# Patient Record
Sex: FEMALE | Race: WHITE | Employment: OTHER | URBAN - METROPOLITAN AREA
[De-identification: names, ages, dates, MRNs, and addresses within clinical notes are randomized per-mention and may not be internally consistent; named-entity substitution may affect disease eponyms.]

---

## 2017-01-09 ENCOUNTER — HOSPITAL ENCOUNTER (OUTPATIENT)
Dept: NUCLEAR MEDICINE | Age: 57
Discharge: HOME | End: 2017-01-09
Attending: INTERNAL MEDICINE | Admitting: INTERNAL MEDICINE

## 2017-01-09 VITALS — WEIGHT: 250 LBS | BODY MASS INDEX: 40.35 KG/M2

## 2017-01-09 DIAGNOSIS — K80.20 CALCULUS OF GALLBLADDER WITHOUT CHOLECYSTITIS WITHOUT OBSTRUCTION: ICD-10-CM

## 2017-01-09 DIAGNOSIS — K80.50 CALCULUS OF BILE DUCT WITHOUT MENTION OF CHOLECYSTITIS OR OBSTRUCTION: ICD-10-CM

## 2017-01-09 RX ADMIN — Medication 6 MILLICURIE: at 13:00

## 2017-01-26 ENCOUNTER — OFFICE VISIT (OUTPATIENT)
Dept: INTERNAL MEDICINE CLINIC | Age: 57
End: 2017-01-26

## 2017-01-26 ENCOUNTER — HOSPITAL ENCOUNTER (OUTPATIENT)
Dept: LAB | Age: 57
Discharge: HOME | End: 2017-01-26
Attending: INTERNAL MEDICINE | Admitting: INTERNAL MEDICINE

## 2017-01-26 VITALS
HEIGHT: 66 IN | DIASTOLIC BLOOD PRESSURE: 74 MMHG | SYSTOLIC BLOOD PRESSURE: 128 MMHG | HEART RATE: 74 BPM | WEIGHT: 237 LBS | BODY MASS INDEX: 38.09 KG/M2

## 2017-01-26 DIAGNOSIS — E66.01 MORBID OBESITY DUE TO EXCESS CALORIES (HCC): ICD-10-CM

## 2017-01-26 DIAGNOSIS — K80.20 CALCULUS OF GALLBLADDER WITHOUT CHOLECYSTITIS WITHOUT OBSTRUCTION: ICD-10-CM

## 2017-01-26 DIAGNOSIS — N17.9 ACUTE ON CHRONIC RENAL FAILURE (HCC): Primary | ICD-10-CM

## 2017-01-26 DIAGNOSIS — E78.2 MIXED HYPERLIPIDEMIA: ICD-10-CM

## 2017-01-26 DIAGNOSIS — N18.9 ACUTE ON CHRONIC RENAL FAILURE (HCC): Primary | ICD-10-CM

## 2017-01-26 DIAGNOSIS — I10 ESSENTIAL HYPERTENSION: ICD-10-CM

## 2017-01-26 LAB
ANION GAP SERPL CALCULATED.3IONS-SCNC: 11 MMOL/L (ref 4–16)
BUN BLDV-MCNC: 19 MG/DL (ref 6–23)
CALCIUM SERPL-MCNC: 9.8 MG/DL (ref 8.3–10.6)
CHLORIDE BLD-SCNC: 101 MMOL/L (ref 99–110)
CO2: 26 MMOL/L (ref 21–32)
CREAT SERPL-MCNC: 1.4 MG/DL (ref 0.6–1.1)
GFR AFRICAN AMERICAN: 47 ML/MIN/1.73M2
GFR NON-AFRICAN AMERICAN: 39 ML/MIN/1.73M2
GLUCOSE BLD-MCNC: 110 MG/DL (ref 70–140)
POTASSIUM SERPL-SCNC: 3.9 MMOL/L (ref 3.5–5.1)
SODIUM BLD-SCNC: 138 MMOL/L (ref 135–145)

## 2017-01-26 PROCEDURE — 99213 OFFICE O/P EST LOW 20 MIN: CPT | Performed by: INTERNAL MEDICINE

## 2017-01-26 ASSESSMENT — ENCOUNTER SYMPTOMS
SHORTNESS OF BREATH: 0
EYE PAIN: 0
NAUSEA: 1
ABDOMINAL PAIN: 1
COUGH: 0
DIARRHEA: 0
SORE THROAT: 0
BACK PAIN: 0
WHEEZING: 0
CONSTIPATION: 0

## 2017-01-27 PROBLEM — Z90.49 S/P LAPAROSCOPIC CHOLECYSTECTOMY: Status: ACTIVE | Noted: 2017-01-27

## 2017-02-06 ENCOUNTER — HOSPITAL ENCOUNTER (OUTPATIENT)
Dept: GENERAL RADIOLOGY | Age: 57
Discharge: HOME | End: 2017-02-06
Attending: NURSE PRACTITIONER | Admitting: NURSE PRACTITIONER

## 2017-02-06 DIAGNOSIS — M54.40 LOW BACK PAIN WITH SCIATICA, SCIATICA LATERALITY UNSPECIFIED, UNSPECIFIED BACK PAIN LATERALITY, UNSPECIFIED CHRONICITY: ICD-10-CM

## 2017-04-11 ENCOUNTER — TELEPHONE (OUTPATIENT)
Dept: INTERNAL MEDICINE CLINIC | Age: 57
End: 2017-04-11

## 2017-04-18 ENCOUNTER — OFFICE VISIT (OUTPATIENT)
Dept: CARDIOLOGY CLINIC | Age: 57
End: 2017-04-18

## 2017-04-18 ENCOUNTER — NURSE ONLY (OUTPATIENT)
Dept: CARDIOLOGY CLINIC | Age: 57
End: 2017-04-18

## 2017-04-18 VITALS
BODY MASS INDEX: 37.38 KG/M2 | DIASTOLIC BLOOD PRESSURE: 72 MMHG | SYSTOLIC BLOOD PRESSURE: 130 MMHG | WEIGHT: 232.6 LBS | HEIGHT: 66 IN | HEART RATE: 80 BPM

## 2017-04-18 DIAGNOSIS — I10 ESSENTIAL HYPERTENSION: ICD-10-CM

## 2017-04-18 DIAGNOSIS — I10 ESSENTIAL HYPERTENSION: Primary | ICD-10-CM

## 2017-04-18 DIAGNOSIS — H81.09 MENIERE'S DISEASE, UNSPECIFIED LATERALITY: ICD-10-CM

## 2017-04-18 DIAGNOSIS — E78.2 MIXED HYPERLIPIDEMIA: ICD-10-CM

## 2017-04-18 DIAGNOSIS — R55 SYNCOPE, UNSPECIFIED SYNCOPE TYPE: Primary | ICD-10-CM

## 2017-04-18 DIAGNOSIS — E66.01 MORBID OBESITY DUE TO EXCESS CALORIES (HCC): ICD-10-CM

## 2017-04-18 DIAGNOSIS — K21.9 GASTROESOPHAGEAL REFLUX DISEASE, ESOPHAGITIS PRESENCE NOT SPECIFIED: ICD-10-CM

## 2017-04-18 PROCEDURE — 99213 OFFICE O/P EST LOW 20 MIN: CPT | Performed by: INTERNAL MEDICINE

## 2017-04-20 ENCOUNTER — TELEPHONE (OUTPATIENT)
Dept: CARDIOLOGY CLINIC | Age: 57
End: 2017-04-20

## 2017-05-05 ENCOUNTER — HOSPITAL ENCOUNTER (OUTPATIENT)
Dept: LAB | Age: 57
Discharge: HOME | End: 2017-05-05
Attending: INTERNAL MEDICINE | Admitting: INTERNAL MEDICINE

## 2017-05-05 LAB
ANION GAP SERPL CALCULATED.3IONS-SCNC: 13 MMOL/L (ref 4–16)
BUN BLDV-MCNC: 20 MG/DL (ref 6–23)
CALCIUM SERPL-MCNC: 9.2 MG/DL (ref 8.3–10.6)
CHLORIDE BLD-SCNC: 100 MMOL/L (ref 99–110)
CO2: 26 MMOL/L (ref 21–32)
CREAT SERPL-MCNC: 1.5 MG/DL (ref 0.6–1.1)
GFR AFRICAN AMERICAN: 44 ML/MIN/1.73M2
GFR NON-AFRICAN AMERICAN: 36 ML/MIN/1.73M2
GLUCOSE FASTING: 106 MG/DL (ref 70–99)
MAGNESIUM: 2 MG/DL (ref 1.8–2.4)
POTASSIUM SERPL-SCNC: 4.4 MMOL/L (ref 3.5–5.1)
SODIUM BLD-SCNC: 139 MMOL/L (ref 135–145)

## 2017-05-09 ENCOUNTER — HOSPITAL ENCOUNTER (OUTPATIENT)
Dept: CARDIOLOGY | Age: 57
Discharge: HOME | End: 2017-05-09
Attending: INTERNAL MEDICINE | Admitting: INTERNAL MEDICINE

## 2017-05-09 VITALS
HEART RATE: 93 BPM | SYSTOLIC BLOOD PRESSURE: 122 MMHG | DIASTOLIC BLOOD PRESSURE: 85 MMHG | RESPIRATION RATE: 21 BRPM | OXYGEN SATURATION: 99 %

## 2017-05-09 LAB
ANION GAP SERPL CALCULATED.3IONS-SCNC: 12 MMOL/L (ref 4–16)
BUN BLDV-MCNC: 23 MG/DL (ref 6–23)
CALCIUM SERPL-MCNC: 9.7 MG/DL (ref 8.3–10.6)
CHLORIDE BLD-SCNC: 101 MMOL/L (ref 99–110)
CO2: 28 MMOL/L (ref 21–32)
CREAT SERPL-MCNC: 1.5 MG/DL (ref 0.6–1.1)
GFR AFRICAN AMERICAN: 44 ML/MIN/1.73M2
GFR NON-AFRICAN AMERICAN: 36 ML/MIN/1.73M2
GLUCOSE BLD-MCNC: 112 MG/DL (ref 70–140)
MAGNESIUM: 2.1 MG/DL (ref 1.8–2.4)
POTASSIUM SERPL-SCNC: 3.8 MMOL/L (ref 3.5–5.1)
SODIUM BLD-SCNC: 141 MMOL/L (ref 135–145)

## 2017-05-09 PROCEDURE — 93228 REMOTE 30 DAY ECG REV/REPORT: CPT | Performed by: INTERNAL MEDICINE

## 2017-05-09 PROCEDURE — 93660 TILT TABLE EVALUATION: CPT | Performed by: INTERNAL MEDICINE

## 2017-05-09 RX ORDER — CITALOPRAM 20 MG/1
20 TABLET ORAL DAILY
Qty: 30 TABLET | Refills: 5 | Status: SHIPPED | OUTPATIENT
Start: 2017-05-09 | End: 2017-05-12

## 2017-05-09 RX ORDER — CITALOPRAM 20 MG/1
20 TABLET ORAL DAILY
Status: DISCONTINUED | OUTPATIENT
Start: 2017-05-09 | End: 2017-05-10 | Stop reason: HOSPADM

## 2017-05-10 ENCOUNTER — TELEPHONE (OUTPATIENT)
Dept: CARDIOLOGY CLINIC | Age: 57
End: 2017-05-10

## 2017-05-12 ENCOUNTER — TELEPHONE (OUTPATIENT)
Dept: CARDIOLOGY CLINIC | Age: 57
End: 2017-05-12

## 2017-05-12 ENCOUNTER — NURSE ONLY (OUTPATIENT)
Dept: CARDIOLOGY CLINIC | Age: 57
End: 2017-05-12

## 2017-05-12 VITALS
HEART RATE: 80 BPM | WEIGHT: 232 LBS | DIASTOLIC BLOOD PRESSURE: 64 MMHG | SYSTOLIC BLOOD PRESSURE: 110 MMHG | BODY MASS INDEX: 37.28 KG/M2 | HEIGHT: 66 IN

## 2017-05-12 DIAGNOSIS — R55 SYNCOPE, UNSPECIFIED SYNCOPE TYPE: Primary | ICD-10-CM

## 2017-05-19 ENCOUNTER — OFFICE VISIT (OUTPATIENT)
Dept: CARDIOLOGY CLINIC | Age: 57
End: 2017-05-19

## 2017-05-19 VITALS
HEIGHT: 66 IN | OXYGEN SATURATION: 98 % | SYSTOLIC BLOOD PRESSURE: 108 MMHG | BODY MASS INDEX: 36.8 KG/M2 | WEIGHT: 229 LBS | DIASTOLIC BLOOD PRESSURE: 72 MMHG | HEART RATE: 81 BPM

## 2017-05-19 DIAGNOSIS — F41.9 ANXIETY: ICD-10-CM

## 2017-05-19 DIAGNOSIS — H81.09 MENIERE'S DISEASE, UNSPECIFIED LATERALITY: ICD-10-CM

## 2017-05-19 DIAGNOSIS — E66.01 MORBID OBESITY DUE TO EXCESS CALORIES (HCC): ICD-10-CM

## 2017-05-19 DIAGNOSIS — K21.9 GASTROESOPHAGEAL REFLUX DISEASE, ESOPHAGITIS PRESENCE NOT SPECIFIED: ICD-10-CM

## 2017-05-19 DIAGNOSIS — I10 ESSENTIAL HYPERTENSION: ICD-10-CM

## 2017-05-19 DIAGNOSIS — E78.2 MIXED HYPERLIPIDEMIA: ICD-10-CM

## 2017-05-19 DIAGNOSIS — Z90.49 S/P LAPAROSCOPIC CHOLECYSTECTOMY: ICD-10-CM

## 2017-05-19 DIAGNOSIS — R55 VASOVAGAL SYNCOPE: Primary | ICD-10-CM

## 2017-05-19 DIAGNOSIS — H91.92 HEARING LOSS, LEFT: ICD-10-CM

## 2017-05-19 PROCEDURE — 99212 OFFICE O/P EST SF 10 MIN: CPT | Performed by: INTERNAL MEDICINE

## 2017-05-19 RX ORDER — PYRIDOSTIGMINE BROMIDE 60 MG/1
30 TABLET ORAL 3 TIMES DAILY
Qty: 45 TABLET | Refills: 5 | Status: SHIPPED | OUTPATIENT
Start: 2017-05-19 | End: 2017-06-21

## 2017-05-22 ENCOUNTER — TELEPHONE (OUTPATIENT)
Dept: CARDIOLOGY CLINIC | Age: 57
End: 2017-05-22

## 2017-05-22 ENCOUNTER — HOSPITAL ENCOUNTER (OUTPATIENT)
Dept: GENERAL RADIOLOGY | Age: 57
Discharge: HOME | End: 2017-05-22
Attending: INTERNAL MEDICINE | Admitting: INTERNAL MEDICINE

## 2017-05-22 DIAGNOSIS — R55 VASOVAGAL SYNCOPE: Primary | ICD-10-CM

## 2017-05-22 LAB
CHOLESTEROL: 248 MG/DL
HDLC SERPL-MCNC: 35 MG/DL
LDL CHOLESTEROL CALCULATED: ABNORMAL MG/DL
TRIGL SERPL-MCNC: 471 MG/DL

## 2017-05-23 ENCOUNTER — TELEPHONE (OUTPATIENT)
Dept: CARDIOLOGY CLINIC | Age: 57
End: 2017-05-23

## 2017-05-24 ENCOUNTER — TELEPHONE (OUTPATIENT)
Dept: CARDIOLOGY CLINIC | Age: 57
End: 2017-05-24

## 2017-06-21 ENCOUNTER — INITIAL CONSULT (OUTPATIENT)
Dept: CARDIOLOGY CLINIC | Age: 57
End: 2017-06-21

## 2017-06-21 VITALS
HEIGHT: 66 IN | WEIGHT: 227.6 LBS | DIASTOLIC BLOOD PRESSURE: 74 MMHG | HEART RATE: 80 BPM | BODY MASS INDEX: 36.58 KG/M2 | SYSTOLIC BLOOD PRESSURE: 114 MMHG

## 2017-06-21 DIAGNOSIS — R55 VASOVAGAL SYNCOPE: Primary | ICD-10-CM

## 2017-06-21 PROCEDURE — 93000 ELECTROCARDIOGRAM COMPLETE: CPT | Performed by: INTERNAL MEDICINE

## 2017-06-21 PROCEDURE — 99204 OFFICE O/P NEW MOD 45 MIN: CPT | Performed by: INTERNAL MEDICINE

## 2017-06-21 RX ORDER — HYDROCHLOROTHIAZIDE 25 MG/1
12.5 TABLET ORAL DAILY
Qty: 30 TABLET | Refills: 3 | Status: SHIPPED | OUTPATIENT
Start: 2017-06-21 | End: 2018-01-08 | Stop reason: SDUPTHER

## 2017-06-21 RX ORDER — POTASSIUM CHLORIDE 750 MG/1
10 TABLET, EXTENDED RELEASE ORAL EVERY OTHER DAY
Qty: 30 TABLET | Refills: 1 | Status: SHIPPED | OUTPATIENT
Start: 2017-06-21 | End: 2018-05-11

## 2017-06-30 ASSESSMENT — ENCOUNTER SYMPTOMS
VOMITING: 0
BACK PAIN: 0
COUGH: 0
COLOR CHANGE: 0
NAUSEA: 0
BLOOD IN STOOL: 0
WHEEZING: 0
EYE PAIN: 0
CHEST TIGHTNESS: 0
DIARRHEA: 0
ABDOMINAL PAIN: 0
CONSTIPATION: 0
SHORTNESS OF BREATH: 0
PHOTOPHOBIA: 0

## 2017-07-12 ENCOUNTER — OFFICE VISIT (OUTPATIENT)
Dept: CARDIOLOGY CLINIC | Age: 57
End: 2017-07-12

## 2017-07-12 VITALS
HEIGHT: 66 IN | WEIGHT: 230.6 LBS | BODY MASS INDEX: 37.06 KG/M2 | SYSTOLIC BLOOD PRESSURE: 122 MMHG | HEART RATE: 80 BPM | DIASTOLIC BLOOD PRESSURE: 82 MMHG

## 2017-07-12 DIAGNOSIS — E78.2 MIXED HYPERLIPIDEMIA: ICD-10-CM

## 2017-07-12 DIAGNOSIS — R55 VASOVAGAL SYNCOPE: Primary | ICD-10-CM

## 2017-07-12 DIAGNOSIS — R06.02 SOB (SHORTNESS OF BREATH): ICD-10-CM

## 2017-07-12 DIAGNOSIS — K21.9 GASTROESOPHAGEAL REFLUX DISEASE, ESOPHAGITIS PRESENCE NOT SPECIFIED: ICD-10-CM

## 2017-07-12 DIAGNOSIS — E66.01 MORBID OBESITY DUE TO EXCESS CALORIES (HCC): ICD-10-CM

## 2017-07-12 DIAGNOSIS — I10 ESSENTIAL HYPERTENSION: ICD-10-CM

## 2017-07-12 DIAGNOSIS — F41.9 ANXIETY: ICD-10-CM

## 2017-07-12 PROCEDURE — 99213 OFFICE O/P EST LOW 20 MIN: CPT | Performed by: INTERNAL MEDICINE

## 2017-07-12 RX ORDER — ICOSAPENT ETHYL 1000 MG/1
2 CAPSULE ORAL 2 TIMES DAILY
Qty: 60 CAPSULE | Refills: 5 | Status: SHIPPED | OUTPATIENT
Start: 2017-07-12 | End: 2017-07-13 | Stop reason: SDUPTHER

## 2017-07-12 RX ORDER — METOPROLOL TARTRATE 50 MG/1
50 TABLET, FILM COATED ORAL 2 TIMES DAILY
Qty: 60 TABLET | Refills: 5 | Status: SHIPPED | OUTPATIENT
Start: 2017-07-12 | End: 2018-01-08 | Stop reason: SDUPTHER

## 2017-07-12 RX ORDER — ATORVASTATIN CALCIUM 40 MG/1
40 TABLET, FILM COATED ORAL DAILY
Qty: 30 TABLET | Refills: 5 | Status: SHIPPED | OUTPATIENT
Start: 2017-07-12 | End: 2018-01-08 | Stop reason: SDUPTHER

## 2017-07-13 RX ORDER — ICOSAPENT ETHYL 1000 MG/1
2 CAPSULE ORAL 2 TIMES DAILY
Qty: 64 CAPSULE | Refills: 0 | COMMUNITY
Start: 2017-07-13 | End: 2018-05-11

## 2017-07-26 ENCOUNTER — OFFICE VISIT (OUTPATIENT)
Dept: CARDIOLOGY CLINIC | Age: 57
End: 2017-07-26

## 2017-07-26 VITALS
HEART RATE: 72 BPM | OXYGEN SATURATION: 98 % | DIASTOLIC BLOOD PRESSURE: 82 MMHG | WEIGHT: 226 LBS | SYSTOLIC BLOOD PRESSURE: 136 MMHG | BODY MASS INDEX: 36.32 KG/M2 | HEIGHT: 66 IN

## 2017-07-26 DIAGNOSIS — R55 VASOVAGAL SYNCOPE: Primary | ICD-10-CM

## 2017-07-26 PROCEDURE — 99213 OFFICE O/P EST LOW 20 MIN: CPT | Performed by: INTERNAL MEDICINE

## 2017-09-27 ENCOUNTER — HOSPITAL ENCOUNTER (OUTPATIENT)
Dept: GENERAL RADIOLOGY | Age: 57
Discharge: HOME | End: 2017-09-27
Attending: INTERNAL MEDICINE | Admitting: INTERNAL MEDICINE

## 2017-09-27 LAB
ALBUMIN SERPL-MCNC: 4.3 GM/DL (ref 3.4–5)
ALP BLD-CCNC: 64 IU/L (ref 40–129)
ALT SERPL-CCNC: 29 U/L (ref 10–40)
AST SERPL-CCNC: 22 IU/L (ref 15–37)
BILIRUB SERPL-MCNC: 0.6 MG/DL (ref 0–1)
BILIRUBIN DIRECT: 0.2 MG/DL (ref 0–0.3)
BILIRUBIN, INDIRECT: 0.4 MG/DL (ref 0–0.7)
CHOLESTEROL: 195 MG/DL
HDLC SERPL-MCNC: 42 MG/DL
LDL CHOLESTEROL DIRECT: 95 MG/DL
TOTAL PROTEIN: 6.9 GM/DL (ref 6.4–8.2)
TRIGL SERPL-MCNC: 260 MG/DL

## 2017-10-02 ENCOUNTER — OFFICE VISIT (OUTPATIENT)
Dept: CARDIOLOGY CLINIC | Age: 57
End: 2017-10-02

## 2017-10-02 VITALS
HEIGHT: 66 IN | DIASTOLIC BLOOD PRESSURE: 80 MMHG | WEIGHT: 231.2 LBS | BODY MASS INDEX: 37.16 KG/M2 | SYSTOLIC BLOOD PRESSURE: 108 MMHG | HEART RATE: 64 BPM

## 2017-10-02 DIAGNOSIS — I10 ESSENTIAL HYPERTENSION: Primary | ICD-10-CM

## 2017-10-02 DIAGNOSIS — E66.01 MORBID OBESITY DUE TO EXCESS CALORIES (HCC): ICD-10-CM

## 2017-10-02 DIAGNOSIS — R55 VASOVAGAL SYNCOPE: ICD-10-CM

## 2017-10-02 DIAGNOSIS — E78.2 MIXED HYPERLIPIDEMIA: ICD-10-CM

## 2017-10-02 DIAGNOSIS — H81.09 MENIERE'S DISEASE, UNSPECIFIED LATERALITY: ICD-10-CM

## 2017-10-02 DIAGNOSIS — K21.9 GASTROESOPHAGEAL REFLUX DISEASE, ESOPHAGITIS PRESENCE NOT SPECIFIED: ICD-10-CM

## 2017-10-02 PROCEDURE — 99213 OFFICE O/P EST LOW 20 MIN: CPT | Performed by: INTERNAL MEDICINE

## 2017-10-02 NOTE — MR AVS SNAPSHOT
After Visit Summary             Manisha Bates   10/2/2017 8:40 AM   Office Visit    Description:  Female : 1960   Provider:  Betzy Muro MD   Department:  Cardiology [address] and Future Appointments         Below is a list of your follow-up and future appointments. This may not be a complete list as you may have made appointments directly with providers that we are not aware of or your providers may have made some for you. Please call your providers to confirm appointments. It is important to keep your appointments. Please bring your current insurance card, photo ID, co-pay, and all medication bottles to your appointment. If self-pay, payment is expected at the time of service. Your To-Do List     Future Appointments Provider Department Dept Phone    2018 9:40 AM Betzy Muro MD Cardiology [facility] 104 [phone]    Please arrive 15 minutes prior to appointment, bring photo ID and insurance card. Information from Your Visit        Department     Name Address Phone Fax    Cardiology [facility] [address] [address] [phone]      You Were Seen for:         Comments    Essential hypertension   [[id number]]         Vital Signs     Blood Pressure Pulse Height Weight Body Mass Index Smoking Status    108/80 64 5' 6\" (1.676 m) 231 lb 3.2 oz (104.9 kg) 37.32 kg/m2 Never Smoker      Additional Information about your Body Mass Index (BMI)           Your BMI as listed above is considered obese (30 or more). BMI is an estimate of body fat, calculated from your height and weight. The higher your BMI, the greater your risk of heart disease, high blood pressure, type 2 diabetes, stroke, gallstones, arthritis, sleep apnea, and certain cancers. BMI is not perfect. It may overestimate body fat in athletes and people who are more muscular.   Even a small weight loss (between 5 and 10 percent of your current weight) by decreasing your calorie intake and becoming more physically active will help lower your risk of developing or worsening diseases associated with obesity. Learn more at: [employer]          Instructions    If you have any questions, please call our office at [phone]  CAD:No  HTN:well controlled on current medical regimen, see list above. Not well controlled needs medication adjustment. - changes in  treatment:   no    VHD: No significant VHD noted  DYSLIPIDEMIA: Patient's profile is at / near Goal.no, Triglycerides are high. HDL is low                                Tolerating current medical regimen wellyes,                                                               See most recent Lab values in Labs section above. OTHER RELEVANT DIAGNOSIS:as noted in patient's active problem list:NC- Syncope. Obesity. TESTS ORDERED: None this visit                                     All previously ordered tests reviewed. ARRHYTHMIAS: None known   MEDICATIONS: [facility]   Office f/u in six months. Primary/secondary prevention is the goal by aggressive risk modification, healthy and therapeutic life style changes for cardiovascular risk reduction. Various goals are discussed and multiple questions answered. Today's Medication Changes          These changes are accurate as of: 10/2/17  9:21 AM.  If you have any questions, ask your nurse or doctor.                STOP taking these medications           ibuprofen 600 MG tablet   Commonly known as:  ADVIL;MOTRIN   Stopped by:  Hugo Valdes MD       senna 8.6 MG Tabs tablet   Commonly known as:  SENOKOT   Stopped by:  Hugo Valdes MD               Your Current Medications Are              Icosapent Ethyl (VASCEPA) 1 g CAPS capsule Take 2 capsules by mouth 2 times daily    metoprolol tartrate (LOPRESSOR) 50 MG tablet Take 1 tablet by mouth 2

## 2017-10-02 NOTE — PATIENT INSTRUCTIONS
If you have any questions, please call our office at [phone]  CAD:No  HTN:well controlled on current medical regimen, see list above. Not well controlled needs medication adjustment. - changes in  treatment:   no    VHD: No significant VHD noted  DYSLIPIDEMIA: Patient's profile is at / near Goal.no, Triglycerides are high. HDL is low                                Tolerating current medical regimen wellyes,                                                               See most recent Lab values in Labs section above. OTHER RELEVANT DIAGNOSIS:as noted in patient's active problem list:NC- Syncope. Obesity. TESTS ORDERED: None this visit                                     All previously ordered tests reviewed. ARRHYTHMIAS: None known   MEDICATIONS: [facility]   Office f/u in six months. Primary/secondary prevention is the goal by aggressive risk modification, healthy and therapeutic life style changes for cardiovascular risk reduction. Various goals are discussed and multiple questions answered.

## 2017-10-02 NOTE — PROGRESS NOTES
OFFICE PROGRESS NOTES      Angela Uribe is a 56 y.o. female who has    CHIEF COMPLAINT AS FOLLOWS:  CHEST PAIN: Patient denies any C/O chest pains at this time. SOB:  Has SOB with exertion but no change over previous noted. LEG EDEMA: No leg edema   PALPITATIONS: Denies any C/O Palpitations                                   DIZZINESS: C/O positional Dizziness when she stands too long but no recent black out spells. SYNCOPE: None   OTHER:                                     HPI: Patient is here for F/U on her H/O Syncope, HTN & Dyslipidemia problems. She does not have any new complaints at this time.     Bear Gutierrez has the following history recorded in care path:  Patient Active Problem List    Diagnosis Date Noted    Vasovagal syncope      Priority: High    S/P laparoscopic cholecystectomy 01/27/2017     Priority: Low    Calculus of gallbladder without cholecystitis without obstruction 01/09/2017     Priority: Low    Precordial pain 09/28/2016     Priority: Low    SOB (shortness of breath) 09/28/2016     Priority: Low    Insomnia 04/18/2016     Priority: Low    Anxiety 02/18/2016     Priority: Low    Osteoarthritis 02/18/2016     Priority: Low    Meniere's disease 02/18/2016     Priority: Low    Hypertension 11/19/2015     Priority: Low    Hyperlipidemia 11/19/2015     Priority: Low    Allergic rhinitis due to pollen 11/19/2015     Priority: Low    Morbid obesity due to excess calories ([address] 11/19/2015     Priority: Low    Hearing loss 11/19/2015     Priority: Low    Hot flashes due to surgical menopause 11/19/2015     Priority: Low    Gastroesophageal reflux disease 11/19/2015     Priority: Low    Chronic back pain 11/19/2015     Priority: Low     Current Outpatient Prescriptions   Medication Sig Dispense Refill    Icosapent Ethyl (VASCEPA) 1 g CAPS capsule Take 2 capsules by mouth 2 times daily 64 capsule 0    cardiolite-normal,EF70%    Hot flashes due to surgical menopause 11/19/2015    Hx of echocardiogram 10/05/2016    EF: >55 %   Mild mitral and tricuspid regurg.  Hyperlipidemia     Hypertension     Meniere disease     Morbid obesity due to excess calories ([facility] [address] 11/19/2015    Sleep apnea 11/19/2015    Tilt table evaluation 05/09/2017     positive for neurocardiogenic syncope     Past Surgical History:   Procedure Laterality Date    APPENDECTOMY      CHOLECYSTECTOMY      2017    HYSTERECTOMY      TONSILLECTOMY        As reviewed   Family History   Problem Relation Age of Onset    Heart Disease Mother     High Blood Pressure Mother     High Cholesterol Mother     Cancer Mother 80     Stomach    Diabetes Mother     Stroke Mother     Heart Disease Father     High Blood Pressure Father     High Cholesterol Father     Diabetes Father     Cancer Sister 46     Lung cancer    Cancer Maternal Grandmother      Stomach    Diabetes Maternal Grandmother     Diabetes Maternal Grandfather     Diabetes Paternal Grandmother     Diabetes Paternal Grandfather     Cancer Maternal Cousin 47     Pancreatic     Social History   Substance Use Topics    Smoking status: Never Smoker    Smokeless tobacco: Never Used      Comment: Reviewed     Alcohol use No      Review of Systems:    Constitutional: Negative for diaphoresis and fatigue  Psychological:Negative for anxiety or depression  HENT: Negative for headaches, nasal congestion, sinus pain or vertigo  Eyes: Negative for visual disturbance.    Endocrine: Negative for polydipsia/polyuria  Respiratory: Negative for shortness of breath  Cardiovascular: Negative for chest pain, dyspnea on exertion, claudication, edema, irregular heartbeat, murmur, palpitations or shortness of breath  Gastrointestinal: Negative for abdominal pain or heartburn  Genito-Urinary: Negative for urinary frequency/urgency  Musculoskeletal: Negative for muscle pain, muscular weakness, negative for pain in arm and leg or swelling in foot and leg  Neurological: Negative for dizziness, headaches, memory loss, numbness/tingling, visual changes, syncope  Dermatological: Negative for rash    Objective:  /80  Pulse 64  Ht 5' 6\" (1.676 m)  Wt 231 lb 3.2 oz (104.9 kg)  BMI 37.32 kg/m2  Wt Readings from Last 3 Encounters:   10/02/17 231 lb 3.2 oz (104.9 kg)   07/26/17 226 lb (102.5 kg)   07/12/17 230 lb 9.6 oz (104.6 kg)     Body mass index is 37.32 kg/(m^2). GENERAL - Alert, oriented, pleasant, in no apparent distress. EYES: No jaundice, no conjunctival pallor. SKIN: It is warm & dry. No rashes. No Echhymosis    HEENT  No clinically significant abnormalities seen. Neck - Supple. No jugular venous distention noted. No carotid bruits. Cardiovascular  Normal S1 and S2 without obvious murmur or gallop. Extremities - No cyanosis, clubbing, or significant edema. Pulmonary  No respiratory distress. No wheezes or rales. Abdomen  No masses, tenderness, or organomegaly. Musculoskeletal  No significant edema. No joint deformities. No muscle wasting. Neurologic  Cranial nerves II through XII are grossly intact. There were no gross focal neurologic abnormalities.     Lab Review   Lab Results   Component Value Date    CKTOTAL 97 09/20/2011    CKMB <0.2 09/20/2011    TROPONINI <0.006 09/20/2011     BNP:  No results found for: BNP  PT/INR:    Lab Results   Component Value Date    INR 1.14 09/20/2011     Lab Results   Component Value Date    LABA1C 5.6 12/07/2015     Lab Results   Component Value Date    WBC 11.3 (H) 05/10/2017    HCT 38.4 05/10/2017    MCV 89.5 05/10/2017     05/10/2017     Lab Results   Component Value Date    CHOL 195 09/27/2017    TRIG 260 (H) 09/27/2017    HDL 42 09/27/2017    LDLCALC NOT VALID WHEN TRIGLYCERIDE >400 MG/DL. 05/22/2017    LDLDIRECT 95 09/27/2017     Lab Results   Component Value Date    ALT 29 09/27/2017    AST 22 09/27/2017     BMP:    Lab

## 2017-10-02 NOTE — LETTER
Cardiology [address] [address]  Phone: [phone]  Fax: [phone]    Eladio Perez MD        October 2, 2017     Codie Mojica NP  [address] [facility] [address]    Patient: Nandini Sneed  MR Number: [id number]  YOB: 1960  Date of Visit: 10/2/2017    Dear Dr. Codie Mojica:    Thank you for the request for consultation for Nicolas Goldberg to me for the evaluation of Syncope. Below are the relevant portions of my assessment and plan of care. If you have questions, please do not hesitate to call me. I look forward to following Charli Jose along with you.     Sincerely,        Eladio Perez MD

## 2018-01-08 DIAGNOSIS — E78.2 MIXED HYPERLIPIDEMIA: ICD-10-CM

## 2018-01-09 RX ORDER — ATORVASTATIN CALCIUM 40 MG/1
40 TABLET, FILM COATED ORAL DAILY
Qty: 90 TABLET | Refills: 3 | Status: SHIPPED | OUTPATIENT
Start: 2018-01-09 | End: 2019-02-12

## 2018-01-09 RX ORDER — METOPROLOL TARTRATE 50 MG/1
50 TABLET, FILM COATED ORAL 2 TIMES DAILY
Qty: 180 TABLET | Refills: 3 | Status: SHIPPED | OUTPATIENT
Start: 2018-01-09 | End: 2019-02-12 | Stop reason: SDUPTHER

## 2018-01-09 RX ORDER — HYDROCHLOROTHIAZIDE 25 MG/1
12.5 TABLET ORAL DAILY
Qty: 90 TABLET | Refills: 3 | Status: SHIPPED | OUTPATIENT
Start: 2018-01-09 | End: 2019-02-12 | Stop reason: SDUPTHER

## 2018-03-09 ENCOUNTER — HOSPITAL ENCOUNTER (OUTPATIENT)
Dept: MAMMOGRAPHY | Age: 58
Discharge: HOME | End: 2018-03-14

## 2018-03-09 DIAGNOSIS — Z12.31 VISIT FOR SCREENING MAMMOGRAM: ICD-10-CM

## 2018-05-11 ENCOUNTER — OFFICE VISIT (OUTPATIENT)
Dept: CARDIOLOGY CLINIC | Age: 58
End: 2018-05-11

## 2018-05-11 VITALS
DIASTOLIC BLOOD PRESSURE: 88 MMHG | HEIGHT: 66 IN | HEART RATE: 68 BPM | BODY MASS INDEX: 40.47 KG/M2 | WEIGHT: 251.8 LBS | SYSTOLIC BLOOD PRESSURE: 120 MMHG

## 2018-05-11 DIAGNOSIS — I10 ESSENTIAL HYPERTENSION: Primary | ICD-10-CM

## 2018-05-11 DIAGNOSIS — R55 VASOVAGAL SYNCOPE: ICD-10-CM

## 2018-05-11 DIAGNOSIS — E66.09 CLASS 2 OBESITY DUE TO EXCESS CALORIES WITHOUT SERIOUS COMORBIDITY WITH BODY MASS INDEX (BMI) OF 37.0 TO 37.9 IN ADULT: ICD-10-CM

## 2018-05-11 DIAGNOSIS — E78.2 MIXED HYPERLIPIDEMIA: ICD-10-CM

## 2018-05-11 DIAGNOSIS — H81.09 MENIERE'S DISEASE, UNSPECIFIED LATERALITY: ICD-10-CM

## 2018-05-11 DIAGNOSIS — K21.9 GASTROESOPHAGEAL REFLUX DISEASE, ESOPHAGITIS PRESENCE NOT SPECIFIED: ICD-10-CM

## 2018-05-11 PROBLEM — E66.812 CLASS 2 OBESITY DUE TO EXCESS CALORIES WITHOUT SERIOUS COMORBIDITY IN ADULT: Status: ACTIVE | Noted: 2018-05-11

## 2018-05-11 PROCEDURE — 3017F COLORECTAL CA SCREEN DOC REV: CPT | Performed by: INTERNAL MEDICINE

## 2018-05-11 PROCEDURE — G8427 DOCREV CUR MEDS BY ELIG CLIN: HCPCS | Performed by: INTERNAL MEDICINE

## 2018-05-11 PROCEDURE — G8417 CALC BMI ABV UP PARAM F/U: HCPCS | Performed by: INTERNAL MEDICINE

## 2018-05-11 PROCEDURE — 99213 OFFICE O/P EST LOW 20 MIN: CPT | Performed by: INTERNAL MEDICINE

## 2018-05-11 PROCEDURE — 1036F TOBACCO NON-USER: CPT | Performed by: INTERNAL MEDICINE

## 2018-05-11 RX ORDER — FENOFIBRATE 145 MG/1
145 TABLET, COATED ORAL DAILY
Qty: 30 TABLET | Refills: 5 | Status: SHIPPED | OUTPATIENT
Start: 2018-05-11 | End: 2019-02-12

## 2018-05-21 ENCOUNTER — TELEPHONE (OUTPATIENT)
Dept: CARDIOLOGY CLINIC | Age: 58
End: 2018-05-21

## 2018-05-24 ENCOUNTER — TELEPHONE (OUTPATIENT)
Dept: CARDIOLOGY CLINIC | Age: 58
End: 2018-05-24

## 2019-02-12 ENCOUNTER — OFFICE VISIT (OUTPATIENT)
Dept: CARDIOLOGY CLINIC | Age: 59
End: 2019-02-12
Payer: COMMERCIAL

## 2019-02-12 VITALS
HEART RATE: 80 BPM | DIASTOLIC BLOOD PRESSURE: 86 MMHG | SYSTOLIC BLOOD PRESSURE: 128 MMHG | HEIGHT: 66 IN | WEIGHT: 260 LBS | BODY MASS INDEX: 41.78 KG/M2

## 2019-02-12 DIAGNOSIS — E78.2 MIXED HYPERLIPIDEMIA: ICD-10-CM

## 2019-02-12 DIAGNOSIS — H81.09 MENIERE'S DISEASE, UNSPECIFIED LATERALITY: ICD-10-CM

## 2019-02-12 DIAGNOSIS — K21.9 GASTROESOPHAGEAL REFLUX DISEASE, ESOPHAGITIS PRESENCE NOT SPECIFIED: ICD-10-CM

## 2019-02-12 DIAGNOSIS — I10 ESSENTIAL HYPERTENSION: Primary | ICD-10-CM

## 2019-02-12 DIAGNOSIS — R55 VASOVAGAL SYNCOPE: ICD-10-CM

## 2019-02-12 DIAGNOSIS — R00.2 PALPITATIONS: ICD-10-CM

## 2019-02-12 DIAGNOSIS — E66.01 MORBID OBESITY DUE TO EXCESS CALORIES (HCC): ICD-10-CM

## 2019-02-12 PROCEDURE — 3017F COLORECTAL CA SCREEN DOC REV: CPT | Performed by: INTERNAL MEDICINE

## 2019-02-12 PROCEDURE — 99214 OFFICE O/P EST MOD 30 MIN: CPT | Performed by: INTERNAL MEDICINE

## 2019-02-12 PROCEDURE — G8417 CALC BMI ABV UP PARAM F/U: HCPCS | Performed by: INTERNAL MEDICINE

## 2019-02-12 PROCEDURE — G8484 FLU IMMUNIZE NO ADMIN: HCPCS | Performed by: INTERNAL MEDICINE

## 2019-02-12 PROCEDURE — 1036F TOBACCO NON-USER: CPT | Performed by: INTERNAL MEDICINE

## 2019-02-12 PROCEDURE — G8427 DOCREV CUR MEDS BY ELIG CLIN: HCPCS | Performed by: INTERNAL MEDICINE

## 2019-02-12 RX ORDER — HYDROCHLOROTHIAZIDE 25 MG/1
12.5 TABLET ORAL DAILY
Qty: 45 TABLET | Refills: 3 | Status: SHIPPED | OUTPATIENT
Start: 2019-02-12 | End: 2019-10-31 | Stop reason: SDUPTHER

## 2019-02-12 RX ORDER — METOPROLOL TARTRATE 50 MG/1
50 TABLET, FILM COATED ORAL 2 TIMES DAILY
Qty: 180 TABLET | Refills: 3 | Status: SHIPPED | OUTPATIENT
Start: 2019-02-12 | End: 2019-10-31 | Stop reason: SDUPTHER

## 2019-02-12 RX ORDER — COLESEVELAM 180 1/1
1875 TABLET ORAL 2 TIMES DAILY WITH MEALS
Qty: 180 TABLET | Refills: 5 | Status: SHIPPED | OUTPATIENT
Start: 2019-02-12 | End: 2019-03-05

## 2019-03-05 ENCOUNTER — TELEPHONE (OUTPATIENT)
Dept: CARDIOLOGY CLINIC | Age: 59
End: 2019-03-05

## 2019-03-05 RX ORDER — EZETIMIBE 10 MG/1
10 TABLET ORAL DAILY
Qty: 30 TABLET | Refills: 5 | Status: SHIPPED | OUTPATIENT
Start: 2019-03-05 | End: 2019-05-09 | Stop reason: SDUPTHER

## 2019-05-09 ENCOUNTER — OFFICE VISIT (OUTPATIENT)
Dept: FAMILY MEDICINE CLINIC | Age: 59
End: 2019-05-09
Payer: COMMERCIAL

## 2019-05-09 VITALS
WEIGHT: 255.6 LBS | HEIGHT: 65 IN | BODY MASS INDEX: 42.59 KG/M2 | HEART RATE: 77 BPM | OXYGEN SATURATION: 95 % | DIASTOLIC BLOOD PRESSURE: 78 MMHG | SYSTOLIC BLOOD PRESSURE: 128 MMHG

## 2019-05-09 DIAGNOSIS — E78.2 MIXED HYPERLIPIDEMIA: ICD-10-CM

## 2019-05-09 DIAGNOSIS — J30.1 SEASONAL ALLERGIC RHINITIS DUE TO POLLEN: ICD-10-CM

## 2019-05-09 DIAGNOSIS — Z13.1 DIABETES MELLITUS SCREENING: ICD-10-CM

## 2019-05-09 DIAGNOSIS — M79.605 ACUTE LEG PAIN, LEFT: ICD-10-CM

## 2019-05-09 DIAGNOSIS — I10 ESSENTIAL HYPERTENSION: Primary | ICD-10-CM

## 2019-05-09 DIAGNOSIS — Z13.29 THYROID DISORDER SCREENING: ICD-10-CM

## 2019-05-09 DIAGNOSIS — K21.9 GASTROESOPHAGEAL REFLUX DISEASE, ESOPHAGITIS PRESENCE NOT SPECIFIED: ICD-10-CM

## 2019-05-09 PROCEDURE — 1036F TOBACCO NON-USER: CPT | Performed by: FAMILY MEDICINE

## 2019-05-09 PROCEDURE — 3017F COLORECTAL CA SCREEN DOC REV: CPT | Performed by: FAMILY MEDICINE

## 2019-05-09 PROCEDURE — G8427 DOCREV CUR MEDS BY ELIG CLIN: HCPCS | Performed by: FAMILY MEDICINE

## 2019-05-09 PROCEDURE — 99214 OFFICE O/P EST MOD 30 MIN: CPT | Performed by: FAMILY MEDICINE

## 2019-05-09 PROCEDURE — G8417 CALC BMI ABV UP PARAM F/U: HCPCS | Performed by: FAMILY MEDICINE

## 2019-05-09 PROCEDURE — 96372 THER/PROPH/DIAG INJ SC/IM: CPT | Performed by: FAMILY MEDICINE

## 2019-05-09 RX ORDER — LORATADINE 10 MG/1
10 TABLET ORAL DAILY
Qty: 30 TABLET | Refills: 5 | Status: SHIPPED | OUTPATIENT
Start: 2019-05-09

## 2019-05-09 RX ORDER — EZETIMIBE 10 MG/1
10 TABLET ORAL DAILY
Qty: 30 TABLET | Refills: 5 | Status: SHIPPED | OUTPATIENT
Start: 2019-05-09 | End: 2019-10-31 | Stop reason: SDUPTHER

## 2019-05-09 RX ORDER — RANITIDINE 150 MG/1
150 TABLET ORAL 2 TIMES DAILY
Qty: 60 TABLET | Refills: 5 | Status: SHIPPED
Start: 2019-05-09 | End: 2020-08-27 | Stop reason: SINTOL

## 2019-05-09 RX ORDER — KETOROLAC TROMETHAMINE 30 MG/ML
60 INJECTION, SOLUTION INTRAMUSCULAR; INTRAVENOUS ONCE
Status: COMPLETED | OUTPATIENT
Start: 2019-05-09 | End: 2019-05-09

## 2019-05-09 RX ADMIN — KETOROLAC TROMETHAMINE 60 MG: 30 INJECTION, SOLUTION INTRAMUSCULAR; INTRAVENOUS at 14:46

## 2019-05-09 ASSESSMENT — PATIENT HEALTH QUESTIONNAIRE - PHQ9
2. FEELING DOWN, DEPRESSED OR HOPELESS: 0
SUM OF ALL RESPONSES TO PHQ QUESTIONS 1-9: 1
1. LITTLE INTEREST OR PLEASURE IN DOING THINGS: 1
SUM OF ALL RESPONSES TO PHQ9 QUESTIONS 1 & 2: 1
SUM OF ALL RESPONSES TO PHQ QUESTIONS 1-9: 1

## 2019-05-09 NOTE — PROGRESS NOTES
Stomach    Diabetes Maternal Grandmother     Diabetes Maternal Grandfather     Diabetes Paternal Grandmother     Diabetes Paternal Grandfather     Cancer Maternal Cousin 47        Pancreatic     Social History     Socioeconomic History    Marital status:      Spouse name: Not on file    Number of children: Not on file    Years of education: Not on file    Highest education level: Not on file   Occupational History    Not on file   Social Needs    Financial resource strain: Not on file    Food insecurity:     Worry: Not on file     Inability: Not on file    Transportation needs:     Medical: Not on file     Non-medical: Not on file   Tobacco Use    Smoking status: Never Smoker    Smokeless tobacco: Never Used    Tobacco comment: Reviewed    Substance and Sexual Activity    Alcohol use: No    Drug use: No    Sexual activity: Not Currently     Partners: Male   Lifestyle    Physical activity:     Days per week: Not on file     Minutes per session: Not on file    Stress: Not on file   Relationships    Social connections:     Talks on phone: Not on file     Gets together: Not on file     Attends [employer] service: Not on file     Active member of club or organization: Not on file     Attends meetings of clubs or organizations: Not on file     Relationship status: Not on file    Intimate partner violence:     Fear of current or ex partner: Not on file     Emotionally abused: Not on file     Physically abused: Not on file     Forced sexual activity: Not on file   Other Topics Concern    Not on file   Social History Narrative    Not on file       Allergies   Allergen Reactions    Celexa [Citalopram] Other (See Comments)     Stomach pain        Atorvastatin      Leg cramps      Mestinon [Pyridostigmine] Itching and Swelling    Penicillins     Sulfa Antibiotics     Tape [place] Tape]     Tricor [Fenofibrate]      angioedema     Current Outpatient Medications   Medication Sig Dispense Refill    loratadine (CLARITIN) 10 MG tablet Take 1 tablet by mouth daily 30 tablet 5    ranitidine (ZANTAC) 150 MG tablet Take 1 tablet by mouth 2 times daily 60 tablet 5    ezetimibe (ZETIA) 10 MG tablet Take 1 tablet by mouth daily 30 tablet 5    metoprolol tartrate (LOPRESSOR) 50 MG tablet Take 1 tablet by mouth 2 times daily 180 tablet 3    hydrochlorothiazide (HYDRODIURIL) 25 MG tablet Take 0.5 tablets by mouth daily 45 tablet 3    aspirin 81 MG chewable tablet Take 81 mg by mouth daily       No current facility-administered medications for this visit. Review of Systems   Constitutional: Negative for activity change, appetite change, chills, fatigue and fever. HENT: Negative for congestion, postnasal drip, sinus pressure and sore throat. Respiratory: Negative for cough, chest tightness, shortness of breath and wheezing. Cardiovascular: Negative for chest pain and leg swelling. Gastrointestinal: Negative for abdominal pain, constipation and diarrhea. Genitourinary: Negative for dysuria and frequency. Musculoskeletal: Negative for back pain and gait problem. Skin: Negative for rash. Neurological: Negative for dizziness, tingling, weakness, numbness and headaches. Psychiatric/Behavioral: Negative for agitation and behavioral problems. The patient is not nervous/anxious.         Lab Results   Component Value Date    WBC 11.3 (H) 05/10/2017    HGB 12.1 (L) 05/10/2017    HCT 38.4 05/10/2017    MCV 89.5 05/10/2017     05/10/2017     Lab Results   Component Value Date     05/10/2017    K 4.1 05/10/2017     05/10/2017    CO2 23 05/10/2017    BUN 22 05/10/2017    CREATININE 1.4 (H) 05/10/2017    GLUCOSE 123 05/10/2017    CALCIUM 9.1 05/10/2017    PROT 6.9 09/27/2017    LABALBU 4.3 09/27/2017    BILITOT 0.6 09/27/2017    ALKPHOS 64 09/27/2017    AST 22 09/27/2017    ALT 29 09/27/2017    LABGLOM 39 (L) 05/10/2017    GFRAA 47 (L) 05/10/2017    AGRATIO 1.7 12/07/2015

## 2019-05-11 ASSESSMENT — ENCOUNTER SYMPTOMS
COUGH: 0
SORE THROAT: 0
WHEEZING: 0
DIARRHEA: 0
SINUS PRESSURE: 0
CHEST TIGHTNESS: 0
CONSTIPATION: 0
BACK PAIN: 0
SHORTNESS OF BREATH: 0
ABDOMINAL PAIN: 0

## 2019-05-13 ENCOUNTER — HOSPITAL ENCOUNTER (OUTPATIENT)
Age: 59
Discharge: HOME | End: 2019-05-13
Payer: COMMERCIAL

## 2019-05-13 ENCOUNTER — HOSPITAL ENCOUNTER (OUTPATIENT)
Dept: GENERAL RADIOLOGY | Age: 59
Discharge: HOME | End: 2019-05-13
Payer: COMMERCIAL

## 2019-05-13 DIAGNOSIS — M79.605 ACUTE LEG PAIN, LEFT: ICD-10-CM

## 2019-05-13 DIAGNOSIS — I10 ESSENTIAL HYPERTENSION: ICD-10-CM

## 2019-05-13 DIAGNOSIS — Z13.1 DIABETES MELLITUS SCREENING: ICD-10-CM

## 2019-05-13 DIAGNOSIS — Z13.29 THYROID DISORDER SCREENING: ICD-10-CM

## 2019-05-13 DIAGNOSIS — E78.2 MIXED HYPERLIPIDEMIA: ICD-10-CM

## 2019-05-13 LAB
ALBUMIN SERPL-MCNC: 4.7 GM/DL (ref 3.4–5)
ALP BLD-CCNC: 63 IU/L (ref 40–129)
ALT SERPL-CCNC: 20 U/L (ref 10–40)
ANION GAP SERPL CALCULATED.3IONS-SCNC: 14 MMOL/L (ref 4–16)
AST SERPL-CCNC: 16 IU/L (ref 15–37)
BASOPHILS ABSOLUTE: 0.1 K/CU MM
BASOPHILS RELATIVE PERCENT: 0.7 % (ref 0–1)
BILIRUB SERPL-MCNC: 0.4 MG/DL (ref 0–1)
BUN BLDV-MCNC: 19 MG/DL (ref 6–23)
CALCIUM SERPL-MCNC: 9.6 MG/DL (ref 8.3–10.6)
CHLORIDE BLD-SCNC: 99 MMOL/L (ref 99–110)
CHOLESTEROL: 508 MG/DL
CO2: 28 MMOL/L (ref 21–32)
CREAT SERPL-MCNC: 1.1 MG/DL (ref 0.6–1.1)
DIFFERENTIAL TYPE: ABNORMAL
EOSINOPHILS ABSOLUTE: 0.1 K/CU MM
EOSINOPHILS RELATIVE PERCENT: 1.3 % (ref 0–3)
ESTIMATED AVERAGE GLUCOSE: 114 MG/DL
FOLATE: >20 NG/ML (ref 3.1–17.5)
GFR AFRICAN AMERICAN: >60 ML/MIN/1.73M2
GFR NON-AFRICAN AMERICAN: 51 ML/MIN/1.73M2
GLUCOSE BLD-MCNC: 95 MG/DL (ref 70–99)
HBA1C MFR BLD: 5.6 % (ref 4.2–6.3)
HCT VFR BLD CALC: 44.9 % (ref 37–47)
HDLC SERPL-MCNC: 38 MG/DL
HEMOGLOBIN: 14 GM/DL (ref 12.5–16)
IMMATURE NEUTROPHIL %: 0.6 % (ref 0–0.43)
LDL CHOLESTEROL DIRECT: 312 MG/DL
LYMPHOCYTES ABSOLUTE: 3 K/CU MM
LYMPHOCYTES RELATIVE PERCENT: 32.8 % (ref 24–44)
MCH RBC QN AUTO: 27.9 PG (ref 27–31)
MCHC RBC AUTO-ENTMCNC: 31.2 % (ref 32–36)
MCV RBC AUTO: 89.4 FL (ref 78–100)
MONOCYTES ABSOLUTE: 0.6 K/CU MM
MONOCYTES RELATIVE PERCENT: 6.5 % (ref 0–4)
NUCLEATED RBC %: 0 %
PDW BLD-RTO: 13.8 % (ref 11.7–14.9)
PLATELET # BLD: 229 K/CU MM (ref 140–440)
PMV BLD AUTO: 9.6 FL (ref 7.5–11.1)
POTASSIUM SERPL-SCNC: 4.1 MMOL/L (ref 3.5–5.1)
RBC # BLD: 5.02 M/CU MM (ref 4.2–5.4)
SEGMENTED NEUTROPHILS ABSOLUTE COUNT: 5.2 K/CU MM
SEGMENTED NEUTROPHILS RELATIVE PERCENT: 58.1 % (ref 36–66)
SODIUM BLD-SCNC: 141 MMOL/L (ref 135–145)
T4 FREE: 1.06 NG/DL (ref 0.9–1.8)
TOTAL IMMATURE NEUTOROPHIL: 0.05 K/CU MM
TOTAL NUCLEATED RBC: 0 K/CU MM
TOTAL PROTEIN: 7.4 GM/DL (ref 6.4–8.2)
TRIGL SERPL-MCNC: 598 MG/DL
TSH HIGH SENSITIVITY: 2.74 UIU/ML (ref 0.27–4.2)
VITAMIN B-12: 676 PG/ML (ref 211–911)
WBC # BLD: 9 K/CU MM (ref 4–10.5)

## 2019-05-13 PROCEDURE — 80053 COMPREHEN METABOLIC PANEL: CPT

## 2019-05-13 PROCEDURE — 80061 LIPID PANEL: CPT

## 2019-05-13 PROCEDURE — 83721 ASSAY OF BLOOD LIPOPROTEIN: CPT

## 2019-05-13 PROCEDURE — 36415 COLL VENOUS BLD VENIPUNCTURE: CPT

## 2019-05-13 PROCEDURE — 85025 COMPLETE CBC W/AUTO DIFF WBC: CPT

## 2019-05-13 PROCEDURE — 73562 X-RAY EXAM OF KNEE 3: CPT

## 2019-05-13 PROCEDURE — 84439 ASSAY OF FREE THYROXINE: CPT

## 2019-05-13 PROCEDURE — 73502 X-RAY EXAM HIP UNI 2-3 VIEWS: CPT

## 2019-05-13 PROCEDURE — 83036 HEMOGLOBIN GLYCOSYLATED A1C: CPT

## 2019-05-13 PROCEDURE — 84443 ASSAY THYROID STIM HORMONE: CPT

## 2019-05-13 PROCEDURE — 82746 ASSAY OF FOLIC ACID SERUM: CPT

## 2019-05-13 PROCEDURE — 82607 VITAMIN B-12: CPT

## 2019-05-16 ENCOUNTER — OFFICE VISIT (OUTPATIENT)
Dept: FAMILY MEDICINE CLINIC | Age: 59
End: 2019-05-16
Payer: COMMERCIAL

## 2019-05-16 VITALS
SYSTOLIC BLOOD PRESSURE: 136 MMHG | OXYGEN SATURATION: 97 % | DIASTOLIC BLOOD PRESSURE: 90 MMHG | WEIGHT: 257.8 LBS | BODY MASS INDEX: 43.57 KG/M2 | HEART RATE: 68 BPM

## 2019-05-16 DIAGNOSIS — M16.12 PRIMARY OSTEOARTHRITIS OF LEFT HIP: ICD-10-CM

## 2019-05-16 DIAGNOSIS — E78.2 MIXED HYPERLIPIDEMIA: Primary | ICD-10-CM

## 2019-05-16 PROCEDURE — 99213 OFFICE O/P EST LOW 20 MIN: CPT | Performed by: FAMILY MEDICINE

## 2019-05-16 PROCEDURE — 1036F TOBACCO NON-USER: CPT | Performed by: FAMILY MEDICINE

## 2019-05-16 PROCEDURE — 3017F COLORECTAL CA SCREEN DOC REV: CPT | Performed by: FAMILY MEDICINE

## 2019-05-16 PROCEDURE — G8417 CALC BMI ABV UP PARAM F/U: HCPCS | Performed by: FAMILY MEDICINE

## 2019-05-16 PROCEDURE — G8427 DOCREV CUR MEDS BY ELIG CLIN: HCPCS | Performed by: FAMILY MEDICINE

## 2019-05-16 RX ORDER — MELOXICAM 15 MG/1
15 TABLET ORAL DAILY
Qty: 30 TABLET | Refills: 5 | Status: SHIPPED | OUTPATIENT
Start: 2019-05-16 | End: 2019-06-12 | Stop reason: SINTOL

## 2019-05-16 RX ORDER — GEMFIBROZIL 600 MG/1
600 TABLET, FILM COATED ORAL 2 TIMES DAILY
Qty: 60 TABLET | Refills: 3 | Status: SHIPPED | OUTPATIENT
Start: 2019-05-16 | End: 2019-06-12 | Stop reason: SINTOL

## 2019-05-16 ASSESSMENT — ENCOUNTER SYMPTOMS
SHORTNESS OF BREATH: 0
COUGH: 0

## 2019-05-16 NOTE — PROGRESS NOTES
 Number of children: Not on file    Years of education: Not on file    Highest education level: Not on file   Occupational History    Not on file   Social Needs    Financial resource strain: Not on file    Food insecurity:     Worry: Not on file     Inability: Not on file    Transportation needs:     Medical: Not on file     Non-medical: Not on file   Tobacco Use    Smoking status: Never Smoker    Smokeless tobacco: Never Used    Tobacco comment: Reviewed    Substance and Sexual Activity    Alcohol use: No    Drug use: No    Sexual activity: Not Currently     Partners: Male   Lifestyle    Physical activity:     Days per week: Not on file     Minutes per session: Not on file    Stress: Not on file   Relationships    Social connections:     Talks on phone: Not on file     Gets together: Not on file     Attends [employer] service: Not on file     Active member of club or organization: Not on file     Attends meetings of clubs or organizations: Not on file     Relationship status: Not on file    Intimate partner violence:     Fear of current or ex partner: Not on file     Emotionally abused: Not on file     Physically abused: Not on file     Forced sexual activity: Not on file   Other Topics Concern    Not on file   Social History Narrative    Not on file       Allergies   Allergen Reactions    Celexa [Citalopram] Other (See Comments)     Stomach pain        Atorvastatin      Leg cramps      Mestinon [Pyridostigmine] Itching and Swelling    Penicillins     Sulfa Antibiotics     Tape [facility] Tape]     Tricor [Fenofibrate]      angioedema     Current Outpatient Medications   Medication Sig Dispense Refill    gemfibrozil (LOPID) 600 MG tablet Take 1 tablet by mouth 2 times daily 60 tablet 3    meloxicam (MOBIC) 15 MG tablet Take 1 tablet by mouth daily 30 tablet 5    loratadine (CLARITIN) 10 MG tablet Take 1 tablet by mouth daily 30 tablet 5    ranitidine (ZANTAC) 150 MG tablet Take 1 tablet by mouth 2 times daily 60 tablet 5    ezetimibe (ZETIA) 10 MG tablet Take 1 tablet by mouth daily 30 tablet 5    metoprolol tartrate (LOPRESSOR) 50 MG tablet Take 1 tablet by mouth 2 times daily 180 tablet 3    hydrochlorothiazide (HYDRODIURIL) 25 MG tablet Take 0.5 tablets by mouth daily 45 tablet 3    aspirin 81 MG chewable tablet Take 81 mg by mouth daily       No current facility-administered medications for this visit. Review of Systems   Constitutional: Negative for activity change, chills and fatigue. Respiratory: Negative for cough and shortness of breath. Cardiovascular: Negative for chest pain and leg swelling. Musculoskeletal: Positive for arthralgias (left hip and left knee). Psychiatric/Behavioral: Negative for agitation. The patient is not nervous/anxious.         Lab Results   Component Value Date    WBC 9.0 05/13/2019    HGB 14.0 05/13/2019    HCT 44.9 05/13/2019    MCV 89.4 05/13/2019     05/13/2019     Lab Results   Component Value Date     05/13/2019    K 4.1 05/13/2019    CL 99 05/13/2019    CO2 28 05/13/2019    BUN 19 05/13/2019    CREATININE 1.1 05/13/2019    GLUCOSE 95 05/13/2019    CALCIUM 9.6 05/13/2019    PROT 7.4 05/13/2019    LABALBU 4.7 05/13/2019    BILITOT 0.4 05/13/2019    ALKPHOS 63 05/13/2019    AST 16 05/13/2019    ALT 20 05/13/2019    LABGLOM 51 (L) 05/13/2019    GFRAA >60 05/13/2019    AGRATIO 1.7 12/07/2015    GLOB 2.7 12/07/2015     Lab Results   Component Value Date    CHOL 508 (H) 05/13/2019    CHOL 195 09/27/2017    CHOL 248 (H) 05/22/2017     Lab Results   Component Value Date    TRIG 598 (H) 05/13/2019    TRIG 260 (H) 09/27/2017    TRIG 471 (H) 05/22/2017     Lab Results   Component Value Date    HDL 38 (L) 05/13/2019    HDL 42 09/27/2017    HDL 35 (L) 05/22/2017     Lab Results   Component Value Date    LDLCALC NOT VALID WHEN TRIGLYCERIDE >400 MG/DL. 05/22/2017    LDLCALC 75 12/07/2015     Lab Results   Component Value Date    LABA1C 5.6 05/13/2019     Lab Results   Component Value Date    [facility] 2.740 05/13/2019         BP (!) 136/90 (Site: Left Upper Arm, Position: Sitting, Cuff Size: Large Adult)   Pulse 68   Wt 257 lb 12.8 oz (116.9 kg)   SpO2 97%   BMI 43.57 kg/m²     BP Readings from Last 3 Encounters:   05/16/19 (!) 136/90   05/09/19 128/78   02/12/19 128/86       Wt Readings from Last 3 Encounters:   05/16/19 257 lb 12.8 oz (116.9 kg)   05/09/19 255 lb 9.6 oz (115.9 kg)   02/12/19 260 lb (117.9 kg)         Physical Exam   Constitutional: She is oriented to person, place, and time. She appears well-developed and well-nourished. No distress. HENT:   Head: Normocephalic and atraumatic. Eyes: Pupils are equal, round, and reactive to light. EOM are normal.   Cardiovascular: Normal rate, regular rhythm and normal heart sounds. No murmur heard. Pulmonary/Chest: Effort normal and breath sounds normal. She has no wheezes. She has no rales. Neurological: She is alert and oriented to person, place, and time. Skin: She is not diaphoretic. Psychiatric: She has a normal mood and affect. ASSESSMENT/ PLAN:    1. Mixed hyperlipidemia  - Start:  - gemfibrozil (LOPID) 600 MG tablet; Take 1 tablet by mouth 2 times daily  Dispense: 60 tablet; Refill: 3  - tried the statin before could not tolerted    2. Primary osteoarthritis of left hip  - Alanna Ma DO, Orthopedic Surgery, [place]  - start:  - meloxicam (MOBIC) 15 MG tablet; Take 1 tablet by mouth daily  Dispense: 30 tablet; Refill: 5                - Appropriateprescription are addressed. - After visit quianay provided. - Questions answered and patient verbalizes understanding.  - Call for any problem, questions, or concerns.  - RTC if symptoms worse. Return in about 3 months (around 8/16/2019).

## 2019-06-12 ENCOUNTER — OFFICE VISIT (OUTPATIENT)
Dept: ORTHOPEDIC SURGERY | Age: 59
End: 2019-06-12
Payer: COMMERCIAL

## 2019-06-12 VITALS — BODY MASS INDEX: 42.65 KG/M2 | RESPIRATION RATE: 16 BRPM | HEIGHT: 65 IN | WEIGHT: 256 LBS

## 2019-06-12 DIAGNOSIS — M16.12 PRIMARY OSTEOARTHRITIS OF LEFT HIP: Primary | ICD-10-CM

## 2019-06-12 PROCEDURE — G8417 CALC BMI ABV UP PARAM F/U: HCPCS | Performed by: PHYSICIAN ASSISTANT

## 2019-06-12 PROCEDURE — G8427 DOCREV CUR MEDS BY ELIG CLIN: HCPCS | Performed by: PHYSICIAN ASSISTANT

## 2019-06-12 PROCEDURE — 99242 OFF/OP CONSLTJ NEW/EST SF 20: CPT | Performed by: PHYSICIAN ASSISTANT

## 2019-06-12 ASSESSMENT — ENCOUNTER SYMPTOMS
RESPIRATORY NEGATIVE: 1
GASTROINTESTINAL NEGATIVE: 1
EYES NEGATIVE: 1
BACK PAIN: 1

## 2019-06-12 NOTE — PATIENT INSTRUCTIONS
Continue to take Aleve  Physical therapy ordered to help with range of motion of the left hip and muscle tightness  Call office if you want to proceed with an injection or discuss surgery otherwise follow-up as needed

## 2019-06-12 NOTE — PROGRESS NOTES
Review of Systems   Constitutional: Negative. HENT: Negative. Eyes: Negative. Respiratory: Negative. Cardiovascular: Negative. Gastrointestinal: Negative. Genitourinary: Negative. Musculoskeletal: Positive for arthralgias, back pain, gait problem, joint swelling and myalgias. Skin: Negative. Psychiatric/Behavioral: Negative. Kirill Heaton is a 66y.o. year old female who complains of Left leg pain. Onset about 1 year. She states the pain is in the anterior thigh, anterior tibia and also into the groin. Depending on what she is doing her pain fluctuates from a 4/10 to an 8/10. She does take over-the-counter medication with no significant relief. She states that her muscles feel tight and her pain is worse with activity. She did do an oral steroid which did not help the pain significantly but did help some. She denies any numbness or tingling or any prior surgery on the hip. Referred by Rashaun Knight MD for Left hip/leg pain      Past Medical History:   Diagnosis Date    Allergic rhinitis due to pollen 11/19/2015    Blood transfusion reaction     Chronic back pain     Depression     Gastroesophageal reflux disease 11/19/2015    Hearing loss 11/19/2015    History of cardiac monitoring 04/18/2017    14 day event monitor. Sinus Rhythm    History of nuclear stress test 09/28/2016    cardiolite-normal,EF70%    Hot flashes due to surgical menopause 11/19/2015    Hx of echocardiogram 10/05/2016    EF: >55 %   Mild mitral and tricuspid regurg.      Hyperlipidemia     Hypertension     Meniere disease     Morbid obesity due to excess calories ([address] 11/19/2015    Sleep apnea 11/19/2015    sleep study negative    Tilt table evaluation 05/09/2017     positive for neurocardiogenic syncope       Past Surgical History:   Procedure Laterality Date    APPENDECTOMY      CHOLECYSTECTOMY      2017    HYSTERECTOMY      TONSILLECTOMY         Family History   Problem Relation Age of Onset    Heart Disease Mother     High Blood Pressure Mother     High Cholesterol Mother     Cancer Mother 80        Stomach    Diabetes Mother     Stroke Mother     Heart Disease Father     High Blood Pressure Father     High Cholesterol Father     Diabetes Father     Cancer Sister 46        Lung cancer    Cancer Maternal Grandmother         Stomach    Diabetes Maternal Grandmother     Diabetes Maternal Grandfather     Diabetes Paternal Grandmother     Diabetes Paternal Grandfather     Cancer Maternal Cousin 47        Pancreatic       Social History     Socioeconomic History    Marital status:      Spouse name: None    Number of children: None    Years of education: None    Highest education level: None   Occupational History    None   Social Needs    Financial resource strain: None    Food insecurity:     Worry: None     Inability: None    Transportation needs:     Medical: None     Non-medical: None   Tobacco Use    Smoking status: Never Smoker    Smokeless tobacco: Never Used    Tobacco comment: Reviewed    Substance and Sexual Activity    Alcohol use: No    Drug use: No    Sexual activity: Not Currently     Partners: Male   Lifestyle    Physical activity:     Days per week: None     Minutes per session: None    Stress: None   Relationships    Social connections:     Talks on phone: None     Gets together: None     Attends [employer] service: None     Active member of club or organization: None     Attends meetings of clubs or organizations: None     Relationship status: None    Intimate partner violence:     Fear of current or ex partner: None     Emotionally abused: None     Physically abused: None     Forced sexual activity: None   Other Topics Concern    None   Social History Narrative    None       Current Outpatient Medications   Medication Sig Dispense Refill    loratadine (CLARITIN) 10 MG tablet Take 1 tablet by mouth daily 30 tablet 5    degrees  Strength:5-/5 hip flexion, 5/5 hip abduction      Outside record review: ER records and historical medical records    I did personally review the images of the left hip that were taken in May 2019 which showed severe arthritic change within the left hip, subchondral sclerosis and subchondral cyst formation both in the femoral head in the acetabulum. The right hip did not show any significant arthritic change. Left hip x-rays are consistent with possible avascular necrosis. Imaging studies:  X-ray Left hip/pelvis 5/13/19  Impression   Severe left hip osteoarthritis.       No acute fracture or dislocation.         No x-rays taken in the office today. Impression:  left hip DJD, severe      Plan:    The most likely impression, expected course, diagnostic and treatment options were discussed, will proceed with:  Natural history and expected course discussed. Questions answered. We discussed both hip replacement or intra-articular injection as potential treatments at this time however she did not want to proceed with hip replacement at this time and she wanted to hold off on the hip injection. Continue to take Aleve  Physical therapy ordered to help with range of motion of the left hip and muscle tightness  Call office if you want to proceed with an injection or discuss surgery otherwise follow-up as needed    We greatly appreciate the referral from Giovanni Cockayne, MD, and will send a copy of this note to her office.

## 2019-08-02 ENCOUNTER — OFFICE VISIT (OUTPATIENT)
Dept: CARDIOLOGY CLINIC | Age: 59
End: 2019-08-02
Payer: COMMERCIAL

## 2019-08-02 VITALS
HEART RATE: 78 BPM | DIASTOLIC BLOOD PRESSURE: 70 MMHG | BODY MASS INDEX: 42.83 KG/M2 | WEIGHT: 257.4 LBS | SYSTOLIC BLOOD PRESSURE: 118 MMHG | RESPIRATION RATE: 16 BRPM

## 2019-08-02 DIAGNOSIS — R55 VASOVAGAL SYNCOPE: ICD-10-CM

## 2019-08-02 DIAGNOSIS — E78.2 MIXED HYPERLIPIDEMIA: ICD-10-CM

## 2019-08-02 DIAGNOSIS — I10 ESSENTIAL HYPERTENSION: Primary | ICD-10-CM

## 2019-08-02 PROCEDURE — 3017F COLORECTAL CA SCREEN DOC REV: CPT | Performed by: NURSE PRACTITIONER

## 2019-08-02 PROCEDURE — 1036F TOBACCO NON-USER: CPT | Performed by: NURSE PRACTITIONER

## 2019-08-02 PROCEDURE — 99213 OFFICE O/P EST LOW 20 MIN: CPT | Performed by: NURSE PRACTITIONER

## 2019-08-02 PROCEDURE — G8417 CALC BMI ABV UP PARAM F/U: HCPCS | Performed by: NURSE PRACTITIONER

## 2019-08-02 PROCEDURE — G8427 DOCREV CUR MEDS BY ELIG CLIN: HCPCS | Performed by: NURSE PRACTITIONER

## 2019-08-02 NOTE — PROGRESS NOTES
History:   Diagnosis Date    Allergic rhinitis due to pollen 11/19/2015    Blood transfusion reaction     Chronic back pain     Depression     Gastroesophageal reflux disease 11/19/2015    Hearing loss 11/19/2015    History of cardiac monitoring 04/18/2017    14 day event monitor. Sinus Rhythm    History of nuclear stress test 09/28/2016    cardiolite-normal,EF70%    Hot flashes due to surgical menopause 11/19/2015    Hx of echocardiogram 10/05/2016    EF: >55 %   Mild mitral and tricuspid regurg.      Hyperlipidemia     Hypertension     Meniere disease     Morbid obesity due to excess calories ([address] 11/19/2015    Sleep apnea 11/19/2015    sleep study negative    Tilt table evaluation 05/09/2017     positive for neurocardiogenic syncope     Past Surgical History:   Procedure Laterality Date    APPENDECTOMY      CHOLECYSTECTOMY      2017    HYSTERECTOMY      TONSILLECTOMY       Family History   Problem Relation Age of Onset    Heart Disease Mother     High Blood Pressure Mother     High Cholesterol Mother     Cancer Mother 80        Stomach    Diabetes Mother     Stroke Mother     Heart Disease Father     High Blood Pressure Father     High Cholesterol Father     Diabetes Father     Cancer Sister 46        Lung cancer    Cancer Maternal Grandmother         Stomach    Diabetes Maternal Grandmother     Diabetes Maternal Grandfather     Diabetes Paternal Grandmother     Diabetes Paternal Grandfather     Cancer Maternal Cousin 47        Pancreatic     Social History     Tobacco Use    Smoking status: Never Smoker    Smokeless tobacco: Never Used    Tobacco comment: Reviewed    Substance Use Topics    Alcohol use: No        Review of Systems - History obtained from the patient  General: negative for - fatigue, malaise, night sweats, weight gain  Psychological: negative for - anxiety, depression, sleep disturbances  Ophthalmic: negative for - blurry vision, loss of vision  ENT: Affect- normal mood and pleasant       DATA:  Lab Results   Component Value Date    CKTOTAL 97 09/20/2011    CKMB <0.2 09/20/2011    TROPONINI <0.006 09/20/2011     BNP:  No results found for: BNP  PT/INR:  No results found for: PTINR  Lab Results   Component Value Date    LABA1C 5.6 05/13/2019    LABA1C 5.6 12/07/2015     Lab Results   Component Value Date    CHOL 508 (H) 05/13/2019    TRIG 598 (H) 05/13/2019    HDL 38 (L) 05/13/2019    LDLCALC NOT VALID WHEN TRIGLYCERIDE >400 MG/DL. 05/22/2017    LDLDIRECT 312 (H) 05/13/2019     Lab Results   Component Value Date    ALT 20 05/13/2019    AST 16 05/13/2019     TSH:  No results found for: TSH      Assessment/ Plan:     Vasovagal syncope  · Only happens when she is standing in one spot. · Encouraged to move around and wear compression stockings as tolerated. Hypertension   Controlled   To continue Beta blocker, Diuretic   advised low salt diet    Last echo 2016 showed EF of 55% with mild MVR and TVR      Hyperlipidemia   Lipid panel reviewed   Patient is not at goal   Patient is on a Fibric Acid    information given about Omega-3-acid, PCSK9 inhibitor          Patient seen, interviewed and examined. Testing was reviewed. Patient is encouraged to exercise even a brisk walk for 30 minutes at least 3 to 4 times a week. Lifestyle and risk factor modificatons discussed. Various goals are discussed and questions answered. Continue current medications. Appropriate prescriptions are addressed. Questions answered and patient verbalizes understanding. Call for any problems, questions, or concerns.     Pt is to follow up in 6 months for Cardiac management    Electronically signed by LIAN Calix CNP on 8/2/2019 at 3:14 PM

## 2019-09-11 ENCOUNTER — OFFICE VISIT (OUTPATIENT)
Dept: FAMILY MEDICINE CLINIC | Age: 59
End: 2019-09-11
Payer: COMMERCIAL

## 2019-09-11 VITALS
HEART RATE: 71 BPM | OXYGEN SATURATION: 97 % | SYSTOLIC BLOOD PRESSURE: 125 MMHG | DIASTOLIC BLOOD PRESSURE: 85 MMHG | WEIGHT: 260.2 LBS | BODY MASS INDEX: 43.3 KG/M2

## 2019-09-11 DIAGNOSIS — M16.12 PRIMARY OSTEOARTHRITIS OF LEFT HIP: ICD-10-CM

## 2019-09-11 DIAGNOSIS — E78.2 MIXED HYPERLIPIDEMIA: ICD-10-CM

## 2019-09-11 DIAGNOSIS — I10 ESSENTIAL HYPERTENSION: Primary | ICD-10-CM

## 2019-09-11 DIAGNOSIS — K21.9 GASTROESOPHAGEAL REFLUX DISEASE, ESOPHAGITIS PRESENCE NOT SPECIFIED: ICD-10-CM

## 2019-09-11 PROCEDURE — 3017F COLORECTAL CA SCREEN DOC REV: CPT | Performed by: FAMILY MEDICINE

## 2019-09-11 PROCEDURE — 99214 OFFICE O/P EST MOD 30 MIN: CPT | Performed by: FAMILY MEDICINE

## 2019-09-11 PROCEDURE — G8427 DOCREV CUR MEDS BY ELIG CLIN: HCPCS | Performed by: FAMILY MEDICINE

## 2019-09-11 PROCEDURE — 1036F TOBACCO NON-USER: CPT | Performed by: FAMILY MEDICINE

## 2019-09-11 PROCEDURE — G8417 CALC BMI ABV UP PARAM F/U: HCPCS | Performed by: FAMILY MEDICINE

## 2019-09-11 RX ORDER — ICOSAPENT ETHYL 1000 MG/1
2 CAPSULE ORAL 2 TIMES DAILY
Qty: 120 CAPSULE | Refills: 3 | Status: SHIPPED | OUTPATIENT
Start: 2019-09-11 | End: 2020-10-08

## 2019-09-11 ASSESSMENT — ENCOUNTER SYMPTOMS
SHORTNESS OF BREATH: 0
COUGH: 0

## 2019-09-11 NOTE — PROGRESS NOTES
Christine Vaughn  1960 09/11/19    Chief Complaint   Patient presents with    3 Month Follow-Up    Hypertension    Hyperlipidemia    Gastroesophageal Reflux    Allergic Rhinitis            Patient here  For 3 months f/u regarding HTN, HLD, GERD, and allergic rhinitis. Patient already saw the orthopedic and told her need injection or need replacement, The patient is taking hypertensive medications compliantly without side effects. Denies chest pain, dyspnea, edema, or TIA's. Past Medical History:   Diagnosis Date    Allergic rhinitis due to pollen 11/19/2015    Blood transfusion reaction     Chronic back pain     Depression     Gastroesophageal reflux disease 11/19/2015    Hearing loss 11/19/2015    History of cardiac monitoring 04/18/2017    14 day event monitor. Sinus Rhythm    History of nuclear stress test 09/28/2016    cardiolite-normal,EF70%    Hot flashes due to surgical menopause 11/19/2015    Hx of echocardiogram 10/05/2016    EF: >55 %   Mild mitral and tricuspid regurg.      Hyperlipidemia     Hypertension     Meniere disease     Morbid obesity due to excess calories ([address] 11/19/2015    Sleep apnea 11/19/2015    sleep study negative    Tilt table evaluation 05/09/2017     positive for neurocardiogenic syncope     Past Surgical History:   Procedure Laterality Date    APPENDECTOMY      CHOLECYSTECTOMY      2017    HYSTERECTOMY      TONSILLECTOMY       Family History   Problem Relation Age of Onset    Heart Disease Mother     High Blood Pressure Mother     High Cholesterol Mother     Cancer Mother 80        Stomach    Diabetes Mother     Stroke Mother     Heart Disease Father     High Blood Pressure Father     High Cholesterol Father     Diabetes Father     Cancer Sister 46        Lung cancer    Cancer Maternal Grandmother         Stomach    Diabetes Maternal Grandmother     Diabetes Maternal Grandfather     Diabetes Paternal Grandmother     Diabetes Take 2 capsules by mouth 2 times daily 120 capsule 3    loratadine (CLARITIN) 10 MG tablet Take 1 tablet by mouth daily 30 tablet 5    ranitidine (ZANTAC) 150 MG tablet Take 1 tablet by mouth 2 times daily 60 tablet 5    ezetimibe (ZETIA) 10 MG tablet Take 1 tablet by mouth daily 30 tablet 5    metoprolol tartrate (LOPRESSOR) 50 MG tablet Take 1 tablet by mouth 2 times daily 180 tablet 3    hydrochlorothiazide (HYDRODIURIL) 25 MG tablet Take 0.5 tablets by mouth daily 45 tablet 3    aspirin 81 MG chewable tablet Take 81 mg by mouth daily       No current facility-administered medications for this visit. Review of Systems   Constitutional: Negative for activity change, chills and fatigue. Respiratory: Negative for cough and shortness of breath. Cardiovascular: Negative for chest pain and leg swelling. Musculoskeletal: Positive for arthralgias (left hip and left knee). Skin: Negative for rash. Neurological: Negative for dizziness and headaches. Psychiatric/Behavioral: Negative for agitation and sleep disturbance. The patient is not nervous/anxious.         Lab Results   Component Value Date    WBC 9.0 05/13/2019    HGB 14.0 05/13/2019    HCT 44.9 05/13/2019    MCV 89.4 05/13/2019     05/13/2019     Lab Results   Component Value Date     05/13/2019    K 4.1 05/13/2019    CL 99 05/13/2019    CO2 28 05/13/2019    BUN 19 05/13/2019    CREATININE 1.1 05/13/2019    GLUCOSE 95 05/13/2019    CALCIUM 9.6 05/13/2019    PROT 7.4 05/13/2019    LABALBU 4.7 05/13/2019    BILITOT 0.4 05/13/2019    ALKPHOS 63 05/13/2019    AST 16 05/13/2019    ALT 20 05/13/2019    LABGLOM 51 (L) 05/13/2019    GFRAA >60 05/13/2019    AGRATIO 1.7 12/07/2015    GLOB 2.7 12/07/2015     Lab Results   Component Value Date    CHOL 508 (H) 05/13/2019    CHOL 195 09/27/2017    CHOL 248 (H) 05/22/2017     Lab Results   Component Value Date    TRIG 598 (H) 05/13/2019    TRIG 260 (H) 09/27/2017    TRIG 471 (H) 05/22/2017

## 2019-09-12 ENCOUNTER — TELEPHONE (OUTPATIENT)
Dept: FAMILY MEDICINE CLINIC | Age: 59
End: 2019-09-12

## 2019-09-12 RX ORDER — OMEGA-3-ACID ETHYL ESTERS 1 G/1
2 CAPSULE, LIQUID FILLED ORAL 2 TIMES DAILY
Qty: 60 CAPSULE | Refills: 3 | Status: SHIPPED | OUTPATIENT
Start: 2019-09-12 | End: 2020-02-03

## 2019-10-31 DIAGNOSIS — E78.2 MIXED HYPERLIPIDEMIA: ICD-10-CM

## 2019-10-31 RX ORDER — HYDROCHLOROTHIAZIDE 25 MG/1
12.5 TABLET ORAL DAILY
Qty: 45 TABLET | Refills: 3 | Status: SHIPPED | OUTPATIENT
Start: 2019-10-31 | End: 2020-07-06 | Stop reason: SDUPTHER

## 2019-10-31 RX ORDER — EZETIMIBE 10 MG/1
10 TABLET ORAL DAILY
Qty: 30 TABLET | Refills: 5 | Status: SHIPPED | OUTPATIENT
Start: 2019-10-31 | End: 2020-07-06 | Stop reason: SDUPTHER

## 2019-10-31 RX ORDER — METOPROLOL TARTRATE 50 MG/1
50 TABLET, FILM COATED ORAL 2 TIMES DAILY
Qty: 180 TABLET | Refills: 3 | Status: SHIPPED | OUTPATIENT
Start: 2019-10-31 | End: 2020-07-06 | Stop reason: SDUPTHER

## 2020-07-06 ENCOUNTER — VIRTUAL VISIT (OUTPATIENT)
Dept: FAMILY MEDICINE CLINIC | Age: 60
End: 2020-07-06
Payer: COMMERCIAL

## 2020-07-06 PROCEDURE — 99214 OFFICE O/P EST MOD 30 MIN: CPT | Performed by: FAMILY MEDICINE

## 2020-07-06 PROCEDURE — 1036F TOBACCO NON-USER: CPT | Performed by: FAMILY MEDICINE

## 2020-07-06 PROCEDURE — G8417 CALC BMI ABV UP PARAM F/U: HCPCS | Performed by: FAMILY MEDICINE

## 2020-07-06 PROCEDURE — 3017F COLORECTAL CA SCREEN DOC REV: CPT | Performed by: FAMILY MEDICINE

## 2020-07-06 PROCEDURE — G8428 CUR MEDS NOT DOCUMENT: HCPCS | Performed by: FAMILY MEDICINE

## 2020-07-06 RX ORDER — METOPROLOL TARTRATE 50 MG/1
50 TABLET, FILM COATED ORAL 2 TIMES DAILY
Qty: 180 TABLET | Refills: 3 | Status: SHIPPED | OUTPATIENT
Start: 2020-07-06 | End: 2021-07-15 | Stop reason: SDUPTHER

## 2020-07-06 RX ORDER — EZETIMIBE 10 MG/1
10 TABLET ORAL DAILY
Qty: 90 TABLET | Refills: 3 | Status: SHIPPED | OUTPATIENT
Start: 2020-07-06 | End: 2021-10-26 | Stop reason: SDUPTHER

## 2020-07-06 RX ORDER — HYDROCHLOROTHIAZIDE 25 MG/1
12.5 TABLET ORAL DAILY
Qty: 45 TABLET | Refills: 3 | Status: SHIPPED | OUTPATIENT
Start: 2020-07-06 | End: 2021-08-10 | Stop reason: SDUPTHER

## 2020-07-06 NOTE — PROGRESS NOTES
Alyse Alatorre is a 64 y.o. female evaluated via telephone on 7/6/2020. Consent:  She and/or health care decision maker is aware that that she may receive a bill for this telephone service, depending on her insurance coverage, and has provided verbal consent to proceed: Yes      Documentation:  I communicated with the patient:      Chief Complaint   Patient presents with    Other     10 months f/u regarding HTN, HLD, GERD and allergic rhinitis. Patient for 10 months f/u regarding HTN, HLD, GERD, and allergic rhinitis. The patient is taking hypertensive medications compliantly without side effects. Denies chest pain, dyspnea, edema, or TIA's. She is taking her cholesterol medications, her GERD under control, patient doing fine and has no complaints. Past Medical History:   Diagnosis Date    Allergic rhinitis due to pollen 11/19/2015    Blood transfusion reaction     Chronic back pain     Depression     Gastroesophageal reflux disease 11/19/2015    Hearing loss 11/19/2015    History of cardiac monitoring 04/18/2017    14 day event monitor. Sinus Rhythm    History of nuclear stress test 09/28/2016    cardiolite-normal,EF70%    Hot flashes due to surgical menopause 11/19/2015    Hx of echocardiogram 10/05/2016    EF: >55 %   Mild mitral and tricuspid regurg.      Hyperlipidemia     Hypertension     Meniere disease     Morbid obesity due to excess calories ([facility] [address] 11/19/2015    Sleep apnea 11/19/2015    sleep study negative    Tilt table evaluation 05/09/2017     positive for neurocardiogenic syncope     Family History   Problem Relation Age of Onset    Heart Disease Mother     High Blood Pressure Mother     High Cholesterol Mother     Cancer Mother 80        Stomach    Diabetes Mother     Stroke Mother     Heart Disease Father     High Blood Pressure Father     High Cholesterol Father     Diabetes Father     Cancer Sister 46        Lung cancer    Cancer Maternal relevant concern      Courtney Griggs

## 2020-08-19 NOTE — ASSESSMENT & PLAN NOTE
· Only happens when she is standing in one spot. · Encouraged to move around and wear compression stockings as tolerated. Cheiloplasty (Less Than 50%) Text: A decision was made to reconstruct the defect with a  cheiloplasty.  The defect was undermined extensively.  Additional obicularis oris muscle was excised with a 15 blade scalpel.  The defect was converted into a full thickness wedge, of less than 50% of the vertical height of the lip, to facilite a better cosmetic result.  Small vessels were then tied off with 5-0 monocyrl. The obicularis oris, superficial fascia, adipose and dermis were then reapproximated.  After the deeper layers were approximated the epidermis was reapproximated with particular care given to realign the vermilion border.

## 2020-08-27 ENCOUNTER — OFFICE VISIT (OUTPATIENT)
Dept: ORTHOPEDIC SURGERY | Age: 60
End: 2020-08-27
Payer: COMMERCIAL

## 2020-08-27 VITALS — HEIGHT: 65 IN | RESPIRATION RATE: 18 BRPM | BODY MASS INDEX: 41.65 KG/M2 | WEIGHT: 250 LBS

## 2020-08-27 PROCEDURE — 1036F TOBACCO NON-USER: CPT | Performed by: ORTHOPAEDIC SURGERY

## 2020-08-27 PROCEDURE — G8427 DOCREV CUR MEDS BY ELIG CLIN: HCPCS | Performed by: ORTHOPAEDIC SURGERY

## 2020-08-27 PROCEDURE — G8417 CALC BMI ABV UP PARAM F/U: HCPCS | Performed by: ORTHOPAEDIC SURGERY

## 2020-08-27 PROCEDURE — 3017F COLORECTAL CA SCREEN DOC REV: CPT | Performed by: ORTHOPAEDIC SURGERY

## 2020-08-27 PROCEDURE — 99214 OFFICE O/P EST MOD 30 MIN: CPT | Performed by: ORTHOPAEDIC SURGERY

## 2020-08-27 RX ORDER — TRAMADOL HYDROCHLORIDE 50 MG/1
50 TABLET ORAL EVERY 8 HOURS PRN
Qty: 30 TABLET | Refills: 0 | Status: SHIPPED | OUTPATIENT
Start: 2020-08-27 | End: 2020-09-10

## 2020-08-27 ASSESSMENT — ENCOUNTER SYMPTOMS
VOMITING: 0
EYE PAIN: 0
COLOR CHANGE: 0
CHEST TIGHTNESS: 0
SHORTNESS OF BREATH: 0
EYE REDNESS: 0
WHEEZING: 0

## 2020-08-27 NOTE — PROGRESS NOTES
8/27/2020   Chief Complaint   Patient presents with    Hip Pain     left         History of Present Illness:                             Trang Ellison is a 64 y.o. female who presents today for severe left hip pain, stiffness, and dysfunction. She has had progressive worsening symptoms at the left hip over the last several years. She states it has become extremely severe and debilitating over the last 6 months. She has pain is constant and severe with any attempted movement or weightbearing activities. She requires the use of a cane or walker for any ambulatory activities. She has become very sedentary and has difficulty with performing simple activities of daily living. She has difficulty getting up from a seated position, getting in and out of her car, navigating stairs, and getting dressed because of the pain and stiffness in her hip. She cannot get comfortable and has difficulty sleeping at night. Medical History  Patient's medications, allergies, past medical, surgical, social and family histories were reviewed and updated as appropriate. Past Medical History:   Diagnosis Date    Allergic rhinitis due to pollen 11/19/2015    Blood transfusion reaction     Chronic back pain     Depression     Gastroesophageal reflux disease 11/19/2015    Hearing loss 11/19/2015    History of cardiac monitoring 04/18/2017    14 day event monitor. Sinus Rhythm    History of nuclear stress test 09/28/2016    cardiolite-normal,EF70%    Hot flashes due to surgical menopause 11/19/2015    Hx of echocardiogram 10/05/2016    EF: >55 %   Mild mitral and tricuspid regurg.      Hyperlipidemia     Hypertension     Meniere disease     Morbid obesity due to excess calories ([facility] [address] 11/19/2015    Sleep apnea 11/19/2015    sleep study negative    Tilt table evaluation 05/09/2017     positive for neurocardiogenic syncope     Family History   Problem Relation Age of Onset    Heart Disease Mother     High for up to 14 days. Intended supply: 5 days. Take lowest dose possible to manage pain 30 tablet 0    ezetimibe (ZETIA) 10 MG tablet Take 1 tablet by mouth daily 90 tablet 3    metoprolol tartrate (LOPRESSOR) 50 MG tablet Take 1 tablet by mouth 2 times daily 180 tablet 3    hydroCHLOROthiazide (HYDRODIURIL) 25 MG tablet Take 0.5 tablets by mouth daily 45 tablet 3    omega-3 acid ethyl esters (LOVAZA) 1 g capsule TAKE TWO CAPSULES BY MOUTH TWICE A  capsule 5    Icosapent Ethyl (VASCEPA) 1 g CAPS capsule Take 2 capsules by mouth 2 times daily 120 capsule 3    loratadine (CLARITIN) 10 MG tablet Take 1 tablet by mouth daily 30 tablet 5    aspirin 81 MG chewable tablet Take 81 mg by mouth daily       No current facility-administered medications for this visit. Allergies   Allergen Reactions    Celexa [Citalopram] Other (See Comments)     Stomach pain        Atorvastatin      Leg cramps      Mestinon [Pyridostigmine] Itching and Swelling    Penicillins     Sulfa Antibiotics     Tape Cash Flakes Tape]     Tricor [Fenofibrate]      angioedema    Gemfibrozil Rash    Meloxicam Other (See Comments)     moodswings       Review of Systems:   Review of Systems   Constitutional: Negative for chills and fever. HENT: Negative for congestion and sneezing. Eyes: Negative for pain and redness. Respiratory: Negative for chest tightness, shortness of breath and wheezing. Cardiovascular: Negative for chest pain and palpitations. Gastrointestinal: Negative for vomiting. Musculoskeletal: Positive for arthralgias. Skin: Negative for color change and rash. Neurological: Negative for weakness, light-headedness and numbness. Psychiatric/Behavioral: Negative for agitation. The patient is not nervous/anxious.                                                 Examination:  General Exam:  Vitals: Resp 18   Ht 5' 5\" (1.651 m)   Wt 250 lb (113.4 kg)   BMI 41.60 kg/m²    Physical Exam  Vitals signs and Date: 8/27/2020  AP pelvis and frog-leg lateral view of the left hip show evidence of severe advanced degenerative changes at the left hip joint with complete loss of joint space. There is extensive subchondral sclerosis and cystic changes on both sides of the joint. No evidence of acute abnormality or fracture. Normal alignment with mild left sided shortening due to joint collapse. Impression: Severe degenerative joint disease left hip    Office Procedures:  No orders of the defined types were placed in this encounter. Assessment and Plan  1. Left hip advanced primary osteoarthritis    We discussed the severity of the degenerative findings on both on the x-rays and physical exam.  The patient feels that they have exhausted conservative measures. The patient has previously tried injections, physical therapy, over-the-counter pain medications, and activity modification. The pain level is severe and bothers the patient on a daily basis, including interrupting sleep at night. Activities of daily living are difficult to perform. The patient has trouble getting dressed, getting up from a seated position, climbing stairs, and has fear of falling. The patient has tried to exercise and lose weight but feels that this has been difficult and limited because of ongoing hip pain. The pain and dysfunction at the hip are severely limiting at this stage and the patient would like to consider surgical intervention. I have recommended surgical intervention for a total hip replacement. We discussed the risks, benefits, and alternatives to surgery. We discussed the intended benefits from a well-functioning replacement and the anticipated recovery process. We discussed potential risks and complications including but not limited to DVT/PE, infection, stiffness, loosening, limb length inequality, dislocation, and fracture.   We discussed pain control, physical therapy, and anticoagulation during the perioperative timeframe. We specifically addressed her obesity and her BMI. We discussed potential risks and complications related to.her weight. She feels that her pain is so severe and debilitating at this time it is difficult for her to perform any exercise or successfully lose weight. She is tried to lose weight in the past and has been unsuccessful because of how severe her hip pain is. She understands that there may be an increased risk of wound healing problems or other surgical complications such as DVT, infection, fracture, or other medical problems with her heart and lungs.   She feels that her quality of life is so poor at this time that she is interested in proceeding with surgery but will continue to try to lose weight in the interval.    The patient would like to proceed with hip replacement surgery and will be enrolled in the joint replacement class    The plan will be to perform a posterior hip approach and I have her on Xarelto postoperatively for DVT prophylaxis      Electronically signed by Jose Avila MD on 8/27/2020 at 7:18 PM

## 2020-08-27 NOTE — PATIENT INSTRUCTIONS
Left total hip arthroplasty discussed   Will proceed with surgery   Ultram as directed, script provided   No meds list provided   Consent to be signed day of surgery   Obtain any clearances as needed, as discussed   Weightbearing as tolerated  Work on ROM of the hip with strengthening of the legs  Follow up for pre-op testing( COVID)/surgery as discussed     Call office with any questions ([facility] surgery scheduler) [phone]

## 2020-08-27 NOTE — PROGRESS NOTES
Patient here for a new patient follow up on severe left hip DJD. This is a chronic and persistently worsening issue. She is using a walker to help ambulate. She states she has very restricted motion to this hip and is in moderate pain. She reports no consecutive treatment other than OTC NSAIDS and tylenol. She is wishing to discuss surgical options. She reports no prior surgeries on this hip.     Provider needs to conduct a hands on physical today due to patients injury/diagnosis

## 2020-08-28 ENCOUNTER — HOSPITAL ENCOUNTER (OUTPATIENT)
Dept: PHYSICAL THERAPY | Age: 60
Setting detail: THERAPIES SERIES
Discharge: HOME | End: 2020-08-28
Payer: COMMERCIAL

## 2020-08-28 PROCEDURE — 97162 PT EVAL MOD COMPLEX 30 MIN: CPT

## 2020-08-28 PROCEDURE — 97110 THERAPEUTIC EXERCISES: CPT

## 2020-08-28 PROCEDURE — 97116 GAIT TRAINING THERAPY: CPT

## 2020-08-28 ASSESSMENT — PAIN SCALES - GENERAL: PAINLEVEL_OUTOF10: 10

## 2020-08-28 ASSESSMENT — PAIN DESCRIPTION - LOCATION: LOCATION: HIP

## 2020-08-28 ASSESSMENT — PAIN DESCRIPTION - ORIENTATION: ORIENTATION: LEFT

## 2020-08-28 ASSESSMENT — PAIN DESCRIPTION - PROGRESSION: CLINICAL_PROGRESSION: GRADUALLY WORSENING

## 2020-08-28 ASSESSMENT — PAIN - FUNCTIONAL ASSESSMENT: PAIN_FUNCTIONAL_ASSESSMENT: PREVENTS OR INTERFERES WITH MANY ACTIVE NOT PASSIVE ACTIVITIES

## 2020-08-28 ASSESSMENT — PAIN DESCRIPTION - DESCRIPTORS: DESCRIPTORS: ACHING;SHARP

## 2020-08-28 ASSESSMENT — PAIN DESCRIPTION - FREQUENCY: FREQUENCY: CONTINUOUS

## 2020-08-28 ASSESSMENT — PAIN DESCRIPTION - PAIN TYPE: TYPE: CHRONIC PAIN

## 2020-08-28 ASSESSMENT — PAIN DESCRIPTION - ONSET: ONSET: PROGRESSIVE

## 2020-08-28 NOTE — PLAN OF CARE
?    Frequency/Duration:  # Days per week: [] 1 day # Weeks: [] 1 week [] 5 weeks     [x] 2 days? [] 2 weeks [] 6 weeks     [] 3 days   [] 3 weeks [] 7 weeks     [] 4 days   [x] 4 weeks [] 8 weeks    Rehab Potential: [] Excellent [x] Good [x] Fair  [] Poor     Goals:     Long term goals  Time Frame for Long term goals : In 4 weeks, patient will  Long term goal 1: demonstrate compliance and independence w/HEP. Long term goal 2: improve upon Osw score by at least 10 points. Long term goal 3: demonstrate compliance w/ambulating using a RW vs her SPC to decrease stress through low back and hip and improve safety. Long term goal 4: demonstrate ability to ambulate in more upright position for decreased stress through low back (FF no >= 20 deg)    Electronically signed by:  Santa Dickerson PT, 8/28/2020, 4:33 PM          If you have any questions or concerns, please don't hesitate to call.   Thank you for your referral.    Physician Signature:_________________Date:____________Time: ________  By signing above, therapists plan is approved by physician

## 2020-08-28 NOTE — PROGRESS NOTES
pain  AROM LLE (degrees)  LLE AROM : Exceptions  LLE General AROM: ARM left hip/knee pain inhibition. See strength grades. L SLR: Unable    Strength RLE  Strength RLE: Exception  R Hip Flexion: 4-/5  R Hip Extension: 4-/5  R Hip ABduction: 4-/5  R Hip ADduction: 4/5  R Hip Internal Rotation: 4/5  R Hip External Rotation: 4/5  R Knee Flexion: 4+/5  R Knee Extension: 4+/5  R Ankle Dorsiflexion: 4+/5  Strength LLE  Strength LLE: Exception  L Hip Flexion: 3-/5  L Hip Extension: 3-/5  L Hip ABduction: 3-/5  L Hip ADduction: 3-/5  L Hip Internal Rotation: 3-/5  L Hip External Rotation: 3-/5  L Knee Flexion: 3+/5  L Knee Extension: 3+/5  L Ankle Dorsiflexion: 4/5     Additional Measures  Other: Apparent LLD w/left shorter than right. Unable to measure d/t patient pain and inability to straighten leg while on mat table. Bed mobility  Bridging: Modified independent (minimal clearance)  Supine to Sit: Minimal assistance(LE management)  Sit to Supine: Minimal assistance(LE management)  Transfers  Sit to Stand: Modified independent(increased time and effort required.)  Stand to sit: Modified independent(increased time and effort required.)       Ambulation  Ambulation?: Yes  Ambulation 1  Quality of Gait: antalgic, left hip drop w/LLE wbing (vs LLD), step-to-gait pattern w/cane and walker, heavy forward lean into walker and cane w/LLE wbing, forward flexed posture 45 deg when using cane/approx 30 deg when using RW                            Assessment   Conditions Requiring Skilled Therapeutic Intervention  Body structures, Functions, Activity limitations: Decreased functional mobility ; Decreased ADL status; Decreased ROM; Decreased strength;Decreased high-level IADLs;Decreased endurance  Assessment: Pt is a 64 y.o. female w/DX OA left hip. Pt reports chronic and worsening left hip pain x >= 1 year and is scheduled for hip surgery 10/19/20.   She is also experiencing significant low back pain and has been ambulating w/a spc x 8 mo and holding furniture at home. PLOF:  Ambulated w/o AD, able to go up/down stairs, walk across uneven ground, get in/out of car w/o pain, perform home maintenance and homemaking. For the last 1.3 years, pain has limited all those activities and now needs help of others to perform all maintenance on house and struggles w/homemaking, lower body dressing. Treatment Diagnosis: decreased ROm, decreased core strength, impaired ADLS  Prognosis: Good;Fair  Decision Making: Medium Complexity  History: PMH:  HTN, HLP, depression, Meniere's disease, chronic back pain, morbid obesity, GERD, hearing loss, bipolar disorder, wears compression hose d/t hypotension, heart valve regurgitation  Exam: MMT, ROM, palpation, gait  Clinical Presentation: Medium complexity  Barriers to Learning: None noted. Learns vis demo, repeat demo/repetition, handouts/written instruction  REQUIRES PT FOLLOW UP: Yes  Activity Tolerance  Activity Tolerance: Patient limited by pain         Plan   Plan  Times per week: 2  Plan weeks: 4  Specific instructions for Next Treatment: Mat table: lumbar stabilization, hip/knee exercise aarom>arom as able, E-stim w/MH lumbar spine  Current Treatment Recommendations: Strengthening, Functional Mobility Training, Gait Training, Home Exercise Program, Neuromuscular Re-education, Manual Therapy - Soft Tissue Mobilization, Manual Therapy - Joint Manipulation, Pain Management, Modalities, Patient/Caregiver Education & Training    G-Code       OutComes Score                                                  AM-PAC Score             Goals  Long term goals  Time Frame for Long term goals : In 4 weeks, patient will  Long term goal 1: demonstrate compliance and independence w/HEP. Long term goal 2: improve upon Osw score by at least 10 points. Long term goal 3: demonstrate compliance w/ambulating using a RW vs her SPC to decrease stress through low back and hip and improve safety.   Long term goal 4: demonstrate ability to ambulate in more upright position for decreased stress through low back (FF no >= 20 deg)  Patient Goals   Patient goals : decrease low back and hip pain in preparation of hip surgery       Therapy Time   Individual Concurrent Group Co-treatment   Time In 0835         Time Out 0935         Minutes 60         Timed Code Treatment Minutes: [address], PT

## 2020-08-28 NOTE — FLOWSHEET NOTE
perform all maintenance on house and struggles w/homemaking, lower body dressing. Subjective:  See eval         Any changes in Ambulatory Summary Sheet? None        Objective:  See eval     Prior to today's treatment session, patient was screened for signs and symptoms related to COVID-19 including but not limited to verbally answering questions related to feeling ill, cough, or SOB, along with taking temperature via forehead thermometer. Patient presented with all negative signs and symptoms and had no fever >100 degrees Fahrenheit this date. Exercises: (No more than 4 columns)   Exercise/Equipment Date 8/28/20 #1 Date Date           WARM UP                     TABLE      Ankle pumps 1 x 10     SAQ 1 x 10     Glute sets 1 x 10     Hip add sets w/towel 1 x 10     Heel slides Unable even w/assist d/t pain     Hip abd Unable even w/assist d/t pain     bridges unable              STANDING      Gait Gait Training:  Cues were given for safety, sequence, device management, balance, posture, awareness, path. Provided RW for pt use from tx room to lobby then car. Mod Ind. 100' plus 200' w/use of walker, posture more upright, less pain. PROPRIOCEPTION                                    MODALITIES                      Other Therapeutic Activities/Education:    POC and treatment progression expected reviewed w/and accepted by patient. Home Exercise Program:    8/28/20:  See above  Handouts provided    Manual Treatments:    NA    Modalities:    NA    Communication with other providers:    POC faxed to ordering provider    Assessment:  (Response towards treatment session) (Pain Rating)  8/10; walked out using facility RW, which provided relief. Assessment: Pt is a 64 y.o. female w/DX OA left hip. Pt reports chronic and worsening left hip pain x >= 1 year and is scheduled for hip surgery 10/19/20.   She is also experiencing significant low back pain and has

## 2020-08-31 ENCOUNTER — HOSPITAL ENCOUNTER (OUTPATIENT)
Dept: PHYSICAL THERAPY | Age: 60
Setting detail: THERAPIES SERIES
Discharge: HOME | End: 2020-08-31
Payer: COMMERCIAL

## 2020-08-31 PROCEDURE — G0283 ELEC STIM OTHER THAN WOUND: HCPCS

## 2020-08-31 PROCEDURE — 97110 THERAPEUTIC EXERCISES: CPT

## 2020-08-31 NOTE — FLOWSHEET NOTE
Outpatient Physical Therapy  [place]           [x] Phone: [phone]   Fax: [phone]  [facility]           [] Phone: [phone]   Fax: [phone]        Physical Therapy Daily Treatment Note  Date:  2020    Patient Name:  Mandie Elam    :  1960  MRN: [id number]  Restrictions/Precautions: Other position/activity restrictions: No formal restrictions  Diagnosis:   Diagnosis: OA left hip  Date of Injury/Surgery: Chronic - Surgery planned for 10/19/20  Treatment Diagnosis: Treatment Diagnosis: decreased ROm, decreased core strength, impaired ADLS    Insurance/Certification information: PT Insurance Information: [facility]   Referring Physician:  Referring Practitioner: Kahlil Ojeda  Next Doctor Visit:  Unknown  Plan of care signed (Y/N):  Pending  Outcome Measure: NDW   Visit# / total visits:    Pain level: 8/10   POC Date range:  20 - 20    Goals:          Long term goals  Time Frame for Long term goals : In 4 weeks, patient will  Long term goal 1: demonstrate compliance and independence w/HEP. Long term goal 2: improve upon Osw score by at least 10 points. Long term goal 3: demonstrate compliance w/ambulating using a RW vs her SPC to decrease stress through low back and hip and improve safety. Long term goal 4: demonstrate ability to ambulate in more upright position for decreased stress through low back (FF no >= 20 deg)    Summary of Evaluation: Assessment: Pt is a 64 y.o. female w/DX OA left hip. Pt reports chronic and worsening left hip pain x >= 1 year and is scheduled for hip surgery 10/19/20. She is also experiencing significant low back pain and has been ambulating w/a spc x 8 mo and holding furniture at home. PLOF:  Ambulated w/o AD, able to go up/down stairs, walk across uneven ground, get in/out of car w/o pain, perform home maintenance and homemaking.    For the last 1.3 years, pain has limited all those activities and now needs help of others to perform all maintenance on house and struggles w/homemaking, lower body dressing. Subjective:  Patient reports of 8/10 pain upon arrival after taking 2 Tylenol, she appears amb with a SPC in front of her leaning on it as if walking with a walker. She reports she's waiting for a walker to be delivered in the next few days. Any changes in Ambulatory Summary Sheet? None        Objective:  Slow guarded movement through out therapy session      Prior to today's treatment session, patient was screened for signs and symptoms related to COVID-19 including but not limited to verbally answering questions related to feeling ill, cough, or SOB, along with taking temperature via forehead thermometer. Patient presented with all negative signs and symptoms and had no fever >100 degrees Fahrenheit this date. Exercises: (No more than 4 columns)   Exercise/Equipment Date 8/28/20 #1 Date 8/31/2020 #2 Date           WARM UP                     TABLE      Ankle pumps 1 x 10 2x10    SAQ 1 x 10 2x10 5\" B    Glute sets 1 x 10 10x5\"    Hip add sets w/towel 1 x 10 2x10 3\" w/ball    Heel slides Unable even w/assist d/t pain With slide board 2x10 B    Hip abd Unable even w/assist d/t pain With slide board 2x10 B    bridges unable     SLR  10x B                               STANDING      Gait Gait Training:  Cues were given for safety, sequence, device management, balance, posture, awareness, path. Provided RW for pt use from tx room to lobby then car. Mod Ind. 100' plus 200' w/use of walker, posture more upright, less pain. PROPRIOCEPTION                                    MODALITIES                      Other Therapeutic Activities/Education:    POC and treatment progression expected reviewed w/and accepted by patient.     Home Exercise Program:    8/28/20:  See above  Handouts provided    Manual Treatments:    NA    Modalities:  IFC 80-120Hz 15' to L hip/low back w/MH to L hip       Communication with other providers:    POC faxed to ordering provider    Assessment:  (Response towards treatment session) (Pain Rating) 5/10 at end of session and walked out with a quicker pace and better posture       Assessment: Pt is a 64 y.o. female w/DX OA left hip. Pt reports chronic and worsening left hip pain x >= 1 year and is scheduled for hip surgery 10/19/20. She is also experiencing significant low back pain and has been ambulating w/a spc x 8 mo and holding furniture at home. PLOF:  Ambulated w/o AD, able to go up/down stairs, walk across uneven ground, get in/out of car w/o pain, perform home maintenance and homemaking. For the last 1.3 years, pain has limited all those activities and now needs help of others to perform all maintenance on house and struggles w/homemaking, lower body dressing. Plan for Next Session: Specific instructions for Next Treatment: Mat table: lumbar stabilization, hip/knee exercise aarom>arom as able, E-stim w/MH lumbar spine      Time In / Time Out:     1648/1752      Timed Code/Total Treatment Minutes: 59  15ifc  49te       Next Progress Note due:   On or before 9/25/20      Plan of Care Interventions:  [x] Therapeutic Exercise  [x] Modalities:  [x] Therapeutic Activity     [x] Ultrasound  [x] Estim  [x] Gait Training      [] Cervical Traction [] Lumbar Traction  [x] Neuromuscular Re-education    [] Cold/hotpack [] Iontophoresis   [x] Instruction in HEP      [] Vasopneumatic   [] Dry Needling    [x] Manual Therapy               [] Aquatic Therapy              Electronically signed by:  Prema Mina PTA 8/31/2020, 4:48 PM

## 2020-09-03 ENCOUNTER — TELEPHONE (OUTPATIENT)
Dept: ORTHOPEDIC SURGERY | Age: 60
End: 2020-09-03

## 2020-09-08 ENCOUNTER — HOSPITAL ENCOUNTER (OUTPATIENT)
Dept: PHYSICAL THERAPY | Age: 60
Setting detail: THERAPIES SERIES
Discharge: HOME | End: 2020-09-08
Payer: COMMERCIAL

## 2020-09-08 PROCEDURE — 97116 GAIT TRAINING THERAPY: CPT

## 2020-09-08 PROCEDURE — 97110 THERAPEUTIC EXERCISES: CPT

## 2020-09-08 NOTE — FLOWSHEET NOTE
Outpatient Physical Therapy  [place]           [x] Phone: [phone]   Fax: [phone]  [facility]           [] Phone: [phone]   Fax: [phone]        Physical Therapy Daily Treatment Note  Date:  2020    Patient Name:  Daryl Herrera    :  1960  MRN: [id number]  Restrictions/Precautions: Other position/activity restrictions: No formal restrictions  Diagnosis:   Diagnosis: OA left hip  Date of Injury/Surgery: Chronic - Surgery planned for 10/19/20  Treatment Diagnosis: Treatment Diagnosis: decreased ROm, decreased core strength, impaired ADLS    Insurance/Certification information: PT Insurance Information: [facility]   Referring Physician:  Referring Practitioner: Karen Lind  Next Doctor Visit:  Unknown  Plan of care signed (Y/N):  Pending  Outcome Measure: DEVON   Visit# / total visits:  3/8  Pain level: 10/10   POC Date range:  20 - 20    Goals:          Long term goals  Time Frame for Long term goals : In 4 weeks, patient will  Long term goal 1: demonstrate compliance and independence w/HEP. Long term goal 2: improve upon Osw score by at least 10 points. Long term goal 3: demonstrate compliance w/ambulating using a RW vs her SPC to decrease stress through low back and hip and improve safety. Long term goal 4: demonstrate ability to ambulate in more upright position for decreased stress through low back (FF no >= 20 deg)    Summary of Evaluation: Assessment: Pt is a 64 y.o. female w/DX OA left hip. Pt reports chronic and worsening left hip pain x >= 1 year and is scheduled for hip surgery 10/19/20. She is also experiencing significant low back pain and has been ambulating w/a spc x 8 mo and holding furniture at home. PLOF:  Ambulated w/o AD, able to go up/down stairs, walk across uneven ground, get in/out of car w/o pain, perform home maintenance and homemaking.    For the last 1.3 years, pain has limited all those activities and now needs help of others to Vasopneumatic   [] Dry Needling    [x] Manual Therapy               [] Aquatic Therapy              Electronically signed by:  Cathy Watson PTA 9/8/2020, 4:19 PM

## 2020-09-11 ENCOUNTER — HOSPITAL ENCOUNTER (OUTPATIENT)
Dept: PHYSICAL THERAPY | Age: 60
Setting detail: THERAPIES SERIES
Discharge: HOME | End: 2020-09-11
Payer: COMMERCIAL

## 2020-09-11 PROCEDURE — 97110 THERAPEUTIC EXERCISES: CPT

## 2020-09-11 PROCEDURE — 97116 GAIT TRAINING THERAPY: CPT

## 2020-09-11 NOTE — FLOWSHEET NOTE
Outpatient Physical Therapy  [place]           [x] Phone: [phone]   Fax: [phone]  [facility]           [] Phone: [phone]   Fax: [phone]        Physical Therapy Daily Treatment Note  Date:  2020    Patient Name:  Shon Cope    :  1960  MRN: [id number]  Restrictions/Precautions: Other position/activity restrictions: No formal restrictions  Diagnosis:   Diagnosis: OA left hip  Date of Injury/Surgery: Chronic - Surgery planned for 10/19/20  Treatment Diagnosis: Treatment Diagnosis: decreased ROm, decreased core strength, impaired ADLS    Insurance/Certification information: PT Insurance Information: [facility]   Referring Physician:  Referring Practitioner: Curtis Dia  Next Doctor Visit:  Unknown  Plan of care signed (Y/N):  Pending  Outcome Measure: CMG   Visit# / total visits:    Pain level: 8/10 left hip/groin/anterior thigh, 5/10 low back   POC Date range:  20 - 20    Goals:          Long term goals  Time Frame for Long term goals : In 4 weeks, patient will  Long term goal 1: demonstrate compliance and independence w/HEP. Long term goal 2: improve upon Osw score by at least 10 points. Long term goal 3: demonstrate compliance w/ambulating using a RW vs her SPC to decrease stress through low back and hip and improve safety. Long term goal 4: demonstrate ability to ambulate in more upright position for decreased stress through low back (FF no >= 20 deg)    Summary of Evaluation: Assessment: Pt is a 64 y.o. female w/DX OA left hip. Pt reports chronic and worsening left hip pain x >= 1 year and is scheduled for hip surgery 10/19/20. She is also experiencing significant low back pain and has been ambulating w/a spc x 8 mo and holding furniture at home. PLOF:  Ambulated w/o AD, able to go up/down stairs, walk across uneven ground, get in/out of car w/o pain, perform home maintenance and homemaking.    For the last 1.3 years, pain has limited all those bridges unable      SLR  10x B Unable L Unable L   PPT   2 x 10 1x10  1x10 w/2# MB OH   Hip ext w/MH applied left thigh    Isometric against small bolster  2 x 5\" hold  6 reps over 10'                    STANDING       Gait Gait Training:  Cues were given for safety, sequence, device management, balance, posture, awareness, path. Provided RW for pt use from tx room to lobby then car. Mod Ind. 100' plus 200' w/use of walker, posture more upright, less pain. Walker adjusted to appropriate height. Discussed appropriate walker for post surgery. Nephew ordered a [id number] which would promote flexed posture. Amb 150 ft x 2 w/SW set low by patient to accomodate mx areas of pain (back/arms/hip)    Vc/tc upright posture. vc to avoid overstepping walker. PROPRIOCEPTION                                          MODALITIES   Not today MH during exercise x 10' (see above)                     Other Therapeutic Activities/Education:    POC and treatment progression expected reviewed w/and accepted by patient. Home Exercise Program:    8/28/20:  See above  Handouts provided  9/08/20:  PPT - handout provided    Manual Treatments:    NA    Modalities:         Communication with other providers:    POC faxed to ordering provider    Assessment:  (Response towards treatment session) (Pain Rating)   Pain end of treatment: 7/10 hip, 5/10 back  Pt had FAIR+ tolerance to exercise today. Had minimal relief w/quad rolling STM and moist heat during hip extension isometrics. Ambulating w/more upright posture and step-to/through-gait using SW. Pt will benefit from continued physical therapy in preparation of LISHA scheduled 10/09/20. Assessment: Pt is a 64 y.o. female w/DX OA left hip. Pt reports chronic and worsening left hip pain x >= 1 year and is scheduled for hip surgery 10/19/20.   She is also experiencing significant low back pain and has been ambulating w/a spc x 8 mo and holding furniture at home. PLOF:  Ambulated w/o AD, able to go up/down stairs, walk across uneven ground, get in/out of car w/o pain, perform home maintenance and homemaking. For the last 1.3 years, pain has limited all those activities and now needs help of others to perform all maintenance on house and struggles w/homemaking, lower body dressing. Plan for Next Session: Specific instructions for Next Treatment: Mat table: lumbar stabilization, hip/knee exercise aarom>arom as able, E-stim w/MH lumbar spine prn      Time In / Time Out:   0845/0930      Timed Code/Total Treatment Minutes: 45'/45'  TE 35' (2), Gait 10' (1)       Next Progress Note due:   On or before 9/25/20      Plan of Care Interventions:  [x] Therapeutic Exercise  [x] Modalities:  [x] Therapeutic Activity     [x] Ultrasound  [x] Estim  [x] Gait Training      [] Cervical Traction [] Lumbar Traction  [x] Neuromuscular Re-education    [x] Cold/hotpack [] Iontophoresis   [x] Instruction in HEP      [] Vasopneumatic   [] Dry Needling    [x] Manual Therapy               [] Aquatic Therapy              Electronically signed by:  Troy Whalen, PT 9/11/2020, 8:56 AM

## 2020-09-14 ENCOUNTER — HOSPITAL ENCOUNTER (OUTPATIENT)
Dept: PHYSICAL THERAPY | Age: 60
Setting detail: THERAPIES SERIES
Discharge: HOME | End: 2020-09-14
Payer: COMMERCIAL

## 2020-09-14 PROCEDURE — 97140 MANUAL THERAPY 1/> REGIONS: CPT

## 2020-09-14 PROCEDURE — 97110 THERAPEUTIC EXERCISES: CPT

## 2020-09-14 NOTE — FLOWSHEET NOTE
activities and now needs help of others to perform all maintenance on house and struggles w/homemaking, lower body dressing. Subjective:  Pt reports pain is 9/10. She only performs necessary housework and rests the rest of the day. Any changes in Ambulatory Summary Sheet? None        Objective:    Antalgic transfers and gait, requiring increased time for transitional changes  Step-to gait pattern, decreased stance time LLE, decreased step length RLE  Moderate FFP and lacks hip extension/flexion L>R, no pelvic rotation. Self-dependant assist to position LLE onto mat table. Supine hip ext lacks 55 deg to neutral    Vc/tc upright posture and hip musculature activation    HS tightness and hip flexor tightness limiting pt's ability to extend L LE. Increase ROM able to be gained after stretching   Extension lag at hip and knee with improvement post tx    Prior to today's treatment session, patient was screened for signs and symptoms related to COVID-19 including but not limited to verbally answering questions related to feeling ill, cough, or SOB, along with taking temperature via forehead thermometer. Patient presented with all negative signs and symptoms and had no fever >100 degrees Fahrenheit this date.        Exercises: (No more than 4 columns)   Exercise/Equipment Date 8/31/2020 #2 Date 9/08/20 #3 Date 9/11/20 #4 9/14/20 #5 9/15/20 #6             WARM UP                           TABLE        Ankle pumps 2x10 2 x 10 2x10 X 10 X 10   SAQ 2x10 5\" B 2x10 5\" B 2x10 5\" B 2x10 5\" B 2x10 5\" B   Glute sets 10x5\" 10x5\"      Hip add sets w/towel 2x10 3\" w/ball 2x10 3\" w/ball 2x10 3\" w/towel roll With slide board   x 10  L AAROM With slide board   x 10  L AAROM   Heel slides With slide board 2x10 B R/L  With slide board 2x10 B  Unable to fully extend left hip/knee d/t pain R/L  With slide board 2x10 B  Unable to fully extend left hip/knee d/t pain R/L  With slide board 2x10 B  Unable to fully extend left hip/knee d/t pain R/L  With slide board 2x10 B  Unable to fully extend left hip/knee d/t pain   Hip abd With slide board 2x10 B With slide board  2 x 10 R  Unable L With slide board  2 x 10 R  Unable L With slide board   x 10  L AAROM With slide board   x 10  L AAROM   Heel digs    X 10    SLR 10x B Unable L Unable L AAROM with knee mildly flexed AAROM with knee mildly flexed  incorporated HS stretch at end range   PPT  2 x 10 1x10  1x10 w/2# MB OH 1x10  1x10 w/2# MB OH  Cued for muscle activation 1x10  1x10 w/2# MB OH  Cued for muscle activation   Hip ext w/MH applied left thigh   Isometric against small bolster  2 x 5\" hold  6 reps over 10'     Supine hip flexor stretch    2 min x 2 2 min x 2              STANDING        Gait  Walker adjusted to appropriate height. Discussed appropriate walker for post surgery. Nephew ordered a [id number] which would promote flexed posture. Amb 150 ft x 2 w/SW set low by patient to accomodate mx areas of pain (back/arms/hip)    Vc/tc upright posture. vc to avoid overstepping walker. Amb 150 ft x 2 w/2WW set to proper height to decrease pressure on shoulder and LB     Vc/tc upright posture and hip musculature activation    Reported feeling better Amb 150 ft x 2 w/2WW set to proper height to decrease pressure on shoulder and LB     Vc/tc upright posture and hip musculature activation    Reported feeling better                                                        PROPRIOCEPTION                                                MODALITIES  Not today MH during exercise x 10' (see above)                         Other Therapeutic Activities/Education:    POC and treatment progression expected reviewed w/and accepted by patient.     Home Exercise Program:    8/28/20:  See above  Handouts provided  9/08/20:  PPT - handout provided    Manual Treatments:  PROM and manual HS stretch    Modalities:         Communication with other providers:    POC faxed to ordering provider    Assessment:  (Response towards treatment session) (Pain Rating)   Pt demonstrated FAIR+ tolerance to exercise today. Pt demonstrated limited HS/quad/ hip flexor/ adductor flexibility decreasing her ability to perform functional mobility and transfer without increased pain. Pt demonstrated increase ROM and reported feeling looser post tx with no change in pain. Ambulating w/more upright posture and step-to/through-gait using 2WW. Pt will benefit from continued physical therapy in preparation of LISHA scheduled 10/09/20. Plan for Next Session: Specific instructions for Next Treatment: Mat table: lumbar stabilization, hip/knee exercise aarom>arom as able, E-stim w/MH lumbar spine prn      Time In / Time Out:   1305/1350      Timed Code/Total Treatment Minutes: 45'/45'  TE 40' (2), MT 8' (1)       Next Progress Note due:   On or before 9/25/20      Plan of Care Interventions:  [x] Therapeutic Exercise  [x] Modalities:  [x] Therapeutic Activity     [x] Ultrasound  [x] Estim  [x] Gait Training      [] Cervical Traction [] Lumbar Traction  [x] Neuromuscular Re-education    [x] Cold/hotpack [] Iontophoresis   [x] Instruction in HEP      [] Vasopneumatic   [] Dry Needling    [x] Manual Therapy               [] Aquatic Therapy              Electronically signed by:  Rigoberto Hoang PTA, CLT 9/14/2020, 1:01 PM

## 2020-09-16 ENCOUNTER — HOSPITAL ENCOUNTER (OUTPATIENT)
Dept: PHYSICAL THERAPY | Age: 60
Setting detail: THERAPIES SERIES
Discharge: HOME | End: 2020-09-16
Payer: COMMERCIAL

## 2020-09-16 PROCEDURE — 97140 MANUAL THERAPY 1/> REGIONS: CPT

## 2020-09-16 PROCEDURE — 97110 THERAPEUTIC EXERCISES: CPT

## 2020-09-22 ENCOUNTER — HOSPITAL ENCOUNTER (OUTPATIENT)
Dept: PHYSICAL THERAPY | Age: 60
Setting detail: THERAPIES SERIES
Discharge: HOME | End: 2020-09-22
Payer: COMMERCIAL

## 2020-09-22 ENCOUNTER — OFFICE VISIT (OUTPATIENT)
Dept: CARDIOLOGY CLINIC | Age: 60
End: 2020-09-22
Payer: COMMERCIAL

## 2020-09-22 VITALS
HEART RATE: 64 BPM | WEIGHT: 250 LBS | HEIGHT: 65 IN | DIASTOLIC BLOOD PRESSURE: 86 MMHG | SYSTOLIC BLOOD PRESSURE: 128 MMHG | BODY MASS INDEX: 41.65 KG/M2

## 2020-09-22 PROBLEM — Z01.818 PRE-OPERATIVE CLEARANCE: Status: ACTIVE | Noted: 2020-09-22

## 2020-09-22 PROBLEM — Z82.49 FAMILY HISTORY OF EARLY CAD: Status: ACTIVE | Noted: 2020-09-22

## 2020-09-22 PROCEDURE — 3017F COLORECTAL CA SCREEN DOC REV: CPT | Performed by: NURSE PRACTITIONER

## 2020-09-22 PROCEDURE — 97140 MANUAL THERAPY 1/> REGIONS: CPT

## 2020-09-22 PROCEDURE — 97110 THERAPEUTIC EXERCISES: CPT

## 2020-09-22 PROCEDURE — G8417 CALC BMI ABV UP PARAM F/U: HCPCS | Performed by: NURSE PRACTITIONER

## 2020-09-22 PROCEDURE — 1036F TOBACCO NON-USER: CPT | Performed by: NURSE PRACTITIONER

## 2020-09-22 PROCEDURE — 99214 OFFICE O/P EST MOD 30 MIN: CPT | Performed by: NURSE PRACTITIONER

## 2020-09-22 PROCEDURE — G8427 DOCREV CUR MEDS BY ELIG CLIN: HCPCS | Performed by: NURSE PRACTITIONER

## 2020-09-22 PROCEDURE — 93000 ELECTROCARDIOGRAM COMPLETE: CPT | Performed by: NURSE PRACTITIONER

## 2020-09-22 PROCEDURE — 97112 NEUROMUSCULAR REEDUCATION: CPT

## 2020-09-22 NOTE — ASSESSMENT & PLAN NOTE
Wears compression stocking and sits down when symptomatic. Positive tilt table in 2016, on HcTZ and lopressor.

## 2020-09-22 NOTE — ASSESSMENT & PLAN NOTE
Both parents and siblings have significant CAD. Will get stress test due to high risk of CAD.  Last stress test was in 2016 which was normal.

## 2020-09-22 NOTE — ASSESSMENT & PLAN NOTE
Controlled  She is to continue current medications   advised low salt diet   Testing ordered:  yes   Last testing: Echo 2016

## 2020-09-22 NOTE — LETTER
Narcisa Bellerose  1960  [id number]    Have you had any Chest Pain - No    Have you had any Shortness of Breath - No      Have you had any dizziness - No      Have you had any palpitations - No      Do you have any edema - No         Do you have a surgery or procedure scheduled in the near future - Yes  If Yes- DO EKG  If Yes - Who is the surgery with? Dr. Karlie Jean   Phone number of physician  Fax number of physician  Type of surgery Total Left Hip replacement 10-

## 2020-09-22 NOTE — PROGRESS NOTES
[facility] [address]  Phone: [phone]    Fax [phone]                  Carlos Zavala MD, Alice Turner MD, Selina Simmons MD, MD Caleb Saunders MD Suzi Abo, MD Tolbert Grow, APRN      Mike Gutierrez, APRN  Puja Archibald, APRN     Chris Chambers, APRN    CARDIOLOGY  NOTE      9/22/2020    RE: Kishor Rome  (1960)                               TO:  Dr. Keyla Ramirez MD  The primary cardiologist is Dr. Ayaan Hernandez     CC:   1. Pre-operative clearance    2. Mixed hyperlipidemia    3. Essential hypertension    4. Vasovagal syncope    5. Morbid obesity due to excess calories ([facility] [address]    6. Family history of early CAD        Patient denies all of the following:  Chest Pain  Palpitations  Shortness of Breath  Edema  Dizziness  Syncope      HPI: Thank you for involving me in taking care of your patient Kishor Rome, who is a  64y.o. year old female with a history as listed above. Patient presents to the office for follow up on vasovagal syncope, HTN, obesity, family hx of CAD, and hyperlipidemia. Patient is requesting cardiac clearance for total hip replacement. Patient is  compliant with she medications. Patient denies any cardiac complaints or needs.      Vitals:    09/22/20 1026   BP: 128/86   Pulse: 64       Current Outpatient Medications   Medication Sig Dispense Refill    raNITIdine HCl (RANITIDINE ACID REDUCER PO) Take by mouth      ezetimibe (ZETIA) 10 MG tablet Take 1 tablet by mouth daily 90 tablet 3    metoprolol tartrate (LOPRESSOR) 50 MG tablet Take 1 tablet by mouth 2 times daily 180 tablet 3    hydroCHLOROthiazide (HYDRODIURIL) 25 MG tablet Take 0.5 tablets by mouth daily 45 tablet 3    Icosapent Ethyl (VASCEPA) 1 g CAPS capsule Take 2 capsules by mouth 2 times daily 120 capsule 3    loratadine (CLARITIN) 10 MG tablet Take 1 tablet by mouth daily 30 tablet 5    aspirin 81 MG chewable tablet Take 81 mg by mouth daily       No current facility-administered medications for this visit. Allergies: Celexa [citalopram]; Atorvastatin; Mestinon [pyridostigmine]; Penicillins; Sulfa antibiotics; Tape [adhesive tape]; Tricor [fenofibrate]; Gemfibrozil; and Meloxicam  Past Medical History:   Diagnosis Date    Allergic rhinitis due to pollen 11/19/2015    Blood transfusion reaction     Chronic back pain     Depression     Gastroesophageal reflux disease 11/19/2015    Hearing loss 11/19/2015    History of cardiac monitoring 04/18/2017    14 day event monitor. Sinus Rhythm    History of nuclear stress test 09/28/2016    cardiolite-normal,EF70%    Hot flashes due to surgical menopause 11/19/2015    Hx of echocardiogram 10/05/2016    EF: >55 %   Mild mitral and tricuspid regurg.      Hyperlipidemia     Hypertension     Meniere disease     Morbid obesity due to excess calories ([facility] [address] 11/19/2015    Sleep apnea 11/19/2015    sleep study negative    Tilt table evaluation 05/09/2017     positive for neurocardiogenic syncope     Past Surgical History:   Procedure Laterality Date    APPENDECTOMY      CHOLECYSTECTOMY      2017    HYSTERECTOMY      TONSILLECTOMY       Family History   Problem Relation Age of Onset    Heart Disease Mother     High Blood Pressure Mother     High Cholesterol Mother     Cancer Mother 80        Stomach    Diabetes Mother     Stroke Mother     Heart Disease Father     High Blood Pressure Father     High Cholesterol Father     Diabetes Father     Cancer Sister 46        Lung cancer    Cancer Maternal Grandmother         Stomach    Diabetes Maternal Grandmother     Diabetes Maternal Grandfather     Diabetes Paternal Grandmother     Diabetes Paternal Grandfather     Cancer Maternal Cousin 47        Pancreatic     Social History     Tobacco Use    Smoking status: Never Smoker    Smokeless tobacco: Never Used    Tobacco comment: Reviewed Substance Use Topics    Alcohol use: No        Review of Systems - History obtained from the patient  General: negative for - fatigue, malaise, night sweats, weight gain  Psychological: negative for - anxiety, depression, sleep disturbances  Ophthalmic: negative for - blurry vision, loss of vision  ENT: negative for - headaches, vertigo, visual changes  Hematological and Lymphatic: negative for - bleeding problems, blood clots, bruising, fatigue or pallor  Endocrine: negative for - malaise/lethargy, palpitations, unexpected weight changes  Respiratory: negative for - cough, orthopnea, shortness of breath or tachypnea  Cardiovascular: negative for - chest pain, dyspnea on exertion, edema or palpitations  Gastrointestinal: no abdominal pain, change in bowel habits, or black or bloody stools  Genito-Urinary: no dysuria, trouble voiding, or hematuria  Musculoskeletal: negative for - gait disturbance, pain in left hip, swelling in left leg   Neurological: negative for - confusion, Positive- dizziness, impaired coordination/balance or numbness/tingling    Objective:      Physical Exam:  /86   Pulse 64   Ht 5' 5\" (1.651 m)   Wt 250 lb (113.4 kg)   BMI 41.60 kg/m²   Wt Readings from Last 3 Encounters:   09/22/20 250 lb (113.4 kg)   08/27/20 250 lb (113.4 kg)   09/11/19 260 lb 3.2 oz (118 kg)     Body mass index is 41.6 kg/m². GENERAL - Alert, oriented, pleasant, in no apparent distress. Head unremarkable  Eyes - pupils equal and reactive to light - bilateral conjunctiva are pink: sclera are white   ENT - external ears intact, nose is intact:  tongue is midline pink and moist  Neck - Supple. No jugular venous distention noted. No carotid bruits appreciated. Cardiovascular - Normal S1 and S2:  murmur appreciated No, No gallop. Regular rate- Yes,  rhythm regular-Yes. Extremities - No cyanosis, clubbing, no edema to lower legs. Pulmonary - No respiratory distress. No wheezes or rales.  Chest is clear  Pulses: Bilateral radial and pedal pulses normal  Abdomen -  Soft no tenderness, non distended   Musculoskeletal - Normal movement of all extremities   Neurologic - alert and oriented: There are no gross focal neurologic abnormalities. Skin-  No rash: No echymosis   Affect- normal mood and pleasant       DATA:  Lab Results   Component Value Date    CKTOTAL 97 09/20/2011    CKMB <0.2 09/20/2011    TROPONINI <0.006 09/20/2011     BNP:  No results found for: BNP  PT/INR:  No results found for: PTINR  Lab Results   Component Value Date    LABA1C 5.6 05/13/2019    LABA1C 5.6 12/07/2015     Lab Results   Component Value Date    CHOL 508 (H) 05/13/2019    TRIG 598 (H) 05/13/2019    HDL 38 (L) 05/13/2019    LDLCALC NOT VALID WHEN TRIGLYCERIDE >400 MG/DL. 05/22/2017    LDLDIRECT 312 (H) 05/13/2019     Lab Results   Component Value Date    ALT 20 05/13/2019    AST 16 05/13/2019     TSH:  No results found for: TSH    Echo:2016  EF 50-55% no valvular disease    Stress Test:2016  No ischemia       The ASCVD Risk score (Mary Shields et al., 2013) failed to calculate for the following reasons: The valid total cholesterol range is 130 to 320 mg/dL    Assessment/ Plan:     Pre-operative clearance  Revised Cardiac Risk Index:   High risk type of surgery no   H/O ischemic heart disease  (h/o MI, or a positive stress test, current complaint of chest pain considered to be secondary to myocardial ischemia,  Use of nitrate therapy or ECG with pathological Q waves; do not count prior coronary revascularization procedure unless one of the other criteria for ischemic heart disease is present) no     H/O heart failure no  H/O CVA no   H/O DM treated with insulin no  Preoperative serum creatinine >2.0 mg/dl (177 micromol/L) no     The calculated rate of cardiac death, nonfatal myocardial infarction, and nonfatal cardiac arrest according to the number of predictors is;       Will need stress due to multiple risk factors of CAD with obesity, hyperlipidemia, and strong family history of CAD. If stress test is normal patient will be considered low risk for cardiac event pre and post op. Hyperlipidemia  -At or near goal No    -No recent labs available- lab slip given   -She is to continue current medications (Vascepa and zetia) Hepatic function panel WNL. No abdominal pain. No myalgias. Patient reports not wanting to try statin due to history of multiple allergic reactions with hives and leg cramps.     -The nature of cardiac risk has been fully discussed with this patient. I have made her aware of her LDL target goal given her cardiovascular risk analysis. I have discussed the appropriate diet. The need for lifelong compliance in order to reduce risk is stressed. A regular exercise program is recommended to help achieve and maintain normal body weight, fitness and improve lipid balance. A written copy of a low fat, low cholesterol diet has been given to the patient. Essential hypertension    Controlled  She is to continue current medications   advised low salt diet   Testing ordered:  yes   Last testing: Echo 2016    Vasovagal syncope  Wears compression stocking and sits down when symptomatic. Positive tilt table in 2016, on HcTZ and lopressor. Morbid obesity due to excess calories ([address]  Educated on diet and exercise but unable to exercise due to hip. At risk for CAD with elevated Lipids, obesity, and family hx. Family history of early CAD  Both parents and siblings have significant CAD. Will get stress test due to high risk of CAD. Last stress test was in 2016 which was normal.       Patient seen, interviewed and examined. Testing was reviewed. Lifestyle and risk factor modificatons discussed. Various goals are discussed and questions answered. Continue current medications. Appropriate prescriptions are addressed. Questions answered and patient verbalizes understanding.      Call for any problems, questions, or

## 2020-09-22 NOTE — FLOWSHEET NOTE
Outpatient Physical Therapy  [place]           [x] Phone: [phone]   Fax: [phone]  [place]           [] Phone: [phone]   Fax: [phone]        Physical Therapy Daily Treatment Note  Date:  2020    Patient Name:  Domenico Schuler    :  1960  MRN: [id number]  Restrictions/Precautions: Other position/activity restrictions: No formal restrictions  Diagnosis:   Diagnosis: OA left hip  Date of Injury/Surgery: Chronic - Surgery planned for 10/19/20  Treatment Diagnosis: Treatment Diagnosis: decreased ROm, decreased core strength, impaired ADLS    Insurance/Certification information: PT Insurance Information: [facility]   Referring Physician:  Referring Practitioner: Alison Esteves  Next Doctor Visit:  Unknown  Plan of care signed (Y/N):  Pending  Outcome Measure: Osw ; Visit #7   Visit# / total visits:    Pain level: 10/10 left hip/groin/anterior thigh, 810 low back   POC Date range:  20 - 20    Goals:          Long term goals  Time Frame for Long term goals : In 4 weeks, patient will  Long term goal 1: demonstrate compliance and independence w/HEP. - Ind. w/HEP  Long term goal 2: improve upon Osw score by at least 10 points. - UNMET 20  Long term goal 3: demonstrate compliance w/ambulating using a RW vs her SPC to decrease stress through low back and hip and improve safety. - MET 20  Long term goal 4: demonstrate ability to ambulate in more upright position for decreased stress through low back (FF no >= 20 deg) - 28deg 20 - UNMET    Summary of Evaluation: Assessment: Pt is a 64 y.o. female w/DX OA left hip. Pt reports chronic and worsening left hip pain x >= 1 year and is scheduled for hip surgery 10/19/20. She is also experiencing significant low back pain and has been ambulating w/a spc x 8 mo and holding furniture at home.   PLOF:  Ambulated w/o AD, able to go up/down stairs, walk across uneven ground, get in/out of car w/o pain, perform home maintenance and homemaking. For the last 1.3 years, pain has limited all those activities and now needs help of others to perform all maintenance on house and struggles w/homemaking, lower body dressing. Subjective:  Pt reports pain is 10/10. Kian Stark states she has begun going through pre-op clearance. In clinic today using FWW Mod Ind. Any changes in Ambulatory Summary Sheet? None        Objective:    Antalgic transfers and gait, requiring increased time for transitional changes  Step-to gait pattern, decreased stance time LLE, decreased step length RLE  Moderate FFP and lacks hip extension/flexion L>R, no pelvic rotation. Self-dependant assist to position LLE onto mat table. Supine hip ext lacks 55 deg to neutral    Vc/tc upright posture and hip musculature activation    HS tightness and hip flexor tightness limiting pt's ability to extend L LE. Increase ROM able to be gained after stretching   Extension lag at hip and knee with improvement post tx    Prior to today's treatment session, patient was screened for signs and symptoms related to COVID-19 including but not limited to verbally answering questions related to feeling ill, cough, or SOB, along with taking temperature via forehead thermometer. Patient presented with all negative signs and symptoms and had no fever >100 degrees Fahrenheit this date.        Exercises: (No more than 4 columns)   Exercise/Equipment Date 8/31/2020 #2 Date 9/08/20 #3 Date 9/11/20 #4 9/14/20 #5 9/15/20 #6 9/22/20  #7              WARM UP                              TABLE         Ankle pumps 2x10 2 x 10 2x10 X 10 X 10 x10   SAQ 2x10 5\" B 2x10 5\" B 2x10 5\" B 2x10 5\" B 2x10 5\" B 2x10 5\" B   Glute sets 10x5\" 10x5\"    10x3\"   Hip add sets w/towel 2x10 3\" w/ball 2x10 3\" w/ball 2x10 3\" w/towel roll With slide board   x 10  L AAROM With slide board   x 10  L AAROM Hook lying position  1 x 10   Heel slides With slide board 2x10 B R/L  With slide board 2x10 B  Unable to fully extend left hip/knee d/t pain R/L  With slide board 2x10 B  Unable to fully extend left hip/knee d/t pain R/L  With slide board 2x10 B  Unable to fully extend left hip/knee d/t pain R/L  With slide board 2x10 B  Unable to fully extend left hip/knee d/t pain R/L  With slide board 2x10 B  Unable to fully extend left hip/knee d/t pain   Hip abd With slide board 2x10 B With slide board  2 x 10 R  Unable L With slide board  2 x 10 R  Unable L With slide board   x 10  L AAROM With slide board   x 10  L AAROM With slide board   x 10  L AAROM   Heel digs    X 10     SLR 10x B Unable L Unable L AAROM with knee mildly flexed AAROM with knee mildly flexed  incorporated HS stretch at end range 1 x 5  AAROM LLE with knee mildly flexed  incorporatedHS stretch at end range    1 x 10  AROM  RLE   PPT  2 x 10 1x10  1x10 w/2# MB OH 1x10  1x10 w/2# MB OH  Cued for muscle activation 1x10  1x10 w/2# MB OH  Cued for muscle activation 1x10 w/2# MB OH  Cued for muscle activation    Obliques  1x5 w/2# MB   Hip ext w/MH applied left thigh   Isometric against small bolster  2 x 5\" hold  6 reps over 10'      Supine hip flexor stretch    2 min x 2 2 min x 2                STANDING         Gait  Walker adjusted to appropriate height. Discussed appropriate walker for post surgery. Nephew ordered a [id number] which would promote flexed posture. Amb 150 ft x 2 w/SW set low by patient to accomodate mx areas of pain (back/arms/hip)    Vc/tc upright posture. vc to avoid overstepping walker. Amb 150 ft x 2 w/2WW set to proper height to decrease pressure on shoulder and LB     Vc/tc upright posture and hip musculature activation    Reported feeling better Amb 150 ft x 2 w/2WW set to proper height to decrease pressure on shoulder and LB     Vc/tc upright posture and hip musculature activation    Reported feeling better Amb 150 ft x 2 w/RW, height set appropriately. VC/tc upright posture. Stands w/28 deg hip flexion.   Increased back and hip pain w/further correction. PROPRIOCEPTION         Static stand      3\" blue foam  No UE support (walker in front)  2 x 30 sec  Min sway  No physical assist needed                                       MODALITIES  Not today MH during exercise x 10' (see above)                            Other Therapeutic Activities/Education:    POC and treatment progression expected reviewed w/and accepted by patient. Home Exercise Program:    8/28/20:  See above  Handouts provided  9/08/20:  PPT - handout provided    Manual Treatments:  PROM and manual HS stretch    Modalities:         Communication with other providers:    POC faxed to ordering provider    Assessment:  (Response towards treatment session) (Pain Rating)   Pt demonstrated FAIR+ tolerance to exercise today. Pt demonstrated limited HS/quad/ hip flexor/ adductor flexibility decreasing her ability to perform functional mobility and transfer without increased pain. Pt demonstrated increase ROM and reported feeling looser post tx with no change in pain. Ambulating w/more upright posture and step-to/through-gait using RW  Pt will benefit from continued physical therapy in preparation of LISHA scheduled 10/09/20. Plan for Next Session: Specific instructions for Next Treatment: Mat table: lumbar stabilization, hip/knee exercise aarom>arom as able, E-stim w/MH lumbar spine prn      Time In / Time Out:  1300/1345      Timed Code/Total Treatment Minutes: 45'/45'  TE 15' (1), NRE 15' (1), Manual 15' (1)       Next Progress Note due:   On or before 9/25/20      Plan of Care Interventions:  [x] Therapeutic Exercise  [x] Modalities:  [x] Therapeutic Activity     [x] Ultrasound  [x] Estim  [x] Gait Training      [] Cervical Traction [] Lumbar Traction  [x] Neuromuscular Re-education    [x] Cold/hotpack [] Iontophoresis   [x] Instruction in HEP      [] Vasopneumatic   [] Dry Needling    [x] Manual Therapy               [] Aquatic Therapy              Electronically signed by:  Severo Buenrostro, PT 9/22/2020, 1:01 PM

## 2020-09-22 NOTE — ASSESSMENT & PLAN NOTE
Revised Cardiac Risk Index:   High risk type of surgery no   H/O ischemic heart disease  (h/o MI, or a positive stress test, current complaint of chest pain considered to be secondary to myocardial ischemia,  Use of nitrate therapy or ECG with pathological Q waves; do not count prior coronary revascularization procedure unless one of the other criteria for ischemic heart disease is present) no     H/O heart failure no  H/O CVA no   H/O DM treated with insulin no  Preoperative serum creatinine >2.0 mg/dl (177 micromol/L) no     The calculated rate of cardiac death, nonfatal myocardial infarction, and nonfatal cardiac arrest according to the number of predictors is; Will need stress due to multiple risk factors of CAD with obesity, hyperlipidemia, and strong family history of CAD. If stress test is normal patient will be considered low risk for cardiac event pre and post op.

## 2020-09-22 NOTE — ASSESSMENT & PLAN NOTE
Educated on diet and exercise but unable to exercise due to hip. At risk for CAD with elevated Lipids, obesity, and family hx.

## 2020-09-22 NOTE — PATIENT INSTRUCTIONS
Please hold on to these instructions the  will call you within 1-9 business days when we receive authorization from your insurance. Nuclear Stress Test    WHAT TO EXPECT:   ? You will need to confirm the test or it could be cancelled. ? This test will take approximately 2 hours: 1 hour in the AM &    1 hour in the PM. You will be given a time by the Technologist after the first part is completed to come back. ? You will be given a medication, through an IV in the hand, this will safely simulate exercise. This IV is also needed to inject the radioactive isotope unless you are able toe walk the treadmill. ? You will receive an injection in the AM & PM before the pictures. ? Using a special camera, you will have one set of pictures of your heart taken in the AM and a set of pictures in the PM.     PREPARATION FOR TEST:  ? Eat a light breakfast such as water or juice and toast.  ? If you are DIABETIC: Eat a normal breakfast with NO CAFFEINE and take your insulin as normal.   ? AVOID ALL FOODS & DRINKS containing CAFFEINE 12 HOURS PRIOR TO THE TEST: Including coffee, Tea, Shraddha and other soft drinks even those labeled  caffeine free or decaffeinated.

## 2020-09-24 ENCOUNTER — HOSPITAL ENCOUNTER (OUTPATIENT)
Dept: PHYSICAL THERAPY | Age: 60
Setting detail: THERAPIES SERIES
Discharge: HOME | End: 2020-09-24
Payer: COMMERCIAL

## 2020-09-24 PROCEDURE — 97112 NEUROMUSCULAR REEDUCATION: CPT

## 2020-09-24 PROCEDURE — 97110 THERAPEUTIC EXERCISES: CPT

## 2020-09-24 PROCEDURE — 97140 MANUAL THERAPY 1/> REGIONS: CPT

## 2020-09-24 NOTE — FLOWSHEET NOTE
home maintenance and homemaking. For the last 1.3 years, pain has limited all those activities and now needs help of others to perform all maintenance on house and struggles w/homemaking, lower body dressing. Subjective:  Pt reports pain is 9-10/10. Rosaura Marino states she has begun going through pre-op clearance. In clinic today using FWW Mod Ind. Any changes in Ambulatory Summary Sheet? None        Objective:    Antalgic transfers and gait, requiring increased time for transitional changes and ambulation  Step-to gait pattern, decreased stance time LLE, decreased step length RLE  Moderate FFP and lacks hip extension/flexion L>R, no pelvic rotation. Self-dependant assist to position LLE onto mat table. Supine hip ext lacks -35 deg to neutral; flexion 45 dg  Supine 10 dg abduction on slide board    Vc/tc upright posture and hip musculature activation    HS tightness and hip flexor tightness limiting pt's ability to extend L LE. Increase ROM able to be gained after stretching and MT   Extension lag at hip and knee with improvement post tx    Prior to today's treatment session, patient was screened for signs and symptoms related to COVID-19 including but not limited to verbally answering questions related to feeling ill, cough, or SOB, along with taking temperature via forehead thermometer. Patient presented with all negative signs and symptoms and had no fever >100 degrees Fahrenheit this date.        Exercises: (No more than 4 columns)   Exercise/Equipment 9/15/20 #6 9/22/20  #7 9/24/20 #8           WARM UP                     TABLE      Ankle pumps X 10 x10 X 10   SAQ 2x10 5\" B 2x10 5\" B 2x10 5\" B  Ext/flex is Painful, slow and difficult   Glute sets  10x3\" 20x5\"   Hip add sets w/towel With slide board   x 10  L AAROM Hook lying position  1 x 10 Hook lying position  2 x 10   iso hip extension on ball supine   X 10 3 ct   Heel slides R/L  With slide board 2x10 B  Unable to fully extend left hip/knee d/t pain R/L  With slide board 2x10 B  Unable to fully extend left hip/knee d/t pain R/L  With slide board 2x10 B  Unable to fully extend left hip/knee d/t pain   Hip abd With slide board   x 10  L AAROM With slide board   x 10  L AAROM With slide board   x 10  L AAROM   Heel digs   X 10   SLR AAROM with knee mildly flexed  incorporated HS stretch at end range 1 x 5  AAROM LLE with knee mildly flexed  incorporatedHS stretch at end range    1 x 10  AROM  RLE 1 x 5  AAROM LLE with knee mildly flexed  incorporatedHS stretch at end range    1 x 10  AROM  RLE   PPT 1x10  1x10 w/2# MB OH  Cued for muscle activation 1x10 w/2# MB OH  Cued for muscle activation    Obliques  1x5 w/2# MB 1x10 w/2# MB OH  Cued for muscle activation    Obliques  1x5 w/2# MB   Hip ext w/MH applied left thigh      Supine hip flexor stretch 2 min x 2              STANDING      Gait Amb 150 ft x 2 w/2WW set to proper height to decrease pressure on shoulder and LB     Vc/tc upright posture and hip musculature activation    Reported feeling better Amb 150 ft x 2 w/RW, height set appropriately. VC/tc upright posture. Stands w/28 deg hip flexion. Increased back and hip pain w/further correction. Amb 150 ft x 2 w/RW, height set appropriately. VC/tc upright posture. Stands w/25 deg hip flexion. Increased back and hip pain w/further correction. STS   High mat x 5                                PROPRIOCEPTION      Static stand  3\" blue foam  No UE support (walker in front)  2 x 30 sec  Min sway  No physical assist needed 3\" blue foam  No UE support (walker in front)  2 x 30 sec  Min sway  No physical assist needed                           MODALITIES                      Other Therapeutic Activities/Education:    POC and treatment progression expected reviewed w/and accepted by patient.     Home Exercise Program:    8/28/20:  See above  Handouts provided  9/08/20:  PPT - handout provided    Manual Treatments:  PROM and manual HS stretch    Modalities:         Communication with other providers:    POC faxed to ordering provider    Assessment:  (Response towards treatment session) (Pain Rating)   Pt demonstrated FAIR+ tolerance to exercise today. Pt still demonstrated limited HS/quad/ hip flexor/ adductor flexibility decreasing her ability to perform functional mobility and transfer without increased pain with 15 degrees of sagittal plane and 10 dg in front plane in supine . Pt demonstrated increase ROM and reported feeling looser post tx with no change in pain. Ambulating w/more upright posture and step-to/through-gait using RW and increase walker height by one level to more appropriate height. Pt will benefit from continued physical therapy in preparation of LISHA scheduled 10/09/20. Plan for Next Session: Specific instructions for Next Treatment: Mat table: lumbar stabilization, hip/knee exercise aarom>arom as able, E-stim w/MH lumbar spine prn      Time In / Time Out:  1115/1200       Timed Code/Total Treatment Minutes: 45'/45'  TE 15' (1), NRE 20' (1), Manual 10' (1)       Next Progress Note due:   On or before 9/25/20      Plan of Care Interventions:  [x] Therapeutic Exercise  [x] Modalities:  [x] Therapeutic Activity     [x] Ultrasound  [x] Estim  [x] Gait Training      [] Cervical Traction [] Lumbar Traction  [x] Neuromuscular Re-education    [x] Cold/hotpack [] Iontophoresis   [x] Instruction in HEP      [] Vasopneumatic   [] Dry Needling    [x] Manual Therapy               [] Aquatic Therapy              Electronically signed by:  Dariela Delaney PTA, CLT 9/24/2020, 11:10 AM

## 2020-09-28 ENCOUNTER — HOSPITAL ENCOUNTER (OUTPATIENT)
Dept: PHYSICAL THERAPY | Age: 60
Discharge: HOME | End: 2020-09-28

## 2020-09-29 ENCOUNTER — TELEPHONE (OUTPATIENT)
Dept: CARDIOLOGY CLINIC | Age: 60
End: 2020-09-29

## 2020-09-29 ENCOUNTER — HOSPITAL ENCOUNTER (OUTPATIENT)
Dept: PHYSICAL THERAPY | Age: 60
Setting detail: THERAPIES SERIES
Discharge: HOME | End: 2020-09-29
Payer: COMMERCIAL

## 2020-09-29 PROCEDURE — 97140 MANUAL THERAPY 1/> REGIONS: CPT

## 2020-09-29 PROCEDURE — 97110 THERAPEUTIC EXERCISES: CPT

## 2020-09-29 PROCEDURE — 97116 GAIT TRAINING THERAPY: CPT

## 2020-09-29 NOTE — TELEPHONE ENCOUNTER
Patient called she was to have gotten a reminder   Call for her nuclear for 9/30 states Pending   On  Notes , she was asking if it has been approved

## 2020-09-29 NOTE — FLOWSHEET NOTE
Outpatient Physical Therapy  [place]           [x] Phone: [phone]   Fax: [phone]  [facility]           [] Phone: [phone]   Fax: [phone]        Physical Therapy Daily Treatment Note  Date:  2020    Patient Name:  Vijay Turner    :  1960  MRN: [id number]  Restrictions/Precautions: Other position/activity restrictions: No formal restrictions  Diagnosis:   Diagnosis: OA left hip  Date of Injury/Surgery: Chronic - Surgery planned for 10/19/20  Treatment Diagnosis: Treatment Diagnosis: decreased ROm, decreased core strength, impaired ADLS    Insurance/Certification information: PT Insurance Information: [facility]   Referring Physician:  Referring Practitioner: Cande Moore Doctor Visit:  Unknown  Plan of care signed (Y/N):  Pending  Outcome Measure: Osw ; Visit #7   Visit# / total visits:    Pain level: 9-10/10 left hip/groin/anterior thigh,  POC Date range:  20 - 20    Goals:          Long term goals  Time Frame for Long term goals : In 4 weeks, patient will  Long term goal 1: demonstrate compliance and independence w/HEP. - Ind. w/HEP  Long term goal 2: improve upon Osw score by at least 10 points. - UNMET 20  Long term goal 3: demonstrate compliance w/ambulating using a RW vs her SPC to decrease stress through low back and hip and improve safety. - MET 20  Long term goal 4: demonstrate ability to ambulate in more upright position for decreased stress through low back (FF no >= 20 deg) - 28deg 20 - UNMET    Summary of Evaluation: Assessment: Pt is a 64 y.o. female w/DX OA left hip. Pt reports chronic and worsening left hip pain x >= 1 year and is scheduled for hip surgery 10/19/20. She is also experiencing significant low back pain and has been ambulating w/a spc x 8 mo and holding furniture at home.   PLOF:  Ambulated w/o AD, able to go up/down stairs, walk across uneven ground, get in/out of car w/o pain, perform home maintenance and homemaking. For the last 1.3 years, pain has limited all those activities and now needs help of others to perform all maintenance on house and struggles w/homemaking, lower body dressing. Subjective:  Pt stated that her pain was 9-10/10 today. Pt stated that she was able to get her pain to 5/10 over the weekend and was able to do more, but stated that pain increased on Sunday and has been bad since. Pt stated that she knows what to do at home and wants to make this her last visit. Any changes in Ambulatory Summary Sheet? None        Objective:    Pt had difficulty with transfers sit to supine and supine to sit   Pt required assist to roll to R side and back to supine. Pt didn't tolerate hip extension and insisted that L LE be elevated the entire treatment while is supine. Pt was not able to fully stand up in walker. Suha Moellers was too short, but patient stated that she lowered it because it caused too much pain in her shoulders. Tight hip flexor, tight quads and general decreased ROM   Prior to today's treatment session, patient was screened for signs and symptoms related to COVID-19 including but not limited to verbally answering questions related to feeling ill, cough, or SOB, along with taking temperature via forehead thermometer. Patient presented with all negative signs and symptoms and had no fever >100 degrees Fahrenheit this date.        Exercises: (No more than 4 columns)   Exercise/Equipment 9/15/20 #6 9/22/20  #7 9/24/20 #8 9/29/2020 #8            WARM UP                        TABLE       Ankle pumps X 10 x10 X 10 10x   SAQ 2x10 5\" B 2x10 5\" B 2x10 5\" B  Ext/flex is Painful, slow and difficult 10x2 5\"    Glute sets  10x3\" 20x5\" 20x 5\"    Hip add sets w/towel With slide board   x 10  L AAROM Hook lying position  1 x 10 Hook lying position  2 x 10    iso hip extension on ball supine   X 10 3 ct 15x   Heel slides R/L  With slide board 2x10 B  Unable to fully extend left hip/knee d/t pain R/L  With slide board 2x10 B  Unable to fully extend left hip/knee d/t pain R/L  With slide board 2x10 B  Unable to fully extend left hip/knee d/t pain To tolerance x 15   Hip abd With slide board   x 10  L AAROM With slide board   x 10  L AAROM With slide board   x 10  L AAROM To tolerance x 15   Heel digs   X 10    SLR AAROM with knee mildly flexed  incorporated HS stretch at end range 1 x 5  AAROM LLE with knee mildly flexed  incorporatedHS stretch at end range    1 x 10  AROM  RLE 1 x 5  AAROM LLE with knee mildly flexed  incorporatedHS stretch at end range    1 x 10  AROM  RLE --   PPT 1x10  1x10 w/2# MB OH  Cued for muscle activation 1x10 w/2# MB OH  Cued for muscle activation    Obliques  1x5 w/2# MB 1x10 w/2# MB OH  Cued for muscle activation    Obliques  1x5 w/2# MB --   Hip ext w/MH applied left thigh    --   Supine hip flexor stretch 2 min x 2   Man supine and S/L             STANDING       Gait Amb 150 ft x 2 w/2WW set to proper height to decrease pressure on shoulder and LB     Vc/tc upright posture and hip musculature activation    Reported feeling better Amb 150 ft x 2 w/RW, height set appropriately. VC/tc upright posture. Stands w/28 deg hip flexion. Increased back and hip pain w/further correction. Amb 150 ft x 2 w/RW, height set appropriately. VC/tc upright posture. Stands w/25 deg hip flexion. Increased back and hip pain w/further correction. Amb 150 ft x 2 w/RW, height set appropriately. VC/tc upright posture.           STS   High mat x 5 10x                                    PROPRIOCEPTION       Static stand  3\" blue foam  No UE support (walker in front)  2 x 30 sec  Min sway  No physical assist needed 3\" blue foam  No UE support (walker in front)  2 x 30 sec  Min sway  No physical assist needed                                MODALITIES                         Other Therapeutic Activities/Education:    POC and treatment progression expected reviewed w/and accepted by patient. Home Exercise Program:    8/28/20:  See above  Handouts provided  9/08/20:  PPT - handout provided    Manual Treatments:  PROM and manual HS stretch, and STM to quads and hip flexors  X 15'     Modalities:         Communication with other providers:    POC faxed to ordering provider    Assessment:  (Response towards treatment session) (Pain Rating) Pt tolerated treatment poorly today. Tight hip flexor, tight quads and general decreased ROM and decreased tolerance to stretching and ROM  hinders patient's progress. Plan for Next Session: Specific instructions for Next Treatment: Mat table: lumbar stabilization, hip/knee exercise aarom>arom as able, E-stim w/MH lumbar spine prn      Time In / Time Out: 1600/ 1450      Timed Code/Total Treatment Minutes: 50'/50'  1 Gt ( 8') 1 man (15') 1 TE (25')       Next Progress Note due:   On or before 9/25/20      Plan of Care Interventions:  [x] Therapeutic Exercise  [x] Modalities:  [x] Therapeutic Activity     [x] Ultrasound  [x] Estim  [x] Gait Training      [] Cervical Traction [] Lumbar Traction  [x] Neuromuscular Re-education    [x] Cold/hotpack [] Iontophoresis   [x] Instruction in HEP      [] Vasopneumatic   [] Dry Needling    [x] Manual Therapy               [] Aquatic Therapy              Electronically signed by:  Kenneth Vasquez PTA, 9/29/2020, 3:32 PM     9/29/2020,7:24 PM

## 2020-10-05 ENCOUNTER — TELEPHONE (OUTPATIENT)
Dept: CARDIOLOGY CLINIC | Age: 60
End: 2020-10-05

## 2020-10-08 ENCOUNTER — OFFICE VISIT (OUTPATIENT)
Dept: CARDIOLOGY CLINIC | Age: 60
End: 2020-10-08
Payer: COMMERCIAL

## 2020-10-08 ENCOUNTER — ANESTHESIA EVENT (OUTPATIENT)
Dept: OPERATING ROOM | Age: 60
DRG: 324 | End: 2020-10-08
Payer: COMMERCIAL

## 2020-10-08 VITALS
DIASTOLIC BLOOD PRESSURE: 78 MMHG | WEIGHT: 250 LBS | HEART RATE: 80 BPM | HEIGHT: 65 IN | BODY MASS INDEX: 41.65 KG/M2 | SYSTOLIC BLOOD PRESSURE: 118 MMHG

## 2020-10-08 PROCEDURE — 1036F TOBACCO NON-USER: CPT | Performed by: INTERNAL MEDICINE

## 2020-10-08 PROCEDURE — G8417 CALC BMI ABV UP PARAM F/U: HCPCS | Performed by: INTERNAL MEDICINE

## 2020-10-08 PROCEDURE — 99214 OFFICE O/P EST MOD 30 MIN: CPT | Performed by: INTERNAL MEDICINE

## 2020-10-08 PROCEDURE — G8427 DOCREV CUR MEDS BY ELIG CLIN: HCPCS | Performed by: INTERNAL MEDICINE

## 2020-10-08 PROCEDURE — 3017F COLORECTAL CA SCREEN DOC REV: CPT | Performed by: INTERNAL MEDICINE

## 2020-10-08 PROCEDURE — G8484 FLU IMMUNIZE NO ADMIN: HCPCS | Performed by: INTERNAL MEDICINE

## 2020-10-08 RX ORDER — EVOLOCUMAB 140 MG/ML
140 INJECTION, SOLUTION SUBCUTANEOUS
Qty: 2.1 ML | Refills: 3 | Status: SHIPPED
Start: 2020-10-08 | End: 2020-11-04

## 2020-10-08 NOTE — PROGRESS NOTES
Palpitations 02/12/2019    Class 2 obesity due to excess calories without serious comorbidity in adult 05/11/2018     Current Outpatient Medications   Medication Sig Dispense Refill    raNITIdine HCl (RANITIDINE ACID REDUCER PO) Take by mouth      ezetimibe (ZETIA) 10 MG tablet Take 1 tablet by mouth daily 90 tablet 3    metoprolol tartrate (LOPRESSOR) 50 MG tablet Take 1 tablet by mouth 2 times daily 180 tablet 3    hydroCHLOROthiazide (HYDRODIURIL) 25 MG tablet Take 0.5 tablets by mouth daily 45 tablet 3    loratadine (CLARITIN) 10 MG tablet Take 1 tablet by mouth daily 30 tablet 5    aspirin 81 MG chewable tablet Take 81 mg by mouth daily       No current facility-administered medications for this visit. Allergies: Celexa [citalopram]; Atorvastatin; Mestinon [pyridostigmine]; Penicillins; Sulfa antibiotics; Tape [adhesive tape]; Tricor [fenofibrate]; Gemfibrozil; and Meloxicam  Past Medical History:   Diagnosis Date    Allergic rhinitis due to pollen 11/19/2015    Blood transfusion reaction     Chronic back pain     Depression     Gastroesophageal reflux disease 11/19/2015    Hearing loss 11/19/2015    History of cardiac monitoring 04/18/2017    14 day event monitor. Sinus Rhythm    History of nuclear stress test 09/28/2016    cardiolite-normal,EF70%    Hot flashes due to surgical menopause 11/19/2015    Hx of echocardiogram 10/05/2016    EF: >55 %   Mild mitral and tricuspid regurg.      Hyperlipidemia     Hypertension     Meniere disease     Morbid obesity due to excess calories ([address] 11/19/2015    Sleep apnea 11/19/2015    sleep study negative    Tilt table evaluation 05/09/2017     positive for neurocardiogenic syncope     Past Surgical History:   Procedure Laterality Date    APPENDECTOMY      CHOLECYSTECTOMY      2017    HYSTERECTOMY      TONSILLECTOMY        As reviewed   Family History   Problem Relation Age of Onset    Heart Disease Mother     High Blood Pressure Mother  High Cholesterol Mother     Cancer Mother 80        Stomach    Diabetes Mother     Stroke Mother     Heart Disease Father     High Blood Pressure Father     High Cholesterol Father     Diabetes Father     Cancer Sister 46        Lung cancer    Cancer Maternal Grandmother         Stomach    Diabetes Maternal Grandmother     Diabetes Maternal Grandfather     Diabetes Paternal Grandmother     Diabetes Paternal Grandfather     Cancer Maternal Cousin 47        Pancreatic     Social History     Tobacco Use    Smoking status: Never Smoker    Smokeless tobacco: Never Used    Tobacco comment: Reviewed    Substance Use Topics    Alcohol use: No      Review of Systems:    Constitutional: Negative for diaphoresis and fatigue  Psychological:Negative for anxiety or depression  HENT: Negative for headaches, nasal congestion, sinus pain or vertigo  Eyes: Negative for visual disturbance. Endocrine: Negative for polydipsia/polyuria  Respiratory: Negative for shortness of breath  Cardiovascular: Negative for chest pain, dyspnea on exertion, claudication, edema, irregular heartbeat, murmur, palpitations or shortness of breath  Gastrointestinal: Negative for abdominal pain or heartburn  Genito-Urinary: Negative for urinary frequency/urgency  Musculoskeletal: Negative for muscle pain, muscular weakness, negative for pain in arm and leg or swelling in foot and leg  Neurological: Negative for dizziness, headaches, memory loss, numbness/tingling, visual changes, syncope  Dermatological: Negative for rash    Objective:  /78   Pulse 80   Ht 5' 5\" (1.651 m)   Wt 250 lb (113.4 kg)   BMI 41.60 kg/m²   Wt Readings from Last 3 Encounters:   10/08/20 250 lb (113.4 kg)   09/22/20 250 lb (113.4 kg)   08/27/20 250 lb (113.4 kg)     Body mass index is 41.6 kg/m². No flowsheet data found.     Vitals:    10/08/20 1453   BP: 118/78   Pulse: 80   Weight: 250 lb (113.4 kg)   Height: 5' 5\" (1.651 m)      GENERAL - Alert, oriented, pleasant, in no apparent distress. EYES: No jaundice, no conjunctival pallor. SKIN: It is warm & dry. No rashes. No Echhymosis    HEENT - No clinically significant abnormalities seen. Neck - Supple. No jugular venous distention noted. No carotid bruits. Cardiovascular - Normal S1 and S2 without obvious murmur or gallop. Extremities - No cyanosis, clubbing, or significant edema. Pulmonary - No respiratory distress. No wheezes or rales. Abdomen - No masses, tenderness, or organomegaly. Musculoskeletal - No significant edema. No joint deformities. No muscle wasting. Neurologic - Cranial nerves II through XII are grossly intact. There were no gross focal neurologic abnormalities.     Lab Review   Lab Results   Component Value Date    CKTOTAL 97 09/20/2011    CKMB <0.2 09/20/2011    TROPONINT <0.010 12/31/2016    TROPONINT <0.010 08/23/2016     BNP:    Lab Results   Component Value Date    PROBNP 83.42 08/23/2016     PT/INR:    Lab Results   Component Value Date    INR 1.14 09/20/2011     Lab Results   Component Value Date    LABA1C 5.6 05/13/2019    LABA1C 5.6 12/07/2015     Lab Results   Component Value Date    WBC 9.0 05/13/2019    WBC 11.3 (H) 05/10/2017    HCT 44.9 05/13/2019    HCT 38.4 05/10/2017    MCV 89.4 05/13/2019    MCV 89.5 05/10/2017     05/13/2019     05/10/2017     Lab Results   Component Value Date    CHOL 508 (H) 05/13/2019    CHOL 195 09/27/2017    TRIG 598 (H) 05/13/2019    TRIG 260 (H) 09/27/2017    HDL 38 (L) 05/13/2019    HDL 42 09/27/2017    LDLCALC NOT VALID WHEN TRIGLYCERIDE >400 MG/DL. 05/22/2017    LDLCALC 75 12/07/2015    LDLDIRECT 312 (H) 05/13/2019    LDLDIRECT 95 09/27/2017     Lab Results   Component Value Date    ALT 20 05/13/2019    ALT 29 09/27/2017    AST 16 05/13/2019    AST 22 09/27/2017     BMP:    Lab Results   Component Value Date     05/13/2019     05/10/2017    K 4.1 05/13/2019    K 4.1 05/10/2017    CL 99 05/13/2019    CL 101 05/10/2017    CO2 28 05/13/2019    CO2 23 05/10/2017    BUN 19 05/13/2019    BUN 22 05/10/2017    CREATININE 1.1 05/13/2019    CREATININE 1.4 05/10/2017     CMP:   Lab Results   Component Value Date     05/13/2019     05/10/2017    K 4.1 05/13/2019    K 4.1 05/10/2017    CL 99 05/13/2019     05/10/2017    CO2 28 05/13/2019    CO2 23 05/10/2017    BUN 19 05/13/2019    BUN 22 05/10/2017    CREATININE 1.1 05/13/2019    CREATININE 1.4 05/10/2017    PROT 7.4 05/13/2019    PROT 6.9 09/27/2017    PROT 7.5 09/20/2011     TSH:    Lab Results   Component Value Date    TSH 2.740 05/13/2019       QUALITY MEASURES REVIEWED:  1.CAD:Patient is taking anti platelet agent:Yes  2. DYSLIPIDEMIA: Patient is on cholesterol lowering medication:No, due to side effects  3. Beta-Blocker therapy for CAD, if prior Myocardial Infarction:Yes  4. Counselled regarding smoking cessation. 5. Atrial fibrillation & anticoagulation therapy No  6. Discussed weight management strategies. Impression:    1. Precordial pain    2. Vasovagal syncope    3. Essential hypertension    4. Mixed hyperlipidemia    5. Morbid obesity due to excess calories ([address]    6. SOB (shortness of breath)    7.  Family history of early CAD       Patient Active Problem List   Diagnosis Code    Essential hypertension I10    Hyperlipidemia E78.5    Allergic rhinitis due to pollen J30.1    Morbid obesity due to excess calories ([address] E66.01    Hearing loss H91.90    Hot flashes due to surgical menopause E89.41    Gastroesophageal reflux disease K21.9    Chronic back pain M54.9, G89.29    Anxiety F41.9    Osteoarthritis M19.90    Meniere's disease H81.09    Insomnia G47.00    Precordial pain R07.2    SOB (shortness of breath) R06.02    Calculus of gallbladder without cholecystitis without obstruction K80.20    S/P laparoscopic cholecystectomy Z90.49    Vasovagal syncope R55    Class 2 obesity due to excess calories without serious comorbidity in adult E66.09    Palpitations R00.2    Pre-operative clearance Z01.818    Family history of early CAD Z82.49       Assessment & Plan:    CAD:None known but has multiple risk factors & now C/O chest pains / SOB. Also patient needs Hip surgery  HTN:well controlled on current medical regimen, see list above. - changes in  treatment:   no   CARDIOMYOPATHY: None known   CONGESTIVE HEART FAILURE: NO KNOWN HISTORY.   VHD: No significant VHD noted  DYSLIPIDEMIA: Patient's profile is at / near Goal.no                                HDL is low                                Tolerating current medical regimen well no. Only on Zetia as there is Statin intolerance                                Does not tolerate Statin medications well due to side effects                                See most recent Lab values in Labs section above. OTHER RELEVANT DIAGNOSIS:as noted in patient's active problem list:  Syncope    Morbid Obesity. TESTS ORDERED:   Elyce Bias as patient cannot walk on treadmill due to hip problem. All previously ordered tests reviewed. ARRHYTHMIAS: None known   MEDICATIONS: CPM + Repatha if she can get. Office f/u for test results. Primary/secondary prevention is the goal by aggressive risk modification, healthy and therapeutic life style changes for cardiovascular risk reduction. Various goals are discussed and multiple questions answered.

## 2020-10-08 NOTE — PATIENT INSTRUCTIONS
CAD:None known but has multiple risk factors & now C/O chest pains / SOB. Also patient needs Hip surgery  HTN:well controlled on current medical regimen, see list above. - changes in  treatment:   no   CARDIOMYOPATHY: None known   CONGESTIVE HEART FAILURE: NO KNOWN HISTORY.   VHD: No significant VHD noted  DYSLIPIDEMIA: Patient's profile is at / near Goal.no                                HDL is low                                Tolerating current medical regimen well no. Only on Zetia as there is Statin intolerance                                Does not tolerate Statin medications well due to side effects                                See most recent Lab values in Labs section above. OTHER RELEVANT DIAGNOSIS:as noted in patient's active problem list:  Syncope    Morbid Obesity. TESTS ORDERED:   Ajay Jack as patient cannot walk on treadmill due to hip problem. All previously ordered tests reviewed. ARRHYTHMIAS: None known   MEDICATIONS: CPM + Repatha if she can get. Office f/u for test results. Primary/secondary prevention is the goal by aggressive risk modification, healthy and therapeutic life style changes for cardiovascular risk reduction. Various goals are discussed and multiple questions answered. Please hold on to these instructions the  will call you within 1-9 business days when we receive authorization from your insurance. Nuclear Stress Test    WHAT TO EXPECT:   ? You will need to confirm the test or it could be cancelled. ? This test will take approximately 2 hours: 1 hour in the AM &    1 hour in the PM. You will be given a time by the Technologist after the first part is completed to come back. ? You will be given a medication, through an IV in the hand, this will safely simulate exercise. This IV is also needed to inject the radioactive isotope unless you are able toe walk the treadmill. ?  You will receive an injection in the AM & PM before the pictures. ? Using a special camera, you will have one set of pictures of your heart taken in the AM and a set of pictures in the PM.     PREPARATION FOR TEST:  ? Eat a light breakfast such as water or juice and toast.  ? If you are DIABETIC: Eat a normal breakfast with NO CAFFEINE and take your insulin as normal.   ? AVOID ALL FOODS & DRINKS containing CAFFEINE 12 HOURS PRIOR TO THE TEST: Including coffee, Tea, Shraddha and other soft drinks even those labeled  caffeine free or decaffeinated.  ? HOLD THESE MEDICATIONS Persantine & Theophylline (Theodur)  24 hours prior & bring your inhaler with you.    The physician will specify if the following Beta blockers need to be held for 24 hours prior to test:

## 2020-10-08 NOTE — LETTER
[facility]  [address] [address]  Phone: [phone]  Fax: [phone]    Rigoberto Bailey MD        October 8, 2020     Kamille Recinos MD  [address] [place] [address]    Patient: Bia Montelongo  MR Number: [id number]  YOB: 1960  Date of Visit: 10/8/2020    Dear Dr. Kamille Recinos: Thank you for the request for consultation for Parviz Sarabia to me for the evaluation of multiple risk factors. Below are the relevant portions of my assessment and plan of care. If you have questions, please do not hesitate to call me. I look forward to following Kira Mendoza along with you.     Sincerely,        Rigoberto Bailey MD

## 2020-10-09 NOTE — ANESTHESIA PRE PROCEDURE
Department of Anesthesiology  Preprocedure Note       Name:  Tyson Concepcion   Age:  64 y.o.  :  1960                                          MRN:  [id number]         Date:  10/9/2020      Surgeon: Octavia Frederick):  Waldemar Woo MD    Procedure: Procedure(s):  LEFT HIP TOTAL ARTHROPLASTY - POSTERIOR    Medications prior to admission:   Prior to Admission medications    Medication Sig Start Date End Date Taking? Authorizing Provider   Evolocumab (REPATHA) 140 MG/ML SOSY Inject 1 mL into the skin every 14 days 10/8/20   Alexandra Chamorro MD   raNITIdine HCl (RANITIDINE ACID REDUCER PO) Take by mouth    Historical Provider, MD   ezetimibe (ZETIA) 10 MG tablet Take 1 tablet by mouth daily 20   Ramakrishna Coon MD   metoprolol tartrate (LOPRESSOR) 50 MG tablet Take 1 tablet by mouth 2 times daily 20   Ramakrishna Coon MD   hydroCHLOROthiazide (HYDRODIURIL) 25 MG tablet Take 0.5 tablets by mouth daily 20   Ramakrishna Coon MD   loratadine (CLARITIN) 10 MG tablet Take 1 tablet by mouth daily 19   Ramakrishna Coon MD   aspirin 81 MG chewable tablet Take 81 mg by mouth daily    Historical Provider, MD       Current medications:    Current Outpatient Medications   Medication Sig Dispense Refill    Evolocumab (REPATHA) 140 MG/ML SOSY Inject 1 mL into the skin every 14 days 2.1 mL 3    raNITIdine HCl (RANITIDINE ACID REDUCER PO) Take by mouth      ezetimibe (ZETIA) 10 MG tablet Take 1 tablet by mouth daily 90 tablet 3    metoprolol tartrate (LOPRESSOR) 50 MG tablet Take 1 tablet by mouth 2 times daily 180 tablet 3    hydroCHLOROthiazide (HYDRODIURIL) 25 MG tablet Take 0.5 tablets by mouth daily 45 tablet 3    loratadine (CLARITIN) 10 MG tablet Take 1 tablet by mouth daily 30 tablet 5    aspirin 81 MG chewable tablet Take 81 mg by mouth daily       No current facility-administered medications for this encounter. Allergies:     Allergies   Allergen Reactions    Celexa [Citalopram] Other (See Comments)     Stomach pain        Atorvastatin      Leg cramps      Mestinon [Pyridostigmine] Itching and Swelling    Penicillins     Sulfa Antibiotics     Tape Darlene Colder Tape]     Tricor [Fenofibrate]      angioedema    Gemfibrozil Rash    Meloxicam Other (See Comments)     moodswings       Problem List:    Patient Active Problem List   Diagnosis Code    Essential hypertension I10    Hyperlipidemia E78.5    Allergic rhinitis due to pollen J30.1    Morbid obesity due to excess calories ([facility]) E66.01    Hearing loss H91.90    Hot flashes due to surgical menopause E89.41    Gastroesophageal reflux disease K21.9    Chronic back pain M54.9, G89.29    Anxiety F41.9    Osteoarthritis M19.90    Meniere's disease H81.09    Insomnia G47.00    Precordial pain R07.2    SOB (shortness of breath) R06.02    Calculus of gallbladder without cholecystitis without obstruction K80.20    S/P laparoscopic cholecystectomy Z90.49    Vasovagal syncope R55    Class 2 obesity due to excess calories without serious comorbidity in adult E66.09    Palpitations R00.2    Pre-operative clearance Z01.818    Family history of early CAD Z80.55       Past Medical History:        Diagnosis Date    Allergic rhinitis due to pollen 11/19/2015    Blood transfusion reaction     Chronic back pain     Depression     Gastroesophageal reflux disease 11/19/2015    Hearing loss 11/19/2015    History of cardiac monitoring 04/18/2017    14 day event monitor. Sinus Rhythm    History of nuclear stress test 09/28/2016    cardiolite-normal,EF70%    Hot flashes due to surgical menopause 11/19/2015    Hx of echocardiogram 10/05/2016    EF: >55 %   Mild mitral and tricuspid regurg.      Hyperlipidemia     Hypertension     Meniere disease     Morbid obesity due to excess calories ([facility] 11/19/2015    Sleep apnea 11/19/2015    sleep study negative    Tilt table evaluation 05/09/2017     positive for neurocardiogenic syncope Past Surgical History:        Procedure Laterality Date    APPENDECTOMY      CHOLECYSTECTOMY      2017    HYSTERECTOMY      TONSILLECTOMY         Social History:    Social History     Tobacco Use    Smoking status: Never Smoker    Smokeless tobacco: Never Used    Tobacco comment: Reviewed    Substance Use Topics    Alcohol use: No                                Counseling given: Not Answered  Comment: Reviewed       Vital Signs (Current): There were no vitals filed for this visit. BP Readings from Last 3 Encounters:   10/08/20 118/78   09/22/20 128/86   09/11/19 125/85       NPO Status:                                                                                 BMI:   Wt Readings from Last 3 Encounters:   10/08/20 250 lb (113.4 kg)   09/22/20 250 lb (113.4 kg)   08/27/20 250 lb (113.4 kg)     There is no height or weight on file to calculate BMI. CBC  Lab Results   Component Value Date/Time    WBC 8.3 10/12/2020 10:11 AM    HGB 13.9 10/12/2020 10:11 AM    HCT 44.1 10/12/2020 10:11 AM     10/12/2020 10:11 AM     RENAL  Lab Results   Component Value Date/Time     10/12/2020 10:11 AM    K 3.6 10/12/2020 10:11 AM     10/12/2020 10:11 AM    CO2 28 10/12/2020 10:11 AM    BUN 21 10/12/2020 10:11 AM    CREATININE 1.3 (H) 10/12/2020 10:11 AM    GLUCOSE 112 (H) 10/12/2020 10:11 AM       COVID-19 Screening (If Applicable): No results found for: COVID19      Anesthesia Evaluation  Patient summary reviewed and Nursing notes reviewed  Airway: Mallampati: II  TM distance: >3 FB   Neck ROM: limited  Mouth opening: > = 3 FB Dental:          Pulmonary:normal exam    (+) shortness of breath:  sleep apnea:                            ROS comment: Non smoker    PAT Airway. Limited exam. COVID 19 precautions.  Patient wearing mask  Head/Neck movement: full   History of difficult intubation:  None  Teeth: upper extracted, broken and missing lower teeth Able to lie flat         COVID 19 screening  Denies recent fever or known COVID 19 exposure. Instructed to quarantine until surgery and report any new respiratory or fever symptoms to surgeon. Aware COVID testing must be completed before DOS. COVID swab:  10/12/2020   Cardiovascular:  Exercise tolerance: good (>4 METS),   (+) hypertension (on beta blocker and ASA):, dysrhythmias:, hyperlipidemia      NYHA Classification: I  ECG reviewed        Stress test reviewed  Cleared by cardiology     Beta Blocker:  Dose within 24 Hrs      ROS comment:   CP: One time CP 4/2020. Evaluated by Dr Contreras Craig  Exercise: PT x 4 weeks. Cardiac risk assessment per : LIAN Avelar-CNP  LOW RISK    Stress test 10/14/2020  EF 60%  Supervising physician Dr. Ene Dawson .   Paulene Bihari tracer uptake in all segments of myocardium on stress ans rest  images. Normal Lexiscan nuclear scintigraphic study suggestive of normal    myocardial perfusion. Gated images demonstrate normal left ventricular systolic function        EKG: 10/12/2020  NSR      Palpitations      Neuro/Psych:   (+) psychiatric history (insomnia):depression/anxiety              ROS comment: HEENT:  Meniere's disease  [place] GI/Hepatic/Renal:   (+) GERD:, renal disease (CKD, baseline CR: 1.1-1.5, PAT CR: 1.3, BUN: 21, 10/12/2020):, morbid obesity (BMI: 41)         ROS comment: S/P cholecystectomy/ appy  BMI 41.7. Endo/Other:    (+) : arthritis (hips, knees, and shoulders ): OA., . Pt had PAT visit. ROS comment: S/p hysterectomy Abdominal:   (+) obese,         Vascular:                            Anesthesia Plan      general and regional     ASA 3     (H/H 13.9/44.1 K+3.6  COVID-19 not detected)  Induction: intravenous. Anesthetic plan and risks discussed with patient and spouse. Plan discussed with CRNA. LIAN Fortune - CNP  Chart reviewed, pt. Interviewed and examined.   Anesthesia Evaluation will follow by a certified practitioner prior to surgery.   10/9/2020

## 2020-10-12 ENCOUNTER — HOSPITAL ENCOUNTER (OUTPATIENT)
Dept: PREADMISSION TESTING | Age: 60
Discharge: HOME | End: 2020-10-16
Payer: COMMERCIAL

## 2020-10-12 VITALS
SYSTOLIC BLOOD PRESSURE: 144 MMHG | OXYGEN SATURATION: 99 % | HEART RATE: 67 BPM | DIASTOLIC BLOOD PRESSURE: 77 MMHG | WEIGHT: 250 LBS | HEIGHT: 65 IN | BODY MASS INDEX: 41.65 KG/M2 | TEMPERATURE: 97.7 F | RESPIRATION RATE: 18 BRPM

## 2020-10-12 LAB
ALBUMIN SERPL-MCNC: 4.5 GM/DL (ref 3.4–5)
ALP BLD-CCNC: 57 IU/L (ref 40–128)
ALT SERPL-CCNC: 20 U/L (ref 10–40)
ANION GAP SERPL CALCULATED.3IONS-SCNC: 13 MMOL/L (ref 4–16)
AST SERPL-CCNC: 15 IU/L (ref 15–37)
BACTERIA: NEGATIVE /HPF
BASOPHILS ABSOLUTE: 0.1 K/CU MM
BASOPHILS RELATIVE PERCENT: 0.7 % (ref 0–1)
BILIRUB SERPL-MCNC: 0.4 MG/DL (ref 0–1)
BILIRUBIN URINE: NEGATIVE MG/DL
BLOOD, URINE: NEGATIVE
BUN BLDV-MCNC: 21 MG/DL (ref 6–23)
CALCIUM SERPL-MCNC: 9.3 MG/DL (ref 8.3–10.6)
CHLORIDE BLD-SCNC: 102 MMOL/L (ref 99–110)
CLARITY: CLEAR
CO2: 28 MMOL/L (ref 21–32)
COLOR: ABNORMAL
CREAT SERPL-MCNC: 1.3 MG/DL (ref 0.6–1.1)
DIFFERENTIAL TYPE: ABNORMAL
EKG ATRIAL RATE: 69 BPM
EKG DIAGNOSIS: NORMAL
EKG P AXIS: -19 DEGREES
EKG P-R INTERVAL: 154 MS
EKG Q-T INTERVAL: 386 MS
EKG QRS DURATION: 80 MS
EKG QTC CALCULATION (BAZETT): 413 MS
EKG R AXIS: 64 DEGREES
EKG T AXIS: 23 DEGREES
EKG VENTRICULAR RATE: 69 BPM
EOSINOPHILS ABSOLUTE: 0.2 K/CU MM
EOSINOPHILS RELATIVE PERCENT: 2.4 % (ref 0–3)
ERYTHROCYTE SEDIMENTATION RATE: 24 MM/HR (ref 0–30)
GFR AFRICAN AMERICAN: 51 ML/MIN/1.73M2
GFR NON-AFRICAN AMERICAN: 42 ML/MIN/1.73M2
GLUCOSE BLD-MCNC: 112 MG/DL (ref 70–99)
GLUCOSE, URINE: NEGATIVE MG/DL
HCT VFR BLD CALC: 44.1 % (ref 37–47)
HEMOGLOBIN: 13.9 GM/DL (ref 12.5–16)
IMMATURE NEUTROPHIL %: 0.4 % (ref 0–0.43)
KETONES, URINE: NEGATIVE MG/DL
LEUKOCYTE ESTERASE, URINE: NEGATIVE
LYMPHOCYTES ABSOLUTE: 2 K/CU MM
LYMPHOCYTES RELATIVE PERCENT: 23.4 % (ref 24–44)
MCH RBC QN AUTO: 28.3 PG (ref 27–31)
MCHC RBC AUTO-ENTMCNC: 31.5 % (ref 32–36)
MCV RBC AUTO: 89.6 FL (ref 78–100)
MONOCYTES ABSOLUTE: 0.5 K/CU MM
MONOCYTES RELATIVE PERCENT: 6.4 % (ref 0–4)
MUCUS: ABNORMAL HPF
NITRITE URINE, QUANTITATIVE: NEGATIVE
NUCLEATED RBC %: 0 %
PDW BLD-RTO: 13.3 % (ref 11.7–14.9)
PH, URINE: 5 (ref 5–8)
PLATELET # BLD: 272 K/CU MM (ref 140–440)
PMV BLD AUTO: 9.2 FL (ref 7.5–11.1)
POTASSIUM SERPL-SCNC: 3.6 MMOL/L (ref 3.5–5.1)
PROTEIN UA: NEGATIVE MG/DL
RBC # BLD: 4.92 M/CU MM (ref 4.2–5.4)
RBC URINE: <1 /HPF (ref 0–6)
SEGMENTED NEUTROPHILS ABSOLUTE COUNT: 5.6 K/CU MM
SEGMENTED NEUTROPHILS RELATIVE PERCENT: 66.7 % (ref 36–66)
SODIUM BLD-SCNC: 143 MMOL/L (ref 135–145)
SPECIFIC GRAVITY UA: 1 (ref 1–1.03)
SQUAMOUS EPITHELIAL: 8 /HPF
TOTAL IMMATURE NEUTOROPHIL: 0.03 K/CU MM
TOTAL NUCLEATED RBC: 0 K/CU MM
TOTAL PROTEIN: 7 GM/DL (ref 6.4–8.2)
TRICHOMONAS: ABNORMAL /HPF
UROBILINOGEN, URINE: NORMAL MG/DL (ref 0.2–1)
WBC # BLD: 8.3 K/CU MM (ref 4–10.5)
WBC UA: <1 /HPF (ref 0–5)

## 2020-10-12 PROCEDURE — U0002 COVID-19 LAB TEST NON-CDC: HCPCS

## 2020-10-12 PROCEDURE — 93010 ELECTROCARDIOGRAM REPORT: CPT | Performed by: INTERNAL MEDICINE

## 2020-10-12 PROCEDURE — 87086 URINE CULTURE/COLONY COUNT: CPT

## 2020-10-12 PROCEDURE — 80053 COMPREHEN METABOLIC PANEL: CPT

## 2020-10-12 PROCEDURE — 85652 RBC SED RATE AUTOMATED: CPT

## 2020-10-12 PROCEDURE — 85025 COMPLETE CBC W/AUTO DIFF WBC: CPT

## 2020-10-12 PROCEDURE — C9803 HOPD COVID-19 SPEC COLLECT: HCPCS

## 2020-10-12 PROCEDURE — 93005 ELECTROCARDIOGRAM TRACING: CPT | Performed by: ORTHOPAEDIC SURGERY

## 2020-10-12 PROCEDURE — 81001 URINALYSIS AUTO W/SCOPE: CPT

## 2020-10-12 RX ORDER — SODIUM CHLORIDE, SODIUM LACTATE, POTASSIUM CHLORIDE, CALCIUM CHLORIDE 600; 310; 30; 20 MG/100ML; MG/100ML; MG/100ML; MG/100ML
INJECTION, SOLUTION INTRAVENOUS CONTINUOUS
Status: CANCELLED | OUTPATIENT
Start: 2020-10-19

## 2020-10-12 ASSESSMENT — PAIN DESCRIPTION - ONSET: ONSET: ON-GOING

## 2020-10-12 ASSESSMENT — PAIN SCALES - GENERAL: PAINLEVEL_OUTOF10: 8

## 2020-10-12 ASSESSMENT — PAIN - FUNCTIONAL ASSESSMENT: PAIN_FUNCTIONAL_ASSESSMENT: PREVENTS OR INTERFERES WITH MANY ACTIVE NOT PASSIVE ACTIVITIES

## 2020-10-12 ASSESSMENT — PAIN DESCRIPTION - LOCATION: LOCATION: HIP

## 2020-10-12 ASSESSMENT — PAIN DESCRIPTION - DESCRIPTORS: DESCRIPTORS: SHARP

## 2020-10-12 ASSESSMENT — PAIN DESCRIPTION - FREQUENCY: FREQUENCY: CONTINUOUS

## 2020-10-12 ASSESSMENT — PAIN DESCRIPTION - ORIENTATION: ORIENTATION: LEFT

## 2020-10-12 ASSESSMENT — PAIN DESCRIPTION - PAIN TYPE: TYPE: ACUTE PAIN

## 2020-10-12 ASSESSMENT — PAIN DESCRIPTION - PROGRESSION: CLINICAL_PROGRESSION: GRADUALLY WORSENING

## 2020-10-13 LAB
CULTURE: NORMAL
Lab: NORMAL
SARS-COV-2: NOT DETECTED
SOURCE: NORMAL
SPECIMEN: NORMAL

## 2020-10-14 ENCOUNTER — TELEPHONE (OUTPATIENT)
Dept: CARDIOLOGY CLINIC | Age: 60
End: 2020-10-14

## 2020-10-14 ENCOUNTER — PROCEDURE VISIT (OUTPATIENT)
Dept: CARDIOLOGY CLINIC | Age: 60
End: 2020-10-14
Payer: COMMERCIAL

## 2020-10-14 LAB
LV EF: 60 %
LVEF MODALITY: NORMAL

## 2020-10-14 PROCEDURE — 93015 CV STRESS TEST SUPVJ I&R: CPT | Performed by: INTERNAL MEDICINE

## 2020-10-14 PROCEDURE — 78452 HT MUSCLE IMAGE SPECT MULT: CPT | Performed by: INTERNAL MEDICINE

## 2020-10-14 PROCEDURE — A9500 TC99M SESTAMIBI: HCPCS | Performed by: INTERNAL MEDICINE

## 2020-10-14 NOTE — TELEPHONE ENCOUNTER
Spoke with physician for [id number] for [employer]. Approval received. Case ID Reference #: [id number]  Approval #: [id number]  Mar PETERSON Advised and voiced understanding.

## 2020-10-14 NOTE — TELEPHONE ENCOUNTER
Patient called to see if she was still on for her   Nuclear for today /Looks like is pending in   Epic

## 2020-10-16 ENCOUNTER — TELEPHONE (OUTPATIENT)
Dept: CARDIOLOGY CLINIC | Age: 60
End: 2020-10-16

## 2020-10-19 ENCOUNTER — APPOINTMENT (OUTPATIENT)
Dept: GENERAL RADIOLOGY | Age: 60
DRG: 324 | End: 2020-10-19
Attending: ORTHOPAEDIC SURGERY
Payer: COMMERCIAL

## 2020-10-19 ENCOUNTER — ANESTHESIA (OUTPATIENT)
Dept: OPERATING ROOM | Age: 60
DRG: 324 | End: 2020-10-19
Payer: COMMERCIAL

## 2020-10-19 ENCOUNTER — HOSPITAL ENCOUNTER (OUTPATIENT)
Age: 60
Discharge: HOME | DRG: 324 | End: 2020-10-21
Attending: ORTHOPAEDIC SURGERY | Admitting: ORTHOPAEDIC SURGERY
Payer: COMMERCIAL

## 2020-10-19 VITALS — SYSTOLIC BLOOD PRESSURE: 118 MMHG | OXYGEN SATURATION: 94 % | DIASTOLIC BLOOD PRESSURE: 79 MMHG

## 2020-10-19 PROBLEM — M16.12 UNILATERAL PRIMARY OSTEOARTHRITIS, LEFT HIP: Status: ACTIVE | Noted: 2020-10-19

## 2020-10-19 LAB — GLUCOSE BLD-MCNC: 103 MG/DL (ref 70–99)

## 2020-10-19 PROCEDURE — 3700000001 HC ADD 15 MINUTES (ANESTHESIA): Performed by: ORTHOPAEDIC SURGERY

## 2020-10-19 PROCEDURE — 6360000002 HC RX W HCPCS

## 2020-10-19 PROCEDURE — 2709999900 HC NON-CHARGEABLE SUPPLY: Performed by: ORTHOPAEDIC SURGERY

## 2020-10-19 PROCEDURE — 2500000003 HC RX 250 WO HCPCS

## 2020-10-19 PROCEDURE — 7100000000 HC PACU RECOVERY - FIRST 15 MIN: Performed by: ORTHOPAEDIC SURGERY

## 2020-10-19 PROCEDURE — 7100000001 HC PACU RECOVERY - ADDTL 15 MIN: Performed by: ORTHOPAEDIC SURGERY

## 2020-10-19 PROCEDURE — 27130 TOTAL HIP ARTHROPLASTY: CPT | Performed by: PHYSICIAN ASSISTANT

## 2020-10-19 PROCEDURE — 94150 VITAL CAPACITY TEST: CPT

## 2020-10-19 PROCEDURE — 6360000002 HC RX W HCPCS: Performed by: ORTHOPAEDIC SURGERY

## 2020-10-19 PROCEDURE — 0SRB04A REPLACEMENT OF LEFT HIP JOINT WITH CERAMIC ON POLYETHYLENE SYNTHETIC SUBSTITUTE, UNCEMENTED, OPEN APPROACH: ICD-10-PCS | Performed by: ORTHOPAEDIC SURGERY

## 2020-10-19 PROCEDURE — 72170 X-RAY EXAM OF PELVIS: CPT

## 2020-10-19 PROCEDURE — C1713 ANCHOR/SCREW BN/BN,TIS/BN: HCPCS | Performed by: ORTHOPAEDIC SURGERY

## 2020-10-19 PROCEDURE — C1776 JOINT DEVICE (IMPLANTABLE): HCPCS | Performed by: ORTHOPAEDIC SURGERY

## 2020-10-19 PROCEDURE — 2580000003 HC RX 258: Performed by: ORTHOPAEDIC SURGERY

## 2020-10-19 PROCEDURE — 3700000000 HC ANESTHESIA ATTENDED CARE: Performed by: ORTHOPAEDIC SURGERY

## 2020-10-19 PROCEDURE — 64450 NJX AA&/STRD OTHER PN/BRANCH: CPT

## 2020-10-19 PROCEDURE — 3600000015 HC SURGERY LEVEL 5 ADDTL 15MIN: Performed by: ORTHOPAEDIC SURGERY

## 2020-10-19 PROCEDURE — 2500000003 HC RX 250 WO HCPCS: Performed by: ORTHOPAEDIC SURGERY

## 2020-10-19 PROCEDURE — 27130 TOTAL HIP ARTHROPLASTY: CPT | Performed by: ORTHOPAEDIC SURGERY

## 2020-10-19 PROCEDURE — 3600000005 HC SURGERY LEVEL 5 BASE: Performed by: ORTHOPAEDIC SURGERY

## 2020-10-19 PROCEDURE — 6370000000 HC RX 637 (ALT 250 FOR IP): Performed by: ORTHOPAEDIC SURGERY

## 2020-10-19 PROCEDURE — 6360000002 HC RX W HCPCS: Performed by: NURSE ANESTHETIST, CERTIFIED REGISTERED

## 2020-10-19 PROCEDURE — 94761 N-INVAS EAR/PLS OXIMETRY MLT: CPT

## 2020-10-19 PROCEDURE — 82962 GLUCOSE BLOOD TEST: CPT

## 2020-10-19 DEVICE — LINER ACET SZ E ID36MM THK5.9MM 0DEG HIP X3 LOK RNG FOR: Type: IMPLANTABLE DEVICE | Site: HIP | Status: FUNCTIONAL

## 2020-10-19 DEVICE — HEAD FEM DIA36MM -2.5MM OFFSET HIP BIOLOX DELT CERAMIC TAPR: Type: IMPLANTABLE DEVICE | Site: HIP | Status: FUNCTIONAL

## 2020-10-19 DEVICE — SCREW BNE L25MM DIA6.5MM STD CANC HIP TI ST CANN NONLOCKING: Type: IMPLANTABLE DEVICE | Site: HIP | Status: FUNCTIONAL

## 2020-10-19 RX ORDER — ACETAMINOPHEN 325 MG/1
650 TABLET ORAL EVERY 6 HOURS
Status: DISCONTINUED | OUTPATIENT
Start: 2020-10-19 | End: 2020-10-21 | Stop reason: HOSPADM

## 2020-10-19 RX ORDER — ONDANSETRON 2 MG/ML
8 INJECTION INTRAMUSCULAR; INTRAVENOUS
Status: DISCONTINUED | OUTPATIENT
Start: 2020-10-19 | End: 2020-10-19 | Stop reason: HOSPADM

## 2020-10-19 RX ORDER — SODIUM CHLORIDE, SODIUM LACTATE, POTASSIUM CHLORIDE, CALCIUM CHLORIDE 600; 310; 30; 20 MG/100ML; MG/100ML; MG/100ML; MG/100ML
INJECTION, SOLUTION INTRAVENOUS CONTINUOUS
Status: DISCONTINUED | OUTPATIENT
Start: 2020-10-19 | End: 2020-10-19

## 2020-10-19 RX ORDER — ONDANSETRON 2 MG/ML
4 INJECTION INTRAMUSCULAR; INTRAVENOUS EVERY 6 HOURS PRN
Status: DISCONTINUED | OUTPATIENT
Start: 2020-10-19 | End: 2020-10-21 | Stop reason: HOSPADM

## 2020-10-19 RX ORDER — FENTANYL CITRATE 50 UG/ML
25 INJECTION, SOLUTION INTRAMUSCULAR; INTRAVENOUS EVERY 5 MIN PRN
Status: DISCONTINUED | OUTPATIENT
Start: 2020-10-19 | End: 2020-10-19 | Stop reason: HOSPADM

## 2020-10-19 RX ORDER — LABETALOL HYDROCHLORIDE 5 MG/ML
5 INJECTION, SOLUTION INTRAVENOUS EVERY 10 MIN PRN
Status: DISCONTINUED | OUTPATIENT
Start: 2020-10-19 | End: 2020-10-19 | Stop reason: HOSPADM

## 2020-10-19 RX ORDER — ONDANSETRON 2 MG/ML
INJECTION INTRAMUSCULAR; INTRAVENOUS PRN
Status: DISCONTINUED | OUTPATIENT
Start: 2020-10-19 | End: 2020-10-19 | Stop reason: SDUPTHER

## 2020-10-19 RX ORDER — LIDOCAINE HYDROCHLORIDE 20 MG/ML
INJECTION, SOLUTION INTRAVENOUS PRN
Status: DISCONTINUED | OUTPATIENT
Start: 2020-10-19 | End: 2020-10-19 | Stop reason: SDUPTHER

## 2020-10-19 RX ORDER — SODIUM CHLORIDE 0.9 % (FLUSH) 0.9 %
10 SYRINGE (ML) INJECTION EVERY 12 HOURS SCHEDULED
Status: DISCONTINUED | OUTPATIENT
Start: 2020-10-19 | End: 2020-10-21 | Stop reason: HOSPADM

## 2020-10-19 RX ORDER — PROMETHAZINE HYDROCHLORIDE 25 MG/1
12.5 TABLET ORAL EVERY 6 HOURS PRN
Status: DISCONTINUED | OUTPATIENT
Start: 2020-10-19 | End: 2020-10-21 | Stop reason: HOSPADM

## 2020-10-19 RX ORDER — SODIUM CHLORIDE 0.9 % (FLUSH) 0.9 %
10 SYRINGE (ML) INJECTION PRN
Status: DISCONTINUED | OUTPATIENT
Start: 2020-10-19 | End: 2020-10-21 | Stop reason: HOSPADM

## 2020-10-19 RX ORDER — BISACODYL 10 MG
10 SUPPOSITORY, RECTAL RECTAL DAILY PRN
Status: DISCONTINUED | OUTPATIENT
Start: 2020-10-19 | End: 2020-10-21 | Stop reason: HOSPADM

## 2020-10-19 RX ORDER — OXYCODONE HYDROCHLORIDE AND ACETAMINOPHEN 5; 325 MG/1; MG/1
1 TABLET ORAL EVERY 6 HOURS PRN
Status: DISCONTINUED | OUTPATIENT
Start: 2020-10-19 | End: 2020-10-21 | Stop reason: HOSPADM

## 2020-10-19 RX ORDER — 0.9 % SODIUM CHLORIDE 0.9 %
500 INTRAVENOUS SOLUTION INTRAVENOUS
Status: DISCONTINUED | OUTPATIENT
Start: 2020-10-19 | End: 2020-10-19 | Stop reason: HOSPADM

## 2020-10-19 RX ORDER — EZETIMIBE 10 MG/1
10 TABLET ORAL DAILY
Status: DISCONTINUED | OUTPATIENT
Start: 2020-10-19 | End: 2020-10-21 | Stop reason: HOSPADM

## 2020-10-19 RX ORDER — PROPOFOL 10 MG/ML
INJECTION, EMULSION INTRAVENOUS PRN
Status: DISCONTINUED | OUTPATIENT
Start: 2020-10-19 | End: 2020-10-19 | Stop reason: SDUPTHER

## 2020-10-19 RX ORDER — TRANEXAMIC ACID 100 MG/ML
1000 INJECTION, SOLUTION INTRAVENOUS
Status: COMPLETED | OUTPATIENT
Start: 2020-10-19 | End: 2020-10-19

## 2020-10-19 RX ORDER — FAMOTIDINE 20 MG/1
10 TABLET, FILM COATED ORAL NIGHTLY
Status: DISCONTINUED | OUTPATIENT
Start: 2020-10-19 | End: 2020-10-21 | Stop reason: HOSPADM

## 2020-10-19 RX ORDER — OXYCODONE HYDROCHLORIDE AND ACETAMINOPHEN 5; 325 MG/1; MG/1
2 TABLET ORAL EVERY 4 HOURS PRN
Status: DISCONTINUED | OUTPATIENT
Start: 2020-10-19 | End: 2020-10-21 | Stop reason: HOSPADM

## 2020-10-19 RX ORDER — SENNA PLUS 8.6 MG/1
1 TABLET ORAL DAILY PRN
Status: DISCONTINUED | OUTPATIENT
Start: 2020-10-19 | End: 2020-10-21 | Stop reason: HOSPADM

## 2020-10-19 RX ORDER — DEXAMETHASONE SODIUM PHOSPHATE 4 MG/ML
INJECTION, SOLUTION INTRA-ARTICULAR; INTRALESIONAL; INTRAMUSCULAR; INTRAVENOUS; SOFT TISSUE PRN
Status: DISCONTINUED | OUTPATIENT
Start: 2020-10-19 | End: 2020-10-19 | Stop reason: SDUPTHER

## 2020-10-19 RX ORDER — FENTANYL CITRATE 50 UG/ML
INJECTION, SOLUTION INTRAMUSCULAR; INTRAVENOUS PRN
Status: DISCONTINUED | OUTPATIENT
Start: 2020-10-19 | End: 2020-10-19 | Stop reason: SDUPTHER

## 2020-10-19 RX ORDER — CETIRIZINE HYDROCHLORIDE 10 MG/1
10 TABLET ORAL DAILY
Status: DISCONTINUED | OUTPATIENT
Start: 2020-10-19 | End: 2020-10-21 | Stop reason: HOSPADM

## 2020-10-19 RX ORDER — METOCLOPRAMIDE HYDROCHLORIDE 5 MG/ML
10 INJECTION INTRAMUSCULAR; INTRAVENOUS
Status: DISCONTINUED | OUTPATIENT
Start: 2020-10-19 | End: 2020-10-19 | Stop reason: HOSPADM

## 2020-10-19 RX ORDER — METOPROLOL TARTRATE 50 MG/1
50 TABLET, FILM COATED ORAL 2 TIMES DAILY
Status: DISCONTINUED | OUTPATIENT
Start: 2020-10-19 | End: 2020-10-21 | Stop reason: HOSPADM

## 2020-10-19 RX ORDER — HYDROCHLOROTHIAZIDE 12.5 MG/1
12.5 TABLET ORAL DAILY
Status: DISCONTINUED | OUTPATIENT
Start: 2020-10-19 | End: 2020-10-21 | Stop reason: HOSPADM

## 2020-10-19 RX ORDER — MIDAZOLAM HYDROCHLORIDE 1 MG/ML
INJECTION INTRAMUSCULAR; INTRAVENOUS PRN
Status: DISCONTINUED | OUTPATIENT
Start: 2020-10-19 | End: 2020-10-19 | Stop reason: SDUPTHER

## 2020-10-19 RX ORDER — EPHEDRINE SULFATE 50 MG/ML
INJECTION, SOLUTION INTRAVENOUS PRN
Status: DISCONTINUED | OUTPATIENT
Start: 2020-10-19 | End: 2020-10-19 | Stop reason: SDUPTHER

## 2020-10-19 RX ORDER — ROPIVACAINE HYDROCHLORIDE 2 MG/ML
INJECTION, SOLUTION EPIDURAL; INFILTRATION; PERINEURAL
Status: COMPLETED | OUTPATIENT
Start: 2020-10-19 | End: 2020-10-19

## 2020-10-19 RX ORDER — HYDROMORPHONE HCL 110MG/55ML
0.5 PATIENT CONTROLLED ANALGESIA SYRINGE INTRAVENOUS EVERY 5 MIN PRN
Status: DISCONTINUED | OUTPATIENT
Start: 2020-10-19 | End: 2020-10-19 | Stop reason: HOSPADM

## 2020-10-19 RX ORDER — PROPOFOL 10 MG/ML
INJECTION, EMULSION INTRAVENOUS CONTINUOUS PRN
Status: DISCONTINUED | OUTPATIENT
Start: 2020-10-19 | End: 2020-10-19 | Stop reason: SDUPTHER

## 2020-10-19 RX ORDER — SODIUM CHLORIDE, SODIUM LACTATE, POTASSIUM CHLORIDE, CALCIUM CHLORIDE 600; 310; 30; 20 MG/100ML; MG/100ML; MG/100ML; MG/100ML
INJECTION, SOLUTION INTRAVENOUS CONTINUOUS
Status: DISCONTINUED | OUTPATIENT
Start: 2020-10-19 | End: 2020-10-21 | Stop reason: HOSPADM

## 2020-10-19 RX ADMIN — SODIUM CHLORIDE, POTASSIUM CHLORIDE, SODIUM LACTATE AND CALCIUM CHLORIDE: 600; 310; 30; 20 INJECTION, SOLUTION INTRAVENOUS at 08:39

## 2020-10-19 RX ADMIN — MIDAZOLAM 1 MG: 1 INJECTION INTRAMUSCULAR; INTRAVENOUS at 11:29

## 2020-10-19 RX ADMIN — TRANEXAMIC ACID 1000 MG: 100 INJECTION, SOLUTION INTRAVENOUS at 12:14

## 2020-10-19 RX ADMIN — SODIUM CHLORIDE, POTASSIUM CHLORIDE, SODIUM LACTATE AND CALCIUM CHLORIDE: 600; 310; 30; 20 INJECTION, SOLUTION INTRAVENOUS at 12:05

## 2020-10-19 RX ADMIN — ACETAMINOPHEN 650 MG: 325 TABLET ORAL at 23:44

## 2020-10-19 RX ADMIN — PHENYLEPHRINE HYDROCHLORIDE 100 MCG: 10 INJECTION INTRAVENOUS at 11:42

## 2020-10-19 RX ADMIN — PROPOFOL 30 MG: 10 INJECTION, EMULSION INTRAVENOUS at 11:45

## 2020-10-19 RX ADMIN — PROPOFOL 30 MG: 10 INJECTION, EMULSION INTRAVENOUS at 11:33

## 2020-10-19 RX ADMIN — MIDAZOLAM 1 MG: 1 INJECTION INTRAMUSCULAR; INTRAVENOUS at 11:17

## 2020-10-19 RX ADMIN — PHENYLEPHRINE HYDROCHLORIDE 100 MCG: 10 INJECTION INTRAVENOUS at 11:07

## 2020-10-19 RX ADMIN — PHENYLEPHRINE HYDROCHLORIDE 100 MCG: 10 INJECTION INTRAVENOUS at 11:03

## 2020-10-19 RX ADMIN — ROPIVACAINE HYDROCHLORIDE 60 ML: 2 INJECTION, SOLUTION EPIDURAL; INFILTRATION at 12:05

## 2020-10-19 RX ADMIN — DEXAMETHASONE SODIUM PHOSPHATE 4 MG: 4 INJECTION, SOLUTION INTRAMUSCULAR; INTRAVENOUS at 11:19

## 2020-10-19 RX ADMIN — OXYCODONE HYDROCHLORIDE AND ACETAMINOPHEN 2 TABLET: 5; 325 TABLET ORAL at 23:45

## 2020-10-19 RX ADMIN — SODIUM CHLORIDE, PRESERVATIVE FREE 10 ML: 5 INJECTION INTRAVENOUS at 23:47

## 2020-10-19 RX ADMIN — ONDANSETRON 4 MG: 2 INJECTION INTRAMUSCULAR; INTRAVENOUS at 11:12

## 2020-10-19 RX ADMIN — PROPOFOL 30 MG: 10 INJECTION, EMULSION INTRAVENOUS at 11:43

## 2020-10-19 RX ADMIN — FENTANYL CITRATE 50 MCG: 50 INJECTION INTRAMUSCULAR; INTRAVENOUS at 10:47

## 2020-10-19 RX ADMIN — SODIUM CHLORIDE, POTASSIUM CHLORIDE, SODIUM LACTATE AND CALCIUM CHLORIDE: 600; 310; 30; 20 INJECTION, SOLUTION INTRAVENOUS at 14:00

## 2020-10-19 RX ADMIN — CEFAZOLIN SODIUM 2 G: 10 INJECTION, POWDER, FOR SOLUTION INTRAVENOUS at 11:05

## 2020-10-19 RX ADMIN — EPHEDRINE SULFATE 5 MG: 50 INJECTION, SOLUTION INTRAMUSCULAR; INTRAVENOUS; SUBCUTANEOUS at 11:16

## 2020-10-19 RX ADMIN — RIVAROXABAN 10 MG: 10 TABLET, FILM COATED ORAL at 17:05

## 2020-10-19 RX ADMIN — PHENYLEPHRINE HYDROCHLORIDE 100 MCG: 10 INJECTION INTRAVENOUS at 10:57

## 2020-10-19 RX ADMIN — ACETAMINOPHEN 650 MG: 325 TABLET ORAL at 15:40

## 2020-10-19 RX ADMIN — CEFAZOLIN SODIUM 2 G: 10 INJECTION, POWDER, FOR SOLUTION INTRAVENOUS at 17:05

## 2020-10-19 RX ADMIN — LIDOCAINE HYDROCHLORIDE 60 MG: 20 INJECTION, SOLUTION INTRAVENOUS at 11:09

## 2020-10-19 RX ADMIN — PHENYLEPHRINE HYDROCHLORIDE 100 MCG: 10 INJECTION INTRAVENOUS at 12:24

## 2020-10-19 RX ADMIN — EPHEDRINE SULFATE 10 MG: 50 INJECTION, SOLUTION INTRAMUSCULAR; INTRAVENOUS; SUBCUTANEOUS at 11:21

## 2020-10-19 RX ADMIN — FAMOTIDINE 10 MG: 20 TABLET, FILM COATED ORAL at 23:44

## 2020-10-19 RX ADMIN — CETIRIZINE HYDROCHLORIDE 10 MG: 10 TABLET, FILM COATED ORAL at 15:40

## 2020-10-19 RX ADMIN — EZETIMIBE 10 MG: 10 TABLET ORAL at 15:59

## 2020-10-19 RX ADMIN — EPHEDRINE SULFATE 5 MG: 50 INJECTION, SOLUTION INTRAMUSCULAR; INTRAVENOUS; SUBCUTANEOUS at 12:11

## 2020-10-19 RX ADMIN — EPHEDRINE SULFATE 5 MG: 50 INJECTION, SOLUTION INTRAMUSCULAR; INTRAVENOUS; SUBCUTANEOUS at 11:14

## 2020-10-19 RX ADMIN — Medication 0.5 MG: at 15:39

## 2020-10-19 RX ADMIN — HYDROCHLOROTHIAZIDE 12.5 MG: 12.5 TABLET ORAL at 15:40

## 2020-10-19 RX ADMIN — PHENYLEPHRINE HYDROCHLORIDE 100 MCG: 10 INJECTION INTRAVENOUS at 10:49

## 2020-10-19 RX ADMIN — PHENYLEPHRINE HYDROCHLORIDE 100 MCG: 10 INJECTION INTRAVENOUS at 11:32

## 2020-10-19 RX ADMIN — SODIUM CHLORIDE, POTASSIUM CHLORIDE, SODIUM LACTATE AND CALCIUM CHLORIDE: 600; 310; 30; 20 INJECTION, SOLUTION INTRAVENOUS at 10:32

## 2020-10-19 RX ADMIN — METOPROLOL TARTRATE 50 MG: 50 TABLET, FILM COATED ORAL at 23:45

## 2020-10-19 RX ADMIN — PHENYLEPHRINE HYDROCHLORIDE 100 MCG: 10 INJECTION INTRAVENOUS at 12:12

## 2020-10-19 RX ADMIN — PROPOFOL 50 MCG/KG/MIN: 10 INJECTION, EMULSION INTRAVENOUS at 11:09

## 2020-10-19 ASSESSMENT — PULMONARY FUNCTION TESTS
PIF_VALUE: 0
PIF_VALUE: 1
PIF_VALUE: 0
PIF_VALUE: 1
PIF_VALUE: 0
PIF_VALUE: 1
PIF_VALUE: 0
PIF_VALUE: 1
PIF_VALUE: 1
PIF_VALUE: 0
PIF_VALUE: 1
PIF_VALUE: 0
PIF_VALUE: 0
PIF_VALUE: 1
PIF_VALUE: 0
PIF_VALUE: 2
PIF_VALUE: 0
PIF_VALUE: 1
PIF_VALUE: 0
PIF_VALUE: 1
PIF_VALUE: 0
PIF_VALUE: 1
PIF_VALUE: 0
PIF_VALUE: 1
PIF_VALUE: 0
PIF_VALUE: 1
PIF_VALUE: 0
PIF_VALUE: 1
PIF_VALUE: 0
PIF_VALUE: 1
PIF_VALUE: 0
PIF_VALUE: 1
PIF_VALUE: 0
PIF_VALUE: 1
PIF_VALUE: 0
PIF_VALUE: 1
PIF_VALUE: 0
PIF_VALUE: 1

## 2020-10-19 ASSESSMENT — PAIN DESCRIPTION - DESCRIPTORS
DESCRIPTORS: STABBING
DESCRIPTORS: ACHING;SHARP
DESCRIPTORS: ACHING;STABBING

## 2020-10-19 ASSESSMENT — PAIN SCALES - GENERAL
PAINLEVEL_OUTOF10: 9
PAINLEVEL_OUTOF10: 9
PAINLEVEL_OUTOF10: 0
PAINLEVEL_OUTOF10: 9
PAINLEVEL_OUTOF10: 0
PAINLEVEL_OUTOF10: 7
PAINLEVEL_OUTOF10: 0

## 2020-10-19 ASSESSMENT — PAIN DESCRIPTION - ONSET
ONSET: ON-GOING
ONSET: ON-GOING

## 2020-10-19 ASSESSMENT — PAIN - FUNCTIONAL ASSESSMENT
PAIN_FUNCTIONAL_ASSESSMENT: 0-10
PAIN_FUNCTIONAL_ASSESSMENT: PREVENTS OR INTERFERES SOME ACTIVE ACTIVITIES AND ADLS
PAIN_FUNCTIONAL_ASSESSMENT: PREVENTS OR INTERFERES SOME ACTIVE ACTIVITIES AND ADLS

## 2020-10-19 ASSESSMENT — PAIN DESCRIPTION - PAIN TYPE: TYPE: ACUTE PAIN

## 2020-10-19 ASSESSMENT — PAIN DESCRIPTION - FREQUENCY
FREQUENCY: CONTINUOUS
FREQUENCY: CONTINUOUS

## 2020-10-19 ASSESSMENT — PAIN DESCRIPTION - ORIENTATION: ORIENTATION: LOWER

## 2020-10-19 ASSESSMENT — PAIN DESCRIPTION - PROGRESSION
CLINICAL_PROGRESSION: GRADUALLY WORSENING
CLINICAL_PROGRESSION: GRADUALLY WORSENING

## 2020-10-19 ASSESSMENT — PAIN DESCRIPTION - LOCATION: LOCATION: ABDOMEN;BACK

## 2020-10-19 ASSESSMENT — ENCOUNTER SYMPTOMS: SHORTNESS OF BREATH: 1

## 2020-10-19 NOTE — ANESTHESIA POSTPROCEDURE EVALUATION
Department of Anesthesiology  Postprocedure Note    Patient: Bia Montelongo  MRN: [id number]  [place]: 1960  Date of evaluation: 10/19/2020  Time:  1:14 PM     Procedure Summary     Date:  10/19/20 Room / Location:  [address]    Anesthesia Start:  1032 Anesthesia Stop:      Procedure:  LEFT HIP TOTAL ARTHROPLASTY - POSTERIOR (Left ) Diagnosis:  (PRIMARY OSTEOARTHRITIS OF LEFT HIP)    Surgeon:  Azael Caballero MD Responsible Provider:  LIAN Kapoor CRNA    Anesthesia Type:  general, regional ASA Status:  3          Anesthesia Type: general, regional    Chris Phase I: Chris Score: 10    Chris Phase II:      Last vitals: Reviewed and per EMR flowsheets.        Anesthesia Post Evaluation    Patient location during evaluation: PACU  Patient participation: complete - patient participated  Level of consciousness: awake and alert  Airway patency: patent  Nausea & Vomiting: no nausea and no vomiting  Complications: no  Cardiovascular status: hemodynamically stable and blood pressure returned to baseline  Respiratory status: acceptable, spontaneous ventilation, nonlabored ventilation and face mask  Hydration status: stable

## 2020-10-19 NOTE — H&P
Chief Complaint   Patient presents with    Hip Pain       left          History of Present Illness:                             Salas Mallory is a 64 y.o. female who presents today for severe left hip pain, stiffness, and dysfunction. She has had progressive worsening symptoms at the left hip over the last several years. She states it has become extremely severe and debilitating over the last 6 months. She has pain is constant and severe with any attempted movement or weightbearing activities. She requires the use of a cane or walker for any ambulatory activities. She has become very sedentary and has difficulty with performing simple activities of daily living. She has difficulty getting up from a seated position, getting in and out of her car, navigating stairs, and getting dressed because of the pain and stiffness in her hip. She cannot get comfortable and has difficulty sleeping at night. Medical History  Patient's medications, allergies, past medical, surgical, social and family histories were reviewed and updated as appropriate. Past Medical History:   Diagnosis Date    Allergic rhinitis due to pollen 11/19/2015    Arthritis     left hip & knee    Chronic back pain     Depression     Gastroesophageal reflux disease 11/19/2015    Hearing loss 11/19/2015    History of blood transfusion     No reaction per pt    History of cardiac monitoring 04/18/2017    14 day event monitor. Sinus Rhythm    History of nuclear stress test 09/28/2016    cardiolite-normal,EF70%    Hot flashes due to surgical menopause 11/19/2015    Hx of echocardiogram 10/05/2016    EF: >55 %   Mild mitral and tricuspid regurg.      Hyperlipidemia     Hypertension     Follows with PCP    Meniere disease     Morbid obesity due to excess calories ([facility] [address] 11/19/2015    Sleep apnea 11/19/2015    sleep study negative    Tilt table evaluation 05/09/2017     positive for neurocardiogenic syncope     Family History Problem Relation Age of Onset    Heart Disease Mother     High Blood Pressure Mother     High Cholesterol Mother     Cancer Mother 80        Stomach    Diabetes Mother     Stroke Mother     Heart Disease Father     High Blood Pressure Father     High Cholesterol Father     Diabetes Father     Cancer Sister 46        Lung cancer    Cancer Maternal Grandmother         Stomach    Diabetes Maternal Grandmother     Diabetes Maternal Grandfather     Diabetes Paternal Grandmother     Diabetes Paternal Grandfather     Cancer Maternal Cousin 47        Pancreatic     Social History     Socioeconomic History    Marital status:      Spouse name: None    Number of children: None    Years of education: None    Highest education level: None   Occupational History    None   Social Needs    Financial resource strain: None    Food insecurity     Worry: None     Inability: None    Transportation needs     Medical: None     Non-medical: None   Tobacco Use    Smoking status: Never Smoker    Smokeless tobacco: Never Used    Tobacco comment: Reviewed    Substance and Sexual Activity    Alcohol use: No    Drug use: No    Sexual activity: Not Currently     Partners: Male   Lifestyle    Physical activity     Days per week: None     Minutes per session: None    Stress: None   Relationships    Social connections     Talks on phone: None     Gets together: None     Attends Yazdanism service: None     Active member of club or organization: None     Attends meetings of clubs or organizations: None     Relationship status: None    Intimate partner violence     Fear of current or ex partner: None     Emotionally abused: None     Physically abused: None     Forced sexual activity: None   Other Topics Concern    None   Social History Narrative    None     Current Facility-Administered Medications   Medication Dose Route Frequency Provider Last Rate Last Dose    ceFAZolin (ANCEF) 2 g in dextrose 5 % 100 mL IVPB  2 g Intravenous Once Haleigh Fatima MD        lactated ringers infusion   Intravenous Continuous Haleigh Fatima MD 40 mL/hr at 10/19/20 0839      labetalol (NORMODYNE;TRANDATE) injection 5 mg  5 mg Intravenous Q10 Min PRN Trish Ewing MD        ondansetron (ZOFRAN) injection 8 mg  8 mg Intravenous Once PRN Trish Ewing MD        metoclopramide (REGLAN) injection 10 mg  10 mg Intravenous Once PRN Trish Ewing MD        0.9 % sodium chloride bolus  500 mL Intravenous Once PRN Trish Ewing MD        HYDROmorphone (DILAUDID) injection 0.5 mg  0.5 mg Intravenous Q5 Min PRN rTish Ewing MD        fentaNYL (SUBLIMAZE) injection 25 mcg  25 mcg Intravenous Q5 Min PRN Trish Ewing MD        tranexamic acid (CYKLOKAPRON) injection 1,000 mg  1,000 mg Intravenous On Call to OR Haleigh Fatima MD         Allergies   Allergen Reactions    Celexa [Citalopram] Other (See Comments)     Stomach pain        Atorvastatin      Leg cramps      Mestinon [Pyridostigmine] Itching and Swelling    Penicillins     Sulfa Antibiotics     Tape Chelsea Ervin Tape]     Tricor [Fenofibrate]      angioedema    Gemfibrozil Rash    Meloxicam Other (See Comments)     moodswings         Review of Systems   Constitutional: Negative for chills and fever. HENT: Negative for congestion and sneezing. Eyes: Negative for pain and redness. Respiratory: Negative for chest tightness, shortness of breath and wheezing. Cardiovascular: Negative for chest pain and palpitations. Gastrointestinal: Negative for vomiting. Musculoskeletal: Positive for arthralgias. Skin: Negative for color change and rash. Neurological: Negative for weakness, light-headedness and numbness. Psychiatric/Behavioral: Negative for agitation. The patient is not nervous/anxious.                                            Examination:  General Exam:  Vitals: BP (!) 163/85   Pulse 71   Temp 97.6 °F (36.4 °C) (Temporal) Resp 16   Ht 5' 5\" (1.651 m)   Wt 250 lb (113.4 kg)   SpO2 97%   BMI 41.60 kg/m²    Physical Exam   Constitutional:       Appearance: Normal appearance. HENT:      Head: Normocephalic and atraumatic. Eyes:      Conjunctiva/sclera: Conjunctivae normal.      Pupils: Pupils are equal, round, and reactive to light. Neck:      Musculoskeletal: Normal range of motion. Pulmonary:      Effort: Pulmonary effort is normal.   Musculoskeletal:      Right hip: She exhibits normal range of motion, normal strength, no tenderness, no bony tenderness, no swelling, no crepitus and no deformity. Right knee: Normal.      Left knee: She exhibits normal range of motion, no swelling, no effusion, no deformity, no erythema, normal alignment, no LCL laxity and no MCL laxity. No medial joint line and no lateral joint line tenderness noted. Lumbar back: She exhibits normal range of motion, no tenderness, no swelling and no deformity. Comments: Left Lower Extremity:  There is moderate tenderness to palpation diffusely throughout the hip both anteriorly and posteriorly. Range of motion is severely limited and painful at the extremes of motion. Hip flexion present to 90 degrees, abduction 20 degrees, extension 5 degrees, internal rotation 10 degrees, external rotation 10 degrees. Strength is 5 out of 5 with hip flexion, extension, and abduction however this is somewhat limited due to pain with resistance testing. NANCY and FADIR test reproduces pain to the groin and hip joint. Sensation is intact to light touch in the left lower extremity. Pulses are intact. Skin is intact without erythema. No lower extremity edema. Skin:     General: Skin is warm and dry. Neurological:      Mental Status: She is alert and oriented to person, place, and time.    Psychiatric:         Mood and Affect: Mood normal.         Behavior: Behavior normal.     Diagnostic testing:  X-rays reviewed today:   Xr Hip 1 Vw W Pelvis Left     Result Date: 8/27/2020  AP pelvis and frog-leg lateral view of the left hip show evidence of severe advanced degenerative changes at the left hip joint with complete loss of joint space. There is extensive subchondral sclerosis and cystic changes on both sides of the joint. No evidence of acute abnormality or fracture. Normal alignment with mild left sided shortening due to joint collapse. Impression: Severe degenerative joint disease left hip      Assessment and Plan  1. Left hip advanced primary osteoarthritis     We discussed the severity of the degenerative findings on both on the x-rays and physical exam.  The patient feels that they have exhausted conservative measures. The patient has previously tried injections, physical therapy, over-the-counter pain medications, and activity modification. The pain level is severe and bothers the patient on a daily basis, including interrupting sleep at night. Activities of daily living are difficult to perform. The patient has trouble getting dressed, getting up from a seated position, climbing stairs, and has fear of falling. The patient has tried to exercise and lose weight but feels that this has been difficult and limited because of ongoing hip pain.        The pain and dysfunction at the hip are severely limiting at this stage and the patient would like to consider surgical intervention.     I have recommended surgical intervention for a total hip replacement. We discussed the risks, benefits, and alternatives to surgery. We discussed the intended benefits from a well-functioning replacement and the anticipated recovery process. We discussed potential risks and complications including but not limited to DVT/PE, infection, stiffness, loosening, limb length inequality, dislocation, and fracture. We discussed pain control, physical therapy, and anticoagulation during the perioperative timeframe.     We specifically addressed her obesity and her BMI. We discussed potential risks and complications related to.her weight. She feels that her pain is so severe and debilitating at this time it is difficult for her to perform any exercise or successfully lose weight. She is tried to lose weight in the past and has been unsuccessful because of how severe her hip pain is. She understands that there may be an increased risk of wound healing problems or other surgical complications such as DVT, infection, fracture, or other medical problems with her heart and lungs. She feels that her quality of life is so poor at this time that she is interested in proceeding with surgery but will continue to try to lose weight in the interval.     The patient would like to proceed with hip replacement surgery      The plan will be to perform a posterior hip approach and I have her on Xarelto postoperatively for DVT prophylaxis    The patient was counseled at length about the risks of alma Covid-19 during their perioperative period and any recovery window from their procedure. The patient was made aware that alma Covid-19  may worsen their prognosis for recovering from their procedure  and lend to a higher morbidity and/or mortality risk. All material risks, benefits, and reasonable alternatives including postponing the procedure were discussed. The patient does wish to proceed with the procedure at this time.         Electronically signed by Waldemar Woo MD on 10/19/2020 at 10:09 AM

## 2020-10-19 NOTE — ANESTHESIA PROCEDURE NOTES
Spinal Block    Start time: 10/19/2020 10:38 AM  End time: 10/19/2020 10:45 AM  Reason for block: primary anesthetic  Staffing  Resident/CRNA: LIAN Patel CRNA  Performed: resident/CRNA   Preanesthetic Checklist  Completed: patient identified, site marked, surgical consent, pre-op evaluation, timeout performed, IV checked, risks and benefits discussed, monitors and equipment checked, anesthesia consent given, oxygen available and patient being monitored  Spinal Block  Patient position: sitting  Prep: Betadine  Patient monitoring: continuous pulse ox and cardiac monitor  Approach: midline  Location: L3/L4  Provider prep: mask and sterile gloves  Local infiltration: lidocaine  Agent: bupivacaine  Dose: 2.5  Dose: 2.5  Needle  Needle type: Pencan   Needle gauge: 25 G  Needle length: 3.5 in  Lot number: [id number]  Expiration date: 6/30/2022  Assessment  Sensory level: T6  Swirl obtained: Yes  CSF: clear  Attempts: 1  Hemodynamics: stable  Additional Notes  Tolerated without complication

## 2020-10-19 NOTE — PLAN OF CARE
Problem: Pain:  Goal: Pain level will decrease  Description: Pain level will decrease  Outcome: Ongoing  Goal: Control of acute pain  Description: Control of acute pain  Outcome: Ongoing  Goal: Control of chronic pain  Description: Control of chronic pain  Outcome: Ongoing     Problem: Falls - Risk of:  Goal: Will remain free from falls  Description: Will remain free from falls  Outcome: Ongoing  Goal: Absence of physical injury  Description: Absence of physical injury  Outcome: Ongoing     Problem: Infection:  Goal: Will remain free from infection  Description: Will remain free from infection  Outcome: Ongoing     Problem: Safety:  Goal: Free from accidental physical injury  Description: Free from accidental physical injury  Outcome: Ongoing  Goal: Free from intentional harm  Description: Free from intentional harm  Outcome: Ongoing     Problem: Daily Care:  Goal: Daily care needs are met  Description: Daily care needs are met  Outcome: Ongoing     Problem: Skin Integrity:  Goal: Skin integrity will stabilize  Description: Skin integrity will stabilize  Outcome: Ongoing     Problem: Discharge Planning:  Goal: Patients continuum of care needs are met  Description: Patients continuum of care needs are met  Outcome: Ongoing     Problem: Infection - Surgical Site:  Goal: Will show no infection signs and symptoms  Description: Will show no infection signs and symptoms  Outcome: Ongoing

## 2020-10-19 NOTE — CONSULTS
HOSPITALIST NON-PHYSICIAN PROVIDER CONSULTATION NOTE                      Name:  Alon Flores /Age/Sex: 1960  (64 y.o. female)   MRN & CSN:  [id number] & [id number] Admission Date/Time: 10/19/2020  7:53 AM   Location:  [facility]- Attending:  Adolfo Arroyo MD                                                  REASON FOR CONSULTATION:  Medical Management HTN, HLD, Morbid obese, HERIBERTO , Depression, GERD, and Arthritis. HPI  Alon Flores is a 64 y.o. female who presents to with chief complaint of left hip pain and stiffness s/p posterior left hip total arthroplasty. Patient still drowsy from anesthesia. She denies pain. We were consulted for medical management. BP mildly elevated likely secondary to anxiety and pain. Will treat anxiety and pain and monitor closely. No antihypertensive medication for now. she does have a history of HTN, HLD, morbid obesity, HERIBERTO, depression, and GERD. SUBJECTIVE    10 point review of systems reviewed and negative unless noted above. ALLERGIES PCP    Allergies   Allergen Reactions    Celexa [Citalopram] Other (See Comments)     Stomach pain        Atorvastatin      Leg cramps      Mestinon [Pyridostigmine] Itching and Swelling    Penicillins     Sulfa Antibiotics     Tape Jimbo Champ Tape]     Tricor [Fenofibrate]      angioedema    Gemfibrozil Rash    Meloxicam Other (See Comments)     maurice Deng MD   PAST MEDICAL HISTORY SURGICAL HISTORY   Past Medical History:   Diagnosis Date    Allergic rhinitis due to pollen 2015    Arthritis     left hip & knee    Chronic back pain     Depression     Gastroesophageal reflux disease 2015    Hearing loss 2015    History of blood transfusion     No reaction per pt    History of cardiac monitoring 2017    14 day event monitor.  Sinus Rhythm    History of nuclear stress test 2016    cardiolite-normal,EF70%    Hot flashes due to surgical menopause Mother    Dorisann Bias Mother 80        Stomach    Diabetes Mother     Stroke Mother     Heart Disease Father     High Blood Pressure Father     High Cholesterol Father     Diabetes Father     Cancer Sister 46        Lung cancer    Cancer Maternal Grandmother         Stomach    Diabetes Maternal Grandmother     Diabetes Maternal Grandfather     Diabetes Paternal Grandmother     Diabetes Paternal Grandfather     Cancer Maternal Cousin 47        Pancreatic       HOME MEDICATIONS    Prior to Admission medications    Medication Sig Start Date End Date Taking? Authorizing Provider   raNITIdine HCl (RANITIDINE ACID REDUCER PO) Take by mouth   Yes Historical MD Jessica   ezetimibe (ZETIA) 10 MG tablet Take 1 tablet by mouth daily 7/6/20  Yes Chris Zambrano MD   metoprolol tartrate (LOPRESSOR) 50 MG tablet Take 1 tablet by mouth 2 times daily 7/6/20  Yes Chris Zambrano MD   hydroCHLOROthiazide (HYDRODIURIL) 25 MG tablet Take 0.5 tablets by mouth daily 7/6/20  Yes Chris Zambrano MD   loratadine (CLARITIN) 10 MG tablet Take 1 tablet by mouth daily 5/9/19  Yes Chris Zambrano MD   Evolocumab (REPATHA) 140 MG/ML SOSY Inject 1 mL into the skin every 14 days 10/8/20   Makr Balbuena MD   aspirin 81 MG chewable tablet Take 81 mg by mouth daily    Historical Provider, MD          OBJECTIVE  Vitals:    10/19/20 1615   BP: (!) 142/65   Pulse: 78   Resp: 16   Temp: 97.5 °F (36.4 °C)   SpO2: 99%       Intake/Output Summary (Last 24 hours) at 10/19/2020 1651  Last data filed at 10/19/2020 1430  Gross per 24 hour   Intake 1800 ml   Output 200 ml   Net 1600 ml       Intake/Output Summary (Last 24 hours) at 10/19/2020 1651  Last data filed at 10/19/2020 1430  Gross per 24 hour   Intake 1800 ml   Output 200 ml   Net 1600 ml             No intake/output data recorded. PHYSICAL EXAM   Gen:  awake, alert, cooperative, no apparent distress.  Morbid obese  EYES:Lids and lashes normal, pupils equal, round ,extra ocular muscles intact, sclera clear, conjunctiva normal  ENT:  Normocephalic, oral pharynx with moist mucus membranes  NECK:  Supple, symmetrical, trachea midline, no adenopathy,  LUNGS:Diminished bilaterally, no rales ronchi or wheezing noted. CARDIOVASCULAR:  regular rate and rhythm, normal S1 and S2,no murmur noted, peripheral pulses 2+, no pitting edema  ABDOMEN: Left hip covered with dressing. Unable to assess. Sensation intact. Dressing intact with no drainage or surrounding edema or erythema. MUSCULOSKELETAL:  ROM of all extremities grossly wnl  NEUROLOGIC: AOx 3,  Cranial nerves II-XII are grossly intact. Motor is 5 out of 5 bilaterally. Sensory is intact, no lateralizing findings.    SKIN:  no bruising or bleeding, normal skin color, turgor, no redness, warmth, or swelling          LABS  Results for orders placed or performed during the hospital encounter of 10/19/20   POCT Glucose   Result Value Ref Range    POC Glucose 103 (H) 70 - 99 MG/DL        IMAGING      MEDS  Scheduled Meds:   ezetimibe  10 mg Oral Daily    hydroCHLOROthiazide  12.5 mg Oral Daily    cetirizine  10 mg Oral Daily    metoprolol tartrate  50 mg Oral BID    famotidine  10 mg Oral Nightly    sodium chloride flush  10 mL Intravenous 2 times per day    ceFAZolin (ANCEF) IVPB  2 g Intravenous Q8H    acetaminophen  650 mg Oral Q6H    rivaroxaban  10 mg Oral Daily    [START ON 10/20/2020] influenza virus vaccine  0.5 mL Intramuscular Once     Continuous Infusions:   lactated ringers 75 mL/hr at 10/19/20 1400     PRN Meds:sodium chloride flush, HYDROmorphone **OR** HYDROmorphone, senna, bisacodyl, oxyCODONE-acetaminophen, oxyCODONE-acetaminophen, promethazine **OR** ondansetron    ASSESSMENT and PLAN  Hospital Day: 1   Primary osteoarthritis of left  hip s/p posterior left hip total arthroplasty  -Antibiotic prophylactic  -Pain control  -Management per Dr. Harsh Sharp team    HTN, uncontrolled  -Likely 2/2 pain and anxiety  -Pain control  -Monitor BP  -Consider PRN IV BP medications if no improvement    Other chronic Issues  -HERIBERTO  -GERD  -HLD  -Depression        Diet DIET GENERAL;   DVT Prophylaxis [] Lovenox, []  Heparin, [] SCDs, [] Ambulation Xarelto   GI Prophylaxis [] PPI,  [] H2 Blocker,  [] Carafate,  [x] Diet/Tube Feeds    Code Status Full Code   Disposition Patient requires continued admission due to IV antibiotic and pain control   CMS Level of Risk [] Low, [x] Moderate,[]  High  Patient's risk as above due to post op monitoring       Thank you Dr. Jana Everett for allowing us to participate in the care of your patient. Further recommendations will be made as the patient's hospital course progresses. If you have any questions about the medical care of this patient, please do not hesitate to contact us.     Electronically signed by LIAN Martinez CNP,  on 10/19/2020 at 4:51 PM

## 2020-10-19 NOTE — ANESTHESIA PROCEDURE NOTES
Peripheral Block    Patient location during procedure: PACU  Start time: 10/19/2020 10:15 AM  End time: 10/19/2020 10:25 AM  Staffing  Resident/CRNA: LIAN Vega CRNA  Other anesthesia staff: LIAN Fry CRNA  Performed: resident/CRNA   Preanesthetic Checklist  Completed: patient identified, site marked, surgical consent, pre-op evaluation, timeout performed, IV checked, risks and benefits discussed, monitors and equipment checked, anesthesia consent given, oxygen available and patient being monitored  Peripheral Block  Patient position: supine  Prep: ChloraPrep  Patient monitoring: cardiac monitor, continuous pulse ox and frequent blood pressure checks  Block type: Fascia iliaca  Laterality: left  Injection technique: single-shot  Procedures: ultrasound guided  Local infiltration: ropivacaine  Infiltration strength: 0.25 %  Dose: 2 mL  Provider prep: mask and sterile gloves  Local infiltration: ropivacaine  Needle  Needle type: combined needle/nerve stimulator   Needle gauge: 20 G  Needle length: 5 cm  Needle localization: anatomical landmarks and ultrasound guidance  Assessment  Injection assessment: negative aspiration for heme, no paresthesia on injection and local visualized surrounding nerve on ultrasound  Paresthesia pain: none  Slow fractionated injection: yes  Hemodynamics: stable  Additional Notes  Tolerated well without complication.    Medications Administered  Ropivacaine (NAROPIN) injection 0.2%, 60 mL  Reason for block: post-op pain management and primary anesthetic

## 2020-10-19 NOTE — BRIEF OP NOTE
Brief Postoperative Note      Patient: Lea Frazier  YOB: 1960  MRN: [id number]    Date of Procedure: 10/19/2020    Pre-Op Diagnosis: PRIMARY OSTEOARTHRITIS OF LEFT HIP    Post-Op Diagnosis: Same       Procedure(s):  LEFT HIP TOTAL ARTHROPLASTY - POSTERIOR    Surgeon(s):  Maria C Tony MD    Assistant:  TOÑO Anderson    Anesthesia: Spinal    Estimated Blood Loss (mL): 317     Complications: None    Specimens:   * No specimens in log *    Implants:  Implant Name Type Inv.  Item Serial No.  Lot No. LRB No. Used Action   TRIDENT PSL HA CLUSTER ACETABULAR SHELL    SEPIDEH: ORTHOPAEDICS [id number] Left 1 Implanted   IMPL HIP FEM INSRT ACET TRIDENT X3 0DEG Hip IMPL HIP FEM INSRT ACET TRIDENT X3 0DEG  SEPIDEH: ORTHOPAEDICS [id number] Left 1 Implanted   SCREW HIP CANC SLFTP PTHRD 6.5X25MM Screw/Plate/Nail/Jeison SCREW HIP CANC SLFTP PTHRD 6.5X25MM  SEPIDEH: ORTHOPAEDICS [id number] Left 1 Implanted   IMPL HIP FEM HEAD NECK ALUMINA 2.5MM Hip IMPL HIP FEM HEAD NECK ALUMINA 2.5MM  SEPIDEH: ORTHOPAEDICS [id number] Left 1 Implanted   ACCOLADE  DEGREE NECK ANGLE HIP STEM    STRYKERDavid Hogan Left 1 Implanted         Drains: * No LDAs found *    Findings:     Electronically signed by Maria C Tony MD on 10/19/2020 at 12:42 PM

## 2020-10-20 LAB
BASOPHILS ABSOLUTE: 0 K/CU MM
BASOPHILS RELATIVE PERCENT: 0.3 % (ref 0–1)
DIFFERENTIAL TYPE: ABNORMAL
EOSINOPHILS ABSOLUTE: 0 K/CU MM
EOSINOPHILS RELATIVE PERCENT: 0 % (ref 0–3)
HCT VFR BLD CALC: 36.3 % (ref 37–47)
HEMOGLOBIN: 11.6 GM/DL (ref 12.5–16)
IMMATURE NEUTROPHIL %: 0.5 % (ref 0–0.43)
LYMPHOCYTES ABSOLUTE: 1.7 K/CU MM
LYMPHOCYTES RELATIVE PERCENT: 14.8 % (ref 24–44)
MCH RBC QN AUTO: 28.9 PG (ref 27–31)
MCHC RBC AUTO-ENTMCNC: 32 % (ref 32–36)
MCV RBC AUTO: 90.5 FL (ref 78–100)
MONOCYTES ABSOLUTE: 1 K/CU MM
MONOCYTES RELATIVE PERCENT: 9.2 % (ref 0–4)
NUCLEATED RBC %: 0 %
PDW BLD-RTO: 13.6 % (ref 11.7–14.9)
PLATELET # BLD: 216 K/CU MM (ref 140–440)
PMV BLD AUTO: 9.5 FL (ref 7.5–11.1)
RBC # BLD: 4.01 M/CU MM (ref 4.2–5.4)
SEGMENTED NEUTROPHILS ABSOLUTE COUNT: 8.5 K/CU MM
SEGMENTED NEUTROPHILS RELATIVE PERCENT: 75.2 % (ref 36–66)
TOTAL IMMATURE NEUTOROPHIL: 0.06 K/CU MM
TOTAL NUCLEATED RBC: 0 K/CU MM
WBC # BLD: 11.3 K/CU MM (ref 4–10.5)

## 2020-10-20 PROCEDURE — 97530 THERAPEUTIC ACTIVITIES: CPT

## 2020-10-20 PROCEDURE — 97116 GAIT TRAINING THERAPY: CPT

## 2020-10-20 PROCEDURE — 2700000000 HC OXYGEN THERAPY PER DAY

## 2020-10-20 PROCEDURE — 97162 PT EVAL MOD COMPLEX 30 MIN: CPT

## 2020-10-20 PROCEDURE — 2580000003 HC RX 258: Performed by: ORTHOPAEDIC SURGERY

## 2020-10-20 PROCEDURE — 6360000002 HC RX W HCPCS: Performed by: ORTHOPAEDIC SURGERY

## 2020-10-20 PROCEDURE — 97166 OT EVAL MOD COMPLEX 45 MIN: CPT

## 2020-10-20 PROCEDURE — 36415 COLL VENOUS BLD VENIPUNCTURE: CPT

## 2020-10-20 PROCEDURE — 94761 N-INVAS EAR/PLS OXIMETRY MLT: CPT

## 2020-10-20 PROCEDURE — 85025 COMPLETE CBC W/AUTO DIFF WBC: CPT

## 2020-10-20 PROCEDURE — 97535 SELF CARE MNGMENT TRAINING: CPT

## 2020-10-20 PROCEDURE — 6370000000 HC RX 637 (ALT 250 FOR IP): Performed by: ORTHOPAEDIC SURGERY

## 2020-10-20 PROCEDURE — 97110 THERAPEUTIC EXERCISES: CPT

## 2020-10-20 PROCEDURE — 94150 VITAL CAPACITY TEST: CPT

## 2020-10-20 RX ADMIN — CETIRIZINE HYDROCHLORIDE 10 MG: 10 TABLET, FILM COATED ORAL at 11:37

## 2020-10-20 RX ADMIN — SODIUM CHLORIDE, POTASSIUM CHLORIDE, SODIUM LACTATE AND CALCIUM CHLORIDE: 600; 310; 30; 20 INJECTION, SOLUTION INTRAVENOUS at 15:20

## 2020-10-20 RX ADMIN — ACETAMINOPHEN 650 MG: 325 TABLET ORAL at 18:38

## 2020-10-20 RX ADMIN — EZETIMIBE 10 MG: 10 TABLET ORAL at 11:37

## 2020-10-20 RX ADMIN — FAMOTIDINE 10 MG: 20 TABLET, FILM COATED ORAL at 21:20

## 2020-10-20 RX ADMIN — SODIUM CHLORIDE, PRESERVATIVE FREE 10 ML: 5 INJECTION INTRAVENOUS at 21:20

## 2020-10-20 RX ADMIN — ACETAMINOPHEN 650 MG: 325 TABLET ORAL at 11:37

## 2020-10-20 RX ADMIN — RIVAROXABAN 10 MG: 10 TABLET, FILM COATED ORAL at 18:39

## 2020-10-20 RX ADMIN — METOPROLOL TARTRATE 50 MG: 50 TABLET, FILM COATED ORAL at 11:37

## 2020-10-20 RX ADMIN — HYDROCHLOROTHIAZIDE 12.5 MG: 12.5 TABLET ORAL at 11:37

## 2020-10-20 RX ADMIN — CEFAZOLIN SODIUM 2 G: 10 INJECTION, POWDER, FOR SOLUTION INTRAVENOUS at 01:47

## 2020-10-20 RX ADMIN — METOPROLOL TARTRATE 50 MG: 50 TABLET, FILM COATED ORAL at 21:20

## 2020-10-20 RX ADMIN — OXYCODONE HYDROCHLORIDE AND ACETAMINOPHEN 2 TABLET: 5; 325 TABLET ORAL at 11:37

## 2020-10-20 RX ADMIN — SODIUM CHLORIDE, POTASSIUM CHLORIDE, SODIUM LACTATE AND CALCIUM CHLORIDE: 600; 310; 30; 20 INJECTION, SOLUTION INTRAVENOUS at 04:11

## 2020-10-20 RX ADMIN — ACETAMINOPHEN 650 MG: 325 TABLET ORAL at 04:08

## 2020-10-20 ASSESSMENT — PAIN DESCRIPTION - PAIN TYPE
TYPE: ACUTE PAIN
TYPE: SURGICAL PAIN
TYPE: SURGICAL PAIN

## 2020-10-20 ASSESSMENT — PAIN DESCRIPTION - DESCRIPTORS
DESCRIPTORS: ACHING
DESCRIPTORS: THROBBING
DESCRIPTORS: ACHING

## 2020-10-20 ASSESSMENT — PAIN DESCRIPTION - ORIENTATION
ORIENTATION: LEFT
ORIENTATION: LEFT

## 2020-10-20 ASSESSMENT — PAIN SCALES - GENERAL
PAINLEVEL_OUTOF10: 7
PAINLEVEL_OUTOF10: 5
PAINLEVEL_OUTOF10: 0
PAINLEVEL_OUTOF10: 7
PAINLEVEL_OUTOF10: 5

## 2020-10-20 ASSESSMENT — PAIN - FUNCTIONAL ASSESSMENT
PAIN_FUNCTIONAL_ASSESSMENT: PREVENTS OR INTERFERES SOME ACTIVE ACTIVITIES AND ADLS
PAIN_FUNCTIONAL_ASSESSMENT: PREVENTS OR INTERFERES SOME ACTIVE ACTIVITIES AND ADLS

## 2020-10-20 ASSESSMENT — PAIN DESCRIPTION - FREQUENCY
FREQUENCY: INTERMITTENT
FREQUENCY: CONTINUOUS

## 2020-10-20 ASSESSMENT — PAIN DESCRIPTION - ONSET
ONSET: ON-GOING
ONSET: ON-GOING

## 2020-10-20 ASSESSMENT — PAIN DESCRIPTION - LOCATION
LOCATION: HIP

## 2020-10-20 ASSESSMENT — PAIN DESCRIPTION - PROGRESSION
CLINICAL_PROGRESSION: GRADUALLY WORSENING
CLINICAL_PROGRESSION: NOT CHANGED

## 2020-10-20 NOTE — PROGRESS NOTES
Occupational Therapy  [facility] OCCUPATIONAL THERAPY EVALUATION    History  [place]:  There were no encounter diagnoses. Restrictions:       Position Activity Restriction  Other position/activity restrictions: L posterior hip precautions, WBAT                   Communication with other providers: PT    Subjective:  Patient states:  \"I am never hungry after I have surgeries\"  Pain:  6/10 surgical hip  Patient goal:  Home, functional independence. Occupational profile (relevant social history and personal factors):    Social/Functional History  Lives With: Alone  Type of Home: House  Home Layout: Two level  Home Access: Ramped entrance  Bathroom Shower/Tub: Walk-in shower, Shower chair without back  Bathroom Toilet: Handicap height  Home Equipment: Rolling walker, Cane(Uses RW at baseline)  ADL Assistance: [facility] [address]: Independent  Transfer Assistance: Independent  Active : Yes    Examination of body systems (includes body structures/functions, activity/participation limitations):  · Orientation: WFL   · Cognition:  WFL   · Observation:  Received pt in bed. Alert and cooperative. · Vision:  nCrypted Cloud   · Hearing:  WFL   · ROM:  WF L BUE  · Strength: WFL BUE  · Sensation: not tested    ADLs  Feeding: INDEP    Grooming: INDEP in sitting    Dressing: UB SBA in seated LB SBA c reacher for pants threading, hikes up in stance via alternating BUE support on walker, increased time to complete. Needs SOCK AID. Bathing: UB SBA in seated LB likely SBA , mostly in seated via sponge bathing. Her stool at home in her walk-in is likely too low to comply with posterior hip precautions    Toileting: likely SBA    *Some ADL determined per observation of actual ADL performance, functional mobility, balance, activity tolerance, and cognition.      AM-PAC 6 click short form for inpatient daily activity:  Raw Score: 20  24/24 = unimpaired  23/24 = 1-20% impaired   20/24-22/24 = 21-40% impaired  15/24-19/24 = 41-59% impaired   10/24-14/24 = 60%-79% impaired  7/24-9/24 = 80%-99% impaired  6/24 = 100% impaired    Functional Mobility  Bed mobility:   Supine to sit: CORINE c bed rail but HOB flat. Inc time and effort. Sitting balance: good    Transfers:   Sit to stand: CGA for steadying  Stand to sit: CGA for steadying. Sits impulsively, reduced safety but this due to fatigue and nausea. Standing balance: SBA c RW during pants hiking, up to CGA c gait. Ambulation:  CGA-SBA c RW 30'. See PT note for gait details. Limited by nausea, pain, pre-syncope. Activity tolerance  WFL given POD 1  But certainly below baseline due post anesthesia symptoms, pain, etc.    Assessment:  Assessment  Performance deficits / Impairments: Decreased functional mobility , Decreased ADL status, Decreased high-level IADLs  Prognosis: Good  Decision Making: Medium Complexity  REQUIRES OT FOLLOW UP: Yes  Discharge Recommendations: Home with Home health OT(her nephew will check-in and assist with meals/home mangement ; SHE NEEDS A SOCK [address] REACHER--DME has been notified and will deliver this afternoon)          Goals:  By d/c or goals met:     Pt will perform all bed mobility with CORINE in prep for EOB/OOB activity. Pt will perform all functional transfers with CORINE and appropriate use of LRD to bed, toilet, chair in prep for increased functional independence. Pt will perform UB ADLs with CORINE to increase functional independence. Pt will perform LB ADLs with CORINE to increase functional independence. Pt will perform all aspects of toileting with CORINE to increase functional independence. Pt will participate in therex/therax c emphasis on strength, activity tolerance,  safety, CORINE tasks.     Plan:  Plan  Times per week: 4x      Recommendation for activity with nursing staff:  ambulate to bathroom with RW for toileting    Treatment today:    Self Care Training:   Self care training was performed today.  Cues were given for safety, sequence, UE/LE placement, visual cues, and balance. Activities performed today included dressing, toileting, hand hygiene, and grooming. Education: Role of OT, OT POC, d/c needs, home safety    Safety: Left in chair with all needs in reach. chair alarm applied. Gait belt used for transfer and mobility. Time in:  0820  Time out:  0853  Timed treatment minutes:  20  Total treatment time:  35    Electronically signed by:    [address] Romero, OTR/L, [place]   [id number]   9:29 AM, 10/20/2020

## 2020-10-20 NOTE — CONSULTS
[address] [facility], 1960, 1117/1117-A, 10/20/2020    History  [place]:  There were no encounter diagnoses. Patient  has a past medical history of Allergic rhinitis due to pollen, Arthritis, Chronic back pain, Depression, Gastroesophageal reflux disease, Hearing loss, History of blood transfusion, History of cardiac monitoring, History of nuclear stress test, Hot flashes due to surgical menopause, Hx of echocardiogram, Hyperlipidemia, Hypertension, Meniere disease, Morbid obesity due to excess calories [facility]), Sleep apnea, and Tilt table evaluation. Patient  has a past surgical history that includes Cholecystectomy; Appendectomy (1967); Tonsillectomy (1970); Colonoscopy (2014); and Hysterectomy (1989). Subjective:  Patient states:  \"I feel woozy\". Pain:  6/10 L hip at sx site. Communication with other providers:  Handoff to RN, OT  Restrictions: Posterior precautions, WBAT L LE, general precautions    Home Setup/Prior level of function  Social/Functional History  Lives With: Alone  Type of Home: House  Home Layout: Two level  Home Access: Ramped entrance  Bathroom Shower/Tub: Walk-in shower, Shower chair without back  Bathroom Toilet: Handicap height  Home Equipment: Rolling walker, Cane(Uses RW at baseline)  ADL Assistance: [address]: Independent  Transfer Assistance: Independent  Active : Yes    Examination of body systems (includes body structures/functions, activity/participation limitations):  · Observation:  Pt supine in bed upon arrival and agreeable to therapy  · Vision:  Wears glasses at all times  · Hearing:  [facility]  · Cardiopulmonary:  1 L O2  · Cognition: WFL, see OT/SLP note for further evaluation. Musculoskeletal  · ROM R/L:  WFL.     · Strength R: 5/5, L: 3/5 at hip, 5/5 all else    · Neuro:  [facility]      Mobility:  · Rolling L/R:  supervision  · Supine to sit:  CGA with cues for sequencing and increased time and effort to complete  · Transfers: Pt completed STS from bed CGA   · Sitting balance:  good. · Standing balance:  Fair+. · Gait: Pt ambulated 30' with RW and CGA with cues for sequencing. Pt educated on posterior precautions, WB'ing precautions, and L leading step to pattern prior to mobility. Pt ambulated with decreased abad, slight forward flexed posture, decreased stance phase on L LE, and decreased step length and height bilaterally. Distance limited due to pt becoming fatigued, lightheaded, and nauseas so pt returned to chair. [facility] 6 Clicks Inpatient Mobility:       Safety: patient left in chair with alarm on, call light within reach, RN notified, gait belt used. Assessment:  Pt is a 64 y.o. female s/p L LISHA. Pt is typically independent at home with RW for ambulation and transfers. Pt is currently presenting with decreased endurance, impaired balance, impaired strength, impaired gait, and increased pain. Pt is currently ambulating 30' with RW and CGA with distance limited due to nausea. Pt would benefit from continued acute care PT as well as ARU vs [facility] PT after discharge depending on functional progression as nausea decreases. Complexity: Moderate  Prognosis: Good, no significant barriers to participation at this time. Plan Times per week: BID     Equipment: Pt already owns RW    Goals:  Short term goals  Time Frame for Short term goals: 1 week  Short term goal 1: Pt to complete all bed mobility mod I  Short term goal 2: Pt to complete all STS transfers to/from bed, commode, and chair mod I  Short term goal 3: Pt to ambulate 48' with LRAD mod I       Treatment plan:  Bed mobility, transfers, balance, gait, TA, TX.     Recommendations for NURSING mobility: ambulate with RW and gait belt    Time:   Time in: 0825  Time out: 0853  Timed treatment minutes: 15  Total time: 28    Electronically signed by:    Bee Abreu PT  10/20/2020, 9:21 AM

## 2020-10-20 NOTE — PROGRESS NOTES
Occupational Therapy  Delivered reacher to pt this afternoon for Lb dressing. Pt declined sock aid.    [address] El[address] Romero, OTR/L, [place]   [id number]   1:50 PM, 10/20/2020'

## 2020-10-20 NOTE — PROGRESS NOTES
Physical Therapy    Physical Therapy Treatment Note  Name: Jade Emmanuel MRN: [id number] :   1960   Date:  10/20/2020   Admission Date: 10/19/2020 Room:  [address] [place] [address]   Restrictions/Precautions:      s/p L LISHA. Posterior precautions, WBAT L LE, general precautions    Communication with other providers: per nurse she gave pain meds before tx session  Subjective:  Patient states:  Pt agreeable to tx but declined staying up in chair at end of tx session. Pain:   Location, Type, Intensity (0/10 to 10/10):  Rates pain 4  Objective:    Observation:  Alert and oriented  Treatment, including education/measures:  Pt given LISHA booklet and reviewed hip precautions and safety. Pt performed leg ex in sitting and sup with written copy:  Dep breathing ex  10 reps aps  10 reps quad sets  10 reps glut sets  10 reps heel slides with leg  assist  10 reps laqs    Sup<=>sit min assist with leg , pt needing increased time and effort. Sit<=>stand from bed cga and min assist from commode. Pt needing encouragement and education on performing freddie care in sitting. Pt was able to stand at sink with sba to wash hands. amb sba with rw 61' with cues for posture and left foot has slight internal rotation and pt educated and encouraged to work on [employer]. Pt reports she was developing foot turning in prior to surgery. Safety  Patient left safely in the bed, with call light/phone in reach with alarm applied. Gait belt was used for transfers and gait. Assessment / Impression:       Patient's tolerance of treatment:  good  Adverse Reaction: na  Significant change in status and impact:  na  Barriers to improvement:  Pain, strength, and safety  Plan for Next Session:    Cont.  POC  Time in:  1310  Time out:  1410  Timed treatment minutes:  60  Total treatment time:  61    Previously filed items:  Social/Functional History  Lives With: Alone  Type of Home: House  Home Layout: Two level  Home Access: Ramped entrance  Bathroom Shower/Tub: Walk-in shower, Shower chair without back  Bathroom Toilet: Handicap height  Home Equipment: Rolling walker, Cane(Uses RW at baseline)  ADL Assistance: [address]: Independent  Ambulation Assistance: Independent  Transfer Assistance: Independent  Active : Yes  Short term goals  Time Frame for Short term goals: 1 week  Short term goal 1: Pt to complete all bed mobility mod I  Short term goal 2: Pt to complete all STS transfers to/from bed, commode, and chair mod I  Short term goal 3: Pt to ambulate 48' with LRAD mod I       Electronically signed by:    Rodrigo Frankel PTA  10/20/2020, 1:13 PM

## 2020-10-20 NOTE — PLAN OF CARE
stabilize  Description: Skin integrity will stabilize  10/20/2020 0528 by Jessica Lovell RN  Outcome: Ongoing  10/19/2020 1639 by Casper Shen RN  Outcome: Ongoing     Problem: Discharge Planning:  Goal: Patients continuum of care needs are met  Description: Patients continuum of care needs are met  10/20/2020 0528 by Jessica Lovell RN  Outcome: Ongoing  10/19/2020 1639 by Casper Shen RN  Outcome: Ongoing     Problem: Infection - Surgical Site:  Goal: Will show no infection signs and symptoms  Description: Will show no infection signs and symptoms  10/20/2020 0528 by Jessica Lovell RN  Outcome: Ongoing  10/19/2020 1639 by Casper Shen RN  Outcome: Ongoing

## 2020-10-20 NOTE — OP NOTE
[address] [place] [address], [address] [place] [address]                                OPERATIVE REPORT    PATIENT NAME: Duong Zayas                  :        1960  MED REC NO:   [id number]                          ROOM:       1846  ACCOUNT NO:   [de-identified]                           ADMIT DATE: 10/19/2020  PROVIDER:     Pio Worthy MD    DATE OF PROCEDURE:  10/19/2020    PREOPERATIVE DIAGNOSIS:  Left hip primary osteoarthritis. POSTOPERATIVE DIAGNOSIS:  Left hip primary osteoarthritis. PROCEDURE:  Left total hip replacement. SURGEON:  Pio Worthy MD    ASSISTANT:  Elliot Gamez PA-C    ANESTHESIA:  Spinal.    ESTIMATED BLOOD LOSS:  300 mL. COMPLICATIONS:  None. DISPOSITION:  To PACU in stable condition. OPERATIVE IMPLANTS:  Sarah cementless press-fit total hip replacement  Trident acetabular shell size 50 mm with one 6.5-mm acetabular screw  along with a Trident X3 36-mm polyethylene liner and a Magnolia Accolade  -degree neck angle femoral stem size 4 along with a 36 mm ceramic  femoral head with -2.5 mm neck length. PREOPERATIVE INDICATIONS:  The patient is a 62-year-old who has had  severe progressive worsening left hip pain and dysfunction and has  presented to my office, and we had a long discussion regarding further  treatment options and we discussed the severity of the degenerative  findings on the x-rays and physical exam.  We discussed risks and  benefits of both surgical and nonsurgical treatment. She felt that she  had failed nonoperative measures and had severe difficulties with simple  activities of daily living and her quality of life was extremely poor;  therefore, she wished to proceed with hip replacement surgery and we  discussed the risks and benefits of surgery as well as the postoperative  course and rehabilitation. She understood these and consent was  obtained.     DESCRIPTION OF the proximal femur and excellent stability of the  femur. The trial 36 mm head with a -2.5 mm femoral neck was placed. The hip was reduced. There was excellent stability and range of motion  with restoration of equal leg lengths and no instability at 90 degrees  of flexion and 45 degrees of internal rotation. The femoral components were removed. The hip was thoroughly irrigated. The Accolade -degree neck angle, size 4 femoral stem was impacted  into place and seated well in appropriate anteversion and the -2.5 mm  neck length 36-mm femoral ceramic head was impacted into place and then  the hip was again reduced and trialed and there was excellent stability  and range of motion present with equal leg lengths. The wound was thoroughly irrigated again. The posterior capsule and  external rotators with the piriformis were repaired through drill holes  in the posterior aspect of the greater trochanter and then the deep  fascial layer was closed with running #1 Stratafix suture and then deep  subcutaneous layers were closed in layers with buried 2-0 Vicryl, and  skin was closed with a running 3-0 subcuticular Monocryl Stratafix  suture and augmented at the surface with the Prineo Dermabond wound  closure, and a sterile dressing was applied with 4x8s, ABD and paper  tape. She was awakened and taken to PACU in stable condition. All  sponge and needle counts were correct. POSTOPERATIVE PLAN:  She will be admitted to the hospital for pain  control, physical therapy and medical monitoring. She will be  weightbearing as tolerated with posterior hip precautions and she will  be on Xarelto postoperatively for DVT prophylaxis.         Jo Kelley MD    D: 10/19/2020 12:56:40       T: 10/19/2020 13:02:21     [email or web address]  Job#: [id number]     Doc#: [id number]    CC:

## 2020-10-20 NOTE — PROGRESS NOTES
Hospitalist Progress Note      Name:  Tyson Concepcion /Age/Sex: 1960  (64 y.o. female)   MRN & CSN:  [id number] & [id number] Admission Date/Time: 10/19/2020  7:53 AM   Location:  [facility] PCP: Ramakrishna Coon MD       Hospital Day: 2    Hospital Course:   Tyson Concepcion is a 64 y.o. female who presented with    Assessment and Plan:      Primary osteoarthritis of left hip s/p posterior left hip total arthroplasty   HTN, uncontrolled   HERIBERTO   Gerd   HLD   Depression   Morbid obesity with BMI of 40    Pain medications  BP control  Am labs       Pt ot to work with patient  Attending planning dc in AM if oob goes well  Discussed dc plan with CM to confirm [facility] at dc    We will defer actual discharge to primary team-Ortho. Diet DIET GENERAL;   DVT Prophylaxis [] Lovenox, []  Heparin, [] SCDs, []No VTE prophylaxis, patient ambulating   GI Prophylaxis [] PPI, [] H2 Blocker, [] No GI prophylaxis, patient is receiving diet/Tube Feeds   Code Status Full Code   Disposition Patient requires continued admission due to pending hip repair    Dc plan to be determined    MDM [] Low, [x] Moderate,[]  High  Patient's risk as above due to     [] One or more chronic illnesses with mild exacerbation progression    [x] Two or more stable chronic illnesses    [] Undiagnosed new problem with uncertain prognosis    [] Elective major surgery    []Prescription drug management     Subjective:     Pt S&E. C/o some pain in the hip  No fevers or chills     10-14 point ROS reviewed negative, unless as noted above    Objective: Intake/Output Summary (Last 24 hours) at 10/20/2020 0915  Last data filed at 10/20/2020 0700  Gross per 24 hour   Intake 3175 ml   Output 450 ml   Net 2725 ml      Vitals:   Vitals:    10/20/20 0533   BP: 121/67   Pulse: 77   Resp: 18   Temp: 97.8 °F (36.6 °C)   SpO2: 98%     Physical Exam:    GEN Awake female, laying in bed in no apparent distress. Appears given age.   EYES Pupils are equally round. No scleral discharge  HENT Atraumatic and symmetric head  NECK No apparent thyromegaly  RESP Symmetric chest movement while on room air. CARDIO/VASC Peripheral pulses equal bilaterally and palpable. No peripheral edema. GI Abdomen is not distended. Rectal exam deferred. Central obesity with BMI of 40   Damon catheter is not present. HEME/LYMPH No petechiae or ecchymoses. MSK Spontaneous movement of BL upper extremities  SKIN Normal coloration, warm, dry. NEURO Cranial nerves appear grossly intact  PSYCH Awake, alert.   Oriented x4     Medications:   Medications:    ezetimibe  10 mg Oral Daily    hydroCHLOROthiazide  12.5 mg Oral Daily    cetirizine  10 mg Oral Daily    metoprolol tartrate  50 mg Oral BID    famotidine  10 mg Oral Nightly    sodium chloride flush  10 mL Intravenous 2 times per day    acetaminophen  650 mg Oral Q6H    rivaroxaban  10 mg Oral Daily    influenza virus vaccine  0.5 mL Intramuscular Once      Infusions:    lactated ringers 75 mL/hr at 10/20/20 0411     PRN Meds: sodium chloride flush, 10 mL, PRN  HYDROmorphone, 0.25 mg, Q3H PRN    Or  HYDROmorphone, 0.5 mg, Q3H PRN  senna, 1 tablet, Daily PRN  bisacodyl, 10 mg, Daily PRN  oxyCODONE-acetaminophen, 1 tablet, Q6H PRN  oxyCODONE-acetaminophen, 2 tablet, Q4H PRN  promethazine, 12.5 mg, Q6H PRN    Or  ondansetron, 4 mg, Q6H PRN          Electronically signed by Mann Beck DO on 10/20/2020 at 9:15 AM

## 2020-10-20 NOTE — CARE COORDINATION
Chart reviewed. Pt talked to CM prior to planned surgery. Pt discharge plan is to return home with Home Care. Pt has no preference. D/t Pt insurance, Pt is limited as to the home care agencies that accept her insurance. LSW contacted Wilburn Severin from [facility] home care. LSW faxed the information. Wilburn Severin [facility] get back with the LSW.

## 2020-10-20 NOTE — PROGRESS NOTES
DUC hose in room, but do not fit pt. Only one size avail. Pt ambulated in fischer 1x with therapy. Pt in [id number] briefly and asked to go back to bed. Pt educated and voiced understanding.

## 2020-10-20 NOTE — PLAN OF CARE
Problem: Pain:  Description: Pain management should include both nonpharmacologic and pharmacologic interventions.   Goal: Pain level will decrease  Description: Pain level will decrease  10/20/2020 1402 by Dimitri Degroot RN  Outcome: Ongoing  10/20/2020 0528 by Aj Mccormick RN  Outcome: Ongoing  Goal: Control of acute pain  Description: Control of acute pain  10/20/2020 1402 by Dimitri Degroot RN  Outcome: Ongoing  10/20/2020 0528 by Aj Mccormick RN  Outcome: Ongoing  Goal: Control of chronic pain  Description: Control of chronic pain  10/20/2020 1402 by Dimitri Degroot RN  Outcome: Ongoing  10/20/2020 0528 by Aj Mccormick RN  Outcome: Ongoing     Problem: Falls - Risk of:  Goal: Will remain free from falls  Description: Will remain free from falls  10/20/2020 1402 by Dimitri Degroot RN  Outcome: Ongoing  10/20/2020 0528 by Aj Mccormick RN  Outcome: Ongoing  Goal: Absence of physical injury  Description: Absence of physical injury  10/20/2020 1402 by Dimitri Degroot RN  Outcome: Ongoing  10/20/2020 0528 by Aj Mccormick RN  Outcome: Ongoing     Problem: Infection:  Goal: Will remain free from infection  Description: Will remain free from infection  10/20/2020 1402 by Dimitri Degroot RN  Outcome: Ongoing  10/20/2020 0528 by Aj Mccormick RN  Outcome: Ongoing     Problem: Safety:  Goal: Free from accidental physical injury  Description: Free from accidental physical injury  10/20/2020 1402 by Dimitri Degroot RN  Outcome: Ongoing  10/20/2020 0528 by Aj Mccormick RN  Outcome: Ongoing  Goal: Free from intentional harm  Description: Free from intentional harm  10/20/2020 1402 by Dimitri Degroot RN  Outcome: Ongoing  10/20/2020 0528 by Aj Mccormick RN  Outcome: Ongoing     Problem: Daily Care:  Goal: Daily care needs are met  Description: Daily care needs are met  10/20/2020 1402 by Dimitri Degroot RN  Outcome: Ongoing  10/20/2020 0528 by Aj Mccormick RN  Outcome: Ongoing     Problem: Skin Integrity:  Goal: Skin integrity will stabilize  Description: Skin integrity will stabilize  10/20/2020 1402 by Junior Gtz RN  Outcome: Ongoing  10/20/2020 0528 by Lorena Pompa RN  Outcome: Ongoing     Problem: Discharge Planning:  Goal: Patients continuum of care needs are met  Description: Patients continuum of care needs are met  10/20/2020 1402 by Junior Gtz RN  Outcome: Ongoing  10/20/2020 0528 by Lorena Pompa RN  Outcome: Ongoing     Problem: Infection - Surgical Site:  Goal: Will show no infection signs and symptoms  Description: Will show no infection signs and symptoms  10/20/2020 1402 by Junior Gtz RN  Outcome: Ongoing  10/20/2020 0528 by Lorena Pompa RN  Outcome: Ongoing

## 2020-10-21 VITALS
SYSTOLIC BLOOD PRESSURE: 109 MMHG | DIASTOLIC BLOOD PRESSURE: 60 MMHG | RESPIRATION RATE: 16 BRPM | BODY MASS INDEX: 38.62 KG/M2 | OXYGEN SATURATION: 93 % | HEIGHT: 65 IN | HEART RATE: 90 BPM | WEIGHT: 231.8 LBS | TEMPERATURE: 98.3 F

## 2020-10-21 PROCEDURE — 90686 IIV4 VACC NO PRSV 0.5 ML IM: CPT | Performed by: ORTHOPAEDIC SURGERY

## 2020-10-21 PROCEDURE — 97110 THERAPEUTIC EXERCISES: CPT

## 2020-10-21 PROCEDURE — 2580000003 HC RX 258: Performed by: ORTHOPAEDIC SURGERY

## 2020-10-21 PROCEDURE — 6370000000 HC RX 637 (ALT 250 FOR IP): Performed by: ORTHOPAEDIC SURGERY

## 2020-10-21 PROCEDURE — 6360000002 HC RX W HCPCS: Performed by: ORTHOPAEDIC SURGERY

## 2020-10-21 PROCEDURE — 97116 GAIT TRAINING THERAPY: CPT

## 2020-10-21 PROCEDURE — 97530 THERAPEUTIC ACTIVITIES: CPT

## 2020-10-21 PROCEDURE — 94150 VITAL CAPACITY TEST: CPT

## 2020-10-21 PROCEDURE — G0008 ADMIN INFLUENZA VIRUS VAC: HCPCS | Performed by: ORTHOPAEDIC SURGERY

## 2020-10-21 PROCEDURE — 94761 N-INVAS EAR/PLS OXIMETRY MLT: CPT

## 2020-10-21 RX ORDER — DOCUSATE SODIUM 100 MG/1
100 CAPSULE, LIQUID FILLED ORAL DAILY PRN
Qty: 30 CAPSULE | Refills: 0 | Status: SHIPPED | OUTPATIENT
Start: 2020-10-21 | End: 2021-02-19

## 2020-10-21 RX ORDER — OXYCODONE HYDROCHLORIDE AND ACETAMINOPHEN 5; 325 MG/1; MG/1
1 TABLET ORAL EVERY 6 HOURS PRN
Qty: 28 TABLET | Refills: 0 | Status: ON HOLD | OUTPATIENT
Start: 2020-10-21 | End: 2020-11-02 | Stop reason: HOSPADM

## 2020-10-21 RX ADMIN — OXYCODONE HYDROCHLORIDE AND ACETAMINOPHEN 2 TABLET: 5; 325 TABLET ORAL at 03:25

## 2020-10-21 RX ADMIN — ACETAMINOPHEN 650 MG: 325 TABLET ORAL at 06:11

## 2020-10-21 RX ADMIN — EZETIMIBE 10 MG: 10 TABLET ORAL at 08:20

## 2020-10-21 RX ADMIN — CETIRIZINE HYDROCHLORIDE 10 MG: 10 TABLET, FILM COATED ORAL at 08:20

## 2020-10-21 RX ADMIN — INFLUENZA A VIRUS A/VICTORIA/2454/2019 IVR-207 (H1N1) ANTIGEN (PROPIOLACTONE INACTIVATED), INFLUENZA A VIRUS A/HONG KONG/2671/2019 IVR-208 (H3N2) ANTIGEN (PROPIOLACTONE INACTIVATED), INFLUENZA B VIRUS B/VICTORIA/705/2018 BVR-11 ANTIGEN (PROPIOLACTONE INACTIVATED), INFLUENZA B VIRUS B/PHUKET/3073/2013 BVR-1B ANTIGEN (PROPIOLACTONE INACTIVATED) 0.5 ML: 15; 15; 15; 15 INJECTION, SUSPENSION INTRAMUSCULAR at 08:20

## 2020-10-21 RX ADMIN — SODIUM CHLORIDE, POTASSIUM CHLORIDE, SODIUM LACTATE AND CALCIUM CHLORIDE: 600; 310; 30; 20 INJECTION, SOLUTION INTRAVENOUS at 07:34

## 2020-10-21 RX ADMIN — OXYCODONE HYDROCHLORIDE AND ACETAMINOPHEN 2 TABLET: 5; 325 TABLET ORAL at 12:13

## 2020-10-21 RX ADMIN — ACETAMINOPHEN 650 MG: 325 TABLET ORAL at 00:09

## 2020-10-21 RX ADMIN — ACETAMINOPHEN 650 MG: 325 TABLET ORAL at 12:13

## 2020-10-21 ASSESSMENT — PAIN DESCRIPTION - PROGRESSION

## 2020-10-21 ASSESSMENT — PAIN SCALES - GENERAL
PAINLEVEL_OUTOF10: 4
PAINLEVEL_OUTOF10: 8
PAINLEVEL_OUTOF10: 6
PAINLEVEL_OUTOF10: 6
PAINLEVEL_OUTOF10: 0
PAINLEVEL_OUTOF10: 3

## 2020-10-21 ASSESSMENT — PAIN DESCRIPTION - DESCRIPTORS: DESCRIPTORS: ACHING

## 2020-10-21 ASSESSMENT — PAIN DESCRIPTION - FREQUENCY: FREQUENCY: INTERMITTENT

## 2020-10-21 ASSESSMENT — PAIN DESCRIPTION - LOCATION: LOCATION: HIP

## 2020-10-21 ASSESSMENT — PAIN DESCRIPTION - ONSET: ONSET: ON-GOING

## 2020-10-21 ASSESSMENT — PAIN DESCRIPTION - PAIN TYPE: TYPE: SURGICAL PAIN

## 2020-10-21 ASSESSMENT — PAIN DESCRIPTION - ORIENTATION: ORIENTATION: LEFT

## 2020-10-21 ASSESSMENT — PAIN - FUNCTIONAL ASSESSMENT: PAIN_FUNCTIONAL_ASSESSMENT: ACTIVITIES ARE NOT PREVENTED

## 2020-10-21 NOTE — PROGRESS NOTES
Physical Therapy    Physical Therapy Treatment Note  Name: Lea Frazier MRN: [id number] :   1960   Date:  10/21/2020   Admission Date: 10/19/2020 Room:  [address] [place] [address]A   Restrictions/Precautions:        Posterior precautions, WBAT L LE, general precautions, Veniers disease, B/P problems  Communication with other providers:  Communicated pt's needs for getting home, discussed with nursing and they communicated to CM  Subjective:  Patient states:  Has no good vehicle to get home in d/t all available vehicles are SUV's and trucks that are very high  Pain:   Location, Type, Intensity (0/10 to 10/10):  5/10 L hip  Objective:    Observation:  Pt was resting in the bed  Treatment, including education/measures:  Transfers  Supine to sit :CGA  Sit to supine :min A of 1 for LE only  Scooting :SBA  Sit to stand :SBA from bed and min A from toilet. Pt has a raised toilet seat at home  Stand to sit :SBA to toilet and bed  Gait:  Pt amb with RW for 75 ft with CGA  Pt needed VC's for pathway, safety with RW and sequencing  Supine Exercises: Ankle pumps x 20  Quad sets x 15  Glute sets x 15  Heel slides x 15  Hip abd x 15  Therapeutic Exercise:  Therapeutic exercises were instructed today. Cues were given for technique, safety, recruitment, and rationale. Cues were verbal and/or tactile. Safety  Patient left safely in the bed, with call light/phone in reach with alarm applied. Gait belt was used for transfers and gait.   Assessment / Impression:     Patient's tolerance of treatment:  Good, pain and fatigue with walking but no dizziness this afternoon  Adverse Reaction: none  Significant change in status and impact:  none  Barriers to improvement:  Complications of veniers and B/P being low  Plan for Next Session:    Will cont to work towards pt's goals per her tolerance  Time in:  1350  Time out:  1430  Timed treatment minutes:  40  Total treatment time:  40  Previously filed items:  Social/Functional History  Lives With: Alone  Type of Home: House  Home Layout: Two level  Home Access: Ramped entrance  Bathroom Shower/Tub: Walk-in shower, Shower chair without back  Bathroom Toilet: Handicap height  Home Equipment: Rolling walker, Cane(Uses RW at baseline)  ADL Assistance: Independent  Homemaking Assistance: Independent  Ambulation Assistance: Independent  Transfer Assistance: Independent  Active : Yes  Short term goals  Time Frame for Short term goals: 1 week  Short term goal 1: Pt to complete all bed mobility mod I  Short term goal 2: Pt to complete all STS transfers to/from bed, commode, and chair mod I  Short term goal 3: Pt to ambulate 48' with LRAD mod I   Electronically signed by:     Seth Arvizu PTA  10/21/2020, 2:23 PM

## 2020-10-21 NOTE — PROGRESS NOTES
Physical Therapy    Physical Therapy Treatment Note  Name: Villa Hernandez MRN: [id number] :   1960   Date:  10/21/2020   Admission Date: 10/19/2020 Room:  [address] [place] [address]   Restrictions/Precautions:        Posterior precautions, WBAT L LE, general precautions, Veniers disease, B/P problems  Communication with other providers:  Nursing asked about recommendations, pt wants to see how well she amb this pm   Subjective:  Patient states:  She has B/P problems and Veniers disease, her  Biggest problem is getting out and into bed and on and off the toilet  Pain:   Location, Type, Intensity (0/10 to 10/10):  5/10 L hip  Objective:    Observation:  Pt was resting in the bed  Treatment, including education/measures:  Transfers  Supine to sit :CGA  Sit to supine :min A of 1  Scooting :SBA  Sit to stand :SBA  Stand to sit :SBA  Gait:  Pt amb with RW for 75 ft with CGA  Pt needed VC's for pathway, safety with RW and sequencing  Sitting Exercises: Ankle pumps x 10  Glute sets x 10  LAQ's x 10  Hip Abd x 2  Therapeutic Exercise:  Therapeutic exercises were instructed today. Cues were given for technique, safety, recruitment, and rationale. Cues were verbal and/or tactile. Safety  Patient left safely in the bed, with call light/phone in reach with alarm applied. Gait belt was used for transfers and gait.   Assessment / Impression:     Patient's tolerance of treatment:  Fair, c/o dizziness and lightheadedness with gt   Adverse Reaction: none  Significant change in status and impact:  none  Barriers to improvement:  Complications of veniers and B/P being low  Plan for Next Session:    Will cont to work towards pt's goals per her tolerance  Time in:  1015  Time out:  1055  Timed treatment minutes:  40  Total treatment time:  40  Previously filed items:  Social/Functional History  Lives With: Alone  Type of Home: House  Home Layout: Two level  Home Access: Ramped entrance  Bathroom Shower/Tub: Walk-in shower, Shower chair without back  Bathroom Toilet: Handicap height  Home Equipment: Rolling walker, Cane(Uses RW at baseline)  ADL Assistance: Independent  Homemaking Assistance: Independent  Ambulation Assistance: Independent  Transfer Assistance: Independent  Active : Yes  Short term goals  Time Frame for Short term goals: 1 week  Short term goal 1: Pt to complete all bed mobility mod I  Short term goal 2: Pt to complete all STS transfers to/from bed, commode, and chair mod I  Short term goal 3: Pt to ambulate 48' with LRAD mod I   Electronically signed by:     Aurelio Trimble PTA  10/21/2020, 10:43 AM

## 2020-10-21 NOTE — PROGRESS NOTES
Evangelina Tan ([birth year])    Daily Progress Note-  Bobo Pool MD                     Today's Date:   10/21/2020          Subjective:     Doing well postoperatively. Pain controlled  Patient participated well with therapy yesterday but is having a difficulty getting up from my bed and getting up from the toilet on her own. Objective:     Patient Vitals for the past 4 hrs:   BP Temp Temp src Pulse Resp SpO2   10/21/20 0447 130/68 98.2 °F (36.8 °C) Oral 88 16 92 %         Physical Exam:     The patient is awake and alert  Resting comfortably in bed    Operative extremity:    The dressing is clean dry and intact  Incision intact with Dermabond dressing. Minimal ecchymosis. No drainage or erythema. Sensation intact with the exception of mild decrease sensation in the toes and motor function intact distally with good dorsiflexion plantarflexion of the ankle and great toe  Calf is soft and nontender. LABS   CBC:   Recent Labs     10/20/20  0558   WBC 11.3*   HGB 11.6*              Assessment and Plan     1. POD # 2 left total hip replacement    1:  Physical therapy consult for mobilization   -Weight-bear as tolerated with posterior hip precautions  2:  DVT prophylaxis   -Xarelto for 4 weeks  3:  Continue Pain Control   -wean to PO meds  4. Medical management per hospitalist service  5:  D/C Planning:     -Continue physical therapy today. Patient will benefit from an elevated toilet seat for home. I recommended that she practice getting up out of bed and getting off of the toilet independently so that she has confidence prior to returning home to ensure that is able to perform these activities of daily living safely.    -If she is doing well physical therapy and her pain is well controlled she can be discharged to home with home physical therapy later today.   If she is having difficulty mobilizing with physical therapy she may require an additional overnight stay and additional therapy prior to discharge home.          Pritesh Wong MD

## 2020-10-21 NOTE — PLAN OF CARE
Problem: Pain:  Description: Pain management should include both nonpharmacologic and pharmacologic interventions.   Goal: Pain level will decrease  Description: Pain level will decrease  Outcome: Ongoing  Goal: Control of acute pain  Description: Control of acute pain  Outcome: Ongoing  Goal: Control of chronic pain  Description: Control of chronic pain  Outcome: Ongoing     Problem: Falls - Risk of:  Goal: Will remain free from falls  Description: Will remain free from falls  Outcome: Ongoing  Goal: Absence of physical injury  Description: Absence of physical injury  Outcome: Ongoing     Problem: Infection:  Goal: Will remain free from infection  Description: Will remain free from infection  Outcome: Ongoing     Problem: Safety:  Goal: Free from accidental physical injury  Description: Free from accidental physical injury  Outcome: Ongoing  Goal: Free from intentional harm  Description: Free from intentional harm  Outcome: Ongoing     Problem: Daily Care:  Goal: Daily care needs are met  Description: Daily care needs are met  Outcome: Ongoing     Problem: Skin Integrity:  Goal: Skin integrity will stabilize  Description: Skin integrity will stabilize  Outcome: Ongoing     Problem: Discharge Planning:  Goal: Patients continuum of care needs are met  Description: Patients continuum of care needs are met  Outcome: Ongoing     Problem: Infection - Surgical Site:  Goal: Will show no infection signs and symptoms  Description: Will show no infection signs and symptoms  Outcome: Ongoing

## 2020-10-21 NOTE — CARE COORDINATION
Pt has a discharge for today. LSW notified [facility] home care and faxed discharge information. Pt is ready for discharge from CM standpoint.      Electronically signed by JOSE LUIS Gan on 10/21/2020 at 9:59 AM

## 2020-10-21 NOTE — PROGRESS NOTES
Pt needs help with transport for DC. Pt states fam members all have large trucks or SUV's. ADAM Martinez will call cab for pt.

## 2020-10-22 ENCOUNTER — APPOINTMENT (OUTPATIENT)
Dept: GENERAL RADIOLOGY | Age: 60
DRG: 324 | End: 2020-10-22
Payer: COMMERCIAL

## 2020-10-22 ENCOUNTER — HOSPITAL ENCOUNTER (INPATIENT)
Age: 60
LOS: 11 days | Discharge: HOME | DRG: 324 | End: 2020-11-02
Attending: HOSPITALIST | Admitting: HOSPITALIST
Payer: COMMERCIAL

## 2020-10-22 ENCOUNTER — APPOINTMENT (OUTPATIENT)
Dept: ULTRASOUND IMAGING | Age: 60
DRG: 324 | End: 2020-10-22
Payer: COMMERCIAL

## 2020-10-22 PROBLEM — Z01.818 PRE-OPERATIVE CLEARANCE: Status: RESOLVED | Noted: 2020-09-22 | Resolved: 2020-10-22

## 2020-10-22 PROBLEM — S82.892A CLOSED FRACTURE OF LEFT ANKLE: Status: ACTIVE | Noted: 2020-10-22

## 2020-10-22 LAB
ALBUMIN SERPL-MCNC: 3.3 GM/DL (ref 3.4–5)
ALP BLD-CCNC: 67 IU/L (ref 40–129)
ALT SERPL-CCNC: 19 U/L (ref 10–40)
ANION GAP SERPL CALCULATED.3IONS-SCNC: 14 MMOL/L (ref 4–16)
AST SERPL-CCNC: 28 IU/L (ref 15–37)
BASOPHILS ABSOLUTE: 0 K/CU MM
BASOPHILS RELATIVE PERCENT: 0.3 % (ref 0–1)
BILIRUB SERPL-MCNC: 1 MG/DL (ref 0–1)
BUN BLDV-MCNC: 12 MG/DL (ref 6–23)
CALCIUM SERPL-MCNC: 8.8 MG/DL (ref 8.3–10.6)
CHLORIDE BLD-SCNC: 94 MMOL/L (ref 99–110)
CO2: 27 MMOL/L (ref 21–32)
CREAT SERPL-MCNC: 1.1 MG/DL (ref 0.6–1.1)
DIFFERENTIAL TYPE: ABNORMAL
EOSINOPHILS ABSOLUTE: 0 K/CU MM
EOSINOPHILS RELATIVE PERCENT: 0.1 % (ref 0–3)
GFR AFRICAN AMERICAN: >60 ML/MIN/1.73M2
GFR NON-AFRICAN AMERICAN: 51 ML/MIN/1.73M2
GLUCOSE BLD-MCNC: 143 MG/DL (ref 70–99)
HCT VFR BLD CALC: 35.8 % (ref 37–47)
HEMOGLOBIN: 11.2 GM/DL (ref 12.5–16)
IMMATURE NEUTROPHIL %: 0.5 % (ref 0–0.43)
LYMPHOCYTES ABSOLUTE: 1.2 K/CU MM
LYMPHOCYTES RELATIVE PERCENT: 8.9 % (ref 24–44)
MAGNESIUM: 1.9 MG/DL (ref 1.8–2.4)
MCH RBC QN AUTO: 29.2 PG (ref 27–31)
MCHC RBC AUTO-ENTMCNC: 31.3 % (ref 32–36)
MCV RBC AUTO: 93.2 FL (ref 78–100)
MONOCYTES ABSOLUTE: 1.2 K/CU MM
MONOCYTES RELATIVE PERCENT: 9.6 % (ref 0–4)
NUCLEATED RBC %: 0 %
PDW BLD-RTO: 13.7 % (ref 11.7–14.9)
PLATELET # BLD: 229 K/CU MM (ref 140–440)
PMV BLD AUTO: 10.1 FL (ref 7.5–11.1)
POTASSIUM SERPL-SCNC: 3.2 MMOL/L (ref 3.5–5.1)
RBC # BLD: 3.84 M/CU MM (ref 4.2–5.4)
SEGMENTED NEUTROPHILS ABSOLUTE COUNT: 10.5 K/CU MM
SEGMENTED NEUTROPHILS RELATIVE PERCENT: 80.6 % (ref 36–66)
SODIUM BLD-SCNC: 135 MMOL/L (ref 135–145)
TOTAL IMMATURE NEUTOROPHIL: 0.07 K/CU MM
TOTAL NUCLEATED RBC: 0 K/CU MM
TOTAL PROTEIN: 6.5 GM/DL (ref 6.4–8.2)
WBC # BLD: 13 K/CU MM (ref 4–10.5)

## 2020-10-22 PROCEDURE — 73630 X-RAY EXAM OF FOOT: CPT

## 2020-10-22 PROCEDURE — 83735 ASSAY OF MAGNESIUM: CPT

## 2020-10-22 PROCEDURE — 80053 COMPREHEN METABOLIC PANEL: CPT

## 2020-10-22 PROCEDURE — 93971 EXTREMITY STUDY: CPT

## 2020-10-22 PROCEDURE — 36415 COLL VENOUS BLD VENIPUNCTURE: CPT

## 2020-10-22 PROCEDURE — 94761 N-INVAS EAR/PLS OXIMETRY MLT: CPT

## 2020-10-22 PROCEDURE — 6360000002 HC RX W HCPCS: Performed by: HOSPITALIST

## 2020-10-22 PROCEDURE — 99285 EMERGENCY DEPT VISIT HI MDM: CPT

## 2020-10-22 PROCEDURE — 85025 COMPLETE CBC W/AUTO DIFF WBC: CPT

## 2020-10-22 PROCEDURE — 2580000003 HC RX 258: Performed by: NURSE PRACTITIONER

## 2020-10-22 PROCEDURE — 2580000003 HC RX 258: Performed by: HOSPITALIST

## 2020-10-22 PROCEDURE — 1200000000 HC SEMI PRIVATE

## 2020-10-22 PROCEDURE — 81001 URINALYSIS AUTO W/SCOPE: CPT

## 2020-10-22 PROCEDURE — 73610 X-RAY EXAM OF ANKLE: CPT

## 2020-10-22 PROCEDURE — 6370000000 HC RX 637 (ALT 250 FOR IP): Performed by: NURSE PRACTITIONER

## 2020-10-22 PROCEDURE — 6370000000 HC RX 637 (ALT 250 FOR IP): Performed by: PHYSICIAN ASSISTANT

## 2020-10-22 PROCEDURE — 71045 X-RAY EXAM CHEST 1 VIEW: CPT

## 2020-10-22 RX ORDER — HYDROCHLOROTHIAZIDE 12.5 MG/1
12.5 TABLET ORAL DAILY
Status: DISCONTINUED | OUTPATIENT
Start: 2020-10-22 | End: 2020-11-02 | Stop reason: HOSPADM

## 2020-10-22 RX ORDER — DOCUSATE SODIUM 100 MG/1
100 CAPSULE, LIQUID FILLED ORAL DAILY PRN
Status: DISCONTINUED | OUTPATIENT
Start: 2020-10-22 | End: 2020-11-02 | Stop reason: HOSPADM

## 2020-10-22 RX ORDER — OXYCODONE HYDROCHLORIDE AND ACETAMINOPHEN 5; 325 MG/1; MG/1
1 TABLET ORAL EVERY 6 HOURS PRN
Status: DISCONTINUED | OUTPATIENT
Start: 2020-10-22 | End: 2020-11-02 | Stop reason: HOSPADM

## 2020-10-22 RX ORDER — FAMOTIDINE 20 MG/1
20 TABLET, FILM COATED ORAL 2 TIMES DAILY
Status: DISCONTINUED | OUTPATIENT
Start: 2020-10-22 | End: 2020-11-02 | Stop reason: HOSPADM

## 2020-10-22 RX ORDER — METOPROLOL TARTRATE 50 MG/1
50 TABLET, FILM COATED ORAL 2 TIMES DAILY
Status: DISCONTINUED | OUTPATIENT
Start: 2020-10-22 | End: 2020-11-02 | Stop reason: HOSPADM

## 2020-10-22 RX ORDER — ACETAMINOPHEN 325 MG/1
650 TABLET ORAL EVERY 6 HOURS PRN
Status: DISCONTINUED | OUTPATIENT
Start: 2020-10-22 | End: 2020-11-02 | Stop reason: HOSPADM

## 2020-10-22 RX ORDER — SODIUM CHLORIDE 0.9 % (FLUSH) 0.9 %
10 SYRINGE (ML) INJECTION EVERY 12 HOURS SCHEDULED
Status: DISCONTINUED | OUTPATIENT
Start: 2020-10-22 | End: 2020-11-02 | Stop reason: HOSPADM

## 2020-10-22 RX ORDER — POTASSIUM CHLORIDE 20 MEQ/1
40 TABLET, EXTENDED RELEASE ORAL 2 TIMES DAILY WITH MEALS
Status: DISCONTINUED | OUTPATIENT
Start: 2020-10-22 | End: 2020-11-02 | Stop reason: HOSPADM

## 2020-10-22 RX ORDER — ONDANSETRON 2 MG/ML
4 INJECTION INTRAMUSCULAR; INTRAVENOUS EVERY 6 HOURS PRN
Status: DISCONTINUED | OUTPATIENT
Start: 2020-10-22 | End: 2020-11-02 | Stop reason: HOSPADM

## 2020-10-22 RX ORDER — POLYETHYLENE GLYCOL 3350 17 G/17G
17 POWDER, FOR SOLUTION ORAL DAILY PRN
Status: DISCONTINUED | OUTPATIENT
Start: 2020-10-22 | End: 2020-11-02 | Stop reason: HOSPADM

## 2020-10-22 RX ORDER — EZETIMIBE 10 MG/1
10 TABLET ORAL DAILY
Status: DISCONTINUED | OUTPATIENT
Start: 2020-10-22 | End: 2020-11-02 | Stop reason: HOSPADM

## 2020-10-22 RX ORDER — HYDROCODONE BITARTRATE AND ACETAMINOPHEN 5; 325 MG/1; MG/1
1 TABLET ORAL ONCE
Status: COMPLETED | OUTPATIENT
Start: 2020-10-22 | End: 2020-10-22

## 2020-10-22 RX ORDER — SODIUM CHLORIDE 0.9 % (FLUSH) 0.9 %
10 SYRINGE (ML) INJECTION PRN
Status: DISCONTINUED | OUTPATIENT
Start: 2020-10-22 | End: 2020-11-02 | Stop reason: HOSPADM

## 2020-10-22 RX ORDER — PROMETHAZINE HYDROCHLORIDE 25 MG/1
12.5 TABLET ORAL EVERY 6 HOURS PRN
Status: DISCONTINUED | OUTPATIENT
Start: 2020-10-22 | End: 2020-11-02 | Stop reason: HOSPADM

## 2020-10-22 RX ORDER — ACETAMINOPHEN 650 MG/1
650 SUPPOSITORY RECTAL EVERY 6 HOURS PRN
Status: DISCONTINUED | OUTPATIENT
Start: 2020-10-22 | End: 2020-11-02 | Stop reason: HOSPADM

## 2020-10-22 RX ADMIN — FAMOTIDINE 20 MG: 20 TABLET, FILM COATED ORAL at 20:41

## 2020-10-22 RX ADMIN — EZETIMIBE 10 MG: 10 TABLET ORAL at 20:41

## 2020-10-22 RX ADMIN — METOPROLOL TARTRATE 50 MG: 50 TABLET, FILM COATED ORAL at 20:41

## 2020-10-22 RX ADMIN — CEFTRIAXONE 1 G: 1 INJECTION, POWDER, FOR SOLUTION INTRAMUSCULAR; INTRAVENOUS at 18:05

## 2020-10-22 RX ADMIN — RIVAROXABAN 10 MG: 10 TABLET, FILM COATED ORAL at 18:06

## 2020-10-22 RX ADMIN — HYDROCODONE BITARTRATE AND ACETAMINOPHEN 1 TABLET: 5; 325 TABLET ORAL at 15:18

## 2020-10-22 RX ADMIN — OXYCODONE HYDROCHLORIDE AND ACETAMINOPHEN 1 TABLET: 5; 325 TABLET ORAL at 18:05

## 2020-10-22 RX ADMIN — SODIUM CHLORIDE, PRESERVATIVE FREE 10 ML: 5 INJECTION INTRAVENOUS at 20:42

## 2020-10-22 RX ADMIN — POTASSIUM CHLORIDE 40 MEQ: 1500 TABLET, EXTENDED RELEASE ORAL at 18:05

## 2020-10-22 RX ADMIN — HYDROCHLOROTHIAZIDE 12.5 MG: 12.5 TABLET ORAL at 18:05

## 2020-10-22 ASSESSMENT — PAIN DESCRIPTION - DIRECTION: RADIATING_TOWARDS: LEG

## 2020-10-22 ASSESSMENT — PAIN DESCRIPTION - FREQUENCY: FREQUENCY: CONTINUOUS

## 2020-10-22 ASSESSMENT — PAIN DESCRIPTION - DESCRIPTORS: DESCRIPTORS: ACHING;CONSTANT

## 2020-10-22 ASSESSMENT — PAIN DESCRIPTION - ONSET: ONSET: ON-GOING

## 2020-10-22 ASSESSMENT — PAIN DESCRIPTION - PAIN TYPE: TYPE: SURGICAL PAIN;ACUTE PAIN

## 2020-10-22 ASSESSMENT — PAIN - FUNCTIONAL ASSESSMENT: PAIN_FUNCTIONAL_ASSESSMENT: PREVENTS OR INTERFERES SOME ACTIVE ACTIVITIES AND ADLS

## 2020-10-22 ASSESSMENT — PAIN DESCRIPTION - PROGRESSION: CLINICAL_PROGRESSION: NOT CHANGED

## 2020-10-22 ASSESSMENT — PAIN DESCRIPTION - LOCATION: LOCATION: ANKLE

## 2020-10-22 ASSESSMENT — PAIN DESCRIPTION - ORIENTATION: ORIENTATION: LEFT

## 2020-10-22 ASSESSMENT — PAIN SCALES - GENERAL
PAINLEVEL_OUTOF10: 9
PAINLEVEL_OUTOF10: 7

## 2020-10-22 NOTE — ED PROVIDER NOTES
EMERGENCY DEPARTMENT ENCOUNTER      PCP: Jason Carballo MD    [facility]    Chief Complaint   Patient presents with    Leg Pain           This patient was not evaluated by the attending physician. I have independently evaluated this patient. HPI    Warner Cheung is a 64 y.o. female who presents with left ankle and left foot pain. Onset yesterday. Context is patient notes insidious onset of symptoms without recent injury. Patient notes associated generalized swelling. Patient reports having total hip arthroplasty 4 days ago, orthopedist is Dr. Ronald Posada. Patient notes that she has not yet taken her Xarelto as prescribed states \"pharmacy has not filled it yet\". REVIEW OF SYSTEMS    Constitutional:  Denies fever, chills, weight loss or weakness   HENT:  Denies sore throat or ear pain   Cardiovascular:  Denies chest pain, palpitations   Respiratory:  Denies cough or shortness of breath    GI:  Denies abdominal pain, nausea, vomiting, or diarrhea  :  Denies any urinary symptoms    Musculoskeletal:    See HPI. Skin:  Denies rash  Neurologic:  Denies headache, focal weakness or sensory changes   Endocrine:  Denies polyuria or polydypsia   Lymphatic:  Denies swollen glands     All other review of systems are negative  See HPI and nursing notes for additional information     PAST MEDICAL AND SURGICAL HISTORY    Past Medical History:   Diagnosis Date    Allergic rhinitis due to pollen 11/19/2015    Arthritis     left hip & knee    Chronic back pain     Depression     Gastroesophageal reflux disease 11/19/2015    Hearing loss 11/19/2015    History of blood transfusion     No reaction per pt    History of cardiac monitoring 04/18/2017    14 day event monitor. Sinus Rhythm    History of nuclear stress test 09/28/2016    cardiolite-normal,EF70%    Hot flashes due to surgical menopause 11/19/2015    Hx of echocardiogram 10/05/2016    EF: >55 %   Mild mitral and tricuspid regurg.      Hyperlipidemia     Hypertension     Follows with PCP    Lexiscan Stress Test 10/14/2020    EF 60%, Normal study, no ischemia    Meniere disease     Morbid obesity due to excess calories ([facility] [address] 11/19/2015    Sleep apnea 11/19/2015    sleep study negative    Tilt table evaluation 05/09/2017     positive for neurocardiogenic syncope     Past Surgical History:   Procedure Laterality Date    APPENDECTOMY  1967    CHOLECYSTECTOMY      2017    COLONOSCOPY  2014    Polyps x2 - Dr. Tequila Alvarez    Current Outpatient Rx   Medication Sig Dispense Refill    rivaroxaban (Dakotah Maynard) 10 MG TABS tablet Take 1 tablet by mouth daily 28 tablet 0    oxyCODONE-acetaminophen (PERCOCET) 5-325 MG per tablet Take 1 tablet by mouth every 6 hours as needed for Pain for up to 7 days. Intended supply: 7 days.  Take lowest dose possible to manage pain 28 tablet 0    docusate sodium (COLACE) 100 MG capsule Take 1 capsule by mouth daily as needed for Constipation 30 capsule 0    Evolocumab (REPATHA) 140 MG/ML SOSY Inject 1 mL into the skin every 14 days 2.1 mL 3    raNITIdine HCl (RANITIDINE ACID REDUCER PO) Take by mouth      ezetimibe (ZETIA) 10 MG tablet Take 1 tablet by mouth daily 90 tablet 3    metoprolol tartrate (LOPRESSOR) 50 MG tablet Take 1 tablet by mouth 2 times daily 180 tablet 3    hydroCHLOROthiazide (HYDRODIURIL) 25 MG tablet Take 0.5 tablets by mouth daily 45 tablet 3    loratadine (CLARITIN) 10 MG tablet Take 1 tablet by mouth daily 30 tablet 5       ALLERGIES    Allergies   Allergen Reactions    Celexa [Citalopram] Other (See Comments)     Stomach pain        Atorvastatin      Leg cramps      Mestinon [Pyridostigmine] Itching and Swelling    Penicillins     Sulfa Antibiotics     Tape Chiqui Endow Tape]     Tricor [Fenofibrate]      angioedema    Gemfibrozil Rash    Meloxicam Other (See Comments)     moodswings       SOCIAL AND FAMILY HISTORY    Social History     Socioeconomic History    Marital status:      Spouse name: None    Number of children: None    Years of education: None    Highest education level: None   Occupational History    None   Social Needs    Financial resource strain: None    Food insecurity     Worry: None     Inability: None    Transportation needs     Medical: None     Non-medical: None   Tobacco Use    Smoking status: Never Smoker    Smokeless tobacco: Never Used    Tobacco comment: Reviewed    Substance and Sexual Activity    Alcohol use: No    Drug use: No    Sexual activity: Not Currently     Partners: Male   Lifestyle    Physical activity     Days per week: None     Minutes per session: None    Stress: None   Relationships    Social connections     Talks on phone: None     Gets together: None     Attends Mosque service: None     Active member of club or organization: None     Attends meetings of clubs or organizations: None     Relationship status: None    Intimate partner violence     Fear of current or ex partner: None     Emotionally abused: None     Physically abused: None     Forced sexual activity: None   Other Topics Concern    None   Social History Narrative    None     Family History   Problem Relation Age of Onset    Heart Disease Mother     High Blood Pressure Mother     High Cholesterol Mother     Cancer Mother 80        Stomach    Diabetes Mother     Stroke Mother     Heart Disease Father     High Blood Pressure Father     High Cholesterol Father     Diabetes Father     Cancer Sister 46        Lung cancer    Cancer Maternal Grandmother         Stomach    Diabetes Maternal Grandmother     Diabetes Maternal Grandfather     Diabetes Paternal Grandmother     Diabetes Paternal Grandfather     Cancer Maternal Cousin 47        Pancreatic         PHYSICAL EXAM    VITAL SIGNS: /77   Pulse 108   Temp 98.1 °F (36.7 °C) (Oral)   Resp 18   Ht 5' 6\" (1.676 m) fracture seen in the ankle or foot. VL DUP LOWER EXTREMITY VENOUS LEFT   Final Result   No evidence of DVT in the left lower extremity. ED COURSE & MEDICAL DECISION MAKING       Patient presents as above with acute ankle and foot pain. No clinical evidence of infection, necrotizing infection or vascular compromise and ultrasound today negative for DVT. X-rays reveal fracture of osteophyte over the anterior ankle region. This is possible to cause of patient's symptoms today. 1420-I discussed patient case with patient's orthopedist, Dr. Jana Everett. He reviewed x-rays today and agrees that this indeed may be the cause of patient's pain, and ability to ambulate. Given patient's inability to ambulate, I feel patient warrants admission for orthopedic evaluation as well as physical therapy. In consideration of current COVID19 pandemic, with effort to minimize unnecessary provider exposure, this patient was seen at bedside by me independently. However, in compliance with current hospital SHAGGY/ED protocol, prior to admission I did discuss this patient case with emergency department physician, Dr. Radha Souza. Of note, this Pt was NOT admitted to the ICU.    1440-I discussed patient case with hospitalist nurse practitioner Nighat Mckeon. He agrees to admit patient. He will consult orthopedist.    Marco Turner    1. Acute left ankle pain    2. Left foot pain    3. Closed fracture of left ankle, initial encounter    4. Unable to ambulate        *Patient admitted. Comment: Please note this report has been produced using speech recognition software and may contain errors related to that system including errors in grammar, punctuation, and spelling, as well as words and phrases that may be inappropriate. If there are any questions or concerns please feel free to contact the dictating provider for clarification.          Radha Means  10/22/20 2183

## 2020-10-22 NOTE — ED NOTES
1433 paged hospitalist     Kahlil Araiza  10/22/20 1433  1443 Carlos Pimentel mid level with [employer] returned call      Kahlil Araiza  10/22/20 1445

## 2020-10-22 NOTE — ED NOTES
1415 paged Dr Ayesha Carias  10/22/20 1418  1426 Dr Amish Camargo returned call      Rochelle Chua  10/22/20 1425

## 2020-10-22 NOTE — H&P
History and Physical      Name:  Theo Carrillo /Age/Sex: 1960  (64 y.o. female)   MRN & CSN:  [id number] & [id number] Admission Date/Time: 10/22/2020 10:50 AM   Location:  [place] [address] PCP: Marito Medellin MD       Discussed patient with Dr. Viji Faustin and Plan:     Theo Carrillo is a 64 y.o.  female  who presents with left foot pain    - Left foot pain/swelling  Negative DVT study  Has + CMS to LLE   XR with suspected small fracture of osteophyte at distal tibia  Had left LISHA with Dr Jana Spring 3 days ago  Consult to Dr Jana Spring, by ED- will follow  Fall precautions, up with assistance  Consults to PT/OT/CM- may need placement for rehab    - Hypokalemia  3.2  Replace and recheck   Check mg      DVT ppx: Xarelto for 4 weeks, per ortho, for LISHA      Chronic  - HTN- Cont HCTZ, BB  - HLD- Cont zetia  - AOCD- 11.2; at baseline  - morbid obesity-    Discussed patient with ER physician     Diet GEN   DVT Prophylaxis [] Lovenox, []  Heparin, [] SCDs, [] Ambulation xarelto   GI Prophylaxis [] PPI,  [] H2 Blocker,  [] Carafate,  [] Diet/Tube Feeds   Code Status Full   Disposition Patient requires continued admission due to left foot pain/ inability to ambulate   MDM [] Low, [x] Moderate,[]  High  Patient's risk as above due to      [] One or more chronic illnesses with exacerbation progression      [x] Two or more stable chronic illnesses      [x] Undiagnosed new problem with uncertain prognosis      [] Elective major surgery      []Prescription drug management       History of Present Illness:     Chief Complaint: left foot pain    Theo Carrillo is a 64 y.o.  female  who presents with the above complaints, onset is last 2 days. She reports difficulty ambulating due to pain; feels like she is going to fall due to pain and altered gait. Denies paresthesias. Reports otherwise feeling at baseline state of health. Denies sob, abdominal pain, back pain, n/v, diarrhea, fever/chills, resp sx.  Confirms code status. Denies regular alcohol use, illicit drug use. Ten point ROS reviewed negative, unless as noted above    Objective:     No intake or output data in the 24 hours ending 10/22/20 1457     Vitals:   Vitals:    10/22/20 1426   BP: 128/77   Pulse: 108   Resp: 18   Temp: 98.1 °F (36.7 °C)   SpO2: 94%       Physical Exam:     GEN Awake female, sitting upright in bed in no apparent distress. Appears given age. EYES Pupils are equally round. No scleral erythema, discharge, or conjunctivitis. HENT Mucous membranes are moist. Oral pharynx without exudates, no evidence of thrush. NECK Supple, no apparent thyromegaly or masses. RESP Clear to auscultation, no wheezes, rales or rhonchi. Symmetric chest movement while on room air. CARDIO/VASC S1/S2 auscultated. Regular rate without appreciable murmurs, rubs, or gallops. No JVD or carotid bruits. Peripheral pulses equal bilaterally and palpable. No peripheral edema. GI Abdomen is soft without significant tenderness, masses, or guarding. Bowel sounds are normoactive. Rectal exam deferred.  No costovertebral angle tenderness. Damon catheter is not present. HEME/LYMPH No palpable cervical lymphadenopathy and no hepatosplenomegaly. No petechiae or ecchymoses. MSK Left foot and ankle with gen edema without erythema. Has + CMS. Strength equal in BLE and BUE  SKIN Normal coloration, warm, dry. NEURO Cranial nerves appear grossly intact, normal speech, no lateralizing weakness. PSYCH Awake, alert, oriented x 4. Affect appropriate. Past Medical History:        Past Medical History:   Diagnosis Date    Allergic rhinitis due to pollen 11/19/2015    Arthritis     left hip & knee    Chronic back pain     Depression     Gastroesophageal reflux disease 11/19/2015    Hearing loss 11/19/2015    History of blood transfusion     No reaction per pt    History of cardiac monitoring 04/18/2017    14 day event monitor.  Sinus Rhythm    History of nuclear stress test 09/28/2016    cardiolite-normal,EF70%    Hot flashes due to surgical menopause 11/19/2015    Hx of echocardiogram 10/05/2016    EF: >55 %   Mild mitral and tricuspid regurg.  Hyperlipidemia     Hypertension     Follows with PCP    Lexiscan Stress Test 10/14/2020    EF 60%, Normal study, no ischemia    Meniere disease     Morbid obesity due to excess calories ([address] 11/19/2015    Sleep apnea 11/19/2015    sleep study negative    Tilt table evaluation 05/09/2017     positive for neurocardiogenic syncope       PSHX:  has a past surgical history that includes Cholecystectomy; Appendectomy (1967); Tonsillectomy (1970); Colonoscopy (2014); and Hysterectomy (1989). Allergies: Allergies   Allergen Reactions    Celexa [Citalopram] Other (See Comments)     Stomach pain        Atorvastatin      Leg cramps      Mestinon [Pyridostigmine] Itching and Swelling    Penicillins     Sulfa Antibiotics     Tape [Adhesive Tape]     Tricor [Fenofibrate]      angioedema    Gemfibrozil Rash    Meloxicam Other (See Comments)     moodswings       FAM HX: family history includes Cancer in her maternal grandmother; Cancer (age of onset: 46) in her sister; Cancer (age of onset: 47) in her maternal cousin; Cancer (age of onset: 80) in her mother; Diabetes in her father, maternal grandfather, maternal grandmother, mother, paternal grandfather, and paternal grandmother; Heart Disease in her father and mother; High Blood Pressure in her father and mother; High Cholesterol in her father and mother; Stroke in her mother.     Soc HX:   Social History     Socioeconomic History    Marital status:      Spouse name: None    Number of children: None    Years of education: None    Highest education level: None   Occupational History    None   Social Needs    Financial resource strain: None    Food insecurity     Worry: None     Inability: None    Transportation needs     Medical: None     Non-medical: None Tobacco Use    Smoking status: Never Smoker    Smokeless tobacco: Never Used    Tobacco comment: Reviewed    Substance and Sexual Activity    Alcohol use: No    Drug use: No    Sexual activity: Not Currently     Partners: Male   Lifestyle    Physical activity     Days per week: None     Minutes per session: None    Stress: None   Relationships    Social connections     Talks on phone: None     Gets together: None     Attends Taoist service: None     Active member of club or organization: None     Attends meetings of clubs or organizations: None     Relationship status: None    Intimate partner violence     Fear of current or ex partner: None     Emotionally abused: None     Physically abused: None     Forced sexual activity: None   Other Topics Concern    None   Social History Narrative    None       Medications:     Medications:    HYDROcodone 5 mg - acetaminophen  1 tablet Oral Once      rivaroxaban (XARELTO) 10 MG TABS tablet  Take 1 tablet by mouth daily  Rachel Cheema MD  Needs Review    oxyCODONE-acetaminophen (PERCOCET) 5-325 MG per tablet  Take 1 tablet by mouth every 6 hours as needed for Pain for up to 7 days. Intended supply: 7 days.  Take lowest dose possible to manage pain  Rachel Cheema MD  Needs Review    docusate sodium (COLACE) 100 MG capsule  Take 1 capsule by mouth daily as needed for Constipation  Rachel Cheema MD  Needs Review    Evolocumab (REPATHA) 140 MG/ML SOSY  Inject 1 mL into the skin every 14 days  Baldemar Mcpherson MD  Needs Review    raNITIdine HCl (RANITIDINE ACID REDUCER PO)  Take by mouth  Rey Lopez MD  Needs Review    ezetimibe (ZETIA) 10 MG tablet  Take 1 tablet by mouth daily  Alcon Zavala MD  Needs Review    metoprolol tartrate (LOPRESSOR) 50 MG tablet  Take 1 tablet by mouth 2 times daily  Alcon Zavala MD  Needs Review    hydroCHLOROthiazide (HYDRODIURIL) 25 MG tablet  Take 0.5 tablets by mouth daily  Alcon Zavala MD  Needs Review loratadine (CLARITIN) 10 MG tablet  Take 1 tablet by mouth daily  Janeth Nicholas MD  Needs Review        Data    XR ANKLE LEFT (MIN 3 VIEWS) [[id number]]  Collected: 10/22/20 1308        Order Status: Completed  Updated: 10/22/20 1316       Narrative:         EXAMINATION:   THREE XRAY VIEWS OF THE LEFT FOOT; THREE XRAY VIEWS OF THE LEFT ANKLE     10/22/2020 12:40 pm     COMPARISON:   None. HISTORY:   ORDERING SYSTEM PROVIDED HISTORY: pain   TECHNOLOGIST PROVIDED HISTORY:   Reason for exam:->pain   Reason for Exam: pain   Acuity: Acute   Type of Exam: Initial   Mechanism of Injury: Pt to ED with complaints of left leg and foot pain. Pt   reports recent hip replacement; ORDERING SYSTEM PROVIDED HISTORY: pain   TECHNOLOGIST PROVIDED HISTORY:   Reason for exam:->pain   Reason for Exam: PAIN   Acuity: Acute   Type of Exam: Initial   Mechanism of Injury: Pt to ED with complaints of left leg and foot pain. Pt   reports recent hip replacement     FINDINGS:   Left foot: Mild degenerative change seen at the 1st metatarsal-phalangeal   joint.  Mild interphalangeal joint degenerative osteoarthritic change.  The   Lisfranc joint appears intact.  No fracture or dislocation is identified.  No   osseous erosive changes are identified.  Calcaneal spurring seen at the   plantar aponeurosis. Left ankle: Soft tissue swelling is seen at the ankle.  The ankle mortise   appears intact.  There is a calcaneal spur noted at the plantar aponeurosis. There is a focal spur noted along the anteroinferior aspect of the distal   tibia seen on the lateral view, with a oblique lucency extending through this   which could be related to a nondisplaced osteophyte fracture.       Impression:         Suspected fracture through a small osteophyte off the anterior inferior   aspect of the distal tibia at seen best on the lateral view at the level of   the articular surface.  Soft tissue swelling seen at the ankle.      No other acute osseous fracture seen in the ankle or foot.       XR FOOT LEFT (MIN 3 VIEWS) [[id number]]  Collected: 10/22/20 1308       Order Status: Completed  Updated: 10/22/20 1316       Narrative:         EXAMINATION:   THREE XRAY VIEWS OF THE LEFT FOOT; THREE XRAY VIEWS OF THE LEFT ANKLE     10/22/2020 12:40 pm     COMPARISON:   None. HISTORY:   ORDERING SYSTEM PROVIDED HISTORY: pain   TECHNOLOGIST PROVIDED HISTORY:   Reason for exam:->pain   Reason for Exam: pain   Acuity: Acute   Type of Exam: Initial   Mechanism of Injury: Pt to ED with complaints of left leg and foot pain. Pt   reports recent hip replacement; ORDERING SYSTEM PROVIDED HISTORY: pain   TECHNOLOGIST PROVIDED HISTORY:   Reason for exam:->pain   Reason for Exam: PAIN   Acuity: Acute   Type of Exam: Initial   Mechanism of Injury: Pt to ED with complaints of left leg and foot pain. Pt   reports recent hip replacement     FINDINGS:   Left foot: Mild degenerative change seen at the 1st metatarsal-phalangeal   joint.  Mild interphalangeal joint degenerative osteoarthritic change.  The   Lisfranc joint appears intact.  No fracture or dislocation is identified.  No   osseous erosive changes are identified.  Calcaneal spurring seen at the   plantar aponeurosis. Left ankle: Soft tissue swelling is seen at the ankle.  The ankle mortise   appears intact.  There is a calcaneal spur noted at the plantar aponeurosis. There is a focal spur noted along the anteroinferior aspect of the distal   tibia seen on the lateral view, with a oblique lucency extending through this   which could be related to a nondisplaced osteophyte fracture.       Impression:         Suspected fracture through a small osteophyte off the anterior inferior   aspect of the distal tibia at seen best on the lateral view at the level of   the articular surface.  Soft tissue swelling seen at the ankle.      No other acute osseous fracture seen in the ankle or foot.       VL DUP LOWER EXTREMITY VENOUS LEFT [[id number]]  Collected: 10/22/20 1211       Order Status: Completed  Updated: 10/22/20 1216       Narrative:         EXAMINATION:   DUPLEX VENOUS ULTRASOUND OF THE LEFT LOWER EXTREMITY     10/22/2020 11:36 am     TECHNIQUE:   Duplex ultrasound and Doppler images were obtained of the left lower   extremity. COMPARISON:   None. HISTORY:   ORDERING SYSTEM PROVIDED HISTORY: leg pain, total hip arthroplasty recently   TECHNOLOGIST PROVIDED HISTORY:   Reason for exam:->leg pain, total hip arthroplasty recently   Reason for Exam: left leg pain   Acuity: Acute   Type of Exam: Initial   Mechanism of Injury: left hip arthroplasty   Relevant Medical/Surgical History: nk     FINDINGS:   The visualized veins of the left lower extremity are patent and free of   echogenic thrombus.  The veins are normally compressible and have normal   phasic flow.       Impression:         No evidence of DVT in the left lower extremity.             Electronically signed by LIAN Landaverde NP on 10/22/2020 at 2:57 PM

## 2020-10-23 ENCOUNTER — APPOINTMENT (OUTPATIENT)
Dept: GENERAL RADIOLOGY | Age: 60
DRG: 324 | End: 2020-10-23
Payer: COMMERCIAL

## 2020-10-23 LAB
ANION GAP SERPL CALCULATED.3IONS-SCNC: 17 MMOL/L (ref 4–16)
BACTERIA: ABNORMAL /HPF
BASOPHILS ABSOLUTE: 0 K/CU MM
BASOPHILS RELATIVE PERCENT: 0.3 % (ref 0–1)
BILIRUBIN URINE: NEGATIVE MG/DL
BLOOD, URINE: NEGATIVE
BUN BLDV-MCNC: 13 MG/DL (ref 6–23)
CALCIUM SERPL-MCNC: 8.4 MG/DL (ref 8.3–10.6)
CHLORIDE BLD-SCNC: 93 MMOL/L (ref 99–110)
CLARITY: CLEAR
CO2: 27 MMOL/L (ref 21–32)
COLOR: YELLOW
CREAT SERPL-MCNC: 1.1 MG/DL (ref 0.6–1.1)
DIFFERENTIAL TYPE: ABNORMAL
EOSINOPHILS ABSOLUTE: 0 K/CU MM
EOSINOPHILS RELATIVE PERCENT: 0 % (ref 0–3)
GFR AFRICAN AMERICAN: >60 ML/MIN/1.73M2
GFR NON-AFRICAN AMERICAN: 51 ML/MIN/1.73M2
GLUCOSE BLD-MCNC: 140 MG/DL (ref 70–99)
GLUCOSE, URINE: NEGATIVE MG/DL
HCT VFR BLD CALC: 33.7 % (ref 37–47)
HEMOGLOBIN: 10.7 GM/DL (ref 12.5–16)
IMMATURE NEUTROPHIL %: 0.6 % (ref 0–0.43)
KETONES, URINE: ABNORMAL MG/DL
LEUKOCYTE ESTERASE, URINE: NEGATIVE
LYMPHOCYTES ABSOLUTE: 1.4 K/CU MM
LYMPHOCYTES RELATIVE PERCENT: 9.4 % (ref 24–44)
MAGNESIUM: 1.9 MG/DL (ref 1.8–2.4)
MCH RBC QN AUTO: 28.8 PG (ref 27–31)
MCHC RBC AUTO-ENTMCNC: 31.8 % (ref 32–36)
MCV RBC AUTO: 90.6 FL (ref 78–100)
MONOCYTES ABSOLUTE: 1.5 K/CU MM
MONOCYTES RELATIVE PERCENT: 9.8 % (ref 0–4)
MUCUS: ABNORMAL HPF
NITRITE URINE, QUANTITATIVE: NEGATIVE
NUCLEATED RBC %: 0 %
PDW BLD-RTO: 13.3 % (ref 11.7–14.9)
PH, URINE: 6 (ref 5–8)
PLATELET # BLD: 250 K/CU MM (ref 140–440)
PMV BLD AUTO: 9.8 FL (ref 7.5–11.1)
POTASSIUM SERPL-SCNC: 3.2 MMOL/L (ref 3.5–5.1)
PROTEIN UA: NEGATIVE MG/DL
RBC # BLD: 3.72 M/CU MM (ref 4.2–5.4)
RBC URINE: 5 /HPF (ref 0–6)
SEGMENTED NEUTROPHILS ABSOLUTE COUNT: 12 K/CU MM
SEGMENTED NEUTROPHILS RELATIVE PERCENT: 79.9 % (ref 36–66)
SODIUM BLD-SCNC: 137 MMOL/L (ref 135–145)
SPECIFIC GRAVITY UA: 1.02 (ref 1–1.03)
SQUAMOUS EPITHELIAL: 10 /HPF
TOTAL IMMATURE NEUTOROPHIL: 0.09 K/CU MM
TOTAL NUCLEATED RBC: 0 K/CU MM
TRANSITIONAL EPITHELIAL: <1 /HPF
TRICHOMONAS: ABNORMAL /HPF
UROBILINOGEN, URINE: NORMAL MG/DL (ref 0.2–1)
WBC # BLD: 15.1 K/CU MM (ref 4–10.5)
WBC UA: 1 /HPF (ref 0–5)

## 2020-10-23 PROCEDURE — 2580000003 HC RX 258: Performed by: NURSE PRACTITIONER

## 2020-10-23 PROCEDURE — 6360000002 HC RX W HCPCS: Performed by: NURSE PRACTITIONER

## 2020-10-23 PROCEDURE — 83735 ASSAY OF MAGNESIUM: CPT

## 2020-10-23 PROCEDURE — 97530 THERAPEUTIC ACTIVITIES: CPT

## 2020-10-23 PROCEDURE — 1200000000 HC SEMI PRIVATE

## 2020-10-23 PROCEDURE — 73080 X-RAY EXAM OF ELBOW: CPT

## 2020-10-23 PROCEDURE — 97166 OT EVAL MOD COMPLEX 45 MIN: CPT

## 2020-10-23 PROCEDURE — 97163 PT EVAL HIGH COMPLEX 45 MIN: CPT

## 2020-10-23 PROCEDURE — 80048 BASIC METABOLIC PNL TOTAL CA: CPT

## 2020-10-23 PROCEDURE — 36415 COLL VENOUS BLD VENIPUNCTURE: CPT

## 2020-10-23 PROCEDURE — 2580000003 HC RX 258: Performed by: HOSPITALIST

## 2020-10-23 PROCEDURE — 85025 COMPLETE CBC W/AUTO DIFF WBC: CPT

## 2020-10-23 PROCEDURE — 6370000000 HC RX 637 (ALT 250 FOR IP): Performed by: NURSE PRACTITIONER

## 2020-10-23 PROCEDURE — 97535 SELF CARE MNGMENT TRAINING: CPT

## 2020-10-23 PROCEDURE — 2700000000 HC OXYGEN THERAPY PER DAY

## 2020-10-23 PROCEDURE — 84132 ASSAY OF SERUM POTASSIUM: CPT

## 2020-10-23 PROCEDURE — 99222 1ST HOSP IP/OBS MODERATE 55: CPT | Performed by: ORTHOPAEDIC SURGERY

## 2020-10-23 PROCEDURE — 27824 TREAT LOWER LEG FRACTURE: CPT | Performed by: ORTHOPAEDIC SURGERY

## 2020-10-23 PROCEDURE — 94761 N-INVAS EAR/PLS OXIMETRY MLT: CPT

## 2020-10-23 PROCEDURE — 6360000002 HC RX W HCPCS: Performed by: HOSPITALIST

## 2020-10-23 RX ADMIN — SODIUM CHLORIDE, PRESERVATIVE FREE 10 ML: 5 INJECTION INTRAVENOUS at 09:34

## 2020-10-23 RX ADMIN — OXYCODONE HYDROCHLORIDE AND ACETAMINOPHEN 1 TABLET: 5; 325 TABLET ORAL at 00:07

## 2020-10-23 RX ADMIN — METOPROLOL TARTRATE 50 MG: 50 TABLET, FILM COATED ORAL at 22:09

## 2020-10-23 RX ADMIN — SODIUM CHLORIDE, PRESERVATIVE FREE 10 ML: 5 INJECTION INTRAVENOUS at 22:10

## 2020-10-23 RX ADMIN — POTASSIUM CHLORIDE 40 MEQ: 1500 TABLET, EXTENDED RELEASE ORAL at 09:34

## 2020-10-23 RX ADMIN — SODIUM CHLORIDE, PRESERVATIVE FREE 10 ML: 5 INJECTION INTRAVENOUS at 00:08

## 2020-10-23 RX ADMIN — HYDROCHLOROTHIAZIDE 12.5 MG: 12.5 TABLET ORAL at 09:32

## 2020-10-23 RX ADMIN — AZITHROMYCIN MONOHYDRATE 500 MG: 500 INJECTION, POWDER, LYOPHILIZED, FOR SOLUTION INTRAVENOUS at 14:37

## 2020-10-23 RX ADMIN — ONDANSETRON 4 MG: 2 INJECTION INTRAMUSCULAR; INTRAVENOUS at 15:23

## 2020-10-23 RX ADMIN — RIVAROXABAN 10 MG: 10 TABLET, FILM COATED ORAL at 09:34

## 2020-10-23 RX ADMIN — CEFTRIAXONE 1 G: 1 INJECTION, POWDER, FOR SOLUTION INTRAMUSCULAR; INTRAVENOUS at 09:34

## 2020-10-23 RX ADMIN — EZETIMIBE 10 MG: 10 TABLET ORAL at 14:37

## 2020-10-23 RX ADMIN — FAMOTIDINE 20 MG: 20 TABLET, FILM COATED ORAL at 22:09

## 2020-10-23 RX ADMIN — OXYCODONE HYDROCHLORIDE AND ACETAMINOPHEN 1 TABLET: 5; 325 TABLET ORAL at 09:32

## 2020-10-23 RX ADMIN — OXYCODONE HYDROCHLORIDE AND ACETAMINOPHEN 1 TABLET: 5; 325 TABLET ORAL at 22:09

## 2020-10-23 RX ADMIN — FAMOTIDINE 20 MG: 20 TABLET, FILM COATED ORAL at 09:32

## 2020-10-23 RX ADMIN — POTASSIUM CHLORIDE 40 MEQ: 1500 TABLET, EXTENDED RELEASE ORAL at 17:38

## 2020-10-23 RX ADMIN — DOCUSATE SODIUM 100 MG: 100 CAPSULE, LIQUID FILLED ORAL at 22:09

## 2020-10-23 ASSESSMENT — PAIN DESCRIPTION - ORIENTATION
ORIENTATION: LEFT
ORIENTATION: LEFT

## 2020-10-23 ASSESSMENT — PAIN DESCRIPTION - PROGRESSION
CLINICAL_PROGRESSION: NOT CHANGED
CLINICAL_PROGRESSION: NOT CHANGED

## 2020-10-23 ASSESSMENT — PAIN SCALES - GENERAL
PAINLEVEL_OUTOF10: 8
PAINLEVEL_OUTOF10: 6
PAINLEVEL_OUTOF10: 6

## 2020-10-23 ASSESSMENT — PAIN DESCRIPTION - LOCATION
LOCATION: ANKLE
LOCATION: ANKLE

## 2020-10-23 ASSESSMENT — PAIN DESCRIPTION - ONSET: ONSET: ON-GOING

## 2020-10-23 ASSESSMENT — PAIN SCALES - WONG BAKER: WONGBAKER_NUMERICALRESPONSE: 6

## 2020-10-23 ASSESSMENT — PAIN DESCRIPTION - PAIN TYPE
TYPE: ACUTE PAIN

## 2020-10-23 ASSESSMENT — PAIN DESCRIPTION - DESCRIPTORS: DESCRIPTORS: ACHING

## 2020-10-23 ASSESSMENT — PAIN DESCRIPTION - FREQUENCY: FREQUENCY: INTERMITTENT

## 2020-10-23 ASSESSMENT — PAIN - FUNCTIONAL ASSESSMENT: PAIN_FUNCTIONAL_ASSESSMENT: PREVENTS OR INTERFERES SOME ACTIVE ACTIVITIES AND ADLS

## 2020-10-23 NOTE — CARE COORDINATION
LSW reviewed chart/into room to initiate discharge planning. Pt was admitted 10/19-10/20 for planned left hip arthoplasty. Pt went home with home care. Pt admitted to hospital on 10/22 with suspected ankle fracture. Pt seen by OT with recommendations of ARU. LSW made a referral to ARU. Pt still need to be seen by PT before a precet can be started.

## 2020-10-23 NOTE — PROGRESS NOTES
Physical Therapy    Facility/Department: [address] [place] [address]  Initial Assessment    NAME: Lea Frazier  : 1960  MRN: [id number]    Date of Service: 10/23/2020    Discharge Recommendations:  Subacute/Skilled Nursing Facility        Assessment   Assessment: Pt is a 64 y.o. female with medical history, surgical history, co-morbidities, and personal factors including Allergic rhinitis due to pollen, Arthritis, Chronic back pain, Depression, Gastroesophageal reflux disease, Hearing loss, History of blood transfusion, History of cardiac monitoring, History of nuclear stress test, Hot flashes due to surgical menopause, Hx of echocardiogram, Hyperlipidemia, Hypertension, Lexiscan Stress Test, Meniere disease, Morbid obesity due to excess calories, Sleep apnea, Tilt table evaluation, Cholecystectomy; Appendectomy (); Tonsillectomy (); Colonoscopy (); Hysterectomy (); and Total hip arthroplasty (Left, 10/19/2020) with admission for Acute left ankle pain, Left foot pain, Closed fracture of left ankle, and Unable to ambulate. Prior to admission, pt was modified independent with functional mobility and ADLs. Examination of body systems reveals decreased strength, decreased balance, decreased aerobic capacity, and decreased independence with functional mobility. Prognosis: Good  Decision Making: High Complexity  Clinical Presentation: unpredictable characteristics  PT Education: Goals;PT Role;Plan of Care;General Safety;Gait Training;Equipment; Functional Mobility Training;Transfer Training;Weight-bearing Education  REQUIRES PT FOLLOW UP: Yes  Activity Tolerance  Activity Tolerance: Patient Tolerated treatment well;Patient limited by pain     Restrictions  Restrictions/Precautions  Restrictions/Precautions: General Precautions, Fall Risk, Weight Bearing  Lower Extremity Weight Bearing Restrictions  Left Lower Extremity Weight Bearing: Weight Bearing As Tolerated(in walking boot)  Position Activity Restriction  Hip Precautions: No hip internal rotation, No ADduction, Posterior hip precautions, No hip flexion > 90 degrees  Vision/Hearing  Vision: Within Functional Limits  Hearing: Within functional limits     Subjective  General  Chart Reviewed: Yes  Patient assessed for rehabilitation services?: Yes  Family / Caregiver Present: No  Follows Commands: Within Functional Limits  Pain Screening  Patient Currently in Pain: Yes  Pain Assessment  Pain Assessment: Faces  Osborne-Baker Pain Rating: Hurts even more  Pain Type: Acute pain  Pain Location: Ankle  Pain Orientation: Left  Vital Signs  Patient Currently in Pain: Yes       Orientation  Orientation  Overall Orientation Status: Within Normal Limits  Social/Functional History  Social/Functional History  Lives With: Alone  Type of Home: House  Home Layout: Two level  Home Access: Ramped entrance  Bathroom Shower/Tub: Walk-in shower, Shower chair without back  Bathroom Toilet: Handicap height  Home Equipment: Rolling walker, Cane  ADL Assistance: Independent  Homemaking Assistance: Independent  Ambulation Assistance: Independent  Transfer Assistance: Independent  Active : Yes  Cognition   Cognition  Overall Cognitive Status: WNL    Objective             Strength RLE  Comment: knee extension: at least 3+/5 observed functionally  Strength LLE  Comment: knee extension: at least 3+/5 observed functionally     Sensation  Overall Sensation Status: WNL  Bed mobility  Supine to Sit: Minimal assistance(with verbal and tactile cues for BUE and BLE placement throughout transfer; with HOB elevated; with increased time for task completion)  Sit to Supine: Moderate assistance  Transfers  Sit to Stand:  Moderate Assistance;2 Person Assistance(with verbal cues to push through bed and avoid pulling on walker)  Stand to sit: Moderate Assistance;2 Person Assistance(with verbal cues to feel bed against back of legs, reach back, and sit slowly)        Balance  Posture: Fair(forward head, rounded shoulders)  Sitting - Static: Good  Sitting - Dynamic: Good  Standing - Static: Poor;+  Standing - Dynamic: Poor;+       Therapeutic Activity Training:   Therapeutic activity training was instructed today. Cues were given for safety, sequence, UE/LE placement, awareness, and balance. Activities performed today included bed mobility training, sup-sit, sit-stand. Plan   Plan  Times per week: 6+  Current Treatment Recommendations: Strengthening, ROM, Balance Training, Functional Mobility Training, Transfer Training, ADL/Self-care Training, Gait Training, IADL Training, Equipment Evaluation, Education, & procurement, Patient/Caregiver Education & Training, Neuromuscular Re-education, Pain Management, Endurance Training, Home Exercise Program, Positioning, Safety Education & Training, Wheelchair Mobility Training  Safety Devices  Type of devices: All fall risk precautions in place, Left in bed, Bed alarm in place, Call light within reach, Nurse notified, Gait belt, Patient at risk for falls      AM-PAC Score  AM-PAC Inpatient Mobility Raw Score : 10 (10/23/20 1951)  AM-PAC Inpatient T-Scale Score : 32.29 (10/23/20 1951)  Mobility Inpatient CMS 0-100% Score: 76.75 (10/23/20 1951)  Mobility Inpatient CMS G-Code Modifier : CL (10/23/20 1951)          Goals  Long term goals  Time Frame for Long term goals :  In one week:  Long term goal 1: Pt will complete all bed mobility with CGAx1  Long term goal 2: Pt will complete sit <> stand transfers with minAx1  Long term goal 3: Pt will ambulate 20 feet with modAx2 with LRAD  Long term goal 4: Pt will independently complete 3 sets of 10 reps of BLE AROM exercises in available and allowed ROM       Time In: 1325  Time Out: 1405  Total Treatment Time: 40  Timed Code Treatment Minutes: [address] [facility] Tony Henry PT, DPT  License #: [id number]

## 2020-10-23 NOTE — PROGRESS NOTES
Progress Note  Date:10/23/2020       Room:1129/1129-A  Patient Ning Gordon     YOB: 1960     Age:60 y.o. Still has leg pain  Subjective    Subjective:  Symptoms:  Stable. Diet:  Adequate intake. Pain:  She complains of pain that is moderate. Review of Systems  Objective         Vitals Last 24 Hours:  TEMPERATURE:  Temp  Av.8 °F (37.7 °C)  Min: 98.1 °F (36.7 °C)  Max: 101.9 °F (38.8 °C)  RESPIRATIONS RANGE: Resp  Av.8  Min: 15  Max: 20  PULSE OXIMETRY RANGE: SpO2  Av.3 %  Min: 91 %  Max: 98 %  PULSE RANGE: Pulse  Av.3  Min: 99  Max: 108  BLOOD PRESSURE RANGE: Systolic (60OFR), NQH:215 , Min:113 , QHO:207   ; Diastolic (06JRN), PHV:33, Min:64, Max:78    I/O (24Hr): Intake/Output Summary (Last 24 hours) at 10/23/2020 1342  Last data filed at 10/23/2020 0347  Gross per 24 hour   Intake --   Output 300 ml   Net -300 ml     Objective:  General Appearance:  Comfortable. Vital signs: (most recent): Blood pressure 115/65, pulse 100, temperature 98.6 °F (37 °C), temperature source Oral, resp. rate 16, height 5' 6\" (1.676 m), weight 254 lb 14.4 oz (115.6 kg), SpO2 98 %. Vital signs are normal.    HEENT: Normal HEENT exam.    Lungs: There are decreased breath sounds. Heart: Normal rate. Abdomen: Abdomen is soft. Extremities: Decreased range of motion. (Left foot wrapped)  Neurological: Patient is alert. Pupils:  Pupils are equal, round, and reactive to light. Skin:  Warm. Labs/Imaging/Diagnostics    Labs:  CBC:  Recent Labs     10/22/20  1115 10/23/20  0725   WBC 13.0* 15.1*   RBC 3.84* 3.72*   HGB 11.2* 10.7*   HCT 35.8* 33.7*   MCV 93.2 90.6   RDW 13.7 13.3    250     CHEMISTRIES:  Recent Labs     10/22/20  1115 10/23/20  0725    137   K 3.2* 3.2*   CL 94* 93*   CO2 27 27   BUN 12 13   CREATININE 1.1 1.1   GLUCOSE 143* 140*   MG 1.9 1.9     PT/INR:No results for input(s): PROTIME, INR in the last 72 hours.   APTT:No results for input(s): APTT in the last 72 hours. LIVER PROFILE:  Recent Labs     10/22/20  1115   AST 28   ALT 19   BILITOT 1.0   ALKPHOS 67       Imaging Last 24 Hours:  Xr Elbow Left (min 3 Views)    Result Date: 10/23/2020  EXAMINATION: THREE XRAY VIEWS OF THE LEFT ELBOW 10/23/2020 12:27 am COMPARISON: None. HISTORY: ORDERING SYSTEM PROVIDED HISTORY: Pt complaining of pain 9/10. Recent fall with broken left hip and left ankle. TECHNOLOGIST PROVIDED HISTORY: Reason for exam:->Pt complaining of pain 9/10. Recent fall with broken left hip and left ankle. Reason for Exam: Pt complaining of pain 9/10. Recent fall with broken left hip and left ankle. Acuity: Acute Type of Exam: Subsequent/Follow-up FINDINGS: Bones are intact and in anatomic alignment. Joint spaces are maintained. Questionable posterior fat pad. No fracture identified. A suspected elbow joint effusion, however, could indicate occult injury. RECOMMENDATION: Consider CT of the elbow. Xr Ankle Left (min 3 Views)    Result Date: 10/22/2020  EXAMINATION: THREE XRAY VIEWS OF THE LEFT FOOT; THREE XRAY VIEWS OF THE LEFT ANKLE 10/22/2020 12:40 pm COMPARISON: None. HISTORY: ORDERING SYSTEM PROVIDED HISTORY: pain TECHNOLOGIST PROVIDED HISTORY: Reason for exam:->pain Reason for Exam: pain Acuity: Acute Type of Exam: Initial Mechanism of Injury: Pt to ED with complaints of left leg and foot pain. Pt reports recent hip replacement; ORDERING SYSTEM PROVIDED HISTORY: pain TECHNOLOGIST PROVIDED HISTORY: Reason for exam:->pain Reason for Exam: PAIN Acuity: Acute Type of Exam: Initial Mechanism of Injury: Pt to ED with complaints of left leg and foot pain. Pt reports recent hip replacement FINDINGS: Left foot: Mild degenerative change seen at the 1st metatarsal-phalangeal joint. Mild interphalangeal joint degenerative osteoarthritic change. The Lisfranc joint appears intact. No fracture or dislocation is identified. No osseous erosive changes are identified. Calcaneal spurring seen at the plantar aponeurosis. Left ankle: Soft tissue swelling is seen at the ankle. The ankle mortise appears intact. There is a calcaneal spur noted at the plantar aponeurosis. There is a focal spur noted along the anteroinferior aspect of the distal tibia seen on the lateral view, with a oblique lucency extending through this which could be related to a nondisplaced osteophyte fracture. Suspected fracture through a small osteophyte off the anterior inferior aspect of the distal tibia at seen best on the lateral view at the level of the articular surface. Soft tissue swelling seen at the ankle. No other acute osseous fracture seen in the ankle or foot. Xr Foot Left (min 3 Views)    Result Date: 10/22/2020  EXAMINATION: THREE XRAY VIEWS OF THE LEFT FOOT; THREE XRAY VIEWS OF THE LEFT ANKLE 10/22/2020 12:40 pm COMPARISON: None. HISTORY: ORDERING SYSTEM PROVIDED HISTORY: pain TECHNOLOGIST PROVIDED HISTORY: Reason for exam:->pain Reason for Exam: pain Acuity: Acute Type of Exam: Initial Mechanism of Injury: Pt to ED with complaints of left leg and foot pain. Pt reports recent hip replacement; ORDERING SYSTEM PROVIDED HISTORY: pain TECHNOLOGIST PROVIDED HISTORY: Reason for exam:->pain Reason for Exam: PAIN Acuity: Acute Type of Exam: Initial Mechanism of Injury: Pt to ED with complaints of left leg and foot pain. Pt reports recent hip replacement FINDINGS: Left foot: Mild degenerative change seen at the 1st metatarsal-phalangeal joint. Mild interphalangeal joint degenerative osteoarthritic change. The Lisfranc joint appears intact. No fracture or dislocation is identified. No osseous erosive changes are identified. Calcaneal spurring seen at the plantar aponeurosis. Left ankle: Soft tissue swelling is seen at the ankle. The ankle mortise appears intact. There is a calcaneal spur noted at the plantar aponeurosis.  There is a focal spur noted along the anteroinferior aspect of the fracture of left ankle 10/22/2020 Yes        Assessment & Plan  Left sebastien pain due to small fx osteophyte distal tib  -doppler neg for DVT  -ortho eval  -PT/OT  Sepsis due to suspected gram pos pna  -rocephin and zithro  -bld cx, UA pending  Hypokalemia  -on thiazide but chronic  -replace and check mag  HTN  -thiazide and BB  DVt prophylaxis  -xarelto  Electronically signed by Derek Blanchard MD on 10/23/20 at 1:42 PM EDT

## 2020-10-23 NOTE — CARE COORDINATION
Referral received for ARU. Will review PT/OT notes/evaluations and clinical information and review with Dr. Freda Hernández.  Thank you for the referral.

## 2020-10-23 NOTE — PROGRESS NOTES
Pt requested to be placed on bedpan so that she could urinated. Pt has not urinated today. Pt urinated a large amount, however no urine was in the bedpan. Pt soaked bed with urine. Complete bed change was done. Pt cleaned up and feels relief after urinating. No urine sample able to be obtained.

## 2020-10-23 NOTE — PROGRESS NOTES
Occupational Therapy    [facility] ACUTE CARE OCCUPATIONAL THERAPY EVALUATION    Emma Worthy, 1960, 1129/1129-A, 10/23/2020    Discharge Recommendation: Inpatient Rehabilitation      History:  Habematolel:  The primary encounter diagnosis was Acute left ankle pain. Diagnoses of Left foot pain, Closed fracture of left ankle, initial encounter, and Unable to ambulate were also pertinent to this visit. Subjective:  Patient states: \"I've love to try to get to the commode. That would be way better than the bed pan! \"  Pain: Pt reported 8/10 pain in Lt ankle  Communication with other providers: PT Joe Woodard  Restrictions: General Precautions, Fall Risk, WBAT Lt LE with CAM boot, Posterior Hip Precautions, Bed/chair alarm    Home Setup/Prior level of function:  Social/Functional History  Lives With: Alone  Type of Home: House  Home Layout: Two level  Home Access: Ramped entrance  Bathroom Shower/Tub: Walk-in shower, Shower chair without back  Bathroom Toilet: Handicap height  Home Equipment: Rolling walker, Cane, [address], Sock Aid, Long handled shoe horn, Long handled sponge  ADL Assistance: Independent  Homemaking Assistance: Independent  Ambulation Assistance: Independent (mod I with RW)  Transfer Assistance: Independent  Active : Yes    Examination:  · Observation: Supine in bed upon arrival. Pleasant and agreeable to evaluation. Painful throughout session but very motivated to participate.   · Vision: WFL (Glasses)  · Hearing: [facility]  · Vitals: Stable HR and o2 sats throughout session, BP of 90/52 sitting EOB after brief stand and pt initially symptomatic    Body Systems and functions:  · ROM: Active shoulder flexion grossly 0-90' in BL UEs (Lt shoulder painful with any AROM), WNL distally in BL UEs  · Strength: 4-/5 MMT elbow flexors, extensors, and grasp  · Sensation: WFL in BL UEs (See PT note for LE assessment)  · Tone: Normal  · Coordination: WFL for ADLs  · Perception: WNL    Activities of Daily Living (ADLs):  · Feeding: Independent   · Grooming: SBA (seated hand/facial hygiene; unable to complete in standing at this time)  · UB bathing: SBA   · LB bathing: Max A (needs to use long handled sponge due to posterior hip precautions)  · UB dressing: SBA (donning clean robe seated EOB)  · LB dressing: Dependent (donning Rt sock and Lt CAM boot)  · Toileting: Dependent (pt urinated on bed pan; total assist for placement of pain, clothing mgmt, and freddie care in sidelying. Attempted to get to [facility] but pt with difficulty with static standing this date)    Cognitive and Psychosocial Functioning:  · Overall cognitive status: WFL   · Affect: Normal     Balance:   · Sitting: SBA in unsupported sitting EOB  · Standing: Mod A x 2 with RW and Lt LE CAM boot    Functional Mobility:  · Bed Mobility: Min A supine to sitting EOB (light assist/stabilization of Lt LE, but able to scoot Rt leg and manage trunk with bed rail), Mod A sitting EOB to supine (assist for lifting Rt LE onto bed and steadying of trunk), Min A rolling to Rt side for bed pan placement, Mod A rolling to Lt side  · Transfers: Max A x 2 sit to stand from bed on 1st attempt (unable to fully clear bottom), Mod A x 2 sit to stand on 2nd attempt, Mod A x 2 sit to sit   · Ambulation: Unsafe/unable this date      AM-PAC 6 click short form for inpatient daily activity:   How much help from another person does the patient currently need. .. Unable  Dep A Lot  Max A A Lot   Mod A A Little  Min A A Little   CGA  SBA None   Mod I  Indep  Sup   1. Putting on and taking off regular lower body clothing? [x] 1    [] 2   [] 2   [] 3   [] 3   [] 4      2. Bathing (including washing, rinsing, drying)? [] 1   [] 2   [x] 2 [] 3 [] 3 [] 4   3. Toileting, which includes using toilet, bedpan, or urinal? [x] 1    [] 2   [] 2   [] 3   [] 3   [] 4     4. Putting on and taking off regular upper body clothing? [] 1   [] 2   [] 2   [] 3   [x] 3    [] 4      5.  Taking care of personal grooming such as brushing teeth? [] 1   [] 2    [] 2 [] 3    [x] 3   [] 4      6. Eating meals? [] 1   [] 2   [] 2   [] 3   [] 3   [x] 4      Raw Score:  14     [24=0% impaired(CH), 23=1-19%(CI), 20-22=20-39%(CJ), 15-19=40-59%(CK), 10-14=60-79%(CL), 7-9=80-99%(CM), 6=100%(CN)]     Treatment:  Therapeutic Activity Training:   Therapeutic activity training was instructed today. Cues were given for safety, sequence, UE/LE placement, awareness, and balance. Activities performed today included bed mobility training, transfer training, standing tolerance with RW, education on role of OT, POC, WBAT status Lt LE with CAM boot, re-education on posterior hip precautions, importance of OOB activity, d/c planning  Self Care Training:   Cues were given for safety, sequence, UE/LE placement, visual cues, and balance. Activities performed today included UB/LB dressing tasks of donning sock/CAM boot, toileting on bed pain, hand/facial hygiene      Safety Measures: Gait belt used, Left in Bed, Alarm in place    Assessment:  Pt is a 64year old female with a past medical history of Allergic rhinitis due to pollen, Arthritis, Chronic back pain, Depression, Gastroesophageal reflux disease, Hearing loss, History of blood transfusion, History of cardiac monitoring, History of nuclear stress test, Hot flashes due to surgical menopause, Hx of echocardiogram, Hyperlipidemia, Hypertension, Lexiscan Stress Test, Meniere disease, Morbid obesity due to excess calories ([address] Sleep apnea, and Tilt table evaluation. Pt admitted with a Lt non-displaced distal tibia fracture and is undergoing non-operative treatment with a CAM boot. Pt also with very recent Lt LISHA on 10-19. Pt lives at home alone, and prior to LISHA, was fully independent with ADLs, IADLs, ambulated mod I with a RW, and drove. Pt currently presents with the above impairments, and is not safe for immediate return home.  Recommend continued OT services in inpatient rehab

## 2020-10-24 ENCOUNTER — APPOINTMENT (OUTPATIENT)
Dept: GENERAL RADIOLOGY | Age: 60
DRG: 324 | End: 2020-10-24
Payer: COMMERCIAL

## 2020-10-24 ENCOUNTER — APPOINTMENT (OUTPATIENT)
Dept: CT IMAGING | Age: 60
DRG: 324 | End: 2020-10-24
Payer: COMMERCIAL

## 2020-10-24 LAB
ANION GAP SERPL CALCULATED.3IONS-SCNC: 14 MMOL/L (ref 4–16)
BUN BLDV-MCNC: 18 MG/DL (ref 6–23)
CALCIUM SERPL-MCNC: 8.1 MG/DL (ref 8.3–10.6)
CHLORIDE BLD-SCNC: 96 MMOL/L (ref 99–110)
CO2: 27 MMOL/L (ref 21–32)
CREAT SERPL-MCNC: 1.2 MG/DL (ref 0.6–1.1)
GFR AFRICAN AMERICAN: 56 ML/MIN/1.73M2
GFR NON-AFRICAN AMERICAN: 46 ML/MIN/1.73M2
GLUCOSE BLD-MCNC: 151 MG/DL (ref 70–99)
HCT VFR BLD CALC: 29.8 % (ref 37–47)
HEMOGLOBIN: 9.4 GM/DL (ref 12.5–16)
MAGNESIUM: 2 MG/DL (ref 1.8–2.4)
MCH RBC QN AUTO: 28.8 PG (ref 27–31)
MCHC RBC AUTO-ENTMCNC: 31.5 % (ref 32–36)
MCV RBC AUTO: 91.4 FL (ref 78–100)
PDW BLD-RTO: 13.2 % (ref 11.7–14.9)
PLATELET # BLD: 254 K/CU MM (ref 140–440)
PMV BLD AUTO: 9.6 FL (ref 7.5–11.1)
POTASSIUM SERPL-SCNC: 3.5 MMOL/L (ref 3.5–5.1)
RBC # BLD: 3.26 M/CU MM (ref 4.2–5.4)
SODIUM BLD-SCNC: 137 MMOL/L (ref 135–145)
WBC # BLD: 15.9 K/CU MM (ref 4–10.5)

## 2020-10-24 PROCEDURE — 1200000000 HC SEMI PRIVATE

## 2020-10-24 PROCEDURE — 94761 N-INVAS EAR/PLS OXIMETRY MLT: CPT

## 2020-10-24 PROCEDURE — 51701 INSERT BLADDER CATHETER: CPT

## 2020-10-24 PROCEDURE — 80048 BASIC METABOLIC PNL TOTAL CA: CPT

## 2020-10-24 PROCEDURE — 83735 ASSAY OF MAGNESIUM: CPT

## 2020-10-24 PROCEDURE — 2580000003 HC RX 258: Performed by: NURSE PRACTITIONER

## 2020-10-24 PROCEDURE — 6360000002 HC RX W HCPCS: Performed by: HOSPITALIST

## 2020-10-24 PROCEDURE — 71045 X-RAY EXAM CHEST 1 VIEW: CPT

## 2020-10-24 PROCEDURE — 36415 COLL VENOUS BLD VENIPUNCTURE: CPT

## 2020-10-24 PROCEDURE — 51798 US URINE CAPACITY MEASURE: CPT

## 2020-10-24 PROCEDURE — 97110 THERAPEUTIC EXERCISES: CPT

## 2020-10-24 PROCEDURE — 2580000003 HC RX 258: Performed by: HOSPITALIST

## 2020-10-24 PROCEDURE — 6370000000 HC RX 637 (ALT 250 FOR IP): Performed by: NURSE PRACTITIONER

## 2020-10-24 PROCEDURE — 85027 COMPLETE CBC AUTOMATED: CPT

## 2020-10-24 PROCEDURE — 97530 THERAPEUTIC ACTIVITIES: CPT

## 2020-10-24 PROCEDURE — 73200 CT UPPER EXTREMITY W/O DYE: CPT

## 2020-10-24 RX ADMIN — AZITHROMYCIN MONOHYDRATE 500 MG: 500 INJECTION, POWDER, LYOPHILIZED, FOR SOLUTION INTRAVENOUS at 11:31

## 2020-10-24 RX ADMIN — EZETIMIBE 10 MG: 10 TABLET ORAL at 11:30

## 2020-10-24 RX ADMIN — PROMETHAZINE HYDROCHLORIDE 12.5 MG: 25 TABLET ORAL at 11:31

## 2020-10-24 RX ADMIN — SODIUM CHLORIDE, PRESERVATIVE FREE 10 ML: 5 INJECTION INTRAVENOUS at 21:30

## 2020-10-24 RX ADMIN — FAMOTIDINE 20 MG: 20 TABLET, FILM COATED ORAL at 21:18

## 2020-10-24 RX ADMIN — RIVAROXABAN 10 MG: 10 TABLET, FILM COATED ORAL at 09:10

## 2020-10-24 RX ADMIN — METOPROLOL TARTRATE 50 MG: 50 TABLET, FILM COATED ORAL at 09:10

## 2020-10-24 RX ADMIN — FAMOTIDINE 20 MG: 20 TABLET, FILM COATED ORAL at 09:10

## 2020-10-24 RX ADMIN — OXYCODONE HYDROCHLORIDE AND ACETAMINOPHEN 1 TABLET: 5; 325 TABLET ORAL at 21:29

## 2020-10-24 RX ADMIN — POTASSIUM CHLORIDE 40 MEQ: 1500 TABLET, EXTENDED RELEASE ORAL at 17:39

## 2020-10-24 RX ADMIN — OXYCODONE HYDROCHLORIDE AND ACETAMINOPHEN 1 TABLET: 5; 325 TABLET ORAL at 06:02

## 2020-10-24 RX ADMIN — METOPROLOL TARTRATE 50 MG: 50 TABLET, FILM COATED ORAL at 21:18

## 2020-10-24 RX ADMIN — HYDROCHLOROTHIAZIDE 12.5 MG: 12.5 TABLET ORAL at 09:10

## 2020-10-24 RX ADMIN — OXYCODONE HYDROCHLORIDE AND ACETAMINOPHEN 1 TABLET: 5; 325 TABLET ORAL at 13:13

## 2020-10-24 RX ADMIN — CEFTRIAXONE 1 G: 1 INJECTION, POWDER, FOR SOLUTION INTRAMUSCULAR; INTRAVENOUS at 09:12

## 2020-10-24 RX ADMIN — SODIUM CHLORIDE, PRESERVATIVE FREE 10 ML: 5 INJECTION INTRAVENOUS at 09:10

## 2020-10-24 RX ADMIN — POTASSIUM CHLORIDE 40 MEQ: 1500 TABLET, EXTENDED RELEASE ORAL at 09:10

## 2020-10-24 ASSESSMENT — PAIN DESCRIPTION - LOCATION
LOCATION: ANKLE
LOCATION: ANKLE;ABDOMEN

## 2020-10-24 ASSESSMENT — PAIN DESCRIPTION - ORIENTATION
ORIENTATION: OTHER (COMMENT)
ORIENTATION: LEFT

## 2020-10-24 ASSESSMENT — PAIN SCALES - GENERAL
PAINLEVEL_OUTOF10: 0
PAINLEVEL_OUTOF10: 10
PAINLEVEL_OUTOF10: 0
PAINLEVEL_OUTOF10: 8
PAINLEVEL_OUTOF10: 0
PAINLEVEL_OUTOF10: 8
PAINLEVEL_OUTOF10: 0
PAINLEVEL_OUTOF10: 8

## 2020-10-24 ASSESSMENT — PAIN DESCRIPTION - DESCRIPTORS: DESCRIPTORS: ACHING

## 2020-10-24 ASSESSMENT — PAIN DESCRIPTION - ONSET: ONSET: ON-GOING

## 2020-10-24 ASSESSMENT — PAIN DESCRIPTION - FREQUENCY: FREQUENCY: CONTINUOUS

## 2020-10-24 ASSESSMENT — PAIN DESCRIPTION - PAIN TYPE
TYPE: ACUTE PAIN
TYPE: ACUTE PAIN

## 2020-10-24 NOTE — PROGRESS NOTES
Physical Therapy    Physical Therapy Treatment Note  Name: Jade Emmanuel MRN: [id number] :   1960   Date:  10/24/2020   Admission Date: 10/22/2020 Room:  [facility]91129-A   Restrictions/Precautions:  Restrictions/Precautions  Restrictions/Precautions: General Precautions, Fall Risk, Weight Bearing Lower Extremity Weight Bearing Restrictions  Left Lower Extremity Weight Bearing: Weight Bearing As Tolerated(in walking boot)     Hip Precautions: No hip internal rotation, No ADduction, Posterior hip precautions, No hip flexion > 90 degrees  Communication with other providers:  Per nurse ok to tx and had  given pain meds earlier. Pt had CT of LUE earlier today. Subjective:  Patient states:  Pt needing max encouragement and education on importance of participating in tx session. Pt refused out of bed ex and requested to do ex in bed due to pain in LUE. Pain:   Location, Type, Intensity (0/10 to 10/10):  Pt rates pain in left UE 8 and holding it in guarded position. Pt reports left hip and ankle 7. Objective:    Observation:  Alert and oriented  Treatment, including education/measures:  Bed placed in chair position for tx session and then returned to sup with HOB up at end of tx. Pt give small balloon to squeeze in left hand to work on circulation and hand fingers appearing to have some edema. No other ex in left UE due to c/o pain and waiting for result of CT. Ex in sitting position:  Trunk stretches with deep breathing   10 reps aps   10 reps right shoulder flex   10 reps heel slides   10 reps abd/add  10 reps saqs  10 reps quad sets  10 reps glut sets  Safety  Patient left safely in the bed, with call light/phone in reach with alarm applied. Gait belt was used for transfers and gait. Assessment / Impression:       Patient's tolerance of treatment:  fair  Adverse Reaction: na  Significant change in status and impact:  na  Barriers to improvement:  Pain in LUE and LLE  Plan for Next Session:    Cont.

## 2020-10-24 NOTE — PROGRESS NOTES
Progress Note  Date:10/24/2020       [id number]  Patient Caitie Turcios     YOB: 1960     Age:60 y.o. Has left elbow pain  Subjective    Subjective:  Symptoms:  Stable. Diet:  Adequate intake. Pain:  She complains of pain that is mild. Review of Systems  Objective         Vitals Last 24 Hours:  TEMPERATURE:  Temp  Av.6 °F (37 °C)  Min: 98 °F (36.7 °C)  Max: 99.2 °F (37.3 °C)  RESPIRATIONS RANGE: Resp  Av  Min: 16  Max: 18  PULSE OXIMETRY RANGE: SpO2  Av.5 %  Min: 93 %  Max: 98 %  PULSE RANGE: Pulse  Av.5  Min: 95  Max: 100  BLOOD PRESSURE RANGE: Systolic (87DQL), XDC:203 , Min:115 , YGU:737   ; Diastolic (74RPF), CND:28, Min:62, Max:74    I/O (24Hr): Intake/Output Summary (Last 24 hours) at 10/24/2020 0717  Last data filed at 10/23/2020 2220  Gross per 24 hour   Intake 490 ml   Output 700 ml   Net -210 ml     Objective:  General Appearance:  Comfortable. Vital signs: (most recent): Blood pressure 117/65, pulse 99, temperature 99.2 °F (37.3 °C), temperature source Oral, resp. rate 18, height 5' 6\" (1.676 m), weight 254 lb 14.4 oz (115.6 kg), SpO2 93 %. Vital signs are normal.    HEENT: Normal HEENT exam.    Lungs: There are decreased breath sounds. Heart: Normal rate. Abdomen: Abdomen is soft. Extremities: Decreased range of motion. (Left foot wrapped and left elbow pain)  Neurological: Patient is alert. Pupils:  Pupils are equal, round, and reactive to light. Skin:  Warm.       Labs/Imaging/Diagnostics    Labs:  CBC:  Recent Labs     10/22/20  1115 10/23/20  0725   WBC 13.0* 15.1*   RBC 3.84* 3.72*   HGB 11.2* 10.7*   HCT 35.8* 33.7*   MCV 93.2 90.6   RDW 13.7 13.3    250     CHEMISTRIES:  Recent Labs     10/22/20  1115 10/23/20  0725    137   K 3.2* 3.2*   CL 94* 93*   CO2 27 27   BUN 12 13   CREATININE 1.1 1.1   GLUCOSE 143* 140*   MG 1.9 1.9     PT/INR:No results for input(s): PROTIME, INR in the last 72 hours.  APTT:No results for input(s): APTT in the last 72 hours. LIVER PROFILE:  Recent Labs     10/22/20  1115   AST 28   ALT 19   BILITOT 1.0   ALKPHOS 67       Imaging Last 24 Hours:  Xr Elbow Left (min 3 Views)    Result Date: 10/23/2020  EXAMINATION: THREE XRAY VIEWS OF THE LEFT ELBOW 10/23/2020 12:27 am COMPARISON: None. HISTORY: ORDERING SYSTEM PROVIDED HISTORY: Pt complaining of pain 9/10. Recent fall with broken left hip and left ankle. TECHNOLOGIST PROVIDED HISTORY: Reason for exam:->Pt complaining of pain 9/10. Recent fall with broken left hip and left ankle. Reason for Exam: Pt complaining of pain 9/10. Recent fall with broken left hip and left ankle. Acuity: Acute Type of Exam: Subsequent/Follow-up FINDINGS: Bones are intact and in anatomic alignment. Joint spaces are maintained. Questionable posterior fat pad. No fracture identified. A suspected elbow joint effusion, however, could indicate occult injury. RECOMMENDATION: Consider CT of the elbow. Xr Ankle Left (min 3 Views)    Result Date: 10/22/2020  EXAMINATION: THREE XRAY VIEWS OF THE LEFT FOOT; THREE XRAY VIEWS OF THE LEFT ANKLE 10/22/2020 12:40 pm COMPARISON: None. HISTORY: ORDERING SYSTEM PROVIDED HISTORY: pain TECHNOLOGIST PROVIDED HISTORY: Reason for exam:->pain Reason for Exam: pain Acuity: Acute Type of Exam: Initial Mechanism of Injury: Pt to ED with complaints of left leg and foot pain. Pt reports recent hip replacement; ORDERING SYSTEM PROVIDED HISTORY: pain TECHNOLOGIST PROVIDED HISTORY: Reason for exam:->pain Reason for Exam: PAIN Acuity: Acute Type of Exam: Initial Mechanism of Injury: Pt to ED with complaints of left leg and foot pain. Pt reports recent hip replacement FINDINGS: Left foot: Mild degenerative change seen at the 1st metatarsal-phalangeal joint. Mild interphalangeal joint degenerative osteoarthritic change. The Lisfranc joint appears intact. No fracture or dislocation is identified.   No osseous erosive anteroinferior aspect of the distal tibia seen on the lateral view, with a oblique lucency extending through this which could be related to a nondisplaced osteophyte fracture. Suspected fracture through a small osteophyte off the anterior inferior aspect of the distal tibia at seen best on the lateral view at the level of the articular surface. Soft tissue swelling seen at the ankle. No other acute osseous fracture seen in the ankle or foot. Xr Chest 1 View    Result Date: 10/22/2020  EXAMINATION: ONE XRAY VIEW OF THE CHEST 10/22/2020 7:52 pm COMPARISON: None. HISTORY: ORDERING SYSTEM PROVIDED HISTORY: fever TECHNOLOGIST PROVIDED HISTORY: Reason for exam:->fever Reason for Exam: fever Acuity: Unknown Type of Exam: Unknown Additional signs and symptoms: none Relevant Medical/Surgical History: none FINDINGS: Mild cardiomegaly with pulmonary vascular congestion is noted. Minimal left basilar atelectasis/infiltrate is seen. There is no pleural effusion or pneumothorax. Mild cardiomegaly with pulmonary vascular congestion. Left basilar atelectasis/infiltrate. Vl Dup Lower Extremity Venous Left    Result Date: 10/22/2020  EXAMINATION: DUPLEX VENOUS ULTRASOUND OF THE LEFT LOWER EXTREMITY 10/22/2020 11:36 am TECHNIQUE: Duplex ultrasound and Doppler images were obtained of the left lower extremity. COMPARISON: None. HISTORY: ORDERING SYSTEM PROVIDED HISTORY: leg pain, total hip arthroplasty recently TECHNOLOGIST PROVIDED HISTORY: Reason for exam:->leg pain, total hip arthroplasty recently Reason for Exam: left leg pain Acuity: Acute Type of Exam: Initial Mechanism of Injury: left hip arthroplasty Relevant Medical/Surgical History: nk FINDINGS: The visualized veins of the left lower extremity are patent and free of echogenic thrombus. The veins are normally compressible and have normal phasic flow. No evidence of DVT in the left lower extremity.      Assessment//Plan           Hospital Problems Last Modified POA    Closed fracture of left ankle 10/22/2020 Yes        Assessment & Plan  Left sebastien pain due to small fx osteophyte distal tib  -doppler neg for DVT  -ortho eval  -PT/OT  Sepsis due to suspected gram pos pna  -rocephin and zithro  -bld cx, UA neg  Hypokalemia  -on thiazide but chronic  -replace and check mag  HTN  -thiazide and BB  DVt prophylaxis  -xarelto    CT left elbow.  Has increase in WBC and check CXR  Electronically signed by Monie Lopez MD on 10/23/20 at 1:42 PM EDT

## 2020-10-25 LAB
ANION GAP SERPL CALCULATED.3IONS-SCNC: 13 MMOL/L (ref 4–16)
BUN BLDV-MCNC: 20 MG/DL (ref 6–23)
CALCIUM SERPL-MCNC: 8.4 MG/DL (ref 8.3–10.6)
CHLORIDE BLD-SCNC: 92 MMOL/L (ref 99–110)
CO2: 27 MMOL/L (ref 21–32)
CREAT SERPL-MCNC: 1.1 MG/DL (ref 0.6–1.1)
ERYTHROCYTE SEDIMENTATION RATE: 138 MM/HR (ref 0–30)
GFR AFRICAN AMERICAN: >60 ML/MIN/1.73M2
GFR NON-AFRICAN AMERICAN: 51 ML/MIN/1.73M2
GLUCOSE BLD-MCNC: 136 MG/DL (ref 70–99)
HCT VFR BLD CALC: 28.3 % (ref 37–47)
HEMOGLOBIN: 9.3 GM/DL (ref 12.5–16)
HIGH SENSITIVE C-REACTIVE PROTEIN: >300 MG/L
MCH RBC QN AUTO: 29.6 PG (ref 27–31)
MCHC RBC AUTO-ENTMCNC: 32.9 % (ref 32–36)
MCV RBC AUTO: 90.1 FL (ref 78–100)
PDW BLD-RTO: 13.5 % (ref 11.7–14.9)
PLATELET # BLD: 300 K/CU MM (ref 140–440)
PMV BLD AUTO: 9.7 FL (ref 7.5–11.1)
POTASSIUM SERPL-SCNC: 3.6 MMOL/L (ref 3.5–5.1)
RBC # BLD: 3.14 M/CU MM (ref 4.2–5.4)
SODIUM BLD-SCNC: 132 MMOL/L (ref 135–145)
WBC # BLD: 15.2 K/CU MM (ref 4–10.5)

## 2020-10-25 PROCEDURE — 36415 COLL VENOUS BLD VENIPUNCTURE: CPT

## 2020-10-25 PROCEDURE — 85652 RBC SED RATE AUTOMATED: CPT

## 2020-10-25 PROCEDURE — 2580000003 HC RX 258: Performed by: HOSPITALIST

## 2020-10-25 PROCEDURE — 6360000002 HC RX W HCPCS: Performed by: HOSPITALIST

## 2020-10-25 PROCEDURE — 94761 N-INVAS EAR/PLS OXIMETRY MLT: CPT

## 2020-10-25 PROCEDURE — 86141 C-REACTIVE PROTEIN HS: CPT

## 2020-10-25 PROCEDURE — 2580000003 HC RX 258: Performed by: NURSE PRACTITIONER

## 2020-10-25 PROCEDURE — 80048 BASIC METABOLIC PNL TOTAL CA: CPT

## 2020-10-25 PROCEDURE — 87040 BLOOD CULTURE FOR BACTERIA: CPT

## 2020-10-25 PROCEDURE — 1200000000 HC SEMI PRIVATE

## 2020-10-25 PROCEDURE — 6370000000 HC RX 637 (ALT 250 FOR IP): Performed by: NURSE PRACTITIONER

## 2020-10-25 PROCEDURE — 85027 COMPLETE CBC AUTOMATED: CPT

## 2020-10-25 RX ADMIN — PROMETHAZINE HYDROCHLORIDE 12.5 MG: 25 TABLET ORAL at 13:38

## 2020-10-25 RX ADMIN — SODIUM CHLORIDE, PRESERVATIVE FREE 10 ML: 5 INJECTION INTRAVENOUS at 22:32

## 2020-10-25 RX ADMIN — POTASSIUM CHLORIDE 40 MEQ: 1500 TABLET, EXTENDED RELEASE ORAL at 08:49

## 2020-10-25 RX ADMIN — CEFTRIAXONE 1 G: 1 INJECTION, POWDER, FOR SOLUTION INTRAMUSCULAR; INTRAVENOUS at 08:49

## 2020-10-25 RX ADMIN — OXYCODONE HYDROCHLORIDE AND ACETAMINOPHEN 1 TABLET: 5; 325 TABLET ORAL at 06:33

## 2020-10-25 RX ADMIN — EZETIMIBE 10 MG: 10 TABLET ORAL at 08:49

## 2020-10-25 RX ADMIN — ACETAMINOPHEN 650 MG: 325 TABLET ORAL at 15:48

## 2020-10-25 RX ADMIN — OXYCODONE HYDROCHLORIDE AND ACETAMINOPHEN 1 TABLET: 5; 325 TABLET ORAL at 12:42

## 2020-10-25 RX ADMIN — SODIUM CHLORIDE, PRESERVATIVE FREE 10 ML: 5 INJECTION INTRAVENOUS at 08:49

## 2020-10-25 RX ADMIN — METOPROLOL TARTRATE 50 MG: 50 TABLET, FILM COATED ORAL at 22:32

## 2020-10-25 RX ADMIN — POTASSIUM CHLORIDE 40 MEQ: 1500 TABLET, EXTENDED RELEASE ORAL at 18:41

## 2020-10-25 RX ADMIN — FAMOTIDINE 20 MG: 20 TABLET, FILM COATED ORAL at 08:49

## 2020-10-25 RX ADMIN — HYDROCHLOROTHIAZIDE 12.5 MG: 12.5 TABLET ORAL at 08:49

## 2020-10-25 RX ADMIN — CEFEPIME 2 G: 2 INJECTION, POWDER, FOR SOLUTION INTRAVENOUS at 15:46

## 2020-10-25 RX ADMIN — POLYETHYLENE GLYCOL (3350) 17 G: 17 POWDER, FOR SOLUTION ORAL at 22:36

## 2020-10-25 RX ADMIN — AZITHROMYCIN MONOHYDRATE 500 MG: 500 INJECTION, POWDER, LYOPHILIZED, FOR SOLUTION INTRAVENOUS at 12:02

## 2020-10-25 RX ADMIN — OXYCODONE HYDROCHLORIDE AND ACETAMINOPHEN 1 TABLET: 5; 325 TABLET ORAL at 18:42

## 2020-10-25 RX ADMIN — VANCOMYCIN HYDROCHLORIDE 1500 MG: 5 INJECTION, POWDER, LYOPHILIZED, FOR SOLUTION INTRAVENOUS at 13:37

## 2020-10-25 RX ADMIN — METOPROLOL TARTRATE 50 MG: 50 TABLET, FILM COATED ORAL at 08:49

## 2020-10-25 RX ADMIN — FAMOTIDINE 20 MG: 20 TABLET, FILM COATED ORAL at 22:31

## 2020-10-25 RX ADMIN — RIVAROXABAN 10 MG: 10 TABLET, FILM COATED ORAL at 08:49

## 2020-10-25 ASSESSMENT — PAIN DESCRIPTION - LOCATION
LOCATION: ANKLE

## 2020-10-25 ASSESSMENT — PAIN DESCRIPTION - ORIENTATION
ORIENTATION: LEFT
ORIENTATION: LEFT
ORIENTATION: RIGHT

## 2020-10-25 ASSESSMENT — PAIN DESCRIPTION - DESCRIPTORS
DESCRIPTORS: ACHING
DESCRIPTORS: THROBBING
DESCRIPTORS: NAGGING;THROBBING

## 2020-10-25 ASSESSMENT — PAIN SCALES - GENERAL
PAINLEVEL_OUTOF10: 5
PAINLEVEL_OUTOF10: 9
PAINLEVEL_OUTOF10: 7
PAINLEVEL_OUTOF10: 5
PAINLEVEL_OUTOF10: 7

## 2020-10-25 ASSESSMENT — PAIN DESCRIPTION - ONSET
ONSET: ON-GOING
ONSET: ON-GOING
ONSET: GRADUAL

## 2020-10-25 ASSESSMENT — PAIN DESCRIPTION - PAIN TYPE
TYPE: ACUTE PAIN

## 2020-10-25 ASSESSMENT — PAIN - FUNCTIONAL ASSESSMENT
PAIN_FUNCTIONAL_ASSESSMENT: PREVENTS OR INTERFERES SOME ACTIVE ACTIVITIES AND ADLS
PAIN_FUNCTIONAL_ASSESSMENT: ACTIVITIES ARE NOT PREVENTED

## 2020-10-25 ASSESSMENT — PAIN DESCRIPTION - FREQUENCY
FREQUENCY: CONTINUOUS
FREQUENCY: CONTINUOUS
FREQUENCY: INTERMITTENT

## 2020-10-25 ASSESSMENT — PAIN DESCRIPTION - PROGRESSION
CLINICAL_PROGRESSION: GRADUALLY WORSENING
CLINICAL_PROGRESSION: NOT CHANGED

## 2020-10-25 NOTE — PROGRESS NOTES
Progress Note  Date:10/25/2020       [facility]:[id number]  Patient Loan Abreu     YOB: 1960     Age:60 y.o. Has left elbow pain and causing decreased movement. It is about a 9/10  Subjective    Subjective:  Symptoms:  Stable. Diet:  Adequate intake. Pain:  She complains of pain that is moderate. Review of Systems  Objective         Vitals Last 24 Hours:  TEMPERATURE:  Temp  Av.5 °F (37.5 °C)  Min: 98.9 °F (37.2 °C)  Max: 100 °F (37.8 °C)  RESPIRATIONS RANGE: Resp  Av  Min: 18  Max: 18  PULSE OXIMETRY RANGE: SpO2  Av.2 %  Min: 93 %  Max: 95 %  PULSE RANGE: Pulse  Av.5  Min: 93  Max: 107  BLOOD PRESSURE RANGE: Systolic (84UDK), WXS:991 , Min:114 , VGK:874   ; Diastolic (93XOD), MGP:33, Min:60, Max:72    I/O (24Hr): Intake/Output Summary (Last 24 hours) at 10/25/2020 0729  Last data filed at 10/24/2020 2343  Gross per 24 hour   Intake 860 ml   Output 600 ml   Net 260 ml     Objective:  General Appearance:  Comfortable. Vital signs: (most recent): Blood pressure 134/72, pulse 101, temperature 100 °F (37.8 °C), temperature source Oral, resp. rate 18, height 5' 6\" (1.676 m), weight 249 lb 3.2 oz (113 kg), SpO2 95 %. Vital signs are normal.    HEENT: Normal HEENT exam.    Lungs: There are decreased breath sounds. Heart: Normal rate. Abdomen: Abdomen is soft. Extremities: Decreased range of motion. (Left foot wrapped and left elbow pain)  Neurological: Patient is alert. Pupils:  Pupils are equal, round, and reactive to light. Skin:  Warm.       Labs/Imaging/Diagnostics    Labs:  CBC:  Recent Labs     10/22/20  1115 10/23/20  0725 10/24/20  0831   WBC 13.0* 15.1* 15.9*   RBC 3.84* 3.72* 3.26*   HGB 11.2* 10.7* 9.4*   HCT 35.8* 33.7* 29.8*   MCV 93.2 90.6 91.4   RDW 13.7 13.3 13.2    250 254     CHEMISTRIES:  Recent Labs     10/22/20  1115 10/23/20  0725 10/24/20  0831    137 137   K 3.2* 3.2* 3.5   CL 94* 93* 96*   CO2 27 27 27 BUN 12 13 18   CREATININE 1.1 1.1 1.2*   GLUCOSE 143* 140* 151*   MG 1.9 1.9 2.0     PT/INR:No results for input(s): PROTIME, INR in the last 72 hours. APTT:No results for input(s): APTT in the last 72 hours. LIVER PROFILE:  Recent Labs     10/22/20  1115   AST 28   ALT 19   BILITOT 1.0   ALKPHOS 67       Imaging Last 24 Hours:  Xr Elbow Left (min 3 Views)    Result Date: 10/23/2020  EXAMINATION: THREE XRAY VIEWS OF THE LEFT ELBOW 10/23/2020 12:27 am COMPARISON: None. HISTORY: ORDERING SYSTEM PROVIDED HISTORY: Pt complaining of pain 9/10. Recent fall with broken left hip and left ankle. TECHNOLOGIST PROVIDED HISTORY: Reason for exam:->Pt complaining of pain 9/10. Recent fall with broken left hip and left ankle. Reason for Exam: Pt complaining of pain 9/10. Recent fall with broken left hip and left ankle. Acuity: Acute Type of Exam: Subsequent/Follow-up FINDINGS: Bones are intact and in anatomic alignment. Joint spaces are maintained. Questionable posterior fat pad. No fracture identified. A suspected elbow joint effusion, however, could indicate occult injury. RECOMMENDATION: Consider CT of the elbow. Xr Ankle Left (min 3 Views)    Result Date: 10/22/2020  EXAMINATION: THREE XRAY VIEWS OF THE LEFT FOOT; THREE XRAY VIEWS OF THE LEFT ANKLE 10/22/2020 12:40 pm COMPARISON: None. HISTORY: ORDERING SYSTEM PROVIDED HISTORY: pain TECHNOLOGIST PROVIDED HISTORY: Reason for exam:->pain Reason for Exam: pain Acuity: Acute Type of Exam: Initial Mechanism of Injury: Pt to ED with complaints of left leg and foot pain. Pt reports recent hip replacement; ORDERING SYSTEM PROVIDED HISTORY: pain TECHNOLOGIST PROVIDED HISTORY: Reason for exam:->pain Reason for Exam: PAIN Acuity: Acute Type of Exam: Initial Mechanism of Injury: Pt to ED with complaints of left leg and foot pain. Pt reports recent hip replacement FINDINGS: Left foot: Mild degenerative change seen at the 1st metatarsal-phalangeal joint.   Mild interphalangeal joint degenerative osteoarthritic change. The Lisfranc joint appears intact. No fracture or dislocation is identified. No osseous erosive changes are identified. Calcaneal spurring seen at the plantar aponeurosis. Left ankle: Soft tissue swelling is seen at the ankle. The ankle mortise appears intact. There is a calcaneal spur noted at the plantar aponeurosis. There is a focal spur noted along the anteroinferior aspect of the distal tibia seen on the lateral view, with a oblique lucency extending through this which could be related to a nondisplaced osteophyte fracture. Suspected fracture through a small osteophyte off the anterior inferior aspect of the distal tibia at seen best on the lateral view at the level of the articular surface. Soft tissue swelling seen at the ankle. No other acute osseous fracture seen in the ankle or foot. Xr Foot Left (min 3 Views)    Result Date: 10/22/2020  EXAMINATION: THREE XRAY VIEWS OF THE LEFT FOOT; THREE XRAY VIEWS OF THE LEFT ANKLE 10/22/2020 12:40 pm COMPARISON: None. HISTORY: ORDERING SYSTEM PROVIDED HISTORY: pain TECHNOLOGIST PROVIDED HISTORY: Reason for exam:->pain Reason for Exam: pain Acuity: Acute Type of Exam: Initial Mechanism of Injury: Pt to ED with complaints of left leg and foot pain. Pt reports recent hip replacement; ORDERING SYSTEM PROVIDED HISTORY: pain TECHNOLOGIST PROVIDED HISTORY: Reason for exam:->pain Reason for Exam: PAIN Acuity: Acute Type of Exam: Initial Mechanism of Injury: Pt to ED with complaints of left leg and foot pain. Pt reports recent hip replacement FINDINGS: Left foot: Mild degenerative change seen at the 1st metatarsal-phalangeal joint. Mild interphalangeal joint degenerative osteoarthritic change. The Lisfranc joint appears intact. No fracture or dislocation is identified. No osseous erosive changes are identified. Calcaneal spurring seen at the plantar aponeurosis.  Left ankle: Soft tissue swelling is seen at the ankle. The ankle mortise appears intact. There is a calcaneal spur noted at the plantar aponeurosis. There is a focal spur noted along the anteroinferior aspect of the distal tibia seen on the lateral view, with a oblique lucency extending through this which could be related to a nondisplaced osteophyte fracture. Suspected fracture through a small osteophyte off the anterior inferior aspect of the distal tibia at seen best on the lateral view at the level of the articular surface. Soft tissue swelling seen at the ankle. No other acute osseous fracture seen in the ankle or foot. Xr Chest 1 View    Result Date: 10/22/2020  EXAMINATION: ONE XRAY VIEW OF THE CHEST 10/22/2020 7:52 pm COMPARISON: None. HISTORY: ORDERING SYSTEM PROVIDED HISTORY: fever TECHNOLOGIST PROVIDED HISTORY: Reason for exam:->fever Reason for Exam: fever Acuity: Unknown Type of Exam: Unknown Additional signs and symptoms: none Relevant Medical/Surgical History: none FINDINGS: Mild cardiomegaly with pulmonary vascular congestion is noted. Minimal left basilar atelectasis/infiltrate is seen. There is no pleural effusion or pneumothorax. Mild cardiomegaly with pulmonary vascular congestion. Left basilar atelectasis/infiltrate. Vl Dup Lower Extremity Venous Left    Result Date: 10/22/2020  EXAMINATION: DUPLEX VENOUS ULTRASOUND OF THE LEFT LOWER EXTREMITY 10/22/2020 11:36 am TECHNIQUE: Duplex ultrasound and Doppler images were obtained of the left lower extremity. COMPARISON: None. HISTORY: ORDERING SYSTEM PROVIDED HISTORY: leg pain, total hip arthroplasty recently TECHNOLOGIST PROVIDED HISTORY: Reason for exam:->leg pain, total hip arthroplasty recently Reason for Exam: left leg pain Acuity: Acute Type of Exam: Initial Mechanism of Injury: left hip arthroplasty Relevant Medical/Surgical History: nk FINDINGS: The visualized veins of the left lower extremity are patent and free of echogenic thrombus.  The veins are normally compressible and have normal phasic flow. No evidence of DVT in the left lower extremity. Assessment//Plan           Hospital Problems           Last Modified POA    Closed fracture of left ankle 10/22/2020 Yes        Assessment & Plan  Left sebastien pain due to small fx osteophyte distal tib  -doppler neg for DVT  -ortho eval  -PT/OT  Sepsis due to suspected gram pos pna  - zithro  -cefepime and vanc added as still having fevers  -CXR stable  -bld cx, UA neg  Left elbow pain  -synovitis and or complicated effusion  -with her fevers check MRI elbow  Hypokalemia(resolved)  -on thiazide but chronic  -replace and mag wnl  HTN  -thiazide and BB  DVt prophylaxis  -xarelto    Imaging as above.    Electronically signed by Xuan Astorga MD on 10/23/20 at 1:42 PM EDT

## 2020-10-25 NOTE — PROGRESS NOTES
Patient resting comfortably in bed    She has had difficulty mobilizing with physical therapy. She states that she does not feel steady with ambulation requires assistance during weightbearing activities with her boot and walker. She still complains mostly of pain at the ankle with any mobility or attempted weightbearing on the left lower extremity. She denies any problems at the left hip except for mild discomfort with direct pressure if she has to lay on that side. Afebrile and vital signs are stable over the last 24 hours    White blood cell count 15.9    Chest x-ray showed small infiltrate consistent with atelectasis    X-ray of the left elbow showed no fracture with mild effusion at the elbow joint    CT scan of the left elbow showed no fracture or dislocation. There was mild fluid accumulation at the elbow joint consistent with synovitis    Exam:  Left lower extremity: The surgical incision at the hip is intact with Dermabond dressing and resolving ecchymosis. No erythema or drainage or induration. No pain or instability with gentle passive range of motion of the hip. No pain at the hip with simulated weightbearing on the left lower extremity. There is moderate to severe tenderness to palpation diffusely throughout the ankle most severe at the anterior aspect along the distal tibia at the fracture site. Moderate persistent swelling at the ankle. Normal alignment. Sensation is mildly decreased in the toes otherwise intact. She has active range of motion present with dorsiflexion and plantarflexion of the toes and ankle which is limited due to pain referred to the ankle joint. Left upper extremity:  There is moderate tenderness to palpation diffusely throughout the elbow with restricted range of motion and severe pain at the extremes of motion with elbow from 15 degrees of extension to 90 degrees of flexion.   There is a decrease sensation in the ulnar nerve distribution and weakness with  and finger flexion of the small and ring fingers. Ulnar nerve is functioning but weak compared to the contralateral side      Assessment:  1. Left total hip replacement, healing appropriately    2. Left ankle anterior distal tibia nondisplaced fracture    3. Left elbow synovitis and cubital tunnel syndrome    4. Atelectasis and possible pneumonia    Plan:  I explained to the patient that her situation now is complicated due to multiple issues. I recommended that we continue moving forward with physical therapy in order to rehabilitate the hip following total hip replacement. She can continue with weightbearing as tolerated within the left lower extremity. I recommended use of the fracture boot for ambulation. She can remove the boot at rest for range of motion activities and stretching of the ankle. She can use a Ace wrap at rest as needed for compression and support of the ankle. We discussed her elbow issue and have explained to her that it appears she is dealing with 2 sets of circumstances which are giving her pain and dysfunction in the left upper extremity. She can continue with range of motion and weightbearing and use as tolerated. I suspect that she is having a flareup of underlying cubital tunnel syndrome related to increased pressure on the elbow using her walker. She will continue to advance her activities as tolerated with physical therapy and continue medical management by the hospitalist service. She can be discharged from the orthopedic perspective standpoint when stable medically    She will benefit from additional therapy rehabilitation prior to discharge home.

## 2020-10-25 NOTE — CONSULTS
[id number]  consulted by Dr. Kavon Fitzgerald for monitoring and adjustment. Indication for treatment: Sepsis 2/2 PNA  Goal trough: 15 mcg/mL     Pertinent Laboratory Values:   Temp Readings from Last 3 Encounters:   10/25/20 100 °F (37.8 °C) (Oral)   10/21/20 98.3 °F (36.8 °C) (Oral)   10/12/20 97.7 °F (36.5 °C) (Temporal)     Recent Labs     10/22/20  1115 10/23/20  0725 10/24/20  0831   WBC 13.0* 15.1* 15.9*     Recent Labs     10/22/20  1115 10/23/20  0725 10/24/20  0831   BUN 12 13 18   CREATININE 1.1 1.1 1.2*     Estimated Creatinine Clearance: 65 mL/min (A) (based on SCr of 1.2 mg/dL (H)). Intake/Output Summary (Last 24 hours) at 10/25/2020 0743  Last data filed at 10/24/2020 2343  Gross per 24 hour   Intake 860 ml   Output 600 ml   Net 260 ml       Pertinent Cultures:  Date    Source    Results  10/12   Urine    No growth  10/12   COVID-19   Negative  10/25                          Blood                                      Ordered    Vancomycin level:   TROUGH:  No results for input(s): VANCOTROUGH in the last 72 hours. RANDOM:  No results for input(s): VANCORANDOM in the last 72 hours. Assessment:  WBC and temperature: WBC slowly trending upward to 15.9, pt with a slight fever of 100  SCr, BUN, and urine output: SCR with a slight upward trend to 1.2, UOP appears ok  Day(s) of therapy:  1  Vancomycin level: scheduled for 10/27 @14:30    Plan:  Dosing comments: Start vancomycin 1,500mg ivpb q18h  Check the vanco trough before the 4th dose  Pharmacy will continue to monitor patient and adjust therapy as indicated    VANCOMYCIN TROUGH SCHEDULED FOR 10/27 @14:30    Thank you for the consult. Ralph Joseph  10/25/2020 7:43 AM

## 2020-10-25 NOTE — PROGRESS NOTES
Pt has not had an appetite today. She states nothing sounds good and nothing tastes good.  Nurse has offered many times but pt is insistent on not eating dinner

## 2020-10-26 ENCOUNTER — APPOINTMENT (OUTPATIENT)
Dept: ULTRASOUND IMAGING | Age: 60
DRG: 324 | End: 2020-10-26
Payer: COMMERCIAL

## 2020-10-26 LAB
HCT VFR BLD CALC: 29.4 % (ref 37–47)
HEMOGLOBIN: 9.3 GM/DL (ref 12.5–16)
MCH RBC QN AUTO: 28.7 PG (ref 27–31)
MCHC RBC AUTO-ENTMCNC: 31.6 % (ref 32–36)
MCV RBC AUTO: 90.7 FL (ref 78–100)
PDW BLD-RTO: 13.3 % (ref 11.7–14.9)
PLATELET # BLD: 328 K/CU MM (ref 140–440)
PMV BLD AUTO: 9.3 FL (ref 7.5–11.1)
PROCALCITONIN: 0.66
RBC # BLD: 3.24 M/CU MM (ref 4.2–5.4)
SARS-COV-2, NAAT: NOT DETECTED
WBC # BLD: 13.9 K/CU MM (ref 4–10.5)

## 2020-10-26 PROCEDURE — 97535 SELF CARE MNGMENT TRAINING: CPT

## 2020-10-26 PROCEDURE — 2580000003 HC RX 258: Performed by: HOSPITALIST

## 2020-10-26 PROCEDURE — 94761 N-INVAS EAR/PLS OXIMETRY MLT: CPT

## 2020-10-26 PROCEDURE — 97530 THERAPEUTIC ACTIVITIES: CPT

## 2020-10-26 PROCEDURE — 85027 COMPLETE CBC AUTOMATED: CPT

## 2020-10-26 PROCEDURE — 1200000000 HC SEMI PRIVATE

## 2020-10-26 PROCEDURE — 84145 PROCALCITONIN (PCT): CPT

## 2020-10-26 PROCEDURE — 36415 COLL VENOUS BLD VENIPUNCTURE: CPT

## 2020-10-26 PROCEDURE — 97110 THERAPEUTIC EXERCISES: CPT

## 2020-10-26 PROCEDURE — 2580000003 HC RX 258: Performed by: NURSE PRACTITIONER

## 2020-10-26 PROCEDURE — 6370000000 HC RX 637 (ALT 250 FOR IP): Performed by: NURSE PRACTITIONER

## 2020-10-26 PROCEDURE — 93971 EXTREMITY STUDY: CPT

## 2020-10-26 PROCEDURE — 6360000002 HC RX W HCPCS: Performed by: HOSPITALIST

## 2020-10-26 PROCEDURE — U0002 COVID-19 LAB TEST NON-CDC: HCPCS

## 2020-10-26 RX ADMIN — HYDROCHLOROTHIAZIDE 12.5 MG: 12.5 TABLET ORAL at 10:00

## 2020-10-26 RX ADMIN — OXYCODONE HYDROCHLORIDE AND ACETAMINOPHEN 1 TABLET: 5; 325 TABLET ORAL at 03:17

## 2020-10-26 RX ADMIN — SODIUM CHLORIDE, PRESERVATIVE FREE 10 ML: 5 INJECTION INTRAVENOUS at 09:59

## 2020-10-26 RX ADMIN — RIVAROXABAN 10 MG: 10 TABLET, FILM COATED ORAL at 10:01

## 2020-10-26 RX ADMIN — VANCOMYCIN HYDROCHLORIDE 1500 MG: 5 INJECTION, POWDER, LYOPHILIZED, FOR SOLUTION INTRAVENOUS at 04:18

## 2020-10-26 RX ADMIN — OXYCODONE HYDROCHLORIDE AND ACETAMINOPHEN 1 TABLET: 5; 325 TABLET ORAL at 20:53

## 2020-10-26 RX ADMIN — METOPROLOL TARTRATE 50 MG: 50 TABLET, FILM COATED ORAL at 10:00

## 2020-10-26 RX ADMIN — FAMOTIDINE 20 MG: 20 TABLET, FILM COATED ORAL at 10:00

## 2020-10-26 RX ADMIN — SODIUM CHLORIDE, PRESERVATIVE FREE 10 ML: 5 INJECTION INTRAVENOUS at 04:18

## 2020-10-26 RX ADMIN — FAMOTIDINE 20 MG: 20 TABLET, FILM COATED ORAL at 20:53

## 2020-10-26 RX ADMIN — POTASSIUM CHLORIDE 40 MEQ: 1500 TABLET, EXTENDED RELEASE ORAL at 10:11

## 2020-10-26 RX ADMIN — CEFEPIME 2 G: 2 INJECTION, POWDER, FOR SOLUTION INTRAVENOUS at 03:15

## 2020-10-26 RX ADMIN — POTASSIUM CHLORIDE 40 MEQ: 1500 TABLET, EXTENDED RELEASE ORAL at 16:48

## 2020-10-26 RX ADMIN — METOPROLOL TARTRATE 50 MG: 50 TABLET, FILM COATED ORAL at 20:53

## 2020-10-26 RX ADMIN — CEFEPIME 2 G: 2 INJECTION, POWDER, FOR SOLUTION INTRAVENOUS at 14:23

## 2020-10-26 RX ADMIN — VANCOMYCIN HYDROCHLORIDE 1500 MG: 5 INJECTION, POWDER, LYOPHILIZED, FOR SOLUTION INTRAVENOUS at 21:40

## 2020-10-26 RX ADMIN — OXYCODONE HYDROCHLORIDE AND ACETAMINOPHEN 1 TABLET: 5; 325 TABLET ORAL at 11:10

## 2020-10-26 RX ADMIN — SODIUM CHLORIDE, PRESERVATIVE FREE 10 ML: 5 INJECTION INTRAVENOUS at 03:14

## 2020-10-26 RX ADMIN — EZETIMIBE 10 MG: 10 TABLET ORAL at 10:00

## 2020-10-26 RX ADMIN — AZITHROMYCIN MONOHYDRATE 500 MG: 500 INJECTION, POWDER, LYOPHILIZED, FOR SOLUTION INTRAVENOUS at 11:14

## 2020-10-26 RX ADMIN — SODIUM CHLORIDE, PRESERVATIVE FREE 10 ML: 5 INJECTION INTRAVENOUS at 21:00

## 2020-10-26 ASSESSMENT — PAIN DESCRIPTION - PROGRESSION
CLINICAL_PROGRESSION: NOT CHANGED
CLINICAL_PROGRESSION: GRADUALLY WORSENING
CLINICAL_PROGRESSION: GRADUALLY WORSENING
CLINICAL_PROGRESSION: NOT CHANGED

## 2020-10-26 ASSESSMENT — PAIN DESCRIPTION - LOCATION
LOCATION: FOOT;LEG
LOCATION: BACK;FOOT;LEG
LOCATION: FOOT;LEG
LOCATION: BACK;LEG

## 2020-10-26 ASSESSMENT — PAIN DESCRIPTION - DESCRIPTORS
DESCRIPTORS: THROBBING
DESCRIPTORS: THROBBING
DESCRIPTORS: ACHING;PRESSURE
DESCRIPTORS: ACHING;THROBBING

## 2020-10-26 ASSESSMENT — PAIN SCALES - GENERAL
PAINLEVEL_OUTOF10: 6
PAINLEVEL_OUTOF10: 7
PAINLEVEL_OUTOF10: 8
PAINLEVEL_OUTOF10: 6

## 2020-10-26 ASSESSMENT — PAIN DESCRIPTION - PAIN TYPE
TYPE: ACUTE PAIN

## 2020-10-26 ASSESSMENT — PAIN DESCRIPTION - ONSET
ONSET: ON-GOING

## 2020-10-26 ASSESSMENT — PAIN DESCRIPTION - ORIENTATION
ORIENTATION: LEFT
ORIENTATION: LEFT;RIGHT
ORIENTATION: RIGHT;LEFT
ORIENTATION: RIGHT;LEFT

## 2020-10-26 ASSESSMENT — PAIN DESCRIPTION - FREQUENCY
FREQUENCY: CONTINUOUS

## 2020-10-26 NOTE — PROGRESS NOTES
Progress Note  Date:10/26/2020       [id number]  Patient Teresa Kirkpatrick     YOB: 1960     Age:60 y.o. Still having left elbow pain  Subjective    Subjective:  Symptoms:  Stable. Diet:  Adequate intake. Pain:  She complains of pain that is moderate. Review of Systems  Objective         Vitals Last 24 Hours:  TEMPERATURE:  Temp  Av °F (37.2 °C)  Min: 98.4 °F (36.9 °C)  Max: 99.8 °F (37.7 °C)  RESPIRATIONS RANGE: Resp  Avg: 15.5  Min: 14  Max: 16  PULSE OXIMETRY RANGE: SpO2  Av.3 %  Min: 93 %  Max: 95 %  PULSE RANGE: Pulse  Av  Min: 91  Max: 102  BLOOD PRESSURE RANGE: Systolic (82FQE), CDO:116 , Min:120 , TVD:780   ; Diastolic (89HFD), LENIN:35, Min:69, Max:79    I/O (24Hr): Intake/Output Summary (Last 24 hours) at 10/26/2020 0720  Last data filed at 10/26/2020 0422  Gross per 24 hour   Intake 360 ml   Output 1400 ml   Net -1040 ml     Objective:  General Appearance:  Comfortable. Vital signs: (most recent): Blood pressure 126/73, pulse 95, temperature 99.8 °F (37.7 °C), resp. rate 16, height 5' 6\" (1.676 m), weight 249 lb 3.2 oz (113 kg), SpO2 93 %. Vital signs are normal.    HEENT: Normal HEENT exam.    Lungs: There are decreased breath sounds. Heart: Normal rate. Abdomen: Abdomen is soft. Extremities: Decreased range of motion. (Left foot and  and left elbow pain)  Neurological: Patient is alert. Pupils:  Pupils are equal, round, and reactive to light. Skin:  Warm.       Labs/Imaging/Diagnostics    Labs:  CBC:  Recent Labs     10/23/20  0725 10/24/20  0831 10/25/20  0856   WBC 15.1* 15.9* 15.2*   RBC 3.72* 3.26* 3.14*   HGB 10.7* 9.4* 9.3*   HCT 33.7* 29.8* 28.3*   MCV 90.6 91.4 90.1   RDW 13.3 13.2 13.5    254 300     CHEMISTRIES:  Recent Labs     10/23/20  0725 10/24/20  0831 10/25/20  0856    137 132*   K 3.2* 3.5 3.6   CL 93* 96* 92*   CO2 27 27 27   BUN 13 18 20   CREATININE 1.1 1.2* 1.1   GLUCOSE 140* 151* 136*   MG appears intact. No fracture or dislocation is identified. No osseous erosive changes are identified. Calcaneal spurring seen at the plantar aponeurosis. Left ankle: Soft tissue swelling is seen at the ankle. The ankle mortise appears intact. There is a calcaneal spur noted at the plantar aponeurosis. There is a focal spur noted along the anteroinferior aspect of the distal tibia seen on the lateral view, with a oblique lucency extending through this which could be related to a nondisplaced osteophyte fracture. Suspected fracture through a small osteophyte off the anterior inferior aspect of the distal tibia at seen best on the lateral view at the level of the articular surface. Soft tissue swelling seen at the ankle. No other acute osseous fracture seen in the ankle or foot. Xr Foot Left (min 3 Views)    Result Date: 10/22/2020  EXAMINATION: THREE XRAY VIEWS OF THE LEFT FOOT; THREE XRAY VIEWS OF THE LEFT ANKLE 10/22/2020 12:40 pm COMPARISON: None. HISTORY: ORDERING SYSTEM PROVIDED HISTORY: pain TECHNOLOGIST PROVIDED HISTORY: Reason for exam:->pain Reason for Exam: pain Acuity: Acute Type of Exam: Initial Mechanism of Injury: Pt to ED with complaints of left leg and foot pain. Pt reports recent hip replacement; ORDERING SYSTEM PROVIDED HISTORY: pain TECHNOLOGIST PROVIDED HISTORY: Reason for exam:->pain Reason for Exam: PAIN Acuity: Acute Type of Exam: Initial Mechanism of Injury: Pt to ED with complaints of left leg and foot pain. Pt reports recent hip replacement FINDINGS: Left foot: Mild degenerative change seen at the 1st metatarsal-phalangeal joint. Mild interphalangeal joint degenerative osteoarthritic change. The Lisfranc joint appears intact. No fracture or dislocation is identified. No osseous erosive changes are identified. Calcaneal spurring seen at the plantar aponeurosis. Left ankle: Soft tissue swelling is seen at the ankle. The ankle mortise appears intact.   There is a calcaneal spur noted at the plantar aponeurosis. There is a focal spur noted along the anteroinferior aspect of the distal tibia seen on the lateral view, with a oblique lucency extending through this which could be related to a nondisplaced osteophyte fracture. Suspected fracture through a small osteophyte off the anterior inferior aspect of the distal tibia at seen best on the lateral view at the level of the articular surface. Soft tissue swelling seen at the ankle. No other acute osseous fracture seen in the ankle or foot. Xr Chest 1 View    Result Date: 10/22/2020  EXAMINATION: ONE XRAY VIEW OF THE CHEST 10/22/2020 7:52 pm COMPARISON: None. HISTORY: ORDERING SYSTEM PROVIDED HISTORY: fever TECHNOLOGIST PROVIDED HISTORY: Reason for exam:->fever Reason for Exam: fever Acuity: Unknown Type of Exam: Unknown Additional signs and symptoms: none Relevant Medical/Surgical History: none FINDINGS: Mild cardiomegaly with pulmonary vascular congestion is noted. Minimal left basilar atelectasis/infiltrate is seen. There is no pleural effusion or pneumothorax. Mild cardiomegaly with pulmonary vascular congestion. Left basilar atelectasis/infiltrate. Vl Dup Lower Extremity Venous Left    Result Date: 10/22/2020  EXAMINATION: DUPLEX VENOUS ULTRASOUND OF THE LEFT LOWER EXTREMITY 10/22/2020 11:36 am TECHNIQUE: Duplex ultrasound and Doppler images were obtained of the left lower extremity. COMPARISON: None. HISTORY: ORDERING SYSTEM PROVIDED HISTORY: leg pain, total hip arthroplasty recently TECHNOLOGIST PROVIDED HISTORY: Reason for exam:->leg pain, total hip arthroplasty recently Reason for Exam: left leg pain Acuity: Acute Type of Exam: Initial Mechanism of Injury: left hip arthroplasty Relevant Medical/Surgical History: nk FINDINGS: The visualized veins of the left lower extremity are patent and free of echogenic thrombus. The veins are normally compressible and have normal phasic flow.      No evidence of DVT in the left lower extremity.      Assessment//Plan           Hospital Problems           Last Modified POA    Closed fracture of left ankle 10/22/2020 Yes        Assessment & Plan  Left foot pain due to small fx osteophyte distal tib  -doppler neg for DVT  -ortho eval  -PT/OT  Sepsis due to suspected gram pos pna  - zithro  -covid neg   -cefepime and vanc added as still having fevers  -ESR and CRP is very high and check procalcitonin  -MRI left elbow pending  -check doppler RLE  -CXR stable  -bld cx pending, UA neg  Left elbow pain  -synovitis and or complicated effusion  -with her fevers check MRI elbow today and with high ESR  Hypokalemia(resolved)  -on thiazide but chronic  -replace and mag wnl  HTN  -thiazide and BB  DVt prophylaxis  -xarelto    Electronically signed by Avni Peoples MD on 10/23/20 at 1:42 PM EDT

## 2020-10-26 NOTE — CONSULTS
[facility]  consulted by Dr. Sofi Carreno for monitoring and adjustment. Indication for treatment: Sepsis 2/2 PNA  Goal trough: 15 mcg/mL     Pertinent Laboratory Values:   Temp Readings from Last 3 Encounters:   10/26/20 98.9 °F (37.2 °C) (Oral)   10/21/20 98.3 °F (36.8 °C) (Oral)   10/12/20 97.7 °F (36.5 °C) (Temporal)     Recent Labs     10/24/20  0831 10/25/20  0856   WBC 15.9* 15.2*     Recent Labs     10/24/20  0831 10/25/20  0856   BUN 18 20   CREATININE 1.2* 1.1     Estimated Creatinine Clearance: 71 mL/min (based on SCr of 1.1 mg/dL). Intake/Output Summary (Last 24 hours) at 10/26/2020 1310  Last data filed at 10/26/2020 0754  Gross per 24 hour   Intake 240 ml   Output 1400 ml   Net -1160 ml       Pertinent Cultures:  Date    Source    Results  10/12   Urine    No growth  10/12   COVID-19   Negative  10/25                          Blood                                      Ordered    Vancomycin level:   TROUGH:  No results for input(s): VANCOTROUGH in the last 72 hours. RANDOM:  No results for input(s): VANCORANDOM in the last 72 hours. Assessment:  · WBC and temperature: WBC previously elevated @ 15.2;  Tmax = 99.8F  · SCr, BUN, and urine output: Scr stable  · Day(s) of therapy: 2  · Vancomycin level: scheduled for 10/27 @14:30    Plan:  · Continue vancomycin 1500 mg IVPB q18h  · Renal trends are stable  · Check the vanco trough before the 4th dose  · Pharmacy will continue to monitor patient and adjust therapy as indicated    VANCOMYCIN TROUGH SCHEDULED FOR 10/27 @14:30    Thank you for the consult,  Shanae Varela RPh  10/26/2020 1:10 PM

## 2020-10-26 NOTE — PROGRESS NOTES
Physical Therapy    Physical Therapy Treatment Note  Name: Maria Guadalupe Escalona MRN: [id number] :   1960   Date:  10/26/2020   Admission Date: 10/22/2020 Room:  1129/1129-A   Restrictions/Precautions:  Restrictions/Precautions  Restrictions/Precautions: General Precautions, Fall Risk, Weight Bearing Lower Extremity Weight Bearing Restrictions  Left Lower Extremity Weight Bearing: Weight Bearing As Tolerated(in walking boot)   Hip Precautions: No hip internal rotation, No ADduction, Posterior hip precautions, No hip flexion > 90 degrees  Communication with other providers:  OT  Subjective:  Patient states:  She is very fatigued and painful from working with OT, she is agreeable for LE ex's only,  was in pt's room examining her L arm. Wanted pt to have testing d/t pain and edema before pt gets up again. Pain:   Location, Type, Intensity (0/10 to 10/10):  L hip, L arm, R foot  Objective:    Observation:  Pt was resting in the bed, pt was known to me from [place] [id number] admission  Treatment, including education/measures:  Supine Exercises: reviewed posterior precautions  Pt did very limited ROM with movements  Ankle pumps x 10  Quad sets x 10  Glute sets x 10  Heel slides x 10  Hip abd x 10  SAQ's x 10  Pillow squeezes x 10  Therapeutic Exercise:  Therapeutic exercises were instructed today. Cues were given for technique, safety, recruitment, and rationale. Cues were verbal and/or tactile. Safety  Patient left safely in the bed, with call light/phone in reach with alarm applied. Gait belt was used for transfers and gait.   Assessment / Impression:   Patient's tolerance of treatment:  Fair d/t pain and fatigue   Adverse Reaction: none  Significant change in status and impact:  none  Barriers to improvement:  Pain,   Plan for Next Session:    Will cont to work towards pt's goals per her tolerance  Time in:  1100  Time out:  1138  Timed treatment minutes:  38  Total treatment time:  38  Previously filed items:  Social/Functional History  Lives With: Alone  Type of Home: House  Home Layout: Two level  Home Access: Ramped entrance  Bathroom Shower/Tub: Walk-in shower, Shower chair without back  Bathroom Toilet: Handicap height  Home Equipment: Rolling walker, Cane  ADL Assistance: Independent  Homemaking Assistance: Independent  Ambulation Assistance: Independent  Transfer Assistance: Independent  Active : Yes     Long term goals  Time Frame for Long term goals : In one week:  Long term goal 1: Pt will complete all bed mobility with CGAx1  Long term goal 2: Pt will complete sit <> stand transfers with minAx1  Long term goal 3: Pt will ambulate 20 feet with modAx2 with LRAD  Long term goal 4: Pt will independently complete 3 sets of 10 reps of BLE AROM exercises in available and allowed ROM    Electronically signed by:     Ewelina Graf PTA  10/26/2020, 11:29 AM

## 2020-10-26 NOTE — CARE COORDINATION
LSW reviewed chart. PT recommended SNF and OT recommended ARU. LSW into room to talk with Pt about discharge plans. RN students were in the room with Pt and OT. LSW left the list of insurance approved SNF at bedside. LSW informed Pt that LSW will be back in the afternoon to see if Pt has made a choice.      Electronically signed by JOSE LUIS De La Rosa on 10/26/2020 at 10:06 AM

## 2020-10-26 NOTE — PROGRESS NOTES
Occupational Therapy  . Occupational Therapy Treatment Note  Name: Leah Lyn MRN: [id number] :   1960   Date:  10/26/2020   Admission Date: 10/22/2020 Room:  11291129-A   Restrictions/Precautions:  Restrictions/Precautions  Restrictions/Precautions: General Precautions, Fall Risk, Weight Bearing Posterior hip precautions, use of CAM boot for transfers. Communication with other providers:  Per chart review and Nurse Winifred, patient is appropriate for therapeutic intervention. Subjective:  Patient states:  \"I have to go slow. I get dizzy when I get up. \" Pt still have no feeling in my legs since my block last Monday. I had one in my groin and one in my back. \" Patient also reports that surgeon is aware. After tolerating chair position activity w/o dizziness, pt agreeable to sit EOB, declined to trial standing this date. Pain:   Location, Type, Intensity (0/10 to 10/10):  7/10, BLE and L elbow    Objective:    Observation:  Pt received in semi-fowlers. LUE edema noted as well as BLE edema. After BUE AROM exercises, L hand c noticeably decreased edema / little change to remainder of LUE c pt identifying / exhibiting most discomfort at L elbow during AROM. Objective Measures:  N/A    Treatment, including education:  Therapeutic Activity Training:   Therapeutic activity training was instructed today. Cues were given for safety, sequence, UE/LE placement, awareness, and balance. Pt provided educational review of posterior hip precautions  Activities performed today included use of chair positioning, supine<>sit, and sitting balance / tolerance EOB. Pt placed in chair positioning for acclimation to change of position and for performance of ADL tasks. Supine to sit: Mod A for touching assist to LLE and for scooting of L hip to edge of bed c use of chux pad + cues for sequencing. Sit to supine: Mod A for BLE + cues for sequencing / safe body positioning.   Sitting balance / tolerance: SBA seated EOB x10 minutes. Pt reported mild onset of dizziness which faded over time. Pt exhibited only one instance of outward s/s and that was to used BUE support for the first minute and subsequently was able to maintain sitting balance c intermittent use of 1 UE. At end of Tx session, pt's LUE positioned c pillow support to elevate distal portion and was educated for gentle effleurage [id number] to promote lymphatic flow. Pt verbalized understanding and demonstrated understanding. Therapeutic Exercise:  Cues were given for technique, safety, recruitment, and rationale. Cues were verbal and/or tactile. Pt Sup + vc's for technique to perform BUE AROM exercises to include: shoulder flex/extension, internal/external rotation, chest presses, bicep curls, rowing, and supination/pronation x15 reps c patient requiring sets of 5 x3 c rest breaks between to manage activity tolerance. Pt required increased time to perform, educated to perform outside of therapy times and verbalized understanding. Self Care Training:   Cues were given for safety, sequence, UE/LE placement, visual cues, and balance. Activities performed today included UB bathing, hand hygiene, and grooming. UB Bathing: Sup seated  Grooming: Min A for tangled long hair at back of head, for hair care, Set up for face washing, hand hygiene, and oral care. All therapeutic intervention performed c emphasis on BUE therapeutic exercises, dynamic balance / standing tolerance to inc strength, endurance and act tolerance for inc Indep c ADL tasks, func transfers / mobility. Safety  Patient safely in bed + alarm activated at end of session, with call light/phone in reach, and nursing aware. Gait belt was used for sitting EOB.       Assessment / Impression:        Patient's tolerance of treatment:  Well   Adverse Reaction: None  Significant change in status and impact:  Mild dizziness c initial positional change to sit EOB, resolved and tolerated 10 minutes dynamic sitting balance. Barriers to improvement:  Pain, edema, mild dizziness c positional changes, decreased endurance. Plan for Next Session:    Continue per OT POC c plan to address functional transfers during ADLs. Pt may benefit from co-treat for transfer training. Time in:  0940  Time out:  1042  Timed treatment minutes:  62  Total treatment time:  62    Electronically signed by:    SILVIA Vegas  10/26/2020, 9:44 AM    Previously filed values:    Goals:  1. Pt will complete all aspects of bed mobility for EOB/OOB ADLs CGA with HOB flat  2. Pt will complete UB/LB bathing mod A with setup using long-handled sponge   3. Pt will complete all aspects of LB dressing mod A with setup using AE  4. Pt will complete all functional transfers to and from bed, chair, toilet, shower chair min A/good safety awareness  5. Pt will ambulate HH distance to bathroom for toileting mod A x 2 with RW and Lt CAM boot  6. Pt will complete all aspects of toileting task mod A on BSC/regular toilet  7. Pt will complete oral hygiene/grooming routine in standing in unsupported sitting EOB with supervision/setup  8.  Pt will complete ther ex/ther act with focus on UE strengthening and functional standing tolerance >5 minutes

## 2020-10-27 ENCOUNTER — APPOINTMENT (OUTPATIENT)
Dept: CT IMAGING | Age: 60
DRG: 324 | End: 2020-10-27
Payer: COMMERCIAL

## 2020-10-27 LAB
ANION GAP SERPL CALCULATED.3IONS-SCNC: 13 MMOL/L (ref 4–16)
BASOPHILS ABSOLUTE: 0.1 K/CU MM
BASOPHILS RELATIVE PERCENT: 0.5 % (ref 0–1)
BUN BLDV-MCNC: 22 MG/DL (ref 6–23)
CALCIUM SERPL-MCNC: 8.4 MG/DL (ref 8.3–10.6)
CHLORIDE BLD-SCNC: 89 MMOL/L (ref 99–110)
CO2: 29 MMOL/L (ref 21–32)
CREAT SERPL-MCNC: 1.2 MG/DL (ref 0.6–1.1)
DIFFERENTIAL TYPE: ABNORMAL
DOSE AMOUNT: NORMAL
DOSE TIME: NORMAL
EOSINOPHILS ABSOLUTE: 0.1 K/CU MM
EOSINOPHILS RELATIVE PERCENT: 0.5 % (ref 0–3)
GFR AFRICAN AMERICAN: 56 ML/MIN/1.73M2
GFR NON-AFRICAN AMERICAN: 46 ML/MIN/1.73M2
GLUCOSE BLD-MCNC: 119 MG/DL (ref 70–99)
HCT VFR BLD CALC: 28.5 % (ref 37–47)
HEMOGLOBIN: 9.5 GM/DL (ref 12.5–16)
IMMATURE NEUTROPHIL %: 2.1 % (ref 0–0.43)
LYMPHOCYTES ABSOLUTE: 1.5 K/CU MM
LYMPHOCYTES RELATIVE PERCENT: 10 % (ref 24–44)
MCH RBC QN AUTO: 30.2 PG (ref 27–31)
MCHC RBC AUTO-ENTMCNC: 33.3 % (ref 32–36)
MCV RBC AUTO: 90.5 FL (ref 78–100)
MONOCYTES ABSOLUTE: 1.7 K/CU MM
MONOCYTES RELATIVE PERCENT: 11.3 % (ref 0–4)
NUCLEATED RBC %: 0 %
PDW BLD-RTO: 13.8 % (ref 11.7–14.9)
PLATELET # BLD: 402 K/CU MM (ref 140–440)
PMV BLD AUTO: 9 FL (ref 7.5–11.1)
POTASSIUM SERPL-SCNC: 4 MMOL/L (ref 3.5–5.1)
RBC # BLD: 3.15 M/CU MM (ref 4.2–5.4)
SEGMENTED NEUTROPHILS ABSOLUTE COUNT: 11.6 K/CU MM
SEGMENTED NEUTROPHILS RELATIVE PERCENT: 75.6 % (ref 36–66)
SODIUM BLD-SCNC: 131 MMOL/L (ref 135–145)
TOTAL IMMATURE NEUTOROPHIL: 0.33 K/CU MM
TOTAL NUCLEATED RBC: 0 K/CU MM
VANCOMYCIN TROUGH: 12.3 UG/ML (ref 10–20)
WBC # BLD: 15.4 K/CU MM (ref 4–10.5)

## 2020-10-27 PROCEDURE — 85025 COMPLETE CBC W/AUTO DIFF WBC: CPT

## 2020-10-27 PROCEDURE — 6370000000 HC RX 637 (ALT 250 FOR IP): Performed by: NURSE PRACTITIONER

## 2020-10-27 PROCEDURE — 74176 CT ABD & PELVIS W/O CONTRAST: CPT

## 2020-10-27 PROCEDURE — 80202 ASSAY OF VANCOMYCIN: CPT

## 2020-10-27 PROCEDURE — 1200000000 HC SEMI PRIVATE

## 2020-10-27 PROCEDURE — 6360000002 HC RX W HCPCS: Performed by: HOSPITALIST

## 2020-10-27 PROCEDURE — 6360000002 HC RX W HCPCS: Performed by: INTERNAL MEDICINE

## 2020-10-27 PROCEDURE — 36415 COLL VENOUS BLD VENIPUNCTURE: CPT

## 2020-10-27 PROCEDURE — 94761 N-INVAS EAR/PLS OXIMETRY MLT: CPT

## 2020-10-27 PROCEDURE — 2580000003 HC RX 258: Performed by: HOSPITALIST

## 2020-10-27 PROCEDURE — 2580000003 HC RX 258: Performed by: NURSE PRACTITIONER

## 2020-10-27 PROCEDURE — 80048 BASIC METABOLIC PNL TOTAL CA: CPT

## 2020-10-27 PROCEDURE — 71250 CT THORAX DX C-: CPT

## 2020-10-27 RX ORDER — MORPHINE SULFATE 2 MG/ML
1 INJECTION, SOLUTION INTRAMUSCULAR; INTRAVENOUS EVERY 6 HOURS PRN
Status: DISCONTINUED | OUTPATIENT
Start: 2020-10-27 | End: 2020-11-02

## 2020-10-27 RX ADMIN — RIVAROXABAN 10 MG: 10 TABLET, FILM COATED ORAL at 10:07

## 2020-10-27 RX ADMIN — FAMOTIDINE 20 MG: 20 TABLET, FILM COATED ORAL at 08:45

## 2020-10-27 RX ADMIN — CEFEPIME 2 G: 2 INJECTION, POWDER, FOR SOLUTION INTRAVENOUS at 03:26

## 2020-10-27 RX ADMIN — VANCOMYCIN HYDROCHLORIDE 1500 MG: 5 INJECTION, POWDER, LYOPHILIZED, FOR SOLUTION INTRAVENOUS at 16:18

## 2020-10-27 RX ADMIN — FAMOTIDINE 20 MG: 20 TABLET, FILM COATED ORAL at 20:52

## 2020-10-27 RX ADMIN — METOPROLOL TARTRATE 50 MG: 50 TABLET, FILM COATED ORAL at 20:52

## 2020-10-27 RX ADMIN — OXYCODONE HYDROCHLORIDE AND ACETAMINOPHEN 1 TABLET: 5; 325 TABLET ORAL at 08:42

## 2020-10-27 RX ADMIN — AZITHROMYCIN MONOHYDRATE 500 MG: 500 INJECTION, POWDER, LYOPHILIZED, FOR SOLUTION INTRAVENOUS at 11:55

## 2020-10-27 RX ADMIN — OXYCODONE HYDROCHLORIDE AND ACETAMINOPHEN 1 TABLET: 5; 325 TABLET ORAL at 23:17

## 2020-10-27 RX ADMIN — POTASSIUM CHLORIDE 40 MEQ: 1500 TABLET, EXTENDED RELEASE ORAL at 08:45

## 2020-10-27 RX ADMIN — CEFEPIME 2 G: 2 INJECTION, POWDER, FOR SOLUTION INTRAVENOUS at 14:38

## 2020-10-27 RX ADMIN — SODIUM CHLORIDE, PRESERVATIVE FREE 10 ML: 5 INJECTION INTRAVENOUS at 20:52

## 2020-10-27 RX ADMIN — METOPROLOL TARTRATE 50 MG: 50 TABLET, FILM COATED ORAL at 08:45

## 2020-10-27 RX ADMIN — EZETIMIBE 10 MG: 10 TABLET ORAL at 08:45

## 2020-10-27 RX ADMIN — HYDROCHLOROTHIAZIDE 12.5 MG: 12.5 TABLET ORAL at 08:45

## 2020-10-27 RX ADMIN — MORPHINE SULFATE 1 MG: 2 INJECTION, SOLUTION INTRAMUSCULAR; INTRAVENOUS at 11:47

## 2020-10-27 RX ADMIN — POTASSIUM CHLORIDE 40 MEQ: 1500 TABLET, EXTENDED RELEASE ORAL at 16:18

## 2020-10-27 ASSESSMENT — PAIN SCALES - GENERAL
PAINLEVEL_OUTOF10: 10
PAINLEVEL_OUTOF10: 7
PAINLEVEL_OUTOF10: 8
PAINLEVEL_OUTOF10: 0
PAINLEVEL_OUTOF10: 10
PAINLEVEL_OUTOF10: 10
PAINLEVEL_OUTOF10: 0

## 2020-10-27 ASSESSMENT — PAIN DESCRIPTION - ORIENTATION
ORIENTATION: RIGHT
ORIENTATION: LEFT
ORIENTATION: RIGHT

## 2020-10-27 ASSESSMENT — PAIN DESCRIPTION - PAIN TYPE
TYPE: ACUTE PAIN
TYPE: ACUTE PAIN
TYPE: ACUTE PAIN;SURGICAL PAIN

## 2020-10-27 ASSESSMENT — PAIN DESCRIPTION - LOCATION
LOCATION: LEG;FOOT
LOCATION: ANKLE;LEG
LOCATION: LEG;FOOT

## 2020-10-27 ASSESSMENT — PAIN DESCRIPTION - DIRECTION
RADIATING_TOWARDS: BACK
RADIATING_TOWARDS: BACK

## 2020-10-27 ASSESSMENT — PAIN DESCRIPTION - ONSET: ONSET: ON-GOING

## 2020-10-27 ASSESSMENT — PAIN DESCRIPTION - FREQUENCY: FREQUENCY: CONTINUOUS

## 2020-10-27 ASSESSMENT — PAIN DESCRIPTION - DESCRIPTORS: DESCRIPTORS: ACHING

## 2020-10-27 ASSESSMENT — PAIN DESCRIPTION - PROGRESSION: CLINICAL_PROGRESSION: GRADUALLY WORSENING

## 2020-10-27 NOTE — CARE COORDINATION
2:20p - f/u with Pt. LSW into room. Pt is off the floor for testing. LSW will re visit Pt once back in room.

## 2020-10-27 NOTE — PROGRESS NOTES
Occupational Therapy      Attempted at 1420 hrs c nurse reporting patient is off floor for CT scan. Will re-attempt as schedule allows.       Electronically signed by:    SILVIA Yuen  10/27/2020, 2:17 PM    Previously filed values:

## 2020-10-27 NOTE — PROGRESS NOTES
Hospitalist Progress Note      Name:  Jade Emmanuel /Age/Sex: 1960  (64 y.o. female)   MRN & CSN:  [id number] & [id number] Admission Date/Time: 10/22/2020 10:50 AM   Location:  [address] [place] [address] PCP: Janeth Nicholas MD         Hospital Day: 6    Assessment and Plan:   Jade Emmanuel is a 64 y.o.  female  who presents with left foot pain     #Left foot pain/swelling: Left foot fracture. #Status post left total hip replacement DOS 10/19/2020  #Cellulitis left lower extremity? -Left foot is red, warm, swollen concerning for cellulitis  -Letter MRI lower extremity  -Negative DVT study  -XR with suspected small fracture of osteophyte at distal tibia  -Fall precautions, up with assistance  -Surgery on board: Recommend physical therapy, fracture boot and Ace wrap  -Consults to PT/OT,CM  -Patient pending placement   -Continue pain control with Percocet as needed and morphine for breakthrough pain    #Community-acquired pneumonia  -Patient continues to spike fevers, will order CT chest  -Chest x-ray shows stable left atelectasis. -Continue azithromycin and cefepime     #Severe back pain bilateral lower extremity numbness  -MRI lumbar/sacral spine ordered    #Hypokalemia  -Monitor BMP replace as needed     DVT ppx: Xarelto for 4 weeks, per ortho, for LISHA        Chronic  - HTN- Cont HCTZ, BB  - HLD- Cont zetia  - AOCD- 11.2; at baseline  - morbid obesity-counseled on diet and exercise    Addendum: Patient's sister had spoken with surgeon and expressed desire to be transferred to [facility] was called, reported no beds at this time. Recommend continue with imaging and call back again to check for bed opening again.       Diet DIET GENERAL;   DVT Prophylaxis [] Lovenox, []  Heparin, [] SCDs, [] Ambulation   GI Prophylaxis [] PPI,  [] H2 Blocker,  [] Carafate,  [] Diet/Tube Feeds   Code Status Full Code   Disposition Patient pending placement         History of Present Illness:     Chief Complaint: <principal problem not specified>  Villa Hernandez is a 64 y.o.  female  who presents with left foot pain       Ten point ROS reviewed negative, unless as noted above    Objective: Intake/Output Summary (Last 24 hours) at 10/27/2020 1135  Last data filed at 10/26/2020 2100  Gross per 24 hour   Intake 150 ml   Output 600 ml   Net -450 ml      Vitals:   Vitals:    10/27/20 0938   BP:    Pulse:    Resp:    Temp:    SpO2: 93%     Physical Exam:   GEN Awake female, sitting upright in bed in no apparent distress. Appears given age. EYES Pupils are equally round. No scleral erythema, discharge, or conjunctivitis. HENT Mucous membranes are moist. Oral pharynx without exudates, no evidence of thrush. NECK Supple, no apparent thyromegaly or masses. RESP Clear to auscultation, no wheezes, rales or rhonchi. Symmetric chest movement while on room air. CARDIO/VASC S1/S2 auscultated. Regular rate without appreciable murmurs, rubs, or gallops. No JVD or carotid bruits. Peripheral pulses equal bilaterally and palpable. No peripheral edema. GI Abdomen is soft without significant tenderness, masses, or guarding. Bowel sounds are normoactive. Rectal exam deferred.  No costovertebral angle tenderness. Normal appearing external genitalia. Damon catheter is not present. HEME/LYMPH No palpable cervical lymphadenopathy and no hepatosplenomegaly. No petechiae or ecchymoses. MSK No gross joint deformities. Left hip clean dressing. Left foot warm,red,swelling and tenderness. SKIN Normal coloration, warm, dry. NEURO Cranial nerves appear grossly intact, normal speech, no lateralizing weakness. PSYCH Awake, alert, oriented x 4. Affect appropriate.     Medications:   Medications:    vancomycin  1,500 mg Intravenous Q18H    cefepime  2 g Intravenous Q12H    azithromycin  500 mg Intravenous Q24H    sodium chloride flush  10 mL Intravenous 2 times per day    famotidine  20 mg Oral BID    ezetimibe  10 mg Oral Daily    hydroCHLOROthiazide  12.5 mg Oral Daily    metoprolol tartrate  50 mg Oral BID    rivaroxaban  10 mg Oral Daily    potassium chloride  40 mEq Oral BID WC      Infusions:   PRN Meds: morphine, 1 mg, Q6H PRN  sodium chloride flush, 10 mL, PRN  acetaminophen, 650 mg, Q6H PRN    Or  acetaminophen, 650 mg, Q6H PRN  polyethylene glycol, 17 g, Daily PRN  promethazine, 12.5 mg, Q6H PRN    Or  ondansetron, 4 mg, Q6H PRN  docusate sodium, 100 mg, Daily PRN  oxyCODONE-acetaminophen, 1 tablet, Q6H PRN          Electronically signed by Jes Javier MD on 10/27/2020 at 11:35 AM

## 2020-10-27 NOTE — DISCHARGE SUMMARY
Discharge Summary    Name:  Alon Flores /Age/Sex: 1960  (64 y.o. female)   MRN & CSN:  [id number] & [id number] Admission Date/Time: 10/19/2020  7:53 AM   Attending:  No att. providers found Discharging Physician: DO FRANCISCA Conway and Hospital Course:   Alon Flores is a 64 y.o.  female  who presents with No chief complaint on file. · Primary osteoarthritis of left hip s/p posterior left hip total arthroplasty  · Op follow up with ortho  · Pain medications as below  · HTN, uncontrolled  · HERIBERTO  · Gerd  · HLD  · Depression  · Morbid obesity with BMI of 40    The patient expressed appropriate understanding of and agreement with the discharge recommendations, medications, and plan.      Consults this admission:  IP CONSULT TO HOSPITALIST  IP CONSULT TO CASE MANAGEMENT  IP CONSULT TO HOME CARE NEEDS  IP CONSULT TO SOCIAL WORK  IP CONSULT TO [address] [place] [address] NEEDS    Discharge Instruction:   Follow up appointments: ortho  Primary care physician:  within 2 weeks    Diet:  General/cardiac/ADA/as tolerated  Activity: {discharge activity: as tolerated  Disposition: Discharged to:   [x]Home, []C, []SNF, []Acute Rehab, []Hospice   Condition on discharge: Stable    Discharge Medications:      Flash Tigre \"Court\"   Home Medication Instructions FKM:[id number]    Printed on:10/26/20 2228   Medication Information                      docusate sodium (COLACE) 100 MG capsule  Take 1 capsule by mouth daily as needed for Constipation             Evolocumab (REPATHA) 140 MG/ML SOSY  Inject 1 mL into the skin every 14 days             ezetimibe (ZETIA) 10 MG tablet  Take 1 tablet by mouth daily             hydroCHLOROthiazide (HYDRODIURIL) 25 MG tablet  Take 0.5 tablets by mouth daily             loratadine (CLARITIN) 10 MG tablet  Take 1 tablet by mouth daily             metoprolol tartrate (LOPRESSOR) 50 MG tablet  Take 1 tablet by mouth 2 times daily             oxyCODONE-acetaminophen

## 2020-10-27 NOTE — CONSULTS
[address] [facility]  consulted by Dr. Farhana Bray for monitoring and adjustment. Indication for treatment: Sepsis 2/2 PNA  Goal trough: 15 mcg/mL     Pertinent Laboratory Values:   Temp Readings from Last 3 Encounters:   10/27/20 98.9 °F (37.2 °C) (Oral)   10/21/20 98.3 °F (36.8 °C) (Oral)   10/12/20 97.7 °F (36.5 °C) (Temporal)     Recent Labs     10/25/20  0856 10/26/20  1230 10/27/20  1431   WBC 15.2* 13.9* 15.4*     Recent Labs     10/25/20  0856 10/27/20  1431   BUN 20 22   CREATININE 1.1 1.2*     Estimated Creatinine Clearance: 65 mL/min (A) (based on SCr of 1.2 mg/dL (H)). Intake/Output Summary (Last 24 hours) at 10/27/2020 1651  Last data filed at 10/26/2020 2100  Gross per 24 hour   Intake 10 ml   Output --   Net 10 ml       Pertinent Cultures:  Date    Source    Results  10/12   Urine    No growth  10/12   COVID-19   Negative  10/25                          Blood                                      Ordered    Vancomycin level:   TROUGH:    Recent Labs     10/27/20  1431   VANCOTROUGH 12.3     RANDOM:  No results for input(s): VANCORANDOM in the last 72 hours. Assessment:  · WBC and temperature: WBC @ 15.4;  Afebrile  · SCr, BUN, and urine output: Scr stable  · Day(s) of therapy: 3  · Vancomycin level:   · 10/27: 12.3, therapeutic    Plan:  · Continue vancomycin 1500 mg IVPB q18h  · Renal trends are stable  · Plan to repeat next trough level in 72h  · Pharmacy will continue to monitor patient and adjust therapy as indicated    Jovana 10/30 @14:30    Thank you for the consult,  Ludwin Vee [facility]  10/27/2020 4:51 PM

## 2020-10-27 NOTE — CARE COORDINATION
Reviewed patients therapy notes from 10/26. Patient had limited participation. Per Brayan Ng patient still hasn't decided where she would like to d/c to. Notified Brayan Ng that d/t patients limited participation with therapy, patient might benefit from SNF at discharge.

## 2020-10-27 NOTE — ADT AUTH CERT
Musculoskeletal Disease GRG - Care Day 5 (10/26/2020) by Sabi Pulido RN         Review Status  Review Entered    Completed  10/27/2020 14:07        Criteria Review       Care Day: 5 Care Date: 10/26/2020 Level of Care:    Guideline Day 3    Level Of Care    (X) * Activity level acceptable    ( ) * Complete discharge planning    Clinical Status    (X) * Temperature status acceptable    10/27/2020 2:07 PM EDT by Rolando Zelaya      126/73 95 16 99.8 93% room air    ( ) * No infection, or status acceptable    (X) * C-reactive protein stable, declining, or not indicated    (X) * Respiratory status acceptable    (X) * Neurologic status acceptable    (X) * Vascular, soft tissue, and wound status acceptable    (X) * Pain and nausea absent or adequately managed    (X) * Fracture or injury absent or status acceptable    (X) * No bone and joint infection, or status acceptable    (X) * Rheumatologic and vasculitic status acceptable    ( ) * General Discharge Criteria met    Interventions    (X) * Intake acceptable    (X) * No inpatient interventions needed    * Milestone    Additional Notes    10/26/20       IM Note:       General Appearance:  Comfortable.      Vital signs: (most recent): Blood pressure 126/73, pulse 95, temperature 99.8 °F (37.7 °C), resp. rate 16, height 5' 6\" (1.676 m), weight 249 lb 3.2 oz (113 kg), SpO2 93 %.  Vital signs are normal.      HEENT: Normal HEENT exam.      Lungs:  There are decreased breath sounds.      Heart: Normal rate.      Abdomen: Abdomen is soft.      Extremities: Decreased range of motion.   (Left foot and  and left elbow pain)    Neurological: Patient is alert.      Pupils:  Pupils are equal, round, and reactive to light.      Skin:  Warm.            Assessment//Plan                Hospital Problems      Last Modified POA      Closed fracture of left ankle 10/22/2020 Yes              Assessment & Plan    Left foot pain due to small fx osteophyte distal tib    -doppler neg for DVT -ortho eval    -PT/OT    Sepsis due to suspected gram pos pna    - zithro    -covid neg    -cefepime and vanc added as still having fevers    -ESR and CRP is very high and check procalcitonin    -MRI left elbow pending    -check doppler RLE    -CXR stable    -bld cx pending, UA neg    Left elbow pain    -synovitis and or complicated effusion    -with her fevers check MRI elbow today and with high ESR    Hypokalemia(resolved)    -on thiazide but chronic    -replace and mag wnl    HTN    -thiazide and BB    DVt prophylaxis    -xarelto         OT:       Assessment / Impression:                                                              Patient's tolerance of treatment:  Well              Adverse Reaction: None    Significant change in status and impact:  Mild dizziness c initial positional change to sit EOB, resolved and tolerated 10 minutes dynamic sitting balance. Barriers to improvement:  Pain, edema, mild dizziness c positional changes, decreased endurance.         Plan for Next Session:      Continue per OT POC c plan to address functional transfers during ADLs. Pt may benefit from co-treat for transfer training.         Time in:  0940    Time out:  1042    Timed treatment minutes:  62    Total treatment time:  62         Electronically signed by:      Merline Craven, COTA/L    10/26/2020, 9:44 AM         Previously filed values:      Goals:    1. Pt will complete all aspects of bed mobility for EOB/OOB ADLs CGA with HOB flat    2. Pt will complete UB/LB bathing mod A with setup using long-handled sponge     3. Pt will complete all aspects of LB dressing mod A with setup using AE    4. Pt will complete all functional transfers to and from bed, chair, toilet, shower chair min A/good safety awareness    5. Pt will ambulate HH distance to bathroom for toileting mod A x 2 with RW and Lt CAM boot    6. Pt will complete all aspects of toileting task mod A on BSC/regular toilet    7.  Pt will complete oral hygiene/grooming routine in standing in unsupported sitting EOB with supervision/setup    8. Pt will complete ther ex/ther act with focus on UE strengthening and functional standing tolerance >5 minutes          CM:       LSW reviewed chart. PT recommended SNF and OT recommended ARU. LSW into room to talk with Pt about discharge plans. RN students were in the room with Pt and OT. LSW left the list of insurance approved SNF at bedside. LSW informed Pt that LSW will be back in the afternoon to see if Pt has made a choice. ARU following for updated therapy notes to assess patients participation.   Discussed with Val Quintero.         PT: Jamey Reynolds / Impression:      Patient's tolerance of treatment:  Fair d/t pain and fatigue        Adverse Reaction: none    Significant change in status and impact:  none    Barriers to improvement:  Pain,    Plan for Next Session:      Will cont to work towards pt's goals per her tolerance    Time in:  1100    Time out:  0278    Timed treatment minutes:  38    Total treatment time:  38    Previously filed items:    Social/Functional History    Lives With: Alone    Type of Home: House    Home Layout: Two level    Home Access: Ramped entrance    Bathroom Shower/Tub: Walk-in shower, Shower chair without back    Bathroom Toilet: Handicap height    Home Equipment: Rolling walker, Cane    ADL Assistance: Independent    Homemaking Assistance: Independent    Ambulation Assistance: Independent    Transfer Assistance: Independent    Active : Yes    Long term goals    Time Frame for Long term goals :  In one week:    Long term goal 1: Pt will complete all bed mobility with CGAx1    Long term goal 2: Pt will complete sit <> stand transfers with minAx1    Long term goal 3: Pt will ambulate 20 feet with modAx2 with LRAD    Long term goal 4: Pt will independently complete 3 sets of 10 reps of BLE AROM exercises in available and allowed ROM          Results for Herschell Don \"MIKE\" (MRN [id number]) as of 10/27/2020 13:57       10/26/2020 08:10    COVID-19: Rpt    SARS-CoV-2, NAAT: NOT DETECTED       10/26/2020 12:30    Procalcitonin: 0.656    WBC: 13.9 (H)    RBC: 3.24 (L)    Hemoglobin Quant: 9.3 (L)    Hematocrit: 29.4 (L)    MCV: 90.7    MCH: 28.7    MCHC: 31.6 (L)    MPV: 9.3    RDW: 13.3    Platelet Count: 346       azithromycin (ZITHROMAX) 500 mg in dextrose 5 % 250 mL IVPB      Dose: 500 mg    Freq: EVERY 24 HOURS Route: IV       cefepime (MAXIPIME) 2 g IVPB minibag      Dose: 2 g    Freq: EVERY 12 HOURS Route: IV       ezetimibe (ZETIA) tablet 10 mg      Dose: 10 mg    Freq: DAILY Route: PO       famotidine (PEPCID) tablet 20 mg      Dose: 20 mg    Freq: 2 TIMES DAILY Route: PO       hydroCHLOROthiazide (HYDRODIURIL) tablet 12.5 mg      Dose: 12.5 mg    Freq: DAILY Route: PO       metoprolol tartrate (LOPRESSOR) tablet 50 mg      Dose: 50 mg    Freq: 2 TIMES DAILY Route: PO       potassium chloride (KLOR-CON M) extended release tablet 40 mEq      Dose: 40 mEq    Freq: 2 TIMES DAILY WITH MEALS Route: PO       rivaroxaban (XARELTO) tablet 10 mg      Dose: 10 mg    Freq: DAILY Route: PO       vancomycin (VANCOCIN) 1,500 mg in dextrose 5 % 500 mL IVPB      Dose: 1,500 mg    Freq: EVERY 18 HOURS Route: IV       oxyCODONE-acetaminophen (PERCOCET) 5-325 MG per tablet 1 tablet      Dose: 1 tablet    Freq: EVERY 6 HOURS PRN Route: PO x 3           Musculoskeletal Disease GRG - Care Day 4 (10/25/2020) by Tej Lin RN         Review Status  Review Entered    Completed  10/27/2020 13:54        Criteria Review       Care Day: 4 Care Date: 10/25/2020 Level of Care:    Guideline Day 3    Level Of Care    (X) * Activity level acceptable    ( ) * Complete discharge planning    Clinical Status    (X) * Temperature status acceptable    10/27/2020 1:54 PM EDT by Bobetta Garcia      131/72 92 14 98.4 95% room air    ( ) * No infection, or status acceptable    (X) * C-reactive protein stable, physical therapy. Domenico Goodman states that she does not feel steady with ambulation requires assistance during weightbearing activities with her boot and walker.         She still complains mostly of pain at the ankle with any mobility or attempted weightbearing on the left lower extremity.  She denies any problems at the left hip except for mild discomfort with direct pressure if she has to lay on that side.         Afebrile and vital signs are stable over the last 24 hours         White blood cell count 15.9         Chest x-ray showed small infiltrate consistent with atelectasis         X-ray of the left elbow showed no fracture with mild effusion at the elbow joint         CT scan of the left elbow showed no fracture or dislocation.  There was mild fluid accumulation at the elbow joint consistent with synovitis         Exam:    Left lower extremity: The surgical incision at the hip is intact with Dermabond dressing and resolving ecchymosis.  No erythema or drainage or induration.  No pain or instability with gentle passive range of motion of the hip.  No pain at the hip with simulated weightbearing on the left lower extremity.         There is moderate to severe tenderness to palpation diffusely throughout the ankle most severe at the anterior aspect along the distal tibia at the fracture site.  Moderate persistent swelling at the ankle.  Normal alignment.  Sensation is mildly decreased in the toes otherwise intact.  She has active range of motion present with dorsiflexion and plantarflexion of the toes and ankle which is limited due to pain referred to the ankle joint.         Left upper extremity:    There is moderate tenderness to palpation diffusely throughout the elbow with restricted range of motion and severe pain at the extremes of motion with elbow from 15 degrees of extension to 90 degrees of flexion.  There is a decrease sensation in the ulnar nerve distribution and weakness with  and finger flexion of the small and ring fingers.  Ulnar nerve is functioning but weak compared to the contralateral side              Assessment:    1.  Left total hip replacement, healing appropriately         2.  Left ankle anterior distal tibia nondisplaced fracture         3.  Left elbow synovitis and cubital tunnel syndrome         4.  Atelectasis and possible pneumonia         Plan:    I explained to the patient that her situation now is complicated due to multiple issues.  I recommended that we continue moving forward with physical therapy in order to rehabilitate the hip following total hip replacement.  She can continue with weightbearing as tolerated within the left lower extremity.  I recommended use of the fracture boot for ambulation.  She can remove the boot at rest for range of motion activities and stretching of the ankle.  She can use a Ace wrap at rest as needed for compression and support of the ankle.         We discussed her elbow issue and have explained to her that it appears she is dealing with 2 sets of circumstances which are giving her pain and dysfunction in the left upper extremity.  She can continue with range of motion and weightbearing and use as tolerated.  I suspect that she is having a flareup of underlying cubital tunnel syndrome related to increased pressure on the elbow using her walker.         She will continue to advance her activities as tolerated with physical therapy and continue medical management by the hospitalist service. Patrick Arriaza can be discharged from the orthopedic perspective standpoint when stable medically         She will benefit from additional therapy rehabilitation prior to discharge home.        Results for Valmy Jayson \"MIKE\" (MRN [id number]) as of 10/27/2020 13:57       10/25/2020 08:56    Sodium: 132 (L)    Potassium: 3.6    Chloride: 92 (L)    CO2: 27    BUN: 20    Creatinine: 1.1    Anion Gap: 13    GFR Non-: 51 (L)    GFR : >60    Glucose: 136 (H)    Calcium: 8.4    WBC: 15.2 (H)    RBC: 3.14 (L)    Hemoglobin Quant: 9.3 (L)    Hematocrit: 28.3 (L)    MCV: 90.1    MCH: 29.6    MCHC: 32.9    MPV: 9.7    RDW: 13.5    Platelet Count: 885       10/25/2020 10:13    CRP, High Sensitivity: >300.0    Sed Rate: 138 (H)          polyethylene glycol (GLYCOLAX) packet 17 g      Dose: 17 g    Freq: DAILY PRN Route: PO x 1       oxyCODONE-acetaminophen (PERCOCET) 5-325 MG per tablet 1 tablet      Dose: 1 tablet    Freq: EVERY 6 HOURS PRN Route: PO x 3       promethazine (PHENERGAN) tablet 12.5 mg      Dose: 12.5 mg    Freq: EVERY 6 HOURS PRN Route: PO x 1       acetaminophen (TYLENOL) tablet 650 mg      Dose: 650 mg    Freq: EVERY 6 HOURS PRN Route: PO x 1       vancomycin (VANCOCIN) 1,500 mg in dextrose 5 % 500 mL IVPB      Dose: 1,500 mg    Freq: EVERY 18 HOURS Route: IV       rivaroxaban (XARELTO) tablet 10 mg      Dose: 10 mg    Freq: DAILY Route: PO       potassium chloride (KLOR-CON M) extended release tablet 40 mEq      Dose: 40 mEq    Freq: 2 TIMES DAILY WITH MEALS Route: PO       metoprolol tartrate (LOPRESSOR) tablet 50 mg      Dose: 50 mg    Freq: 2 TIMES DAILY Route: PO       hydroCHLOROthiazide (HYDRODIURIL) tablet 12.5 mg      Dose: 12.5 mg    Freq: DAILY Route: PO       famotidine (PEPCID) tablet 20 mg      Dose: 20 mg    Freq: 2 TIMES DAILY Route: PO       ezetimibe (ZETIA) tablet 10 mg      Dose: 10 mg    Freq: DAILY Route: PO       cefTRIAXone (ROCEPHIN) 1 g IVPB in 50 mL D5W minibag      Dose: 1 g    Freq: DAILY Route: IV       cefepime (MAXIPIME) 2 g IVPB minibag      Dose: 2 g    Freq: EVERY 12 HOURS Route: IV       azithromycin (ZITHROMAX) 500 mg in dextrose 5 % 250 mL IVPB      Dose: 500 mg    Freq: EVERY 24 HOURS Route: IV        Musculoskeletal Disease GRG - Care Day 3 (10/24/2020) by Alaina Robledo RN         Review Status  Review Entered    Completed  10/27/2020 13:53        Criteria Review       Care Day: 3 Care Date: 10/24/2020 Level of Care:    Guideline Day 3    Level Of Care    (X) * Activity level acceptable    ( ) * Complete discharge planning    Clinical Status    (X) * Temperature status acceptable    10/27/2020 1:53 PM EDT by Mj Monzon      117/65 99 18 99.2 93% room air    ( ) * No infection, or status acceptable    (X) * C-reactive protein stable, declining, or not indicated    (X) * Respiratory status acceptable    (X) * Neurologic status acceptable    (X) * Vascular, soft tissue, and wound status acceptable    (X) * Pain and nausea absent or adequately managed    (X) * Fracture or injury absent or status acceptable    (X) * No bone and joint infection, or status acceptable    (X) * Rheumatologic and vasculitic status acceptable    ( ) * General Discharge Criteria met    Interventions    (X) * Intake acceptable    (X) * No inpatient interventions needed    * Milestone    Additional Notes    10/24/20       IM Note:       General Appearance:  Comfortable.      Vital signs: (most recent): Blood pressure 117/65, pulse 99, temperature 99.2 °F (37.3 °C), temperature source Oral, resp. rate 18, height 5' 6\" (1.676 m), weight 254 lb 14.4 oz (115.6 kg), SpO2 93 %.  Vital signs are normal.      HEENT: Normal HEENT exam.      Lungs:  There are decreased breath sounds.      Heart: Normal rate.      Abdomen: Abdomen is soft.      Extremities: Decreased range of motion.   (Left foot wrapped and left elbow pain)    Neurological: Patient is alert.      Pupils:  Pupils are equal, round, and reactive to light.      Skin:  Warm.         Subjective      Subjective:    Symptoms:  Stable.      Diet:  Adequate intake.      Pain:  She complains of pain that is mild.            Assessment//Plan                Hospital Problems      Last Modified POA      Closed fracture of left ankle 10/22/2020 Yes              Assessment & Plan    Left sebastien pain due to small fx osteophyte distal tib    -doppler neg for DVT    -ortho eval    -PT/OT    Sepsis due to suspected gram pos pna    -rocephin and zithro    -bld cx, UA neg    Hypokalemia    -on thiazide but chronic    -replace and check mag    HTN    -thiazide and BB    DVt prophylaxis    -xarelto       PT:       Assessment / Impression:                      Patient's tolerance of treatment:  fair    Adverse Reaction: na    Significant change in status and impact:  na    Barriers to improvement:  Pain in LUE and LLE    Plan for Next Session:      Cont. POC    Time in:  1330    Time out:  1355    Timed treatment minutes: 25    Total treatment time: 25         Previously filed items:    Social/Functional History    Lives With: Alone    Type of Home: House    Home Layout: Two level    Home Access: Ramped entrance    Bathroom Shower/Tub: Walk-in shower, Shower chair without back    Bathroom Toilet: Handicap height    Home Equipment: Rolling walker, Cane    ADL Assistance: Independent    Homemaking Assistance: Independent    Ambulation Assistance: Independent    Transfer Assistance: Independent    Active : Yes    Long term goals    Time Frame for Long term goals :  In one week:    Long term goal 1: Pt will complete all bed mobility with CGAx1    Long term goal 2: Pt will complete sit <> stand transfers with minAx1    Long term goal 3: Pt will ambulate 20 feet with modAx2 with LRAD    Long term goal 4: Pt will independently complete 3 sets of 10 reps of BLE AROM exercises in available and allowed ROM          Results for Roselia Ruth \"MIKE\" (MRN [id number]) as of 10/27/2020 13:57       10/24/2020 08:31    Sodium: 137    Potassium: 3.5    Chloride: 96 (L)    CO2: 27    BUN: 18    Creatinine: 1.2 (H)    Anion Gap: 14    GFR Non-: 46 (L)    GFR : 56 (L)    Magnesium: 2.0    Glucose: 151 (H)    Calcium: 8.1 (L)    WBC: 15.9 (H)    RBC: 3.26 (L)    Hemoglobin Quant: 9.4 (L)    Hematocrit: 29.8 (L)    MCV: 91.4    MCH: 28.8    MCHC: 31.5 (L)    MPV: 9.6    RDW: 13.2    Platelet Count: 747 10/24/2020 11:48    XR CHEST LIMITED ONE VIEW: Rpt       10/24/2020 13:06    CT ELBOW LEFT WO CONTRAST: Rpt       azithromycin (ZITHROMAX) 500 mg in dextrose 5 % 250 mL IVPB      Dose: 500 mg    Freq: EVERY 24 HOURS Route: IV       cefTRIAXone (ROCEPHIN) 1 g IVPB in 50 mL D5W minibag      Dose: 1 g    Freq: DAILY Route: IV       ezetimibe (ZETIA) tablet 10 mg      Dose: 10 mg    Freq: DAILY Route: PO       famotidine (PEPCID) tablet 20 mg      Dose: 20 mg    Freq: 2 TIMES DAILY Route: PO       hydroCHLOROthiazide (HYDRODIURIL) tablet 12.5 mg      Dose: 12.5 mg    Freq: DAILY Route: PO       metoprolol tartrate (LOPRESSOR) tablet 50 mg      Dose: 50 mg    Freq: 2 TIMES DAILY Route: PO       potassium chloride (KLOR-CON M) extended release tablet 40 mEq      Dose: 40 mEq    Freq: 2 TIMES DAILY WITH MEALS Route: PO       rivaroxaban (XARELTO) tablet 10 mg      Dose: 10 mg    Freq: DAILY Route: PO       promethazine (PHENERGAN) tablet 12.5 mg      Dose: 12.5 mg    Freq: EVERY 6 HOURS PRN Route: PO x 1       oxyCODONE-acetaminophen (PERCOCET) 5-325 MG per tablet 1 tablet      Dose: 1 tablet    Freq: EVERY 6 HOURS PRN Route: PO x 3

## 2020-10-27 NOTE — PROGRESS NOTES
Patient seen and examined today. She states that she is in excruciating pain with bilateral feet and ankles. There is associated numbness and tingling in her feet bilaterally as well. She has complained of additional red streaking at her great toe. Additionally she has complained of severe back pain and elbow pain. She has been unable to utilize her left elbow and wrist because of severe elbow pain which has restricted her range of motion and ability for use of her arm. Patient states that her hip is doing fine at this time. She does not complain of any pain or issues with the hip. Patient is afebrile. Tachycardic to 100s. Blood pressure stable respirations stable. Pain is 10 out of 10    She has been treated by the hospitalist for suspected pneumonia. She has been told that she may have a infection in her blood. There are no blood culture results identified on her laboratory results. White blood cell count is 13.9 today. She has an elevated CRP over 300. She has an elevated ESR of 138. She has had fevers at home but has been afebrile for over a day now. On exam the left hip shows resolving ecchymosis with intact incision. No erythema, drainage, or induration. No tenderness to palpation at the hip and no pain at the hip with passive range of motion. Examination of the left elbow is difficult to interpret due to severity of pain and stiffness. Range of motion is present from 30 degrees to 90 degrees with pain during active range of motion and passive range of motion. No instability. No specific bony point tenderness but severe tenderness to palpation diffusely throughout the elbow. Pain with supination and pronation. Incomplete mobility of the wrist and hand. Numbness and tingling present in the ulnar nerve distribution of the fingers and hand.    and pinch strength is decreased compared to the contralateral side    Doppler ultrasound of the left lower extremity was situation and her inability to improve with current treatments, the patient and her family members would like to have her transferred to [facility] for further evaluation of her status and evaluation of possible underlying issues. I feel that it is appropriate for her to be transferred and she will follow-up with me as previously scheduled regarding her hip replacement.

## 2020-10-28 ENCOUNTER — APPOINTMENT (OUTPATIENT)
Dept: MRI IMAGING | Age: 60
DRG: 324 | End: 2020-10-28
Payer: COMMERCIAL

## 2020-10-28 LAB
ANION GAP SERPL CALCULATED.3IONS-SCNC: 13 MMOL/L (ref 4–16)
BANDED NEUTROPHILS ABSOLUTE COUNT: 1.38 K/CU MM
BANDED NEUTROPHILS RELATIVE PERCENT: 9 % (ref 5–11)
BUN BLDV-MCNC: 23 MG/DL (ref 6–23)
CALCIUM SERPL-MCNC: 8.4 MG/DL (ref 8.3–10.6)
CHLORIDE BLD-SCNC: 91 MMOL/L (ref 99–110)
CO2: 27 MMOL/L (ref 21–32)
CREAT SERPL-MCNC: 1.2 MG/DL (ref 0.6–1.1)
DIFFERENTIAL TYPE: ABNORMAL
GFR AFRICAN AMERICAN: 56 ML/MIN/1.73M2
GFR NON-AFRICAN AMERICAN: 46 ML/MIN/1.73M2
GLUCOSE BLD-MCNC: 121 MG/DL (ref 70–99)
HCT VFR BLD CALC: 28.6 % (ref 37–47)
HEMOGLOBIN: 9.3 GM/DL (ref 12.5–16)
LYMPHOCYTES ABSOLUTE: 0.9 K/CU MM
LYMPHOCYTES RELATIVE PERCENT: 6 % (ref 24–44)
MCH RBC QN AUTO: 29.9 PG (ref 27–31)
MCHC RBC AUTO-ENTMCNC: 32.5 % (ref 32–36)
MCV RBC AUTO: 92 FL (ref 78–100)
METAMYELOCYTES ABSOLUTE COUNT: 0.31 K/CU MM
METAMYELOCYTES PERCENT: 2 %
MONOCYTES ABSOLUTE: 1.8 K/CU MM
MONOCYTES RELATIVE PERCENT: 12 % (ref 0–4)
PDW BLD-RTO: 13.8 % (ref 11.7–14.9)
PLATELET # BLD: 389 K/CU MM (ref 140–440)
PMV BLD AUTO: 9.2 FL (ref 7.5–11.1)
POTASSIUM SERPL-SCNC: 3.7 MMOL/L (ref 3.5–5.1)
RBC # BLD: 3.11 M/CU MM (ref 4.2–5.4)
SEGMENTED NEUTROPHILS ABSOLUTE COUNT: 10.9 K/CU MM
SEGMENTED NEUTROPHILS RELATIVE PERCENT: 71 % (ref 36–66)
SODIUM BLD-SCNC: 131 MMOL/L (ref 135–145)
WBC # BLD: 15.3 K/CU MM (ref 4–10.5)
WBC # BLD: ABNORMAL 10*3/UL

## 2020-10-28 PROCEDURE — 6370000000 HC RX 637 (ALT 250 FOR IP): Performed by: NURSE PRACTITIONER

## 2020-10-28 PROCEDURE — 36415 COLL VENOUS BLD VENIPUNCTURE: CPT

## 2020-10-28 PROCEDURE — 2580000003 HC RX 258: Performed by: NURSE PRACTITIONER

## 2020-10-28 PROCEDURE — 72148 MRI LUMBAR SPINE W/O DYE: CPT

## 2020-10-28 PROCEDURE — 85007 BL SMEAR W/DIFF WBC COUNT: CPT

## 2020-10-28 PROCEDURE — 73718 MRI LOWER EXTREMITY W/O DYE: CPT

## 2020-10-28 PROCEDURE — 6360000002 HC RX W HCPCS: Performed by: HOSPITALIST

## 2020-10-28 PROCEDURE — 2580000003 HC RX 258: Performed by: HOSPITALIST

## 2020-10-28 PROCEDURE — 94761 N-INVAS EAR/PLS OXIMETRY MLT: CPT

## 2020-10-28 PROCEDURE — 72195 MRI PELVIS W/O DYE: CPT

## 2020-10-28 PROCEDURE — 85027 COMPLETE CBC AUTOMATED: CPT

## 2020-10-28 PROCEDURE — 80048 BASIC METABOLIC PNL TOTAL CA: CPT

## 2020-10-28 PROCEDURE — 1200000000 HC SEMI PRIVATE

## 2020-10-28 PROCEDURE — 6360000002 HC RX W HCPCS: Performed by: INTERNAL MEDICINE

## 2020-10-28 RX ADMIN — FAMOTIDINE 20 MG: 20 TABLET, FILM COATED ORAL at 23:06

## 2020-10-28 RX ADMIN — CEFEPIME 2 G: 2 INJECTION, POWDER, FOR SOLUTION INTRAVENOUS at 14:15

## 2020-10-28 RX ADMIN — RIVAROXABAN 10 MG: 10 TABLET, FILM COATED ORAL at 09:38

## 2020-10-28 RX ADMIN — EZETIMIBE 10 MG: 10 TABLET ORAL at 09:39

## 2020-10-28 RX ADMIN — POTASSIUM CHLORIDE 40 MEQ: 1500 TABLET, EXTENDED RELEASE ORAL at 12:31

## 2020-10-28 RX ADMIN — AZITHROMYCIN MONOHYDRATE 500 MG: 500 INJECTION, POWDER, LYOPHILIZED, FOR SOLUTION INTRAVENOUS at 12:32

## 2020-10-28 RX ADMIN — MORPHINE SULFATE 1 MG: 2 INJECTION, SOLUTION INTRAMUSCULAR; INTRAVENOUS at 23:11

## 2020-10-28 RX ADMIN — VANCOMYCIN HYDROCHLORIDE 1500 MG: 5 INJECTION, POWDER, LYOPHILIZED, FOR SOLUTION INTRAVENOUS at 09:39

## 2020-10-28 RX ADMIN — POTASSIUM CHLORIDE 40 MEQ: 1500 TABLET, EXTENDED RELEASE ORAL at 17:34

## 2020-10-28 RX ADMIN — METOPROLOL TARTRATE 50 MG: 50 TABLET, FILM COATED ORAL at 09:38

## 2020-10-28 RX ADMIN — OXYCODONE HYDROCHLORIDE AND ACETAMINOPHEN 1 TABLET: 5; 325 TABLET ORAL at 17:33

## 2020-10-28 RX ADMIN — FAMOTIDINE 20 MG: 20 TABLET, FILM COATED ORAL at 09:39

## 2020-10-28 RX ADMIN — CEFEPIME 2 G: 2 INJECTION, POWDER, FOR SOLUTION INTRAVENOUS at 02:06

## 2020-10-28 RX ADMIN — METOPROLOL TARTRATE 50 MG: 50 TABLET, FILM COATED ORAL at 23:06

## 2020-10-28 RX ADMIN — HYDROCHLOROTHIAZIDE 12.5 MG: 12.5 TABLET ORAL at 09:38

## 2020-10-28 RX ADMIN — SODIUM CHLORIDE, PRESERVATIVE FREE 10 ML: 5 INJECTION INTRAVENOUS at 09:39

## 2020-10-28 RX ADMIN — OXYCODONE HYDROCHLORIDE AND ACETAMINOPHEN 1 TABLET: 5; 325 TABLET ORAL at 09:39

## 2020-10-28 ASSESSMENT — PAIN SCALES - GENERAL
PAINLEVEL_OUTOF10: 4
PAINLEVEL_OUTOF10: 8
PAINLEVEL_OUTOF10: 4
PAINLEVEL_OUTOF10: 9
PAINLEVEL_OUTOF10: 8
PAINLEVEL_OUTOF10: 4

## 2020-10-28 NOTE — PROGRESS NOTES
[address] [facility]  consulted by Dr. Jayla Bray for monitoring and adjustment. Indication for treatment: Pneumonia, LLE cellulitis   Goal trough: 15 mcg/mL (AUC/MARLYS 400-600)     Pertinent Laboratory Values:   Temp Readings from Last 3 Encounters:   10/28/20 98.3 °F (36.8 °C) (Oral)   10/21/20 98.3 °F (36.8 °C) (Oral)   10/12/20 97.7 °F (36.5 °C) (Temporal)     Recent Labs     10/26/20  1230 10/27/20  1431 10/28/20  0557   WBC 13.9* 15.4* 15.3*     Recent Labs     10/27/20  1431 10/28/20  0557   BUN 22 23   CREATININE 1.2* 1.2*     Estimated Creatinine Clearance: 65 mL/min (A) (based on SCr of 1.2 mg/dL (H)). Intake/Output Summary (Last 24 hours) at 10/28/2020 1452  Last data filed at 10/28/2020 0729  Gross per 24 hour   Intake 740 ml   Output 2500 ml   Net -1760 ml       Pertinent Cultures:  Date    Source    Results  10/12   Urine    No growth  10/12   COVID-19   Negative  10/25                           Blood                                       Ordered    Vancomycin level:   TROUGH:    Recent Labs     10/27/20  1431   VANCOTROUGH 12.3     RANDOM:  No results for input(s): VANCORANDOM in the last 72 hours. Assessment:  · WBC and temperature: WBC elevated;  Afebrile  · SCr, BUN, and urine output: Scr stable  · Day(s) of therapy: 4  · Vancomycin level:   · 10/27: 12.3, therapeutic    Plan:  · Continue vancomycin 1500 mg IVPB q18h  · Renal trends are stable  · Plan to repeat next trough level in 48h  · Pharmacy will continue to monitor patient and adjust therapy as indicated    Jovana 10/30 @14:30    Thank you for the consult,  Tony Guajardo, [id number]  10/28/2020 2:52 PM

## 2020-10-28 NOTE — PROGRESS NOTES
Physical Therapy  PT/OT attempted to see pt this AM but pt refusing due to 9/10 pain in bilateral feet and stating she is supposed to have x-rays this PM. Pt also stating possible transfer to [facility]. Will re-attempt as pt more appropriate.

## 2020-10-28 NOTE — PROGRESS NOTES
Sánchez Lopez MD, [address]                Internal Medicine Hospitalist             Daily Progress  Note   Subjective:     Chief Complaint   Patient presents with    Leg Pain     Ms. Aguillon Complains of pain left ankle area. Objective:    /70   Pulse 104   Temp 98.2 °F (36.8 °C) (Oral)   Resp 16   Ht 5' 6\" (1.676 m)   Wt 249 lb 2 oz (113 kg)   SpO2 94%   BMI 40.21 kg/m²      Intake/Output Summary (Last 24 hours) at 10/28/2020 1007  Last data filed at 10/28/2020 0729  Gross per 24 hour   Intake 1160 ml   Output 2500 ml   Net -1340 ml      Physical Exam:  Heart:  Regular rate and rhythm, normal S1 and S2 in all 4 auscultatory areas. No rubs  Murmurs or gallops heard. Lungs: Mostly clear to auscultation, decreased breath sounds at bases. No wheezes appreciated no crackles heard. Abdomen: Soft, non distended. Bowel sounds appreciated. No obvious liver or spleen enlargement. Non tender, no rebound noted. Extremities: left ankle ]swollen and tender. CNS: Grossly intact. Unable to significantly move her right leg as well ?     Labs:  CBC with Differential:    Lab Results   Component Value Date    WBC 15.3 10/28/2020    RBC 3.11 10/28/2020    HGB 9.3 10/28/2020    HCT 28.6 10/28/2020     10/28/2020    MCV 92.0 10/28/2020    MCH 29.9 10/28/2020    MCHC 32.5 10/28/2020    RDW 13.8 10/28/2020    SEGSPCT 75.6 10/27/2020    LYMPHOPCT 10.0 10/27/2020    MONOPCT 11.3 10/27/2020    EOSPCT 0.3 09/20/2011    BASOPCT 0.5 10/27/2020    MONOSABS 1.7 10/27/2020    LYMPHSABS 1.5 10/27/2020    EOSABS 0.1 10/27/2020    BASOSABS 0.1 10/27/2020    DIFFTYPE AUTOMATED DIFFERENTIAL 10/27/2020     CMP:    Lab Results   Component Value Date     10/28/2020    K 3.7 10/28/2020    CL 91 10/28/2020    CO2 27 10/28/2020    BUN 23 10/28/2020    CREATININE 1.2 10/28/2020    GFRAA 56 10/28/2020    AGRATIO 1.7 12/07/2015    LABGLOM 46 10/28/2020    GLUCOSE 121 10/28/2020    PROT 6.5 10/22/2020    PROT 7.5 09/20/2011 LABALBU 3.3 10/22/2020    CALCIUM 8.4 10/28/2020    BILITOT 1.0 10/22/2020    ALKPHOS 67 10/22/2020    AST 28 10/22/2020    ALT 19 10/22/2020     No results for input(s): TROPONINT in the last 72 hours.   Lab Results   Component Value Date    [facility] 2.740 05/13/2019           vancomycin  1,500 mg Intravenous Q18H    cefepime  2 g Intravenous Q12H    azithromycin  500 mg Intravenous Q24H    sodium chloride flush  10 mL Intravenous 2 times per day    famotidine  20 mg Oral BID    ezetimibe  10 mg Oral Daily    hydroCHLOROthiazide  12.5 mg Oral Daily    metoprolol tartrate  50 mg Oral BID    rivaroxaban  10 mg Oral Daily    potassium chloride  40 mEq Oral BID WC         Assessment:       Patient Active Problem List    Diagnosis Date Noted    Vasovagal syncope      Priority: High    Closed fracture of left ankle 10/22/2020    Unilateral primary osteoarthritis, left hip 10/19/2020    Family history of early CAD 09/22/2020    Palpitations 02/12/2019    Class 2 obesity due to excess calories without serious comorbidity in adult 05/11/2018    S/P laparoscopic cholecystectomy 01/27/2017    Calculus of gallbladder without cholecystitis without obstruction 01/09/2017    Precordial pain 09/28/2016    SOB (shortness of breath) 09/28/2016    Insomnia 04/18/2016    Anxiety 02/18/2016    Osteoarthritis 02/18/2016    Meniere's disease 02/18/2016    Essential hypertension 11/19/2015    Hyperlipidemia 11/19/2015    Allergic rhinitis due to pollen 11/19/2015    Morbid obesity due to excess calories ([address] 11/19/2015    Hearing loss 11/19/2015    Hot flashes due to surgical menopause 11/19/2015    Gastroesophageal reflux disease 11/19/2015    Chronic back pain 11/19/2015       Plan:     Problems being addressed this admission:   Left ankle pain and swelling / Cellulitis  PNA  Back pains  Hyponatremia    Patients family want her to be transferred to [facility] and that has been arranged and we are awaiting bed opening and once that happens she will be transferred. She recently has had left total hip replacement and now has   Suspected fracture through a small osteophyte off the anterior inferior    aspect of the distal tibia at seen best on the lateral view at the level of    the articular surface.  Soft tissue swelling seen at the ankle. On IV antibiotics for suspected cellulitis. Her orthopedic surgeon is also ok with the plan to transfer to [address] [place] [address] meanwhile will continue with the present plan. Her CT chest did not show any PNA, nevertheless will continue with the present plan with antibiotics as she does have an elevated wbc count. She has chronic back pain issues and so would like to be evaluated at [address] [place] [address] for that. Her sodium is slightly down and her Cre is 1.2 and so will continue to monitor. Consultants:  Orthopedics    General Orders:  Repeat basic labs again in am.  I have explained to the patient and discussed with him/her the treatment plan. Also d/w RN.    Jennifer Dunham MD, [address]

## 2020-10-29 ENCOUNTER — APPOINTMENT (OUTPATIENT)
Dept: MRI IMAGING | Age: 60
DRG: 324 | End: 2020-10-29
Payer: COMMERCIAL

## 2020-10-29 LAB
ANION GAP SERPL CALCULATED.3IONS-SCNC: 12 MMOL/L (ref 4–16)
BANDED NEUTROPHILS ABSOLUTE COUNT: 1.59 K/CU MM
BANDED NEUTROPHILS RELATIVE PERCENT: 9 % (ref 5–11)
BUN BLDV-MCNC: 23 MG/DL (ref 6–23)
CALCIUM SERPL-MCNC: 8.7 MG/DL (ref 8.3–10.6)
CHLORIDE BLD-SCNC: 92 MMOL/L (ref 99–110)
CO2: 28 MMOL/L (ref 21–32)
CREAT SERPL-MCNC: 1 MG/DL (ref 0.6–1.1)
DIFFERENTIAL TYPE: ABNORMAL
EOSINOPHILS ABSOLUTE: 0.4 K/CU MM
EOSINOPHILS RELATIVE PERCENT: 2 % (ref 0–3)
GFR AFRICAN AMERICAN: >60 ML/MIN/1.73M2
GFR NON-AFRICAN AMERICAN: 57 ML/MIN/1.73M2
GLUCOSE BLD-MCNC: 109 MG/DL (ref 70–99)
HCT VFR BLD CALC: 27.7 % (ref 37–47)
HEMOGLOBIN: 9.1 GM/DL (ref 12.5–16)
LYMPHOCYTES ABSOLUTE: 1.6 K/CU MM
LYMPHOCYTES RELATIVE PERCENT: 9 % (ref 24–44)
MCH RBC QN AUTO: 29.5 PG (ref 27–31)
MCHC RBC AUTO-ENTMCNC: 32.9 % (ref 32–36)
MCV RBC AUTO: 89.9 FL (ref 78–100)
METAMYELOCYTES ABSOLUTE COUNT: 1.06 K/CU MM
METAMYELOCYTES PERCENT: 6 %
MONOCYTES ABSOLUTE: 1.6 K/CU MM
MONOCYTES RELATIVE PERCENT: 9 % (ref 0–4)
MYELOCYTE PERCENT: 2 %
MYELOCYTES ABSOLUTE COUNT: 0.35 K/CU MM
PDW BLD-RTO: 13.8 % (ref 11.7–14.9)
PLATELET # BLD: 509 K/CU MM (ref 140–440)
PLT MORPHOLOGY: ABNORMAL
PMV BLD AUTO: 9.2 FL (ref 7.5–11.1)
POTASSIUM SERPL-SCNC: 3.9 MMOL/L (ref 3.5–5.1)
RBC # BLD: 3.08 M/CU MM (ref 4.2–5.4)
SEGMENTED NEUTROPHILS ABSOLUTE COUNT: 11.1 K/CU MM
SEGMENTED NEUTROPHILS RELATIVE PERCENT: 63 % (ref 36–66)
SODIUM BLD-SCNC: 132 MMOL/L (ref 135–145)
URIC ACID: 6.2 MG/DL (ref 2.6–6)
WBC # BLD: 17.7 K/CU MM (ref 4–10.5)

## 2020-10-29 PROCEDURE — 6370000000 HC RX 637 (ALT 250 FOR IP): Performed by: NURSE PRACTITIONER

## 2020-10-29 PROCEDURE — 97110 THERAPEUTIC EXERCISES: CPT

## 2020-10-29 PROCEDURE — 97530 THERAPEUTIC ACTIVITIES: CPT

## 2020-10-29 PROCEDURE — 84550 ASSAY OF BLOOD/URIC ACID: CPT

## 2020-10-29 PROCEDURE — 6360000002 HC RX W HCPCS: Performed by: HOSPITALIST

## 2020-10-29 PROCEDURE — 97535 SELF CARE MNGMENT TRAINING: CPT

## 2020-10-29 PROCEDURE — 94761 N-INVAS EAR/PLS OXIMETRY MLT: CPT

## 2020-10-29 PROCEDURE — 85007 BL SMEAR W/DIFF WBC COUNT: CPT

## 2020-10-29 PROCEDURE — 2580000003 HC RX 258: Performed by: HOSPITALIST

## 2020-10-29 PROCEDURE — 6360000002 HC RX W HCPCS: Performed by: INTERNAL MEDICINE

## 2020-10-29 PROCEDURE — 2580000003 HC RX 258: Performed by: NURSE PRACTITIONER

## 2020-10-29 PROCEDURE — 73221 MRI JOINT UPR EXTREM W/O DYE: CPT

## 2020-10-29 PROCEDURE — 80048 BASIC METABOLIC PNL TOTAL CA: CPT

## 2020-10-29 PROCEDURE — 1200000000 HC SEMI PRIVATE

## 2020-10-29 PROCEDURE — 85027 COMPLETE CBC AUTOMATED: CPT

## 2020-10-29 PROCEDURE — 36415 COLL VENOUS BLD VENIPUNCTURE: CPT

## 2020-10-29 RX ORDER — METHYLPREDNISOLONE 4 MG/1
4 TABLET ORAL NIGHTLY
Status: DISCONTINUED | OUTPATIENT
Start: 2020-10-31 | End: 2020-10-31

## 2020-10-29 RX ORDER — METHYLPREDNISOLONE 4 MG/1
8 TABLET ORAL NIGHTLY
Status: COMPLETED | OUTPATIENT
Start: 2020-10-30 | End: 2020-10-30

## 2020-10-29 RX ORDER — METHYLPREDNISOLONE 4 MG/1
4 TABLET ORAL
Status: DISCONTINUED | OUTPATIENT
Start: 2020-10-30 | End: 2020-10-31

## 2020-10-29 RX ADMIN — SODIUM CHLORIDE, PRESERVATIVE FREE 10 ML: 5 INJECTION INTRAVENOUS at 12:47

## 2020-10-29 RX ADMIN — VANCOMYCIN HYDROCHLORIDE 1500 MG: 5 INJECTION, POWDER, LYOPHILIZED, FOR SOLUTION INTRAVENOUS at 02:43

## 2020-10-29 RX ADMIN — OXYCODONE HYDROCHLORIDE AND ACETAMINOPHEN 1 TABLET: 5; 325 TABLET ORAL at 23:28

## 2020-10-29 RX ADMIN — SODIUM CHLORIDE, PRESERVATIVE FREE 10 ML: 5 INJECTION INTRAVENOUS at 21:11

## 2020-10-29 RX ADMIN — POTASSIUM CHLORIDE 40 MEQ: 1500 TABLET, EXTENDED RELEASE ORAL at 18:05

## 2020-10-29 RX ADMIN — MORPHINE SULFATE 1 MG: 2 INJECTION, SOLUTION INTRAMUSCULAR; INTRAVENOUS at 18:21

## 2020-10-29 RX ADMIN — AZITHROMYCIN MONOHYDRATE 500 MG: 500 INJECTION, POWDER, LYOPHILIZED, FOR SOLUTION INTRAVENOUS at 11:37

## 2020-10-29 RX ADMIN — METOPROLOL TARTRATE 50 MG: 50 TABLET, FILM COATED ORAL at 09:11

## 2020-10-29 RX ADMIN — CEFEPIME 2 G: 2 INJECTION, POWDER, FOR SOLUTION INTRAVENOUS at 13:34

## 2020-10-29 RX ADMIN — POTASSIUM CHLORIDE 40 MEQ: 1500 TABLET, EXTENDED RELEASE ORAL at 09:11

## 2020-10-29 RX ADMIN — CEFEPIME 2 G: 2 INJECTION, POWDER, FOR SOLUTION INTRAVENOUS at 02:11

## 2020-10-29 RX ADMIN — RIVAROXABAN 10 MG: 10 TABLET, FILM COATED ORAL at 09:11

## 2020-10-29 RX ADMIN — OXYCODONE HYDROCHLORIDE AND ACETAMINOPHEN 1 TABLET: 5; 325 TABLET ORAL at 02:11

## 2020-10-29 RX ADMIN — METHYLPREDNISOLONE 24 MG: 16 TABLET ORAL at 13:36

## 2020-10-29 RX ADMIN — METOPROLOL TARTRATE 50 MG: 50 TABLET, FILM COATED ORAL at 21:09

## 2020-10-29 RX ADMIN — HYDROCHLOROTHIAZIDE 12.5 MG: 12.5 TABLET ORAL at 09:11

## 2020-10-29 RX ADMIN — EZETIMIBE 10 MG: 10 TABLET ORAL at 09:11

## 2020-10-29 RX ADMIN — VANCOMYCIN HYDROCHLORIDE 1500 MG: 5 INJECTION, POWDER, LYOPHILIZED, FOR SOLUTION INTRAVENOUS at 21:10

## 2020-10-29 RX ADMIN — FAMOTIDINE 20 MG: 20 TABLET, FILM COATED ORAL at 21:09

## 2020-10-29 RX ADMIN — FAMOTIDINE 20 MG: 20 TABLET, FILM COATED ORAL at 09:11

## 2020-10-29 RX ADMIN — SODIUM CHLORIDE, PRESERVATIVE FREE 10 ML: 5 INJECTION INTRAVENOUS at 09:12

## 2020-10-29 ASSESSMENT — PAIN SCALES - GENERAL
PAINLEVEL_OUTOF10: 9
PAINLEVEL_OUTOF10: 8
PAINLEVEL_OUTOF10: 0
PAINLEVEL_OUTOF10: 8
PAINLEVEL_OUTOF10: 8
PAINLEVEL_OUTOF10: 6

## 2020-10-29 ASSESSMENT — PAIN DESCRIPTION - DIRECTION: RADIATING_TOWARDS: BACK

## 2020-10-29 ASSESSMENT — PAIN DESCRIPTION - LOCATION: LOCATION: FOOT;LEG

## 2020-10-29 ASSESSMENT — PAIN DESCRIPTION - ORIENTATION: ORIENTATION: RIGHT

## 2020-10-29 ASSESSMENT — PAIN DESCRIPTION - DESCRIPTORS: DESCRIPTORS: ACHING

## 2020-10-29 ASSESSMENT — PAIN DESCRIPTION - ONSET: ONSET: ON-GOING

## 2020-10-29 ASSESSMENT — PAIN DESCRIPTION - PAIN TYPE: TYPE: ACUTE PAIN

## 2020-10-29 NOTE — CARE COORDINATION
Received call from [facility] stating patient is interesting in admitting to ARU. Reviewed PT note from yesterday (10/28) and patient refused therapy. Per ARU criteria patient has to be participating with therapy. Discussed with [facility]    Also per MD notes patient might be transferring to [facility] at the request of family. Discussed barak Hernandez and requested for clarification. Will continue to follow.

## 2020-10-29 NOTE — PROGRESS NOTES
Comprehensive Nutrition Assessment    Type and Reason for Visit:  Initial(length of stay)    Nutrition Recommendations/Plan:   Continue general diet with snacks as desires  Encourage consistent intake     Nutrition Assessment:  Admit to eval for ankle pain recent hip surgery. Currently on general diet with varying meal intake 25-75%. Reporting reduced appetite during stay, trying to eat small more frequent meals. Not interested in oral nutrition supplements at this time. Moderate nutrition risk at this time with predicted suboptimal intake with reduced appetite. Malnutrition Assessment:  Malnutrition Status: At risk for malnutrition (Comment)    Context:  Acute Illness       Estimated Daily Nutrient Needs:  Energy (kcal):  9150-5624; Weight Used for Energy Requirements:  Current     Protein (g):  71-82 (1.2-1.4 g/kg); Weight Used for Protein Requirements:  Ideal        Fluid (ml/day):  1205-5600; Method Used for Fluid Requirements:  1 ml/kcal      Nutrition Related Findings:  up in bed eating late lunch, feeling poorly due to persistent pain, noted plan to transfer to [facility]      Wounds:  None       Current Nutrition Therapies:    DIET GENERAL;     Anthropometric Measures:  · Height: 5' 6\" (167.6 cm)  · Current Body Weight: 249 lb 1.9 oz (113 kg)   · Admission Body Weight: 255 lb 1.2 oz (115.7 kg)    · Usual Body Weight: 250 lb (113.4 kg)(per hx)     · Ideal Body Weight: 130 lbs; % Ideal Body Weight 191.6 %   · BMI: 40.2  · Adjusted Body Weight:  ; No Adjustment   · BMI Categories: Obese Class 3 (BMI 40.0 or greater)       Nutrition Diagnosis:   · Predicted inadequate energy intake related to pain, other (comment)(poor appetite) as evidenced by constipation, other (comment)(inconsistent meal intake)      Nutrition Interventions:   Food and/or Nutrient Delivery:  Continue Current Diet, Snacks (Comment)  Nutrition Education/Counseling:  Education initiated   Coordination of Nutrition Care:  Continue to monitor

## 2020-10-29 NOTE — PROGRESS NOTES
Ira Bowser MD, [facility]                Internal Medicine Hospitalist             Daily Progress  Note   Subjective:     Chief Complaint   Patient presents with    Leg Pain     Ms. Margreta Simmonds of still having mod to severe pains left ankle. Objective:    /68   Pulse 96   Temp 97.9 °F (36.6 °C) (Oral)   Resp 16   Ht 5' 6\" (1.676 m)   Wt 249 lb 2 oz (113 kg)   SpO2 97%   BMI 40.21 kg/m²      Intake/Output Summary (Last 24 hours) at 10/29/2020 1014  Last data filed at 10/29/2020 0916  Gross per 24 hour   Intake 720 ml   Output 1800 ml   Net -1080 ml      Physical Exam:  Heart:  Regular rate and rhythm, normal S1 and S2 in all 4 auscultatory areas. No rubs  Murmurs or gallops heard. Lungs: Mostly clear to auscultation, decreased breath sounds at bases. No wheezes appreciated no crackles heard. Abdomen: Soft, non distended. Bowel sounds appreciated. No obvious liver or spleen enlargement. Non tender, no rebound noted. Extremities: Left ankle still swollen, tender to touch. CNS: Grossly intact.     Labs:  CBC with Differential:    Lab Results   Component Value Date    WBC 17.7 10/29/2020    RBC 3.08 10/29/2020    HGB 9.1 10/29/2020    HCT 27.7 10/29/2020     10/29/2020    MCV 89.9 10/29/2020    MCH 29.5 10/29/2020    MCHC 32.9 10/29/2020    RDW 13.8 10/29/2020    SEGSPCT 71.0 10/28/2020    BANDSPCT 9 10/28/2020    LYMPHOPCT 6.0 10/28/2020    MONOPCT 12.0 10/28/2020    EOSPCT 0.3 09/20/2011    BASOPCT 0.5 10/27/2020    MONOSABS 1.8 10/28/2020    LYMPHSABS 0.9 10/28/2020    EOSABS 0.1 10/27/2020    BASOSABS 0.1 10/27/2020    DIFFTYPE MANUAL DIFFERENTIAL 10/28/2020     BMP:    Lab Results   Component Value Date     10/29/2020    K 3.9 10/29/2020    CL 92 10/29/2020    CO2 28 10/29/2020    BUN 23 10/29/2020    LABALBU 3.3 10/22/2020    CREATININE 1.0 10/29/2020    CALCIUM 8.7 10/29/2020    GFRAA >60 10/29/2020    LABGLOM 57 10/29/2020    GLUCOSE 109 10/29/2020     No results for

## 2020-10-29 NOTE — PROGRESS NOTES
MRI called and are backed up, asked if we could prioritize which tests need completed first.  Patient unable to lay still for 4 hours for them to be done all at once. Secure message sent to Dr. Ira Bowser and was told the following MRI's are to be done first:  Left foot w/o contrast; lumbar spine, sacrum coccyx.      Garcia Acuna RN

## 2020-10-29 NOTE — ADT AUTH CERT
Utilization Reviews         Musculoskeletal Disease GRG - Care Day 8 (10/29/2020) by Fany Cotter RN         Review Status  Review Entered    Completed  10/29/2020 14:59        Criteria Review       Care Day: 8 Care Date: 10/29/2020 Level of Care:    Guideline Day 3    Level Of Care    ( ) * Activity level acceptable    ( ) * Complete discharge planning    Clinical Status    (X) * Temperature status acceptable    10/29/2020 2:59 PM EDT by Surekha Cortes      Temp 97.9 °F    ( ) * No infection, or status acceptable    ( ) * C-reactive protein stable, declining, or not indicated    (X) * Respiratory status acceptable    10/29/2020 2:59 PM EDT by Surekha Cortes      Lungs: Mostly clear to auscultation, decreased breath sounds at bases. No wheezes appreciated no crackles heard. (X) * Neurologic status acceptable    10/29/2020 2:59 PM EDT by Surekha Cortes      CNS: Grossly intact. ( ) * Vascular, soft tissue, and wound status acceptable    ( ) * Pain and nausea absent or adequately managed    ( ) * Fracture or injury absent or status acceptable    ( ) * No bone and joint infection, or status acceptable    ( ) * Rheumatologic and vasculitic status acceptable    ( ) * General Discharge Criteria met    Interventions    (X) * Intake acceptable    10/29/2020 2:59 PM EDT by Surekha Cortes      [id number] ml    ( ) * No inpatient interventions needed    * Milestone    Additional Notes    10/29    Ms. Nataly Reyes of still having mod to severe pains left ankle.         Objective:     /68   Pulse 96   Temp 97.9 °F (36.6 °C) (Oral)   Resp 16   Ht 5' 6\" (1.676 m)   Wt 249 lb 2 oz (113 kg)   SpO2 97%   BMI 40.21 kg/m²      Physical Exam:    Heart:  Regular rate and rhythm, normal S1 and S2 in all 4 auscultatory areas. No rubs  Murmurs or gallops heard. Lungs: Mostly clear to auscultation, decreased breath sounds at bases. No wheezes appreciated no crackles heard. Abdomen: Soft, non distended. Bowel sounds appreciated. No obvious liver or spleen enlargement. Non tender, no rebound noted. Extremities: Left ankle still swollen, tender to touch. CNS: Grossly intact. Scheduled Medications    · vancomycin 1,500 mg Intravenous Q18H    · cefepime 2 g Intravenous Q12H    · azithromycin 500 mg Intravenous Q24H    · sodium chloride flush 10 mL Intravenous 2 times per day    · famotidine 20 mg Oral BID    · ezetimibe 10 mg Oral Daily    · hydroCHLOROthiazide 12.5 mg Oral Daily    · metoprolol tartrate 50 mg Oral BID    · rivaroxaban 10 mg Oral Daily    · potassium chloride 40 mEq Oral BID WC       Plan:         Problems being addressed this admission:    Left ankle pain and swelling / Cellulitis    PNA    Back pains    Hyponatremia         MRI ankle done possible gout and so will try medrol dose pack o see if that helps check uric acid level. No SOB continue as before for PNA. May need to de escalate antibiotic treatments in am if no change. She has spinal stenosis for her back pains and may need to see a spine surgeon as an out patient continue as before for now. Sodium is 132 today continue to monitor. She can have an external urinary catheter d/w RN. May sandra haider.     Awaiting to be transferred to [facility], (family request) awaiting bed availability.          Updated: 10/29/20 0759      Sodium 132 MMOL/L      Potassium 3.9 MMOL/L      Chloride 92 mMol/L      CO2 28 MMOL/L      Anion Gap 12      BUN 23 MG/DL      CREATININE 1.0 MG/DL      Glucose 109 MG/DL      Calcium 8.7 MG/DL      GFR Non-African American 57 mL/min/1.73m2      GFR African American >60 mL/min/1.73m2       WBC 17.7 K/CU MM      RBC 3.08 M/CU MM      Hemoglobin 9.1 GM/DL      Hematocrit 27.7 %      MCV 89.9 FL      MCH 29.5 PG      MCHC 32.9 %      RDW 13.8 %      Platelets 202 K/CU MM      MPV 9.2 FL      Myelocyte Percent 2 %      Metamyelocytes Relative 6 %      Bands Relative 9 %      Segs Relative 63.0 %      Eosinophils % 2.0 %      Lymphocytes % 9.0 %      Monocytes % 9.0 %      Myelocytes Absolute 0.35 K/CU MM      Metamyelocytes Absolute 1.06 K/CU MM      Bands Absolute 1.59 K/CU MM      Segs Absolute 11.1 K/CU MM      Eosinophils Absolute 0.4 K/CU MM      Lymphocytes Absolute 1.6 K/CU MM      Monocytes Absolute 1.6 K/CU MM      Differential Type MANUAL DIFFERENTIAL      PLT Morphology LARGE        Uric Acid  6.2High  MG/DL                        Musculoskeletal Disease GRG - Care Day 7 (10/28/2020) by Eben Schultz RN         Review Status  Review Entered    Completed  10/29/2020 14:50        Criteria Review       Care Day: 7 Care Date: 10/28/2020 Level of Care:    Guideline Day 3    Level Of Care    ( ) * Activity level acceptable    ( ) * Complete discharge planning    Clinical Status    (X) * Temperature status acceptable    10/29/2020 2:49 PM EDT by Harriet Post      Temp 98.2 °F    ( ) * No infection, or status acceptable    ( ) * C-reactive protein stable, declining, or not indicated    (X) * Respiratory status acceptable    10/29/2020 2:49 PM EDT by Harriet Post      Lungs: Mostly clear to auscultation, decreased breath sounds at bases. No wheezes appreciated no crackles heard    (X) * Neurologic status acceptable    10/29/2020 2:49 PM EDT by Harriet Post      CNS: Grossly intact.  Unable to significantly move her right leg as well    ( ) * Vascular, soft tissue, and wound status acceptable    ( ) * Pain and nausea absent or adequately managed    ( ) * Fracture or injury absent or status acceptable    ( ) * No bone and joint infection, or status acceptable    ( ) * Rheumatologic and vasculitic status acceptable    ( ) * General Discharge Criteria met    Interventions    (X) * Intake acceptable    10/29/2020 2:49 PM EDT by Harriet Post      [id number] ml    ( ) * No inpatient interventions needed    * Milestone    Additional Notes    10/28  MED SURG       Ms. Chelsea Linton Complains of pain left ankle area.      Objective:     /70   Pulse 104   Temp 98.2 °F (36.8 °C) (Oral)   Resp 16   Ht 5' 6\" (1.676 m)   Wt 249 lb 2 oz (113 kg)   SpO2 94%   BMI 40.21 kg/m²           Intake/Output Summary (Last 24 hours) at 10/28/2020 1007    Last data filed at 10/28/2020 0729    Gross per 24 hour    Intake 1160 ml    Output 2500 ml    Net -1340 ml         Physical Exam:    Heart:  Regular rate and rhythm, normal S1 and S2 in all 4 auscultatory areas. No rubs  Murmurs or gallops heard. Lungs: Mostly clear to auscultation, decreased breath sounds at bases. No wheezes appreciated no crackles heard. Abdomen: Soft, non distended. Bowel sounds appreciated. No obvious liver or spleen enlargement. Non tender, no rebound noted. Extremities: left ankle ]swollen and tender. CNS: Grossly intact. Unable to significantly move her right leg as well ?         Scheduled Medications    · vancomycin 1,500 mg Intravenous Q18H    · cefepime 2 g Intravenous Q12H    · azithromycin 500 mg Intravenous Q24H    · sodium chloride flush 10 mL Intravenous 2 times per day    · famotidine 20 mg Oral BID    · ezetimibe 10 mg Oral Daily    · hydroCHLOROthiazide 12.5 mg Oral Daily    · metoprolol tartrate 50 mg Oral BID    · rivaroxaban 10 mg Oral Daily    · potassium chloride 40 mEq Oral BID WC       Plan:         Problems being addressed this admission:    Left ankle pain and swelling / Cellulitis    PNA    Back pains    Hyponatremia         Patients family want her to be transferred to [facility] and that has been arranged and we are awaiting bed opening and once that happens she will be transferred. She recently has had left total hip replacement and now has    Suspected fracture through a small osteophyte off the anterior inferior    aspect of the distal tibia at seen best on the lateral view at the level of    the articular surface.  Soft tissue swelling seen at the ankle.         On IV antibiotics for suspected cellulitis.  Her orthopedic

## 2020-10-29 NOTE — PROGRESS NOTES
Occupational Therapy  . Occupational Therapy Treatment Note  Name: Alon Flores MRN: [id number] :   1960   Date:  10/29/2020   Admission Date: 10/22/2020 Room:  11291129-A   Restrictions/Precautions:  Restrictions/Precautions  Restrictions/Precautions: General Precautions, Fall Risk, Weight Bearing   General Precautions, Fall Risk, WBAT Lt LE with CAM boot, Posterior Hip Precautions    Communication with other providers:  Per chart review, patient is appropriate for therapeutic intervention, has had recent MRI's c no results back yet. Co-Tx c PTA Gloriann Deep. Per chart, pt has possible transfer to [facility]. Subjective:  Patient states:  \"I stil have a lot of pain in my left ankle. \" Pt reports Dr. Mar Sams removed the fracture boot \"due to tightness\". Pt requests no application of it this Tx session. \"My left arm is moving better than it did. \"  Pain:   Location, Type, Intensity (0/10 to 10/10):  7/10, L ankle, up to 8/10 s/p sitting EOB, denies pain in LUE \"unless I move it a certain way\". Objective:    Observation:  Pt received in semi-fowlers in bed. Pt exhibits decreased AROM c LUE, requires increased time to move it. Objective Measures: Damon catheter, RUE saline lock. Treatment, including education:  Therapeutic Activity Training:   Therapeutic activity training was instructed today. Cues were given for safety, sequence, UE/LE placement, awareness, and balance. Activities performed today included bed mobility training, sup-sit, sit-stand, SPT. Supine<>sit: Min A x2 + cues and bed rail  Scooting: Min A x2 for L hip and for support of trunk during scoot to EOB  Sitting balance / tolerance: SBA seated EOB x20 minutes for exercises and ADL tasks. No transfer training this date due to pt's request and deferred while waiting for MRI testing results. Self Care Training:   Cues were given for safety, sequence, UE/LE placement, visual cues, and balance.     Activities performed today included bathing / dressing, hand hygiene, and grooming. Bathing: SBA seated EOB to wash UB and supine in bed to wash freddie area, deferred other parts at this time. UB Dressing: Mod A (for back of gown and for assist up to R shoulder d/t decreased ROM in LUE  LB Dressing: Dep to doff / don B  socks  Grooming: SBA seated EOB to wash face and perform hair care    Therapeutic Exercise:  Cues were given for technique, safety, recruitment, and rationale. Cues were verbal and/or tactile. With modifications for LUE decreased ROM, pt performed Sup + vc's for technique to perform BUE AROM exercises to include: shoulder flex/extension, internal/external rotation, chest presses, bicep curls, rowing, and supination/pronation x10 reps each c rest breaks PRN to manage activity tolerance. .     All therapeutic intervention performed c emphasis on dynamic balance / standing tolerance to inc strength, endurance and act tolerance for inc Indep c ADL tasks, func transfers / mobility. Safety  Patient safely in bed + alarm at end of session, with call light/phone in reach, and nursing aware. Gait belt was used for func transfers / mobility. Assessment / Impression:        Patient's tolerance of treatment:  Well   Adverse Reaction: None  Significant change in status and impact:  Mild improvement in LUE AROM during functional tasks, continues to be limited by decreased ROM and L ankle pain  Barriers to improvement:  Pain, weight bearing precautions / posterior hip precautions, decreased strength    Plan for Next Session:    Continue per OT POC c plan to address functional transfer training when pt able to tolerate re-application of fracture boot and no contraindications following MRI testing. Time in:  0900  Time out:  0958  Timed treatment minutes:  58  Total treatment time:  58    Electronically signed by:    SILVIA Pedroza  10/29/2020, 9:36 AM    Previously filed values:       Goals:  1.  Pt will complete all aspects of bed mobility for EOB/OOB ADLs CGA with HOB flat  2. Pt will complete UB/LB bathing mod A with setup using long-handled sponge   3. Pt will complete all aspects of LB dressing mod A with setup using AE  4. Pt will complete all functional transfers to and from bed, chair, toilet, shower chair min A/good safety awareness  5. Pt will ambulate HH distance to bathroom for toileting mod A x 2 with RW and Lt CAM boot  6. Pt will complete all aspects of toileting task mod A on BSC/regular toilet  7. Pt will complete oral hygiene/grooming routine in standing in unsupported sitting EOB with supervision/setup  8.  Pt will complete ther ex/ther act with focus on UE strengthening and functional standing tolerance >5 minutes

## 2020-10-29 NOTE — PROGRESS NOTES
Physical Therapy    Physical Therapy Treatment Note  Name: Desiree Gonsalves MRN: [id number] :   1960   Date:  10/29/2020   Admission Date: 10/22/2020 Room:  [address] [place] [address]-A   Restrictions/Precautions:  Restrictions/Precautions  Restrictions/Precautions: General Precautions, Fall Risk, Weight Bearing Lower Extremity Weight Bearing Restrictions  Left Lower Extremity Weight Bearing: Weight Bearing As Tolerated(in walking boot)     Communication with other providers:  Per nurse ok to tx and waiting for MRI result and still possible transfer to [facility]. Co-tx with [facility], refer to OT note for ADLs  Subjective:  Patient states:  Needs encouragement to participate in tx session. Per pt doctor took cam bot off. Pain:   Location, Type, Intensity (0/10 to 10/10):  8-9 after actiivity  Objective:    Observation:  Alert and oriented  Treatment, including education/measures:  Sup<=>sit min  assist of 2 and cues using bed rail  Pt was able to sit EOB x 20 minutes for ex and ADLs  Ex in sitting and sup:  10 reps x 2 aps and circles  10 reps quad sets  10 reps glut sets  10 reps laqs  10 reps heel slides  10 reps x2 saq  10 reps abd/add on right LE only  Safety  Patient left safely in the bed, with call light/phone in reach with alarm applied. Gait belt was used for transfers and gait. Assessment / Impression:       Patient's tolerance of treatment:  fair  Adverse Reaction: na  Significant change in status and impact:  na  Barriers to improvement: Pain,  Strength and safety  Plan for Next Session:    Cont.  POC  Time in:  0900  Time out:  0958  Timed treatment minutes:  58  Total treatment time:  62    Previously filed items:  Social/Functional History  Lives With: Alone  Type of Home: House  Home Layout: Two level  Home Access: Ramped entrance  Bathroom Shower/Tub: Walk-in shower, Shower chair without back  Bathroom Toilet: Handicap height  Home Equipment: Rolling walker, Cane  ADL Assistance: [address] [place] [address] Avenue: Independent  Ambulation Assistance: Independent  Transfer Assistance: Independent  Active : Yes     Long term goals  Time Frame for Long term goals :  In one week:  Long term goal 1: Pt will complete all bed mobility with CGAx1  Long term goal 2: Pt will complete sit <> stand transfers with minAx1  Long term goal 3: Pt will ambulate 20 feet with modAx2 with LRAD  Long term goal 4: Pt will independently complete 3 sets of 10 reps of BLE AROM exercises in available and allowed ROM    Electronically signed by:    Stephanie Sharp PTA  10/29/2020, 8:52 AM

## 2020-10-29 NOTE — CARE COORDINATION
Per the hospitlist charting Pt to be transferred to [facility] once bed is open.      Electronically signed by JOSE LUIS Guy on 10/29/2020 at 10:11 AM

## 2020-10-30 ENCOUNTER — APPOINTMENT (OUTPATIENT)
Dept: GENERAL RADIOLOGY | Age: 60
DRG: 324 | End: 2020-10-30
Payer: COMMERCIAL

## 2020-10-30 LAB
ANION GAP SERPL CALCULATED.3IONS-SCNC: 13 MMOL/L (ref 4–16)
ATYPICAL LYMPHOCYTE ABSOLUTE COUNT: ABNORMAL
BANDED NEUTROPHILS ABSOLUTE COUNT: 1.22 K/CU MM
BANDED NEUTROPHILS RELATIVE PERCENT: 6 % (ref 5–11)
BUN BLDV-MCNC: 23 MG/DL (ref 6–23)
CALCIUM SERPL-MCNC: 9.2 MG/DL (ref 8.3–10.6)
CHLORIDE BLD-SCNC: 94 MMOL/L (ref 99–110)
CO2: 27 MMOL/L (ref 21–32)
CREAT SERPL-MCNC: 1 MG/DL (ref 0.6–1.1)
DIFFERENTIAL TYPE: ABNORMAL
DOSE AMOUNT: NORMAL
DOSE TIME: NORMAL
GFR AFRICAN AMERICAN: >60 ML/MIN/1.73M2
GFR NON-AFRICAN AMERICAN: 57 ML/MIN/1.73M2
GLUCOSE BLD-MCNC: 131 MG/DL (ref 70–99)
HCT VFR BLD CALC: 30.1 % (ref 37–47)
HEMOGLOBIN: 9.8 GM/DL (ref 12.5–16)
LYMPHOCYTES ABSOLUTE: 0.8 K/CU MM
LYMPHOCYTES RELATIVE PERCENT: 4 % (ref 24–44)
MCH RBC QN AUTO: 29.7 PG (ref 27–31)
MCHC RBC AUTO-ENTMCNC: 32.6 % (ref 32–36)
MCV RBC AUTO: 91.2 FL (ref 78–100)
METAMYELOCYTES ABSOLUTE COUNT: 1.42 K/CU MM
METAMYELOCYTES PERCENT: 7 %
MONOCYTES ABSOLUTE: 1.2 K/CU MM
MONOCYTES RELATIVE PERCENT: 6 % (ref 0–4)
PDW BLD-RTO: 13.7 % (ref 11.7–14.9)
PLATELET # BLD: 797 K/CU MM (ref 140–440)
PLT MORPHOLOGY: ABNORMAL
PMV BLD AUTO: 9.6 FL (ref 7.5–11.1)
POLYCHROMASIA: ABNORMAL
POTASSIUM SERPL-SCNC: 4.4 MMOL/L (ref 3.5–5.1)
RBC # BLD: 3.3 M/CU MM (ref 4.2–5.4)
SEGMENTED NEUTROPHILS ABSOLUTE COUNT: 15.7 K/CU MM
SEGMENTED NEUTROPHILS RELATIVE PERCENT: 77 % (ref 36–66)
SODIUM BLD-SCNC: 134 MMOL/L (ref 135–145)
TOXIC GRANULATION: PRESENT
VANCOMYCIN TROUGH: 15.6 UG/ML (ref 10–20)
WBC # BLD: 20.3 K/CU MM (ref 4–10.5)

## 2020-10-30 PROCEDURE — 85027 COMPLETE CBC AUTOMATED: CPT

## 2020-10-30 PROCEDURE — 2580000003 HC RX 258: Performed by: NURSE PRACTITIONER

## 2020-10-30 PROCEDURE — 6360000002 HC RX W HCPCS: Performed by: HOSPITALIST

## 2020-10-30 PROCEDURE — 71045 X-RAY EXAM CHEST 1 VIEW: CPT

## 2020-10-30 PROCEDURE — 6360000002 HC RX W HCPCS: Performed by: INTERNAL MEDICINE

## 2020-10-30 PROCEDURE — 94761 N-INVAS EAR/PLS OXIMETRY MLT: CPT

## 2020-10-30 PROCEDURE — 97530 THERAPEUTIC ACTIVITIES: CPT

## 2020-10-30 PROCEDURE — 2580000003 HC RX 258: Performed by: HOSPITALIST

## 2020-10-30 PROCEDURE — 1200000000 HC SEMI PRIVATE

## 2020-10-30 PROCEDURE — 85007 BL SMEAR W/DIFF WBC COUNT: CPT

## 2020-10-30 PROCEDURE — 97110 THERAPEUTIC EXERCISES: CPT

## 2020-10-30 PROCEDURE — 6370000000 HC RX 637 (ALT 250 FOR IP): Performed by: NURSE PRACTITIONER

## 2020-10-30 PROCEDURE — 80202 ASSAY OF VANCOMYCIN: CPT

## 2020-10-30 PROCEDURE — 80048 BASIC METABOLIC PNL TOTAL CA: CPT

## 2020-10-30 PROCEDURE — 36415 COLL VENOUS BLD VENIPUNCTURE: CPT

## 2020-10-30 RX ORDER — AZITHROMYCIN 250 MG/1
500 TABLET, FILM COATED ORAL DAILY
Status: DISCONTINUED | OUTPATIENT
Start: 2020-10-31 | End: 2020-10-31

## 2020-10-30 RX ADMIN — EZETIMIBE 10 MG: 10 TABLET ORAL at 08:45

## 2020-10-30 RX ADMIN — POTASSIUM CHLORIDE 40 MEQ: 1500 TABLET, EXTENDED RELEASE ORAL at 18:00

## 2020-10-30 RX ADMIN — VANCOMYCIN HYDROCHLORIDE 1500 MG: 5 INJECTION, POWDER, LYOPHILIZED, FOR SOLUTION INTRAVENOUS at 15:54

## 2020-10-30 RX ADMIN — OXYCODONE HYDROCHLORIDE AND ACETAMINOPHEN 1 TABLET: 5; 325 TABLET ORAL at 14:56

## 2020-10-30 RX ADMIN — DOCUSATE SODIUM 100 MG: 100 CAPSULE, LIQUID FILLED ORAL at 08:45

## 2020-10-30 RX ADMIN — CEFEPIME 2 G: 2 INJECTION, POWDER, FOR SOLUTION INTRAVENOUS at 14:57

## 2020-10-30 RX ADMIN — METHYLPREDNISOLONE 4 MG: 4 TABLET ORAL at 18:00

## 2020-10-30 RX ADMIN — OXYCODONE HYDROCHLORIDE AND ACETAMINOPHEN 1 TABLET: 5; 325 TABLET ORAL at 22:16

## 2020-10-30 RX ADMIN — METHYLPREDNISOLONE 4 MG: 4 TABLET ORAL at 12:22

## 2020-10-30 RX ADMIN — SODIUM CHLORIDE, PRESERVATIVE FREE 10 ML: 5 INJECTION INTRAVENOUS at 22:19

## 2020-10-30 RX ADMIN — SODIUM CHLORIDE, PRESERVATIVE FREE 10 ML: 5 INJECTION INTRAVENOUS at 12:22

## 2020-10-30 RX ADMIN — FAMOTIDINE 20 MG: 20 TABLET, FILM COATED ORAL at 08:45

## 2020-10-30 RX ADMIN — FAMOTIDINE 20 MG: 20 TABLET, FILM COATED ORAL at 22:16

## 2020-10-30 RX ADMIN — METHYLPREDNISOLONE 4 MG: 4 TABLET ORAL at 06:21

## 2020-10-30 RX ADMIN — HYDROCHLOROTHIAZIDE 12.5 MG: 12.5 TABLET ORAL at 08:45

## 2020-10-30 RX ADMIN — POTASSIUM CHLORIDE 40 MEQ: 1500 TABLET, EXTENDED RELEASE ORAL at 08:45

## 2020-10-30 RX ADMIN — METOPROLOL TARTRATE 50 MG: 50 TABLET, FILM COATED ORAL at 22:16

## 2020-10-30 RX ADMIN — RIVAROXABAN 10 MG: 10 TABLET, FILM COATED ORAL at 08:45

## 2020-10-30 RX ADMIN — METOPROLOL TARTRATE 50 MG: 50 TABLET, FILM COATED ORAL at 08:45

## 2020-10-30 RX ADMIN — AZITHROMYCIN MONOHYDRATE 500 MG: 500 INJECTION, POWDER, LYOPHILIZED, FOR SOLUTION INTRAVENOUS at 12:22

## 2020-10-30 RX ADMIN — OXYCODONE HYDROCHLORIDE AND ACETAMINOPHEN 1 TABLET: 5; 325 TABLET ORAL at 08:45

## 2020-10-30 RX ADMIN — CEFEPIME 2 G: 2 INJECTION, POWDER, FOR SOLUTION INTRAVENOUS at 02:18

## 2020-10-30 RX ADMIN — METHYLPREDNISOLONE 8 MG: 4 TABLET ORAL at 22:17

## 2020-10-30 ASSESSMENT — PAIN SCALES - GENERAL
PAINLEVEL_OUTOF10: 6

## 2020-10-30 ASSESSMENT — PAIN DESCRIPTION - PAIN TYPE: TYPE: ACUTE PAIN

## 2020-10-30 ASSESSMENT — PAIN DESCRIPTION - LOCATION: LOCATION: FOOT;ARM

## 2020-10-30 NOTE — CARE COORDINATION
Nursing to CM desk to ask about transfer to [address] [place] [address]. Instructed nurse to call the [facility] to check on transfer. Per nursing, the transfer process has not been initiated. CM called pt sister Trisha Giles, who initially requested the transfer, to discuss with her. Trisha Giles stated she does not want the patient to be transferred any more since the source of her pain has been found and she is being treated for it. Trisha Giles asked if patient would be able to go to ARU at discharge. Informed her that patient needs to work with therapy in order for that to possibly be approved. This CM encouraged Trisha Giles to call her sister to discuss a back up plan for SNF. Trisha Giles stated she would talk with her sister. Informed her that CM will follow up with pt this afternoon. Referral was called to [facility], ARU admissions.

## 2020-10-30 NOTE — PROGRESS NOTES
Writer called [facility] to check progress of pending transfer to [facility]. [facility] states that this Pt is not on their board.  PS sent to MD.

## 2020-10-30 NOTE — CARE COORDINATION
Reviewed patients therapy note from today. Patient presenting with poor participation with therapy. Patient not ARU appropriate. Notified Ambar LEE

## 2020-10-30 NOTE — PROGRESS NOTES
[address] [facility]  consulted by Dr. Toño Ospina for monitoring and adjustment. Indication for treatment: Pneumonia, LLE cellulitis   Goal trough: 15 mcg/mL (AUC/MARLYS 400-600)     Pertinent Laboratory Values:   Temp Readings from Last 3 Encounters:   10/30/20 97.7 °F (36.5 °C) (Oral)   10/21/20 98.3 °F (36.8 °C) (Oral)   10/12/20 97.7 °F (36.5 °C) (Temporal)     Recent Labs     10/28/20  0557 10/29/20  0607 10/30/20  0620   WBC 15.3* 17.7* 20.3*     Recent Labs     10/28/20  0557 10/29/20  0607 10/30/20  0620   BUN 23 23 23   CREATININE 1.2* 1.0 1.0     Estimated Creatinine Clearance: 78 mL/min (based on SCr of 1 mg/dL). Intake/Output Summary (Last 24 hours) at 10/30/2020 1643  Last data filed at 10/30/2020 1222  Gross per 24 hour   Intake 995 ml   Output 1675 ml   Net -680 ml       Pertinent Cultures:  Date    Source    Results  10/12   Urine    No growth  10/12   COVID-19   Negative  10/25                           Blood                                       Ordered    Vancomycin level:   TROUGH:    Recent Labs     10/30/20  1432   VANCOTROUGH 15.6     RANDOM:  No results for input(s): VANCORANDOM in the last 72 hours. Assessment:  · WBC and temperature: WBC elevated;  Afebrile  · SCr, BUN, and urine output: Scr stable  · Day(s) of therapy: 6  · Vancomycin level:   · 10/27: 12.3, therapeutic  · 10/30: 15.6, therapeutic    Plan:  · Continue vancomycin 1500 mg IVPB q18h with a therapeutic trough  · Renal trends are stable  · Plan to repeat next trough level in 72h  · Pharmacy will continue to monitor patient and adjust therapy as indicated    Jovana 11/2 @7028    Thank you for the consult,  Cora Carranza  10/30/2020 4:43 PM

## 2020-10-30 NOTE — PROGRESS NOTES
Physical Therapy    Physical Therapy Treatment Note  Name: Martin Roberts MRN: [id number] :   1960   Date:  10/30/2020   Admission Date: 10/22/2020 Room:  [address] [place], [place] [address]-   Restrictions/Precautions:  Restrictions/Precautions  Restrictions/Precautions: General Precautions, Fall Risk, Weight Bearing Lower Extremity Weight Bearing Restrictions  Left Lower Extremity Weight Bearing: Weight Bearing As Tolerated(in walking boot)     Communication with other providers:  Per nurse ok to tx and checking on transfer to [facility]. Subjective:  Patient states:  Pt agreeable to sitting EOB and doing ex but declined working on transfers or standing. Pt states\" if I'm still here on Monday I will work on getting up\". Pain:   Location, Type, Intensity (0/10 to 10/10):  Pt rates pain at end of tx in left UE/LE 7-8 but not due for pain meds and will call when due. Objective:    Observation:  Alert and oriented. Pt tends to hold left UE in guarded position. Treatment, including education/measures:  Sup to sit with HOB up using right bed rail and sba. Pt wanting to perform transfer with out assist but needing increased time and effort. Sit to sup mod assist to lift legs into bed. Scooting to middle of bed attempted x 2 with cues to use bead features and to bridge bottom over in bed bed but unable and needing max assist with pad to scoot to middle of bed. Scooting to [facility] with right bed rail and cues for bridging with mod assist using bed pad.   Pt was able to sit EOB x 20 minutes working on ex in sitting:  Trunk stretches with shoulder flex and cues for deep breathing  10 reps horizontal UE abd/add  5 reps shoulder flex using right UE to assist left UE  10 reps x 2 aps and circles  10 reps x 3 laqs (twice alternating left and right and pone with both legs at sometime)   Ex in sup:  20 reps quad sets  20 reps glut sets  10 reps aps  saqs 10 reps x 3 on RLE, 5 reps x 4 on LLE  10 reps abd/add RLE, not tolerated on LLE  Heel slides 10 reps x 3 on RLE, 10 reps on LLE  Attempted to lay left UE straight resting on bed beside pt. Pt has very stiff elbow. Safety  Patient left safely in the bed, with call light/phone in reach with alarm applied. Gait belt was used for transfers and gait. Assessment / Impression:       Patient's tolerance of treatment:  good  Adverse Reaction: na  Significant change in status and impact:  na  Barriers to improvement:  Pain and strength  Plan for Next Session:    Cont. POC  Time in:  0950  Time out:  1100  Timed treatment minutes:  70  Total treatment time:  79    Previously filed items:  Social/Functional History  Lives With: Alone  Type of Home: House  Home Layout: Two level  Home Access: Ramped entrance  Bathroom Shower/Tub: Walk-in shower, Shower chair without back  Bathroom Toilet: Handicap height  Home Equipment: Rolling walker, Cane  ADL Assistance: Independent  Homemaking Assistance: Independent  Ambulation Assistance: Independent  Transfer Assistance: Independent  Active : Yes     Long term goals  Time Frame for Long term goals :  In one week:  Long term goal 1: Pt will complete all bed mobility with CGAx1  Long term goal 2: Pt will complete sit <> stand transfers with minAx1  Long term goal 3: Pt will ambulate 20 feet with modAx2 with LRAD  Long term goal 4: Pt will independently complete 3 sets of 10 reps of BLE AROM exercises in available and allowed ROM    Electronically signed by:    Eugenio Canales PTA  10/30/2020, 8:39 AM

## 2020-10-30 NOTE — PROGRESS NOTES
Estevan Hager MD, [address]                Internal Medicine Hospitalist             Daily Progress  Note   Subjective:     Chief Complaint   Patient presents with    Leg Pain     Ms. Aguillon Complains of left ankle is better slightly. Objective:    /63   Pulse 86   Temp 97.7 °F (36.5 °C) (Oral)   Resp 16   Ht 5' 6\" (1.676 m)   Wt 247 lb 9.6 oz (112.3 kg)   SpO2 94%   BMI 39.96 kg/m²      Intake/Output Summary (Last 24 hours) at 10/30/2020 1715  Last data filed at 10/30/2020 1222  Gross per 24 hour   Intake 995 ml   Output 1675 ml   Net -680 ml      Physical Exam:  Heart:  Regular rate and rhythm, normal S1 and S2 in all 4 auscultatory areas. No rubs  Murmurs or gallops heard. Lungs: Mostly clear to auscultation, decreased breath sounds at bases. No wheezes appreciated no crackles heard. Abdomen: Soft, non distended. Bowel sounds appreciated. No obvious liver or spleen enlargement. Non tender, no rebound noted. Extremities:  Some ROM left ankle still swollen no erythema. CNS: Grossly intact.     Labs:  CBC with Differential:    Lab Results   Component Value Date    WBC 20.3 10/30/2020    RBC 3.30 10/30/2020    HGB 9.8 10/30/2020    HCT 30.1 10/30/2020     10/30/2020    MCV 91.2 10/30/2020    MCH 29.7 10/30/2020    MCHC 32.6 10/30/2020    RDW 13.7 10/30/2020    SEGSPCT 77.0 10/30/2020    BANDSPCT 6 10/30/2020    LYMPHOPCT 4.0 10/30/2020    MONOPCT 6.0 10/30/2020    MYELOPCT 2 10/29/2020    EOSPCT 0.3 09/20/2011    BASOPCT 0.5 10/27/2020    MONOSABS 1.2 10/30/2020    LYMPHSABS 0.8 10/30/2020    EOSABS 0.4 10/29/2020    BASOSABS 0.1 10/27/2020    DIFFTYPE MANUAL DIFFERENTIAL 10/30/2020     CMP:    Lab Results   Component Value Date     10/30/2020    K 4.4 10/30/2020    CL 94 10/30/2020    CO2 27 10/30/2020    BUN 23 10/30/2020    CREATININE 1.0 10/30/2020    GFRAA >60 10/30/2020    AGRATIO 1.7 12/07/2015    LABGLOM 57 10/30/2020    GLUCOSE 131 10/30/2020    PROT 6.5 10/22/2020 PROT 7.5 09/20/2011    LABALBU 3.3 10/22/2020    CALCIUM 9.2 10/30/2020    BILITOT 1.0 10/22/2020    ALKPHOS 67 10/22/2020    AST 28 10/22/2020    ALT 19 10/22/2020     No results for input(s): TROPONINT in the last 72 hours.   Lab Results   Component Value Date    TSH 2.740 05/13/2019           methylPREDNISolone  4 mg Oral QAM AC    methylPREDNISolone  4 mg Oral Lunch    methylPREDNISolone  4 mg Oral Dinner    methylPREDNISolone  8 mg Oral Nightly    [START ON 10/31/2020] methylPREDNISolone  4 mg Oral Nightly    vancomycin  1,500 mg Intravenous Q18H    cefepime  2 g Intravenous Q12H    azithromycin  500 mg Intravenous Q24H    sodium chloride flush  10 mL Intravenous 2 times per day    famotidine  20 mg Oral BID    ezetimibe  10 mg Oral Daily    hydroCHLOROthiazide  12.5 mg Oral Daily    metoprolol tartrate  50 mg Oral BID    rivaroxaban  10 mg Oral Daily    potassium chloride  40 mEq Oral BID WC         Assessment:       Patient Active Problem List    Diagnosis Date Noted    Vasovagal syncope      Priority: High    Closed fracture of left ankle 10/22/2020    Unilateral primary osteoarthritis, left hip 10/19/2020    Family history of early CAD 09/22/2020    Palpitations 02/12/2019    Class 2 obesity due to excess calories without serious comorbidity in adult 05/11/2018    S/P laparoscopic cholecystectomy 01/27/2017    Calculus of gallbladder without cholecystitis without obstruction 01/09/2017    Precordial pain 09/28/2016    SOB (shortness of breath) 09/28/2016    Insomnia 04/18/2016    Anxiety 02/18/2016    Osteoarthritis 02/18/2016    Meniere's disease 02/18/2016    Essential hypertension 11/19/2015    Hyperlipidemia 11/19/2015    Allergic rhinitis due to pollen 11/19/2015    Morbid obesity due to excess calories ([facility] [address] 11/19/2015    Hearing loss 11/19/2015    Hot flashes due to surgical menopause 11/19/2015    Gastroesophageal reflux disease 11/19/2015    Chronic back pain 11/19/2015       Plan:     Problems being addressed this admission:   Left ankle pain and swelling / ? gout  PNA  Back pains  Hyponatremia     I had started her on Medrol dose pack yesterday and seems like the ankle is slightly better. Her last uric acid was 6.2  And so very marginal.  Continue to monitor overall. Wbc going up could be demargination. No fevers. Stop 1/v zithromax start po. . No SOB. Her sodium is 134 today. I will consult rheumatology for her possible gout an d left elbow changes. I will stop cefepime. Patient may need SNF once she decides on a facility. Consultants:  Orthopedics  Rheumatology    General Orders:  Repeat basic labs again in am.  I have explained to the patient and discussed with him/her the treatment plan.   Jyoti Dolan MD, [place]

## 2020-10-31 LAB
ANION GAP SERPL CALCULATED.3IONS-SCNC: 12 MMOL/L (ref 4–16)
ANISOCYTOSIS: ABNORMAL
BANDED NEUTROPHILS ABSOLUTE COUNT: 2.24 K/CU MM
BANDED NEUTROPHILS RELATIVE PERCENT: 12 % (ref 5–11)
BUN BLDV-MCNC: 27 MG/DL (ref 6–23)
CALCIUM SERPL-MCNC: 9.1 MG/DL (ref 8.3–10.6)
CHLORIDE BLD-SCNC: 95 MMOL/L (ref 99–110)
CO2: 26 MMOL/L (ref 21–32)
CREAT SERPL-MCNC: 1 MG/DL (ref 0.6–1.1)
DIFFERENTIAL TYPE: ABNORMAL
GFR AFRICAN AMERICAN: >60 ML/MIN/1.73M2
GFR NON-AFRICAN AMERICAN: 57 ML/MIN/1.73M2
GLUCOSE BLD-MCNC: 130 MG/DL (ref 70–99)
HCT VFR BLD CALC: 30.9 % (ref 37–47)
HEMOGLOBIN: 9.7 GM/DL (ref 12.5–16)
LYMPHOCYTES ABSOLUTE: 1.9 K/CU MM
LYMPHOCYTES RELATIVE PERCENT: 10 % (ref 24–44)
MCH RBC QN AUTO: 29 PG (ref 27–31)
MCHC RBC AUTO-ENTMCNC: 31.4 % (ref 32–36)
MCV RBC AUTO: 92.5 FL (ref 78–100)
METAMYELOCYTES ABSOLUTE COUNT: 0.94 K/CU MM
METAMYELOCYTES PERCENT: 5 %
MONOCYTES ABSOLUTE: 0.6 K/CU MM
MONOCYTES RELATIVE PERCENT: 3 % (ref 0–4)
MYELOCYTE PERCENT: 1 %
MYELOCYTES ABSOLUTE COUNT: 0.19 K/CU MM
PDW BLD-RTO: 13.9 % (ref 11.7–14.9)
PLATELET # BLD: 640 K/CU MM (ref 140–440)
PLT MORPHOLOGY: ABNORMAL
PMV BLD AUTO: 9.1 FL (ref 7.5–11.1)
POLYCHROMASIA: ABNORMAL
POTASSIUM SERPL-SCNC: 4.6 MMOL/L (ref 3.5–5.1)
RBC # BLD: 3.34 M/CU MM (ref 4.2–5.4)
SEGMENTED NEUTROPHILS ABSOLUTE COUNT: 12.8 K/CU MM
SEGMENTED NEUTROPHILS RELATIVE PERCENT: 69 % (ref 36–66)
SODIUM BLD-SCNC: 133 MMOL/L (ref 135–145)
WBC # BLD: 18.7 K/CU MM (ref 4–10.5)
WBC # BLD: ABNORMAL 10*3/UL

## 2020-10-31 PROCEDURE — 6370000000 HC RX 637 (ALT 250 FOR IP): Performed by: NURSE PRACTITIONER

## 2020-10-31 PROCEDURE — 36415 COLL VENOUS BLD VENIPUNCTURE: CPT

## 2020-10-31 PROCEDURE — 86200 CCP ANTIBODY: CPT

## 2020-10-31 PROCEDURE — 94761 N-INVAS EAR/PLS OXIMETRY MLT: CPT

## 2020-10-31 PROCEDURE — 1200000000 HC SEMI PRIVATE

## 2020-10-31 PROCEDURE — 6360000002 HC RX W HCPCS: Performed by: INTERNAL MEDICINE

## 2020-10-31 PROCEDURE — 2580000003 HC RX 258: Performed by: HOSPITALIST

## 2020-10-31 PROCEDURE — 80048 BASIC METABOLIC PNL TOTAL CA: CPT

## 2020-10-31 PROCEDURE — 86038 ANTINUCLEAR ANTIBODIES: CPT

## 2020-10-31 PROCEDURE — 85027 COMPLETE CBC AUTOMATED: CPT

## 2020-10-31 PROCEDURE — 86430 RHEUMATOID FACTOR TEST QUAL: CPT

## 2020-10-31 PROCEDURE — 97530 THERAPEUTIC ACTIVITIES: CPT

## 2020-10-31 PROCEDURE — 6360000002 HC RX W HCPCS: Performed by: HOSPITALIST

## 2020-10-31 PROCEDURE — 85007 BL SMEAR W/DIFF WBC COUNT: CPT

## 2020-10-31 PROCEDURE — 6370000000 HC RX 637 (ALT 250 FOR IP): Performed by: FAMILY MEDICINE

## 2020-10-31 PROCEDURE — 2580000003 HC RX 258: Performed by: NURSE PRACTITIONER

## 2020-10-31 PROCEDURE — 6370000000 HC RX 637 (ALT 250 FOR IP): Performed by: INTERNAL MEDICINE

## 2020-10-31 RX ORDER — PREDNISONE 10 MG/1
10 TABLET ORAL DAILY
Status: DISCONTINUED | OUTPATIENT
Start: 2020-11-06 | End: 2020-11-01

## 2020-10-31 RX ORDER — PREDNISONE 20 MG/1
20 TABLET ORAL DAILY
Status: DISCONTINUED | OUTPATIENT
Start: 2020-10-31 | End: 2020-11-01

## 2020-10-31 RX ORDER — PREDNISONE 1 MG/1
5 TABLET ORAL DAILY
Status: DISCONTINUED | OUTPATIENT
Start: 2020-11-09 | End: 2020-11-01

## 2020-10-31 RX ADMIN — SODIUM CHLORIDE, PRESERVATIVE FREE 10 ML: 5 INJECTION INTRAVENOUS at 20:44

## 2020-10-31 RX ADMIN — METOPROLOL TARTRATE 50 MG: 50 TABLET, FILM COATED ORAL at 09:04

## 2020-10-31 RX ADMIN — SODIUM CHLORIDE, PRESERVATIVE FREE 10 ML: 5 INJECTION INTRAVENOUS at 09:05

## 2020-10-31 RX ADMIN — VANCOMYCIN HYDROCHLORIDE 1500 MG: 5 INJECTION, POWDER, LYOPHILIZED, FOR SOLUTION INTRAVENOUS at 09:04

## 2020-10-31 RX ADMIN — FAMOTIDINE 20 MG: 20 TABLET, FILM COATED ORAL at 20:43

## 2020-10-31 RX ADMIN — RIVAROXABAN 10 MG: 10 TABLET, FILM COATED ORAL at 09:04

## 2020-10-31 RX ADMIN — EZETIMIBE 10 MG: 10 TABLET ORAL at 09:04

## 2020-10-31 RX ADMIN — METHYLPREDNISOLONE 4 MG: 4 TABLET ORAL at 07:03

## 2020-10-31 RX ADMIN — POTASSIUM CHLORIDE 40 MEQ: 1500 TABLET, EXTENDED RELEASE ORAL at 09:04

## 2020-10-31 RX ADMIN — PREDNISONE 20 MG: 20 TABLET ORAL at 12:33

## 2020-10-31 RX ADMIN — METOPROLOL TARTRATE 50 MG: 50 TABLET, FILM COATED ORAL at 20:43

## 2020-10-31 RX ADMIN — FAMOTIDINE 20 MG: 20 TABLET, FILM COATED ORAL at 09:04

## 2020-10-31 RX ADMIN — OXYCODONE HYDROCHLORIDE AND ACETAMINOPHEN 1 TABLET: 5; 325 TABLET ORAL at 12:33

## 2020-10-31 RX ADMIN — POTASSIUM CHLORIDE 40 MEQ: 1500 TABLET, EXTENDED RELEASE ORAL at 17:11

## 2020-10-31 RX ADMIN — AZITHROMYCIN MONOHYDRATE 500 MG: 250 TABLET ORAL at 09:04

## 2020-10-31 RX ADMIN — HYDROCHLOROTHIAZIDE 12.5 MG: 12.5 TABLET ORAL at 09:04

## 2020-10-31 ASSESSMENT — PAIN SCALES - GENERAL
PAINLEVEL_OUTOF10: 6
PAINLEVEL_OUTOF10: 8
PAINLEVEL_OUTOF10: 0
PAINLEVEL_OUTOF10: 0

## 2020-10-31 NOTE — PROGRESS NOTES
Physical Therapy Treatment Note  Name: Alysa Root MRN: [id number] :   1960   Date:  10/31/2020   Admission Date: 10/22/2020 Room:  [facility]-A   Restrictions/Precautions:  Restrictions/Precautions  Restrictions/Precautions: General Precautions, Fall Risk, Weight Bearing Lower Extremity Weight Bearing Restrictions  Left Lower Extremity Weight Bearing: Weight Bearing As Tolerated(in walking boot)     Communication with other providers:  Nurse states ok for treatment, aide assist with transfers and toileting. Subjective:  Patient states:  Willing to try standing and if that goes ok wants to try BSC  Pain:   Location, Type, Intensity (0/10 to 10/10):  6-7/10  Objective:    Observation:  Call light on upon arrival, wanting off bed pan, IV and catheter  Treatment, including education/measures:  Transfers  Supine to sit : MIN x 1, difficulty observing hip precautions. Sit to supine , min x 1, difficulty observing hip precautions  Scooting :sba to eob in sitting  Rolling :x  Sit to stand :min x 1. Boot donned  Stand to sit :min x 1  SPT:        Boot donned. min x 1 to BSC , pt took 2 steps x 2 bed to [facility]. Stood with cg once up. Sat eob x 18-20  mins with dyn reaching with good mid line control. SBT:x     Educated to cont HEP of quad and glute sets x 20 reps 3-4 x day    Safety  Patient left safely in the bed, with call light/phone in reach with alarm applied. Gait belt was used for transfers and gait.    Assessment / Impression:       Patient's tolerance of treatment:  Limited by pain   Adverse Reaction: x  Significant change in status and impact:  Took steps and stood today  Barriers to improvement:  Pain, multiple comorbidities   Plan for Next Session:    Progress standing and gait  Time in:  1055  Time out:  1148  Timed treatment minutes:    Total treatment time:      Previously filed items:  Social/Functional History  Lives With: Alone  Type of Home: House  Home Layout: Two level  Home Access: Ramped entrance  Bathroom Shower/Tub: Walk-in shower, Shower chair without back  Bathroom Toilet: Handicap height  Home Equipment: Rolling walker, Cane  ADL Assistance: Independent  Homemaking Assistance: Independent  Ambulation Assistance: Independent  Transfer Assistance: Independent  Active : Yes     Long term goals  Time Frame for Long term goals :  In one week:  Long term goal 1: Pt will complete all bed mobility with CGAx1  Long term goal 2: Pt will complete sit <> stand transfers with minAx1  Long term goal 3: Pt will ambulate 20 feet with modAx2 with LRAD  Long term goal 4: Pt will independently complete 3 sets of 10 reps of BLE AROM exercises in available and allowed ROM    Electronically signed by:    Raghu Griffith, [place] [id number]  10/31/2020, 11:14 AM

## 2020-10-31 NOTE — CONSULTS
[address] [place] [address], [address] [facility]                                  CONSULTATION    PATIENT NAME: Christian Grossman                  :        1960  MED REC NO:   [id number]                          ROOM:       2330  ACCOUNT NO:   [de-identified]                           ADMIT DATE: 10/22/2020  PROVIDER:     Toni Ny MD    HISTORY OF PRESENT ILLNESS:  The patient is a 59-year-old female. I  have been consulted here for joint pains. The patient has been  experiencing pain in both the ankles. On further questioning, she told  me she has also been experiencing pain in both hands, wrist and elbows. Problem had been going on for the last few weeks. She recently had her  hip replacement performed. According to the patient, she is having  trouble with ambulation secondary to pain. PAST MEDICAL HISTORY:  Remarkable for diagnosis of osteoarthritis. PAST SURGICAL HISTORY:  Remarkable for recent hip replacement. PHYSICAL EXAMINATION:  MUSCULOSKELETAL:  Reveals active synovitis of the wrist and MCP joints  bilaterally. The patient also has pain on palpation of the ankle and  metatarsophalangeal joint. SKIN:  Fails to reveal any rashes consistent with vasculitis. CHEST:  No rubs audible. LABORATORY DATA:  Review of labs reveals a normal creatinine. CBC  reveals a WBC count of 18,700, sedimentation rate is 138 mm/hour. ASSESSMENT AND PLAN:  Systemic polyarthritis. Differential diagnosis  includes rheumatoid arthritis. We will check rheumatoid factor and  anti-CCP antibodies. The patient has not been evaluated for  inflammatory arthritis in the past.  She is currently on Medrol and as  per patient the joint pains are improving. We will continue to monitor  and start on medication based on the result of further serologic workup.         Kelsey Jade MD    D: 10/31/2020 9:07:26       T: 10/31/2020 10:55:50 [email or web address]  Job#: [id number]     Doc#: [id number]    CC:

## 2020-10-31 NOTE — PROGRESS NOTES
[address] [facility]  consulted by Dr. Sofi Carreno for monitoring and adjustment. Indication for treatment: Pneumonia, LLE cellulitis   Goal trough: 15 mcg/mL (AUC/MARLYS 400-600)     Pertinent Laboratory Values:   Temp Readings from Last 3 Encounters:   10/31/20 98.2 °F (36.8 °C) (Oral)   10/21/20 98.3 °F (36.8 °C) (Oral)   10/12/20 97.7 °F (36.5 °C) (Temporal)     Recent Labs     10/29/20  0607 10/30/20  0620 10/31/20  0436   WBC 17.7* 20.3* 18.7*     Recent Labs     10/29/20  0607 10/30/20  0620 10/31/20  0436   BUN 23 23 27*   CREATININE 1.0 1.0 1.0     Estimated Creatinine Clearance: 78 mL/min (based on SCr of 1 mg/dL). Intake/Output Summary (Last 24 hours) at 10/31/2020 1822  Last data filed at 10/31/2020 1811  Gross per 24 hour   Intake 720 ml   Output 1525 ml   Net -805 ml       Pertinent Cultures:  Date    Source    Results  10/12   Urine    No growth  10/12   COVID-19   Negative  10/25                           Blood                                       Ordered    Vancomycin level:   TROUGH:    Recent Labs     10/30/20  1432   VANCOTROUGH 15.6     RANDOM:  No results for input(s): VANCORANDOM in the last 72 hours. Assessment:  · WBC and temperature: WBC elevated;  Afebrile  · SCr, BUN, and urine output: Scr stable  · Day(s) of therapy: 7  · Vancomycin level:   · 10/27: 12.3, therapeutic  · 10/30: 15.6, therapeutic    Plan:  · Continue vancomycin 1500 mg IVPB q18h with a therapeutic trough  · Renal trends are stable  · Plan to repeat next trough level in 48h  · Pharmacy will continue to monitor patient and adjust therapy as indicated    Jovana 11/2 @7424    Thank you for the consult,  Steffi Closs, RPh  10/31/2020 6:22 PM

## 2020-10-31 NOTE — PROGRESS NOTES
Hospitalist Progress Note      Name:  Valeri Foy /Age/Sex: 1960  (64 y.o. female)   MRN & CSN:  [id number] & [id number] Admission Date/Time: 10/22/2020 10:50 AM   [facility]  [facility] PCP: Guille Carnes MD       Valeri Foy is a 64 y.o.  female  who presents with Leg Pain      Assessment and Plan:   #Left foot pain/swelling: Left foot fracture.   #Status post left total hip replacement 10/19/2020  #Possible Cellulitis left lower extremity  #Possible Gout   - IV Vanc  -MRI reviewed and more c/w gout than infection  -Negative DVT study  -XR with suspected small fracture of osteophyte at distal tibia  -Fall precautions, up with assistance  -Surgery on board: Recommend physical therapy, fracture boot and Ace wrap, nonoperative mx  -Consults to PT/OT,CM, SNF pending  -Continue pain control with Percocet as needed and morphine for breakthrough pain  - taper steroids  - repeat uric acid in am     #Community-acquired pneumonia  - ruled out, CT chest non acute  - d/c zithromax     #Severe back pain bilateral lower extremity numbness  -MRI lumbar/sacral spine noted with acquired/congential spinal canal stenosis, Canal stenosis is worst at the L2-3  - MRI sacrum/coccyx: non acute   - pt/ot       DVT ppx: Xarelto for 4 weeks, per ortho, for LISHA        Chronic  - HTN- Cont HCTZ, BB  - HLD- Cont zetia  - AOCD- 11.2; at baseline  - morbid obesity-counseled on diet and exercise               Diet DIET GENERAL;   Code Status Full Code     Medications:   Medications:    azithromycin  500 mg Oral Daily    methylPREDNISolone  4 mg Oral QAM AC    methylPREDNISolone  4 mg Oral Lunch    methylPREDNISolone  4 mg Oral Dinner    methylPREDNISolone  4 mg Oral Nightly    vancomycin  1,500 mg Intravenous Q18H    sodium chloride flush  10 mL Intravenous 2 times per day    famotidine  20 mg Oral BID    ezetimibe  10 mg Oral Daily    hydroCHLOROthiazide  12.5 mg Oral Daily    metoprolol tartrate  50 mg Oral

## 2020-11-01 LAB
ANION GAP SERPL CALCULATED.3IONS-SCNC: 13 MMOL/L (ref 4–16)
BANDED NEUTROPHILS ABSOLUTE COUNT: 1.91 K/CU MM
BANDED NEUTROPHILS RELATIVE PERCENT: 10 % (ref 5–11)
BUN BLDV-MCNC: 27 MG/DL (ref 6–23)
CALCIUM SERPL-MCNC: 9 MG/DL (ref 8.3–10.6)
CHLORIDE BLD-SCNC: 97 MMOL/L (ref 99–110)
CO2: 28 MMOL/L (ref 21–32)
CREAT SERPL-MCNC: 1.1 MG/DL (ref 0.6–1.1)
DIFFERENTIAL TYPE: ABNORMAL
GFR AFRICAN AMERICAN: >60 ML/MIN/1.73M2
GFR NON-AFRICAN AMERICAN: 51 ML/MIN/1.73M2
GLUCOSE BLD-MCNC: 95 MG/DL (ref 70–99)
HCT VFR BLD CALC: 30.6 % (ref 37–47)
HEMOGLOBIN: 9.8 GM/DL (ref 12.5–16)
LYMPHOCYTES ABSOLUTE: 2.1 K/CU MM
LYMPHOCYTES RELATIVE PERCENT: 11 % (ref 24–44)
MCH RBC QN AUTO: 29.3 PG (ref 27–31)
MCHC RBC AUTO-ENTMCNC: 32 % (ref 32–36)
MCV RBC AUTO: 91.6 FL (ref 78–100)
METAMYELOCYTES ABSOLUTE COUNT: 0.38 K/CU MM
METAMYELOCYTES PERCENT: 2 %
MONOCYTES ABSOLUTE: 1.7 K/CU MM
MONOCYTES RELATIVE PERCENT: 9 % (ref 0–4)
MYELOCYTE PERCENT: 2 %
MYELOCYTES ABSOLUTE COUNT: 0.38 K/CU MM
PDW BLD-RTO: 14.2 % (ref 11.7–14.9)
PLATELET # BLD: 613 K/CU MM (ref 140–440)
PLT MORPHOLOGY: ABNORMAL
PMV BLD AUTO: 8.8 FL (ref 7.5–11.1)
POLYCHROMASIA: ABNORMAL
POTASSIUM SERPL-SCNC: 4.2 MMOL/L (ref 3.5–5.1)
RBC # BLD: 3.34 M/CU MM (ref 4.2–5.4)
SEGMENTED NEUTROPHILS ABSOLUTE COUNT: 12.6 K/CU MM
SEGMENTED NEUTROPHILS RELATIVE PERCENT: 66 % (ref 36–66)
SODIUM BLD-SCNC: 138 MMOL/L (ref 135–145)
TOXIC GRANULATION: PRESENT
URIC ACID: 6.5 MG/DL (ref 2.6–6)
WBC # BLD: 19.1 K/CU MM (ref 4–10.5)
WBC # BLD: ABNORMAL 10*3/UL

## 2020-11-01 PROCEDURE — 6360000002 HC RX W HCPCS: Performed by: HOSPITALIST

## 2020-11-01 PROCEDURE — 84550 ASSAY OF BLOOD/URIC ACID: CPT

## 2020-11-01 PROCEDURE — 80048 BASIC METABOLIC PNL TOTAL CA: CPT

## 2020-11-01 PROCEDURE — 6370000000 HC RX 637 (ALT 250 FOR IP): Performed by: NURSE PRACTITIONER

## 2020-11-01 PROCEDURE — 1200000000 HC SEMI PRIVATE

## 2020-11-01 PROCEDURE — 85027 COMPLETE CBC AUTOMATED: CPT

## 2020-11-01 PROCEDURE — 2580000003 HC RX 258: Performed by: HOSPITALIST

## 2020-11-01 PROCEDURE — 2580000003 HC RX 258: Performed by: NURSE PRACTITIONER

## 2020-11-01 PROCEDURE — 85007 BL SMEAR W/DIFF WBC COUNT: CPT

## 2020-11-01 PROCEDURE — 6370000000 HC RX 637 (ALT 250 FOR IP): Performed by: PHYSICIAN ASSISTANT

## 2020-11-01 PROCEDURE — 36415 COLL VENOUS BLD VENIPUNCTURE: CPT

## 2020-11-01 PROCEDURE — 6370000000 HC RX 637 (ALT 250 FOR IP): Performed by: FAMILY MEDICINE

## 2020-11-01 PROCEDURE — 94761 N-INVAS EAR/PLS OXIMETRY MLT: CPT

## 2020-11-01 RX ORDER — LANOLIN ALCOHOL/MO/W.PET/CERES
9 CREAM (GRAM) TOPICAL NIGHTLY PRN
Status: DISCONTINUED | OUTPATIENT
Start: 2020-11-01 | End: 2020-11-02 | Stop reason: HOSPADM

## 2020-11-01 RX ORDER — PREDNISONE 20 MG/1
40 TABLET ORAL DAILY
Status: DISCONTINUED | OUTPATIENT
Start: 2020-11-01 | End: 2020-11-02 | Stop reason: HOSPADM

## 2020-11-01 RX ADMIN — PREDNISONE 40 MG: 20 TABLET ORAL at 11:36

## 2020-11-01 RX ADMIN — FAMOTIDINE 20 MG: 20 TABLET, FILM COATED ORAL at 20:53

## 2020-11-01 RX ADMIN — RIVAROXABAN 10 MG: 10 TABLET, FILM COATED ORAL at 09:24

## 2020-11-01 RX ADMIN — OXYCODONE HYDROCHLORIDE AND ACETAMINOPHEN 1 TABLET: 5; 325 TABLET ORAL at 09:27

## 2020-11-01 RX ADMIN — SODIUM CHLORIDE, PRESERVATIVE FREE 10 ML: 5 INJECTION INTRAVENOUS at 20:52

## 2020-11-01 RX ADMIN — METOPROLOL TARTRATE 50 MG: 50 TABLET, FILM COATED ORAL at 09:24

## 2020-11-01 RX ADMIN — VANCOMYCIN HYDROCHLORIDE 1500 MG: 5 INJECTION, POWDER, LYOPHILIZED, FOR SOLUTION INTRAVENOUS at 02:40

## 2020-11-01 RX ADMIN — HYDROCHLOROTHIAZIDE 12.5 MG: 12.5 TABLET ORAL at 09:24

## 2020-11-01 RX ADMIN — SODIUM CHLORIDE, PRESERVATIVE FREE 10 ML: 5 INJECTION INTRAVENOUS at 10:01

## 2020-11-01 RX ADMIN — PREDNISONE 20 MG: 20 TABLET ORAL at 09:24

## 2020-11-01 RX ADMIN — FAMOTIDINE 20 MG: 20 TABLET, FILM COATED ORAL at 09:24

## 2020-11-01 RX ADMIN — OXYCODONE HYDROCHLORIDE AND ACETAMINOPHEN 1 TABLET: 5; 325 TABLET ORAL at 23:13

## 2020-11-01 RX ADMIN — EZETIMIBE 10 MG: 10 TABLET ORAL at 09:24

## 2020-11-01 RX ADMIN — METOPROLOL TARTRATE 50 MG: 50 TABLET, FILM COATED ORAL at 20:53

## 2020-11-01 RX ADMIN — POTASSIUM CHLORIDE 40 MEQ: 1500 TABLET, EXTENDED RELEASE ORAL at 16:28

## 2020-11-01 RX ADMIN — Medication 9 MG: at 20:53

## 2020-11-01 RX ADMIN — POTASSIUM CHLORIDE 40 MEQ: 1500 TABLET, EXTENDED RELEASE ORAL at 09:29

## 2020-11-01 RX ADMIN — OXYCODONE HYDROCHLORIDE AND ACETAMINOPHEN 1 TABLET: 5; 325 TABLET ORAL at 01:15

## 2020-11-01 ASSESSMENT — PAIN DESCRIPTION - DESCRIPTORS
DESCRIPTORS: SHARP
DESCRIPTORS: SHARP

## 2020-11-01 ASSESSMENT — PAIN SCALES - GENERAL
PAINLEVEL_OUTOF10: 9
PAINLEVEL_OUTOF10: 5
PAINLEVEL_OUTOF10: 8
PAINLEVEL_OUTOF10: 0
PAINLEVEL_OUTOF10: 6
PAINLEVEL_OUTOF10: 0

## 2020-11-01 ASSESSMENT — PAIN DESCRIPTION - PROGRESSION: CLINICAL_PROGRESSION: GRADUALLY WORSENING

## 2020-11-01 ASSESSMENT — PAIN DESCRIPTION - LOCATION
LOCATION: ANKLE;FOOT
LOCATION: FOOT;HIP

## 2020-11-01 ASSESSMENT — PAIN DESCRIPTION - FREQUENCY
FREQUENCY: CONTINUOUS
FREQUENCY: INTERMITTENT

## 2020-11-01 ASSESSMENT — PAIN DESCRIPTION - PAIN TYPE
TYPE: ACUTE PAIN
TYPE: SURGICAL PAIN

## 2020-11-01 ASSESSMENT — PAIN DESCRIPTION - ORIENTATION
ORIENTATION: LEFT;RIGHT
ORIENTATION: RIGHT;LEFT

## 2020-11-01 ASSESSMENT — PAIN DESCRIPTION - ONSET: ONSET: GRADUAL

## 2020-11-01 NOTE — PROGRESS NOTES
Meds: morphine, 1 mg, Q6H PRN  sodium chloride flush, 10 mL, PRN  acetaminophen, 650 mg, Q6H PRN    Or  acetaminophen, 650 mg, Q6H PRN  polyethylene glycol, 17 g, Daily PRN  promethazine, 12.5 mg, Q6H PRN    Or  ondansetron, 4 mg, Q6H PRN  docusate sodium, 100 mg, Daily PRN  oxyCODONE-acetaminophen, 1 tablet, Q6H PRN      Subjective:   Doing ok, no distress    Objective: Intake/Output Summary (Last 24 hours) at 11/1/2020 0943  Last data filed at 11/1/2020 0936  Gross per 24 hour   Intake 720 ml   Output 1750 ml   Net -1030 ml      Vitals:   Vitals:    11/01/20 0749   BP:    Pulse:    Resp: (!) 97   Temp:    SpO2:      Physical Exam:   Gen:  awake, alert, no apparent distress  Head/Eyes:  Normocephalic atraumatic, EOMI   NECK:   symmetrical, trachea midline  LUNGS: Normal Effort   CARDIOVASCULAR:  Normal rate  ABDOMEN:  non distended  MUSCULOSKELETAL:  ROM limited  NEUROLOGIC: Alert and Oriented,  Cranial nerves II-XII are grossly intact.    SKIN:  no bruising or bleeding, normal skin color,  no redness      Data:       CBC   Recent Labs     10/30/20  0620 10/31/20  0436 11/01/20  0537   WBC 20.3* 18.7* 19.1*   HGB 9.8* 9.7* 9.8*   HCT 30.1* 30.9* 30.6*   * 640* 613*      BMP   Recent Labs     10/30/20  0620 10/31/20  0436 11/01/20  0537   * 133* 138   K 4.4 4.6 4.2   CL 94* 95* 97*   CO2 27 26 28   BUN 23 27* 27*   CREATININE 1.0 1.0 1.1         Electronically signed by Jacqueline Pretty MD on 11/1/2020 at 9:43 AM

## 2020-11-02 VITALS
BODY MASS INDEX: 39.18 KG/M2 | DIASTOLIC BLOOD PRESSURE: 69 MMHG | HEIGHT: 66 IN | WEIGHT: 243.8 LBS | RESPIRATION RATE: 18 BRPM | TEMPERATURE: 97.8 F | OXYGEN SATURATION: 99 % | SYSTOLIC BLOOD PRESSURE: 127 MMHG | HEART RATE: 87 BPM

## 2020-11-02 LAB
ANION GAP SERPL CALCULATED.3IONS-SCNC: 13 MMOL/L (ref 4–16)
ANISOCYTOSIS: ABNORMAL
BANDED NEUTROPHILS ABSOLUTE COUNT: 1.86 K/CU MM
BANDED NEUTROPHILS RELATIVE PERCENT: 9 % (ref 5–11)
BASOPHILS ABSOLUTE: 0.2 K/CU MM
BASOPHILS RELATIVE PERCENT: 1 % (ref 0–1)
BUN BLDV-MCNC: 31 MG/DL (ref 6–23)
CALCIUM SERPL-MCNC: 9.4 MG/DL (ref 8.3–10.6)
CHLORIDE BLD-SCNC: 98 MMOL/L (ref 99–110)
CO2: 27 MMOL/L (ref 21–32)
CREAT SERPL-MCNC: 1.1 MG/DL (ref 0.6–1.1)
DIFFERENTIAL TYPE: ABNORMAL
GFR AFRICAN AMERICAN: >60 ML/MIN/1.73M2
GFR NON-AFRICAN AMERICAN: 51 ML/MIN/1.73M2
GLUCOSE BLD-MCNC: 119 MG/DL (ref 70–99)
HCT VFR BLD CALC: 33.8 % (ref 37–47)
HEMOGLOBIN: 10.9 GM/DL (ref 12.5–16)
LYMPHOCYTES ABSOLUTE: 3.1 K/CU MM
LYMPHOCYTES RELATIVE PERCENT: 15 % (ref 24–44)
MCH RBC QN AUTO: 29.8 PG (ref 27–31)
MCHC RBC AUTO-ENTMCNC: 32.2 % (ref 32–36)
MCV RBC AUTO: 92.3 FL (ref 78–100)
METAMYELOCYTES ABSOLUTE COUNT: 1.45 K/CU MM
METAMYELOCYTES PERCENT: 7 %
MONOCYTES ABSOLUTE: 1.7 K/CU MM
MONOCYTES RELATIVE PERCENT: 8 % (ref 0–4)
MYELOCYTE PERCENT: 1 %
MYELOCYTES ABSOLUTE COUNT: 0.21 K/CU MM
PDW BLD-RTO: 14.1 % (ref 11.7–14.9)
PLATELET # BLD: 698 K/CU MM (ref 140–440)
PLT MORPHOLOGY: ABNORMAL
PMV BLD AUTO: 8.6 FL (ref 7.5–11.1)
POLYCHROMASIA: ABNORMAL
POTASSIUM SERPL-SCNC: 4 MMOL/L (ref 3.5–5.1)
PROMYELOCYTES ABSOLUTE COUNT: 0.21 K/CU MM
PROMYELOCYTES PERCENT: 1 %
RBC # BLD: 3.66 M/CU MM (ref 4.2–5.4)
SEGMENTED NEUTROPHILS ABSOLUTE COUNT: 12 K/CU MM
SEGMENTED NEUTROPHILS RELATIVE PERCENT: 58 % (ref 36–66)
SODIUM BLD-SCNC: 138 MMOL/L (ref 135–145)
WBC # BLD: 20.7 K/CU MM (ref 4–10.5)
WBC # BLD: ABNORMAL 10*3/UL

## 2020-11-02 PROCEDURE — 36415 COLL VENOUS BLD VENIPUNCTURE: CPT

## 2020-11-02 PROCEDURE — 97110 THERAPEUTIC EXERCISES: CPT

## 2020-11-02 PROCEDURE — 85007 BL SMEAR W/DIFF WBC COUNT: CPT

## 2020-11-02 PROCEDURE — 2580000003 HC RX 258: Performed by: NURSE PRACTITIONER

## 2020-11-02 PROCEDURE — 6370000000 HC RX 637 (ALT 250 FOR IP): Performed by: NURSE PRACTITIONER

## 2020-11-02 PROCEDURE — 97530 THERAPEUTIC ACTIVITIES: CPT

## 2020-11-02 PROCEDURE — 80048 BASIC METABOLIC PNL TOTAL CA: CPT

## 2020-11-02 PROCEDURE — 97535 SELF CARE MNGMENT TRAINING: CPT

## 2020-11-02 PROCEDURE — 6370000000 HC RX 637 (ALT 250 FOR IP): Performed by: FAMILY MEDICINE

## 2020-11-02 PROCEDURE — 85027 COMPLETE CBC AUTOMATED: CPT

## 2020-11-02 PROCEDURE — 97116 GAIT TRAINING THERAPY: CPT

## 2020-11-02 RX ORDER — POTASSIUM CHLORIDE 20 MEQ/1
20 TABLET, EXTENDED RELEASE ORAL 2 TIMES DAILY
Qty: 60 TABLET | Refills: 3 | Status: SHIPPED | OUTPATIENT
Start: 2020-11-02 | End: 2021-07-15 | Stop reason: SDUPTHER

## 2020-11-02 RX ORDER — FUROSEMIDE 40 MG/1
40 TABLET ORAL DAILY
Qty: 60 TABLET | Refills: 3 | Status: ON HOLD | OUTPATIENT
Start: 2020-11-02 | End: 2020-11-16 | Stop reason: HOSPADM

## 2020-11-02 RX ORDER — OXYCODONE HYDROCHLORIDE AND ACETAMINOPHEN 5; 325 MG/1; MG/1
1 TABLET ORAL EVERY 8 HOURS PRN
Qty: 20 TABLET | Refills: 0 | Status: SHIPPED | OUTPATIENT
Start: 2020-11-02 | End: 2020-11-09

## 2020-11-02 RX ORDER — FUROSEMIDE 40 MG/1
40 TABLET ORAL DAILY
Status: DISCONTINUED | OUTPATIENT
Start: 2020-11-02 | End: 2020-11-02 | Stop reason: HOSPADM

## 2020-11-02 RX ORDER — PREDNISONE 20 MG/1
40 TABLET ORAL DAILY
Qty: 14 TABLET | Refills: 0 | Status: SHIPPED | OUTPATIENT
Start: 2020-11-02 | End: 2020-11-09

## 2020-11-02 RX ADMIN — PREDNISONE 40 MG: 20 TABLET ORAL at 08:55

## 2020-11-02 RX ADMIN — FUROSEMIDE 40 MG: 40 TABLET ORAL at 08:55

## 2020-11-02 RX ADMIN — HYDROCHLOROTHIAZIDE 12.5 MG: 12.5 TABLET ORAL at 08:55

## 2020-11-02 RX ADMIN — POTASSIUM CHLORIDE 40 MEQ: 1500 TABLET, EXTENDED RELEASE ORAL at 08:55

## 2020-11-02 RX ADMIN — EZETIMIBE 10 MG: 10 TABLET ORAL at 08:55

## 2020-11-02 RX ADMIN — SODIUM CHLORIDE, PRESERVATIVE FREE 10 ML: 5 INJECTION INTRAVENOUS at 08:54

## 2020-11-02 RX ADMIN — FAMOTIDINE 20 MG: 20 TABLET, FILM COATED ORAL at 08:55

## 2020-11-02 RX ADMIN — METOPROLOL TARTRATE 50 MG: 50 TABLET, FILM COATED ORAL at 08:55

## 2020-11-02 RX ADMIN — RIVAROXABAN 10 MG: 10 TABLET, FILM COATED ORAL at 08:55

## 2020-11-02 NOTE — PROGRESS NOTES
Occupational Therapy  . Occupational Therapy Treatment Note  Name: Benjamín Rodriguez MRN: [id number] :   1960   Date:  2020   Admission Date: 10/22/2020 Room:  1129/1129-A   Restrictions/Precautions:  Restrictions/Precautions  Restrictions/Precautions: General Precautions, Fall Risk, Weight Bearing General Precautions, Fall Risk, WBAT Lt LE with CAM boot, Posterior Hip Precautions    Communication with other providers:  Per chart review and Nurse Faith Dowling, patient is appropriate for therapeutic intervention. Nurse reports patient is doing much better and wants to get up in a chair. Subjective:  Patient states:  \"I'm feeling so much better. I've lost a lot of fluid off my body. I got up Saturday and I want to get in the chair today. \"   Pain:   Location, Type, Intensity (0/10 to 10/10):  3/10, BLE    Objective:    Observation:  Pt received in high fowlers sitting up in bed, talkative, and eager for transfer to get out of bed. Objective Measures:  CAM boot applied to LLE for transfer    Treatment, including education:  Therapeutic Activity Training:   Therapeutic activity training was instructed today. Cues were given for safety, sequence, UE/LE placement, awareness, and balance. Activities performed today included bed mobility training, sup-sit, sit-stand, SPT. Educational review for posterior hip precautions, use of CAM boot, pt verbalized understanding. Supine to sit: SBA + bed features  Sit to supine: Not addressed  Scooting: Sup + cue  Standing balance / tolerance: CGA/Min A x1 c 2 minutes standing tolerance  Sit<>stands: Min A c RW  Stand Step Transfers: Min A c RW  Pt performed multiple sit<>stands c Min A c RW + cues for safe body positioning, required increased time to perform. Sitting balance / tolerance: Sup seated EOB >8 minutes    Self Care Training:   Cues were given for safety, sequence, UE/LE placement, visual cues, and balance.     Activities performed today included bathing, dressing, toileting, hand hygiene, and grooming. Bathing: Sup seated for UB bathing. Pt provided LH sponge and verbalized understanding for posterior hip precautions during LB bathing, deferred at this time. UB Dressing: Min A for tie on gown  LB Dressing: Dep for  sock RLE and for application of CAM boot to LLE, required assist for doffing / donning brief. Pt provided reacher to use for dropped items. \"I need it so I don't break my precautions. No sock aid available for education at this time. Grooming: Set up for face washing / hair care  Toilet Transfer: Min A c RW + min cues for safe body positioning, one cue for compliance to posterior hip precautions, use of BSC. Toileting: Dep for hygiene s/p BM and to doff / don brief. All therapeutic intervention performed c emphasis on ADLs, transfers to [facility] and chair, and dynamic balance / standing tolerance to inc strength, endurance and act tolerance for inc Indep c ADL tasks, func transfers / mobility. Safety  Patient safely in bedside chair + alarm placed at end of session, with call light/phone in reach, and nursing aware. Gait belt was used for func transfers / mobility. Assessment / Impression:        Patient's tolerance of treatment:  Well   Adverse Reaction: None  Significant change in status and impact:  Progressed to transferring out of bed to chair and bedside commode. Barriers to improvement:  Pain, posterior hip precautions, use of CAM boot for LLE, decreased endurance    Plan for Next Session:    Continue per OT POC. Time in:  1015  Time out:  1115  Timed treatment minutes:  60  Total treatment time:  60    Electronically signed by:    SILVIA Lombardi  11/2/2020, 8:45 AM    Previously filed values:       Goals:  1. Pt will complete all aspects of bed mobility for EOB/OOB ADLs CGA with HOB flat  2. Pt will complete UB/LB bathing mod A with setup using long-handled sponge   3.  Pt will complete all aspects of LB dressing mod A with setup using AE  4. Pt will complete all functional transfers to and from bed, chair, toilet, shower chair min A/good safety awareness  5. Pt will ambulate HH distance to bathroom for toileting mod A x 2 with RW and Lt CAM boot  6. Pt will complete all aspects of toileting task mod A on BSC/regular toilet  7. Pt will complete oral hygiene/grooming routine in standing in unsupported sitting EOB with supervision/setup  8.  Pt will complete ther ex/ther act with focus on UE strengthening and functional standing tolerance >5 minutes

## 2020-11-02 NOTE — PLAN OF CARE
Problem: Pain:  Goal: Pain level will decrease  Description: Pain level will decrease  Outcome: Ongoing  Goal: Control of acute pain  Description: Control of acute pain  Outcome: Ongoing  Goal: Control of chronic pain  Description: Control of chronic pain  Outcome: Ongoing     Problem: Falls - Risk of:  Goal: Will remain free from falls  Description: Will remain free from falls  Outcome: Ongoing  Goal: Absence of physical injury  Description: Absence of physical injury  Outcome: Ongoing     Problem: Skin Integrity:  Goal: Will show no infection signs and symptoms  Description: Will show no infection signs and symptoms  Outcome: Ongoing  Goal: Absence of new skin breakdown  Description: Absence of new skin breakdown  Outcome: Ongoing    Electronically signed by Herlinda Ugarte RN on 11/2/20 at 3:46 AM EST

## 2020-11-02 NOTE — PLAN OF CARE
Problem: Pain:  Goal: Pain level will decrease  Description: Pain level will decrease  11/2/2020 1113 by Gloria Campbell RN  Outcome: Ongoing  11/2/2020 0346 by Angela Villarreal RN  Outcome: Ongoing  Goal: Control of acute pain  Description: Control of acute pain  11/2/2020 1113 by Gloria Campbell RN  Outcome: Ongoing  11/2/2020 0346 by Angela Villarreal RN  Outcome: Ongoing  Goal: Control of chronic pain  Description: Control of chronic pain  11/2/2020 1113 by Gloria Campbell RN  Outcome: Ongoing  11/2/2020 0346 by Angela Villarreal RN  Outcome: Ongoing

## 2020-11-02 NOTE — PROGRESS NOTES
Hospitalist Progress Note      Name:  Lea Frazier /Age/Sex: 1960  (64 y.o. female)   MRN & CSN:  [id number] & [id number] Admission Date/Time: 10/22/2020 10:50 AM   Location:  [address] [place] [address] PCP: Benitez Gilmore MD       Lea Frazier is a 64 y.o.  female  who presents with Leg Pain      Assessment and Plan:   #Left foot pain/swelling: Left foot fracture.   #Status post left total hip replacement 10/19/2020  #Possible Cellulitis, ruled out  #Gout   - IV Vanc d.c now  -MRI reviewed and more c/w gout than infection  -Negative DVT study  -XR with suspected small fracture of osteophyte at distal tibia  -Fall precautions, up with assistance  -Surgery on board: Recommend physical therapy, fracture boot and Ace wrap, nonoperative mx  -Consults to PT/OT,CM, SNF pending  -Continue pain control with Percocet as needed and morphine for breakthrough pain  - Prednisone 40mg daily for total of 7 days  -uric acid 6.5     #Community-acquired pneumonia  - ruled out, CT chest non acute  - d/c zithromax     #Severe back pain bilateral lower extremity numbness  -MRI lumbar/sacral spine noted with acquired/congential spinal canal stenosis, Canal stenosis is worst at the L2-3  - MRI sacrum/coccyx: non acute   - pt/ot and rehab     Leucocytosis likely 2/2 steroids.        DVT ppx: Xarelto for 4 weeks, per ortho, for LISHA     SNF pending        Chronic  - HTN- Cont HCTZ, BB  - HLD- Cont zetia  - AOCD- 11.2; at baseline  - morbid obesity-counseled on diet and exercise               Diet DIET GENERAL;   Code Status Full Code     Medications:   Medications:    furosemide  40 mg Oral Daily    predniSONE  40 mg Oral Daily    sodium chloride flush  10 mL Intravenous 2 times per day    famotidine  20 mg Oral BID    ezetimibe  10 mg Oral Daily    hydroCHLOROthiazide  12.5 mg Oral Daily    metoprolol tartrate  50 mg Oral BID    rivaroxaban  10 mg Oral Daily    potassium chloride  40 mEq Oral BID WC      Infusions:   PRN

## 2020-11-02 NOTE — CARE COORDINATION
F/u with Pt in the room. Pt discharge is to return home with [facility] home care. Pt is very confident that she will be able to manage at home. Pt family have told her that they will be there to help her. LSW notified the hospitlist of the Pt plan. LSW made referral to [facility] via phone call.

## 2020-11-02 NOTE — DISCHARGE INSTR - COC
Continuity of Care Form    Patient Name: Concetta Turner   :  1960  MRN:  [id number]    Admit date:  10/22/2020  Discharge date:  ***    Code Status Order: Full Code   Advance Directives:   Advance Care Flowsheet Documentation     Date/Time Healthcare Directive Type of Healthcare Directive Copy in [address] Agent's Name Healthcare Agent's Phone Number    10/22/20 1951  No, patient does not have an advance directive for healthcare treatment -- -- -- -- --          Admitting Physician:  Miguelangel Rincon MD  PCP: Guero Reilly MD    Discharging Nurse: [facility] Unit/Room#: 4743/2613-M  Discharging Unit Phone Number: ***    Emergency Contact:   Extended Emergency Contact Information  Primary Emergency Contact: [facility]  Address: [address] [place] [address], [address] Phone: [phone]  Mobile Phone: [phone]  Relation: Brother/Sister    Past Surgical History:  Past Surgical History:   Procedure Laterality Date    APPENDECTOMY  1967    CHOLECYSTECTOMY      2017    COLONOSCOPY  2014    Polyps x2 - Dr. Hanna Burrows Left 10/19/2020    LEFT HIP TOTAL ARTHROPLASTY - POSTERIOR performed by Joshua Romano MD [facility]       Immunization History:   Immunization History   Administered Date(s) Administered    Influenza Virus Vaccine 2015    Influenza, Samara Lainez, IM, PF (6 mo and older Fluzone, Flulaval, Fluarix, and 3 yrs and older Afluria) 10/21/2020    Pneumococcal Polysaccharide (Kkvyekamp12) 2015    Tdap (Boostrix, Adacel) 2015       Active Problems:  Patient Active Problem List   Diagnosis Code    Essential hypertension I10    Hyperlipidemia E78.5    Allergic rhinitis due to pollen J30.1    Morbid obesity due to excess calories ([address] E66.01    Hearing loss H91.90    Hot flashes due to surgical menopause E89.41    or Other Treatments After Discharge: ***    Physician Certification: I certify the above information and transfer of Concetta Turner  is necessary for the continuing treatment of the diagnosis listed and that she requires {Admit to Appropriate Level of Care:58070} for {GREATER/LESS:136418062} 30 days.      Update Admission H&P: {CHP DME Changes in RKWZO:[id number]}    PHYSICIAN SIGNATURE:  {Esignature:[id number]}

## 2020-11-02 NOTE — DISCHARGE SUMMARY
Maria Guadalupe Escalona 1960 [id number]  PCP:  Alcon Zavala MD    Admit date: 10/22/2020  Admitting Physician: Nohemi Vincent MD    Discharge date: 11/2/2020 Discharge Physician: Jenifer Baez MD         Hospital Course and Discharge Diagnoses Include:    Pt feels well and okay to d/c home with [facility], SNF refused. #Left foot pain/swelling: Left foot fracture.   #Status post left total hip replacement 10/19/2020  #Possible Cellulitis, ruled out  #Gout   - stable  - IV Vanc d.c now  -MRI reviewed and more c/w gout than infection  -Negative DVT study  -XR with suspected small fracture of osteophyte at distal tibia  -Fall precautions, up with assistance  -Surgery on board: Recommend physical therapy, fracture boot and Ace wrap, nonoperative mx  -Consults to PT/OT,CM, SNF pending  -Continue pain control with Percocet as needed and morphine for breakthrough pain  - Prednisone 40mg daily for total of 7 days  -uric acid 6.5  - added oral lasix to help with foot edema     #Community-acquired pneumonia  - ruled out, CT chest non acute  - d/c zithromax     #Severe back pain bilateral lower extremity numbness  -MRI lumbar/sacral spine noted with acquired/congential spinal canal stenosis, Canal stenosis is worst at the L2-3  - MRI sacrum/coccyx: non acute   - pt/ot and rehab     Leucocytosis likely 2/2 steroids, no symptoms or signs of sepsis, pt clinically feels much better she states.        DVT ppx: Xarelto for 4 weeks, per ortho, for LISHA             Chronic  - HTN- Cont HCTZ, BB  - HLD- Cont zetia  - AOCD- 11.2; at baseline  - morbid obesity-counseled on diet and exercise        Physical Exam on Discharge date: 11/02/20  Gen:  awake, alert, no apparent distress  Head/Eyes:  Normocephalic atraumatic, EOMI   NECK:   symmetrical, trachea midline  LUNGS: Normal Effort   CARDIOVASCULAR:  Normal rate, trace pedal edema  ABDOMEN:  non distended  MUSCULOSKELETAL:  ROM limited  NEUROLOGIC: Alert and Oriented,  Cranial nerves II-XII are grossly intact. SKIN:  no bruising or bleeding, normal skin color,  no redness    Procedures:  See above  Ct Abdomen Pelvis Wo Contrast Additional Contrast? None    Result Date: 10/27/2020  EXAMINATION: CT OF THE CHEST WITHOUT CONTRAST; CT OF THE ABDOMEN AND PELVIS WITHOUT CONTRAST 10/27/2020 2:31 pm TECHNIQUE: CT of the chest was performed without the administration of intravenous contrast. Multiplanar reformatted images are provided for review. Dose modulation, iterative reconstruction, and/or weight based adjustment of the mA/kV was utilized to reduce the radiation dose to as low as reasonably achievable.; CT of the abdomen and pelvis was performed without the administration of intravenous contrast. Multiplanar reformatted images are provided for review. Dose modulation, iterative reconstruction, and/or weight based adjustment of the mA/kV was utilized to reduce the radiation dose to as low as reasonably achievable. COMPARISON: None HISTORY: ORDERING SYSTEM PROVIDED HISTORY: cough TECHNOLOGIST PROVIDED HISTORY: Reason for exam:->cough Reason for Exam: cough Acuity: Acute Type of Exam: Initial Relevant Medical/Surgical History: hx hyster, appy, carolynn; ORDERING SYSTEM PROVIDED HISTORY: abdominal pain TECHNOLOGIST PROVIDED HISTORY: Reason for exam:->abdominal pain Additional Contrast?->None Reason for Exam: abd pain Acuity: Acute Type of Exam: Initial Relevant Medical/Surgical History: hx carolynn, hyster, appy FINDINGS: Chest: No infiltrate or consolidation is identified. There are subsegmental atelectatic changes in both lung bases. There is a calcified granuloma in the right lower lobe. There is a calcified granuloma in the left lower lobe. Calcified lymph nodes are present within the mediastinum and both karmen. The heart size is top normal.  A mild hiatal hernia is noted. Mild degenerative changes of the spine are noted.  Abdomen/Pelvis: Organs: Calcified granulomas are present within the liver and spleen. The gallbladder has been resected. The pancreas is homogeneous in density. No adrenal masses are identified and there is no hydronephrosis. No renal stones are detected. GI/Bowel: There is no bowel obstruction or free air or free fluid. The appendix is not identified. The stomach and duodenum are grossly within normal limits. Pelvis: Status post hysterectomy. The urinary bladder is collapsed around a Damon catheter. Peritoneum/Retroperitoneum: There are mild vascular calcifications. No pathologically enlarged lymph nodes are identified. Bones/Soft Tissues: See there are degenerative changes of the spine and the patient is status post left hip arthroplasty. No acute abnormality visualized. Evidence of chronic granulomatous disease. Xr Pelvis (1-2 Views)    Result Date: 10/19/2020  EXAMINATION: ONE XRAY VIEW OF THE PELVIS 10/19/2020 1:22 pm COMPARISON: Left hip radiographs 08/27/2020. HISTORY: ORDERING SYSTEM PROVIDED HISTORY: post op TECHNOLOGIST PROVIDED HISTORY: Single view AP pelvis to include the prosthesis Reason for exam:->post op Reason for Exam: post op FINDINGS: Status post interval left total hip arthroplasty. The hardware is appropriately positioned and intact. The bilateral sacroiliac joints, pubic symphysis, and right hip are intact. No fracture or dislocation. The soft tissues are unremarkable. Status post interval left total hip arthroplasty without complication. Xr Elbow Left (min 3 Views)    Result Date: 10/23/2020  EXAMINATION: THREE XRAY VIEWS OF THE LEFT ELBOW 10/23/2020 12:27 am COMPARISON: None. HISTORY: ORDERING SYSTEM PROVIDED HISTORY: Pt complaining of pain 9/10. Recent fall with broken left hip and left ankle. TECHNOLOGIST PROVIDED HISTORY: Reason for exam:->Pt complaining of pain 9/10. Recent fall with broken left hip and left ankle. Reason for Exam: Pt complaining of pain 9/10. Recent fall with broken left hip and left ankle.  Acuity: Acute Type of Exam: Subsequent/Follow-up FINDINGS: Bones are intact and in anatomic alignment. Joint spaces are maintained. Questionable posterior fat pad. No fracture identified. A suspected elbow joint effusion, however, could indicate occult injury. RECOMMENDATION: Consider CT of the elbow. Xr Ankle Left (min 3 Views)    Result Date: 10/22/2020  EXAMINATION: THREE XRAY VIEWS OF THE LEFT FOOT; THREE XRAY VIEWS OF THE LEFT ANKLE 10/22/2020 12:40 pm COMPARISON: None. HISTORY: ORDERING SYSTEM PROVIDED HISTORY: pain TECHNOLOGIST PROVIDED HISTORY: Reason for exam:->pain Reason for Exam: pain Acuity: Acute Type of Exam: Initial Mechanism of Injury: Pt to ED with complaints of left leg and foot pain. Pt reports recent hip replacement; ORDERING SYSTEM PROVIDED HISTORY: pain TECHNOLOGIST PROVIDED HISTORY: Reason for exam:->pain Reason for Exam: PAIN Acuity: Acute Type of Exam: Initial Mechanism of Injury: Pt to ED with complaints of left leg and foot pain. Pt reports recent hip replacement FINDINGS: Left foot: Mild degenerative change seen at the 1st metatarsal-phalangeal joint. Mild interphalangeal joint degenerative osteoarthritic change. The Lisfranc joint appears intact. No fracture or dislocation is identified. No osseous erosive changes are identified. Calcaneal spurring seen at the plantar aponeurosis. Left ankle: Soft tissue swelling is seen at the ankle. The ankle mortise appears intact. There is a calcaneal spur noted at the plantar aponeurosis. There is a focal spur noted along the anteroinferior aspect of the distal tibia seen on the lateral view, with a oblique lucency extending through this which could be related to a nondisplaced osteophyte fracture. Suspected fracture through a small osteophyte off the anterior inferior aspect of the distal tibia at seen best on the lateral view at the level of the articular surface. Soft tissue swelling seen at the ankle.  No other acute osseous fracture seen in the ankle or foot. Xr Foot Left (min 3 Views)    Result Date: 10/22/2020  EXAMINATION: THREE XRAY VIEWS OF THE LEFT FOOT; THREE XRAY VIEWS OF THE LEFT ANKLE 10/22/2020 12:40 pm COMPARISON: None. HISTORY: ORDERING SYSTEM PROVIDED HISTORY: pain TECHNOLOGIST PROVIDED HISTORY: Reason for exam:->pain Reason for Exam: pain Acuity: Acute Type of Exam: Initial Mechanism of Injury: Pt to ED with complaints of left leg and foot pain. Pt reports recent hip replacement; ORDERING SYSTEM PROVIDED HISTORY: pain TECHNOLOGIST PROVIDED HISTORY: Reason for exam:->pain Reason for Exam: PAIN Acuity: Acute Type of Exam: Initial Mechanism of Injury: Pt to ED with complaints of left leg and foot pain. Pt reports recent hip replacement FINDINGS: Left foot: Mild degenerative change seen at the 1st metatarsal-phalangeal joint. Mild interphalangeal joint degenerative osteoarthritic change. The Lisfranc joint appears intact. No fracture or dislocation is identified. No osseous erosive changes are identified. Calcaneal spurring seen at the plantar aponeurosis. Left ankle: Soft tissue swelling is seen at the ankle. The ankle mortise appears intact. There is a calcaneal spur noted at the plantar aponeurosis. There is a focal spur noted along the anteroinferior aspect of the distal tibia seen on the lateral view, with a oblique lucency extending through this which could be related to a nondisplaced osteophyte fracture. Suspected fracture through a small osteophyte off the anterior inferior aspect of the distal tibia at seen best on the lateral view at the level of the articular surface. Soft tissue swelling seen at the ankle. No other acute osseous fracture seen in the ankle or foot.      Ct Chest Wo Contrast    Result Date: 10/27/2020  EXAMINATION: CT OF THE CHEST WITHOUT CONTRAST; CT OF THE ABDOMEN AND PELVIS WITHOUT CONTRAST 10/27/2020 2:31 pm TECHNIQUE: CT of the chest was performed without the administration of intravenous contrast. Multiplanar reformatted images are provided for review. Dose modulation, iterative reconstruction, and/or weight based adjustment of the mA/kV was utilized to reduce the radiation dose to as low as reasonably achievable.; CT of the abdomen and pelvis was performed without the administration of intravenous contrast. Multiplanar reformatted images are provided for review. Dose modulation, iterative reconstruction, and/or weight based adjustment of the mA/kV was utilized to reduce the radiation dose to as low as reasonably achievable. COMPARISON: None HISTORY: ORDERING SYSTEM PROVIDED HISTORY: cough TECHNOLOGIST PROVIDED HISTORY: Reason for exam:->cough Reason for Exam: cough Acuity: Acute Type of Exam: Initial Relevant Medical/Surgical History: hx hyster, appy, carolynn; ORDERING SYSTEM PROVIDED HISTORY: abdominal pain TECHNOLOGIST PROVIDED HISTORY: Reason for exam:->abdominal pain Additional Contrast?->None Reason for Exam: abd pain Acuity: Acute Type of Exam: Initial Relevant Medical/Surgical History: hx carolynn, hyster, appy FINDINGS: Chest: No infiltrate or consolidation is identified. There are subsegmental atelectatic changes in both lung bases. There is a calcified granuloma in the right lower lobe. There is a calcified granuloma in the left lower lobe. Calcified lymph nodes are present within the mediastinum and both karmen. The heart size is top normal.  A mild hiatal hernia is noted. Mild degenerative changes of the spine are noted. Abdomen/Pelvis: Organs: Calcified granulomas are present within the liver and spleen. The gallbladder has been resected. The pancreas is homogeneous in density. No adrenal masses are identified and there is no hydronephrosis. No renal stones are detected. GI/Bowel: There is no bowel obstruction or free air or free fluid. The appendix is not identified. The stomach and duodenum are grossly within normal limits. Pelvis: Status post hysterectomy.   The urinary bladder is collapsed around a Damon catheter. Peritoneum/Retroperitoneum: There are mild vascular calcifications. No pathologically enlarged lymph nodes are identified. Bones/Soft Tissues: See there are degenerative changes of the spine and the patient is status post left hip arthroplasty. No acute abnormality visualized. Evidence of chronic granulomatous disease. Mri Lumbar Spine Wo Contrast    Result Date: 10/28/2020  EXAMINATION: MRI OF THE LUMBAR SPINE WITHOUT CONTRAST, 10/28/2020 7:31 pm TECHNIQUE: Multiplanar multisequence MRI of the lumbar spine was performed without the administration of intravenous contrast. COMPARISON: None. HISTORY: ORDERING SYSTEM PROVIDED HISTORY: BACK PAIN TECHNOLOGIST PROVIDED HISTORY: Reason for exam:->BACK PAIN Reason for Exam: NKI, pain, No Surg FINDINGS: BONES/ALIGNMENT: There is a normal lumbar lordosis. Assuming that the last well-formed disc represents L5-S1, the conus medullaris ends at L1 and is within normal limits. There is no acute fracture or traumatic subluxation. Grade 1 anterolisthesis of L4 on L5 is likely due to facet arthropathy. 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 Mild to moderate degenerative disc disease is noted. SPINAL CORD: The conus terminates normally. SOFT TISSUES: No paraspinal mass identified. L1-L2: Mild congenital narrowing of the thecal sac. L2-L3: Disc bulge with a superimposed central disc protrusion measuring up to 5.4 mm AP diameter. Facet hypertrophy. Findings results in severe narrowing of the thecal sac with resultant bunching of the cauda equina nerve roots, best appreciated on image number 6 of series 4. L3-L4: Disc bulge and facet hypertrophy contributing to mild-to-moderate narrowing of the thecal sac. L4-L5: Uncovering of the disc due to anterolisthesis. Facet hypertrophy. There is moderate narrowing of the thecal sac and mild-to-moderate narrowing of the right neural foramen.  L5-S1: Facet hypertrophy without vascular congestion is noted. Minimal left basilar atelectasis/infiltrate is seen. There is no pleural effusion or pneumothorax. Mild cardiomegaly with pulmonary vascular congestion. Left basilar atelectasis/infiltrate. Mri Elbow Left Wo Contrast    Result Date: 10/29/2020  EXAMINATION: MRI OF THE LEFT ELBOW WITHOUT CONTRAST, 10/29/2020 4:46 pm TECHNIQUE: Multiplanar multisequence MRI of the left elbow was performed without the administration of intravenous contrast. COMPARISON: CT left elbow October 24, 2020; left elbow radiograph October 23, 2020 HISTORY: ORDERING SYSTEM PROVIDED HISTORY: left elbow pain/ fever TECHNOLOGIST PROVIDED HISTORY: Reason for exam:->left elbow pain/ fever Reason for Exam: left elbow pain/ fever, fall--due to patient conditio, unable to raise arm, arm in fixed position with restraints as aids,best imaging obtained due to limited motion due to pain after fall the other day Acuity: Acute Type of Exam: Initial Mechanism of Injury: fall Relevant Medical/Surgical History: none FINDINGS: Evaluation of the elbow is suboptimal due to patient positioning and partial flexion as well as inability to utilize the dedicated elbow coil MEDIAL EPICONDYLE: Thickening of the common flexor origin from the medial epicondyle with intrasubstance fluid signal consistent with tendinosis and partial tearing. LATERAL EPICONDYLE: Thickening of the common extensor origin from the lateral epicondyle with high-grade partial tearing at the insertion (images 11 through 13 series 9). Underlying tendinosis of the common extensor origin. MEDIAL COLLATERAL LIGAMENT:  The ulnar collateral ligament, including the anterior and posterior bands, is grossly intact LATERAL COLLATERAL LIGAMENT:  The lateral collateral ligaments including the lateral ulnar collateral ligament are grossly intact MUSCLES / TENDONS: Mild diffuse fatty atrophy of the imaged musculature.   The distal biceps tendon, brachialis tendon, and triceps tendons remain intact. ULNAR NERVE: The ulnar nerve is intact and grossly unremarkable in appearance. JOINT SPACES: Mild degenerative changes of the elbow with large complex elbow effusion which is of indeterminate etiology. Differential considerations include hemarthrosis as well as severe synovitis. BONE MARROW: No osseous contusion or fracture. No suspicious marrow occupying lesions are identified. SOFT TISSUES: There is pronounced dorsal soft tissue edema with confluent fluid at the olecranon bursa. Findings may reflect posttraumatic bursitis given history of fall. Correlate clinically for acuity. 1. Large volume of confluent fluid at the olecranon bursa. Findings may reflect posttraumatic olecranon bursitis given history of fall. Correlate clinically for acuity. 2. Large complex elbow effusion which is of indeterminate etiology. Differential considerations include hemarthrosis as well as severe synovitis. Infection is felt less likely given absence of bone marrow edema and osseous changes. Arthrocentesis of the joint fluid could be obtained for further evaluation as clinically indicated. 3. Tendinosis and mild partial tearing of the common flexor origin from the medial epicondyle. 4. Tendinosis and high-grade partial tearing of the common extensor origin from the lateral epicondyle. 5. No acute fracture identified. Mri Foot Left Wo Contrast    Result Date: 10/29/2020  EXAMINATION: MRI OF THE LEFT FOOT WITHOUT CONTRAST, 10/28/2020 7:00 pm TECHNIQUE: Multiplanar multisequence MRI of the left foot was performed without the administration of intravenous contrast. COMPARISON: Foot and ankle radiograph dated 10/22/2020 HISTORY: ORDERING SYSTEM PROVIDED HISTORY: left foot pain TECHNOLOGIST PROVIDED HISTORY: Reason for exam:->left foot pain Reason for Exam: pain, Swelling, NKI, No Surg FINDINGS: LISFRANC JOINT:  Lisfranc ligament appears intact.   The alignment of the tarsal-metatarsal joint appears anatomic. BONE MARROW: There is mild bone marrow edema type signal intensity seen about the 1st metatarsophalangeal joint as well as the expected location of the medial hallux sesamoid. On the T1 weighted images, there is focus of low to intermediate signal intensity seen within the 1st metatarsal phalangeal joint with corresponding increased T2 hyper signal intensity with suggestion of cortical erosive changes seen affecting the plantar aspect of the 1st metatarsal head (sagittal image 7). No evidence of adjacent soft tissue or skin ulceration is seen. There appear to be degenerative cystic changes seen within the medial cuneiform. LESSER MTP JOINTS: The lesser metatarsophalangeal joints appear unremarkable. SOFT TISSUES: There is a 9 x 5 x 9 mm ovoid focus of near fluid signal intensity seen along the dorsal aspect of the proximal 3rd metatarsal, which may reflect a ganglion. There is mild edema seen within the intrinsic musculature of the foot, which may reflect nonspecific myositis. There appears to be dorsal subcutaneous lipoma seen at the level of the ankle, which is partially visualized on this study. TENDONS: There is a small amount of fluid seen along the flexor hallucis longus tendon, likely reflecting mild tenosynovitis. The visualized portions of flexor and extensor tendons appear grossly intact. The epicenter of the inflammatory changes appear to be at the 1st metatarsophalangeal joint with bone marrow edema type signal intensity seen within the distal aspect of the 1st metatarsal head and base of the 1st proximal phalanx. Correlation with point of tenderness is recommended. There is focus of low to intermediate signal intensity seen within the 1st metatarsophalangeal joint with suggestion of cortical erosive changes seen affecting the plantar aspect of the 1st metatarsal head.   No evidence of adjacent soft tissue or skin ulceration is seen and these findings are likely inflammatory in origin rather than infectious. Based on this location and imaging appearance, the possibility of underlying gouty arthritis is raised. Mild edema seen within the intrinsic musculature of the foot, which may reflect nonspecific myositis. Mild tenosynovitis affecting the flexor hallucis longus tendon. 9 mm focus of near fluid signal intensity seen along the dorsal aspect of the proximal 3rd metatarsal, which may reflect a ganglion     Ct Elbow Left Wo Contrast    Result Date: 10/26/2020  EXAMINATION: CT OF THE LEFT ELBOW WITHOUT CONTRAST 10/24/2020 12:57 pm TECHNIQUE: CT of the left elbow was performed without the administration of intravenous contrast.  Multiplanar reformatted images are provided for review. Dose modulation, iterative reconstruction, and/or weight based adjustment of the mA/kV was utilized to reduce the radiation dose to as low as reasonably achievable. COMPARISON: Radiographs 10/23/2020 HISTORY ORDERING SYSTEM PROVIDED HISTORY: left elbow pain. TECHNOLOGIST PROVIDED HISTORY: Reason for exam:->left elbow pain. Reason for Exam:  ELBOW PAIN, POSSIBLE FALL FINDINGS: No acute fracture or evidence of dislocation. No obvious bony erosions are seen. No significant degenerative changes are noted. There is elevation of the anterior and posterior fat pads with a soft tissue density. There is subcutaneous edema posterior to the olecranon. No acute fracture or evidence of dislocation. There is elevation of the anterior and posterior fat pads with soft tissue density. This could represent a complex joint effusion or synovial thickening/synovitis. In the setting of a fall, hemorrhagic effusion could also be considered. Further assessment can be made with MRI. Nonspecific subcutaneous edema at the posterior aspect of the olecranon.      Vl Dup Lower Extremity Venous Right    Result Date: 10/26/2020  EXAMINATION: DUPLEX VENOUS ULTRASOUND OF THE RIGHT LOWER EXTREMITY, 10/26/2020 1:29 pm TECHNIQUE: Duplex ultrasound and Doppler images were obtained of the right lower extremity. COMPARISON: None. HISTORY: ORDERING SYSTEM PROVIDED HISTORY: edema TECHNOLOGIST PROVIDED HISTORY: Reason for exam:->edema Reason for Exam: edema Type of Exam: Initial FINDINGS: The visualized veins of the right lower extremity are patent and free of echogenic thrombus. The veins are normally compressible and have normal phasic flow. Suboptimal visualization due to body habitus. No evidence of DVT in the right lower extremity. Vl Dup Lower Extremity Venous Left    Result Date: 10/22/2020  EXAMINATION: DUPLEX VENOUS ULTRASOUND OF THE LEFT LOWER EXTREMITY 10/22/2020 11:36 am TECHNIQUE: Duplex ultrasound and Doppler images were obtained of the left lower extremity. COMPARISON: None. HISTORY: ORDERING SYSTEM PROVIDED HISTORY: leg pain, total hip arthroplasty recently TECHNOLOGIST PROVIDED HISTORY: Reason for exam:->leg pain, total hip arthroplasty recently Reason for Exam: left leg pain Acuity: Acute Type of Exam: Initial Mechanism of Injury: left hip arthroplasty Relevant Medical/Surgical History: nk FINDINGS: The visualized veins of the left lower extremity are patent and free of echogenic thrombus. The veins are normally compressible and have normal phasic flow. No evidence of DVT in the left lower extremity.      Nm Myocardial Spect Rest Exercise Or Rx    Result Date: 10/14/2020  Cardiac Perfusion Imaging   Demographics   Patient Name      [facility]  Date of study        10/14/2020   Date of Birth     1960         Gender               Female   Age               64 year(s)         Race                    Patient Number    [id number]           Room Number   Visit Number      [id number]          Height               65 inches   Corporate ID      [id number]           Weight               250 pounds   Accession Number  [id number]   Referring         Torsten AGUILLON Technologist      Caitie Frey  Physician Brigette Valera MD Interpreting         Jack Soto  Physician                            Cardiologist         Kelley MALIK   The procedure was explained in detail to the patient. Risks,  complications and alternative treatments were reviewed. Written consent  was obtained. Conclusions   Summary  Supervising physician Dr. Hanna Desai . Normal tracer uptake in all segments of myocardium on stress ans rest  images. Normal Lexiscan nuclear scintigraphic study suggestive of normal  myocardial perfusion. Gated images demonstrate normal left ventricular  systolic function with EF of 60 %. Recommendation  Continue present medical therapy and followup in office as scheduled. Signatures   ------------------------------------------------------------------  Electronically signed by Bertha Melo MD  (Interpreting cardiologist) on 10/14/2020 at 12:38  ------------------------------------------------------------------  Procedure Procedure Type:   Nuclear Stress Test:NM MYOCARDIAL SPECT REST EXERCISE OR RX  Indications: Pre-op, to stratify risk, Chest pain and Dyspnea on Exertion. Risk Factors   The patient risk factors include:obesity, treated hypercholesterolemia,  treated hypertension and family history of premature CAD. Stress Protocols   Resting ECG  Normal sinus rhythm. No ST or T wave changes. Resting HR:62 bpm  Resting BP:122/64 mmHg  Stress Protocol:Pharmacologic - Lexiscan  Peak HR:86 bpm                           HR response: Appropriate  Peak BP:138/60 mmHg                      BP response: Normal resting BP -  Predicted HR: 162 bpm                    exaggerated response  % of predicted HR: 53                    HR/BP product:[id number]   Exercise duration: 01:00 min  Reason for termination:Reached  diagnostic endpoint   Symptoms  Flushing. Shortness of breath. Dizziness.    Complications  Procedure complication was none.   Stress Interpretation  ECG portion of stress test is negative for ischemia by diagnostic criteria. No chest pain with exercise. Procedure Medications   - Lexiscan I.V. bolus (over 15sec.) 0.4 mg admininstered @ 10/14/2020 10:45.   - Aminophylline I.V. 50 mg admininstered @ 10/14/2020 10:47. Imaging Protocols   Rest                                Stress   Isotope:Sestamibi 99mTc             Isotope: Sestamibi 99mTc  Isotope dose:9 mCi                  Isotope dose:27 mCi  Administration route: I.V. Lt. arm  Administration route: I.V. Lt. arm  Injection Date:10/14/2020 09:10     Injection Date:10/14/2020 10:45  Scan Date:10/14/2020 09:40          Scan Date:10/14/2020 11:15   Technique:         SPECT            Technique:          Gated                                                          SPECT   Perfusion Interpretation   Normal tracer uptake in all segments of myocardium on stress ans rest  images. Imaging Results Visualization: Good  Rest ejection  Ejection fraction:80 %  EDV :74 ml  ESV :15 ml  Stroke volume :59 ml  Medical History   Accession#:  [id number]  Admission Data Admission date: 10/14/2020 Admission Time: 08:48 Hospital Status: Outpatient. Mri Sacrum Coccyx Wo Contrast    Result Date: 10/28/2020  EXAMINATION: MRI OF THE SACRUM/SI JOINT WITHOUT CONTRAST, 10/28/2020 7:58 pm TECHNIQUE: Multiplanar multisequence MRI of the sacrum/si joint was performed without the administration of intravenous contrast. COMPARISON: MRI lumbar spine same day; CT abdomen pelvis October 27, 2020 left hip radiograph August 27 2020; pelvis radiograph August 19, 2020 HISTORY: ORDERING SYSTEM PROVIDED HISTORY: back pain TECHNOLOGIST PROVIDED HISTORY: Reason for exam:->back pain Reason for Exam: back pain, No Surg FINDINGS: SOFT TISSUES: Visualized intrapelvic structures demonstrate no acute process.  Subcutaneous tissues demonstrate mild edema along the left paraspinous musculature which is nonspecific and may reflect muscular strain. There is symmetric fatty atrophy of the paraspinous musculature. Mild dependent subcutaneous edema of the back. Intramuscular edema involving the musculature at the left hemipelvis. Patient has undergone a recent left hip arthroplasty and findings favored to be postoperative. The bladder is partially collapsed with Damon catheter in place. BONE MARROW: No osseous contusion or fracture. No suspicious marrow occupying lesions are identified. Metallic susceptibility artifact at the left hip related to hip arthroplasty hardware. JOINTS: Anatomic alignment of the acromioclavicular joints. Mild degenerative changes of bilateral acromioclavicular joints. No joint effusion. 1. No acute osseous abnormality of the sacrum or coccyx identified. 2. Mild intramuscular edema of the left paraspinous musculature which may reflect mild muscular strain. 3. Postoperative changes of left hip arthroplasty. Adjacent intramuscular edema at the left hemipelvis. Patient has undergone recent hip arthroplasty at this site and findings favored to be postoperative.        Significant Diagnostic Studies at discharge:   BMP:    Lab Results   Component Value Date     11/02/2020    K 4.0 11/02/2020    CL 98 11/02/2020    CO2 27 11/02/2020    BUN 31 11/02/2020    LABALBU 3.3 10/22/2020    CREATININE 1.1 11/02/2020    CALCIUM 9.4 11/02/2020    GFRAA >60 11/02/2020    LABGLOM 51 11/02/2020    GLUCOSE 119 11/02/2020       Patient Instructions:     Medication List      START taking these medications    furosemide 40 MG tablet  Commonly known as:  LASIX  Take 1 tablet by mouth daily     potassium chloride 20 MEQ extended release tablet  Commonly known as:  KLOR-CON M  Take 1 tablet by mouth 2 times daily     predniSONE 20 MG tablet  Commonly known as:  DELTASONE  Take 2 tablets by mouth daily for 7 days        CHANGE how you take these medications    oxyCODONE-acetaminophen 5-325 MG per tablet  Commonly known as:  Percocet  Take 1 tablet by mouth every 8 hours as needed for Pain for up to 7 days. What changed:    · when to take this  · additional instructions        CONTINUE taking these medications    docusate sodium 100 MG capsule  Commonly known as:  COLACE  Take 1 capsule by mouth daily as needed for Constipation     ezetimibe 10 MG tablet  Commonly known as:  ZETIA  Take 1 tablet by mouth daily     hydroCHLOROthiazide 25 MG tablet  Commonly known as:  HYDRODIURIL  Take 0.5 tablets by mouth daily     loratadine 10 MG tablet  Commonly known as:  Claritin  Take 1 tablet by mouth daily     metoprolol tartrate 50 MG tablet  Commonly known as:  LOPRESSOR  Take 1 tablet by mouth 2 times daily     RANITIDINE ACID REDUCER PO     Repatha 140 MG/ML Sosy  Generic drug:  Evolocumab  Inject 1 mL into the skin every 14 days     rivaroxaban 10 MG Tabs tablet  Commonly known as:  XARELTO  Take 1 tablet by mouth daily           Where to Get Your Medications      You can get these medications from any pharmacy    Bring a paper prescription for each of these medications  · furosemide 40 MG tablet  · oxyCODONE-acetaminophen 5-325 MG per tablet  · potassium chloride 20 MEQ extended release tablet  · predniSONE 20 MG tablet            Code Status: Full Code     Consults:   IP CONSULT TO ORTHOPEDIC SURGERY  IP CONSULT TO HOSPITALIST  IP CONSULT TO CASE MANAGEMENT  IP CONSULT TO RHEUMATOLOGY  IP CONSULT TO HOME CARE NEEDS    Diet: regular diet    Activity: activity as tolerated   Work:    Discharged Condition: good    Prognosis: Fair - Good    Disposition: home      Follow-up with   1. PCP within   5-7    Days    Follow up labs: none       Discharge Physician Signed: Sindi Alcaraz M.D. The patient was seen and examined on day of discharge and this discharge summary is in conjunction with any daily progress note from day of discharge.   Time spent on discharge in the examination, evaluation, counseling and review of medications and discharge plan: 34 minutes

## 2020-11-02 NOTE — PROGRESS NOTES
Physical Therapy    Physical Therapy Treatment Note  Name: Leah Lyn MRN: [id number] :   1960   Date:  2020   Admission Date: 10/22/2020 Room:  [address] [place] [address]   Restrictions/Precautions:  Restrictions/Precautions  Restrictions/Precautions: General Precautions, Fall Risk, Weight Bearing Lower Extremity Weight Bearing Restrictions  Left Lower Extremity Weight Bearing: Weight Bearing As Tolerated(in walking boot)     Communication with other providers:  Per nurse ok to tx  Subjective:  Patient states:  Pt motivated and agreeable to tx. Pt reports still feeling shaky and unsteady when up amb. Pain:   Location, Type, Intensity (0/10 to 10/10):  Rates pain at end of tx 5 in left LE   Objective:    Observation:  Alert and oriented up in chair with walking boot on LLE  Treatment, including education/measures:  Sit<=>stand from chair and BSC min assist needing increased effort and rocking motions with several attempts before successful. Pt request to return to bed at end of tx session and needing sba using leg  to lift LLE into bed and min assist to scoot to [employer]. Pt was able to do freddie care in sitting and using wet wipes for hands. amb with rw 10' x 1, 6' x 1 with cga and cues for safety with turning. Ex in sup: pt needs cues to perform slowly  20 reps aps  20 reps quad sets  20 reps glut sets  10 reps heel slides with assist on LLE  10 reps abd/add with assist on LLE  20 reps saqs  Safety  Patient left safely in the bed, with call light/phone in reach with alarm applied. Gait belt was used for transfers and gait. Assessment / Impression:       Patient's tolerance of treatment:  good   Adverse Reaction: na  Significant change in status and impact:  na  Barriers to improvement:  Strength and safety  Plan for Next Session:    Cont.  POC  Time in:  1255  Time out:  1350  Timed treatment minutes:  55  Total treatment time:  54    Previously filed items:  Social/Functional History  Lives With: Alone  Type of Home: House  Home Layout: Two level  Home Access: Ramped entrance  Bathroom Shower/Tub: Walk-in shower, Shower chair without back  Bathroom Toilet: Handicap height  Home Equipment: Rolling walker, Cane  ADL Assistance: Independent  Homemaking Assistance: Independent  Ambulation Assistance: Independent  Transfer Assistance: Independent  Active : Yes     Long term goals  Time Frame for Long term goals :  In one week:  Long term goal 1: Pt will complete all bed mobility with CGAx1  Long term goal 2: Pt will complete sit <> stand transfers with minAx1  Long term goal 3: Pt will ambulate 20 feet with modAx2 with LRAD  Long term goal 4: Pt will independently complete 3 sets of 10 reps of BLE AROM exercises in available and allowed ROM    Electronically signed by:    Shorty Zambrano PTA  11/2/2020, 8:17 AM

## 2020-11-03 LAB
ANTI-NUCLEAR ANTIBODY (ANA): NORMAL
CYCLIC CITRULLINATED PEPTIDE ANTIBODY IGG: 5 UNITS (ref 0–19)
RHEUMATOID FACTOR: 14 IU/ML (ref 0–14)

## 2020-11-04 ENCOUNTER — TELEPHONE (OUTPATIENT)
Dept: CARDIOLOGY CLINIC | Age: 60
End: 2020-11-04

## 2020-11-06 ENCOUNTER — TELEPHONE (OUTPATIENT)
Dept: ORTHOPEDIC SURGERY | Age: 60
End: 2020-11-06

## 2020-11-06 NOTE — TELEPHONE ENCOUNTER
Patient called to let us know that she was released from the hospital on 11/02, however since home has been directly exposed to COVID-19 and does not have a way into the office for at least a couple of weeks. She requested that I let Dr. Mitch Levi know and to let him know she had CT and MRI scans done while in the hospital. Staffed this with Dr. Mitch Levi and he is aware and have viewed the scans.  Jeanette Pena

## 2020-11-13 ENCOUNTER — APPOINTMENT (OUTPATIENT)
Dept: GENERAL RADIOLOGY | Age: 60
DRG: 137 | End: 2020-11-13
Payer: COMMERCIAL

## 2020-11-13 ENCOUNTER — HOSPITAL ENCOUNTER (INPATIENT)
Age: 60
LOS: 3 days | Discharge: HOME HEALTH | DRG: 137 | End: 2020-11-16
Attending: EMERGENCY MEDICINE | Admitting: STUDENT IN AN ORGANIZED HEALTH CARE EDUCATION/TRAINING PROGRAM
Payer: COMMERCIAL

## 2020-11-13 PROBLEM — J96.01 ACUTE RESPIRATORY FAILURE WITH HYPOXIA (HCC): Status: ACTIVE | Noted: 2020-11-13

## 2020-11-13 LAB
ALBUMIN SERPL-MCNC: 3.5 GM/DL (ref 3.4–5)
ALP BLD-CCNC: 84 IU/L (ref 40–129)
ALT SERPL-CCNC: 61 U/L (ref 10–40)
ANION GAP SERPL CALCULATED.3IONS-SCNC: 15 MMOL/L (ref 4–16)
AST SERPL-CCNC: 63 IU/L (ref 15–37)
BASOPHILS ABSOLUTE: 0 K/CU MM
BASOPHILS RELATIVE PERCENT: 0.5 % (ref 0–1)
BILIRUB SERPL-MCNC: 0.9 MG/DL (ref 0–1)
BUN BLDV-MCNC: 12 MG/DL (ref 6–23)
CALCIUM SERPL-MCNC: 8.5 MG/DL (ref 8.3–10.6)
CHLORIDE BLD-SCNC: 94 MMOL/L (ref 99–110)
CO2: 26 MMOL/L (ref 21–32)
CREAT SERPL-MCNC: 1 MG/DL (ref 0.6–1.1)
DIFFERENTIAL TYPE: ABNORMAL
EOSINOPHILS ABSOLUTE: 0 K/CU MM
EOSINOPHILS RELATIVE PERCENT: 0.5 % (ref 0–3)
GFR AFRICAN AMERICAN: >60 ML/MIN/1.73M2
GFR NON-AFRICAN AMERICAN: 57 ML/MIN/1.73M2
GLUCOSE BLD-MCNC: 108 MG/DL (ref 70–99)
HCT VFR BLD CALC: 34.8 % (ref 37–47)
HEMOGLOBIN: 11.2 GM/DL (ref 12.5–16)
IMMATURE NEUTROPHIL %: 0.5 % (ref 0–0.43)
LACTATE: 1.9 MMOL/L (ref 0.4–2)
LYMPHOCYTES ABSOLUTE: 1.4 K/CU MM
LYMPHOCYTES RELATIVE PERCENT: 32.2 % (ref 24–44)
MCH RBC QN AUTO: 28.9 PG (ref 27–31)
MCHC RBC AUTO-ENTMCNC: 32.2 % (ref 32–36)
MCV RBC AUTO: 89.9 FL (ref 78–100)
MONOCYTES ABSOLUTE: 0.3 K/CU MM
MONOCYTES RELATIVE PERCENT: 7.3 % (ref 0–4)
NUCLEATED RBC %: 0 %
PDW BLD-RTO: 15 % (ref 11.7–14.9)
PLATELET # BLD: 137 K/CU MM (ref 140–440)
PMV BLD AUTO: 10.1 FL (ref 7.5–11.1)
POTASSIUM SERPL-SCNC: 3.5 MMOL/L (ref 3.5–5.1)
PRO-BNP: 121.5 PG/ML
RBC # BLD: 3.87 M/CU MM (ref 4.2–5.4)
SARS-COV-2, NAAT: DETECTED
SEGMENTED NEUTROPHILS ABSOLUTE COUNT: 2.5 K/CU MM
SEGMENTED NEUTROPHILS RELATIVE PERCENT: 59 % (ref 36–66)
SODIUM BLD-SCNC: 135 MMOL/L (ref 135–145)
SOURCE: ABNORMAL
TOTAL IMMATURE NEUTOROPHIL: 0.02 K/CU MM
TOTAL NUCLEATED RBC: 0 K/CU MM
TOTAL PROTEIN: 6.7 GM/DL (ref 6.4–8.2)
TROPONIN T: <0.01 NG/ML
WBC # BLD: 4.3 K/CU MM (ref 4–10.5)

## 2020-11-13 PROCEDURE — 93005 ELECTROCARDIOGRAM TRACING: CPT | Performed by: EMERGENCY MEDICINE

## 2020-11-13 PROCEDURE — 99285 EMERGENCY DEPT VISIT HI MDM: CPT

## 2020-11-13 PROCEDURE — 85025 COMPLETE CBC W/AUTO DIFF WBC: CPT

## 2020-11-13 PROCEDURE — 80053 COMPREHEN METABOLIC PANEL: CPT

## 2020-11-13 PROCEDURE — 83605 ASSAY OF LACTIC ACID: CPT

## 2020-11-13 PROCEDURE — 2060000000 HC ICU INTERMEDIATE R&B

## 2020-11-13 PROCEDURE — 96361 HYDRATE IV INFUSION ADD-ON: CPT

## 2020-11-13 PROCEDURE — 86900 BLOOD TYPING SEROLOGIC ABO: CPT

## 2020-11-13 PROCEDURE — 87040 BLOOD CULTURE FOR BACTERIA: CPT

## 2020-11-13 PROCEDURE — 83880 ASSAY OF NATRIURETIC PEPTIDE: CPT

## 2020-11-13 PROCEDURE — 86901 BLOOD TYPING SEROLOGIC RH(D): CPT

## 2020-11-13 PROCEDURE — 84484 ASSAY OF TROPONIN QUANT: CPT

## 2020-11-13 PROCEDURE — U0002 COVID-19 LAB TEST NON-CDC: HCPCS

## 2020-11-13 PROCEDURE — 2580000003 HC RX 258: Performed by: EMERGENCY MEDICINE

## 2020-11-13 PROCEDURE — 71045 X-RAY EXAM CHEST 1 VIEW: CPT

## 2020-11-13 PROCEDURE — 96360 HYDRATION IV INFUSION INIT: CPT

## 2020-11-13 RX ORDER — ACETAMINOPHEN 325 MG/1
650 TABLET ORAL EVERY 6 HOURS PRN
Status: DISCONTINUED | OUTPATIENT
Start: 2020-11-13 | End: 2020-11-16 | Stop reason: HOSPADM

## 2020-11-13 RX ORDER — SODIUM CHLORIDE 0.9 % (FLUSH) 0.9 %
10 SYRINGE (ML) INJECTION EVERY 12 HOURS SCHEDULED
Status: DISCONTINUED | OUTPATIENT
Start: 2020-11-13 | End: 2020-11-16 | Stop reason: HOSPADM

## 2020-11-13 RX ORDER — ACETAMINOPHEN 650 MG/1
650 SUPPOSITORY RECTAL EVERY 6 HOURS PRN
Status: DISCONTINUED | OUTPATIENT
Start: 2020-11-13 | End: 2020-11-16 | Stop reason: HOSPADM

## 2020-11-13 RX ORDER — ONDANSETRON 2 MG/ML
4 INJECTION INTRAMUSCULAR; INTRAVENOUS EVERY 6 HOURS PRN
Status: DISCONTINUED | OUTPATIENT
Start: 2020-11-13 | End: 2020-11-16 | Stop reason: HOSPADM

## 2020-11-13 RX ORDER — VITAMIN B COMPLEX
6000 TABLET ORAL DAILY
Status: DISCONTINUED | OUTPATIENT
Start: 2020-11-14 | End: 2020-11-16 | Stop reason: RX

## 2020-11-13 RX ORDER — SODIUM CHLORIDE 9 MG/ML
INJECTION, SOLUTION INTRAVENOUS CONTINUOUS
Status: DISCONTINUED | OUTPATIENT
Start: 2020-11-13 | End: 2020-11-14

## 2020-11-13 RX ORDER — POLYETHYLENE GLYCOL 3350 17 G/17G
17 POWDER, FOR SOLUTION ORAL DAILY PRN
Status: DISCONTINUED | OUTPATIENT
Start: 2020-11-13 | End: 2020-11-16 | Stop reason: HOSPADM

## 2020-11-13 RX ORDER — PROMETHAZINE HYDROCHLORIDE 25 MG/1
12.5 TABLET ORAL EVERY 6 HOURS PRN
Status: DISCONTINUED | OUTPATIENT
Start: 2020-11-13 | End: 2020-11-16 | Stop reason: HOSPADM

## 2020-11-13 RX ORDER — LEVOFLOXACIN 5 MG/ML
500 INJECTION, SOLUTION INTRAVENOUS EVERY 24 HOURS
Status: DISCONTINUED | OUTPATIENT
Start: 2020-11-13 | End: 2020-11-16 | Stop reason: HOSPADM

## 2020-11-13 RX ORDER — GUAIFENESIN/DEXTROMETHORPHAN 100-10MG/5
5 SYRUP ORAL EVERY 4 HOURS PRN
Status: DISCONTINUED | OUTPATIENT
Start: 2020-11-13 | End: 2020-11-16 | Stop reason: HOSPADM

## 2020-11-13 RX ORDER — SODIUM CHLORIDE 0.9 % (FLUSH) 0.9 %
10 SYRINGE (ML) INJECTION PRN
Status: DISCONTINUED | OUTPATIENT
Start: 2020-11-13 | End: 2020-11-16 | Stop reason: HOSPADM

## 2020-11-13 RX ORDER — METHYLPREDNISOLONE SODIUM SUCCINATE 40 MG/ML
40 INJECTION, POWDER, LYOPHILIZED, FOR SOLUTION INTRAMUSCULAR; INTRAVENOUS EVERY 12 HOURS
Status: DISCONTINUED | OUTPATIENT
Start: 2020-11-13 | End: 2020-11-16 | Stop reason: HOSPADM

## 2020-11-13 RX ORDER — 0.9 % SODIUM CHLORIDE 0.9 %
1000 INTRAVENOUS SOLUTION INTRAVENOUS ONCE
Status: COMPLETED | OUTPATIENT
Start: 2020-11-13 | End: 2020-11-13

## 2020-11-13 RX ADMIN — SODIUM CHLORIDE 1000 ML: 9 INJECTION, SOLUTION INTRAVENOUS at 16:53

## 2020-11-13 RX ADMIN — SODIUM CHLORIDE: 9 INJECTION, SOLUTION INTRAVENOUS at 18:16

## 2020-11-13 ASSESSMENT — PAIN DESCRIPTION - LOCATION
LOCATION: CHEST
LOCATION: CHEST

## 2020-11-13 ASSESSMENT — PAIN DESCRIPTION - PAIN TYPE
TYPE: ACUTE PAIN
TYPE: ACUTE PAIN

## 2020-11-13 ASSESSMENT — PAIN SCALES - GENERAL
PAINLEVEL_OUTOF10: 6
PAINLEVEL_OUTOF10: 5
PAINLEVEL_OUTOF10: 5

## 2020-11-13 ASSESSMENT — PAIN DESCRIPTION - ORIENTATION: ORIENTATION: MID

## 2020-11-13 ASSESSMENT — PAIN SCALES - WONG BAKER: WONGBAKER_NUMERICALRESPONSE: 0

## 2020-11-13 NOTE — ED PROVIDER NOTES
Emergency Department Encounter    Patient: Keon Meade  MRN: [id number]  : 1960  Date of Evaluation: 2020  ED Provider:  Demetrius Blair    Triage Chief Complaint:   Shortness of Breath; Extremity Weakness; and Concern For COVID-19    [place]:  Keon Meade is a 64 y.o. female that presents with concern for generalized weakness cough, nausea, loss of sense of taste and smell that started around Monday, now over the last 2 days having increased shortness of breath and weakness throughout her body. Not able to get up and ambulate due to general weakness. Having body aches. Generally feeling unwell. She was exposed to COVID-19 on the second or third of this month. She feels short of breath with any type of exertion. She is not on oxygen at home. No chest pain or abdominal pain. No diarrhea. No leg swelling or pain other than where she has a boot for her left foot where she had a fracture previously, had been admitted here to the hospital in October. No syncope. No headache or focal weakness or numbness. ROS - see HPI, below listed is current ROS at time of my eval:  10 systems reviewed and negative except as above      Past Medical History:   Diagnosis Date    Allergic rhinitis due to pollen 2015    Arthritis     left hip & knee    Chronic back pain     Depression     Gastroesophageal reflux disease 2015    Hearing loss 2015    History of blood transfusion     No reaction per pt    History of cardiac monitoring 2017    14 day event monitor. Sinus Rhythm    History of nuclear stress test 2016    cardiolite-normal,EF70%    Hot flashes due to surgical menopause 2015    Hx of echocardiogram 10/05/2016    EF: >55 %   Mild mitral and tricuspid regurg.      Hyperlipidemia     Hypertension     Follows with PCP    Lexiscan Stress Test 10/14/2020    EF 60%, Normal study, no ischemia    Meniere disease     Morbid obesity due to excess calories ([address] 11/19/2015    Sleep apnea 11/19/2015    sleep study negative    Tilt table evaluation 05/09/2017     positive for neurocardiogenic syncope     Past Surgical History:   Procedure Laterality Date    APPENDECTOMY  1967    CHOLECYSTECTOMY      2017    COLONOSCOPY  2014    Polyps x2 - Dr. Susi Pool Left 10/19/2020    LEFT HIP TOTAL ARTHROPLASTY - POSTERIOR performed by Rikki Thomas [facility] at [facility] OR     Family History   Problem Relation Age of Onset    Heart Disease Mother     High Blood Pressure Mother     High Cholesterol Mother     Cancer Mother 80        Stomach    Diabetes Mother     Stroke Mother     Heart Disease Father     High Blood Pressure Father     High Cholesterol Father     Diabetes Father     Cancer Sister 46        Lung cancer    Cancer Maternal Grandmother         Stomach    Diabetes Maternal Grandmother     Diabetes Maternal Grandfather     Diabetes Paternal Grandmother     Diabetes Paternal Grandfather     Cancer Maternal Cousin 47        Pancreatic     Social History     Socioeconomic History    Marital status:      Spouse name: Not on file    Number of children: Not on file    Years of education: Not on file    Highest education level: Not on file   Occupational History    Not on file   Social Needs    Financial resource strain: Not on file    Food insecurity     Worry: Not on file     Inability: Not on file    Transportation needs     Medical: Not on file     Non-medical: Not on file   Tobacco Use    Smoking status: Never Smoker    Smokeless tobacco: Never Used    Tobacco comment: Reviewed    Substance and Sexual Activity    Alcohol use: No    Drug use: No    Sexual activity: Not Currently     Partners: Male   Lifestyle    Physical activity     Days per week: Not on file     Minutes per session: Not on file    Stress: Not on file   Relationships    Social connections     Talks on phone: Not on file     Gets together: Not on file     Attends [employer] service: Not on file     Active member of club or organization: Not on file     Attends meetings of clubs or organizations: Not on file     Relationship status: Not on file    Intimate partner violence     Fear of current or ex partner: Not on file     Emotionally abused: Not on file     Physically abused: Not on file     Forced sexual activity: Not on file   Other Topics Concern    Not on file   Social History Narrative    Not on file     Current Facility-Administered Medications   Medication Dose Route Frequency Provider Last Rate Last Dose    0.9 % sodium chloride infusion   Intravenous Continuous Du MD Reva 150 mL/hr at 11/13/20 1816       Current Outpatient Medications   Medication Sig Dispense Refill    potassium chloride (KLOR-CON M) 20 MEQ extended release tablet Take 1 tablet by mouth 2 times daily 60 tablet 3    furosemide (LASIX) 40 MG tablet Take 1 tablet by mouth daily 60 tablet 3    rivaroxaban (XARELTO) 10 MG TABS tablet Take 1 tablet by mouth daily 28 tablet 0    docusate sodium (COLACE) 100 MG capsule Take 1 capsule by mouth daily as needed for Constipation 30 capsule 0    raNITIdine HCl (RANITIDINE ACID REDUCER PO) Take by mouth      ezetimibe (ZETIA) 10 MG tablet Take 1 tablet by mouth daily 90 tablet 3    metoprolol tartrate (LOPRESSOR) 50 MG tablet Take 1 tablet by mouth 2 times daily 180 tablet 3    hydroCHLOROthiazide (HYDRODIURIL) 25 MG tablet Take 0.5 tablets by mouth daily 45 tablet 3    loratadine (CLARITIN) 10 MG tablet Take 1 tablet by mouth daily 30 tablet 5     Allergies   Allergen Reactions    Celexa [Citalopram] Other (See Comments)     Stomach pain        Atorvastatin      Leg cramps      Mestinon [Pyridostigmine] Itching and Swelling    Penicillins     Sulfa Antibiotics     Tape [place] Tape]     Tricor [Fenofibrate]      angioedema    Gemfibrozil Rash    Meloxicam Other (See Comments)     [facility]       Nursing Notes Reviewed    Physical Exam:  ED Triage Vitals [11/13/20 1548]   Enc Vitals Group      BP 97/67      Pulse 103      Resp 22      Temp 99.8 °F (37.7 °C)      Temp Source Oral      SpO2 93 %      Weight       Height       Head Circumference       Peak Flow       Pain Score       Pain Loc       Pain Edu? Excl. in [address]? My pulse ox interpretation is - normal    General appearance:  Appears unwell but nontoxic. Skin:  Warm. Dry. Eye:  Extraocular movements intact. Ears, nose, mouth and throat:  Oral mucosa moist   Neck:  Trachea midline. Extremity:  No swelling. Normal ROM     Heart:  Tachycardic with rate 100s, normal rhythm, normal S1 & S2, no extra heart sounds. Perfusion:  intact  Respiratory:  Lungs clear to auscultation bilaterally. Respirations nonlabored. Abdominal: Soft. Nontender. Non distended.   Neurological:  Alert and oriented          Psychiatric:  Appropriate    I have reviewed and interpreted all of the currently available lab results from this visit (if applicable):  Results for orders placed or performed during the hospital encounter of 11/13/20   CBC Auto Differential   Result Value Ref Range    WBC 4.3 4.0 - 10.5 K/CU MM    RBC 3.87 (L) 4.2 - 5.4 M/CU MM    Hemoglobin 11.2 (L) 12.5 - 16.0 GM/DL    Hematocrit 34.8 (L) 37 - 47 %    MCV 89.9 78 - 100 FL    MCH 28.9 27 - 31 PG    MCHC 32.2 32.0 - 36.0 %    RDW 15.0 (H) 11.7 - 14.9 %    Platelets 277 (L) 027 - 440 K/CU MM    MPV 10.1 7.5 - 11.1 FL    Differential Type AUTOMATED DIFFERENTIAL     Segs Relative 59.0 36 - 66 %    Lymphocytes % 32.2 24 - 44 %    Monocytes % 7.3 (H) 0 - 4 %    Eosinophils % 0.5 0 - 3 %    Basophils % 0.5 0 - 1 %    Segs Absolute 2.5 K/CU MM    Lymphocytes Absolute 1.4 K/CU MM    Monocytes Absolute 0.3 K/CU MM    Eosinophils Absolute 0.0 K/CU MM    Basophils Absolute 0.0 K/CU MM    Nucleated RBC % 0.0 %    Total Nucleated RBC 0.0 K/CU MM    Total Immature that started Monday. She was exposed to COVID-19. She was mildly hypotensive and tachycardic initially on arrival.  Pulse ox is gone from 92 to 96% on room air, she is in no respiratory distress. Started fluids, sepsis work-up, chest x-ray, Covid testing has been sent. White blood cell count is normal, hemoglobin is stable. Her electrolytes appear to be stable, normal bicarb, normal creatinine, glucose is 108. Lactic acid is normal.  Troponin and BNP are normal.  She has a very mild bump in ALT and AST in the 60s which would be consistent with Covid infection. Her heart rate has improved some with fluids but is still in the 100s. She is not requiring supplemental oxygen. She was evaluated by case management to see if we would be able to send her home, she lives alone, is not currently able to ambulate due to weakness and shortness of breath. She does remain tachycardic and so we will plan for admission for observation. Discussed with hospitalist team for admission. Clinical Impression:  1. COVID-19    2. Tachycardia    3. Shortness of breath      Disposition referral (if applicable):  No follow-up provider specified. Disposition medications (if applicable):  New Prescriptions    No medications on file     ED Provider Disposition Time  DISPOSITION Admitted 11/13/2020 07:22:18 PM      Comment: Please note this report has been produced using speech recognition software and may contain errors related to that system including errors in grammar, punctuation, and spelling, as well as words and phrases that may be inappropriate. Efforts were made to edit the dictations.         Phillip Lopes MD  11/13/20 2046

## 2020-11-13 NOTE — ED NOTES
Bed: ED-26  Expected date:   Expected time:   Means of arrival:   Comments:  450 PRIYA Banks RN  11/13/20 [id number]

## 2020-11-14 LAB
ABO/RH: NORMAL
ABO/RH: NORMAL
ALBUMIN SERPL-MCNC: 3.2 GM/DL (ref 3.4–5)
ALP BLD-CCNC: 79 IU/L (ref 40–129)
ALT SERPL-CCNC: 51 U/L (ref 10–40)
ANION GAP SERPL CALCULATED.3IONS-SCNC: 15 MMOL/L (ref 4–16)
ANTIBODY SCREEN: NEGATIVE
APTT: 36.3 SECONDS (ref 25.1–37.1)
AST SERPL-CCNC: 43 IU/L (ref 15–37)
BASOPHILS ABSOLUTE: 0 K/CU MM
BASOPHILS RELATIVE PERCENT: 0 % (ref 0–1)
BILIRUB SERPL-MCNC: 0.7 MG/DL (ref 0–1)
BUN BLDV-MCNC: 10 MG/DL (ref 6–23)
CALCIUM SERPL-MCNC: 7.2 MG/DL (ref 8.3–10.6)
CHLORIDE BLD-SCNC: 99 MMOL/L (ref 99–110)
CO2: 24 MMOL/L (ref 21–32)
CREAT SERPL-MCNC: 1 MG/DL (ref 0.6–1.1)
D DIMER: 900 NG/ML(DDU)
DIFFERENTIAL TYPE: ABNORMAL
EOSINOPHILS ABSOLUTE: 0 K/CU MM
EOSINOPHILS RELATIVE PERCENT: 0 % (ref 0–3)
FERRITIN: 1710 NG/ML (ref 15–150)
FIBRINOGEN LEVEL: 327 MG/DL (ref 196.9–442.1)
GFR AFRICAN AMERICAN: >60 ML/MIN/1.73M2
GFR NON-AFRICAN AMERICAN: 57 ML/MIN/1.73M2
GLUCOSE BLD-MCNC: 167 MG/DL (ref 70–99)
HCT VFR BLD CALC: 30 % (ref 37–47)
HEMOGLOBIN: 9.8 GM/DL (ref 12.5–16)
HIGH SENSITIVE C-REACTIVE PROTEIN: 38.9 MG/L
IMMATURE NEUTROPHIL %: 0.5 % (ref 0–0.43)
INR BLD: 1.91 INDEX
LACTATE DEHYDROGENASE: 393 IU/L (ref 120–246)
LACTATE: 1.6 MMOL/L (ref 0.4–2)
LEGIONELLA URINARY AG: NEGATIVE
LYMPHOCYTES ABSOLUTE: 0.7 K/CU MM
LYMPHOCYTES RELATIVE PERCENT: 34 % (ref 24–44)
MAGNESIUM: 1.6 MG/DL (ref 1.8–2.4)
MCH RBC QN AUTO: 29.4 PG (ref 27–31)
MCHC RBC AUTO-ENTMCNC: 32.7 % (ref 32–36)
MCV RBC AUTO: 90.1 FL (ref 78–100)
MONOCYTES ABSOLUTE: 0.1 K/CU MM
MONOCYTES RELATIVE PERCENT: 4.3 % (ref 0–4)
NUCLEATED RBC %: 0 %
PDW BLD-RTO: 14.8 % (ref 11.7–14.9)
PLATELET # BLD: 108 K/CU MM (ref 140–440)
PMV BLD AUTO: 9.6 FL (ref 7.5–11.1)
POTASSIUM SERPL-SCNC: 3.1 MMOL/L (ref 3.5–5.1)
PROCALCITONIN: 0.19
PROTHROMBIN TIME: 23.3 SECONDS (ref 11.7–14.5)
RBC # BLD: 3.33 M/CU MM (ref 4.2–5.4)
SEGMENTED NEUTROPHILS ABSOLUTE COUNT: 1.3 K/CU MM
SEGMENTED NEUTROPHILS RELATIVE PERCENT: 61.2 % (ref 36–66)
SODIUM BLD-SCNC: 138 MMOL/L (ref 135–145)
STREP PNEUMONIAE ANTIGEN: NORMAL
TOTAL IMMATURE NEUTOROPHIL: 0.01 K/CU MM
TOTAL NUCLEATED RBC: 0 K/CU MM
TOTAL PROTEIN: 5.3 GM/DL (ref 6.4–8.2)
TROPONIN T: <0.01 NG/ML
WBC # BLD: 2.1 K/CU MM (ref 4–10.5)

## 2020-11-14 PROCEDURE — 85730 THROMBOPLASTIN TIME PARTIAL: CPT

## 2020-11-14 PROCEDURE — 6360000002 HC RX W HCPCS: Performed by: NURSE PRACTITIONER

## 2020-11-14 PROCEDURE — 2500000003 HC RX 250 WO HCPCS: Performed by: NURSE PRACTITIONER

## 2020-11-14 PROCEDURE — 94761 N-INVAS EAR/PLS OXIMETRY MLT: CPT

## 2020-11-14 PROCEDURE — 6370000000 HC RX 637 (ALT 250 FOR IP): Performed by: NURSE PRACTITIONER

## 2020-11-14 PROCEDURE — 94640 AIRWAY INHALATION TREATMENT: CPT

## 2020-11-14 PROCEDURE — 87449 NOS EACH ORGANISM AG IA: CPT

## 2020-11-14 PROCEDURE — 83605 ASSAY OF LACTIC ACID: CPT

## 2020-11-14 PROCEDURE — 85025 COMPLETE CBC W/AUTO DIFF WBC: CPT

## 2020-11-14 PROCEDURE — 83615 LACTATE (LD) (LDH) ENZYME: CPT

## 2020-11-14 PROCEDURE — 2060000000 HC ICU INTERMEDIATE R&B

## 2020-11-14 PROCEDURE — 87899 AGENT NOS ASSAY W/OPTIC: CPT

## 2020-11-14 PROCEDURE — 85384 FIBRINOGEN ACTIVITY: CPT

## 2020-11-14 PROCEDURE — 2580000003 HC RX 258: Performed by: HOSPITALIST

## 2020-11-14 PROCEDURE — 86141 C-REACTIVE PROTEIN HS: CPT

## 2020-11-14 PROCEDURE — 6360000002 HC RX W HCPCS: Performed by: HOSPITALIST

## 2020-11-14 PROCEDURE — 84484 ASSAY OF TROPONIN QUANT: CPT

## 2020-11-14 PROCEDURE — 85610 PROTHROMBIN TIME: CPT

## 2020-11-14 PROCEDURE — 83735 ASSAY OF MAGNESIUM: CPT

## 2020-11-14 PROCEDURE — 2700000000 HC OXYGEN THERAPY PER DAY

## 2020-11-14 PROCEDURE — 82728 ASSAY OF FERRITIN: CPT

## 2020-11-14 PROCEDURE — 6370000000 HC RX 637 (ALT 250 FOR IP): Performed by: HOSPITALIST

## 2020-11-14 PROCEDURE — 2580000003 HC RX 258: Performed by: NURSE PRACTITIONER

## 2020-11-14 PROCEDURE — 85379 FIBRIN DEGRADATION QUANT: CPT

## 2020-11-14 PROCEDURE — 86900 BLOOD TYPING SEROLOGIC ABO: CPT

## 2020-11-14 PROCEDURE — 2500000003 HC RX 250 WO HCPCS: Performed by: HOSPITALIST

## 2020-11-14 PROCEDURE — 86901 BLOOD TYPING SEROLOGIC RH(D): CPT

## 2020-11-14 PROCEDURE — 80053 COMPREHEN METABOLIC PANEL: CPT

## 2020-11-14 PROCEDURE — 84145 PROCALCITONIN (PCT): CPT

## 2020-11-14 PROCEDURE — 51702 INSERT TEMP BLADDER CATH: CPT

## 2020-11-14 PROCEDURE — 36430 TRANSFUSION BLD/BLD COMPNT: CPT

## 2020-11-14 PROCEDURE — 86850 RBC ANTIBODY SCREEN: CPT

## 2020-11-14 RX ORDER — 0.9 % SODIUM CHLORIDE 0.9 %
20 INTRAVENOUS SOLUTION INTRAVENOUS ONCE
Status: COMPLETED | OUTPATIENT
Start: 2020-11-14 | End: 2020-11-14

## 2020-11-14 RX ORDER — METOPROLOL TARTRATE 50 MG/1
50 TABLET, FILM COATED ORAL 2 TIMES DAILY
Status: DISCONTINUED | OUTPATIENT
Start: 2020-11-14 | End: 2020-11-16 | Stop reason: HOSPADM

## 2020-11-14 RX ORDER — DOCUSATE SODIUM 100 MG/1
100 CAPSULE, LIQUID FILLED ORAL DAILY PRN
Status: DISCONTINUED | OUTPATIENT
Start: 2020-11-14 | End: 2020-11-16 | Stop reason: HOSPADM

## 2020-11-14 RX ORDER — FUROSEMIDE 10 MG/ML
20 INJECTION INTRAMUSCULAR; INTRAVENOUS ONCE
Status: COMPLETED | OUTPATIENT
Start: 2020-11-14 | End: 2020-11-14

## 2020-11-14 RX ORDER — EZETIMIBE 10 MG/1
10 TABLET ORAL DAILY
Status: DISCONTINUED | OUTPATIENT
Start: 2020-11-14 | End: 2020-11-16 | Stop reason: HOSPADM

## 2020-11-14 RX ORDER — 0.9 % SODIUM CHLORIDE 0.9 %
30 INTRAVENOUS SOLUTION INTRAVENOUS PRN
Status: DISCONTINUED | OUTPATIENT
Start: 2020-11-14 | End: 2020-11-16 | Stop reason: HOSPADM

## 2020-11-14 RX ADMIN — FAMOTIDINE 20 MG: 10 INJECTION INTRAVENOUS at 00:28

## 2020-11-14 RX ADMIN — FUROSEMIDE 20 MG: 10 INJECTION, SOLUTION INTRAVENOUS at 09:16

## 2020-11-14 RX ADMIN — IPRATROPIUM BROMIDE 2 PUFF: 17 AEROSOL, METERED RESPIRATORY (INHALATION) at 11:49

## 2020-11-14 RX ADMIN — METHYLPREDNISOLONE SODIUM SUCCINATE 40 MG: 40 INJECTION, POWDER, FOR SOLUTION INTRAMUSCULAR; INTRAVENOUS at 00:25

## 2020-11-14 RX ADMIN — REMDESIVIR 200 MG: 5 INJECTION INTRAVENOUS at 14:13

## 2020-11-14 RX ADMIN — FAMOTIDINE 20 MG: 10 INJECTION INTRAVENOUS at 20:27

## 2020-11-14 RX ADMIN — SODIUM CHLORIDE, PRESERVATIVE FREE 10 ML: 5 INJECTION INTRAVENOUS at 08:19

## 2020-11-14 RX ADMIN — LEVOFLOXACIN 500 MG: 5 INJECTION, SOLUTION INTRAVENOUS at 00:25

## 2020-11-14 RX ADMIN — IPRATROPIUM BROMIDE 2 PUFF: 17 AEROSOL, METERED RESPIRATORY (INHALATION) at 20:31

## 2020-11-14 RX ADMIN — FAMOTIDINE 20 MG: 10 INJECTION INTRAVENOUS at 08:18

## 2020-11-14 RX ADMIN — SODIUM CHLORIDE, PRESERVATIVE FREE 10 ML: 5 INJECTION INTRAVENOUS at 00:25

## 2020-11-14 RX ADMIN — SODIUM CHLORIDE, PRESERVATIVE FREE 10 ML: 5 INJECTION INTRAVENOUS at 20:27

## 2020-11-14 RX ADMIN — IPRATROPIUM BROMIDE 2 PUFF: 17 AEROSOL, METERED RESPIRATORY (INHALATION) at 08:30

## 2020-11-14 RX ADMIN — GUAIFENESIN AND DEXTROMETHORPHAN 5 ML: 100; 10 SYRUP ORAL at 00:28

## 2020-11-14 RX ADMIN — METOPROLOL TARTRATE 50 MG: 50 TABLET, FILM COATED ORAL at 09:16

## 2020-11-14 RX ADMIN — SODIUM CHLORIDE: 9 INJECTION, SOLUTION INTRAVENOUS at 00:30

## 2020-11-14 RX ADMIN — EZETIMIBE 10 MG: 10 TABLET ORAL at 09:16

## 2020-11-14 RX ADMIN — RIVAROXABAN 10 MG: 10 TABLET, FILM COATED ORAL at 08:18

## 2020-11-14 RX ADMIN — VITAMIN D, TAB 1000IU (100/BT) 6000 UNITS: 25 TAB at 08:18

## 2020-11-14 RX ADMIN — GUAIFENESIN AND DEXTROMETHORPHAN 5 ML: 100; 10 SYRUP ORAL at 13:19

## 2020-11-14 RX ADMIN — IPRATROPIUM BROMIDE 2 PUFF: 17 AEROSOL, METERED RESPIRATORY (INHALATION) at 16:27

## 2020-11-14 RX ADMIN — GUAIFENESIN AND DEXTROMETHORPHAN 5 ML: 100; 10 SYRUP ORAL at 08:26

## 2020-11-14 RX ADMIN — METHYLPREDNISOLONE SODIUM SUCCINATE 40 MG: 40 INJECTION, POWDER, FOR SOLUTION INTRAMUSCULAR; INTRAVENOUS at 11:29

## 2020-11-14 RX ADMIN — METOPROLOL TARTRATE 50 MG: 50 TABLET, FILM COATED ORAL at 20:26

## 2020-11-14 RX ADMIN — SODIUM CHLORIDE 20 ML: 9 INJECTION, SOLUTION INTRAVENOUS at 14:34

## 2020-11-14 ASSESSMENT — PAIN DESCRIPTION - PROGRESSION
CLINICAL_PROGRESSION: GRADUALLY WORSENING
CLINICAL_PROGRESSION: GRADUALLY WORSENING

## 2020-11-14 ASSESSMENT — PAIN SCALES - WONG BAKER
WONGBAKER_NUMERICALRESPONSE: 0

## 2020-11-14 ASSESSMENT — PAIN SCALES - GENERAL
PAINLEVEL_OUTOF10: 0

## 2020-11-14 NOTE — ED NOTES
1903 paged hospitalist     Tushar Lewis  11/13/20 1903 1916 Mami Villa mid level with [employer] returned call      Tushar Lewis  11/13/20 1916

## 2020-11-14 NOTE — PROGRESS NOTES
[address] [place] [address]  HOSPITALIST PROGRESS NOTE                       Name:  Emily Willams /Age/Sex: 1960  (64 y.o. female)   MRN & CSN:  [id number] & [id number] Admission Date/Time: 2020  3:46 PM   Location:  [facility] Attending:  Ben Lopez MD                                                  HPI  Emily Willams is a 64 y.o. female who presents with acute respiratory failure    SUBJECTIVE  - on 3 liters [place] oxygen, reports some shortness of breath, no fever, intermittent dry cough    10 point review of systems reviewed and negative unless noted above. ALLERGIES:   Allergies   Allergen Reactions    Celexa [Citalopram] Other (See Comments)     Stomach pain        Atorvastatin      Leg cramps      Mestinon [Pyridostigmine] Itching and Swelling    Penicillins     Sulfa Antibiotics     Tape [place] Tape]     Tricor [Fenofibrate]      angioedema    Gemfibrozil Rash    Meloxicam Other (See Comments)     moodswings       PCP: Darvin García MD    PAST MEDICAL HISTORY, SURGICAL HISTORY, SOCIAL HISTORY and  HOME MEDICATIONS all reviewed. OBJECTIVE  Vitals:    20 0600 20 0700 20 0800 20 0832   BP: 120/66 117/67 118/69    Pulse: 80 82 83    Resp: (!) 31 (!) 35 19 20   Temp:  98.6 °F (37 °C)     TempSrc:  Oral     SpO2:  95% 97% 98%   Weight:       Height:           PHYSICAL EXAM   GEN Awake female, sitting upright in bed in no apparent distress. EYES Pupils are equally round. No scleral erythema, discharge, or conjunctivitis. HENT Mucous membranes are moist. Oral pharynx without exudates, no evidence of thrush. NECK Supple, no apparent thyromegaly or masses. RESP Unlabored respiration, bilateral coarse breath sounds  CARDIO/VASC S1/S2 auscultated. Regular rate without appreciable murmurs, rubs, or gallops. No JVD or carotid bruits. Peripheral pulses equal bilaterally and palpable. GI Abdomen is soft without significant tenderness, masses, or guarding.

## 2020-11-15 LAB
ALBUMIN SERPL-MCNC: 3.5 GM/DL (ref 3.4–5)
ALP BLD-CCNC: 71 IU/L (ref 40–128)
ALT SERPL-CCNC: 45 U/L (ref 10–40)
ANION GAP SERPL CALCULATED.3IONS-SCNC: 11 MMOL/L (ref 4–16)
APTT: 31.6 SECONDS (ref 25.1–37.1)
AST SERPL-CCNC: 35 IU/L (ref 15–37)
BASOPHILS ABSOLUTE: 0 K/CU MM
BASOPHILS RELATIVE PERCENT: 0 % (ref 0–1)
BILIRUB SERPL-MCNC: 0.6 MG/DL (ref 0–1)
BUN BLDV-MCNC: 13 MG/DL (ref 6–23)
CALCIUM SERPL-MCNC: 7.7 MG/DL (ref 8.3–10.6)
CHLORIDE BLD-SCNC: 104 MMOL/L (ref 99–110)
CO2: 28 MMOL/L (ref 21–32)
COMPONENT: NORMAL
CREAT SERPL-MCNC: 1 MG/DL (ref 0.6–1.1)
D DIMER: 838 NG/ML(DDU)
DIFFERENTIAL TYPE: ABNORMAL
EOSINOPHILS ABSOLUTE: 0 K/CU MM
EOSINOPHILS RELATIVE PERCENT: 0 % (ref 0–3)
FIBRINOGEN LEVEL: 302 MG/DL (ref 196.9–442.1)
GFR AFRICAN AMERICAN: >60 ML/MIN/1.73M2
GFR NON-AFRICAN AMERICAN: 57 ML/MIN/1.73M2
GLUCOSE BLD-MCNC: 169 MG/DL (ref 70–99)
HCT VFR BLD CALC: 28.4 % (ref 37–47)
HEMOGLOBIN: 9.3 GM/DL (ref 12.5–16)
IMMATURE NEUTROPHIL %: 0.5 % (ref 0–0.43)
INR BLD: 1.25 INDEX
LYMPHOCYTES ABSOLUTE: 0.8 K/CU MM
LYMPHOCYTES RELATIVE PERCENT: 19.5 % (ref 24–44)
Lab: 2
MCH RBC QN AUTO: 29.5 PG (ref 27–31)
MCHC RBC AUTO-ENTMCNC: 32.7 % (ref 32–36)
MCV RBC AUTO: 90.2 FL (ref 78–100)
MONOCYTES ABSOLUTE: 0.2 K/CU MM
MONOCYTES RELATIVE PERCENT: 5 % (ref 0–4)
NUCLEATED RBC %: 0 %
PDW BLD-RTO: 14.9 % (ref 11.7–14.9)
PLATELET # BLD: 104 K/CU MM (ref 140–440)
PMV BLD AUTO: 10.4 FL (ref 7.5–11.1)
POTASSIUM SERPL-SCNC: 2.8 MMOL/L (ref 3.5–5.1)
PROCALCITONIN: 0.16
PROTHROMBIN TIME: 15.1 SECONDS (ref 11.7–14.5)
RBC # BLD: 3.15 M/CU MM (ref 4.2–5.4)
SEGMENTED NEUTROPHILS ABSOLUTE COUNT: 3 K/CU MM
SEGMENTED NEUTROPHILS RELATIVE PERCENT: 75 % (ref 36–66)
SODIUM BLD-SCNC: 143 MMOL/L (ref 135–145)
STATUS: NORMAL
STATUS: NORMAL
TOTAL IMMATURE NEUTOROPHIL: 0.02 K/CU MM
TOTAL NUCLEATED RBC: 0 K/CU MM
TOTAL PROTEIN: 5.5 GM/DL (ref 6.4–8.2)
TRANSFUSION STATUS: NORMAL
TRANSFUSION STATUS: NORMAL
UNIT DIVISION: 0
UNIT DIVISION: 0
UNIT NUMBER: NORMAL
UNIT NUMBER: NORMAL
WBC # BLD: 4 K/CU MM (ref 4–10.5)

## 2020-11-15 PROCEDURE — 6370000000 HC RX 637 (ALT 250 FOR IP): Performed by: NURSE PRACTITIONER

## 2020-11-15 PROCEDURE — 2500000003 HC RX 250 WO HCPCS: Performed by: NURSE PRACTITIONER

## 2020-11-15 PROCEDURE — 2580000003 HC RX 258: Performed by: HOSPITALIST

## 2020-11-15 PROCEDURE — 85384 FIBRINOGEN ACTIVITY: CPT

## 2020-11-15 PROCEDURE — 6370000000 HC RX 637 (ALT 250 FOR IP): Performed by: PHYSICIAN ASSISTANT

## 2020-11-15 PROCEDURE — 84145 PROCALCITONIN (PCT): CPT

## 2020-11-15 PROCEDURE — 6370000000 HC RX 637 (ALT 250 FOR IP): Performed by: HOSPITALIST

## 2020-11-15 PROCEDURE — 93010 ELECTROCARDIOGRAM REPORT: CPT | Performed by: INTERNAL MEDICINE

## 2020-11-15 PROCEDURE — 85730 THROMBOPLASTIN TIME PARTIAL: CPT

## 2020-11-15 PROCEDURE — 6360000002 HC RX W HCPCS: Performed by: PHYSICIAN ASSISTANT

## 2020-11-15 PROCEDURE — 6360000002 HC RX W HCPCS: Performed by: NURSE PRACTITIONER

## 2020-11-15 PROCEDURE — 85025 COMPLETE CBC W/AUTO DIFF WBC: CPT

## 2020-11-15 PROCEDURE — 80053 COMPREHEN METABOLIC PANEL: CPT

## 2020-11-15 PROCEDURE — 2060000000 HC ICU INTERMEDIATE R&B

## 2020-11-15 PROCEDURE — 85379 FIBRIN DEGRADATION QUANT: CPT

## 2020-11-15 PROCEDURE — 94761 N-INVAS EAR/PLS OXIMETRY MLT: CPT

## 2020-11-15 PROCEDURE — 85610 PROTHROMBIN TIME: CPT

## 2020-11-15 PROCEDURE — 2500000003 HC RX 250 WO HCPCS: Performed by: HOSPITALIST

## 2020-11-15 PROCEDURE — 94640 AIRWAY INHALATION TREATMENT: CPT

## 2020-11-15 PROCEDURE — 94150 VITAL CAPACITY TEST: CPT

## 2020-11-15 PROCEDURE — 2580000003 HC RX 258: Performed by: NURSE PRACTITIONER

## 2020-11-15 RX ORDER — LANOLIN ALCOHOL/MO/W.PET/CERES
9 CREAM (GRAM) TOPICAL NIGHTLY PRN
Status: DISCONTINUED | OUTPATIENT
Start: 2020-11-15 | End: 2020-11-16 | Stop reason: HOSPADM

## 2020-11-15 RX ORDER — POTASSIUM CHLORIDE 7.45 MG/ML
10 INJECTION INTRAVENOUS PRN
Status: DISCONTINUED | OUTPATIENT
Start: 2020-11-15 | End: 2020-11-16 | Stop reason: HOSPADM

## 2020-11-15 RX ORDER — POTASSIUM CHLORIDE 20 MEQ/1
40 TABLET, EXTENDED RELEASE ORAL PRN
Status: DISCONTINUED | OUTPATIENT
Start: 2020-11-15 | End: 2020-11-16 | Stop reason: HOSPADM

## 2020-11-15 RX ADMIN — Medication 9 MG: at 00:27

## 2020-11-15 RX ADMIN — IPRATROPIUM BROMIDE 2 PUFF: 17 AEROSOL, METERED RESPIRATORY (INHALATION) at 15:54

## 2020-11-15 RX ADMIN — LEVOFLOXACIN 500 MG: 5 INJECTION, SOLUTION INTRAVENOUS at 00:27

## 2020-11-15 RX ADMIN — IPRATROPIUM BROMIDE 2 PUFF: 17 AEROSOL, METERED RESPIRATORY (INHALATION) at 20:18

## 2020-11-15 RX ADMIN — METHYLPREDNISOLONE SODIUM SUCCINATE 40 MG: 40 INJECTION, POWDER, FOR SOLUTION INTRAMUSCULAR; INTRAVENOUS at 00:27

## 2020-11-15 RX ADMIN — RIVAROXABAN 10 MG: 10 TABLET, FILM COATED ORAL at 10:24

## 2020-11-15 RX ADMIN — IPRATROPIUM BROMIDE 2 PUFF: 17 AEROSOL, METERED RESPIRATORY (INHALATION) at 08:28

## 2020-11-15 RX ADMIN — METHYLPREDNISOLONE SODIUM SUCCINATE 40 MG: 40 INJECTION, POWDER, FOR SOLUTION INTRAMUSCULAR; INTRAVENOUS at 10:24

## 2020-11-15 RX ADMIN — VITAMIN D, TAB 1000IU (100/BT) 6000 UNITS: 25 TAB at 10:25

## 2020-11-15 RX ADMIN — POTASSIUM CHLORIDE 10 MEQ: 7.46 INJECTION, SOLUTION INTRAVENOUS at 11:20

## 2020-11-15 RX ADMIN — POTASSIUM CHLORIDE 10 MEQ: 7.46 INJECTION, SOLUTION INTRAVENOUS at 07:24

## 2020-11-15 RX ADMIN — REMDESIVIR 100 MG: 5 INJECTION INTRAVENOUS at 15:46

## 2020-11-15 RX ADMIN — Medication 9 MG: at 20:50

## 2020-11-15 RX ADMIN — SODIUM CHLORIDE, PRESERVATIVE FREE 10 ML: 5 INJECTION INTRAVENOUS at 20:52

## 2020-11-15 RX ADMIN — IPRATROPIUM BROMIDE 2 PUFF: 17 AEROSOL, METERED RESPIRATORY (INHALATION) at 12:00

## 2020-11-15 RX ADMIN — POTASSIUM CHLORIDE 10 MEQ: 7.46 INJECTION, SOLUTION INTRAVENOUS at 14:19

## 2020-11-15 RX ADMIN — EZETIMIBE 10 MG: 10 TABLET ORAL at 10:24

## 2020-11-15 RX ADMIN — GUAIFENESIN AND DEXTROMETHORPHAN 5 ML: 100; 10 SYRUP ORAL at 00:36

## 2020-11-15 RX ADMIN — POTASSIUM CHLORIDE 10 MEQ: 7.46 INJECTION, SOLUTION INTRAVENOUS at 10:28

## 2020-11-15 RX ADMIN — ACETAMINOPHEN 650 MG: 325 TABLET ORAL at 20:50

## 2020-11-15 RX ADMIN — FAMOTIDINE 20 MG: 10 INJECTION INTRAVENOUS at 10:24

## 2020-11-15 RX ADMIN — POTASSIUM CHLORIDE 10 MEQ: 7.46 INJECTION, SOLUTION INTRAVENOUS at 15:51

## 2020-11-15 RX ADMIN — POTASSIUM CHLORIDE 10 MEQ: 7.46 INJECTION, SOLUTION INTRAVENOUS at 12:00

## 2020-11-15 RX ADMIN — ONDANSETRON 4 MG: 2 INJECTION INTRAMUSCULAR; INTRAVENOUS at 20:50

## 2020-11-15 RX ADMIN — FAMOTIDINE 20 MG: 10 INJECTION INTRAVENOUS at 20:53

## 2020-11-15 ASSESSMENT — PAIN DESCRIPTION - LOCATION
LOCATION: ANKLE
LOCATION: ANKLE

## 2020-11-15 ASSESSMENT — PAIN DESCRIPTION - PAIN TYPE
TYPE: ACUTE PAIN
TYPE: ACUTE PAIN

## 2020-11-15 ASSESSMENT — PAIN DESCRIPTION - ORIENTATION
ORIENTATION: LEFT
ORIENTATION: LEFT

## 2020-11-15 ASSESSMENT — PAIN DESCRIPTION - PROGRESSION
CLINICAL_PROGRESSION: GRADUALLY WORSENING
CLINICAL_PROGRESSION: GRADUALLY WORSENING
CLINICAL_PROGRESSION: NOT CHANGED

## 2020-11-15 ASSESSMENT — PAIN SCALES - GENERAL
PAINLEVEL_OUTOF10: 6
PAINLEVEL_OUTOF10: 6

## 2020-11-15 ASSESSMENT — PAIN DESCRIPTION - ONSET
ONSET: GRADUAL
ONSET: ON-GOING

## 2020-11-15 ASSESSMENT — PAIN DESCRIPTION - FREQUENCY
FREQUENCY: INTERMITTENT
FREQUENCY: INTERMITTENT

## 2020-11-15 ASSESSMENT — PAIN - FUNCTIONAL ASSESSMENT
PAIN_FUNCTIONAL_ASSESSMENT: PREVENTS OR INTERFERES WITH MANY ACTIVE NOT PASSIVE ACTIVITIES
PAIN_FUNCTIONAL_ASSESSMENT: PREVENTS OR INTERFERES WITH MANY ACTIVE NOT PASSIVE ACTIVITIES

## 2020-11-15 ASSESSMENT — PAIN DESCRIPTION - DESCRIPTORS
DESCRIPTORS: ACHING
DESCRIPTORS: ACHING

## 2020-11-15 NOTE — PROGRESS NOTES
11/15/20 0830   Oxygen Therapy/Pulse Ox   O2 Therapy Room air   Resp 21   SpO2 95 %   Pulse Oximeter Device Mode Continuous   Pulse Oximeter Device Location Finger   $Pulse Oximeter $Spot check (multiple/continuous)

## 2020-11-15 NOTE — PROGRESS NOTES
[facility]  HOSPITALIST PROGRESS NOTE                       Name:  Alfredo Mortensen /Age/Sex: 1960  (64 y.o. female)   MRN & CSN:  [id number] & [id number] Admission Date/Time: 2020  3:46 PM   Location:  [facility]- Attending:  Kinjal Al MD                                                  HPI  Alfredo Mortensen is a 64 y.o. female who presents with acute respiratory failure    SUBJECTIVE  - reports feeling weak and tired. On liters NC oxygen    10 point review of systems reviewed and negative unless noted above. ALLERGIES:   Allergies   Allergen Reactions    Celexa [Citalopram] Other (See Comments)     Stomach pain        Atorvastatin      Leg cramps      Mestinon [Pyridostigmine] Itching and Swelling    Penicillins     Sulfa Antibiotics     Tape Marella Blake Tape]     Tricor [Fenofibrate]      angioedema    Gemfibrozil Rash    Meloxicam Other (See Comments)     moodswings       PCP: Estefania Bergman MD    PAST MEDICAL HISTORY, SURGICAL HISTORY, SOCIAL HISTORY and  HOME MEDICATIONS all reviewed. OBJECTIVE  Vitals:    11/15/20 0600 11/15/20 0624 11/15/20 0626 11/15/20 0830   BP: (!) 95/56 (!) 99/54 (!) 100/54    Pulse:   58    Resp:    21   Temp:       TempSrc:       SpO2:    95%   Weight:       Height:           PHYSICAL EXAM   GEN Awake female, sitting upright in bed in no apparent distress. EYES Pupils are equally round. No scleral erythema, discharge, or conjunctivitis. HENT Mucous membranes are moist. Oral pharynx without exudates, no evidence of thrush. NECK Supple, no apparent thyromegaly or masses. RESP Unlabored respiration, bilateral coarse breath sounds  CARDIO/VASC S1/S2 auscultated. Regular rate without appreciable murmurs, rubs, or gallops. No JVD or carotid bruits. Peripheral pulses equal bilaterally and palpable. GI Abdomen is soft without significant tenderness, masses, or guarding. Bowel sounds are normoactive. Rectal exam deferred.     No costovertebral angle tenderness. Normal appearing external genitalia. HEME/LYMPH No palpable cervical lymphadenopathy and no hepatosplenomegaly. No petechiae or ecchymoses. MSK Spontaneous movement of all extremities. No gross joint deformities. SKIN Normal coloration, warm, dry. NEURO Cranial nerves appear grossly intact, normal speech, no lateralizing weakness. PSYCH Awake, alert, oriented x 4. Affect appropriate.     INTAKE: In: 1320 [I.V.:680; Blood:640]  Out: 650   OUTPUT: In: 1320   Out: 650 [Urine:650]    LABS  Recent Labs     11/13/20 1657 11/14/20  1140 11/15/20  0434   WBC 4.3 2.1* 4.0   HGB 11.2* 9.8* 9.3*   HCT 34.8* 30.0* 28.4*   * 108* 104*      Recent Labs     11/13/20 1657 11/14/20  1140 11/15/20  0434    138 143   K 3.5 3.1* 2.8*   CL 94* 99 104   CO2 26 24 28   BUN 12 10 13   CREATININE 1.0 1.0 1.0     Recent Labs     11/13/20 1657 11/14/20  1140 11/15/20  0434   AST 63* 43* 35   ALT 61* 51* 45*   BILITOT 0.9 0.7 0.6   ALKPHOS 84 79 71     Recent Labs     11/14/20  1140 11/15/20  0434   INR 1.91 1.25     Recent Labs     11/13/20 1657 11/14/20  1140   TROPONINT <0.010 <0.010          Abnormal labs for today noted      Imaging:     ECHO:    Microbiology:  Blood culture:    Urine culture:    Sputum culture:    Procedures done this admission:    MEDS  Scheduled Meds:   ezetimibe  10 mg Oral Daily    metoprolol tartrate  50 mg Oral BID    remdesivir IVPB  100 mg Intravenous Q24H    rivaroxaban  10 mg Oral Daily    sodium chloride flush  10 mL Intravenous 2 times per day    famotidine (PEPCID) injection  20 mg Intravenous BID    ipratropium  2 puff Inhalation 4x daily    Vitamin D  6,000 Units Oral Daily    levofloxacin  500 mg Intravenous Q24H    methylPREDNISolone  40 mg Intravenous Q12H     Continuous Infusions:  PRN Meds:melatonin, potassium chloride **OR** potassium alternative oral replacement **OR** potassium chloride, docusate sodium, sodium chloride, sodium chloride flush, acetaminophen **OR** acetaminophen, polyethylene glycol, promethazine **OR** ondansetron, guaiFENesin-dextromethorphan        ASSESSMENT and [facility] Day: 3    1-Acute hypoxic respiratory failure likely due to COVID pneumonia- on [place] oxygen 2-3 liters- not on home oxygen  - ordered convalescent plasma   -on solumedrol 40mg Q12 as of 11/13  -remdesevir as of 11/14  -continue levaquin and monitor inflammatory makers     Other issues  -HTN  - OA s/p left total hip replacement 10/19/2020-PT/OT  -Morbid obesity with BMI of 45                       Sister updated via phone call    Potential discharge in 1-2 days if oxygen requirement remains stable or improves         Disp:     Diet DIET CARDIAC;   DVT Prophylaxis [] Lovenox, []  Heparin, [] SCDs, [] Ambulation   GI Prophylaxis [] PPI,  [] H2 Blocker,  [] Carafate,  [] Diet/Tube Feeds   Code Status Full Code   Disposition Patient requires continued admission due to acute hypoxic respiratory failure   CMS Level of Risk [] Low, [] Moderate,[x]  High  Patient's risk as above due to acute hypoxic respiratory failure     KENNEDI RUIZ MD 11/15/2020 9:12 AM

## 2020-11-16 VITALS
DIASTOLIC BLOOD PRESSURE: 72 MMHG | HEART RATE: 87 BPM | RESPIRATION RATE: 20 BRPM | OXYGEN SATURATION: 99 % | HEIGHT: 66 IN | BODY MASS INDEX: 37.95 KG/M2 | TEMPERATURE: 98.7 F | SYSTOLIC BLOOD PRESSURE: 111 MMHG | WEIGHT: 236.11 LBS

## 2020-11-16 LAB
ALBUMIN SERPL-MCNC: 3.3 GM/DL (ref 3.4–5)
ALP BLD-CCNC: 63 IU/L (ref 40–128)
ALT SERPL-CCNC: 44 U/L (ref 10–40)
ANION GAP SERPL CALCULATED.3IONS-SCNC: 10 MMOL/L (ref 4–16)
APTT: 32.1 SECONDS (ref 25.1–37.1)
AST SERPL-CCNC: 27 IU/L (ref 15–37)
BASOPHILS ABSOLUTE: 0 K/CU MM
BASOPHILS RELATIVE PERCENT: 0 % (ref 0–1)
BILIRUB SERPL-MCNC: 0.6 MG/DL (ref 0–1)
BUN BLDV-MCNC: 20 MG/DL (ref 6–23)
CALCIUM SERPL-MCNC: 7.8 MG/DL (ref 8.3–10.6)
CHLORIDE BLD-SCNC: 103 MMOL/L (ref 99–110)
CO2: 27 MMOL/L (ref 21–32)
CREAT SERPL-MCNC: 1 MG/DL (ref 0.6–1.1)
D DIMER: 728 NG/ML(DDU)
DIFFERENTIAL TYPE: ABNORMAL
EOSINOPHILS ABSOLUTE: 0 K/CU MM
EOSINOPHILS RELATIVE PERCENT: 0 % (ref 0–3)
FIBRINOGEN LEVEL: 298 MG/DL (ref 196.9–442.1)
GFR AFRICAN AMERICAN: >60 ML/MIN/1.73M2
GFR NON-AFRICAN AMERICAN: 57 ML/MIN/1.73M2
GLUCOSE BLD-MCNC: 173 MG/DL (ref 70–99)
GLUCOSE BLD-MCNC: 188 MG/DL (ref 70–99)
HCT VFR BLD CALC: 30 % (ref 37–47)
HEMOGLOBIN: 9.4 GM/DL (ref 12.5–16)
IMMATURE NEUTROPHIL %: 0.7 % (ref 0–0.43)
INR BLD: 1.33 INDEX
LYMPHOCYTES ABSOLUTE: 0.8 K/CU MM
LYMPHOCYTES RELATIVE PERCENT: 14.6 % (ref 24–44)
MCH RBC QN AUTO: 28.2 PG (ref 27–31)
MCHC RBC AUTO-ENTMCNC: 31.3 % (ref 32–36)
MCV RBC AUTO: 90.1 FL (ref 78–100)
MONOCYTES ABSOLUTE: 0.2 K/CU MM
MONOCYTES RELATIVE PERCENT: 4.3 % (ref 0–4)
NUCLEATED RBC %: 0 %
PDW BLD-RTO: 14.9 % (ref 11.7–14.9)
PLATELET # BLD: 105 K/CU MM (ref 140–440)
PMV BLD AUTO: 10.3 FL (ref 7.5–11.1)
POTASSIUM SERPL-SCNC: 3.3 MMOL/L (ref 3.5–5.1)
PROTHROMBIN TIME: 16.1 SECONDS (ref 11.7–14.5)
RBC # BLD: 3.33 M/CU MM (ref 4.2–5.4)
SEGMENTED NEUTROPHILS ABSOLUTE COUNT: 4.5 K/CU MM
SEGMENTED NEUTROPHILS RELATIVE PERCENT: 80.4 % (ref 36–66)
SODIUM BLD-SCNC: 140 MMOL/L (ref 135–145)
TOTAL IMMATURE NEUTOROPHIL: 0.04 K/CU MM
TOTAL NUCLEATED RBC: 0 K/CU MM
TOTAL PROTEIN: 5.2 GM/DL (ref 6.4–8.2)
WBC # BLD: 5.6 K/CU MM (ref 4–10.5)

## 2020-11-16 PROCEDURE — 85384 FIBRINOGEN ACTIVITY: CPT

## 2020-11-16 PROCEDURE — 80053 COMPREHEN METABOLIC PANEL: CPT

## 2020-11-16 PROCEDURE — 6360000002 HC RX W HCPCS: Performed by: NURSE PRACTITIONER

## 2020-11-16 PROCEDURE — 6370000000 HC RX 637 (ALT 250 FOR IP): Performed by: PHYSICIAN ASSISTANT

## 2020-11-16 PROCEDURE — 82962 GLUCOSE BLOOD TEST: CPT

## 2020-11-16 PROCEDURE — 94618 PULMONARY STRESS TESTING: CPT

## 2020-11-16 PROCEDURE — 6370000000 HC RX 637 (ALT 250 FOR IP): Performed by: NURSE PRACTITIONER

## 2020-11-16 PROCEDURE — 85610 PROTHROMBIN TIME: CPT

## 2020-11-16 PROCEDURE — 6370000000 HC RX 637 (ALT 250 FOR IP): Performed by: HOSPITALIST

## 2020-11-16 PROCEDURE — 85379 FIBRIN DEGRADATION QUANT: CPT

## 2020-11-16 PROCEDURE — 94150 VITAL CAPACITY TEST: CPT

## 2020-11-16 PROCEDURE — 2500000003 HC RX 250 WO HCPCS: Performed by: NURSE PRACTITIONER

## 2020-11-16 PROCEDURE — 2580000003 HC RX 258: Performed by: HOSPITALIST

## 2020-11-16 PROCEDURE — 85025 COMPLETE CBC W/AUTO DIFF WBC: CPT

## 2020-11-16 PROCEDURE — 85730 THROMBOPLASTIN TIME PARTIAL: CPT

## 2020-11-16 PROCEDURE — 94640 AIRWAY INHALATION TREATMENT: CPT

## 2020-11-16 PROCEDURE — 2500000003 HC RX 250 WO HCPCS: Performed by: HOSPITALIST

## 2020-11-16 PROCEDURE — 94761 N-INVAS EAR/PLS OXIMETRY MLT: CPT

## 2020-11-16 PROCEDURE — 2580000003 HC RX 258: Performed by: NURSE PRACTITIONER

## 2020-11-16 RX ORDER — GUAIFENESIN/DEXTROMETHORPHAN 100-10MG/5
5 SYRUP ORAL EVERY 4 HOURS PRN
Qty: 1 BOTTLE | Refills: 0 | Status: SHIPPED | OUTPATIENT
Start: 2020-11-16 | End: 2020-11-26

## 2020-11-16 RX ORDER — DEXAMETHASONE 6 MG/1
6 TABLET ORAL
Qty: 7 TABLET | Refills: 0 | Status: SHIPPED | OUTPATIENT
Start: 2020-11-16 | End: 2020-11-23

## 2020-11-16 RX ORDER — HYDROXYZINE PAMOATE 25 MG/1
25 CAPSULE ORAL ONCE
Status: COMPLETED | OUTPATIENT
Start: 2020-11-16 | End: 2020-11-16

## 2020-11-16 RX ORDER — LEVOFLOXACIN 750 MG/1
750 TABLET ORAL DAILY
Qty: 4 TABLET | Refills: 0 | Status: SHIPPED | OUTPATIENT
Start: 2020-11-16 | End: 2020-11-20

## 2020-11-16 RX ADMIN — ACETAMINOPHEN 650 MG: 325 TABLET ORAL at 11:12

## 2020-11-16 RX ADMIN — SODIUM CHLORIDE, PRESERVATIVE FREE 10 ML: 5 INJECTION INTRAVENOUS at 11:14

## 2020-11-16 RX ADMIN — REMDESIVIR 100 MG: 5 INJECTION INTRAVENOUS at 14:45

## 2020-11-16 RX ADMIN — Medication 6000 UNITS: at 12:25

## 2020-11-16 RX ADMIN — RIVAROXABAN 10 MG: 10 TABLET, FILM COATED ORAL at 11:12

## 2020-11-16 RX ADMIN — HYDROXYZINE PAMOATE 25 MG: 25 CAPSULE ORAL at 04:38

## 2020-11-16 RX ADMIN — IPRATROPIUM BROMIDE 2 PUFF: 17 AEROSOL, METERED RESPIRATORY (INHALATION) at 08:57

## 2020-11-16 RX ADMIN — METOPROLOL TARTRATE 50 MG: 50 TABLET, FILM COATED ORAL at 11:12

## 2020-11-16 RX ADMIN — EZETIMIBE 10 MG: 10 TABLET ORAL at 11:13

## 2020-11-16 RX ADMIN — LEVOFLOXACIN 500 MG: 5 INJECTION, SOLUTION INTRAVENOUS at 00:00

## 2020-11-16 RX ADMIN — POTASSIUM BICARBONATE 40 MEQ: 782 TABLET, EFFERVESCENT ORAL at 11:12

## 2020-11-16 RX ADMIN — FAMOTIDINE 20 MG: 10 INJECTION INTRAVENOUS at 11:13

## 2020-11-16 RX ADMIN — METHYLPREDNISOLONE SODIUM SUCCINATE 40 MG: 40 INJECTION, POWDER, FOR SOLUTION INTRAMUSCULAR; INTRAVENOUS at 11:13

## 2020-11-16 RX ADMIN — IPRATROPIUM BROMIDE 2 PUFF: 17 AEROSOL, METERED RESPIRATORY (INHALATION) at 16:30

## 2020-11-16 RX ADMIN — IPRATROPIUM BROMIDE 2 PUFF: 17 AEROSOL, METERED RESPIRATORY (INHALATION) at 12:13

## 2020-11-16 RX ADMIN — METHYLPREDNISOLONE SODIUM SUCCINATE 40 MG: 40 INJECTION, POWDER, FOR SOLUTION INTRAMUSCULAR; INTRAVENOUS at 00:38

## 2020-11-16 ASSESSMENT — PAIN SCALES - GENERAL
PAINLEVEL_OUTOF10: 7
PAINLEVEL_OUTOF10: 0
PAINLEVEL_OUTOF10: 7
PAINLEVEL_OUTOF10: 3

## 2020-11-16 ASSESSMENT — PAIN DESCRIPTION - FREQUENCY: FREQUENCY: CONTINUOUS

## 2020-11-16 ASSESSMENT — PAIN DESCRIPTION - LOCATION: LOCATION: CHEST

## 2020-11-16 ASSESSMENT — PAIN DESCRIPTION - DESCRIPTORS: DESCRIPTORS: ACHING

## 2020-11-16 ASSESSMENT — PAIN - FUNCTIONAL ASSESSMENT: PAIN_FUNCTIONAL_ASSESSMENT: ACTIVITIES ARE NOT PREVENTED

## 2020-11-16 NOTE — CARE COORDINATION
LSW noted that Pt has a discharge for today. Call to Pt room d/t Pt room is on the [facility] floor. Pt did not answer the phone. LSW reviewed chart. Pt was seen by this LSW on 11/02 At that time Pt was active with [facility] home care. Call to [facility] and Pt is still active with their services. PS sent to [facility] asking for a home care order to resume services. Pt is active with skilled RN, PT/OT and a aide.

## 2020-11-16 NOTE — PROGRESS NOTES
Ambulated pt in room on room air to see if 02 would drop while walking. 02 stat remained at 92%. Walked several times around the room.

## 2020-11-16 NOTE — DISCHARGE SUMMARY
Discharge Summary    Name:  Benjamín Rodriguez /Age/Sex: 1960  (64 y.o. female)   MRN & CSN:  [id number] & [id number] Admission Date/Time: 2020  3:46 PM   Attending:  Mely Bowles MD Discharging Physician: Francia Ocampo MD     HPI and Hospital Course:   Benjamín Rodriguez is a 64 y.o.  female  who presents with Shortness of Breath; Extremity Weakness; and Concern For COVID-19    HPI- as per H and [address]  1-Acute hypoxic respiratory failure likely due to COVID pneumonia- on [place] oxygen 2-3 liters- not on home oxygen  - received convalescent plasma   -on solumedrol 40mg Q12 as of - to be discharged on decadron with end date of .  -remdesevir as of   -has improved on room air this morning- will do home oxygen and ambulation today and if she does okay with ambulation will discharge home  -for DVT prophylaxis already on xarelto from recent hip surgery- to continue for another month.     Other issues  -HTN  - OA s/p left total hip replacement 10/19/2020-PT/OT  -Morbid obesity with BMI of 38                   The patient expressed appropriate understanding of and agreement with the discharge recommendations, medications, and plan.      Consults this admission:  IP CONSULT TO CASE MANAGEMENT  IP CONSULT TO HOSPITALIST  IP CONSULT TO PHARMACY    Discharge Instruction:   Follow up appointments:   Primary care physician:  within 2 weeks    Diet:  General/cardiac/ADA/as tolerated  Activity: {discharge activity: as tolerated  Disposition: Discharged to:   [x]Home, [x]C, []SNF, []Acute Rehab, []Hospice   Condition on discharge: Stable    Discharge Medications:      Jennifer Lightning \"Court\"   Home Medication Instructions PIU:[id number]    Printed on:20 2441   Medication Information                      dexamethasone (DECADRON) 6 MG tablet  Take 1 tablet by mouth daily (with breakfast) for 7 days             docusate sodium (COLACE) 100 MG capsule  Take 1 capsule by 108* 104* 105*     SIGNIFICANT IMAGING AND LABS:      Discharge Time of 32 minutes    Electronically signed by Tej Hamlin MD on 11/16/2020 at 11:18 AM

## 2020-11-16 NOTE — PLAN OF CARE
Problem: Airway Clearance - Ineffective  Goal: Achieve or maintain patent airway  Outcome: Completed     Problem: Gas Exchange - Impaired  Goal: Absence of hypoxia  Outcome: Completed  Goal: Promote optimal lung function  Outcome: Completed     Problem: Breathing Pattern - Ineffective  Goal: Ability to achieve and maintain a regular respiratory rate  Outcome: Completed     Problem:  Body Temperature -  Risk of, Imbalanced  Goal: Ability to maintain a body temperature within defined limits  Outcome: Completed  Goal: Will regain or maintain usual level of consciousness  Outcome: Completed  Goal: Complications related to the disease process, condition or treatment will be avoided or minimized  Outcome: Completed     Problem: Isolation Precautions - Risk of Spread of Infection  Goal: Prevent transmission of infection  Outcome: Completed     Problem: Nutrition Deficits  Goal: Optimize nutrtional status  Outcome: Completed     Problem: Risk for Fluid Volume Deficit  Goal: Maintain normal heart rhythm  Outcome: Completed  Goal: Maintain absence of muscle cramping  Outcome: Completed  Goal: Maintain normal serum potassium, sodium, calcium, phosphorus, and pH  Outcome: Completed     Problem: Loneliness or Risk for Loneliness  Goal: Demonstrate positive use of time alone when socialization is not possible  Outcome: Completed     Problem: Patient Education: Go to Patient Education Activity  Goal: Patient/Family Education  Outcome: Completed     Problem: Pain:  Goal: Pain level will decrease  Description: Pain level will decrease  Outcome: Completed  Goal: Control of acute pain  Description: Control of acute pain  Outcome: Completed  Goal: Control of chronic pain  Description: Control of chronic pain  Outcome: Completed     Problem: Falls - Risk of:  Goal: Will remain free from falls  Description: Will remain free from falls  Outcome: Completed  Goal: Absence of physical injury  Description: Absence of physical

## 2020-11-16 NOTE — PLAN OF CARE
Problem: Airway Clearance - Ineffective  Goal: Achieve or maintain patent airway  Outcome: Ongoing     Problem: Gas Exchange - Impaired  Goal: Absence of hypoxia  Outcome: Ongoing  Goal: Promote optimal lung function  Outcome: Ongoing     Problem: Breathing Pattern - Ineffective  Goal: Ability to achieve and maintain a regular respiratory rate  Outcome: Ongoing     Problem: Body Temperature -  Risk of, Imbalanced  Goal: Ability to maintain a body temperature within defined limits  Outcome: Ongoing  Goal: Will regain or maintain usual level of consciousness  Outcome: Ongoing  Goal: Complications related to the disease process, condition or treatment will be avoided or minimized  Outcome: Ongoing     Problem: Risk for Fluid Volume Deficit  Goal: Maintain normal heart rhythm  Outcome: Ongoing  Goal: Maintain absence of muscle cramping  Outcome: Ongoing  Goal: Maintain normal serum potassium, sodium, calcium, phosphorus, and pH  Outcome: Ongoing     Problem: Loneliness or Risk for Loneliness  Goal: Demonstrate positive use of time alone when socialization is not possible  Outcome: Ongoing     Problem: Fatigue  Goal: Verbalize increase energy and improved vitality  Outcome: Ongoing     Problem: Patient Education: Go to Patient Education Activity  Goal: Patient/Family Education  Outcome: Ongoing     Problem: Pain:  Description: Pain management should include both nonpharmacologic and pharmacologic interventions.   Goal: Pain level will decrease  Description: Pain level will decrease  Outcome: Ongoing  Goal: Control of acute pain  Description: Control of acute pain  Outcome: Ongoing  Goal: Control of chronic pain  Description: Control of chronic pain  Outcome: Ongoing     Problem: Falls - Risk of:  Goal: Will remain free from falls  Description: Will remain free from falls  Outcome: Ongoing  Goal: Absence of physical injury  Description: Absence of physical injury  Outcome: Ongoing     Problem: Skin Integrity:  Goal: Will show no infection signs and symptoms  Description: Will show no infection signs and symptoms  Outcome: Ongoing  Goal: Absence of new skin breakdown  Description: Absence of new skin breakdown  Outcome: Ongoing

## 2020-11-16 NOTE — PROGRESS NOTES
[address] [place] [address]  HOSPITALIST PROGRESS NOTE                       Name:  Lea Frazier /Age/Sex: 1960  (64 y.o. female)   MRN & CSN:  [id number] & [id number] Admission Date/Time: 2020  3:46 PM   Location:  [address] [place] [address] Attending:  Perla Hernandez MD                                                  HPI  Lea Frazier is a 64 y.o. female who presents with acute respiratory failure    SUBJECTIVE  -awake, interactive, on room air when seen, feels improved. No fever    10 point review of systems reviewed and negative unless noted above. ALLERGIES:   Allergies   Allergen Reactions    Celexa [Citalopram] Other (See Comments)     Stomach pain        Atorvastatin      Leg cramps      Mestinon [Pyridostigmine] Itching and Swelling    Penicillins     Sulfa Antibiotics     Tape Lindia Cluck Tape]     Tricor [Fenofibrate]      angioedema    Gemfibrozil Rash    Meloxicam Other (See Comments)     moodswings       PCP: Benitez Gilmore MD    PAST MEDICAL HISTORY, SURGICAL HISTORY, SOCIAL HISTORY and  HOME MEDICATIONS all reviewed. OBJECTIVE  Vitals:    20 1000 20 1057 20 1101 20 1112   BP: (!) 113/51 112/66 116/65 116/65   Pulse: 70 74 76 73   Resp: 24 25 27    Temp:       TempSrc:       SpO2: 94% 95% 95%    Weight:       Height:           PHYSICAL EXAM   GEN Awake female, sitting upright in bed in no apparent distress. EYES Pupils are equally round. No scleral erythema, discharge, or conjunctivitis. HENT Mucous membranes are moist. Oral pharynx without exudates, no evidence of thrush. NECK Supple, no apparent thyromegaly or masses. RESP Unlabored respiration, bilateral coarse breath sounds  CARDIO/VASC S1/S2 auscultated. Regular rate without appreciable murmurs, rubs, or gallops. No JVD or carotid bruits. Peripheral pulses equal bilaterally and palpable. GI Abdomen is soft without significant tenderness, masses, or guarding. Bowel sounds are normoactive.  Rectal exam deferred.  No costovertebral angle tenderness. Normal appearing external genitalia. HEME/LYMPH No palpable cervical lymphadenopathy and no hepatosplenomegaly. No petechiae or ecchymoses. MSK Spontaneous movement of all extremities. No gross joint deformities. SKIN Normal coloration, warm, dry. NEURO Cranial nerves appear grossly intact, normal speech, no lateralizing weakness. PSYCH Awake, alert, oriented x 4. Affect appropriate. INTAKE: In: 560 [P.O.:240;  I.V.:20]  Out: 650   OUTPUT: In: 560   Out: 650 [Urine:650]    LABS  Recent Labs     11/14/20  1140 11/15/20  0434 11/16/20  0450   WBC 2.1* 4.0 5.6   HGB 9.8* 9.3* 9.4*   HCT 30.0* 28.4* 30.0*   * 104* 105*      Recent Labs     11/14/20  1140 11/15/20  0434 11/16/20  0450    143 140   K 3.1* 2.8* 3.3*   CL 99 104 103   CO2 24 28 27   BUN 10 13 20   CREATININE 1.0 1.0 1.0     Recent Labs     11/14/20  1140 11/15/20  0434 11/16/20  0450   AST 43* 35 27   ALT 51* 45* 44*   BILITOT 0.7 0.6 0.6   ALKPHOS 79 71 63     Recent Labs     11/14/20  1140 11/15/20  0434 11/16/20  0450   INR 1.91 1.25 1.33     Recent Labs     11/13/20  1657 11/14/20  1140   TROPONINT <0.010 <0.010          Abnormal labs for today noted      Imaging:     ECHO:    Microbiology:  Blood culture:    Urine culture:    Sputum culture:    Procedures done this admission:    MEDS  Scheduled Meds:   vitamin D  6,000 Units Oral Daily    ezetimibe  10 mg Oral Daily    metoprolol tartrate  50 mg Oral BID    remdesivir IVPB  100 mg Intravenous Q24H    rivaroxaban  10 mg Oral Daily    sodium chloride flush  10 mL Intravenous 2 times per day    famotidine (PEPCID) injection  20 mg Intravenous BID    ipratropium  2 puff Inhalation 4x daily    levofloxacin  500 mg Intravenous Q24H    methylPREDNISolone  40 mg Intravenous Q12H     Continuous Infusions:  PRN Meds:melatonin, potassium chloride **OR** potassium alternative oral replacement **OR** potassium chloride, docusate sodium, sodium chloride, sodium chloride flush, acetaminophen **OR** acetaminophen, polyethylene glycol, promethazine **OR** ondansetron, guaiFENesin-dextromethorphan        ASSESSMENT and [facility] Day: 4    1-Acute hypoxic respiratory failure likely due to COVID pneumonia- on [place] oxygen 2-3 liters- not on home oxygen  - ordered convalescent plasma   -on solumedrol 40mg Q12 as of 11/13  -remdesevir as of 11/14  -continue levaquin and monitor inflammatory makers     Other issues  -HTN  - OA s/p left total hip replacement 10/19/2020-PT/OT  -Morbid obesity with BMI of 38                   Will ambulate and do home oxygen evaluation- discharge home later today if she does well    Sister updated via phone call         Disp:     Diet DIET CARDIAC;   DVT Prophylaxis [] Lovenox, []  Heparin, [] SCDs, [] Ambulation   GI Prophylaxis [] PPI,  [] H2 Blocker,  [] Carafate,  [] Diet/Tube Feeds   Code Status Full Code   Disposition Patient requires continued admission due to acute hypoxic respiratory failure   CMS Level of Risk [] Low, [] Moderate,[x]  High  Patient's risk as above due to acute hypoxic respiratory failure     KENNEDI RUIZ MD 11/16/2020 11:19 AM

## 2020-11-17 ENCOUNTER — CARE COORDINATION (OUTPATIENT)
Dept: CASE MANAGEMENT | Age: 60
End: 2020-11-17

## 2020-11-17 LAB
EKG ATRIAL RATE: 101 BPM
EKG DIAGNOSIS: NORMAL
EKG P AXIS: -26 DEGREES
EKG P-R INTERVAL: 124 MS
EKG Q-T INTERVAL: 344 MS
EKG QRS DURATION: 68 MS
EKG QTC CALCULATION (BAZETT): 446 MS
EKG R AXIS: 75 DEGREES
EKG T AXIS: -6 DEGREES
EKG VENTRICULAR RATE: 101 BPM

## 2020-11-17 NOTE — CARE COORDINATION
[facility] Transitions Initial Follow Up Call    Call within 2 business days of discharge: Yes    Patient: Kristie Limon Patient : 1960   MRN: [id number]  Reason for Admission:   SOB/ COVID+  Discharge Date: 20 RARS: Readmission Risk Score: 18      Last Discharge [facility]       Complaint Diagnosis Description Type Department Provider    20 Shortness of Breath; Extremity Weakness; Concern For COVID-19 COVID-19 . .. ED to Hosp-Admission (Discharged) (ADMITTED) Julian Toth MD; Rell Martinez .. Spoke with:   patient    Facility:  [facility]    Non-face-to-face services provided:  Education of patient/family/caregiver/guardian to support self-management-1    Care Transitions 24 Hour Call    Do you have a copy of your discharge instructions?:  Yes  Do you have all of your prescriptions and are they filled?:  Yes  Have you been contacted by a [employer]?:  No  Have you scheduled your follow up appointment?:  No  Were you discharged with any Home Care or Post Acute Services:  Yes  Post Acute Services:  Home Health (Comment: Santy Hassan)  Do you feel like you have everything you need to keep you well at home?:  Yes  Care Transitions Interventions         Follow Up:  CTN spoke with patient briefly. Identified patient by name and . Patient reports that she is \"not feeling great\". Reports that she is very tired and weak. Patient reports poor energy level; SOB on exertion. Reports that she feels \"about the same as when she was in the hospital\". Patient denies increased SOB, reports continued cough. Denies fever, chills, N/V/D or worsening symptoms. Patient reports that she \"just feels bad\". Patient reports that she has not been contacted by the Health Department. Denies any barriers to obtaining medication, food or supplies. CTN provided [facility] contact if needs arise. Discussed same day visit/ WIC/ PCP office.   Instructed on tele-health/ Real Time Translation visit options. Patient declined reporting that she does not have a smart phone and has no computer. CTN offered to [facility] to request [facility] day visit. Patient confirms that she contacted [facility] and a nurse is scheduled to visit w/in the hour. Reminded patient of the 24/7 availability of a [facility] nurse on call for worsening symptoms. Encouraged rest, energy conservation . Instructed on the importance of maintaining hydration. Patient verbalized understanding. Declined further CTN assessment at this time. CTN contact information provided. Encouraged return call if needs arise. Plan for follow up in 1-2 days based on severity of symptoms and risk factors.   Future Appointments   Date Time Provider Carrie Arevalo   1/6/2021  8:00 AM Estefania Bergman MD [facility] HILARIA Hawkins RN

## 2020-11-18 ENCOUNTER — CARE COORDINATION (OUTPATIENT)
Dept: CASE MANAGEMENT | Age: 60
End: 2020-11-18

## 2020-11-18 LAB
CULTURE: NORMAL
CULTURE: NORMAL
Lab: NORMAL
Lab: NORMAL
SPECIMEN: NORMAL
SPECIMEN: NORMAL

## 2020-11-18 NOTE — CARE COORDINATION
[facility] Transitions Follow Up Call    2020    Patient: Bia Montelongo  Patient : 1960   MRN: [id number]  Reason for Admission:   SOB/ COVID+  Discharge Date: 20 RARS: Readmission Risk Score: 25         Spoke with:   patient    Care Transitions Subsequent and Final Call    Subsequent and Final Calls  Have your medications changed?:  No  Do you have any questions related to your medications?:  No  Do you currently have any active services?:  Yes  Are you currently active with any services?:  Home Health  Do you have any needs or concerns that I can assist you with?:  No  Identified Barriers:  None  Care Transitions Interventions  Other Interventions: Follow Up:  COVID-19 Risk Monitoring:    Patient follow up. Spoke with patient. Identified by name and . Patient reports that she feels better today. Denies increased SOB, cough, weakness or \"shakiness\". Patient confirms that she is resting and stayed hydrated. Reviewed medications and discharge instructions. Medication reconciliation completed. Patient confirms that she has all of her prescriptions and is taking them as directed. [facility]. Defers further CTN assessment. CTN contact information provided. Encouraged return call if needs arise.    Future Appointments   Date Time Provider Carrie Arevalo   2021  8:00 AM Kamille Recinos MD [facility]       Irais Ann RN

## 2020-11-25 ENCOUNTER — CARE COORDINATION (OUTPATIENT)
Dept: CASE MANAGEMENT | Age: 60
End: 2020-11-25

## 2020-11-25 NOTE — CARE COORDINATION
[facility] Transitions Follow Up Call    2020    Patient: Martin Roberts  Patient : 1960   MRN: [id number]  Reason for Admission:   COVID-19 PNA  Discharge Date: 20 RARS: Readmission Risk Score: 18         Spoke with:   patient      Needs to be reviewed by the provider   N/A    Care Transition Nurse (CTN) contacted the patient by telephone to follow up after admission on . Verified name and  with patient as identifiers. Patient reports that she is feeling better. Denies increased SOB, cough, fever, chills, N/V/D or worsening symptoms. Discussed risk for worsening symptoms/ reviewed infection prevention measures. Instructed on s.s to report to MD.    Patient reports occasional dizziness. Encouraged hydration. Instructed on routine BP monitoring; encouraged patient to keep a log for Provider review. Instructed on safety/ fall prevention measures. Instructed to avoid sudden changes in position and encouraged patient to rise slowly from sitting position. Addressed changes since last contact:  N/A    Advance Care Planning:   Does patient have an Advance Directive:  No.     CTN reviewed discharge instructions, medical action plan and red flags with patient and discussed any barriers to care and/or understanding of plan of care after discharge. Discussed appropriate site of care based on symptoms and resources available to patient including: COVID+. The patient agrees to contact the PCP office for questions related to their healthcare. Patients top risk factors for readmission: COVID+  Interventions to address risk factors:   Education in r/t self care management    Discussed follow-up appointments. If no appointment was previously scheduled, appointment scheduling offered: Yes  Next appt. 12/10 with Dr. Cary Sheehan for follow up as needed based on severity of symptoms and risk factors.   Plan for next call:  Provider follow up  CTN provided contact information for

## 2020-12-03 ENCOUNTER — TELEPHONE (OUTPATIENT)
Dept: FAMILY MEDICINE CLINIC | Age: 60
End: 2020-12-03

## 2020-12-03 RX ORDER — METHYLPREDNISOLONE 4 MG/1
TABLET ORAL
Qty: 1 KIT | Refills: 0 | Status: SHIPPED | OUTPATIENT
Start: 2020-12-03 | End: 2021-02-19

## 2020-12-10 ENCOUNTER — OFFICE VISIT (OUTPATIENT)
Dept: ORTHOPEDIC SURGERY | Age: 60
End: 2020-12-10

## 2020-12-10 VITALS
WEIGHT: 250 LBS | HEIGHT: 65 IN | BODY MASS INDEX: 41.65 KG/M2 | HEART RATE: 100 BPM | RESPIRATION RATE: 18 BRPM | OXYGEN SATURATION: 97 %

## 2020-12-10 PROCEDURE — 99024 POSTOP FOLLOW-UP VISIT: CPT | Performed by: ORTHOPAEDIC SURGERY

## 2020-12-10 NOTE — PROGRESS NOTES
Patient returns to the office 7 weeks post operatively following a left total hip arthroplasty performed on 10/19/2020. Patient denies pain in office today but sometimes has some pulling sensation within the incision site. She continues to work with therapy to improve strength and stability of the hip without complications. She does take Tylenol as needed for pain as needed. Patient has had some setbacks since surgery which a positive Covid testing which caused her to be hospitalized for a short period of time and left ankle injury which is why she continues to ambulate with the walker. Overall, patient is progressing as expected.

## 2020-12-10 NOTE — PATIENT INSTRUCTIONS
Weightbearing and activities as tolerated  Work on ROM and strengthening exercises per PT protocol  Rest, ice, and elevate as needed  May take Tylenol or Ibuprofen as needed   Follow up in 4 weeks

## 2020-12-14 ENCOUNTER — CARE COORDINATION (OUTPATIENT)
Dept: CASE MANAGEMENT | Age: 60
End: 2020-12-14

## 2020-12-14 NOTE — CARE COORDINATION
[facility] Transitions Follow Up Call    2020    Patient: Emma Worthy  Patient : 1960   MRN: [id number]  Reason for Admission:   SOB/ COVID+  Discharge Date: 20 RARS: Readmission Risk Score: 25         Spoke with:   patient    Care Transitions Subsequent and Final Call    Subsequent and Final Calls  Have your medications changed?: No  Do you have any questions related to your medications?: No  Do you currently have any active services?: No  Are you currently active with any services?: Home Health  Do you have any needs or concerns that I can assist you with?: No  Identified Barriers: None  Care Transitions Interventions  Other Interventions: Follow Up:             Patient currently reports that the following symptoms have improved:       Patient resolved from the Care Transitions episode on 20. Patient/family has been provided the following resources and education related to COVID-19:                         Signs, symptoms and red flags related to COVID-19            CDC exposure and quarantine guidelines            [facility] exposure contact - [phone]            Contact for their local Department of Health                 Patient currently reports that the following symptoms have improved;  Reports weakness at times;  SOB on exertion. Encouraged rest; avoid over exertion. Patient reports symptoms are at baseline. Instructed on worsening s/s to report to MD.    Reports that she is taking her medications as directed. Denies support needs. No further outreach scheduled with this CTN. Episode of Care resolved. Patient has this CTN contact information if future needs arise.   Future Appointments   Date Time Provider Carrie Arevalo   2021  8:00 AM [facility]   2021  2:10 PM Jesusita Sarabia MD [facility]       Catalina Schlatter, RN

## 2020-12-16 PROBLEM — M16.12 UNILATERAL PRIMARY OSTEOARTHRITIS, LEFT HIP: Status: RESOLVED | Noted: 2020-10-19 | Resolved: 2020-12-16

## 2020-12-16 PROBLEM — Z96.642 STATUS POST LEFT HIP REPLACEMENT: Status: ACTIVE | Noted: 2020-12-16

## 2020-12-16 NOTE — PROGRESS NOTES
12/10/2020   Chief Complaint   Patient presents with    Post-Op Check     post op left LISHA DOS 10/19/20        History of Present Illness:                             Evangelina Tan is a 64 y.o. female who returns today for follow-up of visit after left total hip replacement. She had a very complicated postoperative course which involved a small nondisplaced fracture in her left ankle as well as treatment for chronic back issues and gout. She also subsequently tested positive for COVID-19. She has been hospitalized for this and has been delayed in her therapy because of these multiple issues. She has continued to walk with her walker and has been getting therapy over the last couple of weeks which would seem to help. She feels that she is improving well with respect to her hip and has no pain or issues at her hip joint today. She feels that she is walking well with her walker. Patient returns to the office 7 weeks post operatively following a left total hip arthroplasty performed on 10/19/2020. Patient denies pain in office today but sometimes has some pulling sensation within the incision site. She continues to work with therapy to improve strength and stability of the hip without complications. She does take Tylenol as needed for pain as needed. Patient has had some setbacks since surgery which a positive Covid testing which caused her to be hospitalized for a short period of time and left ankle injury which is why she continues to ambulate with the walker. Overall, patient is progressing as expected. Examination:  General Exam:  Vitals: Pulse 100   Resp 18   Ht 5' 5\" (1.651 m)   Wt 250 lb (113.4 kg)   SpO2 97%   BMI 41.60 kg/m²    Physical Exam   Operative Extremity:  Left lower extremity:  Well-healed surgical scar over the lateral aspect of the hip. No erythema, drainage, or induration.   Good active and passive range of motion of the hip with intact strength during hip flexion and extension and abduction. Sensation and motor function is intact distally in the leg with good dorsiflexion and plantarflexion of the ankle. Calf is soft and nontender. Diagnostic testing:  X-rays reviewed in office, I independently reviewed the films in the office today:     Narrative   AP pelvis and frog-leg lateral view of the left hip show excellent    position alignment status post left total hip replacement with no evidence    of fracture or postoperative complication. Assessment and Plan  1. Left total hip replacement    I reviewed the x-rays with the patient explained that there is good position and alignment of her arthroplasty and that it appears her hip is recovering well following surgery. I have encouraged her to keep working with physical therapy to increase her mobility and activity levels. She can transition from the walker to a cane as guided by therapy. Her ankle seems to be well-healed at this time and she can simply wear regular shoes without needing any further bracing at this time. Continue physical therapy and follow-up in 1 month for check of her progress with rehabilitation activities.         Electronically signed by Jaspreet Uriarte MD on 12/16/2020 at 3:02 PM

## 2021-01-14 ENCOUNTER — OFFICE VISIT (OUTPATIENT)
Dept: ORTHOPEDIC SURGERY | Age: 61
End: 2021-01-14
Payer: COMMERCIAL

## 2021-01-14 VITALS
HEIGHT: 65 IN | OXYGEN SATURATION: 97 % | RESPIRATION RATE: 18 BRPM | BODY MASS INDEX: 41.65 KG/M2 | HEART RATE: 88 BPM | WEIGHT: 250 LBS

## 2021-01-14 DIAGNOSIS — Z09 POSTOP CHECK: ICD-10-CM

## 2021-01-14 DIAGNOSIS — Z96.642 STATUS POST LEFT HIP REPLACEMENT: Primary | ICD-10-CM

## 2021-01-14 DIAGNOSIS — M48.061 SPINAL STENOSIS OF LUMBAR REGION, UNSPECIFIED WHETHER NEUROGENIC CLAUDICATION PRESENT: ICD-10-CM

## 2021-01-14 PROCEDURE — G8482 FLU IMMUNIZE ORDER/ADMIN: HCPCS | Performed by: ORTHOPAEDIC SURGERY

## 2021-01-14 PROCEDURE — 99024 POSTOP FOLLOW-UP VISIT: CPT | Performed by: ORTHOPAEDIC SURGERY

## 2021-01-14 PROCEDURE — 1036F TOBACCO NON-USER: CPT | Performed by: ORTHOPAEDIC SURGERY

## 2021-01-14 PROCEDURE — 3017F COLORECTAL CA SCREEN DOC REV: CPT | Performed by: ORTHOPAEDIC SURGERY

## 2021-01-14 PROCEDURE — G8427 DOCREV CUR MEDS BY ELIG CLIN: HCPCS | Performed by: ORTHOPAEDIC SURGERY

## 2021-01-14 PROCEDURE — 99213 OFFICE O/P EST LOW 20 MIN: CPT | Performed by: ORTHOPAEDIC SURGERY

## 2021-01-14 PROCEDURE — G8417 CALC BMI ABV UP PARAM F/U: HCPCS | Performed by: ORTHOPAEDIC SURGERY

## 2021-01-14 RX ORDER — ACETAMINOPHEN 500 MG
500 TABLET ORAL EVERY 6 HOURS PRN
COMMUNITY

## 2021-01-14 NOTE — PROGRESS NOTES
Patient returns to the office 11 weeks post operatively following a left total hip arthroplasty that was performed on 10/19/2020. Over the past week, patient has had stiffness, pinching or pulling sensation along the lateral aspect of the hip,  and aching sensation along the left hip with pain rated at between a 4-5/10 which she has been treated conservatively with Tylenol and ambulating more often, but no relief is being achieved. In addition, to the stiffness and aching felt along the lateral aspect of the hip, she is also having continued swelling to the bilateral ankle and foot which subsides some upon rest until the next time she is on her feet then swelling returns. No new injury reported.

## 2021-01-14 NOTE — PATIENT INSTRUCTIONS
Continue to work on ROM and strengthening exercises  Rest, ice, and elevate as needed  Continue to take Tylenol as needed for pain  Weightbearing and activities as tolerated  Follow up 2 months  Recommend seeing Dr. Geoff Nunez for a steroid injection in the lower back

## 2021-01-15 ASSESSMENT — ENCOUNTER SYMPTOMS
VOMITING: 0
EYE REDNESS: 0
WHEEZING: 0
CHEST TIGHTNESS: 0
BACK PAIN: 1
EYE PAIN: 0
COLOR CHANGE: 0
SHORTNESS OF BREATH: 0

## 2021-01-15 NOTE — PROGRESS NOTES
1/14/2021   Chief Complaint   Patient presents with    Post-Op Check     left total hip arthroplasty DOS 10/19/2020        History of Present Illness:                             Rusty Draper is a 64 y.o. female returns today for a follow-up of her left hip replacement and also further discussion of her ongoing back pain and radiating bilateral lower extremity pain. She has a noticed improvement in her hip and her ability to perform range of motion activities and walking. She denies any current groin or hip pain similar to what she experienced prior to surgery but she does continue to have lower back pain that does travel and radiate down into her legs bilaterally. She has been increasing her activities with walking and therapy for her hip but has been limited because of ongoing pain and weakness in her back and legs. She has been to a spine physician in the past and has had epidural injections. It has been over a year for this. She feels that her hip is healing well but she is still very limited in her mobility and attributes a lot of that to chronic back issues. Patient returns to the office 11 weeks post operatively following a left total hip arthroplasty that was performed on 10/19/2020. Over the past week, patient has had stiffness, pinching or pulling sensation along the lateral aspect of the hip,  and aching sensation along the left hip with pain rated at between a 4-5/10 which she has been treated conservatively with Tylenol and ambulating more often, but no relief is being achieved. In addition, to the stiffness and aching felt along the lateral aspect of the hip, she is also having continued swelling to the bilateral ankle and foot which subsides some upon rest until the next time she is on her feet then swelling returns. No new injury reported.      Medical History  Patient's medications, allergies, past medical, surgical, social and family histories were reviewed and updated as appropriate. Review of Systems:   Review of Systems   Constitutional: Negative for chills and fever. HENT: Negative for congestion and sneezing. Eyes: Negative for pain and redness. Respiratory: Negative for chest tightness, shortness of breath and wheezing. Cardiovascular: Negative for chest pain and palpitations. Gastrointestinal: Negative for vomiting. Musculoskeletal: Positive for arthralgias, back pain and gait problem. Skin: Negative for color change and rash. Neurological: Positive for weakness and numbness. Psychiatric/Behavioral: Negative for agitation. The patient is not nervous/anxious. Examination:  General Exam:  Vitals: Pulse 88   Resp 18   Ht 5' 5\" (1.651 m)   Wt 250 lb (113.4 kg)   SpO2 97%   BMI 41.60 kg/m²    Physical Exam  Vitals signs and nursing note reviewed. Constitutional:       Appearance: Normal appearance. She is obese. HENT:      Head: Normocephalic and atraumatic. Eyes:      Conjunctiva/sclera: Conjunctivae normal.      Pupils: Pupils are equal, round, and reactive to light. Neck:      Musculoskeletal: Normal range of motion. Pulmonary:      Effort: Pulmonary effort is normal.   Musculoskeletal:      Right hip: She exhibits normal range of motion, normal strength, no tenderness, no bony tenderness, no swelling, no crepitus and no deformity. Right knee: Normal.      Left knee: She exhibits normal range of motion, no swelling, no effusion, no deformity, no erythema, normal alignment, no LCL laxity and no MCL laxity. No medial joint line and no lateral joint line tenderness noted. Lumbar back: She exhibits decreased range of motion, tenderness and pain. She exhibits no swelling and no deformity. Comments: Left lower extremity:  Well-healed surgical scar over the lateral aspect of the hip. No pain with active and passive range of motion of the hip.   Range of motion is significantly improved from prior to surgery with no restrictions in internal and external rotation of the hip and flexion intact past 90 degrees with no pain. No instability at the hip. Equal leg lengths are present. Strength is 5 out of 5 with hip flexion and extension and abduction. Skin:     General: Skin is warm and dry. Neurological:      Mental Status: She is alert and oriented to person, place, and time. Psychiatric:         Mood and Affect: Mood normal.         Behavior: Behavior normal.              Diagnostic testing:  X-rays reviewed in office, I independently reviewed the films in the office today:   X-ray images of the hip from 12/10/2020 show well fixed and aligned total hip replacement with no fracture or loosening or other complication. MRI images of the lumbar spine from 10/28/2020 were reviewed by myself and discussed with the patient:  Acquired on congenital spinal canal stenosis and degenerative neural   foraminal narrowing.       Canal stenosis is worst at the L2-3 disc level and results in bunching of the   cauda equina nerve roots. Office Procedures:  No orders of the defined types were placed in this encounter. Assessment and Plan  1. Lumbar spinal stenosis    2. Previous left hip replacement    I reassured the patient that her hip appears to be recovering well following her total hip replacement. She has good strength and range of motion and appears that her hip is functioning better now than it was prior to surgery. Unfortunately she still is limited in her ability to ambulate and be active throughout the day. She continues to have ongoing back pain and radiating discomfort and weakness into her lower extremities. We reviewed her lumbar spine MRI results and I explained that she has significant spinal stenosis which is likely causing pain in her legs.     She has seen back doctors in the past and I have recommended that she be reevaluated and possibly consider a epidural injection. I have sent a referral to  for this. She will follow. 2 months for check of her hip. She can continue with her home exercise program and I have encouraged her to continue walking as much as possible and encouraged her to lose weight.         Electronically signed by Carlos Manuel Alvarez MD on 1/15/2021 at 10:20 AM

## 2021-02-11 ENCOUNTER — OFFICE VISIT (OUTPATIENT)
Dept: ORTHOPEDIC SURGERY | Age: 61
End: 2021-02-11
Payer: COMMERCIAL

## 2021-02-11 VITALS — RESPIRATION RATE: 15 BRPM | BODY MASS INDEX: 41.65 KG/M2 | HEIGHT: 65 IN | WEIGHT: 250 LBS

## 2021-02-11 DIAGNOSIS — Z96.642 STATUS POST LEFT HIP REPLACEMENT: Primary | ICD-10-CM

## 2021-02-11 DIAGNOSIS — M48.061 SPINAL STENOSIS OF LUMBAR REGION, UNSPECIFIED WHETHER NEUROGENIC CLAUDICATION PRESENT: ICD-10-CM

## 2021-02-11 DIAGNOSIS — Z09 POSTOP CHECK: ICD-10-CM

## 2021-02-11 PROCEDURE — G8417 CALC BMI ABV UP PARAM F/U: HCPCS | Performed by: ORTHOPAEDIC SURGERY

## 2021-02-11 PROCEDURE — 3017F COLORECTAL CA SCREEN DOC REV: CPT | Performed by: ORTHOPAEDIC SURGERY

## 2021-02-11 PROCEDURE — G8482 FLU IMMUNIZE ORDER/ADMIN: HCPCS | Performed by: ORTHOPAEDIC SURGERY

## 2021-02-11 PROCEDURE — 1036F TOBACCO NON-USER: CPT | Performed by: ORTHOPAEDIC SURGERY

## 2021-02-11 PROCEDURE — G8427 DOCREV CUR MEDS BY ELIG CLIN: HCPCS | Performed by: ORTHOPAEDIC SURGERY

## 2021-02-11 PROCEDURE — 99213 OFFICE O/P EST LOW 20 MIN: CPT | Performed by: ORTHOPAEDIC SURGERY

## 2021-02-11 RX ORDER — FUROSEMIDE 40 MG/1
TABLET ORAL
COMMUNITY
Start: 2020-12-28 | End: 2021-05-14

## 2021-02-11 NOTE — PATIENT INSTRUCTIONS
Continue weight-bearing as tolerated. Continue range of motion exercises as instructed. Ice and elevate as needed. Tylenol or Motrin for pain.   Follow up as scheduled

## 2021-02-11 NOTE — PROGRESS NOTES
Patient returns to the office today for FU of left LISHA DOS 10/19/20. Pt states two week ago she slipped on water. Pt states she caught herself before she hit the floor but did having instant pain along the posterior aspect of the left hip that is traveling into the left leg. Pt states pain today is a 9/10. Pt states before two weeks ago she did not have any pain. Pt presents today using her cane.

## 2021-02-15 ASSESSMENT — ENCOUNTER SYMPTOMS
VOMITING: 0
COLOR CHANGE: 0
SHORTNESS OF BREATH: 0
EYE PAIN: 0
EYE REDNESS: 0
BACK PAIN: 1
CHEST TIGHTNESS: 0
WHEEZING: 0

## 2021-02-15 NOTE — PROGRESS NOTES
2/11/2021   Chief Complaint   Patient presents with    Hip Pain     left LISHA DOS 10/19/20 increase swelling and pain         History of Present Illness:                             Silvia Earl is a 64 y.o. female who returns today for follow-up of her left hip replacement. She has been doing well for several weeks but did have a recent injury 2 weeks ago when she slipped on some water and caught herself. She did not fall to the floor but did feel sharp pain near the posterior aspect of her hip and radiating down her leg. She has required the use of a cane for ambulation and has pain with pushoff and weightbearing activities and with movements of her back and hip. She denies any feelings of instability or popping. Patient returns to the office today for FU of left LISHA DOS 10/19/20. Pt states two week ago she slipped on water. Pt states she caught herself before she hit the floor but did having instant pain along the posterior aspect of the left hip that is traveling into the left leg. Pt states pain today is a 9/10. Pt states before two weeks ago she did not have any pain. Pt presents today using her cane. Medical History  Patient's medications, allergies, past medical, surgical, social and family histories were reviewed and updated as appropriate. Review of Systems:   Review of Systems   Constitutional: Negative for chills and fever. HENT: Negative for congestion and sneezing. Eyes: Negative for pain and redness. Respiratory: Negative for chest tightness, shortness of breath and wheezing. Cardiovascular: Negative for chest pain and palpitations. Gastrointestinal: Negative for vomiting. Musculoskeletal: Positive for arthralgias, back pain and gait problem. Skin: Negative for color change and rash. Neurological: Negative for weakness and numbness. Psychiatric/Behavioral: Negative for agitation. The patient is not nervous/anxious. Examination:  General Exam:  Vitals: Resp 15   Ht 5' 5\" (1.651 m)   Wt 250 lb (113.4 kg)   BMI 41.60 kg/m²    Physical Exam  Vitals signs and nursing note reviewed. Constitutional:       Appearance: Normal appearance. HENT:      Head: Normocephalic and atraumatic. Eyes:      Conjunctiva/sclera: Conjunctivae normal.      Pupils: Pupils are equal, round, and reactive to light. Neck:      Musculoskeletal: Normal range of motion. Pulmonary:      Effort: Pulmonary effort is normal.   Musculoskeletal:      Right hip: She exhibits normal range of motion, normal strength, no tenderness, no bony tenderness, no swelling, no crepitus and no deformity. Right knee: Normal.      Left knee: She exhibits normal range of motion, no swelling, no effusion, no deformity, no erythema, normal alignment, no LCL laxity and no MCL laxity. No medial joint line and no lateral joint line tenderness noted. Lumbar back: She exhibits decreased range of motion and tenderness. She exhibits no swelling and no deformity. Comments: Left lower extremity:  Well-healed surgical scar over the posterior lateral aspect of the hip. There is pain referred to the sacroiliac region and posterior hip with extremes of motion with flexion and external and internal rotation of the hip but no pain at the groin or pelvic region during attempted weightbearing and rotational movements of the hip. Strength is 5 out of 5 with hip flexion and extension and abduction. Sensation and motor function is intact distally in the foot and ankle with mild decreased sensation present in stocking distribution distal to the ankle. Skin is intact. No lesions or wounds or erythema. Skin:     General: Skin is warm and dry. Neurological:      Mental Status: She is alert and oriented to person, place, and time.    Psychiatric:         Mood and Affect: Mood normal.         Behavior: Behavior normal.              Diagnostic testing:  X-rays reviewed in office, I independently reviewed the films in the office today:   Extremities of the left hip show well aligned prosthesis with no evidence of fracture or dislocation. Underlying degenerative changes present at the lower lumbar and sacral regions. No fracture or acute abnormality. Office Procedures:  No orders of the defined types were placed in this encounter. Assessment and Plan  1. Left hip replacement    2. Lumbar spinal stenosis with radiculopathy    I have reassured the patient that her hip is functioning well on my exam today and the x-rays do not show any complications prosthesis. I explained to her that based on my interpretation of her previous lumbar MRI that the most recent injury likely caused an exacerbation of her underlying back issues. I have offered her a referral to see a spine physician to determine if injections or surgery may be considered. She does not feel that this is necessary at this time and has not done well in the past with cortisone shots in her back. I have encouraged her to continue to be active and exercise and lose weight. Have recommended the use of a cane or walker for help with ambulation and to perform stretching exercises for the back and hip. I would like her to follow-up as previously scheduled or sooner if there are any worsening issues with the hip.         Electronically signed by Jessi Smith MD on 2/15/2021 at 2:03 PM

## 2021-02-19 ENCOUNTER — OFFICE VISIT (OUTPATIENT)
Dept: FAMILY MEDICINE CLINIC | Age: 61
End: 2021-02-19
Payer: COMMERCIAL

## 2021-02-19 VITALS
SYSTOLIC BLOOD PRESSURE: 145 MMHG | BODY MASS INDEX: 40.65 KG/M2 | DIASTOLIC BLOOD PRESSURE: 98 MMHG | OXYGEN SATURATION: 97 % | HEART RATE: 104 BPM | WEIGHT: 244 LBS | TEMPERATURE: 97 F | HEIGHT: 65 IN

## 2021-02-19 DIAGNOSIS — E78.2 MIXED HYPERLIPIDEMIA: ICD-10-CM

## 2021-02-19 DIAGNOSIS — Z13.31 POSITIVE DEPRESSION SCREENING: ICD-10-CM

## 2021-02-19 DIAGNOSIS — Z12.11 COLON CANCER SCREENING: ICD-10-CM

## 2021-02-19 DIAGNOSIS — R73.9 HYPERGLYCEMIA: ICD-10-CM

## 2021-02-19 DIAGNOSIS — I10 ESSENTIAL HYPERTENSION: Primary | ICD-10-CM

## 2021-02-19 DIAGNOSIS — Z12.31 ENCOUNTER FOR SCREENING MAMMOGRAM FOR BREAST CANCER: ICD-10-CM

## 2021-02-19 DIAGNOSIS — F32.A ANXIETY AND DEPRESSION: ICD-10-CM

## 2021-02-19 DIAGNOSIS — F41.9 ANXIETY AND DEPRESSION: ICD-10-CM

## 2021-02-19 PROCEDURE — G8427 DOCREV CUR MEDS BY ELIG CLIN: HCPCS | Performed by: FAMILY MEDICINE

## 2021-02-19 PROCEDURE — G8417 CALC BMI ABV UP PARAM F/U: HCPCS | Performed by: FAMILY MEDICINE

## 2021-02-19 PROCEDURE — 99214 OFFICE O/P EST MOD 30 MIN: CPT | Performed by: FAMILY MEDICINE

## 2021-02-19 PROCEDURE — G8431 POS CLIN DEPRES SCRN F/U DOC: HCPCS | Performed by: FAMILY MEDICINE

## 2021-02-19 PROCEDURE — 1036F TOBACCO NON-USER: CPT | Performed by: FAMILY MEDICINE

## 2021-02-19 PROCEDURE — G8482 FLU IMMUNIZE ORDER/ADMIN: HCPCS | Performed by: FAMILY MEDICINE

## 2021-02-19 PROCEDURE — 3017F COLORECTAL CA SCREEN DOC REV: CPT | Performed by: FAMILY MEDICINE

## 2021-02-19 RX ORDER — FLUOXETINE 10 MG/1
10 CAPSULE ORAL DAILY
Qty: 30 CAPSULE | Refills: 3 | Status: SHIPPED | OUTPATIENT
Start: 2021-02-19 | End: 2021-04-15 | Stop reason: SDUPTHER

## 2021-02-19 RX ORDER — LOSARTAN POTASSIUM 25 MG/1
25 TABLET ORAL DAILY
Qty: 30 TABLET | Refills: 5 | Status: SHIPPED | OUTPATIENT
Start: 2021-02-19 | End: 2021-11-29 | Stop reason: SDUPTHER

## 2021-02-19 ASSESSMENT — PATIENT HEALTH QUESTIONNAIRE - PHQ9
SUM OF ALL RESPONSES TO PHQ QUESTIONS 1-9: 17
5. POOR APPETITE OR OVEREATING: 2
3. TROUBLE FALLING OR STAYING ASLEEP: 3
SUM OF ALL RESPONSES TO PHQ QUESTIONS 1-9: 18
SUM OF ALL RESPONSES TO PHQ9 QUESTIONS 1 & 2: 4
SUM OF ALL RESPONSES TO PHQ QUESTIONS 1-9: 18
10. IF YOU CHECKED OFF ANY PROBLEMS, HOW DIFFICULT HAVE THESE PROBLEMS MADE IT FOR YOU TO DO YOUR WORK, TAKE CARE OF THINGS AT HOME, OR GET ALONG WITH OTHER PEOPLE: 2
4. FEELING TIRED OR HAVING LITTLE ENERGY: 2
1. LITTLE INTEREST OR PLEASURE IN DOING THINGS: 1

## 2021-02-19 ASSESSMENT — ANXIETY QUESTIONNAIRES
4. TROUBLE RELAXING: 3-NEARLY EVERY DAY
5. BEING SO RESTLESS THAT IT IS HARD TO SIT STILL: 1-SEVERAL DAYS
6. BECOMING EASILY ANNOYED OR IRRITABLE: 3-NEARLY EVERY DAY
7. FEELING AFRAID AS IF SOMETHING AWFUL MIGHT HAPPEN: 1-SEVERAL DAYS
1. FEELING NERVOUS, ANXIOUS, OR ON EDGE: 1-SEVERAL DAYS

## 2021-02-19 NOTE — PROGRESS NOTES
 High Blood Pressure Mother     High Cholesterol Mother     Cancer Mother 80        Stomach    Diabetes Mother     Stroke Mother     Heart Disease Father     High Blood Pressure Father     High Cholesterol Father     Diabetes Father     Cancer Sister 46        Lung cancer    Cancer Maternal Grandmother         Stomach    Diabetes Maternal Grandmother     Diabetes Maternal Grandfather     Diabetes Paternal Grandmother     Diabetes Paternal Grandfather     Cancer Maternal Cousin 47        Pancreatic     Social History     Socioeconomic History    Marital status:      Spouse name: Not on file    Number of children: Not on file    Years of education: Not on file    Highest education level: Not on file   Occupational History    Not on file   Social Needs    Financial resource strain: Not on file    Food insecurity     Worry: Not on file     Inability: Not on file   [employer] needs     Medical: Not on file     Non-medical: Not on file   Tobacco Use    Smoking status: Never Smoker    Smokeless tobacco: Never Used    Tobacco comment: Reviewed    Substance and Sexual Activity    Alcohol use: No    Drug use: No    Sexual activity: Not Currently     Partners: Male   Lifestyle    Physical activity     Days per week: Not on file     Minutes per session: Not on file    Stress: Not on file   Relationships    Social connections     Talks on phone: Not on file     Gets together: Not on file     Attends Synagogue service: Not on file     Active member of club or organization: Not on file     Attends meetings of clubs or organizations: Not on file     Relationship status: Not on file    Intimate partner violence     Fear of current or ex partner: Not on file     Emotionally abused: Not on file     Physically abused: Not on file     Forced sexual activity: Not on file   Other Topics Concern    Not on file   Social History Narrative    Not on file       Allergies   Allergen Reactions  Celexa [Citalopram] Other (See Comments)     Stomach pain        Atorvastatin      Leg cramps      Mestinon [Pyridostigmine] Itching and Swelling    Penicillins     Sulfa Antibiotics     Tape [Adhesive Tape]     Tricor [Fenofibrate]      angioedema    Gemfibrozil Rash    Meloxicam Other (See Comments)     moodswings     Current Outpatient Medications   Medication Sig Dispense Refill    losartan (COZAAR) 25 MG tablet Take 1 tablet by mouth daily 30 tablet 5    FLUoxetine (PROZAC) 10 MG capsule Take 1 capsule by mouth daily 30 capsule 3    furosemide (LASIX) 40 MG tablet       acetaminophen (TYLENOL) 500 MG tablet Take 500 mg by mouth every 6 hours as needed for Pain      potassium chloride (KLOR-CON M) 20 MEQ extended release tablet Take 1 tablet by mouth 2 times daily 60 tablet 3    ezetimibe (ZETIA) 10 MG tablet Take 1 tablet by mouth daily 90 tablet 3    metoprolol tartrate (LOPRESSOR) 50 MG tablet Take 1 tablet by mouth 2 times daily 180 tablet 3    hydroCHLOROthiazide (HYDRODIURIL) 25 MG tablet Take 0.5 tablets by mouth daily 45 tablet 3    loratadine (CLARITIN) 10 MG tablet Take 1 tablet by mouth daily 30 tablet 5    vitamin D 25 MCG (1000 UT) CAPS Take 6 capsules by mouth daily (Patient not taking: Reported on 2/19/2021) 30 capsule 0     No current facility-administered medications for this visit. Review of Systems   Constitutional: Negative for activity change, chills and fatigue. HENT: Negative for congestion. Respiratory: Negative for cough and shortness of breath. Cardiovascular: Negative for chest pain and leg swelling. Musculoskeletal: Negative for arthralgias. Skin: Negative for rash. Neurological: Negative for dizziness and headaches. Psychiatric/Behavioral: Positive for agitation and sleep disturbance. Negative for self-injury and suicidal ideas. The patient is nervous/anxious.         Lab Results   Component Value Date    WBC 5.6 11/16/2020    HGB 9.4 (L) 11/16/2020    HCT 30.0 (L) 11/16/2020    MCV 90.1 11/16/2020     (L) 11/16/2020     Lab Results   Component Value Date     11/16/2020    K 3.3 (L) 11/16/2020     11/16/2020    CO2 27 11/16/2020    BUN 20 11/16/2020    CREATININE 1.0 11/16/2020    GLUCOSE 173 (H) 11/16/2020    CALCIUM 7.8 (L) 11/16/2020    PROT 5.2 (L) 11/16/2020    LABALBU 3.3 (L) 11/16/2020    BILITOT 0.6 11/16/2020    ALKPHOS 63 11/16/2020    AST 27 11/16/2020    ALT 44 (H) 11/16/2020    LABGLOM 57 (L) 11/16/2020    GFRAA >60 11/16/2020    AGRATIO 1.7 12/07/2015    GLOB 2.7 12/07/2015     Lab Results   Component Value Date    CHOL 508 (H) 05/13/2019    CHOL 195 09/27/2017    CHOL 248 (H) 05/22/2017     Lab Results   Component Value Date    TRIG 598 (H) 05/13/2019    TRIG 260 (H) 09/27/2017    TRIG 471 (H) 05/22/2017     Lab Results   Component Value Date    HDL 38 (L) 05/13/2019    HDL 42 09/27/2017    HDL 35 (L) 05/22/2017     Lab Results   Component Value Date    LDLCALC NOT VALID WHEN TRIGLYCERIDE >400 MG/DL. 05/22/2017    LDLCALC 75 12/07/2015     Lab Results   Component Value Date    LABA1C 5.6 05/13/2019     Lab Results   Component Value Date    TSHHS 2.740 05/13/2019         BP (!) 145/98   Pulse 104   Temp 97 °F (36.1 °C) (Temporal)   Ht 5' 5\" (1.651 m)   Wt 244 lb (110.7 kg)   SpO2 97%   BMI 40.60 kg/m²     BP Readings from Last 3 Encounters:   02/19/21 (!) 145/98   11/16/20 111/72   11/02/20 127/69       Wt Readings from Last 3 Encounters:   02/19/21 244 lb (110.7 kg)   02/11/21 250 lb (113.4 kg)   01/14/21 250 lb (113.4 kg)         Physical Exam  Constitutional:       General: She is not in acute distress. Appearance: Normal appearance. She is well-developed. She is not ill-appearing or diaphoretic. HENT:      Head: Normocephalic and atraumatic. Eyes:      Pupils: Pupils are equal, round, and reactive to light. Neck:      Musculoskeletal: Normal range of motion and neck supple.    Cardiovascular: 2.19

## 2021-02-20 ASSESSMENT — ENCOUNTER SYMPTOMS
SHORTNESS OF BREATH: 0
COUGH: 0

## 2021-03-01 ENCOUNTER — HOSPITAL ENCOUNTER (OUTPATIENT)
Dept: WOMENS IMAGING | Age: 61
Discharge: HOME | End: 2021-03-01
Payer: COMMERCIAL

## 2021-03-01 ENCOUNTER — HOSPITAL ENCOUNTER (OUTPATIENT)
Age: 61
Discharge: HOME | End: 2021-03-01
Payer: COMMERCIAL

## 2021-03-01 DIAGNOSIS — Z12.31 ENCOUNTER FOR SCREENING MAMMOGRAM FOR BREAST CANCER: ICD-10-CM

## 2021-03-01 LAB
ALBUMIN SERPL-MCNC: 4.6 GM/DL (ref 3.4–5)
ALP BLD-CCNC: 55 IU/L (ref 40–128)
ALT SERPL-CCNC: 16 U/L (ref 10–40)
ANION GAP SERPL CALCULATED.3IONS-SCNC: 14 MMOL/L (ref 4–16)
AST SERPL-CCNC: 16 IU/L (ref 15–37)
BASOPHILS ABSOLUTE: 0.1 K/CU MM
BASOPHILS RELATIVE PERCENT: 0.7 % (ref 0–1)
BILIRUB SERPL-MCNC: 0.3 MG/DL (ref 0–1)
BUN BLDV-MCNC: 20 MG/DL (ref 6–23)
CALCIUM SERPL-MCNC: 9.3 MG/DL (ref 8.3–10.6)
CHLORIDE BLD-SCNC: 104 MMOL/L (ref 99–110)
CHOLESTEROL: 270 MG/DL
CO2: 25 MMOL/L (ref 21–32)
CREAT SERPL-MCNC: 1.3 MG/DL (ref 0.6–1.1)
DIFFERENTIAL TYPE: ABNORMAL
EOSINOPHILS ABSOLUTE: 0.2 K/CU MM
EOSINOPHILS RELATIVE PERCENT: 3.4 % (ref 0–3)
ESTIMATED AVERAGE GLUCOSE: 108 MG/DL
GFR AFRICAN AMERICAN: 51 ML/MIN/1.73M2
GFR NON-AFRICAN AMERICAN: 42 ML/MIN/1.73M2
GLUCOSE BLD-MCNC: 103 MG/DL (ref 70–99)
HBA1C MFR BLD: 5.4 % (ref 4.2–6.3)
HCT VFR BLD CALC: 42.6 % (ref 37–47)
HDLC SERPL-MCNC: 40 MG/DL
HEMOGLOBIN: 13.5 GM/DL (ref 12.5–16)
IMMATURE NEUTROPHIL %: 0.3 % (ref 0–0.43)
LDL CHOLESTEROL DIRECT: 140 MG/DL
LYMPHOCYTES ABSOLUTE: 2.7 K/CU MM
LYMPHOCYTES RELATIVE PERCENT: 37.9 % (ref 24–44)
MCH RBC QN AUTO: 28.8 PG (ref 27–31)
MCHC RBC AUTO-ENTMCNC: 31.7 % (ref 32–36)
MCV RBC AUTO: 91 FL (ref 78–100)
MONOCYTES ABSOLUTE: 0.5 K/CU MM
MONOCYTES RELATIVE PERCENT: 6.8 % (ref 0–4)
NUCLEATED RBC %: 0 %
PDW BLD-RTO: 13.2 % (ref 11.7–14.9)
PLATELET # BLD: 252 K/CU MM (ref 140–440)
PMV BLD AUTO: 9.8 FL (ref 7.5–11.1)
POTASSIUM SERPL-SCNC: 4.2 MMOL/L (ref 3.5–5.1)
RBC # BLD: 4.68 M/CU MM (ref 4.2–5.4)
SEGMENTED NEUTROPHILS ABSOLUTE COUNT: 3.6 K/CU MM
SEGMENTED NEUTROPHILS RELATIVE PERCENT: 50.9 % (ref 36–66)
SODIUM BLD-SCNC: 143 MMOL/L (ref 135–145)
T4 FREE: 1.02 NG/DL (ref 0.9–1.8)
TOTAL IMMATURE NEUTOROPHIL: 0.02 K/CU MM
TOTAL NUCLEATED RBC: 0 K/CU MM
TOTAL PROTEIN: 7.2 GM/DL (ref 6.4–8.2)
TRIGL SERPL-MCNC: 385 MG/DL
TSH HIGH SENSITIVITY: 2.2 UIU/ML (ref 0.27–4.2)
WBC # BLD: 7.1 K/CU MM (ref 4–10.5)

## 2021-03-01 PROCEDURE — 84443 ASSAY THYROID STIM HORMONE: CPT

## 2021-03-01 PROCEDURE — 80061 LIPID PANEL: CPT

## 2021-03-01 PROCEDURE — 83036 HEMOGLOBIN GLYCOSYLATED A1C: CPT

## 2021-03-01 PROCEDURE — 80053 COMPREHEN METABOLIC PANEL: CPT

## 2021-03-01 PROCEDURE — 84439 ASSAY OF FREE THYROXINE: CPT

## 2021-03-01 PROCEDURE — 85025 COMPLETE CBC W/AUTO DIFF WBC: CPT

## 2021-03-01 PROCEDURE — 77067 SCR MAMMO BI INCL CAD: CPT

## 2021-03-01 PROCEDURE — 83721 ASSAY OF BLOOD LIPOPROTEIN: CPT

## 2021-03-01 PROCEDURE — 36415 COLL VENOUS BLD VENIPUNCTURE: CPT

## 2021-03-02 ENCOUNTER — HOSPITAL ENCOUNTER (OUTPATIENT)
Dept: ULTRASOUND IMAGING | Age: 61
Discharge: HOME | End: 2021-03-02
Payer: COMMERCIAL

## 2021-03-02 ENCOUNTER — HOSPITAL ENCOUNTER (OUTPATIENT)
Dept: WOMENS IMAGING | Age: 61
Discharge: HOME | End: 2021-03-02
Payer: COMMERCIAL

## 2021-03-02 DIAGNOSIS — R92.8 ABNORMAL MAMMOGRAM: ICD-10-CM

## 2021-03-02 PROCEDURE — G0279 TOMOSYNTHESIS, MAMMO: HCPCS

## 2021-03-02 PROCEDURE — 76642 ULTRASOUND BREAST LIMITED: CPT

## 2021-03-04 DIAGNOSIS — Z12.11 COLON CANCER SCREENING: ICD-10-CM

## 2021-03-08 LAB
CONTROL: PRESENT
HEMOCCULT STL QL: NORMAL

## 2021-03-09 ENCOUNTER — HOSPITAL ENCOUNTER (OUTPATIENT)
Dept: ULTRASOUND IMAGING | Age: 61
Discharge: HOME | End: 2021-03-09
Payer: COMMERCIAL

## 2021-03-09 ENCOUNTER — HOSPITAL ENCOUNTER (OUTPATIENT)
Dept: WOMENS IMAGING | Age: 61
Discharge: HOME | End: 2021-03-09
Payer: COMMERCIAL

## 2021-03-09 DIAGNOSIS — N63.20 LEFT BREAST MASS: ICD-10-CM

## 2021-03-09 DIAGNOSIS — N63.20 BREAST MASS, LEFT: ICD-10-CM

## 2021-03-09 PROCEDURE — A4648 IMPLANTABLE TISSUE MARKER: HCPCS

## 2021-03-09 PROCEDURE — 88305 TISSUE EXAM BY PATHOLOGIST: CPT

## 2021-03-09 PROCEDURE — 88342 IMHCHEM/IMCYTCHM 1ST ANTB: CPT

## 2021-03-09 PROCEDURE — 88360 TUMOR IMMUNOHISTOCHEM/MANUAL: CPT

## 2021-03-09 PROCEDURE — 77065 DX MAMMO INCL CAD UNI: CPT

## 2021-03-09 PROCEDURE — 88341 IMHCHEM/IMCYTCHM EA ADD ANTB: CPT

## 2021-03-09 PROCEDURE — 19084 BX BREAST ADD LESION US IMAG: CPT

## 2021-03-17 ENCOUNTER — TELEPHONE (OUTPATIENT)
Dept: FAMILY MEDICINE CLINIC | Age: 61
End: 2021-03-17

## 2021-03-19 ENCOUNTER — TELEPHONE (OUTPATIENT)
Dept: FAMILY MEDICINE CLINIC | Age: 61
End: 2021-03-19

## 2021-03-19 DIAGNOSIS — C50.919 INVASIVE CARCINOMA OF BREAST (HCC): Primary | ICD-10-CM

## 2021-03-19 NOTE — TELEPHONE ENCOUNTER
Patient states that she needed to call her insurance about a covered provider for Gen surg.  Patient requesting referral Dr Fernando Grijalva p [phone]

## 2021-03-23 ENCOUNTER — TELEPHONE (OUTPATIENT)
Dept: FAMILY MEDICINE CLINIC | Age: 61
End: 2021-03-23

## 2021-03-29 ENCOUNTER — OFFICE VISIT (OUTPATIENT)
Dept: CARDIOLOGY CLINIC | Age: 61
End: 2021-03-29
Payer: COMMERCIAL

## 2021-03-29 VITALS
HEART RATE: 81 BPM | SYSTOLIC BLOOD PRESSURE: 120 MMHG | DIASTOLIC BLOOD PRESSURE: 86 MMHG | WEIGHT: 246 LBS | BODY MASS INDEX: 39.53 KG/M2 | HEIGHT: 66 IN

## 2021-03-29 DIAGNOSIS — Z82.49 FAMILY HISTORY OF EARLY CAD: ICD-10-CM

## 2021-03-29 DIAGNOSIS — E78.2 MIXED HYPERLIPIDEMIA: ICD-10-CM

## 2021-03-29 DIAGNOSIS — R55 VASOVAGAL SYNCOPE: ICD-10-CM

## 2021-03-29 DIAGNOSIS — K21.9 GASTROESOPHAGEAL REFLUX DISEASE, UNSPECIFIED WHETHER ESOPHAGITIS PRESENT: ICD-10-CM

## 2021-03-29 DIAGNOSIS — E66.01 MORBID OBESITY DUE TO EXCESS CALORIES (HCC): ICD-10-CM

## 2021-03-29 DIAGNOSIS — I10 ESSENTIAL HYPERTENSION: Primary | ICD-10-CM

## 2021-03-29 PROCEDURE — 1036F TOBACCO NON-USER: CPT | Performed by: INTERNAL MEDICINE

## 2021-03-29 PROCEDURE — 3017F COLORECTAL CA SCREEN DOC REV: CPT | Performed by: INTERNAL MEDICINE

## 2021-03-29 PROCEDURE — 99214 OFFICE O/P EST MOD 30 MIN: CPT | Performed by: INTERNAL MEDICINE

## 2021-03-29 PROCEDURE — 93000 ELECTROCARDIOGRAM COMPLETE: CPT | Performed by: INTERNAL MEDICINE

## 2021-03-29 PROCEDURE — G8417 CALC BMI ABV UP PARAM F/U: HCPCS | Performed by: INTERNAL MEDICINE

## 2021-03-29 PROCEDURE — G8482 FLU IMMUNIZE ORDER/ADMIN: HCPCS | Performed by: INTERNAL MEDICINE

## 2021-03-29 PROCEDURE — G8427 DOCREV CUR MEDS BY ELIG CLIN: HCPCS | Performed by: INTERNAL MEDICINE

## 2021-03-29 NOTE — PATIENT INSTRUCTIONS
CAD:None known but has multiple risk factors. HTN:well controlled on current medical regimen, see list above.            - changes in  treatment:   no   CARDIOMYOPATHY: None known   CONGESTIVE HEART FAILURE: NO KNOWN HISTORY.   VHD: No significant VHD noted  DYSLIPIDEMIA: Patient's profile is at / near [facility] is low                                Tolerating current medical regimen well no. Only on Zetia as there is Statin intolerance                                Does not tolerate Statin medications well due to side effects                                See most recent Lab values in Labs section above. OTHER RELEVANT DIAGNOSIS:as noted in patient's active problem list:  Syncope: Morbid Obesity. Breast Cancer:  TESTS ORDERED:   none this visit                                      All previously ordered tests reviewed.   ARRHYTHMIAS: None known   MEDICATIONS: CPMPatient is considered a low risk candidate for surgery. F/U in six months. Please be informed that if you contact our office outside of normal business hours the physician on call cannot help with any scheduling or rescheduling issues, procedure instruction questions or any type of medication issue. We advise you for any urgent/emergency that you go to the nearest emergency room! PLEASE CALL OUR OFFICE DURING NORMAL BUSINESS HOURS    Monday - Friday   8 am to 5 pm    [place]: [place] [address]: [phone]    [place]:  [phone]    Please remember to bring all medication bottles or a medication list with you to your appointment. If you have any questions, please call our office at [phone].

## 2021-03-29 NOTE — LETTER
[facility] [address]  [address] [address]  Phone: [phone]  Fax: [phone]    Lizzeth Ojeda MD        March 29, 2021     Angel Damico MD  [address] [facility] [address]    Patient: Kirti Cohn  MR Number: [id number]  YOB: 1960  Date of Visit: 3/29/2021    Dear Dr. Angel Damico: Thank you for the request for consultation for Danny Albarran to me for the evaluation of HTN. Below are the relevant portions of my assessment and plan of care. If you have questions, please do not hesitate to call me. I look forward to following Harika Oro along with you.     Sincerely,        Lizzeth Ojeda MD

## 2021-03-29 NOTE — LETTER
Varun Butler Ear  1960  [id number]    Have you had any Chest Pain that is not new? - No    Have you had any Shortness of Breath - Yes  If Yes - When on exertion since November had covid    Have you had any dizziness - Yes  If Yes DO ORTHOSTATIC BP - when do you feel dizzy with shortness of breath . Have you had any palpitations that are not new? - No      Is the patient on any of the following medications -   If Yes DO EKG - Needs done every 6 months    Do you have any edema - swelling in sometimes ankles and feet    If Yes - CHECK TO SEE IF THE EDEMA IS PITTING  How long have they been having edema - Years    If we do not have these labs you are retrieve these labs for these providers!     Do you have a surgery or procedure scheduled in the near future - Yes  If Yes- DO EKG  If Yes - Who is the surgery with? [email or web address]   Phone number of physician   Fax number of physician   Type of surgery left mastectomy  GIVE THIS INFORMATION TO DEE METCALF     Ask patient if they want to sign up for [facility] if they are not already signed up    [facility] to see if we have an E-MAIL on file for the patient     Check medication list thoroughly!!! AND RECONCILE OUTSIDE MEDICATIONS  If dose has changed change the entire order not just the MG  BE SURE TO ASK PATIENT IF [address] check out add to every patient's \"wrap up\" the following dot phrase AFTERHOURSEDUCATION and ensure we explain this to our patients

## 2021-03-29 NOTE — PROGRESS NOTES
OFFICE PROGRESS NOTES      Taylor Hollis is a 64 y.o. female who has    CHIEF COMPLAINT AS FOLLOWS:  CHEST PAIN: At the place of Breast biopsy.   SOB:  Has SOB with exertion but no change over previous noted.  Had COVID back in Nov.               LEG EDEMA:  B/L Lower extremity edema is present but no change over previous.   PALPITATIONS:  C/O brief episodes of palpitations, which are not frequent.   DIZZINESS: No C/O Dizziness                       SYNCOPE: None   OTHER: Excessive fatigue                                    HPI: Patient is here for F/U on her HTN & Dyslipidemia problems. HTN: Patient has known Hx of essential HTN. Has been treated with guideline recommended medical / physical/ diet therapy as stated below. Dyslipidemia: Patient has known Hx of mixed dyslipidemia. Has been treated with guideline recommended medical / physical/ diet therapy as stated below. She does not have any new complaints at this time.     Current Outpatient Medications   Medication Sig Dispense Refill    losartan (COZAAR) 25 MG tablet Take 1 tablet by mouth daily 30 tablet 5    FLUoxetine (PROZAC) 10 MG capsule Take 1 capsule by mouth daily 30 capsule 3    furosemide (LASIX) 40 MG tablet       acetaminophen (TYLENOL) 500 MG tablet Take 500 mg by mouth every 6 hours as needed for Pain      potassium chloride (KLOR-CON M) 20 MEQ extended release tablet Take 1 tablet by mouth 2 times daily 60 tablet 3    ezetimibe (ZETIA) 10 MG tablet Take 1 tablet by mouth daily 90 tablet 3    metoprolol tartrate (LOPRESSOR) 50 MG tablet Take 1 tablet by mouth 2 times daily 180 tablet 3    hydroCHLOROthiazide (HYDRODIURIL) 25 MG tablet Take 0.5 tablets by mouth daily 45 tablet 3    loratadine (CLARITIN) 10 MG tablet Take 1 tablet by mouth daily 30 tablet 5    vitamin D 25 MCG (1000 UT) CAPS Take 6 capsules by mouth daily (Patient not taking: Reported on 2/19/2021) 30 capsule 0     No current facility-administered medications for this visit. Allergies: Celexa [citalopram], Atorvastatin, Mestinon [pyridostigmine], Penicillins, Sulfa antibiotics, Tape [adhesive tape], Tricor [fenofibrate], Gemfibrozil, and Meloxicam  Review of Systems:    Constitutional: Negative for diaphoresis and fatigue  Respiratory: Negative for shortness of breath  Cardiovascular: Negative for chest pain, dyspnea on exertion, claudication, edema, irregular heartbeat, murmur, palpitations or shortness of breath  Musculoskeletal: Negative for muscle pain, muscular weakness, negative for pain in arm and leg or swelling in foot and leg    Objective:  /86   Pulse 81   Ht 5' 6\" (1.676 m)   Wt 246 lb (111.6 kg)   BMI 39.71 kg/m²   Wt Readings from Last 3 Encounters:   03/29/21 246 lb (111.6 kg)   02/19/21 244 lb (110.7 kg)   02/11/21 250 lb (113.4 kg)     Body mass index is 39.71 kg/m². GENERAL - Alert, oriented, pleasant, in no apparent distress. EYES: No jaundice, no conjunctival pallor. Neck - Supple. No jugular venous distention noted. No carotid bruits. Cardiovascular - Normal S1 and S2 without obvious murmur or gallop. Extremities - No cyanosis, clubbing, or significant edema. Pulmonary - No respiratory distress. No wheezes or rales.       Lab Review   Lab Results   Component Value Date    TROPONINT <0.010 11/14/2020    TROPONINT <0.010 11/13/2020     Lab Results   Component Value Date    PROBNP 121.5 11/13/2020    PROBNP 83.42 08/23/2016     Lab Results   Component Value Date    INR 1.33 11/16/2020    INR 1.25 11/15/2020     Lab Results   Component Value Date    LABA1C 5.4 03/01/2021    LABA1C 5.6 05/13/2019     Lab Results   Component Value Date    WBC 7.1 03/01/2021    WBC 5.6 11/16/2020    HCT 42.6 03/01/2021    HCT 30.0 (L) 11/16/2020    MCV 91.0 03/01/2021    MCV 90.1 11/16/2020     03/01/2021     (L) 11/16/2020     Lab Results   Component Value Date    CHOL 270 (H) 03/01/2021    CHOL 508 (H) 05/13/2019 TRIG 385 (H) 03/01/2021    TRIG 598 (H) 05/13/2019    HDL 40 (L) 03/01/2021    HDL 38 (L) 05/13/2019    LDLCALC NOT VALID WHEN TRIGLYCERIDE >400 MG/DL. 05/22/2017    LDLCALC 75 12/07/2015    LDLDIRECT 140 (H) 03/01/2021    LDLDIRECT 312 (H) 05/13/2019     Lab Results   Component Value Date    ALT 16 03/01/2021    ALT 44 (H) 11/16/2020    AST 16 03/01/2021    AST 27 11/16/2020     BMP:    Lab Results   Component Value Date     03/01/2021     11/16/2020    K 4.2 03/01/2021    K 3.3 11/16/2020     03/01/2021     11/16/2020    CO2 25 03/01/2021    CO2 27 11/16/2020    BUN 20 03/01/2021    BUN 20 11/16/2020    CREATININE 1.3 03/01/2021    CREATININE 1.0 11/16/2020     CMP:   Lab Results   Component Value Date     03/01/2021     11/16/2020    K 4.2 03/01/2021    K 3.3 11/16/2020     03/01/2021     11/16/2020    CO2 25 03/01/2021    CO2 27 11/16/2020    BUN 20 03/01/2021    BUN 20 11/16/2020    CREATININE 1.3 03/01/2021    CREATININE 1.0 11/16/2020    PROT 7.2 03/01/2021    PROT 5.2 11/16/2020    PROT 7.5 09/20/2011     Lab Results   Component Value Date    TSHHS 2.200 03/01/2021    TSHHS 2.740 05/13/2019     CARDIOLITE 10/2020    Normal tracer uptake in all segments of myocardium on stress ans rest    images. Normal Lexiscan nuclear scintigraphic study suggestive of normal    myocardial perfusion. Gated images demonstrate normal left ventricular    systolic function with EF of 60 %. EKG: NSR, WNL    QUALITY MEASURES REVIEWED:  1.CAD:Patient is taking anti platelet agent:No  2. DYSLIPIDEMIA: Patient is on cholesterol lowering medication:Yes   3. Beta-Blocker therapy for CAD, if prior Myocardial Infarction:Yes   4. Counselled regarding smoking cessation. No   Patient does not Smoke. 5.Anticoagulation therapy (for A.Fib) No   Does Not have A.Fib.  6.Discussed weight management strategies.     Assessment & Plan:    Primary / Secondary prevention is the goal by aggressive risk modification, healthy and therapeutic life style changes for cardiovascular risk reduction. Various goals are discussed and multiple questions answered. CAD:None known but has multiple risk factors. HTN:well controlled on current medical regimen, see list above.            - changes in  treatment:   no   CARDIOMYOPATHY: None known   CONGESTIVE HEART FAILURE: NO KNOWN HISTORY.   VHD: No significant VHD noted  DYSLIPIDEMIA: Patient's profile is at / near [facility] is low                                Tolerating current medical regimen well no. Only on Zetia as there is Statin intolerance                                Does not tolerate Statin medications well due to side effects                                See most recent Lab values in Labs section above. OTHER RELEVANT DIAGNOSIS:as noted in patient's active problem list:  Syncope: Morbid Obesity. Breast Cancer:  TESTS ORDERED:   none this visit                                      All previously ordered tests reviewed.   ARRHYTHMIAS: None known   MEDICATIONS: CPM. Patient is considered a low risk candidate for surgery. F/U in six months.

## 2021-03-29 NOTE — LETTER
Patient Name: Rober Frazier  : 1960  MRN# [id number]    REASON FOR VISIT:   Vasovagal syncope  Essential hypertension  Hyperlipidemia    Current Outpatient Medications   Medication Sig Dispense Refill    losartan (COZAAR) 25 MG tablet Take 1 tablet by mouth daily 30 tablet 5    FLUoxetine (PROZAC) 10 MG capsule Take 1 capsule by mouth daily 30 capsule 3    furosemide (LASIX) 40 MG tablet       acetaminophen (TYLENOL) 500 MG tablet Take 500 mg by mouth every 6 hours as needed for Pain      vitamin D 25 MCG (1000 UT) CAPS Take 6 capsules by mouth daily (Patient not taking: Reported on 2021) 30 capsule 0    potassium chloride (KLOR-CON M) 20 MEQ extended release tablet Take 1 tablet by mouth 2 times daily 60 tablet 3    ezetimibe (ZETIA) 10 MG tablet Take 1 tablet by mouth daily 90 tablet 3    metoprolol tartrate (LOPRESSOR) 50 MG tablet Take 1 tablet by mouth 2 times daily 180 tablet 3    hydroCHLOROthiazide (HYDRODIURIL) 25 MG tablet Take 0.5 tablets by mouth daily 45 tablet 3    loratadine (CLARITIN) 10 MG tablet Take 1 tablet by mouth daily 30 tablet 5     No current facility-administered medications for this visit. Last Visit: 2020 Jasmeet NAQVI  Complaints: clearance  Changes: None    Last EK2020    STRESS TEST:  10/14/2020  Normal tracer uptake in all segments of myocardium on stress ans rest    images. Normal Lexiscan nuclear scintigraphic study suggestive of normal    myocardial perfusion.  Gated images demonstrate normal left ventricular    systolic function with EF of 60 %     ECHO: 10/5/2016  Scanned into chart  CAROTID: NONE    MUGA: NONE    LAST PACER CHECK: NONE    CARDIAC CATH: NONE    Amio Protocol: NONE    CHADS: [id number] Score for Atrial Fibrillation Stroke Risk   Risk   Factors  Component Value   C CHF No 0   H HTN Yes 1   A2 Age >= 75 No,  (61 y.o.) 0   D DM No 0   S2 Prior Stroke/TIA No 0   V Vascular Disease No 0   A Age 74-69 No,  (61 y.o.) 0   Sc Sex female 1    [id number]  Score  2   Score last updated 3/29/21 6:88 AM EDT    Click here for a link to the UpToDate guideline \"Atrial Fibrillation: Anticoagulation therapy to prevent embolization    Disclaimer: Risk Score calculation is dependent on accuracy of patient problem list and past encounter diagnosis.

## 2021-04-06 PROBLEM — C50.912 MUCINOUS CARCINOMA OF BREAST, LEFT (HCC): Status: ACTIVE | Noted: 2021-04-06

## 2021-04-06 PROBLEM — C50.912 INFILTRATING DUCTAL CARCINOMA OF LEFT BREAST (HCC): Status: ACTIVE | Noted: 2021-04-06

## 2021-04-15 ENCOUNTER — OFFICE VISIT (OUTPATIENT)
Dept: PSYCHOLOGY | Age: 61
End: 2021-04-15
Payer: COMMERCIAL

## 2021-04-15 ENCOUNTER — OFFICE VISIT (OUTPATIENT)
Dept: FAMILY MEDICINE CLINIC | Age: 61
End: 2021-04-15
Payer: COMMERCIAL

## 2021-04-15 VITALS
HEIGHT: 66 IN | OXYGEN SATURATION: 98 % | DIASTOLIC BLOOD PRESSURE: 68 MMHG | SYSTOLIC BLOOD PRESSURE: 118 MMHG | HEART RATE: 67 BPM | BODY MASS INDEX: 39.05 KG/M2 | WEIGHT: 243 LBS | TEMPERATURE: 97.3 F

## 2021-04-15 VITALS — TEMPERATURE: 96.4 F

## 2021-04-15 DIAGNOSIS — I10 ESSENTIAL HYPERTENSION: Primary | ICD-10-CM

## 2021-04-15 DIAGNOSIS — C50.912 MUCINOUS CARCINOMA OF BREAST, LEFT (HCC): ICD-10-CM

## 2021-04-15 DIAGNOSIS — F32.A ANXIETY AND DEPRESSION: ICD-10-CM

## 2021-04-15 DIAGNOSIS — F33.2 SEVERE RECURRENT MAJOR DEPRESSION WITHOUT PSYCHOTIC FEATURES (HCC): Primary | ICD-10-CM

## 2021-04-15 DIAGNOSIS — F41.9 ANXIETY AND DEPRESSION: ICD-10-CM

## 2021-04-15 PROCEDURE — 3017F COLORECTAL CA SCREEN DOC REV: CPT | Performed by: FAMILY MEDICINE

## 2021-04-15 PROCEDURE — G8417 CALC BMI ABV UP PARAM F/U: HCPCS | Performed by: FAMILY MEDICINE

## 2021-04-15 PROCEDURE — 99214 OFFICE O/P EST MOD 30 MIN: CPT | Performed by: FAMILY MEDICINE

## 2021-04-15 PROCEDURE — 90791 PSYCH DIAGNOSTIC EVALUATION: CPT | Performed by: PSYCHOLOGIST

## 2021-04-15 PROCEDURE — 1036F TOBACCO NON-USER: CPT | Performed by: PSYCHOLOGIST

## 2021-04-15 PROCEDURE — G8427 DOCREV CUR MEDS BY ELIG CLIN: HCPCS | Performed by: FAMILY MEDICINE

## 2021-04-15 PROCEDURE — 1036F TOBACCO NON-USER: CPT | Performed by: FAMILY MEDICINE

## 2021-04-15 RX ORDER — FLUOXETINE HYDROCHLORIDE 20 MG/1
20 CAPSULE ORAL DAILY
Qty: 30 CAPSULE | Refills: 5 | Status: SHIPPED | OUTPATIENT
Start: 2021-04-15 | End: 2021-05-14

## 2021-04-15 ASSESSMENT — PATIENT HEALTH QUESTIONNAIRE - PHQ9
8. MOVING OR SPEAKING SO SLOWLY THAT OTHER PEOPLE COULD HAVE NOTICED. OR THE OPPOSITE, BEING SO FIGETY OR RESTLESS THAT YOU HAVE BEEN MOVING AROUND A LOT MORE THAN USUAL: 2
3. TROUBLE FALLING OR STAYING ASLEEP: 3
SUM OF ALL RESPONSES TO PHQ QUESTIONS 1-9: 20
1. LITTLE INTEREST OR PLEASURE IN DOING THINGS: 2
2. FEELING DOWN, DEPRESSED OR HOPELESS: 3
4. FEELING TIRED OR HAVING LITTLE ENERGY: 3
SUM OF ALL RESPONSES TO PHQ QUESTIONS 1-9: 22
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
## 2021-04-15 NOTE — PROGRESS NOTES
Blair Holmanmariana  1960 04/18/21    Chief Complaint   Patient presents with    1 Month Follow-Up     not better, nw breast CA dx            Patient herefor 1 month f/u regarding HTN, we add the losartan 25 mg last visit, no side effects, also was started on prozac for her anxiety and depression which noticed did help but not a lot. patient was diagnosed with breast cancer, and going to see the oncology after b/l mastectomy. Past Medical History:   Diagnosis Date    Allergic rhinitis due to pollen 11/19/2015    Arthritis     left hip & knee    Chronic back pain     Depression     Gastroesophageal reflux disease 11/19/2015    Hearing loss 11/19/2015    History of blood transfusion     No reaction per pt    History of cardiac monitoring 04/18/2017    14 day event monitor. Sinus Rhythm    History of nuclear stress test 09/28/2016    cardiolite-normal,EF70%    Hot flashes due to surgical menopause 11/19/2015    Hx of echocardiogram 10/05/2016    EF: >55 %   Mild mitral and tricuspid regurg.      Hyperlipidemia     Hypertension     Follows with PCP    Lexiscan Stress Test 10/14/2020    EF 60%, Normal study, no ischemia    Meniere disease     Morbid obesity due to excess calories ([facility] [address] 11/19/2015    Sleep apnea 11/19/2015    sleep study negative    Tilt table evaluation 05/09/2017     positive for neurocardiogenic syncope     Past Surgical History:   Procedure Laterality Date    APPENDECTOMY  1967    CHOLECYSTECTOMY      2017    COLONOSCOPY  2014    Polyps x2 - Dr. Fernando Mills Left 10/19/2020    LEFT HIP TOTAL ARTHROPLASTY - POSTERIOR performed by Destiny Caldwell MD at [place] [address] LEFT Left 3/9/2021    US BREAST BIOPSY NEEDLE ADDITIONAL LEFT 3/9/2021 Joanna Regalado  [facility]    US BREAST BIOPSY NEEDLE ADDITIONAL LEFT Left 3/9/2021    US BREAST BIOPSY NEEDLE ADDITIONAL LEFT 3/9/2021 Bryan Kelley MD [address] BREAST NEEDLE BIOPSY LEFT Left 3/9/2021     BREAST NEEDLE BIOPSY LEFT 3/9/2021 Bryan Kelley MD [address], [address]. \H[facility] and Outlook.\"" [facility]     Family History   Problem Relation Age of Onset    Heart Disease Mother     High Blood Pressure Mother     High Cholesterol Mother     Cancer Mother 80        Stomach    Diabetes Mother     Stroke Mother     Heart Disease Father     High Blood Pressure Father     High Cholesterol Father     Diabetes Father     Cancer Sister 46        Lung cancer    Cancer Maternal Grandmother         Stomach    Diabetes Maternal Grandmother     Diabetes Maternal Grandfather     Diabetes Paternal Grandmother     Diabetes Paternal Grandfather     Cancer Maternal Cousin 47        Pancreatic     Social History     Socioeconomic History    Marital status:      Spouse name: Not on file    Number of children: Not on file    Years of education: Not on file    Highest education level: Not on file   Occupational History    Not on file   Social Needs    Financial resource strain: Not on file    Food insecurity     Worry: Not on file     Inability: Not on file   [employer] needs     Medical: Not on file     Non-medical: Not on file   Tobacco Use    Smoking status: Never Smoker    Smokeless tobacco: Never Used    Tobacco comment: Reviewed    Substance and Sexual Activity    Alcohol use: No    Drug use: No    Sexual activity: Not Currently     Partners: Male   Lifestyle    Physical activity     Days per week: Not on file     Minutes per session: Not on file    Stress: Not on file   Relationships    Social connections     Talks on phone: Not on file     Gets together: Not on file     Attends Jewish service: Not on file     Active member of club or organization: Not on file     Attends meetings of clubs or organizations: Not on file     Relationship status: Not on file    Intimate partner violence     Fear of current or ex partner: Not on file     Emotionally abused: Not on file     Physically abused: Not on file     Forced sexual activity: Not on file   Other Topics Concern    Not on file   Social History Narrative    Not on file       Allergies   Allergen Reactions    Celexa [Citalopram] Other (See Comments)     Stomach pain        Atorvastatin      Leg cramps      Mestinon [Pyridostigmine] Itching and Swelling    Penicillins     Sulfa Antibiotics     Tape Maria Del Rosario Lose Tape]      PLASTIC TAPE    Tricor [Fenofibrate]      angioedema    Gemfibrozil Rash    Meloxicam Other (See Comments)     moodswings     Current Outpatient Medications   Medication Sig Dispense Refill    FLUoxetine (PROZAC) 20 MG capsule Take 1 capsule by mouth daily 30 capsule 5    aspirin 81 MG chewable tablet Take 81 mg by mouth daily      Multiple Vitamins-Minerals (HAIR SKIN AND NAILS FORMULA) TABS Take by mouth      losartan (COZAAR) 25 MG tablet Take 1 tablet by mouth daily 30 tablet 5    furosemide (LASIX) 40 MG tablet       acetaminophen (TYLENOL) 500 MG tablet Take 500 mg by mouth every 6 hours as needed for Pain      potassium chloride (KLOR-CON M) 20 MEQ extended release tablet Take 1 tablet by mouth 2 times daily 60 tablet 3    ezetimibe (ZETIA) 10 MG tablet Take 1 tablet by mouth daily 90 tablet 3    metoprolol tartrate (LOPRESSOR) 50 MG tablet Take 1 tablet by mouth 2 times daily 180 tablet 3    hydroCHLOROthiazide (HYDRODIURIL) 25 MG tablet Take 0.5 tablets by mouth daily 45 tablet 3    loratadine (CLARITIN) 10 MG tablet Take 1 tablet by mouth daily 30 tablet 5     No current facility-administered medications for this visit. Review of Systems   Constitutional: Negative for activity change, chills and fatigue. HENT: Negative for congestion. Respiratory: Negative for cough and shortness of breath. Cardiovascular: Negative for chest pain and leg swelling. Musculoskeletal: Negative for arthralgias. Skin: Negative for rash. Neurological: Negative for dizziness and headaches. Psychiatric/Behavioral: Positive for agitation and sleep disturbance. Negative for self-injury and suicidal ideas. The patient is nervous/anxious.          Little better       Lab Results   Component Value Date    WBC 7.1 03/01/2021    HGB 13.5 03/01/2021    HCT 42.6 03/01/2021    MCV 91.0 03/01/2021     03/01/2021     Lab Results   Component Value Date     03/01/2021    K 4.2 03/01/2021     03/01/2021    CO2 25 03/01/2021    BUN 20 03/01/2021    CREATININE 1.3 (H) 03/01/2021    GLUCOSE 103 (H) 03/01/2021    CALCIUM 9.3 03/01/2021    PROT 7.2 03/01/2021    LABALBU 4.6 03/01/2021    BILITOT 0.3 03/01/2021    ALKPHOS 55 03/01/2021    AST 16 03/01/2021    ALT 16 03/01/2021    LABGLOM 42 (L) 03/01/2021    GFRAA 51 (L) 03/01/2021    AGRATIO 1.7 12/07/2015    GLOB 2.7 12/07/2015     Lab Results   Component Value Date    CHOL 270 (H) 03/01/2021    CHOL 508 (H) 05/13/2019    CHOL 195 09/27/2017     Lab Results   Component Value Date    TRIG 385 (H) 03/01/2021    TRIG 598 (H) 05/13/2019    TRIG 260 (H) 09/27/2017     Lab Results   Component Value Date    HDL 40 (L) 03/01/2021    HDL 38 (L) 05/13/2019    HDL 42 09/27/2017     Lab Results   Component Value Date    LDLCALC NOT VALID WHEN TRIGLYCERIDE >400 MG/DL. 05/22/2017    LDLCALC 75 12/07/2015     Lab Results   Component Value Date    LABA1C 5.4 03/01/2021     Lab Results   Component Value Date    TSHHS 2.200 03/01/2021         /68 (Site: Left Upper Arm, Position: Sitting, Cuff Size: Large Adult)   Pulse 67   Temp 97.3 °F (36.3 °C)   Ht 5' 6\" (1.676 m)   Wt 243 lb (110.2 kg)   SpO2 98%   BMI 39.22 kg/m²     BP Readings from Last 3 Encounters:   04/15/21 118/68   03/29/21 120/86   02/19/21 (!) 145/98       Wt Readings from Last 3 Encounters:   04/15/21 243 lb (110.2 kg)   04/05/21 244 lb (110.7 kg)   03/29/21 246 lb (111.6 kg)         Physical Exam  Constitutional:       General: She is not in acute distress. Appearance: Normal appearance. She is well-developed. She is not ill-appearing or diaphoretic. HENT:      Head: Normocephalic and atraumatic. Eyes:      Pupils: Pupils are equal, round, and reactive to light. Neck:      Musculoskeletal: Normal range of motion and neck supple. Cardiovascular:      Rate and Rhythm: Normal rate and regular rhythm. Heart sounds: Normal heart sounds. No murmur. Pulmonary:      Effort: Pulmonary effort is normal.      Breath sounds: No wheezing or rales. Musculoskeletal: Normal range of motion. Right lower leg: No edema. Left lower leg: No edema. Neurological:      General: No focal deficit present. Mental Status: She is alert and oriented to person, place, and time. Psychiatric:         Mood and Affect: Mood is depressed and elated. Affect is flat. Behavior: Behavior normal.         ASSESSMENT/ PLAN:    1. Essential hypertension  - better, continue same medication     2. Anxiety and depression  - little better, so increase the dose  - FLUoxetine (PROZAC) 20 MG capsule; Take 1 capsule by mouth daily  Dispense: 30 capsule; Refill: 5    3. Mucinous carcinoma of breast, left ([address]  - f/u with the surgeon              - All old blood work reviewed with the patient  - Appropriate prescription are addressed. - After visit summery provided. - Questions answered and patient verbalizes understanding.  - Call for any problem, questions, or concerns.  - RTC if symptoms worse. Return in about 3 months (around 7/15/2021).

## 2021-04-18 ASSESSMENT — ENCOUNTER SYMPTOMS
COUGH: 0
SHORTNESS OF BREATH: 0

## 2021-04-19 ENCOUNTER — HOSPITAL ENCOUNTER (OUTPATIENT)
Dept: MRI IMAGING | Age: 61
Discharge: HOME | End: 2021-04-19
Payer: COMMERCIAL

## 2021-04-19 DIAGNOSIS — C50.912 INFILTRATING DUCTAL CARCINOMA OF LEFT BREAST (HCC): ICD-10-CM

## 2021-04-19 LAB
GFR AFRICAN AMERICAN: >60 ML/MIN/1.73M2
GFR NON-AFRICAN AMERICAN: 53 ML/MIN/1.73M2
POC CREATININE: 1.1 MG/DL (ref 0.6–1.1)

## 2021-04-19 PROCEDURE — A9577 INJ MULTIHANCE: HCPCS | Performed by: SURGERY

## 2021-04-19 PROCEDURE — 77049 MRI BREAST C-+ W/CAD BI: CPT

## 2021-04-19 PROCEDURE — 6360000004 HC RX CONTRAST MEDICATION: Performed by: SURGERY

## 2021-04-19 RX ADMIN — GADOBENATE DIMEGLUMINE 10 ML: 529 INJECTION, SOLUTION INTRAVENOUS at 08:48

## 2021-04-23 PROBLEM — C50.912 INFILTRATING DUCTAL CARCINOMA OF LEFT BREAST (HCC): Status: ACTIVE | Noted: 2021-04-23

## 2021-05-04 ENCOUNTER — HOSPITAL ENCOUNTER (OUTPATIENT)
Age: 61
Discharge: HOME | End: 2021-05-04
Payer: COMMERCIAL

## 2021-05-04 ENCOUNTER — HOSPITAL ENCOUNTER (OUTPATIENT)
Dept: GENERAL RADIOLOGY | Age: 61
Discharge: HOME | End: 2021-05-04
Payer: COMMERCIAL

## 2021-05-04 DIAGNOSIS — Z79.899 ENCOUNTER FOR LONG-TERM (CURRENT) USE OF HIGH-RISK MEDICATION: ICD-10-CM

## 2021-05-04 DIAGNOSIS — Z86.16 HISTORY OF 2019 NOVEL CORONAVIRUS DISEASE (COVID-19): ICD-10-CM

## 2021-05-04 DIAGNOSIS — I10 HYPERTENSION, UNSPECIFIED TYPE: ICD-10-CM

## 2021-05-04 LAB
ANION GAP SERPL CALCULATED.3IONS-SCNC: 11 MMOL/L (ref 4–16)
CHLORIDE BLD-SCNC: 104 MMOL/L (ref 99–110)
CO2: 26 MMOL/L (ref 21–32)
ESTIMATED AVERAGE GLUCOSE: 114 MG/DL
HBA1C MFR BLD: 5.6 % (ref 4.2–6.3)
POTASSIUM SERPL-SCNC: 4 MMOL/L (ref 3.5–5.1)
SODIUM BLD-SCNC: 141 MMOL/L (ref 135–145)

## 2021-05-04 PROCEDURE — 80051 ELECTROLYTE PANEL: CPT

## 2021-05-04 PROCEDURE — 83036 HEMOGLOBIN GLYCOSYLATED A1C: CPT

## 2021-05-04 PROCEDURE — 71046 X-RAY EXAM CHEST 2 VIEWS: CPT

## 2021-05-04 PROCEDURE — 36415 COLL VENOUS BLD VENIPUNCTURE: CPT

## 2021-05-16 PROBLEM — Z98.890: Status: ACTIVE | Noted: 2021-05-16

## 2021-05-16 PROBLEM — Z90.13 S/P MASTECTOMY, BILATERAL: Status: ACTIVE | Noted: 2021-05-16

## 2021-05-17 PROBLEM — G89.18 POST-OP PAIN: Status: ACTIVE | Noted: 2021-05-17

## 2021-05-17 NOTE — PROGRESS NOTES
ductal carcinoma with focal mucinous features, mucinous carcinoma with focal ductal carcinoma in situ, mucinous carcinoma respectively. ER was more than 95%, CA more than 90%, HER-2/baltazar negative by FISH. MRI of bilateral breasts on April 19, 2021 showed  1. Left breast multicentric disease with biopsy proven invasive ductal  carcinoma with mucinous component at 11 o'clock and mucinous carcinomas at 2 o'clock and 12 o'clock in the retroareolar breast. Nile Baller is at least 5 cm of separation between the 11 o'clock mass and 12 o'clock mass.  Additional smooth masses measuring up to 21 mm at biopsy sites are hematomas. 2. No MR evidence of malignancy in the contralateral right breast.  She had bilateral mastectomy on May 6, 2021. Pathology report showed : Invasive ductal and mucinous carcinoma of the left breast, retroareolar, grade 2, 4.5 cm, negative margin, -2 sentinel lymph nodes, ER more than 95%, CA 90%, HER-2/baltazar negative by FISH. Pathological stage classification T2, N0, MX. Pathologist felt that she has 1 contiguous tumor mass measuring about 4.5 cm rather than multicentric disease. I discussed with pathologist who stated that she does not have pure mucinous carcinoma of the breast.     I recommended Oncotype DX. We went over NCCN guideline. We discussed about healthy diet and weight loss. I went over with her the potential risk and benefit of adjuvant endocrine therapy. Likely I will put her on Arimidex. I will order baseline bone density test.  CBC and CMP in March 2021 were grossly unremarkable. Due to family history and personal history of breast cancer, I refer for genetic counseling. She had colonoscopy with removal of 2 polyps in 2014 by Dr. Cesar Sanon. Follow-up in 3 years was recommended. She refused to have further follow-up.      Past Medical History:   Past Medical History:   Diagnosis Date    Allergic rhinitis due to pollen 11/19/2015    Arthritis     left hip & knee    Chronic back pain     Depression     Gastroesophageal reflux disease 11/19/2015    Hearing loss 11/19/2015    History of blood transfusion     No reaction per pt    History of cardiac monitoring 04/18/2017    14 day event monitor. Sinus Rhythm    History of nuclear stress test 09/28/2016    cardiolite-normal,EF70%    Hot flashes due to surgical menopause 11/19/2015    Hx of echocardiogram 10/05/2016    EF: >55 %   Mild mitral and tricuspid regurg.  Hyperlipidemia     Hypertension     Follows with PCP    Lexiscan Stress Test 10/14/2020    EF 60%, Normal study, no ischemia    Meniere disease     Morbid obesity due to excess calories ([address] 11/19/2015    Sleep apnea 11/19/2015    sleep study negative    Tilt table evaluation 05/09/2017     positive for neurocardiogenic syncope      Past Surgery History:    Past Surgical History:   Procedure Laterality Date    APPENDECTOMY  1967    CHOLECYSTECTOMY      2017    COLONOSCOPY  2014    Polyps x2 - Dr. Sathl Serum Left 10/19/2020    LEFT HIP TOTAL ARTHROPLASTY - POSTERIOR performed by Patti Harrison MD at [facility] LEFT Left 3/9/2021    US BREAST BIOPSY NEEDLE ADDITIONAL LEFT 3/9/2021 Shanika Bowser MD [address] BREAST BIOPSY NEEDLE ADDITIONAL LEFT Left 3/9/2021    US BREAST BIOPSY NEEDLE ADDITIONAL LEFT 3/9/2021 Shanika Bowser MD [address] BREAST NEEDLE BIOPSY LEFT Left 3/9/2021    US BREAST NEEDLE BIOPSY LEFT 3/9/2021 Shanika Bowser  [facility]   She had complete hysterectomy in 1999 due to endometriosis. Social History:   She denies any history of smoking. No alcohol or illicit drug use. She denies any children. Family History: Mother had stomach and breast cancer, maternal grandmother had breast cancer, colon cancer and stomach cancer.     Allergies   Allergen Reactions    Celexa [Citalopram] Other (See Comments) Stomach pain        Atorvastatin      Leg cramps      Fenofibrate      angioedema    Mestinon [Pyridostigmine] Itching and Swelling    Penicillins     Sulfa Antibiotics      Other reaction(s): Hives/Throat closes    Tape Angelica Rock Tape]      PLASTIC TAPE    Gemfibrozil Rash    Meloxicam Other (See Comments)     moodswings     Current Outpatient Medications on File Prior to Visit   Medication Sig Dispense Refill    oxyCODONE-acetaminophen (PERCOCET) 5-325 MG per tablet Take 1 tablet by mouth every 6 hours as needed for Pain for up to 7 days. Intended supply: 7 days. Take lowest dose possible to manage pain 28 tablet 0    FLUoxetine (PROZAC) 10 MG capsule Take 10 mg by mouth daily      furosemide (LASIX) 20 MG tablet Take 20 mg by mouth 2 times daily      aspirin 81 MG chewable tablet Take 81 mg by mouth daily      Multiple Vitamins-Minerals (HAIR SKIN AND NAILS FORMULA) TABS Take by mouth      losartan (COZAAR) 25 MG tablet Take 1 tablet by mouth daily 30 tablet 5    acetaminophen (TYLENOL) 500 MG tablet Take 500 mg by mouth every 6 hours as needed for Pain      potassium chloride (KLOR-CON M) 20 MEQ extended release tablet Take 1 tablet by mouth 2 times daily 60 tablet 3    ezetimibe (ZETIA) 10 MG tablet Take 1 tablet by mouth daily 90 tablet 3    metoprolol tartrate (LOPRESSOR) 50 MG tablet Take 1 tablet by mouth 2 times daily 180 tablet 3    hydroCHLOROthiazide (HYDRODIURIL) 25 MG tablet Take 0.5 tablets by mouth daily 45 tablet 3    loratadine (CLARITIN) 10 MG tablet Take 1 tablet by mouth daily 30 tablet 5     No current facility-administered medications on file prior to visit. Review of Systems:    Constitutional:  No weight loss, No fever, No chills, No night sweats. Energy level good. Eyes:  No diplopia, No transient or permanent loss of vision, No scotomata. ENT / Mouth:  No epistaxis, No dysphagia, No hoarseness, No oral ulcers, No gingival bleeding.   No sore throat, No postnasal drip, No nasal drip, No mouth pain, No sinus pain, No tinnitus, Normal hearing. Cardiovascular:  No chest pain, No palpitations, No syncope, No upper extremity edema, No lower extremity edema, No calf discomfort. Respiratory:  No cough. No hemoptysis, No pleurisy, No wheezing, No dyspnea. Breast:   Status post bilateral mastectomy. Discomfort at the surgical site. Gastrointestinal:  No abdominal pain, No abdominal cramping, No nausea, No vomiting, No constipation, No diarrhea, No hematochezia, No melena, No jaundice, No dyspepsia, No dysphagia. Urinary:  No dysuria, No hematuria, No urinary incontinence. Gynecological:  No vaginal discharge, No suprapubic pain, No abnormal vaginal bleeding. (Female Patients Only)  Musculoskeletal:   Hip pain. Skin:  No rash, No nodules, No pruritus, No lesions. Neurologic:  No confusion, No seizures, No syncope, No tremor, No speech change, No headache, No hiccups, No abnormal gait, No sensory changes, No weakness. Psychiatric:  No depression, No anxiety, Concentration normal.  Endocrine:  No polyuria, No polydipsia, No hot flashes, No thyroid symptoms. Hematologic:  No epistaxis, No gingival bleeding, No petechiae, No ecchymosis. Lymphatic:  No lymphadenopathy, No lymphedema. Allergy / Immunologic:  No eczema, No frequent mucous infections, No frequent respiratory infections, No recurrent urticarial, No frequent skin infections.      Vital Signs: /89 (Site: Right Upper Arm, Position: Sitting, Cuff Size: Medium Adult)   Pulse 74   Temp 96.8 °F (36 °C) (Infrared)   Ht 5' 6\" (1.676 m)   Wt 232 lb 9.6 oz (105.5 kg)   SpO2 95%   BMI 37.54 kg/m²      Physical Exam:  CONSTITUTIONAL: awake, alert, cooperative, no apparent distress   EYES: pupils equal, round and reactive to light, sclera clear and conjunctiva normal  ENT: Normocephalic, without obvious abnormality, atraumatic  NECK: supple, symmetrical, no jugular venous distension and no carotid bruits HEMATOLOGIC/LYMPHATIC: no cervical, supraclavicular or axillary lymphadenopathy   LUNGS: no increased work of breathing and clear to auscultation   BREAST: Healing surgical incision status post bilateral mastectomy. Bruises and stitches noted. CARDIOVASCULAR: regular rate and rhythm, normal S1 and S2, no murmur noted  ABDOMEN: normal bowel sounds x 4, soft, non-distended, non-tender, no masses palpated, no hepatosplenomegaly   MUSCULOSKELETAL: full range of motion noted, tone is normal  NEUROLOGIC: awake, alert, oriented to name, place and time. Motor skills grossly intact. SKIN: Normal skin color, texture, turgor and no jaundice.    EXTREMITIES: no LE edema        Labs:  Hematology:  Lab Results   Component Value Date    WBC 7.1 03/01/2021    RBC 4.68 03/01/2021    HGB 13.5 03/01/2021    HCT 42.6 03/01/2021    MCV 91.0 03/01/2021    MCH 28.8 03/01/2021    MCHC 31.7 (L) 03/01/2021    RDW 13.2 03/01/2021     03/01/2021    MPV 9.8 03/01/2021    BANDSPCT 9 11/02/2020    SEGSPCT 50.9 03/01/2021    EOSRELPCT 3.4 (H) 03/01/2021    BASOPCT 0.7 03/01/2021    LYMPHOPCT 37.9 03/01/2021    MONOPCT 6.8 (H) 03/01/2021    BANDABS 1.86 11/02/2020    SEGSABS 3.6 03/01/2021    EOSABS 0.2 03/01/2021    BASOSABS 0.1 03/01/2021    LYMPHSABS 2.7 03/01/2021    MONOSABS 0.5 03/01/2021    DIFFTYPE AUTOMATED DIFFERENTIAL 03/01/2021    ANISOCYTOSIS 1+ 11/02/2020    POLYCHROM 1+ 11/02/2020    WBCMORP FEW 11/02/2020    PLTM OCC PLT CLUMPING 11/02/2020     Lab Results   Component Value Date     (H) 10/25/2020     Chemistry:  Lab Results   Component Value Date     05/04/2021    K 4.0 05/04/2021     05/04/2021    CO2 26 05/04/2021    BUN 20 03/01/2021    CREATININE 1.1 04/19/2021    GLUCOSE 103 (H) 03/01/2021    CALCIUM 9.3 03/01/2021    PROT 7.2 03/01/2021    LABALBU 4.6 03/01/2021    BILITOT 0.3 03/01/2021    ALKPHOS 55 03/01/2021    AST 16 03/01/2021    ALT 16 03/01/2021    LABGLOM 53 (L) 04/19/2021    GFRAA >60 04/19/2021    AGRATIO 1.7 12/07/2015    GLOB 2.7 12/07/2015    MG 1.6 (L) 11/14/2020    POCGLU 188 (H) 11/16/2020     Lab Results   Component Value Date     (H) 11/14/2020     Lab Results   Component Value Date    TSHHS 2.200 03/01/2021    T4FREE 1.02 03/01/2021     Immunology:  Lab Results   Component Value Date    PROT 7.2 03/01/2021     Coagulation Panel:  Lab Results   Component Value Date    PROTIME 16.1 (H) 11/16/2020    INR 1.33 11/16/2020    APTT 32.1 11/16/2020    DDIMER 728 (H) 11/16/2020     Anemia Panel:  Lab Results   Component Value Date    KBOYCEOF82 676.0 05/13/2019    FOLATE >20.0 (H) 05/13/2019        Observations:  PHQ-9 Total Score: 19 (5/19/2021 10:24 AM)  Thoughts that you would be better off dead, or of hurting yourself in some way: 0 (5/19/2021 10:24 AM)       Assessment & Plan:    1. 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 Pathological stage T2, N0 sentinel lymph node MX. She is agreeable to Oncotype DX testing. We discussed about risk and benefit of adjuvant endocrine therapy. 2.  I refer for genetic counseling. 3.  I ordered a baseline bone density test.    We discussed about diet and weight loss. She has not considered Covid vaccine yet. Return to clinic in 3 weeks or sooner to discuss the results of the Oncotype DX. All of her question has been answered for today. I have discussed the above stated plan with the patient and they verbalized understanding and agreed with the plan. Thank you for allowing us to participate in this patient's care.       Electronically signed by Adam Shirley MD on 5/17/21 at 7:41 AM EDT

## 2021-05-19 ENCOUNTER — INITIAL CONSULT (OUTPATIENT)
Dept: ONCOLOGY | Age: 61
End: 2021-05-19
Payer: COMMERCIAL

## 2021-05-19 ENCOUNTER — HOSPITAL ENCOUNTER (OUTPATIENT)
Dept: INFUSION THERAPY | Age: 61
Discharge: HOME | End: 2021-05-19
Payer: COMMERCIAL

## 2021-05-19 VITALS
TEMPERATURE: 96.8 F | OXYGEN SATURATION: 95 % | HEART RATE: 74 BPM | BODY MASS INDEX: 37.38 KG/M2 | SYSTOLIC BLOOD PRESSURE: 126 MMHG | WEIGHT: 232.6 LBS | DIASTOLIC BLOOD PRESSURE: 89 MMHG | HEIGHT: 66 IN

## 2021-05-19 DIAGNOSIS — Z17.0 MALIGNANT NEOPLASM OF AREOLA OF LEFT BREAST IN FEMALE, ESTROGEN RECEPTOR POSITIVE (HCC): Primary | ICD-10-CM

## 2021-05-19 DIAGNOSIS — C50.012 MALIGNANT NEOPLASM OF AREOLA OF LEFT BREAST IN FEMALE, ESTROGEN RECEPTOR POSITIVE (HCC): Primary | ICD-10-CM

## 2021-05-19 DIAGNOSIS — Z78.0 MENOPAUSE: ICD-10-CM

## 2021-05-19 PROCEDURE — 3017F COLORECTAL CA SCREEN DOC REV: CPT | Performed by: INTERNAL MEDICINE

## 2021-05-19 PROCEDURE — 1036F TOBACCO NON-USER: CPT | Performed by: INTERNAL MEDICINE

## 2021-05-19 PROCEDURE — 99202 OFFICE O/P NEW SF 15 MIN: CPT

## 2021-05-19 PROCEDURE — G8417 CALC BMI ABV UP PARAM F/U: HCPCS | Performed by: INTERNAL MEDICINE

## 2021-05-19 PROCEDURE — 99205 OFFICE O/P NEW HI 60 MIN: CPT | Performed by: INTERNAL MEDICINE

## 2021-05-19 PROCEDURE — G8427 DOCREV CUR MEDS BY ELIG CLIN: HCPCS | Performed by: INTERNAL MEDICINE

## 2021-05-19 ASSESSMENT — PATIENT HEALTH QUESTIONNAIRE - PHQ9
5. POOR APPETITE OR OVEREATING: 3
9. THOUGHTS THAT YOU WOULD BE BETTER OFF DEAD, OR OF HURTING YOURSELF: 0
SUM OF ALL RESPONSES TO PHQ QUESTIONS 1-9: 19
1. LITTLE INTEREST OR PLEASURE IN DOING THINGS: 3
SUM OF ALL RESPONSES TO PHQ9 QUESTIONS 1 & 2: 4
8. MOVING OR SPEAKING SO SLOWLY THAT OTHER PEOPLE COULD HAVE NOTICED. OR THE OPPOSITE, BEING SO FIGETY OR RESTLESS THAT YOU HAVE BEEN MOVING AROUND A LOT MORE THAN USUAL: 0
2. FEELING DOWN, DEPRESSED OR HOPELESS: 1
SUM OF ALL RESPONSES TO PHQ QUESTIONS 1-9: 19

## 2021-05-19 ASSESSMENT — COLUMBIA-SUICIDE SEVERITY RATING SCALE - C-SSRS
6. HAVE YOU EVER DONE ANYTHING, STARTED TO DO ANYTHING, OR PREPARED TO DO ANYTHING TO END YOUR LIFE?: NO
1. WITHIN THE PAST MONTH, HAVE YOU WISHED YOU WERE DEAD OR WISHED YOU COULD GO TO SLEEP AND NOT WAKE UP?: NO

## 2021-05-19 NOTE — PROGRESS NOTES
Patient here at [facility] today with nephew for medical oncology consultation with Dr. Mustapha Phelps. Patient recently diagnosed with left breast IDC/IMC, ER/VT positive, HER-2/baltazar negative, 4.5 cm in size, grade 2, who underwent bilateral mastectomies on 5/6/21. Oncotype DX ordered via [facility] as ordered per Dr. Ryland Zamorano to assess patient risk factor of distant recurrence to determine if chemotherapy would be beneficial.  Patient and nephew voiced understanding of Oncotype testing. Direct contact information given to patient and encouraged to call with any questions/concerns.

## 2021-05-19 NOTE — PROGRESS NOTES
MA Rooming Questions  Patient: Idania Roberts  MRN: [id number]    Date: 5/19/2021        NP    5. Did the patient have a depression screening completed today?  Yes    PHQ-9 Total Score: 19 (5/19/2021 10:24 AM)  Thoughts that you would be better off dead, or of hurting yourself in some way: 0 (5/19/2021 10:24 AM)       PHQ-9 Given to (if applicable):               PHQ-9 Score (if applicable):                     [x] Positive     []  Negative              Does question #9 need addressed (if applicable)                     [] Yes    [x]  No               Chelsea Sood, [facility]

## 2021-05-26 ENCOUNTER — TELEPHONE (OUTPATIENT)
Dept: ONCOLOGY | Age: 61
End: 2021-05-26

## 2021-05-26 NOTE — TELEPHONE ENCOUNTER
Pt is going to [facility] on 6/2 1:15 arrival for Dexa-- pt is aware and stated understanding

## 2021-05-30 ENCOUNTER — APPOINTMENT (OUTPATIENT)
Dept: CT IMAGING | Age: 61
End: 2021-05-30
Payer: COMMERCIAL

## 2021-05-30 ENCOUNTER — HOSPITAL ENCOUNTER (OUTPATIENT)
Age: 61
Setting detail: OBSERVATION
Discharge: HOME HEALTH | End: 2021-06-03
Attending: EMERGENCY MEDICINE | Admitting: STUDENT IN AN ORGANIZED HEALTH CARE EDUCATION/TRAINING PROGRAM
Payer: COMMERCIAL

## 2021-05-30 DIAGNOSIS — G89.18 POSTOPERATIVE PAIN: ICD-10-CM

## 2021-05-30 DIAGNOSIS — R11.2 INTRACTABLE NAUSEA AND VOMITING: Primary | ICD-10-CM

## 2021-05-30 DIAGNOSIS — R63.0 ANOREXIA: ICD-10-CM

## 2021-05-30 LAB
ALBUMIN SERPL-MCNC: 4.7 GM/DL (ref 3.4–5)
ALP BLD-CCNC: 66 IU/L (ref 40–129)
ALT SERPL-CCNC: 68 U/L (ref 10–40)
ANION GAP SERPL CALCULATED.3IONS-SCNC: 16 MMOL/L (ref 4–16)
AST SERPL-CCNC: 54 IU/L (ref 15–37)
BASOPHILS ABSOLUTE: 0 K/CU MM
BASOPHILS RELATIVE PERCENT: 0.5 % (ref 0–1)
BILIRUB SERPL-MCNC: 1 MG/DL (ref 0–1)
BUN BLDV-MCNC: 17 MG/DL (ref 6–23)
CALCIUM SERPL-MCNC: 9.5 MG/DL (ref 8.3–10.6)
CHLORIDE BLD-SCNC: 101 MMOL/L (ref 99–110)
CO2: 24 MMOL/L (ref 21–32)
CREAT SERPL-MCNC: 1 MG/DL (ref 0.6–1.1)
DIFFERENTIAL TYPE: ABNORMAL
EOSINOPHILS ABSOLUTE: 0.1 K/CU MM
EOSINOPHILS RELATIVE PERCENT: 0.8 % (ref 0–3)
GFR AFRICAN AMERICAN: >60 ML/MIN/1.73M2
GFR NON-AFRICAN AMERICAN: 57 ML/MIN/1.73M2
GLUCOSE BLD-MCNC: 102 MG/DL (ref 70–99)
HCT VFR BLD CALC: 37.9 % (ref 37–47)
HEMOGLOBIN: 12.3 GM/DL (ref 12.5–16)
IMMATURE NEUTROPHIL %: 0.3 % (ref 0–0.43)
LACTATE: 1.6 MMOL/L (ref 0.4–2)
LYMPHOCYTES ABSOLUTE: 2 K/CU MM
LYMPHOCYTES RELATIVE PERCENT: 31.9 % (ref 24–44)
MAGNESIUM: 1.8 MG/DL (ref 1.8–2.4)
MCH RBC QN AUTO: 28.5 PG (ref 27–31)
MCHC RBC AUTO-ENTMCNC: 32.5 % (ref 32–36)
MCV RBC AUTO: 87.9 FL (ref 78–100)
MONOCYTES ABSOLUTE: 0.5 K/CU MM
MONOCYTES RELATIVE PERCENT: 7.4 % (ref 0–4)
NUCLEATED RBC %: 0 %
PDW BLD-RTO: 14.2 % (ref 11.7–14.9)
PLATELET # BLD: 215 K/CU MM (ref 140–440)
PMV BLD AUTO: 8.9 FL (ref 7.5–11.1)
POTASSIUM SERPL-SCNC: 3.2 MMOL/L (ref 3.5–5.1)
PRO-BNP: 190.6 PG/ML
RBC # BLD: 4.31 M/CU MM (ref 4.2–5.4)
SEGMENTED NEUTROPHILS ABSOLUTE COUNT: 3.7 K/CU MM
SEGMENTED NEUTROPHILS RELATIVE PERCENT: 59.1 % (ref 36–66)
SODIUM BLD-SCNC: 141 MMOL/L (ref 135–145)
TOTAL IMMATURE NEUTOROPHIL: 0.02 K/CU MM
TOTAL NUCLEATED RBC: 0 K/CU MM
TOTAL PROTEIN: 7 GM/DL (ref 6.4–8.2)
TROPONIN T: <0.01 NG/ML
WBC # BLD: 6.3 K/CU MM (ref 4–10.5)

## 2021-05-30 PROCEDURE — 6360000004 HC RX CONTRAST MEDICATION: Performed by: EMERGENCY MEDICINE

## 2021-05-30 PROCEDURE — 93005 ELECTROCARDIOGRAM TRACING: CPT | Performed by: EMERGENCY MEDICINE

## 2021-05-30 PROCEDURE — 83605 ASSAY OF LACTIC ACID: CPT

## 2021-05-30 PROCEDURE — 6360000002 HC RX W HCPCS: Performed by: EMERGENCY MEDICINE

## 2021-05-30 PROCEDURE — 99285 EMERGENCY DEPT VISIT HI MDM: CPT

## 2021-05-30 PROCEDURE — 96365 THER/PROPH/DIAG IV INF INIT: CPT

## 2021-05-30 PROCEDURE — 2580000003 HC RX 258: Performed by: EMERGENCY MEDICINE

## 2021-05-30 PROCEDURE — 83735 ASSAY OF MAGNESIUM: CPT

## 2021-05-30 PROCEDURE — 74177 CT ABD & PELVIS W/CONTRAST: CPT

## 2021-05-30 PROCEDURE — 2500000003 HC RX 250 WO HCPCS: Performed by: EMERGENCY MEDICINE

## 2021-05-30 PROCEDURE — 96375 TX/PRO/DX INJ NEW DRUG ADDON: CPT

## 2021-05-30 PROCEDURE — 2580000003 HC RX 258: Performed by: STUDENT IN AN ORGANIZED HEALTH CARE EDUCATION/TRAINING PROGRAM

## 2021-05-30 PROCEDURE — 94761 N-INVAS EAR/PLS OXIMETRY MLT: CPT

## 2021-05-30 PROCEDURE — 6360000002 HC RX W HCPCS: Performed by: STUDENT IN AN ORGANIZED HEALTH CARE EDUCATION/TRAINING PROGRAM

## 2021-05-30 PROCEDURE — 81001 URINALYSIS AUTO W/SCOPE: CPT

## 2021-05-30 PROCEDURE — 84484 ASSAY OF TROPONIN QUANT: CPT

## 2021-05-30 PROCEDURE — 83880 ASSAY OF NATRIURETIC PEPTIDE: CPT

## 2021-05-30 PROCEDURE — G0378 HOSPITAL OBSERVATION PER HR: HCPCS

## 2021-05-30 PROCEDURE — 85025 COMPLETE CBC W/AUTO DIFF WBC: CPT

## 2021-05-30 PROCEDURE — 96366 THER/PROPH/DIAG IV INF ADDON: CPT

## 2021-05-30 PROCEDURE — 36415 COLL VENOUS BLD VENIPUNCTURE: CPT

## 2021-05-30 PROCEDURE — 96376 TX/PRO/DX INJ SAME DRUG ADON: CPT

## 2021-05-30 PROCEDURE — 80053 COMPREHEN METABOLIC PANEL: CPT

## 2021-05-30 PROCEDURE — 6370000000 HC RX 637 (ALT 250 FOR IP): Performed by: STUDENT IN AN ORGANIZED HEALTH CARE EDUCATION/TRAINING PROGRAM

## 2021-05-30 PROCEDURE — 71275 CT ANGIOGRAPHY CHEST: CPT

## 2021-05-30 RX ORDER — 0.9 % SODIUM CHLORIDE 0.9 %
1000 INTRAVENOUS SOLUTION INTRAVENOUS ONCE
Status: COMPLETED | OUTPATIENT
Start: 2021-05-30 | End: 2021-05-30

## 2021-05-30 RX ORDER — SODIUM CHLORIDE 9 MG/ML
25 INJECTION, SOLUTION INTRAVENOUS PRN
Status: DISCONTINUED | OUTPATIENT
Start: 2021-05-30 | End: 2021-06-03 | Stop reason: HOSPADM

## 2021-05-30 RX ORDER — SODIUM CHLORIDE 0.9 % (FLUSH) 0.9 %
5-40 SYRINGE (ML) INJECTION EVERY 12 HOURS SCHEDULED
Status: DISCONTINUED | OUTPATIENT
Start: 2021-05-30 | End: 2021-06-03 | Stop reason: HOSPADM

## 2021-05-30 RX ORDER — ONDANSETRON 2 MG/ML
4 INJECTION INTRAMUSCULAR; INTRAVENOUS EVERY 6 HOURS PRN
Status: DISCONTINUED | OUTPATIENT
Start: 2021-05-30 | End: 2021-06-03 | Stop reason: HOSPADM

## 2021-05-30 RX ORDER — METOCLOPRAMIDE HYDROCHLORIDE 5 MG/ML
10 INJECTION INTRAMUSCULAR; INTRAVENOUS EVERY 6 HOURS PRN
Status: DISCONTINUED | OUTPATIENT
Start: 2021-05-30 | End: 2021-06-02

## 2021-05-30 RX ORDER — FUROSEMIDE 20 MG/1
20 TABLET ORAL 2 TIMES DAILY
Status: DISCONTINUED | OUTPATIENT
Start: 2021-05-30 | End: 2021-06-03 | Stop reason: HOSPADM

## 2021-05-30 RX ORDER — PROMETHAZINE HYDROCHLORIDE 25 MG/1
12.5 TABLET ORAL EVERY 6 HOURS PRN
Status: DISCONTINUED | OUTPATIENT
Start: 2021-05-30 | End: 2021-05-30

## 2021-05-30 RX ORDER — OXYCODONE HYDROCHLORIDE AND ACETAMINOPHEN 5; 325 MG/1; MG/1
1 TABLET ORAL EVERY 6 HOURS PRN
Status: DISCONTINUED | OUTPATIENT
Start: 2021-05-30 | End: 2021-06-03 | Stop reason: HOSPADM

## 2021-05-30 RX ORDER — METOPROLOL TARTRATE 50 MG/1
50 TABLET, FILM COATED ORAL 2 TIMES DAILY
Status: DISCONTINUED | OUTPATIENT
Start: 2021-05-30 | End: 2021-06-02

## 2021-05-30 RX ORDER — SODIUM CHLORIDE 9 MG/ML
1000 INJECTION, SOLUTION INTRAVENOUS CONTINUOUS
Status: DISCONTINUED | OUTPATIENT
Start: 2021-05-30 | End: 2021-06-03 | Stop reason: HOSPADM

## 2021-05-30 RX ORDER — HYDROCHLOROTHIAZIDE 12.5 MG/1
12.5 TABLET ORAL DAILY
Status: DISCONTINUED | OUTPATIENT
Start: 2021-05-31 | End: 2021-05-31

## 2021-05-30 RX ORDER — POTASSIUM CHLORIDE 7.45 MG/ML
10 INJECTION INTRAVENOUS
Status: COMPLETED | OUTPATIENT
Start: 2021-05-30 | End: 2021-05-30

## 2021-05-30 RX ORDER — SODIUM CHLORIDE 9 MG/ML
INJECTION, SOLUTION INTRAVENOUS CONTINUOUS
Status: ACTIVE | OUTPATIENT
Start: 2021-05-30 | End: 2021-05-31

## 2021-05-30 RX ORDER — ACETAMINOPHEN 650 MG/1
650 SUPPOSITORY RECTAL EVERY 6 HOURS PRN
Status: DISCONTINUED | OUTPATIENT
Start: 2021-05-30 | End: 2021-06-03 | Stop reason: HOSPADM

## 2021-05-30 RX ORDER — EZETIMIBE 10 MG/1
10 TABLET ORAL DAILY
Status: DISCONTINUED | OUTPATIENT
Start: 2021-05-31 | End: 2021-06-03 | Stop reason: HOSPADM

## 2021-05-30 RX ORDER — POTASSIUM CHLORIDE 7.45 MG/ML
10 INJECTION INTRAVENOUS
Status: COMPLETED | OUTPATIENT
Start: 2021-05-30 | End: 2021-05-31

## 2021-05-30 RX ORDER — FLUOXETINE 10 MG/1
10 CAPSULE ORAL DAILY
Status: DISCONTINUED | OUTPATIENT
Start: 2021-05-31 | End: 2021-06-03 | Stop reason: HOSPADM

## 2021-05-30 RX ORDER — LOSARTAN POTASSIUM 25 MG/1
25 TABLET ORAL DAILY
Status: DISCONTINUED | OUTPATIENT
Start: 2021-05-31 | End: 2021-06-03 | Stop reason: HOSPADM

## 2021-05-30 RX ORDER — POTASSIUM CHLORIDE 20 MEQ/1
20 TABLET, EXTENDED RELEASE ORAL 2 TIMES DAILY
Status: DISCONTINUED | OUTPATIENT
Start: 2021-05-31 | End: 2021-06-03 | Stop reason: HOSPADM

## 2021-05-30 RX ORDER — ACETAMINOPHEN 325 MG/1
650 TABLET ORAL EVERY 6 HOURS PRN
Status: DISCONTINUED | OUTPATIENT
Start: 2021-05-30 | End: 2021-06-03 | Stop reason: HOSPADM

## 2021-05-30 RX ORDER — CETIRIZINE HYDROCHLORIDE 10 MG/1
10 TABLET ORAL DAILY
Refills: 5 | Status: DISCONTINUED | OUTPATIENT
Start: 2021-05-31 | End: 2021-06-03 | Stop reason: HOSPADM

## 2021-05-30 RX ORDER — ONDANSETRON 2 MG/ML
4 INJECTION INTRAMUSCULAR; INTRAVENOUS EVERY 6 HOURS PRN
Status: DISCONTINUED | OUTPATIENT
Start: 2021-05-30 | End: 2021-05-30 | Stop reason: SDUPTHER

## 2021-05-30 RX ORDER — POLYETHYLENE GLYCOL 3350 17 G/17G
17 POWDER, FOR SOLUTION ORAL DAILY PRN
Status: DISCONTINUED | OUTPATIENT
Start: 2021-05-30 | End: 2021-06-03 | Stop reason: HOSPADM

## 2021-05-30 RX ORDER — PANTOPRAZOLE SODIUM 40 MG/10ML
40 INJECTION, POWDER, LYOPHILIZED, FOR SOLUTION INTRAVENOUS DAILY
Status: DISCONTINUED | OUTPATIENT
Start: 2021-05-31 | End: 2021-06-03 | Stop reason: HOSPADM

## 2021-05-30 RX ORDER — ASPIRIN 81 MG/1
81 TABLET, CHEWABLE ORAL DAILY
Status: DISCONTINUED | OUTPATIENT
Start: 2021-05-31 | End: 2021-06-03 | Stop reason: HOSPADM

## 2021-05-30 RX ORDER — SODIUM CHLORIDE 0.9 % (FLUSH) 0.9 %
5-40 SYRINGE (ML) INJECTION PRN
Status: DISCONTINUED | OUTPATIENT
Start: 2021-05-30 | End: 2021-06-03 | Stop reason: HOSPADM

## 2021-05-30 RX ADMIN — SODIUM CHLORIDE 1000 ML: 9 INJECTION, SOLUTION INTRAVENOUS at 17:57

## 2021-05-30 RX ADMIN — POTASSIUM CHLORIDE 10 MEQ: 7.46 INJECTION, SOLUTION INTRAVENOUS at 23:20

## 2021-05-30 RX ADMIN — FAMOTIDINE 20 MG: 10 INJECTION, SOLUTION INTRAVENOUS at 15:37

## 2021-05-30 RX ADMIN — POTASSIUM CHLORIDE 10 MEQ: 7.46 INJECTION, SOLUTION INTRAVENOUS at 17:58

## 2021-05-30 RX ADMIN — ONDANSETRON 4 MG: 2 INJECTION INTRAMUSCULAR; INTRAVENOUS at 15:37

## 2021-05-30 RX ADMIN — IOPAMIDOL 80 ML: 755 INJECTION, SOLUTION INTRAVENOUS at 17:00

## 2021-05-30 RX ADMIN — METOPROLOL TARTRATE 50 MG: 50 TABLET, FILM COATED ORAL at 23:19

## 2021-05-30 RX ADMIN — OXYCODONE HYDROCHLORIDE AND ACETAMINOPHEN 1 TABLET: 5; 325 TABLET ORAL at 23:41

## 2021-05-30 RX ADMIN — POTASSIUM CHLORIDE 10 MEQ: 7.46 INJECTION, SOLUTION INTRAVENOUS at 20:03

## 2021-05-30 RX ADMIN — FUROSEMIDE 20 MG: 20 TABLET ORAL at 23:19

## 2021-05-30 RX ADMIN — SODIUM CHLORIDE 1000 ML: 9 INJECTION, SOLUTION INTRAVENOUS at 15:36

## 2021-05-30 ASSESSMENT — PAIN DESCRIPTION - DESCRIPTORS
DESCRIPTORS: ACHING;BURNING;SHARP
DESCRIPTORS: ACHING

## 2021-05-30 ASSESSMENT — PAIN DESCRIPTION - FREQUENCY
FREQUENCY: INTERMITTENT
FREQUENCY: CONTINUOUS

## 2021-05-30 ASSESSMENT — PAIN DESCRIPTION - PAIN TYPE
TYPE: ACUTE PAIN
TYPE: ACUTE PAIN

## 2021-05-30 ASSESSMENT — ENCOUNTER SYMPTOMS
ABDOMINAL PAIN: 1
NAUSEA: 1
BACK PAIN: 0
COLOR CHANGE: 0
WHEEZING: 0
ANAL BLEEDING: 0
VOMITING: 1
CHEST TIGHTNESS: 0
RHINORRHEA: 1
SHORTNESS OF BREATH: 1
SORE THROAT: 0
CONSTIPATION: 0
DIARRHEA: 1
BLOOD IN STOOL: 0
COUGH: 0

## 2021-05-30 ASSESSMENT — PAIN DESCRIPTION - ORIENTATION: ORIENTATION: RIGHT;LEFT

## 2021-05-30 ASSESSMENT — PAIN SCALES - GENERAL
PAINLEVEL_OUTOF10: 7
PAINLEVEL_OUTOF10: 7
PAINLEVEL_OUTOF10: 8

## 2021-05-30 ASSESSMENT — PAIN DESCRIPTION - LOCATION
LOCATION: HEAD;CHEST
LOCATION: HEAD

## 2021-05-30 NOTE — ED TRIAGE NOTES
Complaints of decreased appetite after bilateral mastectomy May 6. Patient did see Dr Catrina Bates and also visiting nurse a day ago and explained issues and was told if symptoms worsen to go to the ED.

## 2021-05-30 NOTE — ED PROVIDER NOTES
Emergency Department Encounter    Patient: Herve Torres  MRN: [id number]  : 1960  Date of Evaluation: 6/3/2021  ED Provider:  Nini Brown MD      Triage Chief Complaint:   Nausea and Post-op Problem      [place]:  Herve Torres is a 64 y.o. female that presents to the emergency department with persistent nausea, vomiting, and very poor oral intake. Patient reports that she underwent bilateral mastectomy at this facility with Dr. Erin Cooper on May 6, 2021. Since then and especially in the past week, she has had constant nausea and frequent vomiting and dry heaves. She has not vomited since yesterday evening, but the dry heaves remain frequent. She has had essentially nothing solid to eat since last , 7 days prior to presentation. She has only been able to tolerate an occasional ice cube. Her urine output is diminished. She denies dysuria. She has had occasional diarrhea. She denies visualized blood or mucus in the stool, but she has not really been looking. She feels chilled at times, but she denies fevers. The surgical incisions are sore, but she denies any wound opening or drainage. She is having aching quality pain in the midsternum, epigastrium, and left upper quadrant. Pain is essentially constant in timing and tends to worsen with movement and breathing. Patient reports no other particular provocative or alleviating factors. ROS - see HPI, below listed is current ROS at time of my eval:  CONSTITUTIONAL: As above. EYES: No vision change, redness, drainage, or discharge. HENT: No sore throat, runny nose, or earache. No dental pain. No painful swallowing. RESPIRATORY: No shortness of breath, cough, or sputum production. CARDIOVASCULAR: No anginal-type chest pain, orthopnea, or edema. GASTROINTESTINAL: As above. GENITOURINARY: No frequency, urgency, or dysuria. No hematuria. MUSCULOSKELETAL: No recent injury. No neck, back, or extremity pain.   NEUROLOGICAL: No focal weakness, numbness, or tingling. SKIN: As above. Medical history:  Past Medical History:   Diagnosis Date    Allergic rhinitis due to pollen 11/19/2015    Arthritis     left hip & knee    Chronic back pain     Depression     Gastroesophageal reflux disease 11/19/2015    Hearing loss 11/19/2015    History of blood transfusion     No reaction per pt    History of cardiac monitoring 04/18/2017    14 day event monitor. Sinus Rhythm    History of nuclear stress test 09/28/2016    cardiolite-normal,EF70%    Hot flashes due to surgical menopause 11/19/2015    Hx of echocardiogram 10/05/2016    EF: >55 %   Mild mitral and tricuspid regurg.      Hyperlipidemia     Hypertension     Follows with PCP    Lexiscan Stress Test 10/14/2020    EF 60%, Normal study, no ischemia    Meniere disease     Morbid obesity due to excess calories ([address] 11/19/2015    Sleep apnea 11/19/2015    sleep study negative    Tilt table evaluation 05/09/2017     positive for neurocardiogenic syncope     Past Surgical History:   Procedure Laterality Date    APPENDECTOMY  1967    CHOLECYSTECTOMY      2017    COLONOSCOPY  2014    Polyps x2 - Dr. Koki Ferguson Bilateral 05/06/2021    Dr. Bry Leija Left 10/19/2020    LEFT HIP TOTAL ARTHROPLASTY - POSTERIOR performed by Imelda Cordero MD at [facility] LEFT Left 3/9/2021    US BREAST BIOPSY NEEDLE ADDITIONAL LEFT 3/9/2021 Kevin Jones  [address] NEEDLE ADDITIONAL LEFT Left 3/9/2021    US BREAST BIOPSY NEEDLE ADDITIONAL LEFT 3/9/2021 Kevin Jones MD [facility]    US BREAST NEEDLE BIOPSY LEFT Left 3/9/2021    US BREAST NEEDLE BIOPSY LEFT 3/9/2021 Kevin Jones  [address]     Family History   Problem Relation Age of Onset    Heart Disease Mother     High Blood Pressure Mother     High Cholesterol Mother     Cancer Mother 82 Nursing Notes Reviewed    Physical Exam:  Triage VS:    ED Triage Vitals [05/30/21 1511]   Enc Vitals Group      BP       Pulse 78      Resp 16      Temp 97.9 °F (36.6 °C)      Temp Source Oral      SpO2 99 %      Weight 223 lb (101.2 kg)      Height 5' 6\" (1.676 m)      Head Circumference       Peak Flow       Pain Score       Pain Loc       Pain Edu? Excl. in [address]? My pulse ox interpretation is -normal on room air    GENERAL: Patient is awake, alert, and oriented appropriately. Patient is resting comfortably in a still position on the exam table. Patient speaking in full and complete sentences. Well-nourished and well-developed. HEENT: Normocephalic and atraumatic. Pupils equal, round, and reactive to light. No redness or matting. Bilateral external ears are unremarkable. Nasal mucosa is pink without purulence. Oral mucosa is mildly dry. NECK: Supple with normal range of motion. No Kernig's or Brudzinski sign. No significant lymphadenopathy. No JVD. RESPIRATORY: Symmetric aeration. No respiratory distress. No wheeze, stridor, rales, rhonchi. Bilateral mastectomy incisions appear to be healing appropriately. Likely appropriate erythema without cellulitis. No visualized dehiscence or drainage. No fluctuance. Ecchymosis of varying shades noted. CARDIOVASCULAR: Regular rate and rhythm. No murmurs, rubs, or gallops. No central or peripheral cyanosis. GASTROINTESTINAL: Epigastric and left upper quadrant tenderness. Otherwise, soft, nontender, and nondistended. No McBurney's or Maddox's point tenderness. No other focal tenderness. No involuntary guarding, rebound, or rigidity. No mass or pulsatile mass. Bowel sounds normal signs. NEUROLOGICAL: Awake, alert and oriented x 3. Cranial nerves III through XII are grossly intact as tested without facial droop or dermatomal paresthesias. Of note, forehead wrinkles are symmetric and intact.   Conjugate gaze without entrapment. No asymmetry of the corners of the mouth or nasolabial folds. No gross motor or cerebellar deficits. MUSCULOSKELETAL: No asymmetric edema, Homans' sign, or cords. No tenderness or limitation range of motion to the bilateral shoulders, elbows, wrists, hips, knees, or ankles. No accompanying long bone tenderness or deformity. SKIN: Normal tone for ethnicity. Normal turgor and brisk capillary refill peripherally. No petechiae, purpura, vesicles, bullae, or other lesions. No icterus. PSYCHIATRIC: Normal mood. Normal affect. No voiced suicidal or homicidal ideation. Patient does not respond to internal stimuli. Judgment and insight appear intact. Emergency department course. Patient is brought to bed 31 and assessed and reassessed by me. Prior to initial evaluation, orders are placed for medical screening studies including CBC, metabolic panel, troponin, and urinalysis among others. After initial evaluation, normal saline 1 L bolus, ondansetron 4 mg, and famotidine 20 mg ordered. With the persistent pain, CT scans of the chest and abdomen are ordered. We have discussed surgery consultation after results are available. Patient is agreeable to continuing plan. Upon most recent reevaluation, patient remains generally stable. She continues reports severe nausea, though there has been no intractable vomiting. Abdomen has been nonsurgical.  CT scan shows generally expected postsurgical changes with no discrete acute abnormality. We have discussed options for disposition, and patient indicates that she cannot \"go home like this. \"  Care is discussed with Dr. Lázaro Yeung on behalf of Dr. Connie Mercado at approximately 2056. He indicates that patient could be admitted for symptomatic management, but there does not appear to be a surgical emergency on the available medical screening studies. He has suggested admission to the hospitalist service. Hospitalist is paged at 2056 to discuss admission. Initial verbal orders are discussed  shortly after. Decision time to admit: 2056. Patient seen during [facility], I did don appropriate PPE during my encounters with the patient, including n95 (when appropriate) mask and eye protection as appropriate.     I have reviewed and interpreted all of the currently available lab results from this visit (if applicable):  Results for orders placed or performed during the hospital encounter of 05/30/21   Culture, Urine    Specimen: Urine, clean catch   Result Value Ref Range    Specimen URINE CLEAN CATCH     Special Requests NONE     Culture Final Report No growth at 18 to 36 hours    GI Disease Panel PCR    Specimen: Stool   Result Value Ref Range    Campylobacter PCR NOT DETECTED NOT DETECTED    Plesiomonas Shigelloides PCR NOT DETECTED NOT DETECTED    Salmonella PCR NOT DETECTED NOT DETECTED    Vibrio PCR NOT DETECTED NOT DETECTED    Vibrio Cholerae PCR NOT DETECTED NOT DETECTED    Yersinia Enterocolitica PCR NOT DETECTED NOT DETECTED    E Coli Enteroaggregative PCR NOT DETECTED NOT DETECTED    E Coli Enteropathogenic PCR NOT DETECTED NOT DETECTED    E Coli Enterotoxigenic PCR NOT DETECTED NOT DETECTED    E Coli Shiga Like Toxin PCR NOT DETECTED NOT DETECTED    E Coli O157 PCR NOT DETECTED NOT DETECTED    E Coli Shigella/Enteroinvasive PCR NOT DETECTED NOT DETECTED    Cryptosporidium PCR NOT DETECTED NOT DETECTED    Cyclospora Cayetanensis PCR NOT DETECTED NOT DETECTED    Entamoeba Histolytica PCR NOT DETECTED NOT DETECTED    Giardia Lamblia PCR NOT DETECTED NOT DETECTED    Adenovirus F 40 41 PCR NOT DETECTED NOT DETECTED    Astrovirus PCR NOT DETECTED NOT DETECTED    Norovirus GI GII PCR NOT DETECTED NOT DETECTED    Rotavirus A PCR NOT DETECTED NOT DETECTED    Sapovirus PCR NOT DETECTED NOT DETECTED   CBC Auto Differential   Result Value Ref Range    WBC 6.3 4.0 - 10.5 K/CU MM    RBC 4.31 4.2 - 5.4 M/CU MM    Hemoglobin 12.3 (L) 12.5 - 16.0 GM/DL Hematocrit 37.9 37 - 47 %    MCV 87.9 78 - 100 FL    MCH 28.5 27 - 31 PG    MCHC 32.5 32.0 - 36.0 %    RDW 14.2 11.7 - 14.9 %    Platelets 359 169 - 079 K/CU MM    MPV 8.9 7.5 - 11.1 FL    Differential Type AUTOMATED DIFFERENTIAL     Segs Relative 59.1 36 - 66 %    Lymphocytes % 31.9 24 - 44 %    Monocytes % 7.4 (H) 0 - 4 %    Eosinophils % 0.8 0 - 3 %    Basophils % 0.5 0 - 1 %    Segs Absolute 3.7 K/CU MM    Lymphocytes Absolute 2.0 K/CU MM    Monocytes Absolute 0.5 K/CU MM    Eosinophils Absolute 0.1 K/CU MM    Basophils Absolute 0.0 K/CU MM    Nucleated RBC % 0.0 %    Total Nucleated RBC 0.0 K/CU MM    Total Immature Neutrophil 0.02 K/CU MM    Immature Neutrophil % 0.3 0 - 0.43 %   Comprehensive Metabolic Panel w/ Reflex to MG   Result Value Ref Range    Sodium 141 135 - 145 MMOL/L    Potassium 3.2 (L) 3.5 - 5.1 MMOL/L    Chloride 101 99 - 110 mMol/L    CO2 24 21 - 32 MMOL/L    BUN 17 6 - 23 MG/DL    CREATININE 1.0 0.6 - 1.1 MG/DL    Glucose 102 (H) 70 - 99 MG/DL    Calcium 9.5 8.3 - 10.6 MG/DL    Albumin 4.7 3.4 - 5.0 GM/DL    Total Protein 7.0 6.4 - 8.2 GM/DL    Total Bilirubin 1.0 0.0 - 1.0 MG/DL    ALT 68 (H) 10 - 40 U/L    AST 54 (H) 15 - 37 IU/L    Alkaline Phosphatase 66 40 - 129 IU/L    GFR Non- 57 (L) >60 mL/min/1.73m2    GFR African American >60 >60 mL/min/1.73m2    Anion Gap 16 4 - 16   Troponin   Result Value Ref Range    Troponin T <0.010 <0.01 NG/ML   Brain Natriuretic Peptide   Result Value Ref Range    Pro-.6 <300 PG/ML   Urinalysis Reflex to Culture    Specimen: Urine   Result Value Ref Range    Color, UA YELLOW YELLOW    Clarity, UA CLEAR CLEAR    Glucose, Urine NEGATIVE NEGATIVE MG/DL    Bilirubin Urine NEGATIVE NEGATIVE MG/DL    Ketones, Urine MODERATE (A) NEGATIVE MG/DL    Specific Gravity, UA 1.049 (H) 1.001 - 1.035    Blood, Urine NEGATIVE NEGATIVE    pH, Urine 5.0 5.0 - 8.0    Protein, UA NEGATIVE NEGATIVE MG/DL    Urobilinogen, Urine NEGATIVE 0.2 - 1.0 MG/DL 141 135 - 145 MMOL/L    Potassium 3.6 3.5 - 5.1 MMOL/L    Chloride 102 99 - 110 mMol/L    CO2 25 21 - 32 MMOL/L    BUN 13 6 - 23 MG/DL    CREATININE 1.0 0.6 - 1.1 MG/DL    Glucose 89 70 - 99 MG/DL    Calcium 8.9 8.3 - 10.6 MG/DL    Albumin 4.8 3.4 - 5.0 GM/DL    Total Protein 6.4 6.4 - 8.2 GM/DL    Total Bilirubin 0.9 0.0 - 1.0 MG/DL    ALT 53 (H) 10 - 40 U/L    AST 34 15 - 37 IU/L    Alkaline Phosphatase 60 40 - 129 IU/L    GFR Non- 57 (L) >60 mL/min/1.73m2    GFR African American >60 >60 mL/min/1.73m2    Anion Gap 14 4 - 16   Amylase   Result Value Ref Range    Amylase 23 (L) 25 - 115 U/L   CBC Auto Differential   Result Value Ref Range    WBC 7.2 4.0 - 10.5 K/CU MM    RBC 4.26 4.2 - 5.4 M/CU MM    Hemoglobin 12.3 (L) 12.5 - 16.0 GM/DL    Hematocrit 39.0 37 - 47 %    MCV 91.5 78 - 100 FL    MCH 28.9 27 - 31 PG    MCHC 31.5 (L) 32.0 - 36.0 %    RDW 14.1 11.7 - 14.9 %    Platelets 424 560 - 088 K/CU MM    MPV 9.4 7.5 - 11.1 FL    Differential Type AUTOMATED DIFFERENTIAL     Segs Relative 59.2 36 - 66 %    Lymphocytes % 29.9 24 - 44 %    Monocytes % 8.1 (H) 0 - 4 %    Eosinophils % 2.1 0 - 3 %    Basophils % 0.6 0 - 1 %    Segs Absolute 4.2 K/CU MM    Lymphocytes Absolute 2.1 K/CU MM    Monocytes Absolute 0.6 K/CU MM    Eosinophils Absolute 0.2 K/CU MM    Basophils Absolute 0.0 K/CU MM    Nucleated RBC % 0.0 %    Total Nucleated RBC 0.0 K/CU MM    Total Immature Neutrophil 0.01 K/CU MM    Immature Neutrophil % 0.1 0 - 0.43 %   Comprehensive Metabolic Panel   Result Value Ref Range    Sodium 143 135 - 145 MMOL/L    Potassium 3.7 3.5 - 5.1 MMOL/L    Chloride 104 99 - 110 mMol/L    CO2 23 21 - 32 MMOL/L    BUN 13 6 - 23 MG/DL    CREATININE 1.0 0.6 - 1.1 MG/DL    Glucose 84 70 - 99 MG/DL    Calcium 8.7 8.3 - 10.6 MG/DL    Albumin 4.3 3.4 - 5.0 GM/DL    Total Protein 6.1 (L) 6.4 - 8.2 GM/DL    Total Bilirubin 1.0 0.0 - 1.0 MG/DL    ALT 47 (H) 10 - 40 U/L    AST 31 15 - 37 IU/L    Alkaline Phosphatase 57 40 - 128 IU/L    GFR Non- 57 (L) >60 mL/min/1.73m2    GFR African American >60 >60 mL/min/1.73m2    Anion Gap 16 4 - 16   Lipase   Result Value Ref Range    Lipase 24 13 - 60 IU/L   Protime/INR & PTT   Result Value Ref Range    Protime 12.9 11.7 - 14.5 SECONDS    INR 1.07 INDEX    aPTT 31.7 25.1 - 37.1 SECONDS   EKG 12 Lead   Result Value Ref Range    Ventricular Rate 73 BPM    Atrial Rate 73 BPM    P-R Interval 138 ms    QRS Duration 76 ms    Q-T Interval 402 ms    QTc Calculation (Bazett) 442 ms    R Axis 75 degrees    T Axis 47 degrees    Diagnosis       Normal sinus rhythm  ST & T wave abnormality, consider anterior ischemia  Abnormal ECG  When compared with ECG of 13-NOV-2020 16:35,  Previous ECG has undetermined rhythm, needs review  ST elevation now present in Inferior leads  T wave inversion no longer evident in Inferior leads  Confirmed by Lyle Enriquez MD, Rosaura Hui ([id number]) on 5/31/2021 12:23:15 PM          Radiographs (if obtained):  Radiologist's Report Reviewed:     CTA PULMONARY W CONTRAST (Final result)  Result time 06/01/21 09:08:07  Final result by Debra Dee MD (06/01/21 09:08:07)                Impression:    1. No evidence of acute pulmonary embolism. 2. Soft tissue thickening and fat stranding overlying the bilateral pectoral   muscles. Narrative:    EXAMINATION:   CTA OF THE CHEST 5/30/2021 4:57 pm     TECHNIQUE:   CTA of the chest was performed after the administration of intravenous   contrast.  Multiplanar reformatted images are provided for review.  MIP   images are provided for review. Dose modulation, iterative reconstruction,   and/or weight based adjustment of the mA/kV was utilized to reduce the   radiation dose to as low as reasonably achievable. COMPARISON:   CT chest performed October 27, 2020.      HISTORY:   ORDERING SYSTEM PROVIDED HISTORY: Chest pain, persistent postoperative nausea   and vomiting, bilateral mastectomy May 6, concern for sepsis or PE   TECHNOLOGIST PROVIDED HISTORY:   Reason for exam:->Chest pain, persistent postoperative nausea and vomiting,   bilateral mastectomy May 6, concern for sepsis or PE   Decision Support Exception - unselect if not a suspected or confirmed   emergency medical condition->Emergency Medical Condition (MA)   Reason for Exam: Chest pain, persistent postoperative nausea and vomiting,   bilateral mastectomy May 6,   Acuity: Acute   Type of Exam: Initial     FINDINGS:   Pulmonary Arteries: Pulmonary arteries are adequately opacified for   evaluation.  No evidence of intraluminal filling defect to suggest pulmonary   embolism.  Main pulmonary artery is normal in caliber. Mediastinum: The esophagus is unremarkable.  No significant mediastinal or   hilar lymphadenopathy.  No pericardial effusion.  Multivessel coronary artery   disease.  The thoracic aorta is normal in course and caliber. Lungs/pleura: No focal consolidation, pleural effusion, or pneumothorax.  The   central airway is grossly patent. Upper Abdomen: No acute abnormality of the visualized upper abdomen. Soft Tissues/Bones: No acute soft tissue or osseous abnormality.  There is   soft tissue thickening and fat stranding overlying the bilateral pectoral   muscles.                     CT ABDOMEN PELVIS W IV CONTRAST Additional Contrast? None (Final result)  Result time 05/30/21 17:32:53  Final result by Michele Mir MD (05/30/21 17:32:53)                Impression:    No acute abnormality within the abdomen and pelvis. Status post hysterectomy, appendectomy and cholecystectomy. 15 mm subcutaneus soft tissue nodule within the left lower quadrant area of   anterior abdomen.  Finding may represent sebaceous cyst or other pathology.              Narrative:    EXAMINATION:   STONE PROTOCOL CT OF THE ABDOMEN AND PELVIS     5/30/2021 4:58 pm     TECHNIQUE:   CT of the abdomen and pelvis was performed with the administration of   intravenous dextroscoliosis with tip at L2-L3 and mild levoscoliosis with tip at   L4-L5.  Grade 1 anterolisthesis of L4-L5.  Status post left hip arthroplasty. 15 mm subcutaneus soft tissue nodule within the left lower quadrant area of   anterior abdomen. .    EKG:  Twelve-lead EKG obtained at 1520 on 30 May 2021 and interpreted by me in the absence of a cardiologist.  There is no criteria ST elevation or reciprocal change. There are no hyperacute T wave changes. There is no sign of acute ischemia or infarction. This tracing shows a normal sinus rhythm with diffuse ST changes. Rate and intervals are 73 beats per minute, NV interval 138 milliseconds, QRS duration 76 milliseconds, QTc interval 442 milliseconds, and R axis normal at 75 degrees. There is no acute change compared with the most recent EKG dated March 29, 2021. Medical decision making:  Patient presents to the emergency department with persistent nausea, intermittent vomiting, and discomfort. Symptoms seem to have accelerated since her double mastectomy earlier this month. Fortunately, there does not appear to be a direct surgical complication such as abscess, seroma, fasciitis, or sepsis. Consider a prolonged reaction to anesthesia, though the timing is unusual.  Consider coincidental viral or foodborne illness. There has been no respiratory distress, hypoxia, or cyanosis. There is no clear evidence of pneumonia, congestive heart failure, acute coronary syndrome, renal dysfunction, intestinal obstruction, ischemia, aortic catastrophe, among other considerations. Procedures: None. Consultations: General surgery. Hospital service. Clinical Impression:  1. Intractable nausea and vomiting    2. Anorexia    3.  Postoperative pain      Disposition referral (if applicable):  Natty Taylor MD  [place] [id number] [phone]    Go on 6/10/2021  Follow Up, Appointment time is 2:00 PM    Kimani Ferrara MD  [address]

## 2021-05-31 ENCOUNTER — APPOINTMENT (OUTPATIENT)
Dept: CT IMAGING | Age: 61
End: 2021-05-31
Payer: COMMERCIAL

## 2021-05-31 LAB
ANION GAP SERPL CALCULATED.3IONS-SCNC: 12 MMOL/L (ref 4–16)
BACTERIA: ABNORMAL /HPF
BILIRUBIN URINE: NEGATIVE MG/DL
BLOOD, URINE: NEGATIVE
BUN BLDV-MCNC: 13 MG/DL (ref 6–23)
CALCIUM SERPL-MCNC: 8.4 MG/DL (ref 8.3–10.6)
CHLORIDE BLD-SCNC: 106 MMOL/L (ref 99–110)
CLARITY: CLEAR
CO2: 23 MMOL/L (ref 21–32)
COLOR: YELLOW
CREAT SERPL-MCNC: 1 MG/DL (ref 0.6–1.1)
EKG ATRIAL RATE: 73 BPM
EKG DIAGNOSIS: NORMAL
EKG P-R INTERVAL: 138 MS
EKG Q-T INTERVAL: 402 MS
EKG QRS DURATION: 76 MS
EKG QTC CALCULATION (BAZETT): 442 MS
EKG R AXIS: 75 DEGREES
EKG T AXIS: 47 DEGREES
EKG VENTRICULAR RATE: 73 BPM
GFR AFRICAN AMERICAN: >60 ML/MIN/1.73M2
GFR NON-AFRICAN AMERICAN: 57 ML/MIN/1.73M2
GLUCOSE BLD-MCNC: 94 MG/DL (ref 70–99)
GLUCOSE, URINE: NEGATIVE MG/DL
HCT VFR BLD CALC: 34.3 % (ref 37–47)
HEMOGLOBIN: 10.9 GM/DL (ref 12.5–16)
KETONES, URINE: ABNORMAL MG/DL
LEUKOCYTE ESTERASE, URINE: NEGATIVE
MAGNESIUM: 1.9 MG/DL (ref 1.8–2.4)
MCH RBC QN AUTO: 28.5 PG (ref 27–31)
MCHC RBC AUTO-ENTMCNC: 31.8 % (ref 32–36)
MCV RBC AUTO: 89.6 FL (ref 78–100)
MUCUS: ABNORMAL HPF
NITRITE URINE, QUANTITATIVE: NEGATIVE
PDW BLD-RTO: 14.4 % (ref 11.7–14.9)
PH, URINE: 5 (ref 5–8)
PLATELET # BLD: 165 K/CU MM (ref 140–440)
PMV BLD AUTO: 8.7 FL (ref 7.5–11.1)
POTASSIUM SERPL-SCNC: 3.3 MMOL/L (ref 3.5–5.1)
PROTEIN UA: NEGATIVE MG/DL
RBC # BLD: 3.83 M/CU MM (ref 4.2–5.4)
RBC URINE: <1 /HPF (ref 0–6)
SODIUM BLD-SCNC: 141 MMOL/L (ref 135–145)
SPECIFIC GRAVITY UA: 1.05 (ref 1–1.03)
SQUAMOUS EPITHELIAL: 3 /HPF
TRICHOMONAS: ABNORMAL /HPF
TROPONIN T: <0.01 NG/ML
TROPONIN T: <0.01 NG/ML
UROBILINOGEN, URINE: NEGATIVE MG/DL (ref 0.2–1)
WBC # BLD: 6 K/CU MM (ref 4–10.5)
WBC UA: 1 /HPF (ref 0–5)

## 2021-05-31 PROCEDURE — 80048 BASIC METABOLIC PNL TOTAL CA: CPT

## 2021-05-31 PROCEDURE — C9113 INJ PANTOPRAZOLE SODIUM, VIA: HCPCS | Performed by: STUDENT IN AN ORGANIZED HEALTH CARE EDUCATION/TRAINING PROGRAM

## 2021-05-31 PROCEDURE — 70470 CT HEAD/BRAIN W/O & W/DYE: CPT

## 2021-05-31 PROCEDURE — 93010 ELECTROCARDIOGRAM REPORT: CPT | Performed by: INTERNAL MEDICINE

## 2021-05-31 PROCEDURE — G0378 HOSPITAL OBSERVATION PER HR: HCPCS

## 2021-05-31 PROCEDURE — 81001 URINALYSIS AUTO W/SCOPE: CPT

## 2021-05-31 PROCEDURE — 96376 TX/PRO/DX INJ SAME DRUG ADON: CPT

## 2021-05-31 PROCEDURE — 6370000000 HC RX 637 (ALT 250 FOR IP): Performed by: STUDENT IN AN ORGANIZED HEALTH CARE EDUCATION/TRAINING PROGRAM

## 2021-05-31 PROCEDURE — 96375 TX/PRO/DX INJ NEW DRUG ADDON: CPT

## 2021-05-31 PROCEDURE — 6360000002 HC RX W HCPCS: Performed by: STUDENT IN AN ORGANIZED HEALTH CARE EDUCATION/TRAINING PROGRAM

## 2021-05-31 PROCEDURE — 96372 THER/PROPH/DIAG INJ SC/IM: CPT

## 2021-05-31 PROCEDURE — 83735 ASSAY OF MAGNESIUM: CPT

## 2021-05-31 PROCEDURE — 85027 COMPLETE CBC AUTOMATED: CPT

## 2021-05-31 PROCEDURE — 87086 URINE CULTURE/COLONY COUNT: CPT

## 2021-05-31 PROCEDURE — 6360000004 HC RX CONTRAST MEDICATION: Performed by: SURGERY

## 2021-05-31 PROCEDURE — 36415 COLL VENOUS BLD VENIPUNCTURE: CPT

## 2021-05-31 PROCEDURE — 84484 ASSAY OF TROPONIN QUANT: CPT

## 2021-05-31 PROCEDURE — 2580000003 HC RX 258: Performed by: STUDENT IN AN ORGANIZED HEALTH CARE EDUCATION/TRAINING PROGRAM

## 2021-05-31 RX ADMIN — CETIRIZINE HYDROCHLORIDE 10 MG: 10 TABLET, FILM COATED ORAL at 12:12

## 2021-05-31 RX ADMIN — ASPIRIN 81 MG CHEWABLE TABLET 81 MG: 81 TABLET CHEWABLE at 12:12

## 2021-05-31 RX ADMIN — SODIUM CHLORIDE, PRESERVATIVE FREE 10 ML: 5 INJECTION INTRAVENOUS at 12:11

## 2021-05-31 RX ADMIN — POTASSIUM CHLORIDE 20 MEQ: 1500 TABLET, EXTENDED RELEASE ORAL at 20:35

## 2021-05-31 RX ADMIN — METOPROLOL TARTRATE 50 MG: 50 TABLET, FILM COATED ORAL at 12:12

## 2021-05-31 RX ADMIN — EZETIMIBE 10 MG: 10 TABLET ORAL at 12:12

## 2021-05-31 RX ADMIN — ENOXAPARIN SODIUM 40 MG: 40 INJECTION SUBCUTANEOUS at 12:12

## 2021-05-31 RX ADMIN — METOPROLOL TARTRATE 50 MG: 50 TABLET, FILM COATED ORAL at 20:35

## 2021-05-31 RX ADMIN — POTASSIUM CHLORIDE 20 MEQ: 1500 TABLET, EXTENDED RELEASE ORAL at 12:12

## 2021-05-31 RX ADMIN — FUROSEMIDE 20 MG: 20 TABLET ORAL at 20:35

## 2021-05-31 RX ADMIN — FUROSEMIDE 20 MG: 20 TABLET ORAL at 12:12

## 2021-05-31 RX ADMIN — ONDANSETRON 4 MG: 2 INJECTION INTRAMUSCULAR; INTRAVENOUS at 12:11

## 2021-05-31 RX ADMIN — IOPAMIDOL 50 ML: 755 INJECTION, SOLUTION INTRAVENOUS at 14:41

## 2021-05-31 RX ADMIN — PANTOPRAZOLE SODIUM 40 MG: 40 INJECTION, POWDER, FOR SOLUTION INTRAVENOUS at 12:12

## 2021-05-31 RX ADMIN — SODIUM CHLORIDE: 9 INJECTION, SOLUTION INTRAVENOUS at 01:52

## 2021-05-31 RX ADMIN — LOSARTAN POTASSIUM 25 MG: 25 TABLET, FILM COATED ORAL at 12:12

## 2021-05-31 RX ADMIN — POTASSIUM CHLORIDE 10 MEQ: 7.46 INJECTION, SOLUTION INTRAVENOUS at 01:11

## 2021-05-31 RX ADMIN — FLUOXETINE HYDROCHLORIDE 10 MG: 10 CAPSULE ORAL at 12:12

## 2021-05-31 RX ADMIN — SODIUM CHLORIDE, PRESERVATIVE FREE 10 ML: 5 INJECTION INTRAVENOUS at 20:36

## 2021-05-31 NOTE — PLAN OF CARE
Nutrition Problem #1: In context of acute illness or injury, Moderate malnutrition  Intervention: Food and/or Nutrient Delivery: Continue Current Diet, Start Oral Nutrition Supplement  Nutritional Goals: Pt will consume at least half of her meals and supplements

## 2021-05-31 NOTE — ED NOTES
Attempted to call report. Requesting this nurse to call back in 5 minutes.       Nancy Armijo, RN  05/30/21 1449

## 2021-05-31 NOTE — CONSULTS
[facility]  Consultation Note    Carisa Chu M.D.      Reason for Consult: Nausea vomiting history of breast cancer      Patient's Name/Date of Birth: Snehal Saravia / 1960 (25 y.o.)    Date: May 31, 2021     HPI:  71-year-old female who is a patient of my partners who underwent bilateral mastectomies for left breast cancer on 5/6/2021. Final pathology was T2 N0 M0 her recovery is uneventful and she is being evaluated by oncology for the possible indication for chemotherapy but has not had received any chemotherapy. The patient developed nausea and vomiting last Wednesday. The symptoms of nausea and vomiting have persisted she came the emergency room where she was evaluated had a CT scan of the abdomen and pelvis which was normal.  She has had a previous cholecystectomy. I was contacted by the emergency room physician. The patient presently is in no distress she says that she has had nausea poor appetite. She does have complaints of intermittent headaches and occasional visual changes. Patient has not had a CT scan of the brain to exclude metastatic disease or intracranial pathology. As stated above she has had no complications following her bilateral mastectomies    Past Medical History:   Diagnosis Date    Allergic rhinitis due to pollen 11/19/2015    Arthritis     left hip & knee    Chronic back pain     Depression     Gastroesophageal reflux disease 11/19/2015    Hearing loss 11/19/2015    History of blood transfusion     No reaction per pt    History of cardiac monitoring 04/18/2017    14 day event monitor. Sinus Rhythm    History of nuclear stress test 09/28/2016    cardiolite-normal,EF70%    Hot flashes due to surgical menopause 11/19/2015    Hx of echocardiogram 10/05/2016    EF: >55 %   Mild mitral and tricuspid regurg.      Hyperlipidemia     Hypertension     Follows with PCP    Lexiscan Stress Test 10/14/2020    EF 60%, Normal study, no ischemia  Meniere disease     Morbid obesity due to excess calories ([facility] [address] 11/19/2015    Sleep apnea 11/19/2015    sleep study negative    Tilt table evaluation 05/09/2017     positive for neurocardiogenic syncope       Past Surgical History:   Procedure Laterality Date    APPENDECTOMY  1967    CHOLECYSTECTOMY      2017    COLONOSCOPY  2014    Polyps x2 - Dr. George Macias Bilateral 05/06/2021    Dr. Chirinos Palm Left 10/19/2020    LEFT HIP TOTAL ARTHROPLASTY - POSTERIOR performed by Alysia Chen MD at [place] [address] LEFT Left 3/9/2021    US BREAST BIOPSY NEEDLE ADDITIONAL LEFT 3/9/2021 Emilia Blancas MD [address] BREAST BIOPSY NEEDLE ADDITIONAL LEFT Left 3/9/2021    US BREAST BIOPSY NEEDLE ADDITIONAL LEFT 3/9/2021 Emilia Blancas MD [address] BREAST NEEDLE BIOPSY LEFT Left 3/9/2021    US BREAST NEEDLE BIOPSY LEFT 3/9/2021 Emilia Blancas MD [facility]       Allergies   Allergen Reactions    Celexa [Citalopram] Other (See Comments)     Stomach pain        Atorvastatin      Leg cramps      Fenofibrate      angioedema    Mestinon [Pyridostigmine] Itching and Swelling    Penicillins     Sulfa Antibiotics      Other reaction(s): Hives/Throat closes    Tape Arvie Yoana Tape]      PLASTIC TAPE    Gemfibrozil Rash    Meloxicam Other (See Comments)     moodswings       Family History   Problem Relation Age of Onset    Heart Disease Mother     High Blood Pressure Mother     High Cholesterol Mother     Cancer Mother 80        Stomach    Diabetes Mother     Stroke Mother     Heart Disease Father     High Blood Pressure Father     High Cholesterol Father     Diabetes Father     Depression Father     Cancer Sister 46        Lung cancer    High Blood Pressure Sister     Other Sister         migraines, osteoarthritis    Mental Illness Sister     Depression Sister    [employer] Cancer Maternal Grandmother         Stomach    Diabetes Maternal Grandmother     Diabetes Maternal Grandfather     Diabetes Paternal Grandmother     Diabetes Paternal Grandfather     Cancer Maternal Cousin 47        Pancreatic       Social History     Socioeconomic History    Marital status:      Spouse name: Not on file    Number of children: Not on file    Years of education: Not on file    Highest education level: Not on file   Occupational History    Not on file   Tobacco Use    Smoking status: Never Smoker    Smokeless tobacco: Never Used    Tobacco comment: Reviewed    Vaping Use    Vaping Use: Never used   Substance and Sexual Activity    Alcohol use: No    Drug use: No    Sexual activity: Not Currently     Partners: Male   Other Topics Concern    Not on file   Social History Narrative    Not on file     Social Determinants of Health     Financial Resource Strain:     Difficulty of Paying Living Expenses:    Food Insecurity:     Worried About Running Out of Food in the Last Year:     Ran Out of Food in the Last Year:    Transportation Needs:     Lack of Transportation (Medical):      Lack of Transportation (Non-Medical):    Physical Activity:     Days of Exercise per Week:     Minutes of Exercise per Session:    Stress:     Feeling of Stress :    Social Connections:     Frequency of Communication with Friends and Family:     Frequency of Social Gatherings with Friends and Family:     Attends Sikh Services:     Active Member of Clubs or Organizations:     Attends Club or Organization Meetings:     Marital Status:    Intimate Partner Violence:     Fear of Current or Ex-Partner:     Emotionally Abused:     Physically Abused:     Sexually Abused:        ROS: Non-contributory    Physical Exam:  Vitals:    05/31/21 0419   BP: 130/61   Pulse: 63   Resp: 16   Temp: 98 °F (36.7 °C)   SpO2: 96%       Chest: Breath sounds were clear and equal with no rales, wheezes, or rhonchi. Respiratory effort was normal with no retractions or use of accessory muscles. Incisions on the chest are well-healed    Cardiovascular: Heart sounds were normal with a regular rate and rhythm. There were no murmurs or gallops. Abdomen: Obese bowel sounds were normal.  The abdomen was soft and non distended. There was no tenderness, guarding, rebound, or rigidity. There was no masses, hepatosplenomegaly, or hernias. Patricio Kike previous laparoscopic cholecystectomy    Genitourinary: No inguinal hernias were noted on coughing and straining. Labs    CBC:   Lab Results   Component Value Date    WBC 6.0 05/31/2021    RBC 3.83 05/31/2021    HGB 10.9 05/31/2021    HCT 34.3 05/31/2021    MCV 89.6 05/31/2021    MCH 28.5 05/31/2021    MCHC 31.8 05/31/2021    RDW 14.4 05/31/2021     05/31/2021    MPV 8.7 05/31/2021         Assessment/Plan:  47-year-old female with a history of stage I left breast cancer admitted with symptoms unrelated to her surgery of nausea vomiting headaches visual changes  Recommend CT of the brain to exclude occult metastatic disease or any other pathology which could be contributing to her headaches and nausea and visual changes  If CT scan is normal consider GI consult  Dr. Krystina Maddox will be back tomorrow.   No evidence of intra-abdominal pathology causing the patient's symptoms    Yvonne Kuhn MD   5/31/2021 at 8:51 AM

## 2021-05-31 NOTE — H&P
History and Physical      Name:  Jennifer Noland /Age/Sex: 1960  (64 y.o. female)   MRN & CSN:  [id number] & [id number] Admission Date/Time: 2021  3:09 PM   Location:  [address] [place] [address] PCP: Jerry Blakely MD       Hospital Day: 1    Assessment and Plan:   Jennifer Noland is a 64 y.o.  female  who presents with Nausea and vomiting    1. Intractable nausea and vomiting likely secondary to gastroenteritis  2. Mild transaminitis  3. History of infiltrating ductal carcinoma s/p bilateral mastectomy with postop chest wall pain  -Admit to observation services  -zofran intravenous, phenergan oral, reglan oral for nausea  -Pain control with home Percocet oral  -IV protonix daily  -Consider Reglan  -Check stool for occult blood  -ED gave 1 L fluid bolus of normal saline. We will continue with IV NS at 75 mL an hour for 10 hours  -Clear liquid diet  -General surgery contacted from ED, will see in a.m.  -A.m. labs    4. Hypokalemia  -Potassium 3.2 in ED. ED gave 10 mEq IV potassium  -We will continue with 10 mEq IV potassium every 2 hours x2 doses  -A. m. labs    Other chronic medical conditions:   Continue all home meds except stated above or contraindicated.  HTN   History of vasovagal syncope   HLD   Morbid obesity   Anxiety and depression   GERD   Seasonal allergies   CAD    Diet DIET CLEAR LIQUID;   DVT Prophylaxis [x] Lovenox, []  Heparin, [] SCDs, [] Ambulation   GI Prophylaxis [x] PPI,  [] H2 Blocker,  [] Carafate,  [] Diet/Tube Feeds   Code Status Full Code   Disposition Patient requires continued admission due to Nausea and vomiting   MDM [] Low, [] Moderate,[x]  High  Patient's risk as above due to acuity of condition with potential for decompensation.      History of Present Illness:     Chief Complaint: Nausea and vomiting    Jennifer Noland is a 64 y.o.  female with a reviewed and noncontributory family history and a PMH as stated above, who presents with complaints of nausea and vomiting or output data in the 24 hours ending 05/30/21 1048   Vitals:   Vitals:    05/30/21 2200   BP: (!) 160/91   Pulse: 81   Resp: 16   Temp: 98.7 °F (37.1 °C)   SpO2: 98%     BP (!) 160/91   Pulse 81   Temp 98.7 °F (37.1 °C) (Oral)   Resp 16   Ht 5' 6\" (1.676 m)   Wt 222 lb (100.7 kg)   SpO2 98%   BMI 35.83 kg/m²   Physical Exam:   Physical Exam  Vitals and nursing note reviewed. Constitutional:       General: She is not in acute distress. Appearance: Normal appearance. She is obese. She is not ill-appearing, toxic-appearing or diaphoretic. Interventions: She is not intubated. HENT:      Head: Atraumatic. Right Ear: External ear normal.      Left Ear: External ear normal.      Nose: Nose normal. No rhinorrhea. Mouth/Throat:      Mouth: Mucous membranes are moist.   Eyes:      General: No scleral icterus. Extraocular Movements: Extraocular movements intact. Conjunctiva/sclera: Conjunctivae normal.      Pupils: Pupils are equal, round, and reactive to light. Cardiovascular:      Rate and Rhythm: Normal rate and regular rhythm. Pulses: Normal pulses. Heart sounds: Normal heart sounds. No murmur heard. No gallop. Pulmonary:      Effort: Pulmonary effort is normal. No tachypnea or prolonged expiration. She is not intubated. Breath sounds: Normal breath sounds. No decreased breath sounds, wheezing, rhonchi or rales. Abdominal:      General: Abdomen is protuberant. Bowel sounds are normal. There is no distension. Palpations: Abdomen is soft. Tenderness: There is no abdominal tenderness. There is no guarding or rebound. Negative signs include Maddox's sign and Rovsing's sign. Musculoskeletal:         General: Normal range of motion. Cervical back: Neck supple. Right lower leg: No edema. Left lower leg: No edema. Skin:     General: Skin is warm and dry. Capillary Refill: Capillary refill takes less than 2 seconds.       Findings: Laceration present. Comments: Bilateral chest wall mastectomies. Did not do a skin inspection, no chaperone present. Neurological:      General: No focal deficit present. Mental Status: She is alert and oriented to person, place, and time. Mental status is at baseline. Cranial Nerves: No cranial nerve deficit, dysarthria or facial asymmetry. Motor: No tremor or seizure activity. Psychiatric:         Attention and Perception: She is attentive. Mood and Affect: Mood is not anxious. Speech: She is communicative. Speech is not slurred. Behavior: Behavior is cooperative. Past Medical History:      Past Medical History:   Diagnosis Date    Allergic rhinitis due to pollen 11/19/2015    Arthritis     left hip & knee    Chronic back pain     Depression     Gastroesophageal reflux disease 11/19/2015    Hearing loss 11/19/2015    History of blood transfusion     No reaction per pt    History of cardiac monitoring 04/18/2017    14 day event monitor. Sinus Rhythm    History of nuclear stress test 09/28/2016    cardiolite-normal,EF70%    Hot flashes due to surgical menopause 11/19/2015    Hx of echocardiogram 10/05/2016    EF: >55 %   Mild mitral and tricuspid regurg.  Hyperlipidemia     Hypertension     Follows with PCP    Lexiscan Stress Test 10/14/2020    EF 60%, Normal study, no ischemia    Meniere disease     Morbid obesity due to excess calories ([facility] [address] 11/19/2015    Sleep apnea 11/19/2015    sleep study negative    Tilt table evaluation 05/09/2017     positive for neurocardiogenic syncope     PSHX:  has a past surgical history that includes Cholecystectomy; Appendectomy (1967); Tonsillectomy (1970); Colonoscopy (2014); Hysterectomy (1989);  Total hip arthroplasty (Left, 10/19/2020); US BREAST BIOPSY W LOC DEVICE EACH ADDL LESION LEFT (Left, 3/9/2021); US BREAST BIOPSY W LOC DEVICE 1ST LESION LEFT (Left, 3/9/2021); US BREAST BIOPSY W LOC DEVICE EACH Medications:   Prior to Admission medications    Medication Sig Start Date End Date Taking? Authorizing Provider   oxyCODONE-acetaminophen (PERCOCET) 5-325 MG per tablet Take 1 tablet by mouth every 6 hours as needed for Pain for up to 14 days. Intended supply: 5 days.  Take lowest dose possible to manage pain 5/28/21 6/11/21 Yes Ayla Franco MD   FLUoxetine (PROZAC) 10 MG capsule Take 10 mg by mouth daily   Yes Historical Provider, MD   furosemide (LASIX) 20 MG tablet Take 20 mg by mouth 2 times daily   Yes Historical Provider, MD   aspirin 81 MG chewable tablet Take 81 mg by mouth daily   Yes Historical Provider, MD   Multiple Vitamins-Minerals (HAIR SKIN AND NAILS FORMULA) TABS Take by mouth   Yes Historical Provider, MD   losartan (COZAAR) 25 MG tablet Take 1 tablet by mouth daily 2/19/21  Yes Damaris Hong MD   acetaminophen (TYLENOL) 500 MG tablet Take 500 mg by mouth every 6 hours as needed for Pain   Yes Historical Provider, MD   potassium chloride (KLOR-CON M) 20 MEQ extended release tablet Take 1 tablet by mouth 2 times daily 11/2/20  Yes Terry Watters MD   ezetimibe (ZETIA) 10 MG tablet Take 1 tablet by mouth daily 7/6/20  Yes Damaris Hong MD   metoprolol tartrate (LOPRESSOR) 50 MG tablet Take 1 tablet by mouth 2 times daily 7/6/20  Yes Damaris Hong MD   hydroCHLOROthiazide (HYDRODIURIL) 25 MG tablet Take 0.5 tablets by mouth daily 7/6/20  Yes Damaris Hong MD   loratadine (CLARITIN) 10 MG tablet Take 1 tablet by mouth daily 5/9/19  Yes Damaris Hong MD     Medications:    potassium chloride  10 mEq Intravenous Q2H    [START ON 5/31/2021] aspirin  81 mg Oral Daily    [START ON 5/31/2021] ezetimibe  10 mg Oral Daily    [START ON 5/31/2021] FLUoxetine  10 mg Oral Daily    furosemide  20 mg Oral BID    [START ON 5/31/2021] hydroCHLOROthiazide  12.5 mg Oral Daily    [START ON 5/31/2021] cetirizine  10 mg Oral Daily    [START ON 5/31/2021] losartan  25 mg Oral Daily    metoprolol tartrate  50 mg Oral BID    [START ON 5/31/2021] potassium chloride  20 mEq Oral BID    sodium chloride flush  5-40 mL Intravenous 2 times per day    [START ON 5/31/2021] enoxaparin  40 mg Subcutaneous Daily    [START ON 5/31/2021] pantoprazole  40 mg Intravenous Daily      Infusions:    sodium chloride 1,000 mL (05/30/21 7107)    sodium chloride      sodium chloride       PRN Meds: oxyCODONE-acetaminophen, 1 tablet, Q6H PRN  sodium chloride flush, 5-40 mL, PRN  sodium chloride, 25 mL, PRN  ondansetron, 4 mg, Q6H PRN  polyethylene glycol, 17 g, Daily PRN  acetaminophen, 650 mg, Q6H PRN   Or  acetaminophen, 650 mg, Q6H PRN  metoclopramide, 10 mg, Q6H PRN        Electronically signed by Cristine Oneal DO on 5/30/2021 at 11:48 PM      This dictation was created with voice recognition software. While attempts have been made to review the dictation as it is transcribed, on occasion the spoken word can be misinterpreted by the technology leading to omissions or inappropriate words, phrases or sentences.

## 2021-05-31 NOTE — PROGRESS NOTES
[address] [place] [address]  HOSPITALIST PROGRESS NOTE                       Name:  Ernie Harris /Age/Sex: 1960  (64 y.o. female)   MRN & CSN:  [id number] & [id number] Admission Date/Time: 2021  3:09 PM   Location:  [address] [place] [address] Attending:  Linsey Day MD                                                  HPI  Ernie Harris is a 64 y.o. female who presents with poor oral intake/nausea/vomiting    SUBJECTIVE  Reports very poor oral intake for the last one week and whenever eating gets nauseated. Denies significant abdominal pain. Reports having refluxes disease    10 point review of systems reviewed and negative unless noted above. ALLERGIES:   Allergies   Allergen Reactions    Celexa [Citalopram] Other (See Comments)     Stomach pain        Atorvastatin      Leg cramps      Fenofibrate      angioedema    Mestinon [Pyridostigmine] Itching and Swelling    Penicillins     Sulfa Antibiotics      Other reaction(s): Hives/Throat closes    Tape Iris Spore Tape]      PLASTIC TAPE    Gemfibrozil Rash    Meloxicam Other (See Comments)     moodswings       PCP: Erica Gonzalez MD    PAST MEDICAL HISTORY, SURGICAL HISTORY, SOCIAL HISTORY and  HOME MEDICATIONS all reviewed. OBJECTIVE  Vitals:    21 2102 21 2200 21 2230 21 0419   BP: (!) 155/94 (!) 160/91  130/61   Pulse: 82 81  63   Resp: 15 16  16   Temp:  98.7 °F (37.1 °C)  98 °F (36.7 °C)   TempSrc:  Oral  Oral   SpO2: 97% 98%  96%   Weight:   222 lb (100.7 kg) 219 lb (99.3 kg)   Height:   5' 6\" (1.676 m)        PHYSICAL EXAM   GEN Awake female, sitting upright in bed in no apparent distress. EYES Pupils are equally round. No scleral erythema, discharge, or conjunctivitis. HENT Mucous membranes are dry. Oral pharynx without exudates, no evidence of thrush. NECK Supple, no apparent thyromegaly or masses. RESP Clear to auscultation, no wheezes, rales or rhonchi. Symmetric chest movement   CARDIO/VASC S1/S2 auscultated. Regular rate without appreciable murmurs, rubs, or gallops. No JVD or carotid bruits. Peripheral pulses equal bilaterally and palpable. No peripheral edema. GI Abdomen is soft without significant tenderness, masses, or guarding. Bowel sounds are normoactive. Rectal exam deferred.  No costovertebral angle tenderness. Normal appearing external genitalia. HEME/LYMPH No palpable cervical lymphadenopathy and no hepatosplenomegaly. No petechiae or ecchymoses. MSK Spontaneous movement of all extremities. No gross joint deformities. SKIN Normal coloration, warm, dry. NEURO Cranial nerves appear grossly intact, normal speech, no lateralizing weakness. INTAKE: In: 8338 [I.V.:1330]  Out: 1150   OUTPUT: In: 1530   Out: 1150 [Urine:1150]    LABS  Recent Labs     05/30/21  1516 05/31/21  0446   WBC 6.3 6.0   HGB 12.3* 10.9*   HCT 37.9 34.3*    165      Recent Labs     05/30/21  1516 05/31/21  0446    141   K 3.2* 3.3*    106   CO2 24 23   BUN 17 13   CREATININE 1.0 1.0     Recent Labs     05/30/21  1516   AST 54*   ALT 68*   BILITOT 1.0   ALKPHOS 66     No results for input(s): INR in the last 72 hours.   Recent Labs     05/30/21  1516 05/30/21  2315 05/31/21  0446   TROPONINT <0.010 <0.010 <0.010          Abnormal labs for today noted      Imaging:     ECHO:    Microbiology:  Blood culture:    Urine culture:    Sputum culture:    Procedures done this admission:    MEDS  Scheduled Meds:   aspirin  81 mg Oral Daily    ezetimibe  10 mg Oral Daily    FLUoxetine  10 mg Oral Daily    furosemide  20 mg Oral BID    hydroCHLOROthiazide  12.5 mg Oral Daily    cetirizine  10 mg Oral Daily    losartan  25 mg Oral Daily    metoprolol tartrate  50 mg Oral BID    potassium chloride  20 mEq Oral BID    sodium chloride flush  5-40 mL Intravenous 2 times per day    enoxaparin  40 mg Subcutaneous Daily    pantoprazole  40 mg Intravenous Daily     Continuous Infusions:   sodium chloride Stopped (05/31/21 0152)    sodium chloride       PRN Meds:oxyCODONE-acetaminophen, sodium chloride flush, sodium chloride, [DISCONTINUED] promethazine **OR** ondansetron, polyethylene glycol, acetaminophen **OR** acetaminophen, metoclopramide        ASSESSMENT and [facility] Hospital Day: 2    1-?Gastroenteritis with poor appetite, nausea/vomiting- ongoing for the last one week- CT non-acute, has underlying GERD for which on PPI- supportive care at this time and consult GI for input. Advance diet as tolerated. 2-Headaches with ?confusion and reported visual changes- will go ahead and get MRI with/without contrast given cancer hx- no focal deficit when seen. 3-Breast with infiltrating ductal carcinoma s/p bilateral mastectomy on 5/6 locally at [address] by - appearing doing well on this, to follow up with Kalyani Amezcua as an outpatient    K replaced- recheck labs in am     Other chronic medical conditions:   Continue all home meds except stated above or contraindicated.   · HTN  · History of vasovagal syncope  · HLD  · Morbid obesity  · Anxiety and depression  · GERD  · Seasonal allergies  · CAD        Disp:     Diet DIET CLEAR LIQUID;   DVT Prophylaxis [] Lovenox, []  Heparin, [] SCDs, [] Ambulation   GI Prophylaxis [] PPI,  [] H2 Blocker,  [] Carafate,  [] Diet/Tube Feeds   Code Status Full Code   Disposition Patient requires continued admission due to nausea/vomiting   CMS Level of Risk [] Low, [x] Moderate,[]  High  Patient's risk as above due to nausea/vomiting     KENNEDI RUIZ MD 5/31/2021 9:30 AM

## 2021-06-01 ENCOUNTER — APPOINTMENT (OUTPATIENT)
Dept: MRI IMAGING | Age: 61
End: 2021-06-01
Payer: COMMERCIAL

## 2021-06-01 LAB
ALBUMIN SERPL-MCNC: 4.8 GM/DL (ref 3.4–5)
ALP BLD-CCNC: 60 IU/L (ref 40–129)
ALT SERPL-CCNC: 53 U/L (ref 10–40)
ANION GAP SERPL CALCULATED.3IONS-SCNC: 14 MMOL/L (ref 4–16)
AST SERPL-CCNC: 34 IU/L (ref 15–37)
BILIRUB SERPL-MCNC: 0.9 MG/DL (ref 0–1)
BUN BLDV-MCNC: 13 MG/DL (ref 6–23)
CALCIUM SERPL-MCNC: 8.9 MG/DL (ref 8.3–10.6)
CHLORIDE BLD-SCNC: 102 MMOL/L (ref 99–110)
CO2: 25 MMOL/L (ref 21–32)
CREAT SERPL-MCNC: 1 MG/DL (ref 0.6–1.1)
CULTURE: NORMAL
GFR AFRICAN AMERICAN: >60 ML/MIN/1.73M2
GFR NON-AFRICAN AMERICAN: 57 ML/MIN/1.73M2
GLUCOSE BLD-MCNC: 89 MG/DL (ref 70–99)
HCT VFR BLD CALC: 38.1 % (ref 37–47)
HEMOGLOBIN: 11.9 GM/DL (ref 12.5–16)
Lab: NORMAL
MCH RBC QN AUTO: 27.9 PG (ref 27–31)
MCHC RBC AUTO-ENTMCNC: 31.2 % (ref 32–36)
MCV RBC AUTO: 89.2 FL (ref 78–100)
PDW BLD-RTO: 14.4 % (ref 11.7–14.9)
PLATELET # BLD: 176 K/CU MM (ref 140–440)
PMV BLD AUTO: 9.6 FL (ref 7.5–11.1)
POTASSIUM SERPL-SCNC: 3.6 MMOL/L (ref 3.5–5.1)
RBC # BLD: 4.27 M/CU MM (ref 4.2–5.4)
SODIUM BLD-SCNC: 141 MMOL/L (ref 135–145)
SPECIMEN: NORMAL
TOTAL PROTEIN: 6.4 GM/DL (ref 6.4–8.2)
WBC # BLD: 6.4 K/CU MM (ref 4–10.5)

## 2021-06-01 PROCEDURE — 2580000003 HC RX 258: Performed by: STUDENT IN AN ORGANIZED HEALTH CARE EDUCATION/TRAINING PROGRAM

## 2021-06-01 PROCEDURE — G0378 HOSPITAL OBSERVATION PER HR: HCPCS

## 2021-06-01 PROCEDURE — 94761 N-INVAS EAR/PLS OXIMETRY MLT: CPT

## 2021-06-01 PROCEDURE — 36415 COLL VENOUS BLD VENIPUNCTURE: CPT

## 2021-06-01 PROCEDURE — 6360000002 HC RX W HCPCS: Performed by: STUDENT IN AN ORGANIZED HEALTH CARE EDUCATION/TRAINING PROGRAM

## 2021-06-01 PROCEDURE — 96372 THER/PROPH/DIAG INJ SC/IM: CPT

## 2021-06-01 PROCEDURE — 80053 COMPREHEN METABOLIC PANEL: CPT

## 2021-06-01 PROCEDURE — 6370000000 HC RX 637 (ALT 250 FOR IP): Performed by: STUDENT IN AN ORGANIZED HEALTH CARE EDUCATION/TRAINING PROGRAM

## 2021-06-01 PROCEDURE — 70551 MRI BRAIN STEM W/O DYE: CPT

## 2021-06-01 PROCEDURE — C9113 INJ PANTOPRAZOLE SODIUM, VIA: HCPCS | Performed by: STUDENT IN AN ORGANIZED HEALTH CARE EDUCATION/TRAINING PROGRAM

## 2021-06-01 PROCEDURE — 96375 TX/PRO/DX INJ NEW DRUG ADDON: CPT

## 2021-06-01 PROCEDURE — 96376 TX/PRO/DX INJ SAME DRUG ADON: CPT

## 2021-06-01 PROCEDURE — 85027 COMPLETE CBC AUTOMATED: CPT

## 2021-06-01 RX ADMIN — POTASSIUM CHLORIDE 20 MEQ: 1500 TABLET, EXTENDED RELEASE ORAL at 10:46

## 2021-06-01 RX ADMIN — LOSARTAN POTASSIUM 25 MG: 25 TABLET, FILM COATED ORAL at 10:46

## 2021-06-01 RX ADMIN — OXYCODONE HYDROCHLORIDE AND ACETAMINOPHEN 1 TABLET: 5; 325 TABLET ORAL at 10:45

## 2021-06-01 RX ADMIN — POTASSIUM CHLORIDE 20 MEQ: 1500 TABLET, EXTENDED RELEASE ORAL at 20:59

## 2021-06-01 RX ADMIN — SODIUM CHLORIDE, PRESERVATIVE FREE 10 ML: 5 INJECTION INTRAVENOUS at 10:47

## 2021-06-01 RX ADMIN — CETIRIZINE HYDROCHLORIDE 10 MG: 10 TABLET, FILM COATED ORAL at 10:46

## 2021-06-01 RX ADMIN — METOPROLOL TARTRATE 50 MG: 50 TABLET, FILM COATED ORAL at 20:59

## 2021-06-01 RX ADMIN — ASPIRIN 81 MG CHEWABLE TABLET 81 MG: 81 TABLET CHEWABLE at 10:46

## 2021-06-01 RX ADMIN — FUROSEMIDE 20 MG: 20 TABLET ORAL at 10:46

## 2021-06-01 RX ADMIN — METOPROLOL TARTRATE 50 MG: 50 TABLET, FILM COATED ORAL at 10:46

## 2021-06-01 RX ADMIN — FUROSEMIDE 20 MG: 20 TABLET ORAL at 20:59

## 2021-06-01 RX ADMIN — EZETIMIBE 10 MG: 10 TABLET ORAL at 10:46

## 2021-06-01 RX ADMIN — ENOXAPARIN SODIUM 40 MG: 40 INJECTION SUBCUTANEOUS at 10:46

## 2021-06-01 RX ADMIN — PANTOPRAZOLE SODIUM 40 MG: 40 INJECTION, POWDER, FOR SOLUTION INTRAVENOUS at 10:46

## 2021-06-01 RX ADMIN — FLUOXETINE HYDROCHLORIDE 10 MG: 10 CAPSULE ORAL at 10:50

## 2021-06-01 RX ADMIN — SODIUM CHLORIDE, PRESERVATIVE FREE 10 ML: 5 INJECTION INTRAVENOUS at 21:00

## 2021-06-01 RX ADMIN — METOCLOPRAMIDE 10 MG: 5 INJECTION, SOLUTION INTRAMUSCULAR; INTRAVENOUS at 12:11

## 2021-06-01 ASSESSMENT — PAIN SCALES - GENERAL
PAINLEVEL_OUTOF10: 0
PAINLEVEL_OUTOF10: 7

## 2021-06-01 ASSESSMENT — PAIN DESCRIPTION - PAIN TYPE: TYPE: ACUTE PAIN

## 2021-06-01 ASSESSMENT — PAIN DESCRIPTION - LOCATION: LOCATION: CHEST

## 2021-06-01 NOTE — PROGRESS NOTES
MRI BRAIN W AND WO CHANGED TO WITHOUT ONLY DUE TO PATIENT GETTING CLAUSTROPHOBIC AND UNABLE TO TOLERATE BEING IN SCANNER ANY LONGER.

## 2021-06-01 NOTE — CONSULTS
[address], [address] [facility]                                  CONSULTATION    PATIENT NAME: Jeanette Martin                  :        1960  MED REC NO:   [id number]                          ROOM:       0685  ACCOUNT NO:   [de-identified]                           ADMIT DATE: 2021  PROVIDER:     Concepcion Kirk MD    CONSULT DATE:  2021    PRIMARY PHYSICIAN:  Dr. Boo Rapp. The patient is in room 1123. CHIEF COMPLAINT:  Upper abdominal pain with nausea, vomiting, and weight  loss; etiology to be determined. HISTORY OF PRESENT ILLNESS:  As follows. The patient is a 51-year-old  white female, a patient of Dr. Boo Rapp, with past medical history  significant for hypertension; hyperlipidemia; valvular heart disease;  depression; obesity; gastroesophageal reflux disease; obstructive sleep  apnea; osteoarthritis; history of carcinoma of the breast, status post  bilateral mastectomy by Dr. Yuni Negron on 2021 who was admitted to the  hospital on 2021 with about 5-day history of epigastric pain along  with nausea, vomiting, anorexia, and about 15-pound weight loss in the  last 1 week or so. There is no history of hematemesis, melena,  hematochezia, or recent change in bowel habits. The patient has never  had an EGD done, but did have a colonoscopy done by Dr. Cesar Sanon on  2014 and had a cecal polyp removed which showed fragments of  tubular adenoma and also had a 5 mm polyp removed from the descending  colon which was tubular adenoma as well. The patient was requested to  have a followup colonoscopy, but so far she has not had one. The  patient is hemodynamically stable. The patient has never had an EGD  done. REVIEW OF SYSTEMS:  CENTRAL NERVOUS SYSTEM:  The patient complains of occasional headaches,  but denies focal sensorimotor symptoms.   CARDIOVASCULAR SYSTEM:  No history of chest pain, shortness of breath,  or leg swelling. GENITOURINARY SYSTEM:  No history of dysuria, pyuria, or hematuria. MUSCULOSKELETAL SYSTEM:  The patient complains of generalized weakness. RESPIRATORY SYSTEM:  No history of cough, hemoptysis, fever, or chills. PAST MEDICAL HISTORY:  Significant for history of hypertension;  hyperlipidemia; depression; osteoarthritis; chronic back pain; sleep  apnea; gastroesophageal reflux disease; history of carcinoma of the  breast, status post bilateral mastectomy on 05/06/2021. FAMILY HISTORY:  The patient's maternal grandmother and mother both were  diagnosed with carcinoma of the breast, colon, and \"stomach. \"    MEDICATIONS:  Please refer to the chart. SOCIOECONOMIC HISTORY:  No history of EtOH abuse. The patient does not  smoke cigarettes. PAST SURGICAL HISTORY:  The patient has had tonsillectomy and  adenoidectomy done, hysterectomy, appendectomy, cholecystectomy,  bilateral mastectomy, left hip replacement, and multiple breast  biopsies. ALLERGIES:  The patient has multiple allergies. Please refer to the  chart. PHYSICAL EXAMINATION:  GENERAL:  Shows a 61-year-old white female who is obese, lying flat in  bed, in no acute distress. She is awake, alert, and oriented and  pleasant to talk with. VITAL SIGNS:  Stable. HEENT:  Shows skull to be atraumatic. NECK:  Supple. CHEST:  Clear. HEART:  S1, S2 are normal.  ABDOMEN:  Soft, nontender, nondistended. Liver and spleen are  nonpalpable. Bowel sounds are present. RECTAL:  Exam is deferred. CNS:  Shows patient to be awake, alert, and oriented. There is no focal  sensory or motor sign. MUSCULOSKELETAL SYSTEM:  Exam shows evidence of degenerative joint  disease changes. LABORATORY DATA:  The labs drawn during the present hospitalization  comprised of Chem profile today which is unremarkable. CBC shows WBC  count of 6.4, hemoglobin 11.9, platelet count 405,509.   The patient's  LFTs on 05/30/2021 showed mild elevation of transaminases with AST of  54, ALT of 68, total bilirubin was 1.0.  CAT scan of the abdomen and  pelvis was negative for acute findings. IMPRESSION:  3  A 57-year-old white female with multiple comorbidities; recently  diagnosed carcinoma of the breast, status post bilateral mastectomy on  05/06/2021 presents with epigastric pain, nausea, vomiting, anorexia,  weight loss, etiology to be determined. 2.  History of colon polyps. RECOMMENDATIONS:  1. Agree with present management with IV fluids along with parenteral  analgesics and antiemetics. 2.  We will continue the patient on Protonix. 3.  We will check CBC, Chem profile, amylase, lipase, PT, PTT, INR in  a.m.  4.  The patient will need an EGD and a colonoscopy as a part of workup  of her abdominal symptoms and history of colon polyps. 7.  Small bowel evaluation later if needed. 8.  The case and plan have been discussed in detail with the patient.         Andres Pitts MD    D: 06/01/2021 11:18:41       T: 06/01/2021 11:24:39     [id number]  Job#: [id number]     Doc#: [id number]    CC:  Jesus Sullivan MD

## 2021-06-01 NOTE — CARE COORDINATION
Chart reviewed and screened for possible needs at discharge. Pt lives at home and was independent PTA. Pt has PCP and insurance to help w/ RX's. CM available for possible needs at discharge.

## 2021-06-01 NOTE — PROGRESS NOTES
10.9* 11.9*   HCT 37.9 34.3* 38.1    165 176      RENAL  Recent Labs     05/30/21  1516 05/31/21  0446 06/01/21  1011    141 141   K 3.2* 3.3* 3.6    106 102   CO2 24 23 25   BUN 17 13 13   CREATININE 1.0 1.0 1.0     LFT'S  Recent Labs     05/30/21  1516 06/01/21  1011   AST 54* 34   ALT 68* 53*   BILITOT 1.0 0.9   ALKPHOS 66 60     COAG  No results for input(s): INR in the last 72 hours. CARDIAC ENZYMES  No results for input(s): CKTOTAL, CKMB, CKMBINDEX, TROPONINI in the last 72 hours. Radiology this visit:  Reviewed. XR CHEST (2 VW)    Result Date: 5/4/2021  EXAMINATION: TWO XRAY VIEWS OF THE CHEST 5/4/2021 8:25 am COMPARISON: 11/13/2020 HISTORY: ORDERING SYSTEM PROVIDED HISTORY: Hypertension, unspecified type TECHNOLOGIST PROVIDED HISTORY: Reason for Exam: chest x-ray performed for pretesting for bilateral mastectomy FINDINGS: The heart and mediastinal structures are stable. The pulmonary vasculature is normal.  The lungs are clear except for calcified granulomata. No acute osseous abnormality is seen. Clips from prior cholecystectomy are present. No acute cardiopulmonary disease. CT HEAD W WO CONTRAST    Result Date: 5/31/2021  EXAMINATION: CT OF THE HEAD WITH AND WITHOUT CONTRAST  5/31/2021 2:37 pm TECHNIQUE: CT of the head/brain was performed without and with the administration of intravenous contrast. Multiplanar reformatted images are provided for review. Dose modulation, iterative reconstruction, and/or weight based adjustment of the mA/kV was utilized to reduce the radiation dose to as low as reasonably achievable. COMPARISON: None. HISTORY: ORDERING SYSTEM PROVIDED HISTORY: nausea head ache visual changes and recent diagnosis of breast cancer. Exclude metastatic disease TECHNOLOGIST PROVIDED HISTORY: Reason for exam:->nausea head ache visual changes and recent diagnosis of breast cancer.  Exclude metastatic disease Reason for Exam: nausea head ache visual changes and recent diagnosis of breast cancer. Exclude metastatic disease Acuity: Acute Type of Exam: Initial FINDINGS: BRAIN/VENTRICLES: There is no acute intracranial hemorrhage, mass effect or midline shift. No abnormal extra-axial fluid collection. The gray-white differentiation is maintained without evidence of an acute infarct. There is no evidence of hydrocephalus. There is a 7 mm enhancing nodule within the falx near the convexity. Small incidental meningioma is suspected ORBITS: The visualized portion of the orbits demonstrate no acute abnormality. SINUSES: The visualized paranasal sinuses and mastoid air cells demonstrate no acute abnormality. SOFT TISSUES/SKULL:  No acute abnormality of the visualized skull or soft tissues. No acute intracranial abnormality. No intraparenchymal abnormal enhancement to suggest intracranial metastatic disease. MRI is a superior modality for evaluation of intracranial metastasis. Small probable meningioma within the falx near the convexity     CT ABDOMEN PELVIS W IV CONTRAST Additional Contrast? None    Result Date: 5/30/2021  EXAMINATION: [place] PROTOCOL CT OF THE ABDOMEN AND PELVIS 5/30/2021 4:58 pm TECHNIQUE: CT of the abdomen and pelvis was performed with the administration of intravenous contrast. Multiplanar reformatted images are provided for review. Dose modulation, iterative reconstruction, and/or weight based adjustment of the mA/kV was utilized to reduce the radiation dose to as low as reasonably achievable.  COMPARISON: 08/23/2016 HISTORY: ORDERING SYSTEM PROVIDED HISTORY: Epigastric and left upper quadrant pain, bilateral mastectomy May 6, concern for infection or other complication TECHNOLOGIST PROVIDED HISTORY: Additional Contrast?->None Reason for exam:->Epigastric and left upper quadrant pain, bilateral mastectomy May 6, concern for infection or other complication Decision Support Exception - unselect if not a suspected or confirmed emergency medical condition->Emergency Medical Condition (MA) Reason for Exam: Epigastric and left upper quadrant pain, bilateral mastectomy May 6, concern for infection Acuity: Acute Type of Exam: Initial Relevant Medical/Surgical History: hx hster, appy, carolynn, breast cancer FINDINGS: LOWER CHEST: For detailed description please refer to the same-day chest CT report. KIDNEYS AND URINARY TRACT: No renal calculi are identified. There is no evidence for hydronephrosis. The ureters are of normal course and caliber. ORGANS: Status post cholecystectomy. Calcification within the liver and spleen and a splenule, likely related to prior granulomatous disease exposure. Visualized portions of the unenhanced liver, spleen, pancreas, and adrenal glands demonstrate no acute abnormality. GI/BOWEL: No bowel obstruction. Status post appendectomy PELVIS: The bladder and pelvic organs are unremarkable. status post hysterectomy. PERITONEUM/RETROPERITONEUM: No free air or free fluid is noted. No pathologically enlarged lymphadenopathy. The vasculature do not demonstrate acute abnormality. BONES/SOFT TISSUES: The osseous structures demonstrate no acute abnormality. Mild dextroscoliosis with tip at L2-L3 and mild levoscoliosis with tip at L4-L5. Grade 1 anterolisthesis of L4-L5. Status post left hip arthroplasty. 15 mm subcutaneus soft tissue nodule within the left lower quadrant area of anterior abdomen. No acute abnormality within the abdomen and pelvis. Status post hysterectomy, appendectomy and cholecystectomy. 15 mm subcutaneus soft tissue nodule within the left lower quadrant area of anterior abdomen. Finding may represent sebaceous cyst or other pathology. CTA PULMONARY W CONTRAST    Result Date: 6/1/2021  EXAMINATION: CTA OF THE CHEST 5/30/2021 4:57 pm TECHNIQUE: CTA of the chest was performed after the administration of intravenous contrast.  Multiplanar reformatted images are provided for review.   MIP images are provided for review. Dose modulation, iterative reconstruction, and/or weight based adjustment of the mA/kV was utilized to reduce the radiation dose to as low as reasonably achievable. COMPARISON: CT chest performed October 27, 2020. HISTORY: ORDERING SYSTEM PROVIDED HISTORY: Chest pain, persistent postoperative nausea and vomiting, bilateral mastectomy May 6, concern for sepsis or PE TECHNOLOGIST PROVIDED HISTORY: Reason for exam:->Chest pain, persistent postoperative nausea and vomiting, bilateral mastectomy May 6, concern for sepsis or PE Decision Support Exception - unselect if not a suspected or confirmed emergency medical condition->Emergency Medical Condition (MA) Reason for Exam: Chest pain, persistent postoperative nausea and vomiting, bilateral mastectomy May 6, Acuity: Acute Type of Exam: Initial FINDINGS: Pulmonary Arteries: Pulmonary arteries are adequately opacified for evaluation. No evidence of intraluminal filling defect to suggest pulmonary embolism. Main pulmonary artery is normal in caliber. Mediastinum: The esophagus is unremarkable. No significant mediastinal or hilar lymphadenopathy. No pericardial effusion. Multivessel coronary artery disease. The thoracic aorta is normal in course and caliber. Lungs/pleura: No focal consolidation, pleural effusion, or pneumothorax. The central airway is grossly patent. Upper Abdomen: No acute abnormality of the visualized upper abdomen. Soft Tissues/Bones: No acute soft tissue or osseous abnormality. There is soft tissue thickening and fat stranding overlying the bilateral pectoral muscles. 1. No evidence of acute pulmonary embolism. 2. Soft tissue thickening and fat stranding overlying the bilateral pectoral muscles.            Electronically signed by LIAN Peña CNP on 6/1/2021 at 1:09 PM

## 2021-06-01 NOTE — PLAN OF CARE
Problem: Falls - Risk of:  Goal: Will remain free from falls  Description: Will remain free from falls  Outcome: Ongoing  Goal: Absence of physical injury  Description: Absence of physical injury  Outcome: Ongoing     Problem: Pain:  Goal: Pain level will decrease  Description: Pain level will decrease  Outcome: Ongoing  Goal: Control of acute pain  Description: Control of acute pain  Outcome: Ongoing  Goal: Control of chronic pain  Description: Control of chronic pain  Outcome: Ongoing     Problem: Nutrition  Goal: Optimal nutrition therapy  Outcome: Ongoing

## 2021-06-01 NOTE — PROGRESS NOTES
Pt was seen and examined. Reviewed my partner's consultation note. AF VSS, labs noted. CT negative of head to explain symptoms. Will get an MRI because it is a more sensitive study to r/o intracranial metastasis. I feel that it is unlikely. Still reporting poor appetite and nausea with meals. abd is obese, NR, NG, NT, no CVA tenderness. Bilateral chest incisions healing well. Mild bruising which is resolving from recent bilateral mastectomies for breast CA. Await GI consult eval and recs. Follow clinical course.

## 2021-06-02 LAB
ADENOVIRUS F 40 41 PCR: NOT DETECTED
ALBUMIN SERPL-MCNC: 4.3 GM/DL (ref 3.4–5)
ALP BLD-CCNC: 57 IU/L (ref 40–128)
ALT SERPL-CCNC: 47 U/L (ref 10–40)
AMYLASE: 23 U/L (ref 25–115)
ANION GAP SERPL CALCULATED.3IONS-SCNC: 16 MMOL/L (ref 4–16)
APTT: 31.7 SECONDS (ref 25.1–37.1)
AST SERPL-CCNC: 31 IU/L (ref 15–37)
ASTROVIRUS PCR: NOT DETECTED
BASOPHILS ABSOLUTE: 0 K/CU MM
BASOPHILS RELATIVE PERCENT: 0.6 % (ref 0–1)
BILIRUB SERPL-MCNC: 1 MG/DL (ref 0–1)
BUN BLDV-MCNC: 13 MG/DL (ref 6–23)
CALCIUM SERPL-MCNC: 8.7 MG/DL (ref 8.3–10.6)
CAMPYLOBACTER PCR: NOT DETECTED
CHLORIDE BLD-SCNC: 104 MMOL/L (ref 99–110)
CO2: 23 MMOL/L (ref 21–32)
CREAT SERPL-MCNC: 1 MG/DL (ref 0.6–1.1)
CRYPTOSPORIDIUM PCR: NOT DETECTED
CYCLOSPORA CAYETANENSIS PCR: NOT DETECTED
DIFFERENTIAL TYPE: ABNORMAL
E COLI 0157 PCR: NOT DETECTED
E COLI ENTEROAGGREGATIVE PCR: NOT DETECTED
E COLI ENTEROPATHOGENIC PCR: NOT DETECTED
E COLI ENTEROTOXIGENIC PCR: NOT DETECTED
E COLI SHIGA LIKE TOXIN PCR: NOT DETECTED
E COLI SHIGELLA/ENTEROINVASIVE PCR: NOT DETECTED
ENTAMOEBA HISTOLYTICA PCR: NOT DETECTED
EOSINOPHILS ABSOLUTE: 0.2 K/CU MM
EOSINOPHILS RELATIVE PERCENT: 2.1 % (ref 0–3)
GFR AFRICAN AMERICAN: >60 ML/MIN/1.73M2
GFR NON-AFRICAN AMERICAN: 57 ML/MIN/1.73M2
GIARDIA LAMBLIA PCR: NOT DETECTED
GLUCOSE BLD-MCNC: 84 MG/DL (ref 70–99)
HCT VFR BLD CALC: 39 % (ref 37–47)
HEMOCCULT SP1 STL QL: NEGATIVE
HEMOGLOBIN: 12.3 GM/DL (ref 12.5–16)
IMMATURE NEUTROPHIL %: 0.1 % (ref 0–0.43)
INR BLD: 1.07 INDEX
LIPASE: 24 IU/L (ref 13–60)
LYMPHOCYTES ABSOLUTE: 2.1 K/CU MM
LYMPHOCYTES RELATIVE PERCENT: 29.9 % (ref 24–44)
MCH RBC QN AUTO: 28.9 PG (ref 27–31)
MCHC RBC AUTO-ENTMCNC: 31.5 % (ref 32–36)
MCV RBC AUTO: 91.5 FL (ref 78–100)
MONOCYTES ABSOLUTE: 0.6 K/CU MM
MONOCYTES RELATIVE PERCENT: 8.1 % (ref 0–4)
NOROVIRUS GI GII PCR: NOT DETECTED
NUCLEATED RBC %: 0 %
OCCULT BLOOD 2: NEGATIVE
PDW BLD-RTO: 14.1 % (ref 11.7–14.9)
PLATELET # BLD: 170 K/CU MM (ref 140–440)
PLESIOMONAS SHIGELLOIDES PCR: NOT DETECTED
PMV BLD AUTO: 9.4 FL (ref 7.5–11.1)
POTASSIUM SERPL-SCNC: 3.7 MMOL/L (ref 3.5–5.1)
PROTHROMBIN TIME: 12.9 SECONDS (ref 11.7–14.5)
RBC # BLD: 4.26 M/CU MM (ref 4.2–5.4)
ROTAVIRUS A PCR: NOT DETECTED
SALMONELLA PCR: NOT DETECTED
SAPOVIRUS PCR: NOT DETECTED
SEGMENTED NEUTROPHILS ABSOLUTE COUNT: 4.2 K/CU MM
SEGMENTED NEUTROPHILS RELATIVE PERCENT: 59.2 % (ref 36–66)
SODIUM BLD-SCNC: 143 MMOL/L (ref 135–145)
TOTAL IMMATURE NEUTOROPHIL: 0.01 K/CU MM
TOTAL NUCLEATED RBC: 0 K/CU MM
TOTAL PROTEIN: 6.1 GM/DL (ref 6.4–8.2)
VIBRIO CHOLERAE PCR: NOT DETECTED
VIBRIO PCR: NOT DETECTED
WBC # BLD: 7.2 K/CU MM (ref 4–10.5)
YERSINIA ENTEROCOLITICA PCR: NOT DETECTED

## 2021-06-02 PROCEDURE — 6370000000 HC RX 637 (ALT 250 FOR IP): Performed by: STUDENT IN AN ORGANIZED HEALTH CARE EDUCATION/TRAINING PROGRAM

## 2021-06-02 PROCEDURE — 85025 COMPLETE CBC W/AUTO DIFF WBC: CPT

## 2021-06-02 PROCEDURE — 6360000002 HC RX W HCPCS: Performed by: STUDENT IN AN ORGANIZED HEALTH CARE EDUCATION/TRAINING PROGRAM

## 2021-06-02 PROCEDURE — 6360000002 HC RX W HCPCS: Performed by: NURSE PRACTITIONER

## 2021-06-02 PROCEDURE — G0378 HOSPITAL OBSERVATION PER HR: HCPCS

## 2021-06-02 PROCEDURE — 96372 THER/PROPH/DIAG INJ SC/IM: CPT

## 2021-06-02 PROCEDURE — 87507 IADNA-DNA/RNA PROBE TQ 12-25: CPT

## 2021-06-02 PROCEDURE — 94761 N-INVAS EAR/PLS OXIMETRY MLT: CPT

## 2021-06-02 PROCEDURE — 36415 COLL VENOUS BLD VENIPUNCTURE: CPT

## 2021-06-02 PROCEDURE — G0328 FECAL BLOOD SCRN IMMUNOASSAY: HCPCS

## 2021-06-02 PROCEDURE — 2580000003 HC RX 258: Performed by: EMERGENCY MEDICINE

## 2021-06-02 PROCEDURE — 82150 ASSAY OF AMYLASE: CPT

## 2021-06-02 PROCEDURE — 99215 OFFICE O/P EST HI 40 MIN: CPT | Performed by: INTERNAL MEDICINE

## 2021-06-02 PROCEDURE — 83690 ASSAY OF LIPASE: CPT

## 2021-06-02 PROCEDURE — 85730 THROMBOPLASTIN TIME PARTIAL: CPT

## 2021-06-02 PROCEDURE — 80053 COMPREHEN METABOLIC PANEL: CPT

## 2021-06-02 PROCEDURE — 6370000000 HC RX 637 (ALT 250 FOR IP): Performed by: NURSE PRACTITIONER

## 2021-06-02 PROCEDURE — 2580000003 HC RX 258: Performed by: STUDENT IN AN ORGANIZED HEALTH CARE EDUCATION/TRAINING PROGRAM

## 2021-06-02 PROCEDURE — 85610 PROTHROMBIN TIME: CPT

## 2021-06-02 PROCEDURE — 96376 TX/PRO/DX INJ SAME DRUG ADON: CPT

## 2021-06-02 RX ORDER — METOCLOPRAMIDE HYDROCHLORIDE 5 MG/ML
10 INJECTION INTRAMUSCULAR; INTRAVENOUS EVERY 6 HOURS
Status: DISPENSED | OUTPATIENT
Start: 2021-06-02 | End: 2021-06-03

## 2021-06-02 RX ADMIN — FLUOXETINE HYDROCHLORIDE 10 MG: 10 CAPSULE ORAL at 09:29

## 2021-06-02 RX ADMIN — METOPROLOL TARTRATE 25 MG: 25 TABLET, FILM COATED ORAL at 21:31

## 2021-06-02 RX ADMIN — ENOXAPARIN SODIUM 40 MG: 40 INJECTION SUBCUTANEOUS at 09:29

## 2021-06-02 RX ADMIN — OXYCODONE HYDROCHLORIDE AND ACETAMINOPHEN 1 TABLET: 5; 325 TABLET ORAL at 21:34

## 2021-06-02 RX ADMIN — LOSARTAN POTASSIUM 25 MG: 25 TABLET, FILM COATED ORAL at 09:29

## 2021-06-02 RX ADMIN — CETIRIZINE HYDROCHLORIDE 10 MG: 10 TABLET, FILM COATED ORAL at 09:29

## 2021-06-02 RX ADMIN — EZETIMIBE 10 MG: 10 TABLET ORAL at 09:29

## 2021-06-02 RX ADMIN — METOCLOPRAMIDE 10 MG: 5 INJECTION, SOLUTION INTRAMUSCULAR; INTRAVENOUS at 01:34

## 2021-06-02 RX ADMIN — METOCLOPRAMIDE 10 MG: 5 INJECTION, SOLUTION INTRAMUSCULAR; INTRAVENOUS at 16:59

## 2021-06-02 RX ADMIN — ASPIRIN 81 MG CHEWABLE TABLET 81 MG: 81 TABLET CHEWABLE at 09:29

## 2021-06-02 RX ADMIN — SODIUM CHLORIDE, PRESERVATIVE FREE 10 ML: 5 INJECTION INTRAVENOUS at 21:32

## 2021-06-02 RX ADMIN — POTASSIUM CHLORIDE 20 MEQ: 1500 TABLET, EXTENDED RELEASE ORAL at 21:31

## 2021-06-02 RX ADMIN — POTASSIUM CHLORIDE 20 MEQ: 1500 TABLET, EXTENDED RELEASE ORAL at 09:29

## 2021-06-02 RX ADMIN — METOPROLOL TARTRATE 50 MG: 50 TABLET, FILM COATED ORAL at 09:29

## 2021-06-02 RX ADMIN — SODIUM CHLORIDE 1000 ML: 9 INJECTION, SOLUTION INTRAVENOUS at 00:50

## 2021-06-02 ASSESSMENT — PAIN SCALES - GENERAL
PAINLEVEL_OUTOF10: 0
PAINLEVEL_OUTOF10: 7
PAINLEVEL_OUTOF10: 0

## 2021-06-02 ASSESSMENT — PAIN - FUNCTIONAL ASSESSMENT: PAIN_FUNCTIONAL_ASSESSMENT: PREVENTS OR INTERFERES SOME ACTIVE ACTIVITIES AND ADLS

## 2021-06-02 ASSESSMENT — PAIN DESCRIPTION - DESCRIPTORS: DESCRIPTORS: ACHING;CONSTANT

## 2021-06-02 ASSESSMENT — PAIN DESCRIPTION - ONSET: ONSET: ON-GOING

## 2021-06-02 ASSESSMENT — PAIN DESCRIPTION - PAIN TYPE: TYPE: SURGICAL PAIN

## 2021-06-02 ASSESSMENT — PAIN DESCRIPTION - PROGRESSION: CLINICAL_PROGRESSION: GRADUALLY WORSENING

## 2021-06-02 ASSESSMENT — PAIN DESCRIPTION - FREQUENCY: FREQUENCY: INTERMITTENT

## 2021-06-02 ASSESSMENT — PAIN DESCRIPTION - ORIENTATION: ORIENTATION: MID

## 2021-06-02 ASSESSMENT — PAIN DESCRIPTION - LOCATION: LOCATION: CHEST

## 2021-06-02 NOTE — PROGRESS NOTES
Pt was seen and examined. AF VSS, excellent UOP, labs noted. LFT's WNL. MRI brain revealed:    Impression   No acute infarct scattered areas of chronic white matter change in the   periventricular white matter.       No evidence for blood products on gradient imaging.       No  focal intracranial lesion noted     abd is obese, NR, NG, NT, no CVA tenderness. Bilateral chest incisions healing well. Mild bruising which is resolving from recent bilateral mastectomies for breast CA. Intractable nausea and emesis with anorexia. Reviewed Dr. Estephanie Lozano recs, PPI, EGD and C-scope in the near future. Follow clinical course. No acute general surgical issues.

## 2021-06-02 NOTE — PROGRESS NOTES
Patient had iv fluids continuous running 125ml/hr NS with lasix po medication. Patient has not been eating and little to no intake and was restarted on iv fluids due to lack of intake. hospitalist auto held lasix po tonight and may need to be reassessed today based on patient goals and treatment needs. Will continue to follow and assess patient.

## 2021-06-02 NOTE — PROGRESS NOTES
Hospitalist Progress Note      Name:  Idania Roberts /Age/Sex: 1960  (64 y.o. female)   MRN & CSN:  [id number] & [id number] Admission Date/Time: 2021  3:09 PM   Location:  [address] [place] [address] PCP: Constantin Johnson MD         Hospital Day: 4                                               Attending Physician Dr Briana Ceballos and Plan:   Idania Roberts is a 64 y.o.  female  who presents with Nausea and vomiting    Intractable Nausea and vomiting    GI consulted rec OP colonoscopy/EGD   PPI   PRN symptom control    Advance diet as tolerated    Pending GI PCR   Change IV reglan to scheduled x 24     HA- Resolved    Neuro checks   MRI brain- non acute     Left breast cancer-ER/MN positive HER-2 negative. S/P b/l mastectomy (2021)   Dr Marisela Stephens consulted for evaluation and recommends OP follow up       Diet Dietary Nutrition Supplements: Clear Liquid Oral Supplement  DIET GENERAL;   DVT Prophylaxis [x] Lovenox, []  Heparin, [] SCDs, [] Ambulation   GI Prophylaxis [x] PPI,  [] H2 Blocker,  [] Carafate,  [] Diet/Tube Feeds   Code Status Full Code   Disposition Obs    MDM [] Low, [x] Moderate,[]  High       -Patient assessment and plan discussed with supervising physician-  History of Present Illness:     Pt S&E. Idania Roberts is a 64 y.o.  female, who presented with intractable nausea vomiting with concern for gastroenteritis. States still continuously nauseated unable to tolerate PO but no appetite. States reglan improved symptoms overnight temporarily     Ten point ROS reviewed negative, unless as noted above    Objective:        Intake/Output Summary (Last 24 hours) at 2021 1138  Last data filed at 2021 0935  Gross per 24 hour   Intake 360 ml   Output 2150 ml   Net -1790 ml      Vitals:   Vitals:    21 1417 21 1950 21 0350 21 0927   BP: (!) 112/56 131/62 (!) 120/56 (!) 117/56   Pulse: 65 61 70 68   Resp: 16 15 16 16   Temp: 97.9 °F (36.6 °C) 97.7 °F (36.5 °C) 98.2 °F (36.8 °C) 98.2 °F (36.8 °C)   TempSrc: Oral Oral Oral Oral   SpO2: 95% 96% 97% 97%   Weight:   215 lb 4.8 oz (97.7 kg)    Height:         Physical Exam: 06/02/21     GEN -Awake nontoxic appearing female, sitting upright in bed , NAD. obese body habitus. Appears given age. EYES -Pupils are equally round. No scleral erythema, discharge, or conjunctivitis. HENT -MM are moist. Oral pharynx without exudates, no evidence of thrush. NECK -Supple, no apparent thyromegaly or masses. RESP -CTA, no wheezes, rales or rhonchi. Symmetric chest movement while on RA.  C/V -S1/S2 auscultated. RRR without appreciable M/R/G. No JVD or carotid bruits. Peripheral pulses equal bilaterally and palpable. No peripheral edema. GI -Abdomen is soft non distended and without significant tenderness. No masses or guarding. + BS Rectal exam deferred.  -No CVA tenderness. Damon catheter is not present. LYMPH-No palpable cervical lymphadenopathy and no hepatosplenomegaly. No petechiae or ecchymoses. MS -No gross joint deformities. SKIN -Normal coloration, warm, dry. NEURO-Cranial nerves appear grossly intact, normal speech, no lateralizing weakness. PSYC-Awake, alert, oriented x 4. Appropriate affect.     Medications:   Medications:    aspirin  81 mg Oral Daily    ezetimibe  10 mg Oral Daily    FLUoxetine  10 mg Oral Daily    [Held by provider] furosemide  20 mg Oral BID    cetirizine  10 mg Oral Daily    losartan  25 mg Oral Daily    metoprolol tartrate  50 mg Oral BID    potassium chloride  20 mEq Oral BID    sodium chloride flush  5-40 mL Intravenous 2 times per day    enoxaparin  40 mg Subcutaneous Daily    pantoprazole  40 mg Intravenous Daily      Infusions:    sodium chloride Stopped (06/02/21 0957)    sodium chloride       PRN Meds: oxyCODONE-acetaminophen, 1 tablet, Q6H PRN  sodium chloride flush, 5-40 mL, PRN  sodium chloride, 25 mL, PRN  ondansetron, 4 mg, Q6H PRN  polyethylene glycol, 17 g, Daily PRN  acetaminophen, 650 mg, Q6H PRN   Or  acetaminophen, 650 mg, Q6H PRN  metoclopramide, 10 mg, Q6H PRN        LABS  CBC   Recent Labs     05/31/21  0446 06/01/21  1011 06/02/21  0702   WBC 6.0 6.4 7.2   HGB 10.9* 11.9* 12.3*   HCT 34.3* 38.1 39.0    176 170      RENAL  Recent Labs     05/31/21  0446 06/01/21  1011 06/02/21  0702    141 143   K 3.3* 3.6 3.7    102 104   CO2 23 25 23   BUN 13 13 13   CREATININE 1.0 1.0 1.0     LFT'S  Recent Labs     05/30/21  1516 06/01/21  1011 06/02/21  0702   AST 54* 34 31   ALT 68* 53* 47*   BILITOT 1.0 0.9 1.0   ALKPHOS 66 60 57     COAG  Recent Labs     06/02/21  0702   INR 1.07     CARDIAC ENZYMES  No results for input(s): CKTOTAL, CKMB, CKMBINDEX, TROPONINI in the last 72 hours. Radiology this visit:  Reviewed. XR CHEST (2 VW)    Result Date: 5/4/2021  EXAMINATION: TWO XRAY VIEWS OF THE CHEST 5/4/2021 8:25 am COMPARISON: 11/13/2020 HISTORY: ORDERING SYSTEM PROVIDED HISTORY: Hypertension, unspecified type TECHNOLOGIST PROVIDED HISTORY: Reason for Exam: chest x-ray performed for pretesting for bilateral mastectomy FINDINGS: The heart and mediastinal structures are stable. The pulmonary vasculature is normal.  The lungs are clear except for calcified granulomata. No acute osseous abnormality is seen. Clips from prior cholecystectomy are present. No acute cardiopulmonary disease. CT HEAD W WO CONTRAST    Result Date: 5/31/2021  EXAMINATION: CT OF THE HEAD WITH AND WITHOUT CONTRAST  5/31/2021 2:37 pm TECHNIQUE: CT of the head/brain was performed without and with the administration of intravenous contrast. Multiplanar reformatted images are provided for review. Dose modulation, iterative reconstruction, and/or weight based adjustment of the mA/kV was utilized to reduce the radiation dose to as low as reasonably achievable. COMPARISON: None.  HISTORY: ORDERING SYSTEM PROVIDED HISTORY: nausea head ache visual changes and recent diagnosis of 6/1/2021  EXAMINATION: CTA OF THE CHEST 5/30/2021 4:57 pm TECHNIQUE: CTA of the chest was performed after the administration of intravenous contrast.  Multiplanar reformatted images are provided for review. MIP images are provided for review. Dose modulation, iterative reconstruction, and/or weight based adjustment of the mA/kV was utilized to reduce the radiation dose to as low as reasonably achievable. COMPARISON: CT chest performed October 27, 2020. HISTORY: ORDERING SYSTEM PROVIDED HISTORY: Chest pain, persistent postoperative nausea and vomiting, bilateral mastectomy May 6, concern for sepsis or PE TECHNOLOGIST PROVIDED HISTORY: Reason for exam:->Chest pain, persistent postoperative nausea and vomiting, bilateral mastectomy May 6, concern for sepsis or PE Decision Support Exception - unselect if not a suspected or confirmed emergency medical condition->Emergency Medical Condition (MA) Reason for Exam: Chest pain, persistent postoperative nausea and vomiting, bilateral mastectomy May 6, Acuity: Acute Type of Exam: Initial FINDINGS: Pulmonary Arteries: Pulmonary arteries are adequately opacified for evaluation. No evidence of intraluminal filling defect to suggest pulmonary embolism. Main pulmonary artery is normal in caliber. Mediastinum: The esophagus is unremarkable. No significant mediastinal or hilar lymphadenopathy. No pericardial effusion. Multivessel coronary artery disease. The thoracic aorta is normal in course and caliber. Lungs/pleura: No focal consolidation, pleural effusion, or pneumothorax. The central airway is grossly patent. Upper Abdomen: No acute abnormality of the visualized upper abdomen. Soft Tissues/Bones: No acute soft tissue or osseous abnormality. There is soft tissue thickening and fat stranding overlying the bilateral pectoral muscles. 1. No evidence of acute pulmonary embolism. 2. Soft tissue thickening and fat stranding overlying the bilateral pectoral muscles. Electronically signed by LIAN Salazar CNP on 6/2/2021 at 11:38 AM

## 2021-06-02 NOTE — CONSULTS
Hematology/Oncology  Consult Note      Reason for Consult: Recent diagnosis of left breast cancer status post bilateral mastectomy    Requesting Physician: Hospitalist    CHIEF COMPLAINT: Nausea and vomiting    History Obtained From:  patient, electronic medical record    HISTORY OF PRESENT ILLNESS:      The patient is a 64 y.o. 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 On May 26, 2021 she started having nausea, vomiting and epigastric pain. Also she had episode of loose stools 4 -  5 times on the day. She denied any recent antibiotic. No change on her diet. She was admitted to the hospital on May 30, 2021 and lost about 15 pounds in the last 1 week. She had mild transaminitis and was suspected to have gastroenteritis. GI has been on board. Never had any upper endoscopic study. Colonoscopy in June 2014 showed polyps which was removed. Pathology report showed tubular adenoma. CT abdomen and pelvis on May 30, 2021:  No acute abnormality within the abdomen and pelvis.   Status post hysterectomy, appendectomy and cholecystectomy.    15 mm subcutaneus soft tissue nodule within the left lower quadrant area of  anterior abdomen.  Finding may represent sebaceous cyst or other pathology. CTA chest on May 30, 2021 showed  1. No evidence of acute pulmonary embolism. 2. Soft tissue thickening and fat stranding overlying the bilateral pectoral  muscles. CT head on May 31, 2021 showed  No acute intracranial abnormality.  No intraparenchymal abnormal enhancement  to suggest intracranial metastatic disease.  MRI is a superior modality for  evaluation of intracranial metastasis.   Small probable meningioma within the falx near the convexity.   MRI of the brain on June 1, 2021 showed  No acute infarct scattered areas of chronic white matter change in the  periventricular white matter.   No evidence for blood products on gradient imaging.   No  focal intracranial lesion noted    Amylase and lipase were unremarkable. Past Medical History:        Diagnosis Date    Allergic rhinitis due to pollen 11/19/2015    Arthritis     left hip & knee    Chronic back pain     Depression     Gastroesophageal reflux disease 11/19/2015    Hearing loss 11/19/2015    History of blood transfusion     No reaction per pt    History of cardiac monitoring 04/18/2017    14 day event monitor. Sinus Rhythm    History of nuclear stress test 09/28/2016    cardiolite-normal,EF70%    Hot flashes due to surgical menopause 11/19/2015    Hx of echocardiogram 10/05/2016    EF: >55 %   Mild mitral and tricuspid regurg.      Hyperlipidemia     Hypertension     Follows with PCP    Lexiscan Stress Test 10/14/2020    EF 60%, Normal study, no ischemia    Meniere disease     Morbid obesity due to excess calories ([facility] [address] 11/19/2015    Sleep apnea 11/19/2015    sleep study negative    Tilt table evaluation 05/09/2017     positive for neurocardiogenic syncope     Past Surgical History:        Procedure Laterality Date    APPENDECTOMY  1967    CHOLECYSTECTOMY      2017    COLONOSCOPY  2014    Polyps x2 - Dr. Aziza Payne Bilateral 05/06/2021    Dr. Alana Stuart Left 10/19/2020    LEFT HIP TOTAL ARTHROPLASTY - POSTERIOR performed by Andrea Elena MD at [facility] LEFT Left 3/9/2021     BREAST BIOPSY NEEDLE ADDITIONAL LEFT 3/9/2021 Arslan Maldonado  [facility] BREAST BIOPSY NEEDLE ADDITIONAL LEFT Left 3/9/2021    US BREAST BIOPSY NEEDLE ADDITIONAL LEFT 3/9/2021 Arslan Maldonado MD [address] BREAST NEEDLE BIOPSY LEFT Left 3/9/2021    US BREAST NEEDLE BIOPSY LEFT 3/9/2021 Arslan Maldonado MD [address] [facility]       Current Medications:    Current Facility-Administered Medications: 0.9 % sodium chloride infusion, 1,000 mL, Intravenous, Continuous  aspirin chewable tablet 81 mg, 81 mg, Oral, Daily  ezetimibe (ZETIA) tablet 10 mg, 10 mg, Oral, Daily  FLUoxetine (PROZAC) capsule 10 mg, 10 mg, Oral, Daily  [Held by provider] furosemide (LASIX) tablet 20 mg, 20 mg, Oral, BID  cetirizine (ZYRTEC) tablet 10 mg, 10 mg, Oral, Daily  losartan (COZAAR) tablet 25 mg, 25 mg, Oral, Daily  metoprolol tartrate (LOPRESSOR) tablet 50 mg, 50 mg, Oral, BID  oxyCODONE-acetaminophen (PERCOCET) 5-325 MG per tablet 1 tablet, 1 tablet, Oral, Q6H PRN  potassium chloride (KLOR-CON M) extended release tablet 20 mEq, 20 mEq, Oral, BID  sodium chloride flush 0.9 % injection 5-40 mL, 5-40 mL, Intravenous, 2 times per day  sodium chloride flush 0.9 % injection 5-40 mL, 5-40 mL, Intravenous, PRN  0.9 % sodium chloride infusion, 25 mL, Intravenous, PRN  enoxaparin (LOVENOX) injection 40 mg, 40 mg, Subcutaneous, Daily  [DISCONTINUED] promethazine (PHENERGAN) tablet 12.5 mg, 12.5 mg, Oral, Q6H PRN **OR** ondansetron (ZOFRAN) injection 4 mg, 4 mg, Intravenous, Q6H PRN  polyethylene glycol (GLYCOLAX) packet 17 g, 17 g, Oral, Daily PRN  acetaminophen (TYLENOL) tablet 650 mg, 650 mg, Oral, Q6H PRN **OR** acetaminophen (TYLENOL) suppository 650 mg, 650 mg, Rectal, Q6H PRN  metoclopramide (REGLAN) injection 10 mg, 10 mg, Intravenous, Q6H PRN  pantoprazole (PROTONIX) injection 40 mg, 40 mg, Intravenous, Daily    Allergies:  Celexa [citalopram], Atorvastatin, Fenofibrate, Mestinon [pyridostigmine], Penicillins, Sulfa antibiotics, Tape [adhesive tape], Gemfibrozil, and Meloxicam    Social History:    TOBACCO:   reports that she has never smoked. She has never used smokeless tobacco.  ETOH:   reports no history of alcohol use. DRUGS:   reports no history of drug use.   Family History:       Problem Relation Age of Onset    Heart Disease Mother     High Blood Pressure Mother     High Cholesterol Mother     Cancer Mother 80 Stomach    Diabetes Mother     Stroke Mother     Heart Disease Father     High Blood Pressure Father     High Cholesterol Father     Diabetes Father     Depression Father     Cancer Sister 46        Lung cancer    High Blood Pressure Sister     Other Sister         migraines, osteoarthritis    Mental Illness Sister     Depression Sister     Cancer Maternal Grandmother         Stomach    Diabetes Maternal Grandmother     Diabetes Maternal Grandfather     Diabetes Paternal Grandmother     Diabetes Paternal Grandfather     Cancer Maternal Cousin 47        Pancreatic     REVIEW OF SYSTEMS:    She has nausea when she eats. No vomiting this time. Has not had any bowel movement yet. Has epigastric discomfort. Denied any fever. Has mild headache. No chest pain or shortness of breath. The remainder of the review of system is unremarkable. PHYSICAL EXAM:      Vitals:    BP (!) 120/56   Pulse 70   Temp 98.2 °F (36.8 °C) (Oral)   Resp 16   Ht 5' 6\" (1.676 m)   Wt 215 lb 4.8 oz (97.7 kg)   SpO2 97%   BMI 34.75 kg/m²     CONSTITUTIONAL:  awake, alert, cooperative and no apparent distress  EYES:  extra-ocular muscles intact and sclera clear  NECK:  supple, symmetrical, trachea midline and no lymphadenopathy  LUNGS:  clear to auscultation  CARDIOVASCULAR:  normal S1 and S2 and no murmur noted  ABDOMEN:  normal bowel sounds, soft, non-distended and epigastric discomfort  CHEST/BREASTS: Status post both a mastectomy. No sign of infection.   MUSCULOSKELETAL:  there is no redness, warmth, or swelling of the joints  full range of motion noted  NEUROLOGIC:  Cranial Nerves:  cranial nerves II-XII are grossly intact  SKIN:  no rashes and no jaundice  DATA:    Labs:  General Labs:    CBC with Differential:    Lab Results   Component Value Date    WBC 7.2 06/02/2021    RBC 4.26 06/02/2021    HGB 12.3 06/02/2021    HCT 39.0 06/02/2021     06/02/2021    MCV 91.5 06/02/2021    MCH 28.9 06/02/2021 MCHC 31.5 06/02/2021    RDW 14.1 06/02/2021    SEGSPCT 59.2 06/02/2021    BANDSPCT 9 11/02/2020    LYMPHOPCT 29.9 06/02/2021    PROMYELOPCT 1 11/02/2020    MONOPCT 8.1 06/02/2021    MYELOPCT 1 11/02/2020    EOSPCT 0.3 09/20/2011    BASOPCT 0.6 06/02/2021    MONOSABS 0.6 06/02/2021    LYMPHSABS 2.1 06/02/2021    EOSABS 0.2 06/02/2021    BASOSABS 0.0 06/02/2021    DIFFTYPE AUTOMATED DIFFERENTIAL 06/02/2021     CMP:    Lab Results   Component Value Date     06/02/2021    K 3.7 06/02/2021     06/02/2021    CO2 23 06/02/2021    BUN 13 06/02/2021    CREATININE 1.0 06/02/2021    GFRAA >60 06/02/2021    AGRATIO 1.7 12/07/2015    LABGLOM 57 06/02/2021    GLUCOSE 84 06/02/2021    PROT 6.1 06/02/2021    PROT 7.5 09/20/2011    LABALBU 4.3 06/02/2021    CALCIUM 8.7 06/02/2021    BILITOT 1.0 06/02/2021    ALKPHOS 57 06/02/2021    AST 31 06/02/2021    ALT 47 06/02/2021       IMPRESSION/RECOMMENDATIONS:      1. She has recent diagnosis of left breast cancer status post mastectomy. Awaiting for Oncotype DX result. She was offered adjuvant endocrine therapy. She may need adjuvant chemotherapy depending on Oncotype DX result. 2.  She had nausea, vomiting and diarrhea starting last Wednesday ? Gastroenteritis. GI has been on board. Reportedly she will have endoscopic study as an outpatient. She is on Reglan, Protonix and Zofran. I encouraged her to eat and ambulate. If she is discharged home, I recommend to follow-up with me at the cancer center to discuss about potential treatment for the breast cancer. She will also need to follow-up with GI. Thanks for allowing me to participate in her care.

## 2021-06-02 NOTE — PROGRESS NOTES
DOING FAIR NO VOMITING   VITALS STABLE   LABS NOTED   PT TOLD TO CALL OFFICE FOR OUTPT EGD / COLONOSCOPY

## 2021-06-02 NOTE — PROGRESS NOTES
Patient Name:  Lana Santoyo  Patient :  1960  Patient MRN:  [id number]     Primary Oncologist: Angela Julien MD  Referring Provider: Bria Martin MD     Date of Service: 6/10/2021         Chief Complaint:    Chief Complaint   Patient presents with    Follow-up      She came in for follow-up visit. Patient Active Problem List:     Essential hypertension     Hyperlipidemia     Allergic rhinitis due to pollen     Morbid obesity due to excess calories     Hearing loss     Hot flashes due to surgical menopause     Gastroesophageal reflux disease     Chronic back pain     Anxiety and depression     Osteoarthritis     Meniere's disease     Insomnia     Precordial pain     SOB (shortness of breath)     Calculus of gallbladder without cholecystitis without obstruction     S/P laparoscopic cholecystectomy     Vasovagal syncope     Class 2 obesity due to excess calories without serious comorbidity in adult     Palpitations     Family history of early     Closed fracture of left ankle     Acute respiratory failure with hypoxia      Status post left hip replacement     Hyperglycemia     Mucinous carcinoma of breast, left     Malignant neoplasm of left female breast     Infiltrating ductal carcinoma of left breast      S/P mastectomy, bilateral     Hx of lymph node excision    HPI:   Linda Acevedo is a pleasant 64year old female patient who was referred for evaluation of pathologic stage T2, N0, MX invasive ductal carcinoma of the left breast, ER/FL positive HER-2 negative, status post bilateral mastectomy in May 2021. She went for the routine mammogram in 2021 and denied any palpable lump to her breast.  Mammogram at that time showed at least 2 indeterminate complex masses at the 2:00 and 12 o'clock position in the left breast in the retroareolar region.     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 ER was more than 95%, AK more than 90%, HER-2/baltazar negative by FISH. MRI of bilateral breasts on April 19, 2021 showed  1. Left breast multicentric disease with biopsy proven invasive ductal  carcinoma with mucinous component at 11 o'clock and mucinous carcinomas at 2 o'clock and 12 o'clock in the retroareolar breast. Prosper [place] is at least 5 cm of separation between the 11 o'clock mass and 12 o'clock mass.  Additional smooth masses measuring up to 21 mm at biopsy sites are hematomas. 2. No MR evidence of malignancy in the contralateral right breast.  She had bilateral mastectomy on May 6, 2021. Pathology report showed : Invasive ductal and mucinous carcinoma of the left breast, retroareolar, grade 2, 4.5 cm, negative margin, -2 sentinel lymph nodes, ER more than 95%, AK 90%, HER-2/baltazar negative by FISH. Pathological stage classification T2, N0, MX. Pathologist felt that she has 1 contiguous tumor mass measuring about 4.5 cm rather than multicentric disease. I discussed with pathologist who stated that she does not have pure mucinous carcinoma of the breast.      We went over NCCN guideline. We discussed about healthy diet and weight loss. I went over with her the potential risk and benefit of adjuvant endocrine therapy. Likely I will put her on Arimidex. I will order baseline bone density test.  CBC and CMP in March 2021 were grossly unremarkable. Due to family history and personal history of breast cancer, I referred for genetic counseling. She had colonoscopy with removal of 2 polyps in 2014 by Dr. Luz Thapa. Follow-up in 3 years was recommended. She refused to have further follow-up. On Jeannie 10, 2021 she came in for follow up visit. Oncotype DX score was 29. I offered adjuvant chemo therapy TC vs AC +T  She opted to [facility] +T.  I reschedule bone density and order ECHO cardiogram.  I will discuss with Dr Byron Schulz about mediport placement. In May 2021 she was hospitalized for nausea and vomiting. She was seen by GI. She had mild transaminitis and was suspected to have gastroenteritis. Never had any upper endoscopic study. Colonoscopy in June 2014 showed polyps which was removed. Pathology report showed tubular adenoma.   CT abdomen and pelvis on May 30, 2021:  No acute abnormality within the abdomen and pelvis.   Status post hysterectomy, appendectomy and cholecystectomy.    15 mm subcutaneus soft tissue nodule within the left lower quadrant area of anterior abdomen.  Finding may represent sebaceous cyst or other pathology. CTA chest on May 30, 2021 showed   1. No evidence of acute pulmonary embolism. 2. Soft tissue thickening and fat stranding overlying the bilateral pectoral  muscles. CT head on May 31, 2021 showed  No acute intracranial abnormality.  No intraparenchymal abnormal enhancement to suggest intracranial metastatic disease.  MRI is a superior modality for evaluation of intracranial metastasis.   Small probable meningioma within the falx near the convexity. MRI of the brain on June 1, 2021 showed  No acute infarct scattered areas of chronic white matter change in the  periventricular white matter.   No evidence for blood products on gradient imaging.   No  focal intracranial lesion noted     Amylase and lipase were unremarkable. She was placed on reglan with improvement of nausea and vomiting. She was recommended to have upper endoscopic study as outpatient by GI. Today she has mild nausea. I gave prescription of reglan. She has appointment to see Dr Byron Schulz and GI. I scheduled chemo education. We discussed risks and benefits of chemo including nausea, vomiting, secondary malignancy, increased risk of infection, hair loss etc.    I plan chemo after endoscopic study. She has chronic left hip pain. No fever or chills. No melena or hematuria.   No headache or LYMPHSABS 2.1 06/02/2021    MONOSABS 0.6 06/02/2021    DIFFTYPE AUTOMATED DIFFERENTIAL 06/02/2021    ANISOCYTOSIS 1+ 11/02/2020    POLYCHROM 1+ 11/02/2020    WBCMORP FEW 11/02/2020    PLTM OCC PLT CLUMPING 11/02/2020     Lab Results   Component Value Date     (H) 10/25/2020     Chemistry:  Lab Results   Component Value Date     06/02/2021    K 3.7 06/02/2021     06/02/2021    CO2 23 06/02/2021    BUN 13 06/02/2021    CREATININE 1.0 06/02/2021    GLUCOSE 84 06/02/2021    CALCIUM 8.7 06/02/2021    PROT 6.1 (L) 06/02/2021    LABALBU 4.3 06/02/2021    BILITOT 1.0 06/02/2021    ALKPHOS 57 06/02/2021    AST 31 06/02/2021    ALT 47 (H) 06/02/2021    LABGLOM 57 (L) 06/02/2021    GFRAA >60 06/02/2021    AGRATIO 1.7 12/07/2015    GLOB 2.7 12/07/2015    MG 1.9 05/31/2021    POCGLU 188 (H) 11/16/2020     Lab Results   Component Value Date     (H) 11/14/2020     Lab Results   Component Value Date    TSHHS 2.200 03/01/2021    T4FREE 1.02 03/01/2021     Immunology:  Lab Results   Component Value Date    PROT 6.1 (L) 06/02/2021     Coagulation Panel:  Lab Results   Component Value Date    PROTIME 12.9 06/02/2021    INR 1.07 06/02/2021    APTT 31.7 06/02/2021    DDIMER 728 (H) 11/16/2020     Anemia Panel:  Lab Results   Component Value Date    VCCRVIEN49 676.0 05/13/2019    FOLATE >20.0 (H) 05/13/2019        Observations:  No data recorded       Assessment & Plan:    1. 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 Pathological stage T2, N0 sentinel lymph node MX. Oncotype DX was 29. I offered adjuvant chemotherapy  She opted to [facility] + T. We discussed about risk and benefit of adjuvant chemo and endocrine therapy. She will need mediport. I scheduled chemo education. 2.  I referred for genetic counseling. 3.  I rescheduled bone density test.  I ordered ECHO.     4. She has N/V ? Gastroenteritis. She will follow up with GI. I gave refill of reglan. We discussed about diet and weight loss. She has not considered Covid vaccine yet. Return to clinic in 3 weeks or sooner to discuss the results of the Oncotype DX. All of her question has been answered for today.      Electronically signed by Chasity Gramajo MD on 5/17/21 at 7:41 AM EDT

## 2021-06-03 VITALS
SYSTOLIC BLOOD PRESSURE: 130 MMHG | DIASTOLIC BLOOD PRESSURE: 68 MMHG | WEIGHT: 221 LBS | HEIGHT: 66 IN | HEART RATE: 71 BPM | OXYGEN SATURATION: 97 % | BODY MASS INDEX: 35.52 KG/M2 | TEMPERATURE: 98.3 F | RESPIRATION RATE: 18 BRPM

## 2021-06-03 PROCEDURE — 96372 THER/PROPH/DIAG INJ SC/IM: CPT

## 2021-06-03 PROCEDURE — 6370000000 HC RX 637 (ALT 250 FOR IP): Performed by: NURSE PRACTITIONER

## 2021-06-03 PROCEDURE — 6360000002 HC RX W HCPCS: Performed by: STUDENT IN AN ORGANIZED HEALTH CARE EDUCATION/TRAINING PROGRAM

## 2021-06-03 PROCEDURE — 99231 SBSQ HOSP IP/OBS SF/LOW 25: CPT | Performed by: INTERNAL MEDICINE

## 2021-06-03 PROCEDURE — 2580000003 HC RX 258: Performed by: EMERGENCY MEDICINE

## 2021-06-03 PROCEDURE — 96376 TX/PRO/DX INJ SAME DRUG ADON: CPT

## 2021-06-03 PROCEDURE — G0378 HOSPITAL OBSERVATION PER HR: HCPCS

## 2021-06-03 PROCEDURE — 6370000000 HC RX 637 (ALT 250 FOR IP): Performed by: STUDENT IN AN ORGANIZED HEALTH CARE EDUCATION/TRAINING PROGRAM

## 2021-06-03 PROCEDURE — 6360000002 HC RX W HCPCS: Performed by: NURSE PRACTITIONER

## 2021-06-03 PROCEDURE — 94761 N-INVAS EAR/PLS OXIMETRY MLT: CPT

## 2021-06-03 PROCEDURE — C9113 INJ PANTOPRAZOLE SODIUM, VIA: HCPCS | Performed by: STUDENT IN AN ORGANIZED HEALTH CARE EDUCATION/TRAINING PROGRAM

## 2021-06-03 RX ADMIN — ENOXAPARIN SODIUM 40 MG: 40 INJECTION SUBCUTANEOUS at 10:29

## 2021-06-03 RX ADMIN — LOSARTAN POTASSIUM 25 MG: 25 TABLET, FILM COATED ORAL at 10:28

## 2021-06-03 RX ADMIN — POTASSIUM CHLORIDE 20 MEQ: 1500 TABLET, EXTENDED RELEASE ORAL at 10:29

## 2021-06-03 RX ADMIN — EZETIMIBE 10 MG: 10 TABLET ORAL at 10:29

## 2021-06-03 RX ADMIN — ASPIRIN 81 MG CHEWABLE TABLET 81 MG: 81 TABLET CHEWABLE at 10:28

## 2021-06-03 RX ADMIN — METOPROLOL TARTRATE 25 MG: 25 TABLET, FILM COATED ORAL at 10:28

## 2021-06-03 RX ADMIN — CETIRIZINE HYDROCHLORIDE 10 MG: 10 TABLET, FILM COATED ORAL at 10:28

## 2021-06-03 RX ADMIN — METOCLOPRAMIDE 10 MG: 5 INJECTION, SOLUTION INTRAMUSCULAR; INTRAVENOUS at 05:49

## 2021-06-03 RX ADMIN — FLUOXETINE HYDROCHLORIDE 10 MG: 10 CAPSULE ORAL at 10:31

## 2021-06-03 RX ADMIN — SODIUM CHLORIDE 1000 ML: 9 INJECTION, SOLUTION INTRAVENOUS at 01:29

## 2021-06-03 RX ADMIN — PANTOPRAZOLE SODIUM 40 MG: 40 INJECTION, POWDER, FOR SOLUTION INTRAVENOUS at 10:29

## 2021-06-03 RX ADMIN — METOCLOPRAMIDE 10 MG: 5 INJECTION, SOLUTION INTRAMUSCULAR; INTRAVENOUS at 01:26

## 2021-06-03 ASSESSMENT — PAIN SCALES - GENERAL: PAINLEVEL_OUTOF10: 0

## 2021-06-03 NOTE — CARE COORDINATION
LSW informed that Pt is active with home care. Call to Pt room. Pt stated that she is active with [facility] for nursing. Call to [facility] care to verify services. Pt is active with RN services for wound care, medication compliance, and vital signs. LSW faxed the discharge paperwork to [place].

## 2021-06-03 NOTE — DISCHARGE SUMMARY
Discharge Summary    Name:  Ronna Brito /Age/Sex: 1960  (64 y.o. female)   MRN & CSN:  [id number] & [id number] Admission Date/Time: 2021  3:09 PM   Attending:  Beatriz Cruz MD Discharging Provider Stanton Keenan, APRN - [address] Course:   Ronna Brito is a 64 y.o.  female  who presents with Nausea and vomiting    Hospital Course: Patient was admitted 2021 for intractable nausea vomiting with concern for acute gastroenteritis. GI PCR was negative and symptoms gradually resolved to where she was able to tolerate p.o. intake/medications. She also had evaluation for intractable headache with MRI for concern of metastasis as she has a recent bilateral mastectomy 2021 Oncology was also consulted for bedside evaluation and establish following care. Intractable Nausea and vomiting              GI consulted rec OP colonoscopy/EGD              PPI              PRN symptom control               Tolerating p.o.              GI PCR- neg                 Left breast cancer-ER/PA positive HER-2 negative. S/P b/l mastectomy (2021) by Dr. Marlon Taylor              Follow-up with Dr. Karen Milner for treatment plan                          HA- Resolved     The patient expressed appropriate understanding of and agreement with the discharge recommendations, medications, and plan.      Consults this admission:  [address] TO HOSPITALIST  IP CONSULT TO GI  IP CONSULT TO ONCOLOGY    Discharge Instruction:   Follow up appointments:   Primary care physician:  within 2 weeks  Oncology follow-up  Gastroenterology follow-up    Diet:  regular diet and clear liquids, advance as tolerated   Activity: activity as tolerated  Disposition: Discharged to:   [x]Home, []C, []SNF, []Acute Rehab, []Hospice   Condition on discharge: Stable    Discharge Medications:      Jameste Amiraings \"Court\"   Home Medication Instructions UGI:[id number]    Printed on:21 5972 -CTA, no wheezes, rales or rhonchi. Symmetric chest movement while on RA.  C/V      -S1/S2 auscultated. RRR without appreciable M/R/G. No JVD or carotid bruits. Peripheral pulses equal bilaterally and palpable. No peripheral edema. GI        -Abdomen is soft non distended and without significant tenderness. No masses or guarding. + BS Rectal exam deferred.        -No CVA tenderness. Damon catheter is not present. LYMPH-No palpable cervical lymphadenopathy and no hepatosplenomegaly. No petechiae or ecchymoses. MS       -No gross joint deformities. SKIN    -Normal coloration, warm, dry. NEURO-Cranial nerves appear grossly intact, normal speech, no lateralizing weakness. PSYC-Awake, alert, oriented x 4. Appropriate affect. Data:     Laboratory this visit:  Reviewed  Recent Labs     06/01/21  1011 06/02/21  0702   WBC 6.4 7.2   HGB 11.9* 12.3*   HCT 38.1 39.0    170      Recent Labs     06/01/21  1011 06/02/21  0702    143   K 3.6 3.7    104   CO2 25 23   BUN 13 13   CREATININE 1.0 1.0     Recent Labs     06/01/21  1011 06/02/21  0702   AST 34 31   ALT 53* 47*   BILITOT 0.9 1.0   ALKPHOS 60 57     Recent Labs     06/02/21  0702   INR 1.07     No results for input(s): CKTOTAL, CKMB, CKMBINDEX in the last 72 hours. Invalid input(s): Bella Slater input(s): PRO-BNP    Radiology this visit:  Reviewed. XR CHEST (2 VW)    Result Date: 5/4/2021  EXAMINATION: TWO XRAY VIEWS OF THE CHEST 5/4/2021 8:25 am COMPARISON: 11/13/2020 HISTORY: ORDERING SYSTEM PROVIDED HISTORY: Hypertension, unspecified type TECHNOLOGIST PROVIDED HISTORY: Reason for Exam: chest x-ray performed for pretesting for bilateral mastectomy FINDINGS: The heart and mediastinal structures are stable. The pulmonary vasculature is normal.  The lungs are clear except for calcified granulomata. No acute osseous abnormality is seen. Clips from prior cholecystectomy are present. No acute cardiopulmonary disease. abnormality. Mild dextroscoliosis with tip at L2-L3 and mild levoscoliosis with tip at L4-L5. Grade 1 anterolisthesis of L4-L5. Status post left hip arthroplasty. 15 mm subcutaneus soft tissue nodule within the left lower quadrant area of anterior abdomen. No acute abnormality within the abdomen and pelvis. Status post hysterectomy, appendectomy and cholecystectomy. 15 mm subcutaneus soft tissue nodule within the left lower quadrant area of anterior abdomen. Finding may represent sebaceous cyst or other pathology. CTA PULMONARY W CONTRAST    Result Date: 6/1/2021  EXAMINATION: CTA OF THE CHEST 5/30/2021 4:57 pm TECHNIQUE: CTA of the chest was performed after the administration of intravenous contrast.  Multiplanar reformatted images are provided for review. MIP images are provided for review. Dose modulation, iterative reconstruction, and/or weight based adjustment of the mA/kV was utilized to reduce the radiation dose to as low as reasonably achievable. COMPARISON: CT chest performed October 27, 2020. HISTORY: ORDERING SYSTEM PROVIDED HISTORY: Chest pain, persistent postoperative nausea and vomiting, bilateral mastectomy May 6, concern for sepsis or PE TECHNOLOGIST PROVIDED HISTORY: Reason for exam:->Chest pain, persistent postoperative nausea and vomiting, bilateral mastectomy May 6, concern for sepsis or PE Decision Support Exception - unselect if not a suspected or confirmed emergency medical condition->Emergency Medical Condition (MA) Reason for Exam: Chest pain, persistent postoperative nausea and vomiting, bilateral mastectomy May 6, Acuity: Acute Type of Exam: Initial FINDINGS: Pulmonary Arteries: Pulmonary arteries are adequately opacified for evaluation. No evidence of intraluminal filling defect to suggest pulmonary embolism. Main pulmonary artery is normal in caliber. Mediastinum: The esophagus is unremarkable. No significant mediastinal or hilar lymphadenopathy. No pericardial effusion. Multivessel coronary artery disease. The thoracic aorta is normal in course and caliber. Lungs/pleura: No focal consolidation, pleural effusion, or pneumothorax. The central airway is grossly patent. Upper Abdomen: No acute abnormality of the visualized upper abdomen. Soft Tissues/Bones: No acute soft tissue or osseous abnormality. There is soft tissue thickening and fat stranding overlying the bilateral pectoral muscles. 1. No evidence of acute pulmonary embolism. 2. Soft tissue thickening and fat stranding overlying the bilateral pectoral muscles. MRI BRAIN WO CONTRAST    Result Date: 6/1/2021  EXAMINATION: MRI OF THE BRAIN WITHOUT CONTRAST  6/1/2021 3:03 pm TECHNIQUE: Multiplanar multisequence MRI of the brain was performed without the administration of intravenous contrast. COMPARISON: None. HISTORY: ORDERING SYSTEM PROVIDED HISTORY: headache, visual changes TECHNOLOGIST PROVIDED HISTORY: Reason for exam:->headache, visual changes Reason for Exam: HEADACHE, VISUAL CHANGES Acuity: Acute Type of Exam: Initial Additional signs and symptoms: NONE Relevant Medical/Surgical History:  HX OF BREATS CANCER, MASTECOMY MAY 2021 FINDINGS: INTRACRANIAL STRUCTURES/VENTRICLES: There is no acute infarct. No mass effect or midline shift. No evidence of an acute intracranial hemorrhage. Few areas of white matter change in the periventricular white matter. These findings are nonspecific. No abnormal signal on gradient imaging. The ventricles and sulci are normal in size and configuration. The sellar/suprasellar regions appear unremarkable. The normal signal voids within the major intracranial vessels appear maintained. ORBITS: The visualized portion of the orbits demonstrate no acute abnormality. SINUSES: The visualized paranasal sinuses and mastoid air cells are well aerated. BONES/SOFT TISSUES: The bone marrow signal intensity appears normal. The soft tissues demonstrate no acute abnormality.      No acute infarct scattered areas of chronic white matter change in the periventricular white matter. No evidence for blood products on gradient imaging.  No  focal intracranial lesion noted       Discharge Time of < 30 minutes    Electronically signed by LIAN Bar CNP on 6/3/2021 at 8:27 AM

## 2021-06-04 ENCOUNTER — CARE COORDINATION (OUTPATIENT)
Dept: CASE MANAGEMENT | Age: 61
End: 2021-06-04

## 2021-06-04 DIAGNOSIS — R11.2 NAUSEA AND VOMITING, INTRACTABILITY OF VOMITING NOT SPECIFIED, UNSPECIFIED VOMITING TYPE: Primary | ICD-10-CM

## 2021-06-04 NOTE — CARE COORDINATION
Roberto 45 Transitions Initial Follow Up Call    Call within 2 business days of discharge: Yes    Patient: Jeff Roberts Patient : 1960   MRN: [id number]  Reason for Admission: Acute Gastroenteritis  Discharge Date: 6/3/21 RARS: Readmission Risk Score: 18      Last Discharge [facility]       Complaint Diagnosis Description Type Department Provider    21 Nausea; Post-op Problem Intractable nausea and vomiting . .. ED to Hosp-Admission (Discharged) (ADMITTED) Roby Aly MD; Grayson Zaman, ...            Spoke with: Patient  Allegra Russ Dr: [facility]    Non-face-to-face services provided:  Scheduled appointment with PCP-Appt with PCP Dr Ace Richardson 7-  Scheduled appointment with Specialist-appt's 6-10 with Onc 6-10, GI   Obtained and reviewed discharge summary and/or continuity of care documents  Communication with home health agencies or other community services the patient is currently using-[facility] out to pt's this am  Education of patient/family/caregiver/guardian to support self-management-Pt Indept ADL's, nephew assists PRN  Assessment and support for treatment adherence and medication management-reviewed DC med list    Care Transitions 24 Hour Call    Do you have any ongoing symptoms?: No  Do you have a copy of your discharge instructions?: Yes  Do you have all of your prescriptions and are they filled?: Yes  Have you been contacted by a [id number] [employer] Pharmacist?: No  Have you scheduled your follow up appointment?: Yes  How are you going to get to your appointment?: Car - family or friend to transport (Comment: Pt  typically drives self, although nephew who lives close can take her to appts if needed)  Were you discharged with any Home Care or Post Acute Services: Yes  Post Acute Services: [facility] ([facility]  Do you feel like you have everything you need to keep you well at home?: Yes  Care Transitions Interventions   Home Care Waiver: Completed Transportation Support: Completed         Transitions of Care Initial Call    Was this an external facility discharge? No Discharge Facility: NA    Challenges to be reviewed by the provider   Additional needs identified to be addressed with provider: No  none             Method of communication with provider : none      Advance Care Planning:   Does patient have an Advance Directive:  not on file; education provided. Was this a readmission? No  Patient stated reason for admission: Gastroenteritis,   Patients top risk factors for readmission: medical condition-recent surgeries, (hip & Mastectomy (Breast CA)    Care Transition Nurse (CTN) contacted the patient by telephone to perform post hospital discharge assessment. Verified name and  with patient as identifiers. Provided introduction to self, and explanation of the CTN role. CTN reviewed discharge instructions, medical action plan and red flags with patient who verbalized understanding. Patient given an opportunity to ask questions and does not have any further questions or concerns at this time. Were discharge instructions available to patient? Yes. Reviewed appropriate site of care based on symptoms and resources available to patient including: PCP, Specialist, Home health and When to call 911. The patient agrees to contact the PCP office for questions related to their healthcare. Medication reconciliation was performed with patient, who verbalizes understanding of administration of home medications. Advised obtaining a 90-day supply of all daily and as-needed medications. Reviewed and educated patient on any new and changed medications related to discharge diagnosis.        Follow Up  Future Appointments   Date Time Provider Carrie Arevalo   6/10/2021  1:45 PM [facility], MED ONC NURSE [address]   6/10/2021  2:00 PM Scottie Fierro MD [address] [facility]   2021 10:30 AM SCHEDULE, [facility] MED ONC LAB [address] 6/17/2021 10:45 AM Ana Daniel, APRN - CNP [address] [facility] [place] [facility]   6/21/2021  2:00 PM Magdy Lama MD [facility]   7/1/2021  9:50 AM Real Galdamez MD [address] [facility] [place] [facility] SPM [facility]   7/15/2021  2:15 PM [facility]   9/30/2021  2:30 PM Rosie Alexis MD [facility] Heart [facility]   10/21/2021  2:30 PM Real Galdamez MD [address] [facility]     Pt states feeling \"ok\" since home. Hasn't been oob much yet, but plan's to get up & start moving around today. Pt states able to tolerate food, no N&V since home from the hospital. Pt had recent Mastectomy last month (5-2021) for Breast CA. Pain is tolerable, taking Tylenol or rx for Percocet if pain is bad. States her incision is healing well, no redness or swelling noted. Pt states [address] nurse looked @ it this am & said it looks good. Has appt next week with Oncology for treatment plan. Reviewed above appts with pt & importance of continued follow up. Reviewed DC med list with pt, is taking all as prescribed. 1111F completed. CTN provided contact information. Plan for follow-up call in 3-5 days based on severity of symptoms and risk factors.       Doris Garcia, OSWALDO   [phone]

## 2021-06-10 ENCOUNTER — OFFICE VISIT (OUTPATIENT)
Dept: ONCOLOGY | Age: 61
End: 2021-06-10
Payer: COMMERCIAL

## 2021-06-10 ENCOUNTER — HOSPITAL ENCOUNTER (OUTPATIENT)
Dept: INFUSION THERAPY | Age: 61
Discharge: HOME | End: 2021-06-10
Payer: COMMERCIAL

## 2021-06-10 VITALS
HEART RATE: 56 BPM | OXYGEN SATURATION: 96 % | RESPIRATION RATE: 18 BRPM | TEMPERATURE: 97.1 F | SYSTOLIC BLOOD PRESSURE: 126 MMHG | BODY MASS INDEX: 36.16 KG/M2 | HEIGHT: 66 IN | WEIGHT: 225 LBS | DIASTOLIC BLOOD PRESSURE: 78 MMHG

## 2021-06-10 DIAGNOSIS — Z79.899 NEED FOR PROPHYLACTIC CHEMOTHERAPY: Primary | ICD-10-CM

## 2021-06-10 PROCEDURE — 3017F COLORECTAL CA SCREEN DOC REV: CPT | Performed by: INTERNAL MEDICINE

## 2021-06-10 PROCEDURE — 99211 OFF/OP EST MAY X REQ PHY/QHP: CPT

## 2021-06-10 PROCEDURE — G8427 DOCREV CUR MEDS BY ELIG CLIN: HCPCS | Performed by: INTERNAL MEDICINE

## 2021-06-10 PROCEDURE — 1036F TOBACCO NON-USER: CPT | Performed by: INTERNAL MEDICINE

## 2021-06-10 PROCEDURE — G8417 CALC BMI ABV UP PARAM F/U: HCPCS | Performed by: INTERNAL MEDICINE

## 2021-06-10 PROCEDURE — 99214 OFFICE O/P EST MOD 30 MIN: CPT | Performed by: INTERNAL MEDICINE

## 2021-06-10 RX ORDER — FLUOXETINE HYDROCHLORIDE 20 MG/1
20 CAPSULE ORAL DAILY
COMMUNITY
Start: 2021-06-07 | End: 2021-08-27

## 2021-06-10 RX ORDER — METOCLOPRAMIDE 5 MG/1
5 TABLET ORAL 3 TIMES DAILY
Qty: 30 TABLET | Refills: 1 | Status: SHIPPED | OUTPATIENT
Start: 2021-06-10

## 2021-06-10 NOTE — PROGRESS NOTES
MA Rooming Questions  Patient: Talat Uribe  MRN: [id number]    Date: 6/10/2021        1. Do you have any new issues?   no         2. Do you need any refills on medications?    no    3. Have you had any imaging done since your last visit? yes -     4. Have you been hospitalized or seen in the emergency room since your last visit here?   yes -     5. Did the patient have a depression screening completed today?  No    No data recorded     PHQ-9 Given to (if applicable):               PHQ-9 Score (if applicable):                     [] Positive     []  Negative              Does question #9 need addressed (if applicable)                     [] Yes    []  No               Cristóbal Cruz CMA

## 2021-06-11 ENCOUNTER — HOSPITAL ENCOUNTER (OUTPATIENT)
Dept: WOMENS IMAGING | Age: 61
Discharge: HOME | End: 2021-06-11
Payer: COMMERCIAL

## 2021-06-11 DIAGNOSIS — Z79.899 NEED FOR PROPHYLACTIC CHEMOTHERAPY: ICD-10-CM

## 2021-06-11 PROCEDURE — 77080 DXA BONE DENSITY AXIAL: CPT

## 2021-06-17 ENCOUNTER — HOSPITAL ENCOUNTER (OUTPATIENT)
Dept: INFUSION THERAPY | Age: 61
Discharge: HOME | End: 2021-06-17
Payer: COMMERCIAL

## 2021-06-17 ENCOUNTER — OFFICE VISIT (OUTPATIENT)
Dept: ONCOLOGY | Age: 61
End: 2021-06-17
Payer: COMMERCIAL

## 2021-06-17 VITALS — WEIGHT: 230.4 LBS | BODY MASS INDEX: 39.34 KG/M2 | HEIGHT: 64 IN

## 2021-06-17 DIAGNOSIS — Z80.41 FAMILY HISTORY OF OVARIAN CANCER: ICD-10-CM

## 2021-06-17 DIAGNOSIS — Z84.89 FAMILY HISTORY OF BENIGN NEOPLASM OF COLON: Primary | ICD-10-CM

## 2021-06-17 PROCEDURE — 1036F TOBACCO NON-USER: CPT | Performed by: NURSE PRACTITIONER

## 2021-06-17 PROCEDURE — 3017F COLORECTAL CA SCREEN DOC REV: CPT | Performed by: NURSE PRACTITIONER

## 2021-06-17 PROCEDURE — G8428 CUR MEDS NOT DOCUMENT: HCPCS | Performed by: NURSE PRACTITIONER

## 2021-06-17 PROCEDURE — G8417 CALC BMI ABV UP PARAM F/U: HCPCS | Performed by: NURSE PRACTITIONER

## 2021-06-17 PROCEDURE — 99215 OFFICE O/P EST HI 40 MIN: CPT | Performed by: NURSE PRACTITIONER

## 2021-06-18 ENCOUNTER — HOSPITAL ENCOUNTER (OUTPATIENT)
Dept: NON INVASIVE DIAGNOSTICS | Age: 61
Discharge: HOME | End: 2021-06-18
Payer: COMMERCIAL

## 2021-06-18 DIAGNOSIS — Z79.899 NEED FOR PROPHYLACTIC CHEMOTHERAPY: ICD-10-CM

## 2021-06-18 LAB
LV EF: 58 %
LV EF: 58 %
LVEF MODALITY: NORMAL
LVEF MODALITY: NORMAL

## 2021-06-18 PROCEDURE — 93306 TTE W/DOPPLER COMPLETE: CPT

## 2021-06-20 NOTE — PROGRESS NOTES
Wyatt Cha  [facility]        Medical Problems: Patient Active Problem List:     Essential hypertension     Hyperlipidemia     Allergic rhinitis due to pollen     Morbid obesity due to excess calories ([address]     Hearing loss     Hot flashes due to surgical menopause     Gastroesophageal reflux disease     Chronic back pain     Anxiety and depression     Osteoarthritis     Meniere's disease     Insomnia     Precordial pain     SOB (shortness of breath)     Calculus of gallbladder without cholecystitis without obstruction     S/P laparoscopic cholecystectomy     Vasovagal syncope     Class 2 obesity due to excess calories without serious comorbidity in adult     Palpitations     Family history of early CAD     Closed fracture of left ankle     Acute respiratory failure with hypoxia (HCC)     Status post left hip replacement     Hyperglycemia     Mucinous carcinoma of breast, left (HCC)     Malignant neoplasm of left female breast (HCC)     Infiltrating ductal carcinoma of left breast (HCC)     S/P mastectomy, bilateral     Hx of lymph node excision     Post-op pain     Nausea and vomiting       Family History:  A three-generation pedigree was obtained today and will be saved with the patient's record. Patient's family history is significant for the following:  See pedigree for information about family structure and unaffected relatives. Mother- colon cancer, breast cancer, stomach cancer initial dx?   71  Maternal grandmother: colon cancer, breast cancer, stomach cancer dx 79,  70  Paternal grandmother- ovarian dx 76,  68  Paternal grandfather- lung dx 79,  80  Maternal aunt, colon age unknown, decreased  maternal aunt, breast ? Age unknown at diagnosis  Maternal cousin- breast dx 47, 64 presently  maternal second cousin breast dx 22,  27  Paternal uncle- colon dx70,  70  Paternal aunt- stomach, colon dx 62,  68  Paternal aunt- bone dx 61  Paternal first cousin- breast dx 61, now test results: positive, negative, and indeterminate, (variant of unknown significance). We addressed the 2008 federal legislation, [employer], which protects individuals from discrimination in health insurance and employment. More information is available at www. GINAhelp.org. The pros and cons of panel testing were reviewed, including the fact that there are some genes that do not have well-defined cancer risks or recommendations. A psychological assessment was performed, and the patient understands how the results will be incorporated into medical management, as well as potential implications for family members. Consent: All elements of the informed consent were discussed with Ronna Brito and signed consent was obtained for the gene panel. Medical decision making was of high complexity, as it included a comprehensive discussion of all relevant options for genetic testing and implications for patient and family members. The patient was educated that the lab will verify insurance coverage before initiating testing and call if there is out-of-pocket for any reason. Patient was educated that results are expected to be available in approximately 3 weeks and will then be contacted. Plan:  1. The patient opted to pursue the 36 gene panel through [facility] lab today. Will await results and schedule a future appointment in approximately 1 month as needed. 2. The patient was educated to continue routine follow-up with their healthcare providers. 3. Patient was educated that if they obtain any additional information regarding the family medical history, or if there are any changes to the reported personal or family health history, to please contact us for updated risk assessment. 60 minutes were spent with the patient, with over 50% of the time spent in face to face counseling and coordination of care.     LIAN Tabares - CNP    Recipients:  Dr. Tita Louis

## 2021-06-22 PROBLEM — Z85.3 PERSONAL HISTORY OF BREAST CANCER: Status: ACTIVE | Noted: 2021-06-22

## 2021-06-23 ENCOUNTER — CARE COORDINATION (OUTPATIENT)
Dept: CASE MANAGEMENT | Age: 61
End: 2021-06-23

## 2021-06-30 ENCOUNTER — ANTI-COAG VISIT (OUTPATIENT)
Dept: CASE MANAGEMENT | Age: 61
End: 2021-06-30

## 2021-06-30 ENCOUNTER — CARE COORDINATION (OUTPATIENT)
Dept: CASE MANAGEMENT | Age: 61
End: 2021-06-30

## 2021-06-30 NOTE — CARE COORDINATION
Roberto [facility] Transitions Follow Up Call    2021    Patient: Mayito Harris  Patient : 1960   MRN: [id number]  Reason for Admission: Acute Gastroenteritis  Discharge Date: 6/3/21 RARS: Readmission Risk Score: 18    Spoke with: Mayito Harris who reports that she is stilling having pain and discomfort. Patient had an upper GI last Thursday and it revealed a Hiatal hernia, polyps, and gastroenteritis. Patient continues with abd pain, acid reflux, belching, nausea and gas. Appetite is fair and fluid intake within restrictions. Patient reports that she is taking all medications as ordered. Patient states she see ortho tomorrow  because the muscle over hip replacement has , going to see what needs to be done to fixed that. Patient states there is no need to contact doctor because they are all aware of her issues. She just taking it one day at a time. No other concerns or issues at this time. Next outreach in 5 - 7 days. Care Transitions Subsequent and Final Call    Subsequent and Final Calls  Care Transitions Interventions  Other Interventions:            Follow Up  Future Appointments   Date Time Provider Carrie Arevalo   2021  9:50 AM Oswaldo Parada MD [facility]   2021  2:15 PM [facility], MED ONC NURSE [address]   2021  2:30 PM LIAN Ramos - CNP [facility]   7/15/2021  2:15 PM Paul Castrejon MD [facility]   2021 10:00 AM [facility] XR ROOM 3 [facility] RAD [facility]   2021 10:30 AM [facility]   2021  2:30 PM Maurilio Maddox MD [facility] Heart [facility]   10/21/2021  2:30 PM Oswaldo Parada MD [facility]       Reny Mir LPN

## 2021-07-01 ENCOUNTER — OFFICE VISIT (OUTPATIENT)
Dept: ORTHOPEDIC SURGERY | Age: 61
End: 2021-07-01
Payer: COMMERCIAL

## 2021-07-01 VITALS — RESPIRATION RATE: 15 BRPM | WEIGHT: 227 LBS | HEIGHT: 64 IN | BODY MASS INDEX: 38.76 KG/M2

## 2021-07-01 DIAGNOSIS — Z96.642 STATUS POST LEFT HIP REPLACEMENT: Primary | ICD-10-CM

## 2021-07-01 PROCEDURE — 99213 OFFICE O/P EST LOW 20 MIN: CPT | Performed by: ORTHOPAEDIC SURGERY

## 2021-07-01 PROCEDURE — 3017F COLORECTAL CA SCREEN DOC REV: CPT | Performed by: ORTHOPAEDIC SURGERY

## 2021-07-01 PROCEDURE — 1036F TOBACCO NON-USER: CPT | Performed by: ORTHOPAEDIC SURGERY

## 2021-07-01 PROCEDURE — G8428 CUR MEDS NOT DOCUMENT: HCPCS | Performed by: ORTHOPAEDIC SURGERY

## 2021-07-01 PROCEDURE — G8417 CALC BMI ABV UP PARAM F/U: HCPCS | Performed by: ORTHOPAEDIC SURGERY

## 2021-07-01 NOTE — PROGRESS NOTES
Patent returns to the office today for s/p check of the left hip. Pt states pain today in the left hip is a 8/10 will increase when she is lays on her left side of when she is walking. Pt states she is very tender to the touch along the lateral aspect of the left hip.

## 2021-07-02 ENCOUNTER — HOSPITAL ENCOUNTER (OUTPATIENT)
Dept: INFUSION THERAPY | Age: 61
Discharge: HOME | End: 2021-07-02
Payer: COMMERCIAL

## 2021-07-02 ENCOUNTER — CLINICAL DOCUMENTATION (OUTPATIENT)
Dept: RADIATION ONCOLOGY | Age: 61
End: 2021-07-02

## 2021-07-02 ENCOUNTER — OFFICE VISIT (OUTPATIENT)
Dept: ONCOLOGY | Age: 61
End: 2021-07-02
Payer: COMMERCIAL

## 2021-07-02 VITALS
WEIGHT: 227.6 LBS | TEMPERATURE: 96.2 F | OXYGEN SATURATION: 98 % | SYSTOLIC BLOOD PRESSURE: 110 MMHG | HEART RATE: 67 BPM | HEIGHT: 64 IN | BODY MASS INDEX: 38.86 KG/M2 | DIASTOLIC BLOOD PRESSURE: 60 MMHG

## 2021-07-02 DIAGNOSIS — F32.A DEPRESSION, UNSPECIFIED DEPRESSION TYPE: ICD-10-CM

## 2021-07-02 DIAGNOSIS — Z17.0 MALIGNANT NEOPLASM OF AREOLA OF LEFT BREAST IN FEMALE, ESTROGEN RECEPTOR POSITIVE (HCC): Primary | ICD-10-CM

## 2021-07-02 DIAGNOSIS — C50.012 MALIGNANT NEOPLASM OF AREOLA OF LEFT BREAST IN FEMALE, ESTROGEN RECEPTOR POSITIVE (HCC): Primary | ICD-10-CM

## 2021-07-02 PROCEDURE — 1036F TOBACCO NON-USER: CPT | Performed by: NURSE PRACTITIONER

## 2021-07-02 PROCEDURE — 99211 OFF/OP EST MAY X REQ PHY/QHP: CPT

## 2021-07-02 PROCEDURE — 99215 OFFICE O/P EST HI 40 MIN: CPT | Performed by: NURSE PRACTITIONER

## 2021-07-02 PROCEDURE — 3017F COLORECTAL CA SCREEN DOC REV: CPT | Performed by: NURSE PRACTITIONER

## 2021-07-02 PROCEDURE — G8417 CALC BMI ABV UP PARAM F/U: HCPCS | Performed by: NURSE PRACTITIONER

## 2021-07-02 PROCEDURE — G8427 DOCREV CUR MEDS BY ELIG CLIN: HCPCS | Performed by: NURSE PRACTITIONER

## 2021-07-02 RX ORDER — PANTOPRAZOLE SODIUM 40 MG/1
40 TABLET, DELAYED RELEASE ORAL DAILY
Status: ON HOLD | COMMUNITY
Start: 2021-06-24 | End: 2021-08-13 | Stop reason: HOSPADM

## 2021-07-02 ASSESSMENT — PATIENT HEALTH QUESTIONNAIRE - PHQ9
2. FEELING DOWN, DEPRESSED OR HOPELESS: 1
SUM OF ALL RESPONSES TO PHQ QUESTIONS 1-9: 1
1. LITTLE INTEREST OR PLEASURE IN DOING THINGS: 0
SUM OF ALL RESPONSES TO PHQ QUESTIONS 1-9: 1
SUM OF ALL RESPONSES TO PHQ9 QUESTIONS 1 & 2: 1
SUM OF ALL RESPONSES TO PHQ QUESTIONS 1-9: 1

## 2021-07-02 NOTE — PROGRESS NOTES
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 ER was more than 95%, IL more than 90%, HER-2/baltazar negative by FISH. MRI of bilateral breasts on April 19, 2021 showed  1. Left breast multicentric disease with biopsy proven invasive ductal  carcinoma with mucinous component at 11 o'clock and mucinous carcinomas at 2 o'clock and 12 o'clock in the retroareolar breast. Wily Honour is at least 5 cm of separation between the 11 o'clock mass and 12 o'clock mass.  Additional smooth masses measuring up to 21 mm at biopsy sites are hematomas. 2. No MR evidence of malignancy in the contralateral right breast.  She had bilateral mastectomy on May 6, 2021. 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 Pathological stage classification T2, N0, MX. Pathologist felt that she has 1 contiguous tumor mass measuring about 4.5 cm rather than multicentric disease. I discussed with pathologist who stated that she does not have pure mucinous carcinoma of the breast.      We went over NCCN guideline. We discussed about healthy diet and weight loss. I went over with her the potential risk and benefit of adjuvant endocrine therapy. Likely I will put her on Arimidex. I will order baseline bone density test.  CBC and CMP in March 2021 were grossly unremarkable. Due to family history and personal history of breast cancer, I referred for genetic counseling. She had colonoscopy with removal of 2 polyps in 2014 by Dr. Davida Briggs. Follow-up in 3 years was recommended. She refused to have further follow-up. On Jeannie 10, 2021 she came in for follow up visit. Oncotype DX score was 29. I offered adjuvant chemo therapy TC vs AC +T  She opted to [facility] +T.  I reschedule bone density and order ECHO cardiogram.  I will discuss with Dr Cristal Barros about mediport placement. In May 2021 she was hospitalized for nausea and vomiting. She was seen by GI. She had mild transaminitis and was suspected to have gastroenteritis. Never had any upper endoscopic study. Colonoscopy in June 2014 showed polyps which was removed. Pathology report showed tubular adenoma.   CT abdomen and pelvis on May 30, 2021:  No acute abnormality within the abdomen and pelvis.   Status post hysterectomy, appendectomy and cholecystectomy.    15 mm subcutaneus soft tissue nodule within the left lower quadrant area of anterior abdomen.  Finding may represent sebaceous cyst or other pathology. CTA chest on May 30, 2021 showed   1. No evidence of acute pulmonary embolism. 2. Soft tissue thickening and fat stranding overlying the bilateral pectoral  muscles. CT head on May 31, 2021 showed  No acute intracranial abnormality.  No intraparenchymal abnormal enhancement to suggest intracranial metastatic disease.  MRI is a superior modality for evaluation of intracranial metastasis.   Small probable meningioma within the falx near the convexity. MRI of the brain on June 1, 2021 showed  No acute infarct scattered areas of chronic white matter change in the  periventricular white matter.   No evidence for blood products on gradient imaging.   No  focal intracranial lesion noted     Amylase and lipase were unremarkable. She was placed on reglan with improvement of nausea and vomiting. She was recommended to have upper endoscopic study as outpatient by GI. Today she has mild nausea. I gave prescription of reglan. She has appointment to see Dr Cristal Barros and GI. I scheduled chemo education. We discussed risks and benefits of chemo including nausea, vomiting, secondary malignancy, increased risk of infection, hair loss etc.    Presented for scheduled follow-up on July 2, 2021. She is status post bilateral mastectomy. She is healing well.   She  TOTAL HIP ARTHROPLASTY Left 10/19/2020    LEFT HIP TOTAL ARTHROPLASTY - POSTERIOR performed by Mono Richard MD at [facility] 80 LEFT Left 3/9/2021    US BREAST BIOPSY NEEDLE ADDITIONAL LEFT 3/9/2021 Efe Andino  [address] NEEDLE ADDITIONAL LEFT Left 3/9/2021    US BREAST BIOPSY NEEDLE ADDITIONAL LEFT 3/9/2021 Efe Andino MD [address] BREAST NEEDLE BIOPSY LEFT Left 3/9/2021    US BREAST NEEDLE BIOPSY LEFT 3/9/2021 Efe Andino  [facility]   She had complete hysterectomy in 1999 due to endometriosis. Social History:   She denies any history of smoking. No alcohol or illicit drug use. She denies any children. Family History: Mother had stomach and breast cancer, maternal grandmother had breast cancer, colon cancer and stomach cancer. Review of Systems: The remainder of the review of system is unremarkable. Vital Signs: /60 (Site: Right Upper Arm, Position: Sitting, Cuff Size: Large Adult)   Pulse 67   Temp 96.2 °F (35.7 °C) (Temporal)   Ht 5' 4\" (1.626 m)   Wt 227 lb 9.6 oz (103.2 kg)   SpO2 98%   BMI 39.07 kg/m²      Physical Exam:  CONSTITUTIONAL: awake, alert, cooperative, no apparent distress   EYES: pupils equal, round and reactive to light, sclera clear and conjunctiva normal  ENT: Normocephalic, without obvious abnormality, atraumatic  NECK: supple, symmetrical, no jugular venous distension and no carotid bruits   HEMATOLOGIC/LYMPHATIC: no cervical, supraclavicular or axillary lymphadenopathy   LUNGS:CTA bilaterally. BREAST: Healed surgical incision status post bilateral mastectomy. CARDIOVASCULAR: regular rate and rhythm, normal S1 and S2, no murmur noted  ABDOMEN: normal bowel sounds, soft, non-distended, non-tender, no masses palpated, no hepatosplenomegaly   MUSCULOSKELETAL: full range of motion noted, tone is normal  NEUROLOGIC: awake, alert, oriented to name, place and time.  Motor skills grossly intact. SKIN: Normal skin color, texture, turgor and no jaundice. EXTREMITIES: no LE edema. No cyanosis.         Labs:  Hematology:  Lab Results   Component Value Date    WBC 7.2 06/02/2021    RBC 4.26 06/02/2021    HGB 12.3 (L) 06/02/2021    HCT 39.0 06/02/2021    MCV 91.5 06/02/2021    MCH 28.9 06/02/2021    MCHC 31.5 (L) 06/02/2021    RDW 14.1 06/02/2021     06/02/2021    MPV 9.4 06/02/2021    BANDSPCT 9 11/02/2020    SEGSPCT 59.2 06/02/2021    EOSRELPCT 2.1 06/02/2021    BASOPCT 0.6 06/02/2021    LYMPHOPCT 29.9 06/02/2021    MONOPCT 8.1 (H) 06/02/2021    BANDABS 1.86 11/02/2020    SEGSABS 4.2 06/02/2021    EOSABS 0.2 06/02/2021    BASOSABS 0.0 06/02/2021    LYMPHSABS 2.1 06/02/2021    MONOSABS 0.6 06/02/2021    DIFFTYPE AUTOMATED DIFFERENTIAL 06/02/2021    ANISOCYTOSIS 1+ 11/02/2020    POLYCHROM 1+ 11/02/2020    WBCMORP FEW 11/02/2020    PLTM OCC PLT CLUMPING 11/02/2020     Lab Results   Component Value Date     (H) 10/25/2020     Chemistry:  Lab Results   Component Value Date     06/02/2021    K 3.7 06/02/2021     06/02/2021    CO2 23 06/02/2021    BUN 13 06/02/2021    CREATININE 1.0 06/02/2021    GLUCOSE 84 06/02/2021    CALCIUM 8.7 06/02/2021    PROT 6.1 (L) 06/02/2021    LABALBU 4.3 06/02/2021    BILITOT 1.0 06/02/2021    ALKPHOS 57 06/02/2021    AST 31 06/02/2021    ALT 47 (H) 06/02/2021    LABGLOM 57 (L) 06/02/2021    GFRAA >60 06/02/2021    AGRATIO 1.7 12/07/2015    GLOB 2.7 12/07/2015    MG 1.9 05/31/2021    POCGLU 188 (H) 11/16/2020     Lab Results   Component Value Date     (H) 11/14/2020     Lab Results   Component Value Date    TSHHS 2.200 03/01/2021    T4FREE 1.02 03/01/2021     Immunology:  Lab Results   Component Value Date    PROT 6.1 (L) 06/02/2021     Coagulation Panel:  Lab Results   Component Value Date    PROTIME 12.9 06/02/2021    INR 1.07 06/02/2021    APTT 31.7 06/02/2021    DDIMER 728 (H) 11/16/2020     Anemia Panel:  Lab Results Component Value Date    ZBDJHOCO06 676.0 05/13/2019    FOLATE >20.0 (H) 05/13/2019        Observations:  PHQ-9 Total Score: 1 (7/2/2021  2:17 PM)       Assessment & Plan:    1. 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 Pathological stage T2, N0 sentinel lymph node MX. Oncotype DX was 29. I offered adjuvant chemotherapy  She opted to [facility] + T. We discussed about risk and benefit of adjuvant chemo and endocrine therapy. She will need mediport. I scheduled chemo education. 2.  Genetic counseling: VUS JUHI    3.  I rescheduled bone density test.  I ordered ECHO. 4. She has N/V ? Gastroenteritis. EGD as above. Started on Pantoprazole. Bowel follow-through was requested by Dr. Sapna Mckeon. She will follow up with me in 4 weeks    Mental health: Reports suicidal ideation, 1 past attempt, on Prozac 20 mg, has counselor but was referred to our counselor today. Will make referral to mental health. She is contracted for safety.  had face-to-face visit with patient. We discussed about diet and weight loss. She has not considered Covid vaccine yet. Return to clinic in 4 weeks. All of her question has been answered for today. Greater than 45 minutes were spent in counseling and coordination of care.

## 2021-07-02 NOTE — PROGRESS NOTES
MA Rooming Questions  Patient: Gisela Tierney  MRN: [id number]    Date: 7/2/2021        1. Do you have any new issues? yes - patient states she is still loosing weight. 2. Do you need any refills on medications?    no    3. Have you had any imaging done since your last visit? yes - labd @ [facility]     4. Have you been hospitalized or seen in the emergency room since your last visit here?   no    5. Did the patient have a depression screening completed today?  Yes    PHQ-9 Total Score: 1 (7/2/2021  2:17 PM)       PHQ-9 Given to (if applicable):               PHQ-9 Score (if applicable):                     [] Positive     []  Negative              Does question #9 need addressed (if applicable)                     [] Yes    []  No               Zbigniew Bad, CMA

## 2021-07-02 NOTE — CARE COORDINATION
LSW spoke with Pt at the request of [facility] CNP [place] Said. Pt stated she is dealing with distress related to her cancer diagnosis and past experiences of loss. Pt and LSW spoke at length about breast cancer and about her personal history. LSW offered to have the [address] [place] [address] Lora Woodward join us but Pt stated she would prefer to do so at a later time. Pt is currently on depression medication and seeing her PCP in two weeks for follow-up. Mental Health Services was discussed but Pt denied the need at this time. Pt will be referred to [address] [place] [address].

## 2021-07-05 NOTE — PROGRESS NOTES
Patient Name:  Marbella Kothari  Patient :  1960  Patient MRN:  [id number]     Primary Oncologist: Mann Villalba MD  Referring Provider: Jose Roberto Lozano MD     Date of Service: 8/3/2021         Chief Complaint:    No chief complaint on file. She came in for follow-up visit. Patient Active Problem List:     Essential hypertension     Hyperlipidemia     Allergic rhinitis due to pollen     Morbid obesity due to excess calories     Hearing loss     Hot flashes due to surgical menopause     Gastroesophageal reflux disease     Chronic back pain     Anxiety and depression     Osteoarthritis     Meniere's disease     Insomnia     Precordial pain     SOB (shortness of breath)     Calculus of gallbladder without cholecystitis without obstruction     S/P laparoscopic cholecystectomy     Vasovagal syncope     Class 2 obesity due to excess calories without serious comorbidity in adult     Palpitations     Family history of early     Closed fracture of left ankle     Acute respiratory failure with hypoxia      Status post left hip replacement     Hyperglycemia     Mucinous carcinoma of breast, left     Malignant neoplasm of left female breast     Infiltrating ductal carcinoma of left breast      S/P mastectomy, bilateral     Hx of lymph node excision    HPI:   America Romero is a pleasant 64year old female patient who was referred for evaluation of pathologic stage T2, N0, MX invasive ductal carcinoma of the left breast, ER/TX positive HER-2 negative, status post bilateral mastectomy in May 2021. She went for the routine mammogram in 2021 and denied any palpable lump to her breast.  Mammogram at that time showed at least 2 indeterminate complex masses at the 2:00 and 12 o'clock position in the left breast in the retroareolar region.     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 ER was more than 95%, CO more than 90%, HER-2/baltazar negative by FISH. MRI of bilateral breasts on April 19, 2021 showed  1. Left breast multicentric disease with biopsy proven invasive ductal  carcinoma with mucinous component at 11 o'clock and mucinous carcinomas at 2 o'clock and 12 o'clock in the retroareolar breast. Sussy Cheeks is at least 5 cm of separation between the 11 o'clock mass and 12 o'clock mass.  Additional smooth masses measuring up to 21 mm at biopsy sites are hematomas. 2. No MR evidence of malignancy in the contralateral right breast.  She had bilateral mastectomy on May 6, 2021. Pathology report showed : Invasive ductal and mucinous carcinoma of the left breast, retroareolar, grade 2, 4.5 cm, negative margin, -2 sentinel lymph nodes, ER more than 95%, CO 90%, HER-2/baltazar negative by FISH. Pathological stage classification T2, N0, MX. Pathologist felt that she has 1 contiguous tumor mass measuring about 4.5 cm rather than multicentric disease. I discussed with pathologist who stated that she does not have pure mucinous carcinoma of the breast.      We went over NCCN guideline. We discussed about healthy diet and weight loss. I went over with her the potential risk and benefit of adjuvant endocrine therapy. CBC and CMP in March 2021 were grossly unremarkable. Due to family history and personal history of breast cancer, I referred for genetic counseling. She had colonoscopy with removal of 2 polyps in 2014 by Dr. Zach Nielsen. Follow-up in 3 years was recommended. She refused to have further follow-up. Oncotype DX score was 29. I offered adjuvant chemo therapy TC vs AC +T  She opted to [facility] +T. In May 2021 she was hospitalized for nausea and vomiting. She was seen by GI. She had mild transaminitis and was suspected to have gastroenteritis. Never had any upper endoscopic study.   Colonoscopy in June 2014 showed polyps which was removed. Pathology report showed tubular adenoma.   CT abdomen and pelvis on May 30, 2021:  No acute abnormality within the abdomen and pelvis.   Status post hysterectomy, appendectomy and cholecystectomy.    15 mm subcutaneus soft tissue nodule within the left lower quadrant area of anterior abdomen.  Finding may represent sebaceous cyst or other pathology. CTA chest on May 30, 2021 showed   1. No evidence of acute pulmonary embolism. 2. Soft tissue thickening and fat stranding overlying the bilateral pectoral  muscles. CT head on May 31, 2021 showed  No acute intracranial abnormality.  No intraparenchymal abnormal enhancement to suggest intracranial metastatic disease.  MRI is a superior modality for evaluation of intracranial metastasis.   Small probable meningioma within the falx near the convexity. MRI of the brain on June 1, 2021 showed  No acute infarct scattered areas of chronic white matter change in the  periventricular white matter.   No evidence for blood products on gradient imaging.   No  focal intracranial lesion noted     Amylase and lipase were unremarkable. She was placed on reglan with improvement of nausea and vomiting. She was recommended to have upper endoscopic study as outpatient by GI. She is status post bilateral mastectomy. She is scheduled for port insertion next Thursday. She had EGD By Dr. Nakul Gonzalez with diagnosis of hiatal hernia, gastric polyp, mild gastritis. She was started on pantoprazole. She reports she is struggling some with depression. She is on Prozac 20 mg/day. She has established with a counselor. She reports some suicidal ideation. I referred for our counselor today and will make a referral to mental health. She contacted for safety and  had face-to-face visit today. On August 20 21 she came in for follow-up visit. She started adjuvant chemotherapy AC on July 16, 2021.   Interim CBC on July 23, 2021 showed WBC 3.3, hemoglobin 11.6, platelet 678. Potassium was 3.3. I recommend to drink extra orange juice. SBFT:  Unremarkable small bowel follow through series. Bone density in June 2021 showed normal study. ECHO 6/18/2021:   Left ventricular systolic function is normal.   Ejection fraction is visually estimated at 55-60%. She has seen counselor. Denied any suicidal ideation today. She received IV fluid hydration after the first cycle of the chemo. She is agreeable that we decrease the dose of the chemo by 10% with the next cycle. No acute pain. Denies any nausea, vomiting or diarrhea. No fever or chills. No chest pain, shortness of breath or palpitation. No headaches or dizzy spell. No specific bone pain. No melena or hematochezia. Denied any dysuria or hematuria. Past Medical History:   Past Medical History:   Diagnosis Date    Allergic rhinitis due to pollen 11/19/2015    Arthritis     left hip & knee    Chronic back pain     Depression     Gastroesophageal reflux disease 11/19/2015    Hearing loss 11/19/2015    History of blood transfusion     No reaction per pt    History of cardiac monitoring 04/18/2017    14 day event monitor. Sinus Rhythm    History of nuclear stress test 09/28/2016    cardiolite-normal,EF70%    Hot flashes due to surgical menopause 11/19/2015    Hx of echocardiogram 10/05/2016    EF: >55 %   Mild mitral and tricuspid regurg.      Hyperlipidemia     Hypertension     Follows with PCP    Lexiscan Stress Test 10/14/2020    EF 60%, Normal study, no ischemia    Meniere disease     Morbid obesity due to excess calories ([address] 11/19/2015    Sleep apnea 11/19/2015    sleep study negative    Tilt table evaluation 05/09/2017     positive for neurocardiogenic syncope      Past Surgery History:    Past Surgical History:   Procedure Laterality Date    APPENDECTOMY  1967    CHOLECYSTECTOMY      2017    COLONOSCOPY  2014    Polyps x2 - Dr. Alvina Masters  MASTECTOMY Bilateral 05/06/2021    Dr. John Newman Left 10/19/2020    LEFT HIP TOTAL ARTHROPLASTY - POSTERIOR performed by Nancy Escudero MD at [facility] 80 LEFT Left 3/9/2021    US BREAST BIOPSY NEEDLE ADDITIONAL LEFT 3/9/2021 Carlos Manuel Killian MD [address] BREAST BIOPSY NEEDLE ADDITIONAL LEFT Left 3/9/2021    US BREAST BIOPSY NEEDLE ADDITIONAL LEFT 3/9/2021 Carlos Manuel Killian MD [address] BREAST NEEDLE BIOPSY LEFT Left 3/9/2021    US BREAST NEEDLE BIOPSY LEFT 3/9/2021 Carlos Manuel Killian  [facility]   She had complete hysterectomy in 1999 due to endometriosis. Social History:   She denies any history of smoking. No alcohol or illicit drug use. She denies any children. Family History: Mother had stomach and breast cancer, maternal grandmother had breast cancer, colon cancer and stomach cancer. Review of Systems: The remainder of the review of system is unremarkable. Vital Signs: There were no vitals taken for this visit. Physical Exam:  CONSTITUTIONAL: awake, alert, cooperative, no apparent distress   EYES: pupils equal, round and reactive to light, sclera clear and conjunctiva pallor  ENT: Normocephalic, without obvious abnormality, atraumatic  NECK: supple, symmetrical, no jugular venous distension and no carotid bruits   HEMATOLOGIC/LYMPHATIC: no cervical, supraclavicular or axillary lymphadenopathy   LUNGS:CTA bilaterally. BREAST: Healed surgical incision status post bilateral mastectomy. CARDIOVASCULAR: regular rate and rhythm, normal S1 and S2, no murmur   ABDOMEN: normal bowel sound, soft, non-distended, non-tender, no masses palpated, no hepatosplenomegaly   MUSCULOSKELETAL: full range of motion noted, tone is normal  NEUROLOGIC: awake, alert, oriented to name, place and time. Motor skills grossly intact. Cranial nerves II through XII grossly intact.   SKIN: Normal skin color, texture, turgor and no jaundice. EXTREMITIES: no LE edema. No cyanosis.         Labs:  Hematology:  Lab Results   Component Value Date    WBC 7.2 06/02/2021    RBC 4.26 06/02/2021    HGB 12.3 (L) 06/02/2021    HCT 39.0 06/02/2021    MCV 91.5 06/02/2021    MCH 28.9 06/02/2021    MCHC 31.5 (L) 06/02/2021    RDW 14.1 06/02/2021     06/02/2021    MPV 9.4 06/02/2021    BANDSPCT 9 11/02/2020    SEGSPCT 59.2 06/02/2021    EOSRELPCT 2.1 06/02/2021    BASOPCT 0.6 06/02/2021    LYMPHOPCT 29.9 06/02/2021    MONOPCT 8.1 (H) 06/02/2021    BANDABS 1.86 11/02/2020    SEGSABS 4.2 06/02/2021    EOSABS 0.2 06/02/2021    BASOSABS 0.0 06/02/2021    LYMPHSABS 2.1 06/02/2021    MONOSABS 0.6 06/02/2021    DIFFTYPE AUTOMATED DIFFERENTIAL 06/02/2021    ANISOCYTOSIS 1+ 11/02/2020    POLYCHROM 1+ 11/02/2020    WBCMORP FEW 11/02/2020    PLTM OCC PLT CLUMPING 11/02/2020     Lab Results   Component Value Date     (H) 10/25/2020     Chemistry:  Lab Results   Component Value Date     06/02/2021    K 3.7 06/02/2021     06/02/2021    CO2 23 06/02/2021    BUN 13 06/02/2021    CREATININE 1.0 06/02/2021    GLUCOSE 84 06/02/2021    CALCIUM 8.7 06/02/2021    PROT 6.1 (L) 06/02/2021    LABALBU 4.3 06/02/2021    BILITOT 1.0 06/02/2021    ALKPHOS 57 06/02/2021    AST 31 06/02/2021    ALT 47 (H) 06/02/2021    LABGLOM 57 (L) 06/02/2021    GFRAA >60 06/02/2021    AGRATIO 1.7 12/07/2015    GLOB 2.7 12/07/2015    MG 1.9 05/31/2021    POCGLU 188 (H) 11/16/2020     Lab Results   Component Value Date     (H) 11/14/2020     Lab Results   Component Value Date    TSHHS 2.200 03/01/2021    T4FREE 1.02 03/01/2021     Immunology:  Lab Results   Component Value Date    PROT 6.1 (L) 06/02/2021     Coagulation Panel:  Lab Results   Component Value Date    PROTIME 12.9 06/02/2021    INR 1.07 06/02/2021    APTT 31.7 06/02/2021    DDIMER 728 (H) 11/16/2020     Anemia Panel:  Lab Results   Component Value Date    JCECKRUA93 676.0 05/13/2019    FOLATE >20.0 (H) 05/13/2019      Observations:  PHQ-9 Total Score: 1 (7/2/2021  2:17 PM)       Assessment & Plan:    1. 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 Pathological stage T2, N0 sentinel lymph node MX. Oncotype DX was 29. I offered adjuvant chemotherapy  She opted to [facility] + T. We discussed about risk and benefit of adjuvant chemo and endocrine therapy. She started chemo in June 2021. She had dehydration after the first cycle of the chemo. She is agreeable to decrease the chemo by 10% with the next cycle. 2.  Genetic counseling: VUS JUHI    3. Bone density on June 11, 2021 was normal.  Echo in June 2021 showed LVEF at 55 to 60%. 4. She has N/V ? Gastroenteritis. EGD as above. Started on Pantoprazole. Bowel follow-through was requested by Dr. Rivera Shah. She will follow up with me in 4 weeks    5. Mental health: Reports suicidal ideation, 1 past attempt, on Prozac 20 mg, has counselor but was referred to our counselor today. Will make referral to mental health. She is contracted for safety.  had face-to-face visit with patient. 6.  Anemia is related to the chemotherapy. Will monitor CBC. We discussed about diet and weight loss. She has not considered Covid vaccine yet. Return to clinic in 3 weeks. All of her question has been answered for today.

## 2021-07-06 ASSESSMENT — ENCOUNTER SYMPTOMS
COLOR CHANGE: 0
SHORTNESS OF BREATH: 0
CHEST TIGHTNESS: 0
BACK PAIN: 1

## 2021-07-06 NOTE — PROGRESS NOTES
7/1/2021   Chief Complaint   Patient presents with    Knee Pain     left hip LISHA DOS 10/19/20        History of Present Illness:                             Martin Roberts is a 64 y.o. female returns today for follow-up of her left total hip replacement. She has continued to have issues with her back and hips with pain that radiates from the posterior sacroiliac area and the lateral and posterior hip region. He does have history of severe degenerative changes in her lumbar spine. She denies any recent falls or injuries. Denies any instability events at the hip but does feel like she has muscle strain and separation near her hip joint. She has been dealing with multiple other medical issues related to recent cancer diagnosis. ent returns to the office today for s/p check of the left hip. Pt states pain today in the left hip is a 8/10 will increase when she is lays on her left side of when she is walking. Pt states she is very tender to the touch along the lateral aspect of the left hip. Medical History  Patient's medications, allergies, past medical, surgical, social and family histories were reviewed and updated as appropriate. Review of Systems   Constitutional: Negative for activity change and fever. Respiratory: Negative for chest tightness and shortness of breath. Cardiovascular: Negative for chest pain. Musculoskeletal: Positive for arthralgias, back pain, gait problem and myalgias. Skin: Negative for color change. Neurological: Negative for dizziness. Psychiatric/Behavioral: The patient is not nervous/anxious. Examination:  General Exam:  Vitals: Resp 15   Ht 5' 4\" (1.626 m)   Wt 227 lb (103 kg)   BMI 38.96 kg/m²    Physical Exam   Left lower extremity:  Well-healed surgical scar of the hip. No swelling or fluid collections. No instability of the hip with active and passive range of motion.   Patient is ambulating well with no severe limp. She does have a restricted range of motion lumbar spine and pain along the paraspinal musculature right range of motion activities of the lumbar spine. Sensation motor function is intact in lower extremity. Strength is 5 out of 5 with hip flexion and extension and abduction. Diagnostic testing:  Assessment and Plan  1. Left total hip replacement     I reassured the patient that her hip is functioning well my exam today. I also explained improvement her gait is improved now than it was prior to surgery. She is showing good signs of healing from her left hip replacement. I explained to her that she is likely experiencing radicular pain into the hip and leg region from her known chronic degenerative lumbar spine issues. I offered her referral for lumbar spine but she is deferred because of her upcoming medical issues related to her cancer diagnosis. Continue to exercise and I have advised her to lose weight.   Follow-up as needed if any future worsening issues or if she like a referral for her spine      Electronically signed by Waldemar Woo MD on 7/6/2021 at 9:36 AM

## 2021-07-08 ENCOUNTER — CLINICAL DOCUMENTATION (OUTPATIENT)
Dept: CASE MANAGEMENT | Age: 61
End: 2021-07-08

## 2021-07-13 ENCOUNTER — CLINICAL DOCUMENTATION (OUTPATIENT)
Dept: RADIATION ONCOLOGY | Age: 61
End: 2021-07-13

## 2021-07-15 ENCOUNTER — HOSPITAL ENCOUNTER (OUTPATIENT)
Dept: INFUSION THERAPY | Age: 61
Discharge: HOME | End: 2021-07-15
Payer: COMMERCIAL

## 2021-07-15 ENCOUNTER — OFFICE VISIT (OUTPATIENT)
Dept: FAMILY MEDICINE CLINIC | Age: 61
End: 2021-07-15
Payer: COMMERCIAL

## 2021-07-15 ENCOUNTER — OFFICE VISIT (OUTPATIENT)
Dept: ONCOLOGY | Age: 61
End: 2021-07-15
Payer: COMMERCIAL

## 2021-07-15 ENCOUNTER — CLINICAL DOCUMENTATION (OUTPATIENT)
Dept: CASE MANAGEMENT | Age: 61
End: 2021-07-15

## 2021-07-15 VITALS
HEIGHT: 64 IN | BODY MASS INDEX: 39.09 KG/M2 | SYSTOLIC BLOOD PRESSURE: 146 MMHG | OXYGEN SATURATION: 97 % | WEIGHT: 229 LBS | HEART RATE: 67 BPM | RESPIRATION RATE: 18 BRPM | DIASTOLIC BLOOD PRESSURE: 75 MMHG | TEMPERATURE: 97.3 F

## 2021-07-15 VITALS
BODY MASS INDEX: 39.41 KG/M2 | SYSTOLIC BLOOD PRESSURE: 118 MMHG | WEIGHT: 229.6 LBS | DIASTOLIC BLOOD PRESSURE: 80 MMHG | OXYGEN SATURATION: 97 % | HEART RATE: 70 BPM

## 2021-07-15 DIAGNOSIS — Z17.0 MALIGNANT NEOPLASM OF AREOLA OF LEFT BREAST IN FEMALE, ESTROGEN RECEPTOR POSITIVE (HCC): ICD-10-CM

## 2021-07-15 DIAGNOSIS — I10 ESSENTIAL HYPERTENSION: Primary | ICD-10-CM

## 2021-07-15 DIAGNOSIS — F41.9 ANXIETY AND DEPRESSION: ICD-10-CM

## 2021-07-15 DIAGNOSIS — F32.A ANXIETY AND DEPRESSION: ICD-10-CM

## 2021-07-15 DIAGNOSIS — C50.012 MALIGNANT NEOPLASM OF AREOLA OF LEFT BREAST IN FEMALE, ESTROGEN RECEPTOR POSITIVE (HCC): Primary | ICD-10-CM

## 2021-07-15 DIAGNOSIS — E78.2 MIXED HYPERLIPIDEMIA: ICD-10-CM

## 2021-07-15 DIAGNOSIS — Z17.0 MALIGNANT NEOPLASM OF AREOLA OF LEFT BREAST IN FEMALE, ESTROGEN RECEPTOR POSITIVE (HCC): Primary | ICD-10-CM

## 2021-07-15 DIAGNOSIS — C50.012 MALIGNANT NEOPLASM OF AREOLA OF LEFT BREAST IN FEMALE, ESTROGEN RECEPTOR POSITIVE (HCC): ICD-10-CM

## 2021-07-15 DIAGNOSIS — K21.9 GASTROESOPHAGEAL REFLUX DISEASE WITHOUT ESOPHAGITIS: ICD-10-CM

## 2021-07-15 DIAGNOSIS — Z90.13 S/P MASTECTOMY, BILATERAL: ICD-10-CM

## 2021-07-15 LAB
ALBUMIN SERPL-MCNC: 4.5 GM/DL (ref 3.4–5)
ALP BLD-CCNC: 63 IU/L (ref 40–128)
ALT SERPL-CCNC: 10 U/L (ref 10–40)
ANION GAP SERPL CALCULATED.3IONS-SCNC: 12 MMOL/L (ref 4–16)
AST SERPL-CCNC: 12 IU/L (ref 15–37)
BASOPHILS ABSOLUTE: 0.1 K/CU MM
BASOPHILS RELATIVE PERCENT: 0.5 % (ref 0–1)
BILIRUB SERPL-MCNC: 0.3 MG/DL (ref 0–1)
BUN BLDV-MCNC: 19 MG/DL (ref 6–23)
CALCIUM SERPL-MCNC: 9.7 MG/DL (ref 8.3–10.6)
CHLORIDE BLD-SCNC: 101 MMOL/L (ref 99–110)
CO2: 28 MMOL/L (ref 21–32)
CREAT SERPL-MCNC: 1.1 MG/DL (ref 0.6–1.1)
DIFFERENTIAL TYPE: ABNORMAL
EOSINOPHILS ABSOLUTE: 0.3 K/CU MM
EOSINOPHILS RELATIVE PERCENT: 2.9 % (ref 0–3)
GFR AFRICAN AMERICAN: >60 ML/MIN/1.73M2
GFR NON-AFRICAN AMERICAN: 51 ML/MIN/1.73M2
GLUCOSE BLD-MCNC: 104 MG/DL (ref 70–99)
HCT VFR BLD CALC: 38.4 % (ref 37–47)
HEMOGLOBIN: 12.5 GM/DL (ref 12.5–16)
LYMPHOCYTES ABSOLUTE: 2.5 K/CU MM
LYMPHOCYTES RELATIVE PERCENT: 25.7 % (ref 24–44)
MCH RBC QN AUTO: 28.4 PG (ref 27–31)
MCHC RBC AUTO-ENTMCNC: 32.6 % (ref 32–36)
MCV RBC AUTO: 87.3 FL (ref 78–100)
MONOCYTES ABSOLUTE: 0.9 K/CU MM
MONOCYTES RELATIVE PERCENT: 9 % (ref 0–4)
PDW BLD-RTO: 14.4 % (ref 11.7–14.9)
PLATELET # BLD: 244 K/CU MM (ref 140–440)
PMV BLD AUTO: 8.7 FL (ref 7.5–11.1)
POTASSIUM SERPL-SCNC: 4.1 MMOL/L (ref 3.5–5.1)
RBC # BLD: 4.4 M/CU MM (ref 4.2–5.4)
SEGMENTED NEUTROPHILS ABSOLUTE COUNT: 6 K/CU MM
SEGMENTED NEUTROPHILS RELATIVE PERCENT: 61.9 % (ref 36–66)
SODIUM BLD-SCNC: 141 MMOL/L (ref 135–145)
TOTAL PROTEIN: 7.1 GM/DL (ref 6.4–8.2)
WBC # BLD: 9.7 K/CU MM (ref 4–10.5)

## 2021-07-15 PROCEDURE — G8417 CALC BMI ABV UP PARAM F/U: HCPCS | Performed by: FAMILY MEDICINE

## 2021-07-15 PROCEDURE — 85025 COMPLETE CBC W/AUTO DIFF WBC: CPT

## 2021-07-15 PROCEDURE — 1036F TOBACCO NON-USER: CPT | Performed by: FAMILY MEDICINE

## 2021-07-15 PROCEDURE — 1036F TOBACCO NON-USER: CPT | Performed by: NURSE PRACTITIONER

## 2021-07-15 PROCEDURE — 99213 OFFICE O/P EST LOW 20 MIN: CPT

## 2021-07-15 PROCEDURE — 3017F COLORECTAL CA SCREEN DOC REV: CPT | Performed by: FAMILY MEDICINE

## 2021-07-15 PROCEDURE — 99215 OFFICE O/P EST HI 40 MIN: CPT | Performed by: NURSE PRACTITIONER

## 2021-07-15 PROCEDURE — G8427 DOCREV CUR MEDS BY ELIG CLIN: HCPCS | Performed by: NURSE PRACTITIONER

## 2021-07-15 PROCEDURE — 3017F COLORECTAL CA SCREEN DOC REV: CPT | Performed by: NURSE PRACTITIONER

## 2021-07-15 PROCEDURE — 36415 COLL VENOUS BLD VENIPUNCTURE: CPT

## 2021-07-15 PROCEDURE — G8427 DOCREV CUR MEDS BY ELIG CLIN: HCPCS | Performed by: FAMILY MEDICINE

## 2021-07-15 PROCEDURE — G8417 CALC BMI ABV UP PARAM F/U: HCPCS | Performed by: NURSE PRACTITIONER

## 2021-07-15 PROCEDURE — 99214 OFFICE O/P EST MOD 30 MIN: CPT | Performed by: FAMILY MEDICINE

## 2021-07-15 PROCEDURE — 80053 COMPREHEN METABOLIC PANEL: CPT

## 2021-07-15 RX ORDER — METHYLPREDNISOLONE SODIUM SUCCINATE 125 MG/2ML
125 INJECTION, POWDER, LYOPHILIZED, FOR SOLUTION INTRAMUSCULAR; INTRAVENOUS ONCE
Status: CANCELLED | OUTPATIENT
Start: 2021-08-06 | End: 2021-08-06

## 2021-07-15 RX ORDER — PALONOSETRON 0.05 MG/ML
0.25 INJECTION, SOLUTION INTRAVENOUS ONCE
Status: CANCELLED | OUTPATIENT
Start: 2021-07-16

## 2021-07-15 RX ORDER — PALONOSETRON 0.05 MG/ML
0.25 INJECTION, SOLUTION INTRAVENOUS ONCE
Status: CANCELLED | OUTPATIENT
Start: 2021-08-06

## 2021-07-15 RX ORDER — DIPHENHYDRAMINE HYDROCHLORIDE 50 MG/ML
50 INJECTION INTRAMUSCULAR; INTRAVENOUS ONCE
Status: CANCELLED | OUTPATIENT
Start: 2021-08-06 | End: 2021-08-06

## 2021-07-15 RX ORDER — DOXORUBICIN HYDROCHLORIDE 2 MG/ML
60 INJECTION, SOLUTION INTRAVENOUS ONCE
Status: CANCELLED | OUTPATIENT
Start: 2021-08-06

## 2021-07-15 RX ORDER — SODIUM CHLORIDE 9 MG/ML
INJECTION, SOLUTION INTRAVENOUS CONTINUOUS
Status: CANCELLED | OUTPATIENT
Start: 2021-08-06

## 2021-07-15 RX ORDER — ONDANSETRON HYDROCHLORIDE 8 MG/1
8 TABLET, FILM COATED ORAL EVERY 8 HOURS PRN
Qty: 30 TABLET | Refills: 1 | Status: SHIPPED | OUTPATIENT
Start: 2021-07-15 | End: 2021-07-21 | Stop reason: ALTCHOICE

## 2021-07-15 RX ORDER — SODIUM CHLORIDE 9 MG/ML
25 INJECTION, SOLUTION INTRAVENOUS PRN
Status: CANCELLED | OUTPATIENT
Start: 2021-07-16

## 2021-07-15 RX ORDER — SODIUM CHLORIDE 9 MG/ML
20 INJECTION, SOLUTION INTRAVENOUS ONCE
Status: CANCELLED | OUTPATIENT
Start: 2021-07-16 | End: 2021-07-16

## 2021-07-15 RX ORDER — SODIUM CHLORIDE 0.9 % (FLUSH) 0.9 %
5-40 SYRINGE (ML) INJECTION PRN
Status: CANCELLED | OUTPATIENT
Start: 2021-07-16

## 2021-07-15 RX ORDER — METOPROLOL TARTRATE 50 MG/1
50 TABLET, FILM COATED ORAL 2 TIMES DAILY
Qty: 180 TABLET | Refills: 3 | Status: SHIPPED | OUTPATIENT
Start: 2021-07-15

## 2021-07-15 RX ORDER — DEXAMETHASONE 4 MG/1
TABLET ORAL
Qty: 16 TABLET | Refills: 0 | Status: SHIPPED | OUTPATIENT
Start: 2021-07-15 | End: 2021-10-05 | Stop reason: SDUPTHER

## 2021-07-15 RX ORDER — SODIUM CHLORIDE 9 MG/ML
20 INJECTION, SOLUTION INTRAVENOUS ONCE
Status: CANCELLED | OUTPATIENT
Start: 2021-08-06 | End: 2021-08-06

## 2021-07-15 RX ORDER — SODIUM CHLORIDE 0.9 % (FLUSH) 0.9 %
5-40 SYRINGE (ML) INJECTION PRN
Status: CANCELLED | OUTPATIENT
Start: 2021-08-06

## 2021-07-15 RX ORDER — DIPHENHYDRAMINE HYDROCHLORIDE 50 MG/ML
50 INJECTION INTRAMUSCULAR; INTRAVENOUS ONCE
Status: CANCELLED | OUTPATIENT
Start: 2021-07-16 | End: 2021-07-16

## 2021-07-15 RX ORDER — HEPARIN SODIUM (PORCINE) LOCK FLUSH IV SOLN 100 UNIT/ML 100 UNIT/ML
500 SOLUTION INTRAVENOUS PRN
Status: CANCELLED | OUTPATIENT
Start: 2021-08-06

## 2021-07-15 RX ORDER — METHYLPREDNISOLONE SODIUM SUCCINATE 125 MG/2ML
125 INJECTION, POWDER, LYOPHILIZED, FOR SOLUTION INTRAMUSCULAR; INTRAVENOUS ONCE
Status: CANCELLED | OUTPATIENT
Start: 2021-07-16 | End: 2021-07-16

## 2021-07-15 RX ORDER — EPINEPHRINE 1 MG/ML
0.3 INJECTION, SOLUTION, CONCENTRATE INTRAVENOUS PRN
Status: CANCELLED | OUTPATIENT
Start: 2021-07-16

## 2021-07-15 RX ORDER — EPINEPHRINE 1 MG/ML
0.3 INJECTION, SOLUTION, CONCENTRATE INTRAVENOUS PRN
Status: CANCELLED | OUTPATIENT
Start: 2021-08-06

## 2021-07-15 RX ORDER — SODIUM CHLORIDE 9 MG/ML
25 INJECTION, SOLUTION INTRAVENOUS PRN
Status: CANCELLED | OUTPATIENT
Start: 2021-08-06

## 2021-07-15 RX ORDER — DOXORUBICIN HYDROCHLORIDE 2 MG/ML
60 INJECTION, SOLUTION INTRAVENOUS ONCE
Status: CANCELLED | OUTPATIENT
Start: 2021-07-16

## 2021-07-15 RX ORDER — HEPARIN SODIUM (PORCINE) LOCK FLUSH IV SOLN 100 UNIT/ML 100 UNIT/ML
500 SOLUTION INTRAVENOUS PRN
Status: CANCELLED | OUTPATIENT
Start: 2021-07-16

## 2021-07-15 RX ORDER — SODIUM CHLORIDE 9 MG/ML
INJECTION, SOLUTION INTRAVENOUS CONTINUOUS
Status: CANCELLED | OUTPATIENT
Start: 2021-07-16

## 2021-07-15 RX ORDER — POTASSIUM CHLORIDE 20 MEQ/1
20 TABLET, EXTENDED RELEASE ORAL 2 TIMES DAILY
Qty: 180 TABLET | Refills: 3 | Status: SHIPPED | OUTPATIENT
Start: 2021-07-15 | End: 2021-08-27 | Stop reason: SDUPTHER

## 2021-07-15 NOTE — PROGRESS NOTES
Patient Name: Francoise Crowe    Patient : 1960     Patient MRN: [id number]       Date: 2021                                                                                                   CHEMOTHERAPY TREATMENT PLAN    Care Team  Medical Oncologist: Alan Conway MD  Surgeon: Dr. Danial Merchant Oncologist:   Primary Care Physician: Parminder Yanez MD     Learning Needs Assessment  Before the treatment plan was discussed and the consent was signed, the care team assessed the patient's learning needs. This includes their ability to assume responsibility for managing their therapy. Diagnosis     Mucinous carcinoma of left breast    The Goal of My Therapy is    (  ) To cure my cancer  (  ) To shrink the tumor prior to surgery  (x) Increase my chance of cure after surgery  (  ) Help me live as long as possible with the highest quality of life. I know that a cure is not possible.      Prognosis    (x) Curable  (  ) Palliative    Plan Of Treatment    Intent:  (  ) Neoadjuvant   (X) Adjuvant   (  ) Control    Treatment Regimen  (including supportive meds)                             Frequency                                               Duration   Adriamycin/cytoxan every three weeks  Followed by 12 weekly treatments of taxol    Expected Response to Treatment    (x) This therapy could cure your disease  (  ) This therapy has a good chance of helping you live 1 year or more compared to without it  (  ) This therapy has a good chance of helping you feel better or making you live months longer compared to without it     Quality Of Life    (  ) Expect that you will be able to continue all normal activities  (x) Expect that you will have some side effects but should be able to maintain normal activities  (  ) Expect that you will be unable to work but able to perform light duties at home  (  ) Expect that you will be able to perform light duties and will need assistance with self care    Treatment Patient Consented and taught per Nurse Navigator. .  Today, time spent with the patient discussing the intent and schedule of their treatment. The patient was given a copy of their treatment summary. Questions and concerns were addressed with the patient. Time spent with the patient face to face today was 60 minutes, counseling and coordination of care dominated more than 50% of this patient encounter. Warner Cheung  presented for chemo education for adriamycin/cytoxan. We discussed potential side effects, including, but not limited to: myelosuppresion, cardiac toxicity, nausea, vomiting, diarrhea, stomatitis, discolored urine, alopecia, secondary blood cancers, discoloration of nail beds, hemorrahagic cysitis, fatigue, etc. She is given dexamethsone per protocol. She is also given zofran PRN. She is asked to call our office for temperature 100.4 or above. On call number provided. She verbalized understanding and is willing to proceed.      Olanzapine held due to drug drug interaction     Advance directive: paperwork provided    Counseling: referral previously placed; prozac    [place]: declines    Port: Dr. Max Samuels placed; bruised; sutures in place    Echo: 6/18/21 55-60%    Fear, nervousness, sadness, worry, loss of interest; counseling as above    Breathing-     Constipation/diarrhea- discussed imodium and OTC laxative    Eating- encouraged five small meals    Fatigue: small frequent meals, exercise, nutrition    Feeling swollen: on HCTZ; lasix prn    Indigestion- follows with Dr. Jeannene Goldberg; protonix     Nausea; zofran; reglan    Nose draining/dry- advised nasal saline; continue loratadine    Pain- port site and hip    Dry skin- BID lotion cerave, eucerin    Numbness and tingling- chronic      BP (!) 146/75 (Site: Right Lower Arm, Position: Sitting, Cuff Size: Large Adult)   Pulse 67   Temp 97.3 °F (36.3 °C) (Temporal)   Resp 18   Ht 5' 4\" (1.626 m)   Wt 229 lb (103.9 kg)   SpO2 97%   BMI 39.31 kg/m²     Physical Exam  Vitals reviewed. Constitutional:       Appearance: Normal appearance. HENT:      Head: Normocephalic. Mouth/Throat:      Mouth: Mucous membranes are moist.   Eyes:      Extraocular Movements: Extraocular movements intact. Conjunctiva/sclera: Conjunctivae normal.   Cardiovascular:      Rate and Rhythm: Regular rhythm. Heart sounds: Normal heart sounds. Pulmonary:      Effort: Pulmonary effort is normal.      Breath sounds: Normal breath sounds. Chest:      Comments: Bilateral mastectomy - surgical site healing well without redness, swelling or drainage  Port left- sutures present, ecchymosis, slightly swollen  Abdominal:      General: Bowel sounds are normal.      Palpations: Abdomen is soft. Musculoskeletal:         General: Normal range of motion. Cervical back: Normal range of motion and neck supple. Comments: Cane, gait stable   Skin:     General: Skin is warm. Neurological:      Mental Status: She is alert and oriented to person, place, and time. Psychiatric:         Behavior: Behavior normal.         Thought Content:  Thought content normal.         Judgment: Judgment normal.       Interim counts: 7/26  Dr. Faby Zavaleta 7/6

## 2021-07-15 NOTE — PROGRESS NOTES
MA Rooming Questions  Patient: Keon Meade  MRN: [id number]    Date: 7/15/2021        1. Do you have any new issues?  No here for tx planning         Trupti Parsons CMA

## 2021-07-15 NOTE — LETTER
[facility]  [address] [facility],[address] [facility]  Phone: [phone]  Fax: [phone]    Zeinab Goldberg MD         July 15, 2021     Patient: Lady Herbert   YOB: 1960   Date of Visit: 7/15/2021       To Whom It May Concern: It is my medical opinion that Michelle Topete requires a disability parking placard for the following reasons:  Cancer  Chronic back pain   Chemo therapy  Duration of need: 5 years    If you have any questions or concerns, please don't hesitate to call.     Sincerely,        Zeinab Goldberg MD

## 2021-07-15 NOTE — PROGRESS NOTES
Warner Plunkettmin  1960 07/16/21    Chief Complaint   Patient presents with    3 Month Follow-Up    Hypertension    Hyperlipidemia    Gastroesophageal Reflux    Medication Refill           Patient for 3 months f/u regarding HTN, HLD, GERD, and allergic rhinitis. Had B/L matectomy and going to start chemotherapy. The patient is taking hypertensive medications compliantly without side effects. Denies chest pain, dyspnea, edema, or TIA's. She is taking her cholesterol medications, her GERD under control, patient doing fine. Past Medical History:   Diagnosis Date    Allergic rhinitis due to pollen 11/19/2015    Arthritis     left hip & knee    Chronic back pain     Depression     Gastroesophageal reflux disease 11/19/2015    Hearing loss 11/19/2015    History of blood transfusion     No reaction per pt    History of cardiac monitoring 04/18/2017    14 day event monitor. Sinus Rhythm    History of nuclear stress test 09/28/2016    cardiolite-normal,EF70%    Hot flashes due to surgical menopause 11/19/2015    Hx of echocardiogram 10/05/2016    EF: >55 %   Mild mitral and tricuspid regurg.      Hyperlipidemia     Hypertension     Follows with PCP    Lexiscan Stress Test 10/14/2020    EF 60%, Normal study, no ischemia    Meniere disease     Morbid obesity due to excess calories ([facility] [address] 11/19/2015    Sleep apnea 11/19/2015    sleep study negative    Tilt table evaluation 05/09/2017     positive for neurocardiogenic syncope     Past Surgical History:   Procedure Laterality Date    APPENDECTOMY  1967    CHOLECYSTECTOMY      2017    COLONOSCOPY  2014    Polyps x2 - Dr. Oswaldo Samuels Bilateral 05/06/2021    Dr. Tom Mc Left 10/19/2020    LEFT HIP TOTAL ARTHROPLASTY - POSTERIOR performed by Lior Hardin MD at [facility] LEFT Left 3/9/2021     BREAST BIOPSY NEEDLE ADDITIONAL LEFT 3/9/2021 Amari Delgado MD [address] BREAST BIOPSY NEEDLE ADDITIONAL LEFT Left 3/9/2021    US BREAST BIOPSY NEEDLE ADDITIONAL LEFT 3/9/2021 Amari Delgado  [address]    US BREAST NEEDLE BIOPSY LEFT Left 3/9/2021    US BREAST NEEDLE BIOPSY LEFT 3/9/2021 Amari Delgado MD [address] [facility]     Family History   Problem Relation Age of Onset    Heart Disease Mother     High Blood Pressure Mother     High Cholesterol Mother     Cancer Mother 80        Stomach    Diabetes Mother     Stroke Mother     Heart Disease Father     High Blood Pressure Father     High Cholesterol Father     Diabetes Father     Depression Father     Cancer Sister 46        Lung cancer    High Blood Pressure Sister     Other Sister         migraines, osteoarthritis    Mental Illness Sister     Depression Sister     Cancer Maternal Grandmother         Stomach    Diabetes Maternal Grandmother     Diabetes Maternal Grandfather     Diabetes Paternal Grandmother     Diabetes Paternal Grandfather     Cancer Maternal Cousin 47        Pancreatic     Social History     Socioeconomic History    Marital status:      Spouse name: Not on file    Number of children: Not on file    Years of education: Not on file    Highest education level: Not on file   Occupational History    Not on file   Tobacco Use    Smoking status: Never Smoker    Smokeless tobacco: Never Used   Vaping Use    Vaping Use: Never used   Substance and Sexual Activity    Alcohol use: No    Drug use: No    Sexual activity: Not Currently     Partners: Male   Other Topics Concern    Not on file   Social History Narrative    Not on file     Social Determinants of Health     Financial Resource Strain:     Difficulty of Paying Living Expenses:    Food Insecurity:     Worried About Running Out of Food in the Last Year:     [address] in the Last Year:    Transportation Needs:     Lack of Transportation (Medical):    [facility] Lack of Transportation (Non-Medical):    Physical Activity:     Days of Exercise per Week:     Minutes of Exercise per Session:    Stress:     Feeling of Stress :    Social Connections:     Frequency of Communication with Friends and Family:     Frequency of Social Gatherings with Friends and Family:     Attends Church Services:     Active Member of Clubs or Organizations:     Attends Club or Organization Meetings:     Marital Status:    Intimate Partner Violence:     Fear of Current or Ex-Partner:     Emotionally Abused:     Physically Abused:     Sexually Abused: Allergies   Allergen Reactions    Celexa [Citalopram] Other (See Comments)     Stomach pain        Atorvastatin      Leg cramps      Fenofibrate      angioedema    Mestinon [Pyridostigmine] Itching and Swelling    Penicillins     Sulfa Antibiotics      Other reaction(s): Hives/Throat closes    Tape Sussy Cower Tape]      PLASTIC TAPE    Gemfibrozil Rash    Meloxicam Other (See Comments)     moodswings     Current Outpatient Medications   Medication Sig Dispense Refill    ondansetron (ZOFRAN) 8 MG tablet Take 1 tablet by mouth every 8 hours as needed for Nausea or Vomiting 30 tablet 1    dexamethasone (DECADRON) 4 MG tablet 1 tablet PO daily for 4 days starting the day after chemotherapy.  16 tablet 0    metoprolol tartrate (LOPRESSOR) 50 MG tablet Take 1 tablet by mouth 2 times daily 180 tablet 3    potassium chloride (KLOR-CON M) 20 MEQ extended release tablet Take 1 tablet by mouth 2 times daily 180 tablet 3    pantoprazole (PROTONIX) 40 MG tablet TAKE 1 TABLET BY MOUTH EVERY DAY      FLUoxetine (PROZAC) 20 MG capsule       metoclopramide (REGLAN) 5 MG tablet Take 1 tablet by mouth 3 times daily 30 tablet 1    furosemide (LASIX) 20 MG tablet Take 20 mg by mouth 2 times daily      aspirin 81 MG chewable tablet Take 81 mg by mouth daily      Multiple Vitamins-Minerals (HAIR SKIN AND NAILS FORMULA) TABS Take by mouth  losartan (COZAAR) 25 MG tablet Take 1 tablet by mouth daily 30 tablet 5    acetaminophen (TYLENOL) 500 MG tablet Take 500 mg by mouth every 6 hours as needed for Pain      ezetimibe (ZETIA) 10 MG tablet Take 1 tablet by mouth daily 90 tablet 3    hydroCHLOROthiazide (HYDRODIURIL) 25 MG tablet Take 0.5 tablets by mouth daily 45 tablet 3    loratadine (CLARITIN) 10 MG tablet Take 1 tablet by mouth daily 30 tablet 5     No current facility-administered medications for this visit. Review of Systems   Constitutional: Negative for activity change, chills and fatigue. HENT: Negative for congestion. Respiratory: Negative for cough and shortness of breath. Cardiovascular: Negative for chest pain and leg swelling. Genitourinary: Negative for dysuria and frequency. Musculoskeletal: Negative for arthralgias. Skin: Negative for rash. Neurological: Negative for dizziness and headaches. Psychiatric/Behavioral: Positive for agitation and sleep disturbance. Negative for self-injury and suicidal ideas. The patient is nervous/anxious.          Little better       Lab Results   Component Value Date    WBC 9.7 07/15/2021    HGB 12.5 07/15/2021    HCT 38.4 07/15/2021    MCV 87.3 07/15/2021     07/15/2021     Lab Results   Component Value Date     07/15/2021    K 4.1 07/15/2021     07/15/2021    CO2 28 07/15/2021    BUN 19 07/15/2021    CREATININE 1.1 07/15/2021    GLUCOSE 104 (H) 07/15/2021    CALCIUM 9.7 07/15/2021    PROT 7.1 07/15/2021    LABALBU 4.5 07/15/2021    BILITOT 0.3 07/15/2021    ALKPHOS 63 07/15/2021    AST 12 (L) 07/15/2021    ALT 10 07/15/2021    LABGLOM 51 (L) 07/15/2021    GFRAA >60 07/15/2021    AGRATIO 1.7 12/07/2015    GLOB 2.7 12/07/2015     Lab Results   Component Value Date    CHOL 270 (H) 03/01/2021    CHOL 508 (H) 05/13/2019    CHOL 195 09/27/2017     Lab Results   Component Value Date    TRIG 385 (H) 03/01/2021    TRIG 598 (H) 05/13/2019    TRIG 260 (H) 09/27/2017     Lab Results   Component Value Date    HDL 40 (L) 03/01/2021    HDL 38 (L) 05/13/2019    HDL 42 09/27/2017     Lab Results   Component Value Date    LDLCALC NOT VALID WHEN TRIGLYCERIDE >400 MG/DL. 05/22/2017    LDLCALC 75 12/07/2015     Lab Results   Component Value Date    LABA1C 5.6 05/04/2021     Lab Results   Component Value Date    TSHHS 2.200 03/01/2021         /80 (Site: Right Upper Arm, Position: Sitting, Cuff Size: Large Adult)   Pulse 70   Wt 229 lb 9.6 oz (104.1 kg)   SpO2 97%   BMI 39.41 kg/m²     BP Readings from Last 3 Encounters:   07/15/21 118/80   07/15/21 (!) 146/75   07/02/21 110/60       Wt Readings from Last 3 Encounters:   07/15/21 229 lb 9.6 oz (104.1 kg)   07/15/21 229 lb (103.9 kg)   07/02/21 227 lb 9.6 oz (103.2 kg)         Physical Exam  Constitutional:       General: She is not in acute distress. Appearance: Normal appearance. She is well-developed. She is not ill-appearing or diaphoretic. HENT:      Head: Normocephalic and atraumatic. Eyes:      Pupils: Pupils are equal, round, and reactive to light. Cardiovascular:      Rate and Rhythm: Normal rate and regular rhythm. Heart sounds: Normal heart sounds. No murmur heard. Pulmonary:      Effort: Pulmonary effort is normal.      Breath sounds: No wheezing or rales. Musculoskeletal:         General: Normal range of motion. Cervical back: Normal range of motion and neck supple. Right lower leg: No edema. Left lower leg: No edema. Neurological:      General: No focal deficit present. Mental Status: She is alert and oriented to person, place, and time. Comments: Looks better   Psychiatric:         Mood and Affect: Mood is depressed and elated. Affect is not flat. Behavior: Behavior normal.         ASSESSMENT/ PLAN:    1. Essential hypertension  - stable  - metoprolol tartrate (LOPRESSOR) 50 MG tablet;  Take 1 tablet by mouth 2 times daily  Dispense: 180 tablet;

## 2021-07-16 ENCOUNTER — HOSPITAL ENCOUNTER (OUTPATIENT)
Dept: INFUSION THERAPY | Age: 61
Discharge: HOME | End: 2021-07-16
Payer: COMMERCIAL

## 2021-07-16 VITALS
HEART RATE: 74 BPM | TEMPERATURE: 97.1 F | OXYGEN SATURATION: 97 % | SYSTOLIC BLOOD PRESSURE: 123 MMHG | BODY MASS INDEX: 36.16 KG/M2 | DIASTOLIC BLOOD PRESSURE: 77 MMHG | HEIGHT: 66 IN | WEIGHT: 225 LBS

## 2021-07-16 DIAGNOSIS — Z17.0 MALIGNANT NEOPLASM OF LEFT BREAST IN FEMALE, ESTROGEN RECEPTOR POSITIVE, UNSPECIFIED SITE OF BREAST (HCC): ICD-10-CM

## 2021-07-16 DIAGNOSIS — C50.912 MALIGNANT NEOPLASM OF LEFT BREAST IN FEMALE, ESTROGEN RECEPTOR POSITIVE, UNSPECIFIED SITE OF BREAST (HCC): ICD-10-CM

## 2021-07-16 DIAGNOSIS — C50.912 INFILTRATING DUCTAL CARCINOMA OF LEFT BREAST (HCC): Primary | ICD-10-CM

## 2021-07-16 PROCEDURE — 96411 CHEMO IV PUSH ADDL DRUG: CPT

## 2021-07-16 PROCEDURE — 96367 TX/PROPH/DG ADDL SEQ IV INF: CPT

## 2021-07-16 PROCEDURE — 96413 CHEMO IV INFUSION 1 HR: CPT

## 2021-07-16 PROCEDURE — 2580000003 HC RX 258: Performed by: INTERNAL MEDICINE

## 2021-07-16 PROCEDURE — 6360000002 HC RX W HCPCS: Performed by: INTERNAL MEDICINE

## 2021-07-16 PROCEDURE — 96417 CHEMO IV INFUS EACH ADDL SEQ: CPT

## 2021-07-16 PROCEDURE — 96409 CHEMO IV PUSH SNGL DRUG: CPT

## 2021-07-16 PROCEDURE — 96375 TX/PRO/DX INJ NEW DRUG ADDON: CPT

## 2021-07-16 RX ORDER — SODIUM CHLORIDE 0.9 % (FLUSH) 0.9 %
5-40 SYRINGE (ML) INJECTION PRN
Status: DISCONTINUED | OUTPATIENT
Start: 2021-07-16 | End: 2021-07-17 | Stop reason: HOSPADM

## 2021-07-16 RX ORDER — DOXORUBICIN HYDROCHLORIDE 2 MG/ML
60 INJECTION, SOLUTION INTRAVENOUS ONCE
Status: COMPLETED | OUTPATIENT
Start: 2021-07-16 | End: 2021-07-16

## 2021-07-16 RX ORDER — SODIUM CHLORIDE 9 MG/ML
20 INJECTION, SOLUTION INTRAVENOUS ONCE
Status: DISCONTINUED | OUTPATIENT
Start: 2021-07-16 | End: 2021-07-17 | Stop reason: HOSPADM

## 2021-07-16 RX ORDER — HEPARIN SODIUM (PORCINE) LOCK FLUSH IV SOLN 100 UNIT/ML 100 UNIT/ML
500 SOLUTION INTRAVENOUS PRN
Status: DISCONTINUED | OUTPATIENT
Start: 2021-07-16 | End: 2021-07-17 | Stop reason: HOSPADM

## 2021-07-16 RX ORDER — PALONOSETRON 0.05 MG/ML
0.25 INJECTION, SOLUTION INTRAVENOUS ONCE
Status: COMPLETED | OUTPATIENT
Start: 2021-07-16 | End: 2021-07-16

## 2021-07-16 RX ADMIN — DOXORUBICIN HYDROCHLORIDE 130 MG: 2 INJECTION, SOLUTION INTRAVENOUS at 10:57

## 2021-07-16 RX ADMIN — HEPARIN 500 UNITS: 100 SYRINGE at 11:59

## 2021-07-16 RX ADMIN — PALONOSETRON 0.25 MG: 0.25 INJECTION, SOLUTION INTRAVENOUS at 09:24

## 2021-07-16 RX ADMIN — CYCLOPHOSPHAMIDE 1300 MG: 1 INJECTION, POWDER, FOR SOLUTION INTRAVENOUS; ORAL at 11:08

## 2021-07-16 RX ADMIN — SODIUM CHLORIDE 20 ML/HR: 0.9 INJECTION, SOLUTION INTRAVENOUS at 09:23

## 2021-07-16 RX ADMIN — FOSAPREPITANT 150 MG: 150 INJECTION, POWDER, LYOPHILIZED, FOR SOLUTION INTRAVENOUS at 09:24

## 2021-07-16 RX ADMIN — DEXAMETHASONE SODIUM PHOSPHATE 12 MG: 4 INJECTION, SOLUTION INTRAMUSCULAR; INTRAVENOUS at 10:01

## 2021-07-16 ASSESSMENT — ENCOUNTER SYMPTOMS
SHORTNESS OF BREATH: 0
COUGH: 0

## 2021-07-16 ASSESSMENT — PAIN DESCRIPTION - LOCATION: LOCATION: HIP

## 2021-07-16 ASSESSMENT — PAIN SCALES - GENERAL: PAINLEVEL_OUTOF10: 6

## 2021-07-16 ASSESSMENT — PAIN DESCRIPTION - ORIENTATION: ORIENTATION: LEFT

## 2021-07-16 NOTE — PROGRESS NOTES
Pt. Here for new treatment. Right upper chest mediport accessed without difficulty, good blood return noted. Lab work drawn as ordered. Labs reviewed. Doxorubicin given IVP with free flowing normal saline, blood return noted prior to, every 2-5 ml during push, and immediately post push. Cytoxan administered without incident. Discharge AVS given. Patient's status assessed and documented appropriately. All labs and required results were also reviewed today. Treatment parameters have been reviewed. Today's treatment has been approved by the provider. Treatment orders and medication sequencing (when applicable) was verified by 2 registered nurses. The treatment plan was confirmed with the patient prior to administration, and the patient understands the need to report any treatment-related symptoms. Prior to administration, when applicable, the following 8 elements of medication administration were reviewed with 2nd Registered Nurse prior to dosing: drug name, drug dose, infusion volume when prepared in a syringe, rate of administration, expiration dates and/or times, appearance and integrity of drug(s), and rate of pump for infusion. The 5 rights of medication administration have been verified.

## 2021-07-19 ENCOUNTER — TELEPHONE (OUTPATIENT)
Dept: ONCOLOGY | Age: 61
End: 2021-07-19

## 2021-07-19 ENCOUNTER — CLINICAL DOCUMENTATION (OUTPATIENT)
Dept: CASE MANAGEMENT | Age: 61
End: 2021-07-19

## 2021-07-19 ENCOUNTER — HOSPITAL ENCOUNTER (OUTPATIENT)
Dept: INFUSION THERAPY | Age: 61
Discharge: HOME | End: 2021-07-19
Payer: COMMERCIAL

## 2021-07-19 VITALS
BODY MASS INDEX: 39.3 KG/M2 | SYSTOLIC BLOOD PRESSURE: 139 MMHG | RESPIRATION RATE: 18 BRPM | TEMPERATURE: 97.7 F | DIASTOLIC BLOOD PRESSURE: 71 MMHG | WEIGHT: 230.2 LBS | OXYGEN SATURATION: 98 % | HEART RATE: 67 BPM | HEIGHT: 64 IN

## 2021-07-19 DIAGNOSIS — C50.012 MALIGNANT NEOPLASM OF AREOLA OF LEFT BREAST IN FEMALE, ESTROGEN RECEPTOR POSITIVE (HCC): Primary | ICD-10-CM

## 2021-07-19 DIAGNOSIS — C50.912 MALIGNANT NEOPLASM OF LEFT BREAST IN FEMALE, ESTROGEN RECEPTOR POSITIVE, UNSPECIFIED SITE OF BREAST (HCC): Primary | ICD-10-CM

## 2021-07-19 DIAGNOSIS — Z17.0 MALIGNANT NEOPLASM OF AREOLA OF LEFT BREAST IN FEMALE, ESTROGEN RECEPTOR POSITIVE (HCC): Primary | ICD-10-CM

## 2021-07-19 DIAGNOSIS — Z17.0 MALIGNANT NEOPLASM OF LEFT BREAST IN FEMALE, ESTROGEN RECEPTOR POSITIVE, UNSPECIFIED SITE OF BREAST (HCC): Primary | ICD-10-CM

## 2021-07-19 PROCEDURE — 96375 TX/PRO/DX INJ NEW DRUG ADDON: CPT

## 2021-07-19 PROCEDURE — 2580000003 HC RX 258: Performed by: INTERNAL MEDICINE

## 2021-07-19 PROCEDURE — 6360000002 HC RX W HCPCS: Performed by: INTERNAL MEDICINE

## 2021-07-19 PROCEDURE — 96360 HYDRATION IV INFUSION INIT: CPT

## 2021-07-19 RX ORDER — ONDANSETRON 2 MG/ML
8 INJECTION INTRAMUSCULAR; INTRAVENOUS ONCE
Status: CANCELLED
Start: 2021-07-19 | End: 2021-07-19

## 2021-07-19 RX ORDER — SODIUM CHLORIDE 9 MG/ML
25 INJECTION, SOLUTION INTRAVENOUS PRN
Status: CANCELLED | OUTPATIENT
Start: 2021-07-19

## 2021-07-19 RX ORDER — ONDANSETRON 2 MG/ML
8 INJECTION INTRAMUSCULAR; INTRAVENOUS ONCE
Status: COMPLETED | OUTPATIENT
Start: 2021-07-19 | End: 2021-07-19

## 2021-07-19 RX ORDER — SODIUM CHLORIDE 0.9 % (FLUSH) 0.9 %
5-40 SYRINGE (ML) INJECTION PRN
Status: CANCELLED | OUTPATIENT
Start: 2021-07-19

## 2021-07-19 RX ORDER — 0.9 % SODIUM CHLORIDE 0.9 %
1000 INTRAVENOUS SOLUTION INTRAVENOUS ONCE
Status: COMPLETED | OUTPATIENT
Start: 2021-07-19 | End: 2021-07-19

## 2021-07-19 RX ORDER — HEPARIN SODIUM (PORCINE) LOCK FLUSH IV SOLN 100 UNIT/ML 100 UNIT/ML
500 SOLUTION INTRAVENOUS PRN
Status: DISCONTINUED | OUTPATIENT
Start: 2021-07-19 | End: 2021-07-20 | Stop reason: HOSPADM

## 2021-07-19 RX ORDER — 0.9 % SODIUM CHLORIDE 0.9 %
1000 INTRAVENOUS SOLUTION INTRAVENOUS ONCE
Status: CANCELLED | OUTPATIENT
Start: 2021-07-19 | End: 2021-07-19

## 2021-07-19 RX ORDER — DEXAMETHASONE SODIUM PHOSPHATE 4 MG/ML
8 INJECTION, SOLUTION INTRA-ARTICULAR; INTRALESIONAL; INTRAMUSCULAR; INTRAVENOUS; SOFT TISSUE ONCE
Status: COMPLETED | OUTPATIENT
Start: 2021-07-19 | End: 2021-07-19

## 2021-07-19 RX ORDER — HEPARIN SODIUM (PORCINE) LOCK FLUSH IV SOLN 100 UNIT/ML 100 UNIT/ML
500 SOLUTION INTRAVENOUS PRN
Status: CANCELLED | OUTPATIENT
Start: 2021-07-19

## 2021-07-19 RX ORDER — DEXAMETHASONE SODIUM PHOSPHATE 4 MG/ML
8 INJECTION, SOLUTION INTRA-ARTICULAR; INTRALESIONAL; INTRAMUSCULAR; INTRAVENOUS; SOFT TISSUE ONCE
Status: DISCONTINUED | OUTPATIENT
Start: 2021-07-19 | End: 2021-07-19 | Stop reason: SDUPTHER

## 2021-07-19 RX ORDER — DEXAMETHASONE SODIUM PHOSPHATE 10 MG/ML
8 INJECTION, SOLUTION INTRAMUSCULAR; INTRAVENOUS ONCE
Status: CANCELLED
Start: 2021-07-19 | End: 2021-07-19

## 2021-07-19 RX ORDER — DEXAMETHASONE SODIUM PHOSPHATE 10 MG/ML
8 INJECTION INTRAMUSCULAR; INTRAVENOUS ONCE
Status: CANCELLED
Start: 2021-07-19 | End: 2021-07-19

## 2021-07-19 RX ORDER — SODIUM CHLORIDE 0.9 % (FLUSH) 0.9 %
5-40 SYRINGE (ML) INJECTION PRN
Status: DISCONTINUED | OUTPATIENT
Start: 2021-07-19 | End: 2021-07-20 | Stop reason: HOSPADM

## 2021-07-19 RX ORDER — PROCHLORPERAZINE MALEATE 10 MG
10 TABLET ORAL EVERY 6 HOURS PRN
Qty: 30 TABLET | Refills: 1 | Status: SHIPPED | OUTPATIENT
Start: 2021-07-19 | End: 2021-10-14 | Stop reason: SDUPTHER

## 2021-07-19 RX ORDER — SODIUM CHLORIDE 9 MG/ML
25 INJECTION, SOLUTION INTRAVENOUS PRN
Status: DISCONTINUED | OUTPATIENT
Start: 2021-07-19 | End: 2021-07-20 | Stop reason: HOSPADM

## 2021-07-19 RX ADMIN — HEPARIN 500 UNITS: 100 SYRINGE at 15:44

## 2021-07-19 RX ADMIN — SODIUM CHLORIDE, PRESERVATIVE FREE 10 ML: 5 INJECTION INTRAVENOUS at 15:43

## 2021-07-19 RX ADMIN — ONDANSETRON 8 MG: 2 INJECTION INTRAMUSCULAR; INTRAVENOUS at 14:37

## 2021-07-19 RX ADMIN — SODIUM CHLORIDE 1000 ML: 9 INJECTION, SOLUTION INTRAVENOUS at 14:30

## 2021-07-19 RX ADMIN — DEXAMETHASONE SODIUM PHOSPHATE 8 MG: 4 INJECTION, SOLUTION INTRAMUSCULAR; INTRAVENOUS at 14:42

## 2021-07-19 ASSESSMENT — PAIN DESCRIPTION - ORIENTATION: ORIENTATION: LOWER;MID

## 2021-07-19 ASSESSMENT — PAIN SCALES - GENERAL: PAINLEVEL_OUTOF10: 5

## 2021-07-19 ASSESSMENT — PAIN DESCRIPTION - LOCATION: LOCATION: ABDOMEN

## 2021-07-19 ASSESSMENT — PAIN DESCRIPTION - PAIN TYPE: TYPE: CHRONIC PAIN

## 2021-07-19 ASSESSMENT — PAIN DESCRIPTION - DESCRIPTORS: DESCRIPTORS: CRAMPING

## 2021-07-19 NOTE — PROGRESS NOTES
Patient called in with c/o nausea/vomiting most of the weekend. No emesis today but nausea has not subsided with prescribed medications. Per Dr. Maxwell Newby - patient to come today for IVF and steroid/nausea meds. Will also prescribe Compazine for patient to take at home. Patient scheduled today at [phone].

## 2021-07-19 NOTE — TELEPHONE ENCOUNTER
Patient called and indicated she is having nausea and vomiting. Medication is not helping patient feels really since.  Please advise

## 2021-07-21 PROBLEM — Z09 POSTOP CHECK: Status: ACTIVE | Noted: 2021-07-21

## 2021-07-21 PROBLEM — Z95.828 PORT-A-CATH IN PLACE: Status: ACTIVE | Noted: 2021-07-21

## 2021-07-22 DIAGNOSIS — E86.0 DEHYDRATION: ICD-10-CM

## 2021-07-22 DIAGNOSIS — C50.012 MALIGNANT NEOPLASM OF AREOLA OF LEFT BREAST IN FEMALE, ESTROGEN RECEPTOR POSITIVE (HCC): Primary | ICD-10-CM

## 2021-07-22 DIAGNOSIS — Z17.0 MALIGNANT NEOPLASM OF AREOLA OF LEFT BREAST IN FEMALE, ESTROGEN RECEPTOR POSITIVE (HCC): Primary | ICD-10-CM

## 2021-07-22 RX ORDER — SODIUM CHLORIDE 9 MG/ML
25 INJECTION, SOLUTION INTRAVENOUS PRN
Status: CANCELLED | OUTPATIENT
Start: 2021-07-22

## 2021-07-22 RX ORDER — 0.9 % SODIUM CHLORIDE 0.9 %
1000 INTRAVENOUS SOLUTION INTRAVENOUS ONCE
Status: CANCELLED | OUTPATIENT
Start: 2021-07-22 | End: 2021-07-22

## 2021-07-22 RX ORDER — SODIUM CHLORIDE 0.9 % (FLUSH) 0.9 %
5-40 SYRINGE (ML) INJECTION PRN
Status: CANCELLED | OUTPATIENT
Start: 2021-07-22

## 2021-07-22 RX ORDER — HEPARIN SODIUM (PORCINE) LOCK FLUSH IV SOLN 100 UNIT/ML 100 UNIT/ML
500 SOLUTION INTRAVENOUS PRN
Status: CANCELLED | OUTPATIENT
Start: 2021-07-22

## 2021-07-22 NOTE — PROGRESS NOTES
Called to check on patient. States she felt a little better yesterday but has had diarrhea all day today - taking Imodium. Instructed patient to try the BRAT diet the remainder of the day. Will bring in for IVF and lab draw tomorrow morning, 7/23/21 at 0815. Patient states her nausea is better with the Compazine.

## 2021-07-23 ENCOUNTER — HOSPITAL ENCOUNTER (OUTPATIENT)
Dept: INFUSION THERAPY | Age: 61
Discharge: HOME | End: 2021-07-23
Payer: COMMERCIAL

## 2021-07-23 VITALS
HEART RATE: 80 BPM | SYSTOLIC BLOOD PRESSURE: 133 MMHG | BODY MASS INDEX: 40.17 KG/M2 | DIASTOLIC BLOOD PRESSURE: 71 MMHG | RESPIRATION RATE: 16 BRPM | OXYGEN SATURATION: 98 % | TEMPERATURE: 98 F | HEIGHT: 64 IN

## 2021-07-23 DIAGNOSIS — Z17.0 MALIGNANT NEOPLASM OF LEFT BREAST IN FEMALE, ESTROGEN RECEPTOR POSITIVE, UNSPECIFIED SITE OF BREAST (HCC): ICD-10-CM

## 2021-07-23 DIAGNOSIS — C50.912 MALIGNANT NEOPLASM OF LEFT BREAST IN FEMALE, ESTROGEN RECEPTOR POSITIVE, UNSPECIFIED SITE OF BREAST (HCC): ICD-10-CM

## 2021-07-23 DIAGNOSIS — Z17.0 MALIGNANT NEOPLASM OF AREOLA OF LEFT BREAST IN FEMALE, ESTROGEN RECEPTOR POSITIVE (HCC): Primary | ICD-10-CM

## 2021-07-23 DIAGNOSIS — C50.012 MALIGNANT NEOPLASM OF AREOLA OF LEFT BREAST IN FEMALE, ESTROGEN RECEPTOR POSITIVE (HCC): Primary | ICD-10-CM

## 2021-07-23 DIAGNOSIS — E86.0 DEHYDRATION: ICD-10-CM

## 2021-07-23 LAB
ALBUMIN SERPL-MCNC: 4.1 GM/DL (ref 3.4–5)
ALP BLD-CCNC: 58 IU/L (ref 40–128)
ALT SERPL-CCNC: 26 U/L (ref 10–40)
ANION GAP SERPL CALCULATED.3IONS-SCNC: 14 MMOL/L (ref 4–16)
AST SERPL-CCNC: 20 IU/L (ref 15–37)
BASOPHILS ABSOLUTE: 0 K/CU MM
BASOPHILS RELATIVE PERCENT: 0.6 % (ref 0–1)
BILIRUB SERPL-MCNC: 1 MG/DL (ref 0–1)
BUN BLDV-MCNC: 16 MG/DL (ref 6–23)
CALCIUM SERPL-MCNC: 9.2 MG/DL (ref 8.3–10.6)
CHLORIDE BLD-SCNC: 101 MMOL/L (ref 99–110)
CO2: 26 MMOL/L (ref 21–32)
CREAT SERPL-MCNC: 0.9 MG/DL (ref 0.6–1.1)
DIFFERENTIAL TYPE: ABNORMAL
EOSINOPHILS ABSOLUTE: 0.1 K/CU MM
EOSINOPHILS RELATIVE PERCENT: 3 % (ref 0–3)
GFR AFRICAN AMERICAN: >60 ML/MIN/1.73M2
GFR NON-AFRICAN AMERICAN: >60 ML/MIN/1.73M2
GLUCOSE BLD-MCNC: 114 MG/DL (ref 70–99)
HCT VFR BLD CALC: 35.1 % (ref 37–47)
HEMOGLOBIN: 11.6 GM/DL (ref 12.5–16)
LYMPHOCYTES ABSOLUTE: 0.8 K/CU MM
LYMPHOCYTES RELATIVE PERCENT: 25.2 % (ref 24–44)
MCH RBC QN AUTO: 28.1 PG (ref 27–31)
MCHC RBC AUTO-ENTMCNC: 33 % (ref 32–36)
MCV RBC AUTO: 85 FL (ref 78–100)
MONOCYTES ABSOLUTE: 0 K/CU MM
MONOCYTES RELATIVE PERCENT: 0.9 % (ref 0–4)
PDW BLD-RTO: 13.5 % (ref 11.7–14.9)
PLATELET # BLD: 126 K/CU MM (ref 140–440)
PMV BLD AUTO: 9.5 FL (ref 7.5–11.1)
POTASSIUM SERPL-SCNC: 3.3 MMOL/L (ref 3.5–5.1)
RBC # BLD: 4.13 M/CU MM (ref 4.2–5.4)
SEGMENTED NEUTROPHILS ABSOLUTE COUNT: 2.3 K/CU MM
SEGMENTED NEUTROPHILS RELATIVE PERCENT: 70.3 % (ref 36–66)
SODIUM BLD-SCNC: 141 MMOL/L (ref 135–145)
TOTAL PROTEIN: 6.4 GM/DL (ref 6.4–8.2)
WBC # BLD: 3.3 K/CU MM (ref 4–10.5)

## 2021-07-23 PROCEDURE — 80053 COMPREHEN METABOLIC PANEL: CPT

## 2021-07-23 PROCEDURE — 6360000002 HC RX W HCPCS: Performed by: INTERNAL MEDICINE

## 2021-07-23 PROCEDURE — 96375 TX/PRO/DX INJ NEW DRUG ADDON: CPT

## 2021-07-23 PROCEDURE — 85025 COMPLETE CBC W/AUTO DIFF WBC: CPT

## 2021-07-23 PROCEDURE — 96360 HYDRATION IV INFUSION INIT: CPT

## 2021-07-23 PROCEDURE — 2580000003 HC RX 258: Performed by: INTERNAL MEDICINE

## 2021-07-23 RX ORDER — SODIUM CHLORIDE 9 MG/ML
25 INJECTION, SOLUTION INTRAVENOUS PRN
Status: CANCELLED | OUTPATIENT
Start: 2021-07-23

## 2021-07-23 RX ORDER — DEXAMETHASONE SODIUM PHOSPHATE 10 MG/ML
8 INJECTION, SOLUTION INTRAMUSCULAR; INTRAVENOUS ONCE
Status: CANCELLED
Start: 2021-07-23 | End: 2021-07-23

## 2021-07-23 RX ORDER — 0.9 % SODIUM CHLORIDE 0.9 %
1000 INTRAVENOUS SOLUTION INTRAVENOUS ONCE
Status: COMPLETED | OUTPATIENT
Start: 2021-07-23 | End: 2021-07-23

## 2021-07-23 RX ORDER — ONDANSETRON 2 MG/ML
8 INJECTION INTRAMUSCULAR; INTRAVENOUS ONCE
Status: COMPLETED | OUTPATIENT
Start: 2021-07-23 | End: 2021-07-23

## 2021-07-23 RX ORDER — SODIUM CHLORIDE 0.9 % (FLUSH) 0.9 %
5-40 SYRINGE (ML) INJECTION PRN
Status: DISCONTINUED | OUTPATIENT
Start: 2021-07-23 | End: 2021-07-24 | Stop reason: HOSPADM

## 2021-07-23 RX ORDER — DEXAMETHASONE SODIUM PHOSPHATE 4 MG/ML
8 INJECTION, SOLUTION INTRA-ARTICULAR; INTRALESIONAL; INTRAMUSCULAR; INTRAVENOUS; SOFT TISSUE ONCE
Status: COMPLETED | OUTPATIENT
Start: 2021-07-23 | End: 2021-07-23

## 2021-07-23 RX ORDER — SODIUM CHLORIDE 0.9 % (FLUSH) 0.9 %
5-40 SYRINGE (ML) INJECTION PRN
Status: CANCELLED | OUTPATIENT
Start: 2021-07-23

## 2021-07-23 RX ORDER — ONDANSETRON 2 MG/ML
8 INJECTION INTRAMUSCULAR; INTRAVENOUS ONCE
Status: CANCELLED
Start: 2021-07-23 | End: 2021-07-23

## 2021-07-23 RX ORDER — HEPARIN SODIUM (PORCINE) LOCK FLUSH IV SOLN 100 UNIT/ML 100 UNIT/ML
500 SOLUTION INTRAVENOUS PRN
Status: DISCONTINUED | OUTPATIENT
Start: 2021-07-23 | End: 2021-07-24 | Stop reason: HOSPADM

## 2021-07-23 RX ORDER — 0.9 % SODIUM CHLORIDE 0.9 %
1000 INTRAVENOUS SOLUTION INTRAVENOUS ONCE
Status: CANCELLED | OUTPATIENT
Start: 2021-07-23 | End: 2021-07-23

## 2021-07-23 RX ORDER — HEPARIN SODIUM (PORCINE) LOCK FLUSH IV SOLN 100 UNIT/ML 100 UNIT/ML
500 SOLUTION INTRAVENOUS PRN
Status: CANCELLED | OUTPATIENT
Start: 2021-07-23

## 2021-07-23 RX ADMIN — DEXAMETHASONE SODIUM PHOSPHATE 8 MG: 4 INJECTION, SOLUTION INTRAMUSCULAR; INTRAVENOUS at 08:49

## 2021-07-23 RX ADMIN — HEPARIN 500 UNITS: 100 SYRINGE at 10:07

## 2021-07-23 RX ADMIN — SODIUM CHLORIDE, PRESERVATIVE FREE 10 ML: 5 INJECTION INTRAVENOUS at 10:07

## 2021-07-23 RX ADMIN — SODIUM CHLORIDE 1000 ML: 9 INJECTION, SOLUTION INTRAVENOUS at 08:49

## 2021-07-23 RX ADMIN — ONDANSETRON 8 MG: 2 INJECTION INTRAMUSCULAR; INTRAVENOUS at 08:49

## 2021-07-23 ASSESSMENT — PAIN DESCRIPTION - LOCATION: LOCATION: ABDOMEN

## 2021-07-23 ASSESSMENT — PAIN DESCRIPTION - DESCRIPTORS: DESCRIPTORS: CRAMPING

## 2021-07-23 ASSESSMENT — PAIN DESCRIPTION - ORIENTATION: ORIENTATION: LOWER;MID

## 2021-07-23 ASSESSMENT — PAIN SCALES - GENERAL: PAINLEVEL_OUTOF10: 6

## 2021-07-23 NOTE — PROGRESS NOTES
Here for IVF. Pt reports ongoing nausea and diarrhea. Instructed patient on anti-emetic regimen, should be taken every 6 hours to control nausea. Instructed patient on Imodium use with each loose stool. Discussed symptom management with mouth care. Drink 6-8 glasses of water daily to prevent dehydration. Treatment administered as ordered. Pt reports feeling a little better. Instructed patient to got to ER if not eating or drinking for more than 24 hours or if current symptoms worsen. Reviewed AVS, copy given to patient. Will call office with any further needs. Discharged in stable condition via cane.

## 2021-07-27 NOTE — DISCHARGE SUMMARY
Outpatient Physical Therapy  [place]           [x] Phone: [phone]   Fax: [phone]  [facility]           [] Phone: [phone]   Fax: [phone]        Physical Therapy Daily Discharge Note  Date:  2021    Patient Name:  Shon Cope    :  1960  MRN: [id number]    Diagnosis:   Diagnosis: OA left hip  Date of Injury/Surgery: Chronic - Surgery planned for 10/19/20  Treatment Diagnosis: Treatment Diagnosis: decreased ROm, decreased core strength, impaired ADLS    Insurance/Certification information: PT Insurance Information: [facility]   Referring Physician:  Referring Practitioner: Curtis Dia  Next Doctor Visit:  Unknown  Plan of care signed (Y/N):  Pending  Outcome Measure: Osw ; Visit #7   Visit# / total visits:    Pain level:      9-10/10 left hip/groin/anterior thigh,  POC Date range:  20 - 20     Goals:        Long term goals  Time Frame for Long term goals : In 4 weeks, patient will  Long term goal 1: demonstrate compliance and independence w/HEP. - Ind. w/HEP  Long term goal 2: improve upon Osw score by at least 10 points. - UNMET 20  Long term goal 3: demonstrate compliance w/ambulating using a RW vs her SPC to decrease stress through low back and hip and improve safety. - MET 20  Long term goal 4: demonstrate ability to ambulate in more upright position for decreased stress through low back (FF no >= 20 deg) - 28deg 20 - UNMET     Summary of Evaluation: Assessment: Pt is a 64 y.o. female w/DX OA left hip. Pt reports chronic and worsening left hip pain x >= 1 year and is scheduled for hip surgery 10/19/20. She is also experiencing significant low back pain and has been ambulating w/a spc x 8 mo and holding furniture at home. PLOF:  Ambulated w/o AD, able to go up/down stairs, walk across uneven ground, get in/out of car w/o pain, perform home maintenance and homemaking.    For the last 1.3 years, pain has limited all those activities and now needs help of others to perform all maintenance on house and struggles w/homemaking, lower body dressing       Objective:    Unable to complete an assessment of the patient and their progress towards their goals secondary to discontinuation of therapy. Stefany Louise last appointment was on 9/29/2020      Communication with other providers:    Faxed Discharge note secondary to discontinuation of therapy gordon      Assessment:    Stefany Louise has discontinued therapy services and at this time they will be discharged from our facility. If their is any future needs please don't hesitate to call our offices and resubmit a new therapy order. We appreciate your referral and letting us serve your patients.        Interventions PRN:  [x] Therapeutic Exercise  [] Modalities:  [x] Therapeutic Activity     [] Ultrasound  [] Estim  [] Gait Training      [] Cervical Traction [] Lumbar Traction  [x] Neuromuscular Re-education    [] Cold/hotpack [] Iontophoresis   [x] Instruction in HEP      [] Vasopneumatic   [] Dry Needling    [x] Manual Therapy               [] Aquatic Therapy              Electronically signed by:    Donnell Pollard PTA   ,  7/27/2021, 9:21 AM

## 2021-07-29 DIAGNOSIS — Z17.0 MALIGNANT NEOPLASM OF AREOLA OF LEFT BREAST IN FEMALE, ESTROGEN RECEPTOR POSITIVE (HCC): Primary | ICD-10-CM

## 2021-07-29 DIAGNOSIS — C50.012 MALIGNANT NEOPLASM OF AREOLA OF LEFT BREAST IN FEMALE, ESTROGEN RECEPTOR POSITIVE (HCC): Primary | ICD-10-CM

## 2021-08-02 ENCOUNTER — HOSPITAL ENCOUNTER (OUTPATIENT)
Dept: GENERAL RADIOLOGY | Age: 61
Discharge: HOME | End: 2021-08-02
Payer: COMMERCIAL

## 2021-08-02 DIAGNOSIS — R63.4 LOSS OF WEIGHT: ICD-10-CM

## 2021-08-02 DIAGNOSIS — R10.9 ABDOMINAL PAIN, UNSPECIFIED ABDOMINAL LOCATION: ICD-10-CM

## 2021-08-02 PROCEDURE — 74250 X-RAY XM SM INT 1CNTRST STD: CPT

## 2021-08-03 ENCOUNTER — HOSPITAL ENCOUNTER (OUTPATIENT)
Dept: INFUSION THERAPY | Age: 61
Discharge: HOME | End: 2021-08-03
Payer: COMMERCIAL

## 2021-08-03 ENCOUNTER — OFFICE VISIT (OUTPATIENT)
Dept: ONCOLOGY | Age: 61
End: 2021-08-03
Payer: COMMERCIAL

## 2021-08-03 ENCOUNTER — CLINICAL DOCUMENTATION (OUTPATIENT)
Dept: CASE MANAGEMENT | Age: 61
End: 2021-08-03

## 2021-08-03 VITALS
HEIGHT: 64 IN | SYSTOLIC BLOOD PRESSURE: 132 MMHG | BODY MASS INDEX: 38.89 KG/M2 | HEART RATE: 74 BPM | DIASTOLIC BLOOD PRESSURE: 79 MMHG | TEMPERATURE: 97.6 F | RESPIRATION RATE: 18 BRPM | WEIGHT: 227.8 LBS | OXYGEN SATURATION: 97 %

## 2021-08-03 DIAGNOSIS — C50.012 MALIGNANT NEOPLASM OF AREOLA OF LEFT BREAST IN FEMALE, ESTROGEN RECEPTOR POSITIVE (HCC): Primary | ICD-10-CM

## 2021-08-03 DIAGNOSIS — Z17.0 MALIGNANT NEOPLASM OF AREOLA OF LEFT BREAST IN FEMALE, ESTROGEN RECEPTOR POSITIVE (HCC): Primary | ICD-10-CM

## 2021-08-03 PROCEDURE — G8427 DOCREV CUR MEDS BY ELIG CLIN: HCPCS | Performed by: INTERNAL MEDICINE

## 2021-08-03 PROCEDURE — G8417 CALC BMI ABV UP PARAM F/U: HCPCS | Performed by: INTERNAL MEDICINE

## 2021-08-03 PROCEDURE — 99211 OFF/OP EST MAY X REQ PHY/QHP: CPT

## 2021-08-03 PROCEDURE — 1036F TOBACCO NON-USER: CPT | Performed by: INTERNAL MEDICINE

## 2021-08-03 PROCEDURE — 99214 OFFICE O/P EST MOD 30 MIN: CPT | Performed by: INTERNAL MEDICINE

## 2021-08-03 PROCEDURE — 3017F COLORECTAL CA SCREEN DOC REV: CPT | Performed by: INTERNAL MEDICINE

## 2021-08-03 RX ORDER — DIPHENHYDRAMINE HYDROCHLORIDE 50 MG/ML
50 INJECTION INTRAMUSCULAR; INTRAVENOUS ONCE
Status: CANCELLED | OUTPATIENT
Start: 2021-08-27 | End: 2021-08-27

## 2021-08-03 RX ORDER — SODIUM CHLORIDE 0.9 % (FLUSH) 0.9 %
5-40 SYRINGE (ML) INJECTION PRN
Status: CANCELLED | OUTPATIENT
Start: 2021-08-27

## 2021-08-03 RX ORDER — METHYLPREDNISOLONE SODIUM SUCCINATE 125 MG/2ML
125 INJECTION, POWDER, LYOPHILIZED, FOR SOLUTION INTRAMUSCULAR; INTRAVENOUS ONCE
Status: CANCELLED | OUTPATIENT
Start: 2021-08-27 | End: 2021-08-27

## 2021-08-03 RX ORDER — DOXORUBICIN HYDROCHLORIDE 2 MG/ML
54 INJECTION, SOLUTION INTRAVENOUS ONCE
Status: CANCELLED | OUTPATIENT
Start: 2021-08-06

## 2021-08-03 RX ORDER — HEPARIN SODIUM (PORCINE) LOCK FLUSH IV SOLN 100 UNIT/ML 100 UNIT/ML
500 SOLUTION INTRAVENOUS PRN
Status: CANCELLED | OUTPATIENT
Start: 2021-08-27

## 2021-08-03 RX ORDER — SODIUM CHLORIDE 9 MG/ML
20 INJECTION, SOLUTION INTRAVENOUS ONCE
Status: CANCELLED | OUTPATIENT
Start: 2021-08-27 | End: 2021-08-27

## 2021-08-03 RX ORDER — PALONOSETRON 0.05 MG/ML
0.25 INJECTION, SOLUTION INTRAVENOUS ONCE
Status: CANCELLED | OUTPATIENT
Start: 2021-08-27

## 2021-08-03 RX ORDER — EPINEPHRINE 1 MG/ML
0.3 INJECTION, SOLUTION, CONCENTRATE INTRAVENOUS PRN
Status: CANCELLED | OUTPATIENT
Start: 2021-08-27

## 2021-08-03 RX ORDER — SODIUM CHLORIDE 9 MG/ML
INJECTION, SOLUTION INTRAVENOUS CONTINUOUS
Status: CANCELLED | OUTPATIENT
Start: 2021-08-27

## 2021-08-03 RX ORDER — DOXORUBICIN HYDROCHLORIDE 2 MG/ML
54 INJECTION, SOLUTION INTRAVENOUS ONCE
Status: CANCELLED | OUTPATIENT
Start: 2021-08-27

## 2021-08-03 RX ORDER — SODIUM CHLORIDE 9 MG/ML
25 INJECTION, SOLUTION INTRAVENOUS PRN
Status: CANCELLED | OUTPATIENT
Start: 2021-08-27

## 2021-08-03 NOTE — PROGRESS NOTES
Patient here for office visit with Dr. Brittnee Grady today. Scheduled for second chemo treatment on Friday, 8/6/21. Patient struggled with diarrhea and nausea with her first one and received fluids x2 week after. Dr. Brittnee Grady reduced chemo dose and patient scheduled for IVF for Monday, 8/9 and Thursday, 8/12.

## 2021-08-06 ENCOUNTER — HOSPITAL ENCOUNTER (OUTPATIENT)
Dept: INFUSION THERAPY | Age: 61
Discharge: HOME | End: 2021-08-06
Payer: COMMERCIAL

## 2021-08-06 VITALS
OXYGEN SATURATION: 98 % | SYSTOLIC BLOOD PRESSURE: 135 MMHG | BODY MASS INDEX: 39.23 KG/M2 | HEART RATE: 68 BPM | DIASTOLIC BLOOD PRESSURE: 74 MMHG | RESPIRATION RATE: 16 BRPM | TEMPERATURE: 96.3 F | WEIGHT: 229.8 LBS | HEIGHT: 64 IN

## 2021-08-06 DIAGNOSIS — Z17.0 MALIGNANT NEOPLASM OF AREOLA OF LEFT BREAST IN FEMALE, ESTROGEN RECEPTOR POSITIVE (HCC): Primary | ICD-10-CM

## 2021-08-06 DIAGNOSIS — C50.912 MALIGNANT NEOPLASM OF LEFT BREAST IN FEMALE, ESTROGEN RECEPTOR POSITIVE, UNSPECIFIED SITE OF BREAST (HCC): ICD-10-CM

## 2021-08-06 DIAGNOSIS — C50.912 INFILTRATING DUCTAL CARCINOMA OF LEFT BREAST (HCC): ICD-10-CM

## 2021-08-06 DIAGNOSIS — C50.012 MALIGNANT NEOPLASM OF AREOLA OF LEFT BREAST IN FEMALE, ESTROGEN RECEPTOR POSITIVE (HCC): Primary | ICD-10-CM

## 2021-08-06 DIAGNOSIS — Z17.0 MALIGNANT NEOPLASM OF LEFT BREAST IN FEMALE, ESTROGEN RECEPTOR POSITIVE, UNSPECIFIED SITE OF BREAST (HCC): ICD-10-CM

## 2021-08-06 LAB
ALBUMIN SERPL-MCNC: 4.3 GM/DL (ref 3.4–5)
ALP BLD-CCNC: 61 IU/L (ref 40–129)
ALT SERPL-CCNC: 14 U/L (ref 10–40)
ANION GAP SERPL CALCULATED.3IONS-SCNC: 9 MMOL/L (ref 4–16)
AST SERPL-CCNC: 12 IU/L (ref 15–37)
BASOPHILS ABSOLUTE: 0 K/CU MM
BASOPHILS RELATIVE PERCENT: 0.6 % (ref 0–1)
BILIRUB SERPL-MCNC: 0.1 MG/DL (ref 0–1)
BUN BLDV-MCNC: 19 MG/DL (ref 6–23)
CALCIUM SERPL-MCNC: 8.7 MG/DL (ref 8.3–10.6)
CHLORIDE BLD-SCNC: 103 MMOL/L (ref 99–110)
CO2: 27 MMOL/L (ref 21–32)
CREAT SERPL-MCNC: 1 MG/DL (ref 0.6–1.1)
DIFFERENTIAL TYPE: ABNORMAL
EOSINOPHILS ABSOLUTE: 0 K/CU MM
EOSINOPHILS RELATIVE PERCENT: 0.3 % (ref 0–3)
GFR AFRICAN AMERICAN: >60 ML/MIN/1.73M2
GFR NON-AFRICAN AMERICAN: 57 ML/MIN/1.73M2
GLUCOSE BLD-MCNC: 120 MG/DL (ref 70–99)
HCT VFR BLD CALC: 34.5 % (ref 37–47)
HEMOGLOBIN: 11.4 GM/DL (ref 12.5–16)
LYMPHOCYTES ABSOLUTE: 1.8 K/CU MM
LYMPHOCYTES RELATIVE PERCENT: 29 % (ref 24–44)
MCH RBC QN AUTO: 28.4 PG (ref 27–31)
MCHC RBC AUTO-ENTMCNC: 33 % (ref 32–36)
MCV RBC AUTO: 86 FL (ref 78–100)
MONOCYTES ABSOLUTE: 1 K/CU MM
MONOCYTES RELATIVE PERCENT: 16.2 % (ref 0–4)
PDW BLD-RTO: 14.6 % (ref 11.7–14.9)
PLATELET # BLD: 261 K/CU MM (ref 140–440)
PMV BLD AUTO: 8.7 FL (ref 7.5–11.1)
POTASSIUM SERPL-SCNC: 3.8 MMOL/L (ref 3.5–5.1)
RBC # BLD: 4.01 M/CU MM (ref 4.2–5.4)
SEGMENTED NEUTROPHILS ABSOLUTE COUNT: 3.4 K/CU MM
SEGMENTED NEUTROPHILS RELATIVE PERCENT: 53.9 % (ref 36–66)
SODIUM BLD-SCNC: 139 MMOL/L (ref 135–145)
TOTAL PROTEIN: 6 GM/DL (ref 6.4–8.2)
WBC # BLD: 6.3 K/CU MM (ref 4–10.5)

## 2021-08-06 PROCEDURE — 96413 CHEMO IV INFUSION 1 HR: CPT

## 2021-08-06 PROCEDURE — 2580000003 HC RX 258: Performed by: INTERNAL MEDICINE

## 2021-08-06 PROCEDURE — 96375 TX/PRO/DX INJ NEW DRUG ADDON: CPT

## 2021-08-06 PROCEDURE — 6360000002 HC RX W HCPCS: Performed by: INTERNAL MEDICINE

## 2021-08-06 PROCEDURE — 2500000003 HC RX 250 WO HCPCS

## 2021-08-06 PROCEDURE — 85025 COMPLETE CBC W/AUTO DIFF WBC: CPT

## 2021-08-06 PROCEDURE — 80053 COMPREHEN METABOLIC PANEL: CPT

## 2021-08-06 PROCEDURE — 96367 TX/PROPH/DG ADDL SEQ IV INF: CPT

## 2021-08-06 PROCEDURE — 6370000000 HC RX 637 (ALT 250 FOR IP)

## 2021-08-06 PROCEDURE — 96417 CHEMO IV INFUS EACH ADDL SEQ: CPT

## 2021-08-06 PROCEDURE — 6360000002 HC RX W HCPCS

## 2021-08-06 RX ORDER — ACETAMINOPHEN 325 MG/1
TABLET ORAL
Status: COMPLETED
Start: 2021-08-06 | End: 2021-08-06

## 2021-08-06 RX ORDER — SODIUM CHLORIDE 0.9 % (FLUSH) 0.9 %
5-40 SYRINGE (ML) INJECTION PRN
Status: DISCONTINUED | OUTPATIENT
Start: 2021-08-06 | End: 2021-08-07 | Stop reason: HOSPADM

## 2021-08-06 RX ORDER — DIPHENHYDRAMINE HYDROCHLORIDE 50 MG/ML
INJECTION INTRAMUSCULAR; INTRAVENOUS
Status: COMPLETED
Start: 2021-08-06 | End: 2021-08-06

## 2021-08-06 RX ORDER — SODIUM CHLORIDE 9 MG/ML
20 INJECTION, SOLUTION INTRAVENOUS ONCE
Status: COMPLETED | OUTPATIENT
Start: 2021-08-06 | End: 2021-08-06

## 2021-08-06 RX ORDER — DOXORUBICIN HYDROCHLORIDE 2 MG/ML
54 INJECTION, SOLUTION INTRAVENOUS ONCE
Status: COMPLETED | OUTPATIENT
Start: 2021-08-06 | End: 2021-08-06

## 2021-08-06 RX ORDER — ACETAMINOPHEN 325 MG/1
650 TABLET ORAL ONCE
Status: COMPLETED | OUTPATIENT
Start: 2021-08-06 | End: 2021-08-06

## 2021-08-06 RX ORDER — DIPHENHYDRAMINE HYDROCHLORIDE 50 MG/ML
25 INJECTION INTRAMUSCULAR; INTRAVENOUS ONCE
Status: COMPLETED | OUTPATIENT
Start: 2021-08-06 | End: 2021-08-06

## 2021-08-06 RX ORDER — PALONOSETRON 0.05 MG/ML
0.25 INJECTION, SOLUTION INTRAVENOUS ONCE
Status: COMPLETED | OUTPATIENT
Start: 2021-08-06 | End: 2021-08-06

## 2021-08-06 RX ORDER — HEPARIN SODIUM (PORCINE) LOCK FLUSH IV SOLN 100 UNIT/ML 100 UNIT/ML
500 SOLUTION INTRAVENOUS PRN
Status: DISCONTINUED | OUTPATIENT
Start: 2021-08-06 | End: 2021-08-07 | Stop reason: HOSPADM

## 2021-08-06 RX ADMIN — FOSAPREPITANT 150 MG: 150 INJECTION, POWDER, LYOPHILIZED, FOR SOLUTION INTRAVENOUS at 11:06

## 2021-08-06 RX ADMIN — SODIUM CHLORIDE, PRESERVATIVE FREE 10 ML: 5 INJECTION INTRAVENOUS at 13:22

## 2021-08-06 RX ADMIN — ACETAMINOPHEN 650 MG: 325 TABLET ORAL at 12:18

## 2021-08-06 RX ADMIN — PALONOSETRON 0.25 MG: 0.25 INJECTION, SOLUTION INTRAVENOUS at 10:37

## 2021-08-06 RX ADMIN — HEPARIN 500 UNITS: 100 SYRINGE at 13:22

## 2021-08-06 RX ADMIN — CYCLOPHOSPHAMIDE 1180 MG: 500 INJECTION, POWDER, FOR SOLUTION INTRAVENOUS; ORAL at 12:42

## 2021-08-06 RX ADMIN — DEXAMETHASONE SODIUM PHOSPHATE 12 MG: 4 INJECTION, SOLUTION INTRAMUSCULAR; INTRAVENOUS at 10:46

## 2021-08-06 RX ADMIN — SODIUM CHLORIDE 20 ML/HR: 9 INJECTION, SOLUTION INTRAVENOUS at 10:36

## 2021-08-06 RX ADMIN — DIPHENHYDRAMINE HYDROCHLORIDE 25 MG: 50 INJECTION INTRAMUSCULAR; INTRAVENOUS at 12:21

## 2021-08-06 RX ADMIN — FAMOTIDINE 20 MG: 10 INJECTION, SOLUTION INTRAVENOUS at 12:21

## 2021-08-06 RX ADMIN — DOXORUBICIN HYDROCHLORIDE 118 MG: 2 INJECTION, SOLUTION INTRAVENOUS at 12:00

## 2021-08-06 ASSESSMENT — PAIN SCALES - GENERAL
PAINLEVEL_OUTOF10: 4
PAINLEVEL_OUTOF10: 0

## 2021-08-06 NOTE — PROGRESS NOTES
Pt. Here for treatment. Right upper chest mediport accessed without difficulty, good blood return noted. Lab work drawn as ordered. Labs reviewed. Doxorubicin given IVP with free flowing normal saline, blood return noted prior to, every 2-5 ml during push, and immediately post push. After first syring of Doxorubicin pt stated that she had a \"warm, flushed feeling\" and was starting to get a headache. No c/o SOB or difficulty breathing. Notified Carol Ayala RN who took VS and assessed pt. VS WDL Carol Ayala RN,  notified Bryson Oreilly NP who recommended Benadryl, Tylenol and Pepcid. Continued to run normal saline to gravity. Pt stated that the flushing was resolving. Pushed Last syringe of Doxorubicin without incident. Cytoxan administered without incident. Discharge AVS given.       Patient's status assessed and documented appropriately. All labs and required results were also reviewed today. Treatment parameters have been reviewed. Today's treatment has been approved by the provider. Treatment orders and medication sequencing (when applicable) was verified by 2 registered nurses. The treatment plan was confirmed with the patient prior to administration, and the patient understands the need to report any treatment-related symptoms.     Prior to administration, when applicable, the following 8 elements of medication administration were reviewed with 2nd Registered Nurse prior to dosing: drug name, drug dose, infusion volume when prepared in a syringe, rate of administration, expiration dates and/or times, appearance and integrity of drug(s), and rate of pump for infusion. The 5 rights of medication administration have been verified.

## 2021-08-09 ENCOUNTER — TELEPHONE (OUTPATIENT)
Dept: ONCOLOGY | Age: 61
End: 2021-08-09

## 2021-08-09 ENCOUNTER — HOSPITAL ENCOUNTER (OUTPATIENT)
Dept: INFUSION THERAPY | Age: 61
Discharge: HOME | End: 2021-08-09
Payer: COMMERCIAL

## 2021-08-09 VITALS
WEIGHT: 223.4 LBS | DIASTOLIC BLOOD PRESSURE: 63 MMHG | TEMPERATURE: 97 F | SYSTOLIC BLOOD PRESSURE: 143 MMHG | HEART RATE: 76 BPM | BODY MASS INDEX: 35.9 KG/M2 | OXYGEN SATURATION: 96 % | HEIGHT: 66 IN

## 2021-08-09 DIAGNOSIS — Z17.0 MALIGNANT NEOPLASM OF AREOLA OF LEFT BREAST IN FEMALE, ESTROGEN RECEPTOR POSITIVE (HCC): Primary | ICD-10-CM

## 2021-08-09 DIAGNOSIS — C50.012 MALIGNANT NEOPLASM OF AREOLA OF LEFT BREAST IN FEMALE, ESTROGEN RECEPTOR POSITIVE (HCC): Primary | ICD-10-CM

## 2021-08-09 DIAGNOSIS — E86.0 DEHYDRATION: ICD-10-CM

## 2021-08-09 DIAGNOSIS — C50.912 MALIGNANT NEOPLASM OF LEFT BREAST IN FEMALE, ESTROGEN RECEPTOR POSITIVE, UNSPECIFIED SITE OF BREAST (HCC): ICD-10-CM

## 2021-08-09 DIAGNOSIS — Z17.0 MALIGNANT NEOPLASM OF LEFT BREAST IN FEMALE, ESTROGEN RECEPTOR POSITIVE, UNSPECIFIED SITE OF BREAST (HCC): ICD-10-CM

## 2021-08-09 LAB
ALBUMIN SERPL-MCNC: 4.1 GM/DL (ref 3.4–5)
ALP BLD-CCNC: 48 IU/L (ref 40–128)
ALT SERPL-CCNC: 13 U/L (ref 10–40)
ANION GAP SERPL CALCULATED.3IONS-SCNC: 12 MMOL/L (ref 4–16)
AST SERPL-CCNC: 11 IU/L (ref 15–37)
BASOPHILS ABSOLUTE: 0 K/CU MM
BASOPHILS RELATIVE PERCENT: 0.2 % (ref 0–1)
BILIRUB SERPL-MCNC: 0.4 MG/DL (ref 0–1)
BUN BLDV-MCNC: 24 MG/DL (ref 6–23)
CALCIUM SERPL-MCNC: 9.2 MG/DL (ref 8.3–10.6)
CHLORIDE BLD-SCNC: 101 MMOL/L (ref 99–110)
CO2: 26 MMOL/L (ref 21–32)
CREAT SERPL-MCNC: 1 MG/DL (ref 0.6–1.1)
DIFFERENTIAL TYPE: ABNORMAL
EOSINOPHILS ABSOLUTE: 0 K/CU MM
EOSINOPHILS RELATIVE PERCENT: 0 % (ref 0–3)
GFR AFRICAN AMERICAN: >60 ML/MIN/1.73M2
GFR NON-AFRICAN AMERICAN: 57 ML/MIN/1.73M2
GLUCOSE BLD-MCNC: 114 MG/DL (ref 70–99)
HCT VFR BLD CALC: 34.7 % (ref 37–47)
HEMOGLOBIN: 11.5 GM/DL (ref 12.5–16)
LYMPHOCYTES ABSOLUTE: 1 K/CU MM
LYMPHOCYTES RELATIVE PERCENT: 16.5 % (ref 24–44)
MCH RBC QN AUTO: 28.3 PG (ref 27–31)
MCHC RBC AUTO-ENTMCNC: 33.1 % (ref 32–36)
MCV RBC AUTO: 85.5 FL (ref 78–100)
MONOCYTES ABSOLUTE: 0.2 K/CU MM
MONOCYTES RELATIVE PERCENT: 3.1 % (ref 0–4)
PDW BLD-RTO: 14.9 % (ref 11.7–14.9)
PLATELET # BLD: 246 K/CU MM (ref 140–440)
PMV BLD AUTO: 9.1 FL (ref 7.5–11.1)
POTASSIUM SERPL-SCNC: 3.6 MMOL/L (ref 3.5–5.1)
RBC # BLD: 4.06 M/CU MM (ref 4.2–5.4)
SEGMENTED NEUTROPHILS ABSOLUTE COUNT: 5 K/CU MM
SEGMENTED NEUTROPHILS RELATIVE PERCENT: 80.2 % (ref 36–66)
SODIUM BLD-SCNC: 139 MMOL/L (ref 135–145)
TOTAL PROTEIN: 6.2 GM/DL (ref 6.4–8.2)
WBC # BLD: 6.2 K/CU MM (ref 4–10.5)

## 2021-08-09 PROCEDURE — 96360 HYDRATION IV INFUSION INIT: CPT

## 2021-08-09 PROCEDURE — 6360000002 HC RX W HCPCS: Performed by: INTERNAL MEDICINE

## 2021-08-09 PROCEDURE — 80053 COMPREHEN METABOLIC PANEL: CPT

## 2021-08-09 PROCEDURE — 85025 COMPLETE CBC W/AUTO DIFF WBC: CPT

## 2021-08-09 PROCEDURE — 96361 HYDRATE IV INFUSION ADD-ON: CPT

## 2021-08-09 PROCEDURE — 96374 THER/PROPH/DIAG INJ IV PUSH: CPT

## 2021-08-09 PROCEDURE — 2580000003 HC RX 258: Performed by: INTERNAL MEDICINE

## 2021-08-09 PROCEDURE — 96375 TX/PRO/DX INJ NEW DRUG ADDON: CPT

## 2021-08-09 RX ORDER — SODIUM CHLORIDE 0.9 % (FLUSH) 0.9 %
5-40 SYRINGE (ML) INJECTION PRN
Status: DISCONTINUED | OUTPATIENT
Start: 2021-08-09 | End: 2021-08-10 | Stop reason: HOSPADM

## 2021-08-09 RX ORDER — 0.9 % SODIUM CHLORIDE 0.9 %
1000 INTRAVENOUS SOLUTION INTRAVENOUS ONCE
Status: CANCELLED | OUTPATIENT
Start: 2021-08-09 | End: 2021-08-09

## 2021-08-09 RX ORDER — SODIUM CHLORIDE 9 MG/ML
25 INJECTION, SOLUTION INTRAVENOUS PRN
Status: CANCELLED | OUTPATIENT
Start: 2021-08-09

## 2021-08-09 RX ORDER — ONDANSETRON 2 MG/ML
8 INJECTION INTRAMUSCULAR; INTRAVENOUS ONCE
Status: COMPLETED | OUTPATIENT
Start: 2021-08-09 | End: 2021-08-09

## 2021-08-09 RX ORDER — SODIUM CHLORIDE 0.9 % (FLUSH) 0.9 %
5-40 SYRINGE (ML) INJECTION PRN
Status: CANCELLED | OUTPATIENT
Start: 2021-08-09

## 2021-08-09 RX ORDER — ONDANSETRON 2 MG/ML
8 INJECTION INTRAMUSCULAR; INTRAVENOUS ONCE
Status: CANCELLED
Start: 2021-08-09 | End: 2021-08-09

## 2021-08-09 RX ORDER — HEPARIN SODIUM (PORCINE) LOCK FLUSH IV SOLN 100 UNIT/ML 100 UNIT/ML
500 SOLUTION INTRAVENOUS PRN
Status: DISCONTINUED | OUTPATIENT
Start: 2021-08-09 | End: 2021-08-10 | Stop reason: HOSPADM

## 2021-08-09 RX ORDER — 0.9 % SODIUM CHLORIDE 0.9 %
1000 INTRAVENOUS SOLUTION INTRAVENOUS ONCE
Status: COMPLETED | OUTPATIENT
Start: 2021-08-09 | End: 2021-08-09

## 2021-08-09 RX ORDER — HEPARIN SODIUM (PORCINE) LOCK FLUSH IV SOLN 100 UNIT/ML 100 UNIT/ML
500 SOLUTION INTRAVENOUS PRN
Status: CANCELLED | OUTPATIENT
Start: 2021-08-09

## 2021-08-09 RX ORDER — DEXAMETHASONE SODIUM PHOSPHATE 10 MG/ML
8 INJECTION, SOLUTION INTRAMUSCULAR; INTRAVENOUS ONCE
Status: CANCELLED
Start: 2021-08-09 | End: 2021-08-09

## 2021-08-09 RX ORDER — DEXAMETHASONE SODIUM PHOSPHATE 4 MG/ML
8 INJECTION, SOLUTION INTRA-ARTICULAR; INTRALESIONAL; INTRAMUSCULAR; INTRAVENOUS; SOFT TISSUE ONCE
Status: COMPLETED | OUTPATIENT
Start: 2021-08-09 | End: 2021-08-09

## 2021-08-09 RX ADMIN — SODIUM CHLORIDE 1000 ML: 9 INJECTION, SOLUTION INTRAVENOUS at 11:15

## 2021-08-09 RX ADMIN — ONDANSETRON 8 MG: 2 INJECTION INTRAMUSCULAR; INTRAVENOUS at 11:25

## 2021-08-09 RX ADMIN — DEXAMETHASONE SODIUM PHOSPHATE 8 MG: 4 INJECTION, SOLUTION INTRAMUSCULAR; INTRAVENOUS at 11:25

## 2021-08-09 RX ADMIN — SODIUM CHLORIDE, PRESERVATIVE FREE 10 ML: 5 INJECTION INTRAVENOUS at 12:19

## 2021-08-09 RX ADMIN — HEPARIN 500 UNITS: 100 SYRINGE at 12:19

## 2021-08-09 ASSESSMENT — PAIN DESCRIPTION - ORIENTATION: ORIENTATION: RIGHT;LEFT

## 2021-08-09 ASSESSMENT — PAIN DESCRIPTION - LOCATION: LOCATION: HIP;BACK

## 2021-08-09 ASSESSMENT — PAIN SCALES - GENERAL: PAINLEVEL_OUTOF10: 7

## 2021-08-09 NOTE — TELEPHONE ENCOUNTER
Patient called c/o shaking, weakness and headaches, states she just had her 2nd chemotherapy, please call

## 2021-08-09 NOTE — PROGRESS NOTES
Patient arrived to treatment suite for blood draw, pre-medications and IVF infusion. Right chest mediport accessed and blood drawn from site and sent to lab for processing. Patient states nausea and fatigue. Treatment approved and given. Patient tolerated well. Left treatment suite ambulatory. Discharge instructions provided.

## 2021-08-09 NOTE — TELEPHONE ENCOUNTER
Returned call to patient. Patient stated that she did not feel well today with shaking, weakness, HA, and nausea. Patient informed that she was on the schedule today at 1100 for IV fluids. Informed tx suite nurse Stella Hernandez that patient was symptomatic today. CBC and CMP ordered. Treatment suite RN to assess symptoms when patient arrives. Patient confirmed appointment.

## 2021-08-10 DIAGNOSIS — I10 ESSENTIAL HYPERTENSION: ICD-10-CM

## 2021-08-10 RX ORDER — HYDROCHLOROTHIAZIDE 25 MG/1
12.5 TABLET ORAL DAILY
Qty: 45 TABLET | Refills: 3 | Status: SHIPPED | OUTPATIENT
Start: 2021-08-10 | End: 2022-04-21

## 2021-08-12 ENCOUNTER — APPOINTMENT (OUTPATIENT)
Dept: CT IMAGING | Age: 61
End: 2021-08-12
Payer: COMMERCIAL

## 2021-08-12 ENCOUNTER — HOSPITAL ENCOUNTER (OUTPATIENT)
Age: 61
Setting detail: OBSERVATION
Discharge: HOME HEALTH | End: 2021-08-13
Attending: STUDENT IN AN ORGANIZED HEALTH CARE EDUCATION/TRAINING PROGRAM | Admitting: INTERNAL MEDICINE
Payer: COMMERCIAL

## 2021-08-12 ENCOUNTER — APPOINTMENT (OUTPATIENT)
Dept: GENERAL RADIOLOGY | Age: 61
End: 2021-08-12
Payer: COMMERCIAL

## 2021-08-12 DIAGNOSIS — R07.2 PRECORDIAL PAIN: Primary | ICD-10-CM

## 2021-08-12 PROBLEM — R07.9 CHEST PAIN: Status: ACTIVE | Noted: 2021-08-12

## 2021-08-12 LAB
ALBUMIN SERPL-MCNC: 4.1 GM/DL (ref 3.4–5)
ALP BLD-CCNC: 49 IU/L (ref 40–129)
ALT SERPL-CCNC: 21 U/L (ref 10–40)
ANION GAP SERPL CALCULATED.3IONS-SCNC: 9 MMOL/L (ref 4–16)
AST SERPL-CCNC: 16 IU/L (ref 15–37)
BASOPHILS ABSOLUTE: 0.1 K/CU MM
BASOPHILS RELATIVE PERCENT: 1 % (ref 0–1)
BILIRUB SERPL-MCNC: 0.9 MG/DL (ref 0–1)
BUN BLDV-MCNC: 18 MG/DL (ref 6–23)
CALCIUM SERPL-MCNC: 8.7 MG/DL (ref 8.3–10.6)
CHLORIDE BLD-SCNC: 103 MMOL/L (ref 99–110)
CO2: 26 MMOL/L (ref 21–32)
CREAT SERPL-MCNC: 1 MG/DL (ref 0.6–1.1)
D DIMER: 277 NG/ML(DDU)
DIFFERENTIAL TYPE: ABNORMAL
EOSINOPHILS ABSOLUTE: 0.1 K/CU MM
EOSINOPHILS RELATIVE PERCENT: 2 % (ref 0–3)
GFR AFRICAN AMERICAN: >60 ML/MIN/1.73M2
GFR NON-AFRICAN AMERICAN: 57 ML/MIN/1.73M2
GLUCOSE BLD-MCNC: 113 MG/DL (ref 70–99)
HCT VFR BLD CALC: 33.9 % (ref 37–47)
HEMOGLOBIN: 11.1 GM/DL (ref 12.5–16)
LYMPHOCYTES ABSOLUTE: 1 K/CU MM
LYMPHOCYTES RELATIVE PERCENT: 19 % (ref 24–44)
MCH RBC QN AUTO: 28.3 PG (ref 27–31)
MCHC RBC AUTO-ENTMCNC: 32.7 % (ref 32–36)
MCV RBC AUTO: 86.5 FL (ref 78–100)
MONOCYTES ABSOLUTE: 0.1 K/CU MM
MONOCYTES RELATIVE PERCENT: 1 % (ref 0–4)
PDW BLD-RTO: 13.9 % (ref 11.7–14.9)
PLATELET # BLD: 178 K/CU MM (ref 140–440)
PLT MORPHOLOGY: ADEQUATE
PMV BLD AUTO: 9.3 FL (ref 7.5–11.1)
POTASSIUM SERPL-SCNC: 3.8 MMOL/L (ref 3.5–5.1)
PRO-BNP: 240.7 PG/ML
RBC # BLD: 3.92 M/CU MM (ref 4.2–5.4)
RBC # BLD: ABNORMAL 10*6/UL
SEGMENTED NEUTROPHILS ABSOLUTE COUNT: 3.8 K/CU MM
SEGMENTED NEUTROPHILS RELATIVE PERCENT: 77 % (ref 36–66)
SODIUM BLD-SCNC: 138 MMOL/L (ref 135–145)
TOTAL PROTEIN: 6.1 GM/DL (ref 6.4–8.2)
TROPONIN T: <0.01 NG/ML
WBC # BLD: 5.1 K/CU MM (ref 4–10.5)

## 2021-08-12 PROCEDURE — 6360000002 HC RX W HCPCS: Performed by: NURSE PRACTITIONER

## 2021-08-12 PROCEDURE — 71275 CT ANGIOGRAPHY CHEST: CPT

## 2021-08-12 PROCEDURE — 85379 FIBRIN DEGRADATION QUANT: CPT

## 2021-08-12 PROCEDURE — 6360000004 HC RX CONTRAST MEDICATION: Performed by: NURSE PRACTITIONER

## 2021-08-12 PROCEDURE — 85027 COMPLETE CBC AUTOMATED: CPT

## 2021-08-12 PROCEDURE — 85007 BL SMEAR W/DIFF WBC COUNT: CPT

## 2021-08-12 PROCEDURE — 99285 EMERGENCY DEPT VISIT HI MDM: CPT

## 2021-08-12 PROCEDURE — 96374 THER/PROPH/DIAG INJ IV PUSH: CPT

## 2021-08-12 PROCEDURE — 2580000003 HC RX 258: Performed by: NURSE PRACTITIONER

## 2021-08-12 PROCEDURE — 6370000000 HC RX 637 (ALT 250 FOR IP): Performed by: NURSE PRACTITIONER

## 2021-08-12 PROCEDURE — 96376 TX/PRO/DX INJ SAME DRUG ADON: CPT

## 2021-08-12 PROCEDURE — 84484 ASSAY OF TROPONIN QUANT: CPT

## 2021-08-12 PROCEDURE — 93005 ELECTROCARDIOGRAM TRACING: CPT | Performed by: EMERGENCY MEDICINE

## 2021-08-12 PROCEDURE — 80053 COMPREHEN METABOLIC PANEL: CPT

## 2021-08-12 PROCEDURE — G0378 HOSPITAL OBSERVATION PER HR: HCPCS

## 2021-08-12 PROCEDURE — 96375 TX/PRO/DX INJ NEW DRUG ADDON: CPT

## 2021-08-12 PROCEDURE — 71045 X-RAY EXAM CHEST 1 VIEW: CPT

## 2021-08-12 PROCEDURE — 83880 ASSAY OF NATRIURETIC PEPTIDE: CPT

## 2021-08-12 RX ORDER — POTASSIUM CHLORIDE 7.45 MG/ML
10 INJECTION INTRAVENOUS PRN
Status: DISCONTINUED | OUTPATIENT
Start: 2021-08-12 | End: 2021-08-13 | Stop reason: HOSPADM

## 2021-08-12 RX ORDER — ASPIRIN 81 MG/1
81 TABLET, CHEWABLE ORAL DAILY
Status: DISCONTINUED | OUTPATIENT
Start: 2021-08-13 | End: 2021-08-13 | Stop reason: HOSPADM

## 2021-08-12 RX ORDER — ACETAMINOPHEN 325 MG/1
650 TABLET ORAL EVERY 6 HOURS PRN
Status: DISCONTINUED | OUTPATIENT
Start: 2021-08-12 | End: 2021-08-13 | Stop reason: HOSPADM

## 2021-08-12 RX ORDER — LOSARTAN POTASSIUM 25 MG/1
25 TABLET ORAL DAILY
Status: DISCONTINUED | OUTPATIENT
Start: 2021-08-13 | End: 2021-08-13 | Stop reason: HOSPADM

## 2021-08-12 RX ORDER — POTASSIUM CHLORIDE 20 MEQ/1
40 TABLET, EXTENDED RELEASE ORAL PRN
Status: DISCONTINUED | OUTPATIENT
Start: 2021-08-12 | End: 2021-08-13 | Stop reason: HOSPADM

## 2021-08-12 RX ORDER — MORPHINE SULFATE 4 MG/ML
4 INJECTION, SOLUTION INTRAMUSCULAR; INTRAVENOUS ONCE
Status: COMPLETED | OUTPATIENT
Start: 2021-08-12 | End: 2021-08-12

## 2021-08-12 RX ORDER — POLYETHYLENE GLYCOL 3350 17 G/17G
17 POWDER, FOR SOLUTION ORAL DAILY PRN
Status: DISCONTINUED | OUTPATIENT
Start: 2021-08-12 | End: 2021-08-13 | Stop reason: HOSPADM

## 2021-08-12 RX ORDER — ONDANSETRON 4 MG/1
4 TABLET, ORALLY DISINTEGRATING ORAL EVERY 8 HOURS PRN
Status: DISCONTINUED | OUTPATIENT
Start: 2021-08-12 | End: 2021-08-13 | Stop reason: HOSPADM

## 2021-08-12 RX ORDER — PANTOPRAZOLE SODIUM 40 MG/1
40 TABLET, DELAYED RELEASE ORAL
Status: DISCONTINUED | OUTPATIENT
Start: 2021-08-13 | End: 2021-08-13 | Stop reason: HOSPADM

## 2021-08-12 RX ORDER — ACETAMINOPHEN 650 MG/1
650 SUPPOSITORY RECTAL EVERY 6 HOURS PRN
Status: DISCONTINUED | OUTPATIENT
Start: 2021-08-12 | End: 2021-08-13 | Stop reason: HOSPADM

## 2021-08-12 RX ORDER — ONDANSETRON 2 MG/ML
4 INJECTION INTRAMUSCULAR; INTRAVENOUS EVERY 6 HOURS PRN
Status: DISCONTINUED | OUTPATIENT
Start: 2021-08-12 | End: 2021-08-13 | Stop reason: HOSPADM

## 2021-08-12 RX ORDER — SODIUM CHLORIDE 0.9 % (FLUSH) 0.9 %
10 SYRINGE (ML) INJECTION EVERY 12 HOURS SCHEDULED
Status: DISCONTINUED | OUTPATIENT
Start: 2021-08-12 | End: 2021-08-13 | Stop reason: HOSPADM

## 2021-08-12 RX ORDER — METOPROLOL TARTRATE 50 MG/1
50 TABLET, FILM COATED ORAL 2 TIMES DAILY
Status: DISCONTINUED | OUTPATIENT
Start: 2021-08-12 | End: 2021-08-13 | Stop reason: HOSPADM

## 2021-08-12 RX ORDER — NITROGLYCERIN 0.4 MG/1
0.4 TABLET SUBLINGUAL EVERY 5 MIN PRN
Status: DISCONTINUED | OUTPATIENT
Start: 2021-08-12 | End: 2021-08-13 | Stop reason: HOSPADM

## 2021-08-12 RX ORDER — EZETIMIBE 10 MG/1
10 TABLET ORAL DAILY
Status: DISCONTINUED | OUTPATIENT
Start: 2021-08-13 | End: 2021-08-13 | Stop reason: HOSPADM

## 2021-08-12 RX ORDER — MORPHINE SULFATE 4 MG/ML
2 INJECTION, SOLUTION INTRAMUSCULAR; INTRAVENOUS EVERY 4 HOURS PRN
Status: DISCONTINUED | OUTPATIENT
Start: 2021-08-12 | End: 2021-08-13 | Stop reason: HOSPADM

## 2021-08-12 RX ORDER — SODIUM CHLORIDE 9 MG/ML
INJECTION, SOLUTION INTRAVENOUS CONTINUOUS
Status: DISCONTINUED | OUTPATIENT
Start: 2021-08-12 | End: 2021-08-13 | Stop reason: HOSPADM

## 2021-08-12 RX ORDER — ONDANSETRON 2 MG/ML
4 INJECTION INTRAMUSCULAR; INTRAVENOUS ONCE
Status: COMPLETED | OUTPATIENT
Start: 2021-08-12 | End: 2021-08-12

## 2021-08-12 RX ORDER — MAGNESIUM SULFATE IN WATER 40 MG/ML
2000 INJECTION, SOLUTION INTRAVENOUS PRN
Status: DISCONTINUED | OUTPATIENT
Start: 2021-08-12 | End: 2021-08-13 | Stop reason: HOSPADM

## 2021-08-12 RX ORDER — NITROGLYCERIN 0.4 MG/1
0.4 TABLET SUBLINGUAL ONCE
Status: DISCONTINUED | OUTPATIENT
Start: 2021-08-12 | End: 2021-08-13 | Stop reason: HOSPADM

## 2021-08-12 RX ORDER — CETIRIZINE HYDROCHLORIDE 5 MG/1
5 TABLET ORAL DAILY
Status: DISCONTINUED | OUTPATIENT
Start: 2021-08-13 | End: 2021-08-13 | Stop reason: HOSPADM

## 2021-08-12 RX ORDER — SODIUM CHLORIDE 0.9 % (FLUSH) 0.9 %
10 SYRINGE (ML) INJECTION PRN
Status: DISCONTINUED | OUTPATIENT
Start: 2021-08-12 | End: 2021-08-13 | Stop reason: HOSPADM

## 2021-08-12 RX ORDER — SODIUM CHLORIDE 9 MG/ML
25 INJECTION, SOLUTION INTRAVENOUS PRN
Status: DISCONTINUED | OUTPATIENT
Start: 2021-08-12 | End: 2021-08-13 | Stop reason: HOSPADM

## 2021-08-12 RX ORDER — METOCLOPRAMIDE 5 MG/1
5 TABLET ORAL
Status: DISCONTINUED | OUTPATIENT
Start: 2021-08-12 | End: 2021-08-13 | Stop reason: HOSPADM

## 2021-08-12 RX ADMIN — IOPAMIDOL 80 ML: 755 INJECTION, SOLUTION INTRAVENOUS at 14:46

## 2021-08-12 RX ADMIN — MORPHINE SULFATE 4 MG: 4 INJECTION, SOLUTION INTRAMUSCULAR; INTRAVENOUS at 14:32

## 2021-08-12 RX ADMIN — MORPHINE SULFATE 4 MG: 4 INJECTION INTRAVENOUS at 11:05

## 2021-08-12 RX ADMIN — ONDANSETRON 4 MG: 2 INJECTION INTRAMUSCULAR; INTRAVENOUS at 11:05

## 2021-08-12 ASSESSMENT — PAIN SCALES - GENERAL
PAINLEVEL_OUTOF10: 1
PAINLEVEL_OUTOF10: 6
PAINLEVEL_OUTOF10: 8
PAINLEVEL_OUTOF10: 8

## 2021-08-12 ASSESSMENT — PAIN DESCRIPTION - PAIN TYPE: TYPE: ACUTE PAIN

## 2021-08-12 ASSESSMENT — PAIN DESCRIPTION - LOCATION: LOCATION: CHEST

## 2021-08-12 ASSESSMENT — HEART SCORE
ECG: 1
ECG: 1

## 2021-08-12 NOTE — H&P
History and Physical      Name:  Maria Guadalupe Escalona /Age/Sex: 1960  (64 y.o. female)   MRN & CSN:  [id number] & [id number] Admission Date/Time: 2021  9:43 AM   Location:  8/H-08 PCP: Alcon Zavala MD       Hospital Day: 1        Admitting Physician: Dr. Alda Thayer and Plan:   Maria Guadalupe Escalona is a 64 y.o. female who presents with Chest Pain    Chest pain r/o ACS. Concern for anginal source given risk factors and presentation. Initial troponin  EKG shows TW abnormality anterior lead. Normal NM stress 10/2020   Admit to Med/Surg under OBSERVATION status. Telemetry monitoring    Serial troponin level and repeat EKG in AM   Cardiology consult. [facility]   Pending labs for further risk stratification    Antiplatelet/BB/Statin/sl Nitro on medication regimen. Left breast cancer: Invasive ductal carcinoma. Current chemotherapy infusion last dose 2021   Follows with Dr Ayan Leavitt. Will consult d/t postinfusion nausea/vomiting    Essential hypertension- continue home antihypertensive regimen- Monitor BP trends. HLD- zetia      Patient case discussed with ED provider    Diet General diet, NPO at midnight   DVT Prophylaxis [x] Lovenox, []  Heparin, [] SCDs, [] Ambulation  [] Long term AC   GI Prophylaxis [x] PPI,  [] H2 Blocker,  [] Carafate,  [] Diet/Tube Feeds   Code Status Full     Disposition Admit to obs. Patient plans to return home upon discharge     -Patient assessment and plan discussed and reviewed with admitting physician: Jose Luis Benavidez MD.       History of Present Illness:     Chief Complaint: Chest Pain    Maria Guadalupe Escalona is a 64 y.o. female who presents with chest pain. Reports onset this morning upon waking. Describes as a constant substernal heavy sensation. Minimal relief of medication provided emergency room. Some associated shortness of breath and headache. History of-complete neoplasm with current chemotherapy regimen.   Second infusion 8/6/2021. Associated nausea and vomiting post chemotherapy infusion states previous infusion to occur 1.5 weeks to feel back to baseline. Ten point ROS: reviewed negative, unless as noted in above HPI. Objective:   No intake or output data in the 24 hours ending 08/12/21 1605     Vitals:   Vitals:    08/12/21 0948 08/12/21 1125   BP: 120/84 118/76   Pulse: 73 75   Resp: 18 18   Temp: 98.1 °F (36.7 °C)    TempSrc: Oral    SpO2: 96% 96%   Weight: 223 lb (101.2 kg)    Height: 5' 4\" (1.626 m)        Physical Exam: 08/12/21     Gen:  awake, alert, cooperative, no apparent distress obese body habitus  Head/Eyes:  Normocephalic atraumatic, EOMI   NECK:   symmetrical, trachea midline  LUNGS: Normal Effort/ symmetry movement   CARDIOVASCULAR:  Normal rate Tele SR. Right chest wall port accessed  ABDOMEN: Non tender, non distended, no HSM noted. MUSCULOSKELETAL: no gross deformities  NEUROLOGIC: Alert and Oriented,  Cranial nerves II-XII are grossly intact. SKIN:  no bruising or bleeding, normal skin color,  no redness      Past Medical History:      Past Medical History:   Diagnosis Date    Allergic rhinitis due to pollen 11/19/2015    Arthritis     left hip & knee    Chronic back pain     Dehydration 7/22/2021    Depression     Gastroesophageal reflux disease 11/19/2015    Hearing loss 11/19/2015    History of blood transfusion     No reaction per pt    History of cardiac monitoring 04/18/2017    14 day event monitor. Sinus Rhythm    History of nuclear stress test 09/28/2016    cardiolite-normal,EF70%    Hot flashes due to surgical menopause 11/19/2015    Hx of echocardiogram 10/05/2016    EF: >55 %   Mild mitral and tricuspid regurg.      Hyperlipidemia     Hypertension     Follows with PCP    Lexiscan Stress Test 10/14/2020    EF 60%, Normal study, no ischemia    Meniere disease     Morbid obesity due to excess calories ([address] 11/19/2015    Sleep apnea 11/19/2015    sleep study negative  Tilt table evaluation 05/09/2017     positive for neurocardiogenic syncope       Past Surgical  History:    has a past surgical history that includes Cholecystectomy; Appendectomy (1967); Tonsillectomy (1970); Colonoscopy (2014); Hysterectomy (1989); Total hip arthroplasty (Left, 10/19/2020); US BREAST BIOPSY W LOC DEVICE EACH ADDL LESION LEFT (Left, 3/9/2021); US BREAST BIOPSY W LOC DEVICE 1ST LESION LEFT (Left, 3/9/2021); US BREAST BIOPSY W LOC DEVICE EACH ADDL LESION LEFT (Left, 3/9/2021); and Mastectomy (Bilateral, 05/06/2021). Social History:    FAM HX: Reviewed  family history includes Cancer in her maternal grandmother; Cancer (age of onset: 46) in her sister; Cancer (age of onset: 47) in her maternal cousin; Cancer (age of onset: 80) in her mother; Depression in her father and sister; Diabetes in her father, maternal grandfather, maternal grandmother, mother, paternal grandfather, and paternal grandmother; Heart Disease in her father and mother; High Blood Pressure in her father, mother, and sister; High Cholesterol in her father and mother; Mental Illness in her sister; Other in her sister; Stroke in her mother.     Soc HX:   Social History     Socioeconomic History    Marital status:      Spouse name: None    Number of children: None    Years of education: None    Highest education level: None   Occupational History    None   Tobacco Use    Smoking status: Never Smoker    Smokeless tobacco: Never Used   Vaping Use    Vaping Use: Never used   Substance and Sexual Activity    Alcohol use: No    Drug use: No    Sexual activity: Not Currently     Partners: Male   Other Topics Concern    None   Social History Narrative    None     Social Determinants of Health     Financial Resource Strain:     Difficulty of Paying Living Expenses:    Food Insecurity:     Worried About Running Out of Food in the Last Year:     Sal of Food in the Last Year:    Transportation Needs:     Lack of Transportation (Medical):  Lack of Transportation (Non-Medical):    Physical Activity:     Days of Exercise per Week:     Minutes of Exercise per Session:    Stress:     Feeling of Stress :    Social Connections:     Frequency of Communication with Friends and Family:     Frequency of Social Gatherings with Friends and Family:     Attends [employer] Services:     Active Member of Clubs or Organizations:     Attends Club or Organization Meetings:     Marital Status:    Intimate Partner Violence:     Fear of Current or Ex-Partner:     Emotionally Abused:     Physically Abused:     Sexually Abused:      TOBACCO:   reports that she has never smoked. She has never used smokeless tobacco.  ETOH:   reports no history of alcohol use. Drugs:  reports no history of drug use. Allergies: Allergies   Allergen Reactions    Celexa [Citalopram] Other (See Comments)     Stomach pain        Atorvastatin      Leg cramps      Fenofibrate      angioedema    Mestinon [Pyridostigmine] Itching and Swelling    Penicillins     Sulfa Antibiotics      Other reaction(s): Hives/Throat closes    Tape [place] Tape]      PLASTIC TAPE    Gemfibrozil Rash    Meloxicam Other (See Comments)     moodswings       Home Medications:     Prior to Admission medications    Medication Sig Start Date End Date Taking? Authorizing Provider   hydroCHLOROthiazide (HYDRODIURIL) 25 MG tablet Take 0.5 tablets by mouth daily 8/10/21  Yes Janeth Nicholas MD   promethazine (PHENERGAN) 12.5 MG tablet Take 10 mg by mouth every 6 hours as needed for Nausea   Yes Historical Provider, MD   prochlorperazine (COMPAZINE) 10 MG tablet Take 1 tablet by mouth every 6 hours as needed (chemotherapy induced nausea/vomiting) 7/19/21  Yes Mariano Roth MD   dexamethasone (DECADRON) 4 MG tablet 1 tablet PO daily for 4 days starting the day after chemotherapy.  7/15/21  Yes Jason López APRN - CNP   metoprolol tartrate (LOPRESSOR) 50 MG tablet Take 1 tablet by mouth 2 times daily 7/15/21  Yes Frankey Canon, MD   potassium chloride (KLOR-CON M) 20 MEQ extended release tablet Take 1 tablet by mouth 2 times daily 7/15/21  Yes Frankey Canon, MD   pantoprazole (PROTONIX) 40 MG tablet Take 40 mg by mouth daily  6/24/21  Yes Historical Provider, MD   FLUoxetine (PROZAC) 20 MG capsule Take 20 mg by mouth daily  6/7/21  Yes Historical Provider, MD   furosemide (LASIX) 20 MG tablet Take 20 mg by mouth daily    Yes Historical Provider, MD   aspirin 81 MG chewable tablet Take 81 mg by mouth daily   Yes Historical Provider, MD   Multiple Vitamins-Minerals (HAIR SKIN AND NAILS FORMULA) TABS Take 1 tablet by mouth daily    Yes Historical Provider, MD   losartan (COZAAR) 25 MG tablet Take 1 tablet by mouth daily 2/19/21  Yes Frankey Canon, MD   acetaminophen (TYLENOL) 500 MG tablet Take 500 mg by mouth every 6 hours as needed for Pain   Yes Historical Provider, MD   ezetimibe (ZETIA) 10 MG tablet Take 1 tablet by mouth daily 7/6/20  Yes Frankey Canon, MD   loratadine (CLARITIN) 10 MG tablet Take 1 tablet by mouth daily 5/9/19  Yes Frankey Canon, MD   metoclopramide (REGLAN) 5 MG tablet Take 1 tablet by mouth 3 times daily 6/10/21   Emil Conner MD         Medications:   Medications:    nitroGLYCERIN  0.4 mg Sublingual Once      Infusions:   PRN Meds:      Data:     Laboratory this visit:  Reviewed  Recent Labs     08/12/21  1055   WBC 5.1   HGB 11.1*   HCT 33.9*         Recent Labs     08/12/21  1055      K 3.8      CO2 26   BUN 18   CREATININE 1.0     Recent Labs     08/12/21  1055   AST 16   ALT 21   BILITOT 0.9   ALKPHOS 49     No results for input(s): INR in the last 72 hours. Radiology this visit:  Reviewed.     XR CHEST PORTABLE    Result Date: 8/12/2021  EXAMINATION: ONE XRAY VIEW OF THE CHEST 8/12/2021 10:22 am COMPARISON: 05/04/2021 HISTORY: ORDERING SYSTEM PROVIDED HISTORY: CP TECHNOLOGIST PROVIDED HISTORY: Reason for exam:->CP Reason for Exam: chest pain Acuity: Acute Type of Exam: Initial FINDINGS: The heart size and pulmonary vascularity are within normal limits there is no evidence for pulmonary edema or focal consolidation. There is a right-sided Port-A-Cath in place with the tip overlying the right cava atrial junction. No acute cardiopulmonary disease. CTA CHEST W CONTRAST    Result Date: 8/12/2021  EXAMINATION: CTA OF THE CHEST 8/12/2021 2:38 pm TECHNIQUE: CTA of the chest was performed after the administration of intravenous contrast.  Multiplanar reformatted images are provided for review. MIP images are provided for review. Dose modulation, iterative reconstruction, and/or weight based adjustment of the mA/kV was utilized to reduce the radiation dose to as low as reasonably achievable. COMPARISON: 10/27/2020 HISTORY: ORDERING SYSTEM PROVIDED HISTORY: chest pain, CA, PE? TECHNOLOGIST PROVIDED HISTORY: Reason for exam:->chest pain, CA, PE? Decision Support Exception - unselect if not a suspected or confirmed emergency medical condition->Emergency Medical Condition (MA) Reason for Exam: chest pain, CA, PE? Acuity: Acute Type of Exam: Initial FINDINGS: Pulmonary Arteries: Pulmonary arteries are adequately opacified for evaluation. No evidence of intraluminal filling defect to suggest pulmonary embolism. Main pulmonary artery is normal in caliber. Mediastinum: The heart is mildly enlarged. There is no pericardial effusion. There is no straightening or leftward bowing of the cardiac intraventricular septum. There is no pathologically enlarged mediastinal or hilar lymph node. Lungs/pleura: No acute pulmonary abnormality. Chronic calcified granulomatous changes in the lower lobes. No pneumothorax or pleural effusion. Upper Abdomen: No acute abnormality in the visualized upper abdomen. Prior cholecystectomy. Chronic calcified granulomatous changes in the spleen and liver.  Soft Tissues/Bones: No acute osseous or soft tissue abnormality. Right chest port in place. No evidence of pulmonary embolism or acute pulmonary abnormality. FL SMALL BOWEL FOLLOW THROUGH ONLY    Result Date: 8/2/2021  EXAMINATION: SMALL BOWEL FOLLOW THROUGH SERIES 8/2/2021 TECHNIQUE: Small bowel follow through series was performed with overhead images and spot images. FLUOROSCOPY DOSE AND TYPE OR TIME AND EXPOSURES: The fluoroscopy dose is 14.7 mGy which is the KA, R COMPARISON: None HISTORY: ORDERING SYSTEM PROVIDED HISTORY: Abdominal pain, unspecified abdominal location TECHNOLOGIST PROVIDED HISTORY: Reason for Exam: Abdominal pain, unspecified abdominal location, Loss of weight FINDINGS:  image of the abdomen demonstrates a nonspecific, nonobstructed bowel gas pattern. Cholecystectomy is noted. Splenic and hepatic calcified granulomas are noted There is normal transit time to the terminal ileum. No focal abnormalities, strictures or obstructions are seen of the small bowel. Spot images of the terminal ileum are unremarkable. Contrast is identified within the right colon at 45 minutes     Unremarkable small bowel follow through series.            EKG this visit:  Reviewed         Electronically signed by LIAN Escobedo CNP on 8/12/2021 at 4:05 PM

## 2021-08-12 NOTE — PROGRESS NOTES
Medication History  [facility]    Patient Name: Keon Meade 1960     Medication history has been completed by: Tona Arrington CPhT    Source(s) of information: patient and insurance claims     Primary Care Physician: Husam Reynolds MD     Ph[facility]: [facility] as of 08/12/2021 - Fully Reviewed 08/12/2021   Allergen Reaction Noted    Celexa [citalopram] Other (See Comments) 05/12/2017    Atorvastatin  02/12/2019    Fenofibrate  02/12/2019    Mestinon [pyridostigmine] Itching and Swelling 06/21/2017    Penicillins  06/24/2014    Sulfa antibiotics  06/24/2014    Tape [adhesive tape]  04/18/2017    Gemfibrozil Rash 06/12/2019    Meloxicam Other (See Comments) 06/12/2019        Prior to Admission medications    Medication Sig Start Date End Date Taking? Authorizing Provider   hydroCHLOROthiazide (HYDRODIURIL) 25 MG tablet Take 0.5 tablets by mouth daily 8/10/21  Yes Husam Reynolds MD   promethazine (PHENERGAN) 12.5 MG tablet Take 10 mg by mouth every 6 hours as needed for Nausea   Yes Historical Provider, MD   prochlorperazine (COMPAZINE) 10 MG tablet Take 1 tablet by mouth every 6 hours as needed (chemotherapy induced nausea/vomiting) 7/19/21  Yes Luan Alva MD   dexamethasone (DECADRON) 4 MG tablet 1 tablet PO daily for 4 days starting the day after chemotherapy.  7/15/21  Yes Charity Butts APRN - CNP   metoprolol tartrate (LOPRESSOR) 50 MG tablet Take 1 tablet by mouth 2 times daily 7/15/21  Yes Husam Reynolds MD   potassium chloride (KLOR-CON M) 20 MEQ extended release tablet Take 1 tablet by mouth 2 times daily 7/15/21  Yes Husam Reynolds MD   pantoprazole (PROTONIX) 40 MG tablet Take 40 mg by mouth daily  6/24/21  Yes Historical Provider, MD   FLUoxetine (PROZAC) 20 MG capsule Take 20 mg by mouth daily  6/7/21  Yes Historical Provider, MD   furosemide (LASIX) 20 MG tablet Take 20 mg by mouth daily    Yes Historical Provider, MD   aspirin 81 MG chewable tablet Take 81 mg by mouth daily   Yes Historical Provider, MD   Multiple Vitamins-Minerals (HAIR SKIN AND NAILS FORMULA) TABS Take 1 tablet by mouth daily    Yes Historical Provider, MD   losartan (COZAAR) 25 MG tablet Take 1 tablet by mouth daily 2/19/21  Yes Juan Gutiérrez MD   acetaminophen (TYLENOL) 500 MG tablet Take 500 mg by mouth every 6 hours as needed for Pain   Yes Historical Provider, MD   ezetimibe (ZETIA) 10 MG tablet Take 1 tablet by mouth daily 7/6/20  Yes Juan Gutiérrez MD   loratadine (CLARITIN) 10 MG tablet Take 1 tablet by mouth daily 5/9/19  Yes Juan Gutiérrez MD   metoclopramide (REGLAN) 5 MG tablet Take 1 tablet by mouth 3 times daily 6/10/21   Maldonado Martinez MD     Comments:  Medication list reviewed with patient and insurance claims verified. Dexamethasone therapy patient's last dose 08/10/21. Patient reports she has taken first dose of medications for today.     To my knowledge the above medication history is accurate as of 8/12/2021 1:36 PM.   Aj Hirsch CPhT   8/12/2021 1:36 PM

## 2021-08-12 NOTE — ED PROVIDER NOTES
EKG is interpreted by me. EKG shows sinus rhythm at 70 bpm, axis is nondeviated, no remarkable ST segmentations or depressions, T waves are inverted in V3, PA interval 148, QRS duration of 82, QTC of 1-29. Final impression, nonspecific EKG. For review of records, she has had inverted T waves in V3 previously.     Marylene Schlatter, MD  8/12/2021  11:32 AM       Marylene Schlatter, MD  08/12/21 [id number]

## 2021-08-12 NOTE — ED TRIAGE NOTES
Pt presents to ED from home by EMS for chest pain that started this morning. Pt received 1 nitro and 324mg asa per EMS.

## 2021-08-12 NOTE — ED PROVIDER NOTES
EMERGENCY DEPARTMENT ENCOUNTER        PCP: Lawrence Dewitt MD    [address] [facility]    Chief Complaint   Patient presents with    Chest Pain           HPI      Jacinto Cavazos is a 64 y.o. female who presents via EMS with chest pain. Patient has a history of breast cancer and is currently on chemotherapy. She states this morning she woke up and at approximately 8 AM developed chest pain. Onset - acute onset at approximately 8 AM prior to arrival to ED  Location -midsternal  Duration -constant  Character -pressure-like, pain does not migrate (TAD)  Aggravating/Alleviating factors -none  Activity at onset -resting  Associate symptoms -nausea, shortness of breath, diaphoresis, Pt denies any concurrent neurological symptoms (TAD)  Radiation -none  Severity -7        REVIEW OF SYSTEMS    Constitutional:  Denies fever, chills. HENT:  Denies sore throat or ear pain   Cardiovascular:  +Chest Pain,  Denies palpitations,  Denies syncope, no extremity edema. Respiratory:    Denies cough, shortness of breath, or hemoptysis    GI:  Denies abdominal pain, nausea, vomiting, or diarrhea  :  Denies any urinary symptoms  Musculoskeletal:  Denies back pain, no extremity pain, swelling or discoloration.   No pale or cold extremity  Skin:  Denies rash  Neurologic:  Denies changes in vision,  lightheadedness, dizziness, headache, focal weakness or sensory changes   Endocrine:  Denies polyuria or polydypsia   Lymphatic:  Denies swollen glands     All other review of systems are negative  See HPI and nursing notes for additional information         PAST MEDICAL & SURGICAL HISTORY    Past Medical History:   Diagnosis Date    Allergic rhinitis due to pollen 11/19/2015    Arthritis     left hip & knee    Chronic back pain     Dehydration 7/22/2021    Depression     Gastroesophageal reflux disease 11/19/2015    Hearing loss 11/19/2015    History of blood transfusion     No reaction per pt    History of cardiac monitoring 04/18/2017    14 day event monitor. Sinus Rhythm    History of nuclear stress test 09/28/2016    cardiolite-normal,EF70%    Hot flashes due to surgical menopause 11/19/2015    Hx of echocardiogram 10/05/2016    EF: >55 %   Mild mitral and tricuspid regurg.  Hyperlipidemia     Hypertension     Follows with PCP    Lexiscan Stress Test 10/14/2020    EF 60%, Normal study, no ischemia    Meniere disease     Morbid obesity due to excess calories ([address] 11/19/2015    Sleep apnea 11/19/2015    sleep study negative    Tilt table evaluation 05/09/2017     positive for neurocardiogenic syncope     Past Surgical History:   Procedure Laterality Date    APPENDECTOMY  1967    CHOLECYSTECTOMY      2017    COLONOSCOPY  2014    Polyps x2 - Dr. Fatmata Meyer Bilateral 05/06/2021    Dr. Sonny Garcia Left 10/19/2020    LEFT HIP TOTAL ARTHROPLASTY - POSTERIOR performed by Waldemar Woo MD at [place] [id number] LEFT Left 3/9/2021    US BREAST BIOPSY NEEDLE ADDITIONAL LEFT 3/9/2021 Abdelrahman Munoz MD [facility] BREAST BIOPSY NEEDLE ADDITIONAL LEFT Left 3/9/2021    US BREAST BIOPSY NEEDLE ADDITIONAL LEFT 3/9/2021 Abdelrahman Munoz MD [facility]    US BREAST NEEDLE BIOPSY LEFT Left 3/9/2021    US BREAST NEEDLE BIOPSY LEFT 3/9/2021 Abdelrahman Munoz MD [facility]       CURRENT MEDICATIONS    Current Outpatient Rx   Medication Sig Dispense Refill    hydroCHLOROthiazide (HYDRODIURIL) 25 MG tablet Take 0.5 tablets by mouth daily 45 tablet 3    promethazine (PHENERGAN) 12.5 MG tablet Take 10 mg by mouth every 6 hours as needed for Nausea      prochlorperazine (COMPAZINE) 10 MG tablet Take 1 tablet by mouth every 6 hours as needed (chemotherapy induced nausea/vomiting) 30 tablet 1    dexamethasone (DECADRON) 4 MG tablet 1 tablet PO daily for 4 days starting the day after chemotherapy.  16 tablet 0    metoprolol tartrate (LOPRESSOR) 50 MG tablet Take 1 tablet by mouth 2 times daily 180 tablet 3    potassium chloride (KLOR-CON M) 20 MEQ extended release tablet Take 1 tablet by mouth 2 times daily 180 tablet 3    pantoprazole (PROTONIX) 40 MG tablet Take 40 mg by mouth daily       FLUoxetine (PROZAC) 20 MG capsule Take 20 mg by mouth daily       furosemide (LASIX) 20 MG tablet Take 20 mg by mouth daily       aspirin 81 MG chewable tablet Take 81 mg by mouth daily      Multiple Vitamins-Minerals (HAIR SKIN AND NAILS FORMULA) TABS Take 1 tablet by mouth daily       losartan (COZAAR) 25 MG tablet Take 1 tablet by mouth daily 30 tablet 5    acetaminophen (TYLENOL) 500 MG tablet Take 500 mg by mouth every 6 hours as needed for Pain      ezetimibe (ZETIA) 10 MG tablet Take 1 tablet by mouth daily 90 tablet 3    loratadine (CLARITIN) 10 MG tablet Take 1 tablet by mouth daily 30 tablet 5    metoclopramide (REGLAN) 5 MG tablet Take 1 tablet by mouth 3 times daily 30 tablet 1       ALLERGIES    Allergies   Allergen Reactions    Celexa [Citalopram] Other (See Comments)     Stomach pain        Atorvastatin      Leg cramps      Fenofibrate      angioedema    Mestinon [Pyridostigmine] Itching and Swelling    Penicillins     Sulfa Antibiotics      Other reaction(s): Hives/Throat closes    Tape [employer] Tape]      PLASTIC TAPE    Gemfibrozil Rash    Meloxicam Other (See Comments)     moodswings       SOCIAL & FAMILY HISTORY    Social History     Socioeconomic History    Marital status:      Spouse name: None    Number of children: None    Years of education: None    Highest education level: None   Occupational History    None   Tobacco Use    Smoking status: Never Smoker    Smokeless tobacco: Never Used   Vaping Use    Vaping Use: Never used   Substance and Sexual Activity    Alcohol use: No    Drug use: No    Sexual activity: Not Currently     Partners: Male   Other Topics Concern    None Social History Narrative    None     Social Determinants of Health     Financial Resource Strain:     Difficulty of Paying Living Expenses:    Food Insecurity:     Worried About Running Out of Food in the Last Year:     [address] in the Last Year:    Transportation Needs:     Lack of Transportation (Medical):      Lack of Transportation (Non-Medical):    Physical Activity:     Days of Exercise per Week:     Minutes of Exercise per Session:    Stress:     Feeling of Stress :    Social Connections:     Frequency of Communication with Friends and Family:     Frequency of Social Gatherings with Friends and Family:     Attends Shinto Services:     Active Member of Clubs or Organizations:     Attends Club or Organization Meetings:     Marital Status:    Intimate Partner Violence:     Fear of Current or Ex-Partner:     Emotionally Abused:     Physically Abused:     Sexually Abused:      Family History   Problem Relation Age of Onset    Heart Disease Mother     High Blood Pressure Mother     High Cholesterol Mother     Cancer Mother 80        Stomach    Diabetes Mother     Stroke Mother     Heart Disease Father     High Blood Pressure Father     High Cholesterol Father     Diabetes Father     Depression Father     Cancer Sister 46        Lung cancer    High Blood Pressure Sister     Other Sister         migraines, osteoarthritis    Mental Illness Sister     Depression Sister     Cancer Maternal Grandmother         Stomach    Diabetes Maternal Grandmother     Diabetes Maternal Grandfather     Diabetes Paternal Grandmother     Diabetes Paternal Grandfather     Cancer Maternal Cousin 47        Pancreatic       PHYSICAL EXAM    VITAL SIGNS: /73   Pulse 80   Temp 98.1 °F (36.7 °C) (Oral)   Resp 18   Ht 5' 4\" (1.626 m)   Wt 223 lb (101.2 kg)   SpO2 98%   BMI 38.28 kg/m²    Constitutional:  Well developed, well nourished, no acute distress   HENT:  Atraumatic, moist Result Value Ref Range    Sodium 138 135 - 145 MMOL/L    Potassium 3.8 3.5 - 5.1 MMOL/L    Chloride 103 99 - 110 mMol/L    CO2 26 21 - 32 MMOL/L    BUN 18 6 - 23 MG/DL    CREATININE 1.0 0.6 - 1.1 MG/DL    Glucose 113 (H) 70 - 99 MG/DL    Calcium 8.7 8.3 - 10.6 MG/DL    Albumin 4.1 3.4 - 5.0 GM/DL    Total Protein 6.1 (L) 6.4 - 8.2 GM/DL    Total Bilirubin 0.9 0.0 - 1.0 MG/DL    ALT 21 10 - 40 U/L    AST 16 15 - 37 IU/L    Alkaline Phosphatase 49 40 - 129 IU/L    GFR Non- 57 (L) >60 mL/min/1.73m2    GFR African American >60 >60 mL/min/1.73m2    Anion Gap 9 4 - 16   Troponin   Result Value Ref Range    Troponin T <0.010 <0.01 NG/ML   Brain Natriuretic Peptide   Result Value Ref Range    Pro-.7 <300 PG/ML   D-dimer, Quantitative   Result Value Ref Range    D-Dimer, Quant 277 (H) <230 NG/mL(DDU)   Troponin   Result Value Ref Range    Troponin T <0.010 <0.01 NG/ML   EKG 12 Lead   Result Value Ref Range    Ventricular Rate 70 BPM    Atrial Rate 70 BPM    P-R Interval 148 ms    QRS Duration 82 ms    Q-T Interval 398 ms    QTc Calculation (Bazett) 429 ms    P Axis 26 degrees    R Axis 57 degrees    T Axis 46 degrees    Diagnosis       Normal sinus rhythm  T wave abnormality, consider anterior ischemia  Abnormal ECG  When compared with ECG of 30-MAY-2021 15:20,  ST no longer depressed in Anterior leads               EKG Interpretation  Please see ED physician's note for EKG interpretation        RADIOLOGY/PROCEDURES    CTA CHEST W CONTRAST   Final Result   No evidence of pulmonary embolism or acute pulmonary abnormality. XR CHEST PORTABLE   Final Result   No acute cardiopulmonary disease. ED COURSE & MEDICAL DECISION MAKING      45-year-old female presents emergency department with complaints of midsternal chest pain. EKG showed sinus rhythm at 70 beats per minute T wave abnormalities anterior leads. X-ray showed acute findings.   CTA of the chest showed no evidence of PE. CBC shows a anemia which looks baseline for patient. CMP shows a mildly elevated glucose, otherwise no significant electrolyte abnormalities. Troponin x2 -. The patient continues to have chest pain despite nitroglycerin and morphine. At this time I feel she should be admitted to the hospital for further evaluation and treatment. Hospitalist consulted for admission. Dr. Rosa Benson cardiologist was also consulted via telephone and states he will consult in the morning. Patient remained in stable condition throughout her stay in the emergency department. All pertinent Lab data and radiographic results reviewed with patient at bedside. The patient and/or the family were informed of the results of any tests/labs/imaging, the treatment plan, and time was allotted to answer questions. Clinical  IMPRESSION    1. Precordial pain        Admission to the hospital    Comment: Please note this report has been produced using speech recognition software and may contain errors related to that system including errors in grammar, punctuation, and spelling, as well as words and phrases that may be inappropriate. If there are any questions or concerns please feel free to contact the dictating provider for clarification.       LIAN Barreto - CATARINO  08/12/21 [id number]

## 2021-08-13 ENCOUNTER — APPOINTMENT (OUTPATIENT)
Dept: NUCLEAR MEDICINE | Age: 61
End: 2021-08-13
Payer: COMMERCIAL

## 2021-08-13 VITALS
DIASTOLIC BLOOD PRESSURE: 82 MMHG | WEIGHT: 223 LBS | RESPIRATION RATE: 18 BRPM | TEMPERATURE: 97.9 F | HEIGHT: 64 IN | HEART RATE: 76 BPM | BODY MASS INDEX: 38.07 KG/M2 | SYSTOLIC BLOOD PRESSURE: 130 MMHG | OXYGEN SATURATION: 96 %

## 2021-08-13 LAB
ALBUMIN SERPL-MCNC: 3.9 GM/DL (ref 3.4–5)
ALP BLD-CCNC: 50 IU/L (ref 40–128)
ALT SERPL-CCNC: 25 U/L (ref 10–40)
ANION GAP SERPL CALCULATED.3IONS-SCNC: 8 MMOL/L (ref 4–16)
AST SERPL-CCNC: 18 IU/L (ref 15–37)
BILIRUB SERPL-MCNC: 1 MG/DL (ref 0–1)
BUN BLDV-MCNC: 15 MG/DL (ref 6–23)
CALCIUM SERPL-MCNC: 8.9 MG/DL (ref 8.3–10.6)
CHLORIDE BLD-SCNC: 100 MMOL/L (ref 99–110)
CHOLESTEROL: 292 MG/DL
CO2: 28 MMOL/L (ref 21–32)
CREAT SERPL-MCNC: 0.9 MG/DL (ref 0.6–1.1)
EKG ATRIAL RATE: 70 BPM
EKG DIAGNOSIS: NORMAL
EKG P AXIS: 26 DEGREES
EKG P-R INTERVAL: 148 MS
EKG Q-T INTERVAL: 398 MS
EKG QRS DURATION: 82 MS
EKG QTC CALCULATION (BAZETT): 429 MS
EKG R AXIS: 57 DEGREES
EKG T AXIS: 46 DEGREES
EKG VENTRICULAR RATE: 70 BPM
ESTIMATED AVERAGE GLUCOSE: 126 MG/DL
GFR AFRICAN AMERICAN: >60 ML/MIN/1.73M2
GFR NON-AFRICAN AMERICAN: >60 ML/MIN/1.73M2
GLUCOSE BLD-MCNC: 99 MG/DL (ref 70–99)
HBA1C MFR BLD: 6 % (ref 4.2–6.3)
HCT VFR BLD CALC: 33.6 % (ref 37–47)
HDLC SERPL-MCNC: 42 MG/DL
HEMOGLOBIN: 11 GM/DL (ref 12.5–16)
LDL CHOLESTEROL DIRECT: 176 MG/DL
LV EF: 64 %
LVEF MODALITY: NORMAL
MAGNESIUM: 2 MG/DL (ref 1.8–2.4)
MCH RBC QN AUTO: 28.7 PG (ref 27–31)
MCHC RBC AUTO-ENTMCNC: 32.7 % (ref 32–36)
MCV RBC AUTO: 87.7 FL (ref 78–100)
PDW BLD-RTO: 13.8 % (ref 11.7–14.9)
PLATELET # BLD: 157 K/CU MM (ref 140–440)
PMV BLD AUTO: 9.6 FL (ref 7.5–11.1)
POTASSIUM SERPL-SCNC: 3.8 MMOL/L (ref 3.5–5.1)
RBC # BLD: 3.83 M/CU MM (ref 4.2–5.4)
SODIUM BLD-SCNC: 136 MMOL/L (ref 135–145)
TOTAL PROTEIN: 6 GM/DL (ref 6.4–8.2)
TRIGL SERPL-MCNC: 266 MG/DL
TROPONIN T: <0.01 NG/ML
WBC # BLD: 3.7 K/CU MM (ref 4–10.5)

## 2021-08-13 PROCEDURE — 93017 CV STRESS TEST TRACING ONLY: CPT

## 2021-08-13 PROCEDURE — 6360000002 HC RX W HCPCS: Performed by: NURSE PRACTITIONER

## 2021-08-13 PROCEDURE — 83721 ASSAY OF BLOOD LIPOPROTEIN: CPT

## 2021-08-13 PROCEDURE — 93010 ELECTROCARDIOGRAM REPORT: CPT | Performed by: INTERNAL MEDICINE

## 2021-08-13 PROCEDURE — 6370000000 HC RX 637 (ALT 250 FOR IP): Performed by: NURSE PRACTITIONER

## 2021-08-13 PROCEDURE — 6360000002 HC RX W HCPCS

## 2021-08-13 PROCEDURE — 93005 ELECTROCARDIOGRAM TRACING: CPT | Performed by: NURSE PRACTITIONER

## 2021-08-13 PROCEDURE — 3430000000 HC RX DIAGNOSTIC RADIOPHARMACEUTICAL: Performed by: INTERNAL MEDICINE

## 2021-08-13 PROCEDURE — G0378 HOSPITAL OBSERVATION PER HR: HCPCS

## 2021-08-13 PROCEDURE — 96376 TX/PRO/DX INJ SAME DRUG ADON: CPT

## 2021-08-13 PROCEDURE — 78452 HT MUSCLE IMAGE SPECT MULT: CPT

## 2021-08-13 PROCEDURE — 85027 COMPLETE CBC AUTOMATED: CPT

## 2021-08-13 PROCEDURE — 80053 COMPREHEN METABOLIC PANEL: CPT

## 2021-08-13 PROCEDURE — 99215 OFFICE O/P EST HI 40 MIN: CPT | Performed by: INTERNAL MEDICINE

## 2021-08-13 PROCEDURE — 99214 OFFICE O/P EST MOD 30 MIN: CPT | Performed by: INTERNAL MEDICINE

## 2021-08-13 PROCEDURE — 83036 HEMOGLOBIN GLYCOSYLATED A1C: CPT

## 2021-08-13 PROCEDURE — 84484 ASSAY OF TROPONIN QUANT: CPT

## 2021-08-13 PROCEDURE — A9500 TC99M SESTAMIBI: HCPCS | Performed by: INTERNAL MEDICINE

## 2021-08-13 PROCEDURE — 94761 N-INVAS EAR/PLS OXIMETRY MLT: CPT

## 2021-08-13 PROCEDURE — 80061 LIPID PANEL: CPT

## 2021-08-13 PROCEDURE — 83735 ASSAY OF MAGNESIUM: CPT

## 2021-08-13 PROCEDURE — APPNB15 APP NON BILLABLE TIME 0-15 MINS: Performed by: NURSE PRACTITIONER

## 2021-08-13 RX ORDER — PANTOPRAZOLE SODIUM 40 MG/1
40 TABLET, DELAYED RELEASE ORAL 2 TIMES DAILY
Qty: 60 TABLET | Refills: 3 | Status: SHIPPED | OUTPATIENT
Start: 2021-08-13 | End: 2022-02-01

## 2021-08-13 RX ADMIN — MORPHINE SULFATE 2 MG: 4 INJECTION, SOLUTION INTRAMUSCULAR; INTRAVENOUS at 07:43

## 2021-08-13 RX ADMIN — SODIUM CHLORIDE: 9 INJECTION, SOLUTION INTRAVENOUS at 00:08

## 2021-08-13 RX ADMIN — KIT FOR THE PREPARATION OF TECHNETIUM TC99M SESTAMIBI 30 MILLICURIE: 1 INJECTION, POWDER, LYOPHILIZED, FOR SOLUTION PARENTERAL at 10:30

## 2021-08-13 RX ADMIN — KIT FOR THE PREPARATION OF TECHNETIUM TC99M SESTAMIBI 10 MILLICURIE: 1 INJECTION, POWDER, LYOPHILIZED, FOR SOLUTION PARENTERAL at 08:50

## 2021-08-13 RX ADMIN — METOCLOPRAMIDE HYDROCHLORIDE 5 MG: 5 TABLET ORAL at 00:08

## 2021-08-13 RX ADMIN — SODIUM CHLORIDE, PRESERVATIVE FREE 10 ML: 5 INJECTION INTRAVENOUS at 00:00

## 2021-08-13 RX ADMIN — ONDANSETRON 4 MG: 2 INJECTION INTRAMUSCULAR; INTRAVENOUS at 07:43

## 2021-08-13 RX ADMIN — METOPROLOL TARTRATE 50 MG: 50 TABLET, FILM COATED ORAL at 00:08

## 2021-08-13 ASSESSMENT — PAIN SCALES - GENERAL
PAINLEVEL_OUTOF10: 6
PAINLEVEL_OUTOF10: 6

## 2021-08-13 ASSESSMENT — PAIN DESCRIPTION - ORIENTATION: ORIENTATION: MID

## 2021-08-13 ASSESSMENT — PAIN DESCRIPTION - PAIN TYPE: TYPE: ACUTE PAIN

## 2021-08-13 ASSESSMENT — PAIN DESCRIPTION - LOCATION: LOCATION: CHEST

## 2021-08-13 NOTE — FLOWSHEET NOTE
Patient given discharge instructions, voices understanding. Denies questions/concerns. Mediport deaccessed, dressing applied. Patient discharged to [facility] while awaiting transporation.

## 2021-08-13 NOTE — ED NOTES
Patient resting in hallway. Refusing nitro at this time. Waiting bed assignment on OBS unit. Will continue to monitor.       Jayden Montoya RN  08/12/21 [id number]

## 2021-08-13 NOTE — FLOWSHEET NOTE
Patient states she wants to take her daily meds that were held this am d/t stress test at home once she is discharged

## 2021-08-13 NOTE — CONSULTS
Hematology/Oncology  Consult Note      Reason for Consult: Breast cancer    Requesting Physician: Hospitalist    CHIEF COMPLAINT:  Midsternal chest pain, nausea and vomiting. History Obtained From:  patient, electronic medical record    HISTORY OF PRESENT ILLNESS:      The patient is a 64 y.o. female with significant past medical history of breast cancer who presents with above. She has Oncotype DX score of 29. She started on adjuvant [facility] on July 16, 2021. She received 2 cycles so far. The last chemo was on August 6, 2021. She has been taking PPI. Troponin was unremarkable. Prior to initiation of the chemo she has been having issue with nausea, vomiting and diarrhea. She was suspected to have gastroenteritis. She was seen by Dr. Chelsea Peñaloza who put her on Reglan, Protonix and Zofran. She will have stress test today. I recommend to have Dr Chelsea Peñaloza to see her on this admission. Past Medical History:        Diagnosis Date    Allergic rhinitis due to pollen 11/19/2015    Arthritis     left hip & knee    Chronic back pain     Dehydration 7/22/2021    Depression     Gastroesophageal reflux disease 11/19/2015    Hearing loss 11/19/2015    History of blood transfusion     No reaction per pt    History of cardiac monitoring 04/18/2017    14 day event monitor. Sinus Rhythm    History of nuclear stress test 09/28/2016    cardiolite-normal,EF70%    Hot flashes due to surgical menopause 11/19/2015    Hx of echocardiogram 10/05/2016    EF: >55 %   Mild mitral and tricuspid regurg.      Hyperlipidemia     Hypertension     Follows with PCP    Lexiscan Stress Test 10/14/2020    EF 60%, Normal study, no ischemia    Meniere disease     Morbid obesity due to excess calories ([address] 11/19/2015    Sleep apnea 11/19/2015    sleep study negative    Tilt table evaluation 05/09/2017     positive for neurocardiogenic syncope     Past Surgical History:        Procedure Laterality Date   [address] PRN **OR** potassium chloride 10 mEq/100 mL IVPB (Peripheral Line), 10 mEq, Intravenous, PRN  magnesium sulfate 2000 mg in 50 mL IVPB premix, 2,000 mg, Intravenous, PRN  ezetimibe (ZETIA) tablet 10 mg, 10 mg, Oral, Daily  metoclopramide (REGLAN) tablet 5 mg, 5 mg, Oral, TID AC  cetirizine (ZYRTEC) tablet 5 mg, 5 mg, Oral, Daily  pantoprazole (PROTONIX) tablet 40 mg, 40 mg, Oral, QAM AC  morphine sulfate (PF) injection 2 mg, 2 mg, Intravenous, Q4H PRN    Allergies:  Celexa [citalopram], Atorvastatin, Fenofibrate, Mestinon [pyridostigmine], Penicillins, Sulfa antibiotics, Tape [adhesive tape], Gemfibrozil, and Meloxicam    Social History:    TOBACCO:   reports that she has never smoked. She has never used smokeless tobacco.  ETOH:   reports no history of alcohol use. DRUGS:   reports no history of drug use. Family History:       Problem Relation Age of Onset    Heart Disease Mother     High Blood Pressure Mother     High Cholesterol Mother     Cancer Mother 80        Stomach    Diabetes Mother     Stroke Mother     Heart Disease Father     High Blood Pressure Father     High Cholesterol Father     Diabetes Father     Depression Father     Cancer Sister 46        Lung cancer    High Blood Pressure Sister     Other Sister         migraines, osteoarthritis    Mental Illness Sister     Depression Sister     Cancer Maternal Grandmother         Stomach    Diabetes Maternal Grandmother     Diabetes Maternal Grandfather     Diabetes Paternal Grandmother     Diabetes Paternal Grandfather     Cancer Maternal Cousin 47        Pancreatic     REVIEW OF SYSTEMS:    Still has the midsternal discomfort. Denied any fever or chills. No shortness of breath. No headaches or dizzy spell. The remainder of the review of system is unremarkable.     PHYSICAL EXAM:      Vitals:    BP (!) 112/58   Pulse 78   Temp 97.9 °F (36.6 °C) (Tympanic)   Resp 14   Ht 5' 4\" (1.626 m)   Wt 223 lb (101.2 kg)   SpO2 95%   BMI 38.28 kg/m²     CONSTITUTIONAL:  awake, alert, cooperative and no apparent distress  EYES:  extra-ocular muscles intact and sclera clear  NECK:  supple, symmetrical, trachea midline  LUNGS:  clear to auscultation, no crackles or wheezing  CARDIOVASCULAR:  normal S1 and S2 and no murmur noted  ABDOMEN:  normal bowel sounds, soft, non-distended and non-tender  MUSCULOSKELETAL:  there is no redness, warmth, or swelling of the joints  full range of motion noted  NEUROLOGIC:  Cranial Nerves:  cranial nerves II-XII are grossly intact  SKIN:  no bruising or bleeding and no jaundice  DATA:    Labs:  General Labs:    CBC with Differential:    Lab Results   Component Value Date    WBC 3.7 08/13/2021    RBC 3.83 08/13/2021    HGB 11.0 08/13/2021    HCT 33.6 08/13/2021     08/13/2021    MCV 87.7 08/13/2021    MCH 28.7 08/13/2021    MCHC 32.7 08/13/2021    RDW 13.8 08/13/2021    SEGSPCT 77.0 08/12/2021    BANDSPCT 9 11/02/2020    LYMPHOPCT 19.0 08/12/2021    PROMYELOPCT 1 11/02/2020    MONOPCT 1.0 08/12/2021    MYELOPCT 1 11/02/2020    EOSPCT 0.3 09/20/2011    BASOPCT 1.0 08/12/2021    MONOSABS 0.1 08/12/2021    LYMPHSABS 1.0 08/12/2021    EOSABS 0.1 08/12/2021    BASOSABS 0.1 08/12/2021    DIFFTYPE MANUAL DIFFERENTIAL 08/12/2021     CMP:    Lab Results   Component Value Date     08/13/2021    K 3.8 08/13/2021     08/13/2021    CO2 28 08/13/2021    BUN 15 08/13/2021    CREATININE 0.9 08/13/2021    GFRAA >60 08/13/2021    AGRATIO 1.7 12/07/2015    LABGLOM >60 08/13/2021    GLUCOSE 99 08/13/2021    PROT 6.0 08/13/2021    PROT 7.5 09/20/2011    LABALBU 3.9 08/13/2021    CALCIUM 8.9 08/13/2021    BILITOT 1.0 08/13/2021    ALKPHOS 50 08/13/2021    AST 18 08/13/2021    ALT 25 08/13/2021       IMPRESSION/RECOMMENDATIONS:         1.   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 Oncotype DX was 29. I offered adjuvant chemotherapy  She started adjuvant AC in July 2021. She received 2 cycle. The last chemo was on August 6, 2021.      2.  Genetic counseling: VUS JUHI     3. EGD by Dr. Vlad Singh showed hiatal hernia, gastric polyp and mild gastritis. She was placed on pantoprazole. At one time she was on Reglan. ?gastroparesis. 4.  Chemotherapy may exacerbate the nausea and vomiting. I will have Dr. Vlad Singh to see her on this admission. To continue with Zofran and Protonix. She may take the Protonix twice a day during her treatment with the chemo. I will discuss with Dr. Vlad Singh prior to initiation of the third cycle of the chemo. Scheduled for stress test today. Echo in June 2021 showed normal LVEF at 55 to 60%. Thank you for allowing us to participate in her care. I will continue to follow her closely.

## 2021-08-13 NOTE — CONSULTS
INPATIENT CARDIOLOGY CONSULT NOTE       Reason for consultation:  Chest pain     Referring physician:  Keya Oreilly MD     Primary care physician: Guille Carnes MD      Dear Keya Oreilly MD Thank you for the consult    Chief Complaint   Patient presents with    Chest Pain       History of present illness:Conchita is a 64 y. o.year old who  presents with  Chief Complaint   Patient presents with    Chest Pain       Patient is a pleasant 57-year-old female with history of breast cancer, who presents to the ER with chief complaint of chest pain for the past 4 days. Patient complains of chest pain which is 5 out of 10, retrosternal, nonradiating, occasionally exertional however more pronounced with eating food. Patient denies any exertional dyspnea. Chest pain responds well to IV morphine. Patient also has history of invasive ductal carcinoma stage II s/p chemotherapy. Patient had a stress test performed last year which was unremarkable. An echocardiogram recently showed preserved LV ejection fraction without any wall motion abnormalities. Since being in the hospital cardiac troponins have been negative. EKG shows sinus rhythm with T wave inversion in anterior leads    At the time of the interview, patient denies any active chest pains. Stress test in October 2020 shows the following:    [place] physician Dr. Myriam Earl .   [place] tracer uptake in all segments of myocardium on stress ans rest    images. Normal Lexiscan nuclear scintigraphic study suggestive of normal    myocardial perfusion. Gated images demonstrate normal left ventricular    systolic function with EF of 60 %.      Recommendation    Continue present medical therapy and followup in office as scheduled. Echocardiogram in June 2021 shows the following     Summary   Left ventricular systolic function is normal.   Ejection fraction is visually estimated at 55-60%. No significant valvular disease noted.    No evidence of any pericardial effusion. Past medical history:    has a past medical history of Allergic rhinitis due to pollen, Arthritis, Chronic back pain, Dehydration, Depression, Gastroesophageal reflux disease, Hearing loss, History of blood transfusion, History of cardiac monitoring, History of nuclear stress test, Hot flashes due to surgical menopause, Hx of echocardiogram, Hyperlipidemia, Hypertension, Lexiscan Stress Test, Meniere disease, Morbid obesity due to excess calories ([address] Sleep apnea, and Tilt table evaluation. Past surgical history:   has a past surgical history that includes Cholecystectomy; Appendectomy (1967); Tonsillectomy (1970); Colonoscopy (2014); Hysterectomy (1989); Total hip arthroplasty (Left, 10/19/2020); US BREAST BIOPSY W LOC DEVICE EACH ADDL LESION LEFT (Left, 3/9/2021); US BREAST BIOPSY W LOC DEVICE 1ST LESION LEFT (Left, 3/9/2021); US BREAST BIOPSY W LOC DEVICE EACH ADDL LESION LEFT (Left, 3/9/2021); and Mastectomy (Bilateral, 05/06/2021). Social History:   reports that she has never smoked. She has never used smokeless tobacco. She reports that she does not drink alcohol and does not use drugs.   Family history:   no family history of CAD, STROKE of DM    Allergies   Allergen Reactions    Celexa [Citalopram] Other (See Comments)     Stomach pain        Atorvastatin      Leg cramps      Fenofibrate      angioedema    Mestinon [Pyridostigmine] Itching and Swelling    Penicillins     Sulfa Antibiotics      Other reaction(s): Hives/Throat closes    Tape [place] Tape]      PLASTIC TAPE    Gemfibrozil Rash    Meloxicam Other (See Comments)     moodswings       nitroGLYCERIN (NITROSTAT) SL tablet 0.4 mg, Once  metoprolol tartrate (LOPRESSOR) tablet 50 mg, BID  losartan (COZAAR) tablet 25 mg, Daily  0.9 % sodium chloride infusion, Continuous  sodium chloride flush 0.9 % injection 10 mL, 2 times per day  sodium chloride flush 0.9 % injection 10 mL, PRN  0.9 % sodium chloride infusion, PRN  ondansetron (ZOFRAN-ODT) disintegrating tablet 4 mg, Q8H PRN   Or  ondansetron (ZOFRAN) injection 4 mg, Q6H PRN  acetaminophen (TYLENOL) tablet 650 mg, Q6H PRN   Or  acetaminophen (TYLENOL) suppository 650 mg, Q6H PRN  polyethylene glycol (GLYCOLAX) packet 17 g, Daily PRN  aspirin chewable tablet 81 mg, Daily  enoxaparin (LOVENOX) injection 40 mg, Daily  nitroGLYCERIN (NITROSTAT) SL tablet 0.4 mg, Q5 Min PRN  potassium chloride (KLOR-CON M) extended release tablet 40 mEq, PRN   Or  potassium bicarb-citric acid (EFFER-K) effervescent tablet 40 mEq, PRN   Or  potassium chloride 10 mEq/100 mL IVPB (Peripheral Line), PRN  magnesium sulfate 2000 mg in 50 mL IVPB premix, PRN  ezetimibe (ZETIA) tablet 10 mg, Daily  metoclopramide (REGLAN) tablet 5 mg, TID AC  cetirizine (ZYRTEC) tablet 5 mg, Daily  pantoprazole (PROTONIX) tablet 40 mg, QAM AC  morphine sulfate (PF) injection 2 mg, Q4H PRN      Current Facility-Administered Medications   Medication Dose Route Frequency Provider Last Rate Last Admin    nitroGLYCERIN (NITROSTAT) SL tablet 0.4 mg  0.4 mg Sublingual Once Eliza Sommer APRN - CNP        metoprolol tartrate (LOPRESSOR) tablet 50 mg  50 mg Oral BID Milus Kras, APRN - CNP   50 mg at 08/13/21 0008    losartan (COZAAR) tablet 25 mg  25 mg Oral Daily Milus Kras, APRN - CNP        0.9 % sodium chloride infusion   Intravenous Continuous Milus Kras, APRN - CNP 75 mL/hr at 08/13/21 0008 New Bag at 08/13/21 0008    sodium chloride flush 0.9 % injection 10 mL  10 mL Intravenous 2 times per day Milus Kras, APRN - CNP   10 mL at 08/13/21 0000    sodium chloride flush 0.9 % injection 10 mL  10 mL Intravenous PRN Milus Kras, APRN - CNP        0.9 % sodium chloride infusion  25 mL Intravenous PRN Milus Kras, APRN - CNP        ondansetron (ZOFRAN-ODT) disintegrating tablet 4 mg  4 mg Oral Q8H PRN Scotus LIAN Armstrong - CNP        Or    ondansetron [facility]) injection 4 mg  4 mg Intravenous Q6H PRN Vonabiodunla Balo, APRN - CNP   4 mg at 08/13/21 0743    acetaminophen (TYLENOL) tablet 650 mg  650 mg Oral Q6H PRN Vonabiodunla Balo, APRN - CNP        Or    acetaminophen (TYLENOL) suppository 650 mg  650 mg Rectal Q6H PRN Vonzella Balo, APRN - CNP        polyethylene glycol (GLYCOLAX) packet 17 g  17 g Oral Daily PRN Makedala Balo, APRN - CNP        aspirin chewable tablet 81 mg  81 mg Oral Daily Vonabiodunla Balo, APRN - CNP        enoxaparin (LOVENOX) injection 40 mg  40 mg Subcutaneous Daily Vonabiodunla Balo, APRN - CNP        nitroGLYCERIN (NITROSTAT) SL tablet 0.4 mg  0.4 mg Sublingual Q5 Min PRN Makedala Balo, APRN - CNP        potassium chloride (KLOR-CON M) extended release tablet 40 mEq  40 mEq Oral PRN Makedala Balo, APRN - CNP        Or    potassium bicarb-citric acid (EFFER-K) effervescent tablet 40 mEq  40 mEq Oral PRN Makedala Balo, APRN - CNP        Or    potassium chloride 10 mEq/100 mL IVPB (Peripheral Line)  10 mEq Intravenous PRN Makedala Balo, APRN - CNP        magnesium sulfate 2000 mg in 50 mL IVPB premix  2,000 mg Intravenous PRN Makedala Balo, APRN - CNP        ezetimibe (ZETIA) tablet 10 mg  10 mg Oral Daily Vonabiodunla Balo, APRN - CNP        metoclopramide (REGLAN) tablet 5 mg  5 mg Oral TID AC Elida Fairo, APRN - CNP   5 mg at 08/13/21 0008    cetirizine (ZYRTEC) tablet 5 mg  5 mg Oral Daily Vonabiodunla Balo, APRN - CNP        pantoprazole (PROTONIX) tablet 40 mg  40 mg Oral QAM AC Elida Fairo, APRN - CNP        morphine sulfate (PF) injection 2 mg  2 mg Intravenous Q4H PRN Vonzella Balo, APRN - CNP   2 mg at 08/13/21 0050         Review of Systems:     · Constitutional: No Fever or Weight Loss   · Eyes: No Decreased Vision  · ENT: No Headaches, Hearing Loss or Vertigo  · Cardiovascular: No chest pain, dyspnea on exertion, palpitations or loss of consciousness  · Respiratory: No cough or wheezing    · Gastrointestinal: No abdominal pain, appetite loss, blood in stools, constipation, diarrhea or heartburn  · Genitourinary: No dysuria, trouble voiding, or hematuria  · Musculoskeletal:  No gait disturbance, weakness or joint complaints  · Integumentary: No rash or pruritis  · Neurological: No TIA or stroke symptoms  · Psychiatric: No anxiety or depression  · Endocrine: No malaise, fatigue or temperature intolerance  · Hematologic/Lymphatic: No bleeding problems, blood clots or swollen lymph nodes  · Allergic/Immunologic: No nasal congestion or hives    All other systems were reviewed and were negative otherwise. Physical Examination:      Vitals:    08/13/21 1157   BP: 130/82   Pulse: 76   Resp: 18   Temp:    SpO2: 96%      Wt Readings from Last 3 Encounters:   08/12/21 223 lb (101.2 kg)   08/09/21 223 lb 6.4 oz (101.3 kg)   08/06/21 229 lb 12.8 oz (104.2 kg)     Body mass index is 38.28 kg/m². General Appearance:  No distress, conversant  Constitutional:  Well developed, Well nourished  HEENT:  Normocephalic, Atraumatic, Oropharynx moist, No oral exudates,   Nose normal. Neck Supple Carotid: no carotid bruit  Eyes:  Conjunctiva normal, No discharge. Respiratory:    Normal breath sounds, No respiratory distress, No wheezing, no use of accessory muscles, diaphragm movement is normal  No chest Tenderness  Cardiovascular: S1-S2 No murmurs auscultated. No rubs, thrills or gallops. Normal  rhythm. Pedal pulses are normal. No pedal edema  GI:  Soft Non tender, non distended. :  No CVA tenderness. Musculoskeletal:   No tenderness, No cyanosis, No clubbing. Integument:  Warm, Dry, No erythema, No rash. Lymphatic:  No lymphadenopathy noted. Neurologic:  Alert & oriented x 3  No focal deficits noted.    Psychiatric:  Affect normal, Judgment normal, Mood normal.       Lab Review     Recent Labs     08/13/21  0611   WBC 3.7*   HGB 11.0*   HCT 33.6*         Recent Labs     08/13/21  0611      K 3.8      CO2 28   BUN 15   CREATININE 0.9     Recent Labs     08/13/21  0611   AST 18 ALT 25   BILITOT 1.0   ALKPHOS 50     No results for input(s): TROPONINI in the last 72 hours. No results found for: BNP  Lab Results   Component Value Date    INR 1.07 06/02/2021    PROTIME 12.9 06/02/2021         All labs, images, EKGs were personally reviewed      Assessment: 64 y. o.year old with PMH of  has a past medical history of Allergic rhinitis due to pollen, Arthritis, Chronic back pain, Dehydration, Depression, Gastroesophageal reflux disease, Hearing loss, History of blood transfusion, History of cardiac monitoring, History of nuclear stress test, Hot flashes due to surgical menopause, Hx of echocardiogram, Hyperlipidemia, Hypertension, Lexiscan Stress Test, Meniere disease, Morbid obesity due to excess calories ([address] Sleep apnea, and Tilt table evaluation. Recommendations:      1. Atypical chest pain:    Stress test last year in October negative for ischemia  Echocardiogram recent, shows preserved EF, no wall motion abnormalities  Patient has atypical symptoms of chest pain, differential diagnoses include CAD, GERD, anxiety. Obtain stress MPI today for risk stratification. Continue with aspirin, beta-blocker, Zetia, sublingual nitroglycerin    2. Essential hypertension: Blood pressure stable. Continue losartan 25 mg daily, metoprolol tartrate 50 mg p.o. twice daily  3. Breast cancer: Follow-up with oncology  4.  Symptoms of GERD: Continue with Protonix 40 mg daily    Thank you for the consult    Dr. Camryn Calle  8/13/2021 2:30 PM

## 2021-08-13 NOTE — CONSULTS
[address] [facility]               [address], [address] [facility]                                  CONSULTATION    PATIENT NAME: Travon Jade                  :        1960  MED REC NO:   [id number]                          ROOM:       OBS03  ACCOUNT NO:   [de-identified]                           ADMIT DATE: 2021  PROVIDER:     Saeid Mcdowell MD    CONSULT DATE:  2021    PRIMARY CARE PHYSICIAN:  Lida Perry MD    LOCATION:  The patient is in the observation room in the ER. CHIEF COMPLAINT:  Intractable nausea and vomiting. HISTORY OF PRESENT ILLNESS:  The patient is a 65-year-old white female  patient recently seen by me in the office with past medical history  significant for hypertension, hyperlipidemia, coronary artery disease,  obesity, obstructive sleep apnea, gastroesophageal reflux disease,  depression, chronic back pain/osteoarthritis and carcinoma of the  breast, status post bilateral mastectomy done in 2021 by Dr. Quintin Monge,  on chemotherapy, being followed up by Dr. Mel Loo, who presented to the  emergency room with a 3-day history of nausea and vomiting. The patient  also complains of mild upper abdominal discomfort. There is no history  of hematemesis, melena or hematochezia. The patient had a blood workup  done in the ER which comprised of chem profile, LFTs and CBC which is  unremarkable. The patient is hemodynamically stable. The patient did  have a CT scan done of the abdomen and pelvis on 2021 and  postsurgical changes were noted from previous hysterectomy, appendectomy  and cholecystectomy, and otherwise, no acute findings were seen. The patient was recently seen by me in the office for dysphagia, and I  performed EGD on the 2021, and the patient was noted to have a  small hiatal hernia along with mild superficial gastritis and a 5-mm  benign-appearing polyp in the fundus of the stomach.   The biopsies were  benign. The patient subsequently had a small-bowel follow-through done  on 08/02/2021 which was within normal limits. The patient did have a  colonoscopy done by Dr. Joseline Malloy in 2014. The patient is on Protonix at  present. REVIEW OF SYSTEMS:  CENTRAL NERVOUS SYSTEM:  The patient denies headache or focal  sensorimotor symptoms. CARDIOVASCULAR SYSTEM:  No history of chest pain, shortness of breath,  or leg swelling. GENITOURINARY SYSTEM:  No history of dysuria, pyuria, or hematuria. MUSCULOSKELETAL SYSTEM:  The patient complains of generalized weakness. RESPIRATORY SYSTEM:  No history of cough, hemoptysis, fever, or chills. PAST MEDICAL HISTORY:  Significant for history of hypertension,  hyperlipidemia, coronary artery disease, obesity/obstructive sleep  apnea, depression, osteoarthritis/back pain, gastroesophageal reflux  disease and carcinoma of the breast, status post bilateral mastectomy in  05/2021. FAMILY HISTORY:  Significant for the patient's mother diagnosed with  \"stomach cancer,\" sister with carcinoma of the lung, maternal  grandmother with stomach cancer, and maternal cousin with carcinoma of  the pancreas. MEDICATIONS:  Please refer to chart. SOCIOECONOMIC HISTORY:  No history of EtOH abuse. The patient does not  smoke cigarettes. SURGERIES:  The patient has had bilateral mastectomy done by Dr. Cristal Barros  in 05/2021 and also has had hysterectomy, appendectomy, cholecystectomy,  left hip surgery and tonsillectomy. ALLERGIES:  The patient has multiple allergies, please refer to the  chart. PHYSICAL EXAMINATION:  GENERAL:  Shows a 57-year-old white female who is obese, lying flat in  bed, in no acute distress. She is awake, alert, and oriented and  pleasant to talk with. VITAL SIGNS:  Stable. HEENT:  Shows skull to be atraumatic. NECK:  Supple. CHEST:  Clear. HEART:  S1, S2 are normal.  ABDOMEN:  Soft, nontender, and nondistended. Liver and spleen are not  palpable. Bowel sounds are present. RECTAL:  Deferred. CNS:  Shows the patient to be awake, alert, and oriented. There is no  focal sensorimotor sign. MUSCULOSKELETAL:  Shows evidence of degenerative joint disease changes. LABORATORY DATA:  As above mentioned. IMPRESSION:  3  A 82-year-old white female with multiple comorbidities, recently  diagnosed with carcinoma of breast status post bilateral mastectomy in  05/2021, on chemotherapy, presents with intractable nausea and vomiting,  most likely secondary to recent chemotherapy (the last dose was 1 week  ago). 2.  No evidence of gross GI bleeding at present. 3.  The patient had recent EGD on 06/24/2021 which was unremarkable. RECOMMENDATIONS:  1. Agree with present management with IV fluids along with parenteral  analgesics and antiemetics. 2.  We will continue the patient on Protonix. 3.  May resume chemotherapy. 4.  No need for repeat EGD at this point. 5.  We will follow the patient in the office after discharge.         Billy Silver MD    D: 08/13/2021 11:51:56       T: 08/13/2021 11:56:23     [email or web address]  Job#: [id number]     Doc#: [id number]    CC:  MD Vesta Borjas MD

## 2021-08-13 NOTE — DISCHARGE SUMMARY
Physician Discharge Summary     Patient ID:  Angelina Bradford  [id number]  64 y.o.  1960    Admit date: 8/12/2021    Discharge date and time: 08/13/2021     Admitting Physician: Luis Drake MD     Discharge Physician: Mitchell Elkins CNP    Admission Diagnoses: Precordial pain [R07.2]  Chest pain [R07.9]    Discharge Diagnoses:   Pain  Breast cancer  GERD  Hypertension  Hyperlipidemia    Admission Condition: fair    Discharged Condition: good    Indication for Admission: Chest pain    Hospital Course:   Angelina Bradford is a 64 y.o. female who presents with chest pain. Reports onset this morning upon waking. Describes as a constant substernal heavy sensation. Minimal relief of medication provided emergency room. Some associated shortness of breath and headache. History of breast cancer with current chemotherapy regimen. Second infusion 8/6/2021. Associated nausea and vomiting post chemotherapy infusion states previous infusion to occur 1.5 weeks to feel back to baseline. Troponin was negative. EKG has no acute ST-T change. Stress nuclear test was unremarkable. Cardiology okay patient to be discharged.     Consults: cardiology, GI and hematology/oncology    Significant Diagnostic Studies: Stress nuclear test    Outstanding Order Results     Date and Time Order Name Status Description    8/13/2021  6:11 AM EKG 12 lead Preliminary           Treatments: Medical management    Discharge Exam:  /82   Pulse 76   Temp 97.9 °F (36.6 °C) (Tympanic)   Resp 18   Ht 5' 4\" (1.626 m)   Wt 223 lb (101.2 kg)   SpO2 96%   BMI 38.28 kg/m²     General Appearance:    Alert, cooperative, no distress, appears stated age   Head:    Normocephalic, without obvious abnormality, atraumatic   Eyes:    PERRL, conjunctiva/corneas clear, EOM's intact, fundi     benign, both eyes   Ears:    Normal TM's and external ear canals, both ears   Nose:   Nares normal, septum midline, mucosa normal, no drainage    or sinus tenderness   Throat:   Lips, mucosa, and tongue normal; teeth and gums normal   Neck:   Supple, symmetrical, trachea midline, no adenopathy;     thyroid:  no enlargement/tenderness/nodules; no carotid    bruit or JVD   Back:     Symmetric, no curvature, ROM normal, no CVA tenderness   Lungs:     Clear to auscultation bilaterally, respirations unlabored   Chest Wall:    No tenderness or deformity    Heart:    Regular rate and rhythm, S1 and S2 normal, no murmur, rub   or gallop   Breast Exam:    No tenderness, masses, or nipple abnormality   Abdomen:     Soft, non-tender, bowel sounds active all four quadrants,     no masses, no organomegaly   Genitalia:    Normal female without lesion, discharge or tenderness   Rectal:    Normal tone ;guaiac negative stool   Extremities:   Extremities normal, atraumatic, no cyanosis or edema   Pulses:   2+ and symmetric all extremities   Skin:   Skin color, texture, turgor normal, no rashes or lesions   Lymph nodes:   Cervical, supraclavicular, and axillary nodes normal   Neurologic:   CNII-XII intact, normal strength, sensation and reflexes     throughout        Medication List      CHANGE how you take these medications    pantoprazole 40 MG tablet  Commonly known as: PROTONIX  Take 1 tablet by mouth 2 times daily  What changed: when to take this        CONTINUE taking these medications    acetaminophen 500 MG tablet  Commonly known as: TYLENOL     aspirin 81 MG chewable tablet     dexamethasone 4 MG tablet  Commonly known as: DECADRON  1 tablet PO daily for 4 days starting the day after chemotherapy.     ezetimibe 10 MG tablet  Commonly known as: ZETIA  Take 1 tablet by mouth daily     FLUoxetine 20 MG capsule  Commonly known as: PROZAC     furosemide 20 MG tablet  Commonly known as: LASIX     Hair Skin and Nails Formula Tabs     hydroCHLOROthiazide 25 MG tablet  Commonly known as: HYDRODIURIL  Take 0.5 tablets by mouth daily     loratadine 10 MG tablet  Commonly known as: Claritin  Take 1 tablet by mouth daily     losartan 25 MG tablet  Commonly known as: COZAAR  Take 1 tablet by mouth daily     metoclopramide 5 MG tablet  Commonly known as: Reglan  Take 1 tablet by mouth 3 times daily     metoprolol tartrate 50 MG tablet  Commonly known as: LOPRESSOR  Take 1 tablet by mouth 2 times daily     potassium chloride 20 MEQ extended release tablet  Commonly known as: KLOR-CON M  Take 1 tablet by mouth 2 times daily     prochlorperazine 10 MG tablet  Commonly known as: COMPAZINE  Take 1 tablet by mouth every 6 hours as needed (chemotherapy induced nausea/vomiting)     promethazine 12.5 MG tablet  Commonly known as: PHENERGAN           Where to Get Your Medications      These medications were sent to [address] [place] [address], [address] [place] [address]    Phone: [phone]   · pantoprazole 40 MG tablet         Disposition: home    Patient Instructions:   [unfilled]  Activity: activity as tolerated  Diet: cardiac diet  Wound Care: as directed    Follow-up with PCP in 2 weeks.     Signed:  LIAN Meadows CNP  8/13/2021  1:31 PM

## 2021-08-13 NOTE — PROGRESS NOTES
Preliminary results for stress test negative for ischemia.            ++++++++++++      Patient stress test is negative, can be discharged home from cardiology standpoint, follow-up with Dr. Rowan Davies as outpatient    Dr. Jimenez Must

## 2021-08-13 NOTE — PROGRESS NOTES
Pt arrived from ED. Nurse reports port not flushing or drawing. Port d/c reaccessed and now draws and flushes without difficulty.

## 2021-08-15 LAB
EKG ATRIAL RATE: 71 BPM
EKG DIAGNOSIS: NORMAL
EKG P AXIS: 52 DEGREES
EKG P-R INTERVAL: 156 MS
EKG Q-T INTERVAL: 410 MS
EKG QRS DURATION: 72 MS
EKG QTC CALCULATION (BAZETT): 445 MS
EKG R AXIS: 61 DEGREES
EKG T AXIS: 48 DEGREES
EKG VENTRICULAR RATE: 71 BPM

## 2021-08-15 PROCEDURE — 93010 ELECTROCARDIOGRAM REPORT: CPT | Performed by: INTERNAL MEDICINE

## 2021-08-20 PROBLEM — Z09 POSTOP CHECK: Status: RESOLVED | Noted: 2021-07-21 | Resolved: 2021-08-20

## 2021-08-25 ENCOUNTER — TELEPHONE (OUTPATIENT)
Dept: ONCOLOGY | Age: 61
End: 2021-08-25

## 2021-08-25 DIAGNOSIS — C50.912 MUCINOUS CARCINOMA OF BREAST, LEFT (HCC): Primary | ICD-10-CM

## 2021-08-25 NOTE — TELEPHONE ENCOUNTER
Patient states her home care nurse indicated her b/p has been running low yesterday it was 112/72, 103/63 standing. Patient would like to know if okay to get chemo on Friday.

## 2021-08-26 ENCOUNTER — TELEPHONE (OUTPATIENT)
Dept: FAMILY MEDICINE CLINIC | Age: 61
End: 2021-08-26

## 2021-08-27 ENCOUNTER — HOSPITAL ENCOUNTER (OUTPATIENT)
Dept: INFUSION THERAPY | Age: 61
Discharge: HOME | End: 2021-08-27
Payer: COMMERCIAL

## 2021-08-27 ENCOUNTER — OFFICE VISIT (OUTPATIENT)
Dept: ONCOLOGY | Age: 61
End: 2021-08-27
Payer: COMMERCIAL

## 2021-08-27 ENCOUNTER — TELEPHONE (OUTPATIENT)
Dept: ONCOLOGY | Age: 61
End: 2021-08-27

## 2021-08-27 VITALS
SYSTOLIC BLOOD PRESSURE: 125 MMHG | HEIGHT: 66 IN | TEMPERATURE: 97.3 F | HEART RATE: 107 BPM | WEIGHT: 208.6 LBS | DIASTOLIC BLOOD PRESSURE: 73 MMHG | OXYGEN SATURATION: 96 % | BODY MASS INDEX: 33.52 KG/M2

## 2021-08-27 VITALS
OXYGEN SATURATION: 96 % | BODY MASS INDEX: 33.43 KG/M2 | HEART RATE: 107 BPM | HEIGHT: 66 IN | TEMPERATURE: 97.3 F | SYSTOLIC BLOOD PRESSURE: 125 MMHG | WEIGHT: 208 LBS | DIASTOLIC BLOOD PRESSURE: 73 MMHG

## 2021-08-27 DIAGNOSIS — C50.912 MALIGNANT NEOPLASM OF LEFT BREAST IN FEMALE, ESTROGEN RECEPTOR POSITIVE, UNSPECIFIED SITE OF BREAST (HCC): ICD-10-CM

## 2021-08-27 DIAGNOSIS — E87.6 HYPOKALEMIA: Primary | ICD-10-CM

## 2021-08-27 DIAGNOSIS — C50.912 MUCINOUS CARCINOMA OF BREAST, LEFT (HCC): Primary | ICD-10-CM

## 2021-08-27 DIAGNOSIS — Z17.0 MALIGNANT NEOPLASM OF LEFT BREAST IN FEMALE, ESTROGEN RECEPTOR POSITIVE, UNSPECIFIED SITE OF BREAST (HCC): ICD-10-CM

## 2021-08-27 DIAGNOSIS — C50.912 INFILTRATING DUCTAL CARCINOMA OF LEFT BREAST (HCC): ICD-10-CM

## 2021-08-27 DIAGNOSIS — I10 ESSENTIAL HYPERTENSION: ICD-10-CM

## 2021-08-27 DIAGNOSIS — R19.7 DIARRHEA, UNSPECIFIED TYPE: ICD-10-CM

## 2021-08-27 LAB
ALBUMIN SERPL-MCNC: 4.5 GM/DL (ref 3.4–5)
ALP BLD-CCNC: 70 IU/L (ref 40–128)
ALT SERPL-CCNC: 35 U/L (ref 10–40)
ANION GAP SERPL CALCULATED.3IONS-SCNC: 16 MMOL/L (ref 4–16)
AST SERPL-CCNC: 31 IU/L (ref 15–37)
BASOPHILS ABSOLUTE: 0.1 K/CU MM
BASOPHILS RELATIVE PERCENT: 0.9 % (ref 0–1)
BILIRUB SERPL-MCNC: 0.5 MG/DL (ref 0–1)
BUN BLDV-MCNC: 25 MG/DL (ref 6–23)
CALCIUM SERPL-MCNC: 9.5 MG/DL (ref 8.3–10.6)
CHLORIDE BLD-SCNC: 95 MMOL/L (ref 99–110)
CO2: 30 MMOL/L (ref 21–32)
CREAT SERPL-MCNC: 1.5 MG/DL (ref 0.6–1.1)
DIFFERENTIAL TYPE: ABNORMAL
EOSINOPHILS ABSOLUTE: 0 K/CU MM
EOSINOPHILS RELATIVE PERCENT: 0.3 % (ref 0–3)
GFR AFRICAN AMERICAN: 43 ML/MIN/1.73M2
GFR NON-AFRICAN AMERICAN: 36 ML/MIN/1.73M2
GLUCOSE BLD-MCNC: 123 MG/DL (ref 70–99)
HCT VFR BLD CALC: 36.8 % (ref 37–47)
HEMOGLOBIN: 12.5 GM/DL (ref 12.5–16)
LYMPHOCYTES ABSOLUTE: 2.3 K/CU MM
LYMPHOCYTES RELATIVE PERCENT: 35 % (ref 24–44)
MCH RBC QN AUTO: 28.3 PG (ref 27–31)
MCHC RBC AUTO-ENTMCNC: 34 % (ref 32–36)
MCV RBC AUTO: 83.3 FL (ref 78–100)
MONOCYTES ABSOLUTE: 1 K/CU MM
MONOCYTES RELATIVE PERCENT: 15.6 % (ref 0–4)
PDW BLD-RTO: 15.4 % (ref 11.7–14.9)
PLATELET # BLD: 297 K/CU MM (ref 140–440)
PMV BLD AUTO: 9.5 FL (ref 7.5–11.1)
POTASSIUM SERPL-SCNC: 2.6 MMOL/L (ref 3.5–5.1)
RBC # BLD: 4.42 M/CU MM (ref 4.2–5.4)
SEGMENTED NEUTROPHILS ABSOLUTE COUNT: 3.2 K/CU MM
SEGMENTED NEUTROPHILS RELATIVE PERCENT: 48.2 % (ref 36–66)
SODIUM BLD-SCNC: 141 MMOL/L (ref 135–145)
TOTAL PROTEIN: 6.8 GM/DL (ref 6.4–8.2)
WBC # BLD: 6.6 K/CU MM (ref 4–10.5)

## 2021-08-27 PROCEDURE — 1036F TOBACCO NON-USER: CPT | Performed by: NURSE PRACTITIONER

## 2021-08-27 PROCEDURE — 96375 TX/PRO/DX INJ NEW DRUG ADDON: CPT

## 2021-08-27 PROCEDURE — 6360000002 HC RX W HCPCS: Performed by: INTERNAL MEDICINE

## 2021-08-27 PROCEDURE — G8417 CALC BMI ABV UP PARAM F/U: HCPCS | Performed by: NURSE PRACTITIONER

## 2021-08-27 PROCEDURE — 3017F COLORECTAL CA SCREEN DOC REV: CPT | Performed by: NURSE PRACTITIONER

## 2021-08-27 PROCEDURE — 80053 COMPREHEN METABOLIC PANEL: CPT

## 2021-08-27 PROCEDURE — 2580000003 HC RX 258: Performed by: INTERNAL MEDICINE

## 2021-08-27 PROCEDURE — 96417 CHEMO IV INFUS EACH ADDL SEQ: CPT

## 2021-08-27 PROCEDURE — G8427 DOCREV CUR MEDS BY ELIG CLIN: HCPCS | Performed by: NURSE PRACTITIONER

## 2021-08-27 PROCEDURE — 96409 CHEMO IV PUSH SNGL DRUG: CPT

## 2021-08-27 PROCEDURE — 96413 CHEMO IV INFUSION 1 HR: CPT

## 2021-08-27 PROCEDURE — 96367 TX/PROPH/DG ADDL SEQ IV INF: CPT

## 2021-08-27 PROCEDURE — 99214 OFFICE O/P EST MOD 30 MIN: CPT | Performed by: NURSE PRACTITIONER

## 2021-08-27 PROCEDURE — 85025 COMPLETE CBC W/AUTO DIFF WBC: CPT

## 2021-08-27 PROCEDURE — 36591 DRAW BLOOD OFF VENOUS DEVICE: CPT

## 2021-08-27 RX ORDER — HEPARIN SODIUM (PORCINE) LOCK FLUSH IV SOLN 100 UNIT/ML 100 UNIT/ML
500 SOLUTION INTRAVENOUS PRN
Status: DISCONTINUED | OUTPATIENT
Start: 2021-08-27 | End: 2021-08-28 | Stop reason: HOSPADM

## 2021-08-27 RX ORDER — SODIUM CHLORIDE 9 MG/ML
20 INJECTION, SOLUTION INTRAVENOUS ONCE
Status: DISCONTINUED | OUTPATIENT
Start: 2021-08-27 | End: 2021-08-28 | Stop reason: HOSPADM

## 2021-08-27 RX ORDER — POTASSIUM CHLORIDE 20 MEQ/1
40 TABLET, EXTENDED RELEASE ORAL 2 TIMES DAILY
Qty: 28 TABLET | Refills: 0 | Status: SHIPPED | OUTPATIENT
Start: 2021-08-27 | End: 2021-09-10 | Stop reason: SDUPTHER

## 2021-08-27 RX ORDER — PALONOSETRON 0.05 MG/ML
0.25 INJECTION, SOLUTION INTRAVENOUS ONCE
Status: COMPLETED | OUTPATIENT
Start: 2021-08-27 | End: 2021-08-27

## 2021-08-27 RX ORDER — MIRTAZAPINE 15 MG/1
15 TABLET, FILM COATED ORAL NIGHTLY
Qty: 30 TABLET | Refills: 3 | Status: SHIPPED | OUTPATIENT
Start: 2021-08-27

## 2021-08-27 RX ORDER — DOXORUBICIN HYDROCHLORIDE 2 MG/ML
54 INJECTION, SOLUTION INTRAVENOUS ONCE
Status: COMPLETED | OUTPATIENT
Start: 2021-08-27 | End: 2021-08-27

## 2021-08-27 RX ORDER — SODIUM CHLORIDE 0.9 % (FLUSH) 0.9 %
5-40 SYRINGE (ML) INJECTION PRN
Status: DISCONTINUED | OUTPATIENT
Start: 2021-08-27 | End: 2021-08-28 | Stop reason: HOSPADM

## 2021-08-27 RX ADMIN — PALONOSETRON 0.25 MG: 0.25 INJECTION, SOLUTION INTRAVENOUS at 10:31

## 2021-08-27 RX ADMIN — SODIUM CHLORIDE 20 ML/HR: 9 INJECTION, SOLUTION INTRAVENOUS at 10:31

## 2021-08-27 RX ADMIN — HEPARIN 500 UNITS: 100 SYRINGE at 12:36

## 2021-08-27 RX ADMIN — DEXAMETHASONE SODIUM PHOSPHATE 12 MG: 4 INJECTION INTRA-ARTICULAR; INTRALESIONAL; INTRAMUSCULAR; INTRAVENOUS; SOFT TISSUE at 11:05

## 2021-08-27 RX ADMIN — DOXORUBICIN HYDROCHLORIDE 118 MG: 2 INJECTION, SOLUTION INTRAVENOUS at 11:38

## 2021-08-27 RX ADMIN — CYCLOPHOSPHAMIDE 1180 MG: 1 INJECTION, POWDER, FOR SOLUTION INTRAVENOUS; ORAL at 11:51

## 2021-08-27 RX ADMIN — SODIUM CHLORIDE, PRESERVATIVE FREE 10 ML: 5 INJECTION INTRAVENOUS at 12:36

## 2021-08-27 RX ADMIN — FOSAPREPITANT 150 MG: 150 INJECTION, POWDER, LYOPHILIZED, FOR SOLUTION INTRAVENOUS at 10:31

## 2021-08-27 ASSESSMENT — PATIENT HEALTH QUESTIONNAIRE - PHQ9
SUM OF ALL RESPONSES TO PHQ QUESTIONS 1-9: 2
1. LITTLE INTEREST OR PLEASURE IN DOING THINGS: 0
SUM OF ALL RESPONSES TO PHQ QUESTIONS 1-9: 2
2. FEELING DOWN, DEPRESSED OR HOPELESS: 2
SUM OF ALL RESPONSES TO PHQ9 QUESTIONS 1 & 2: 2
SUM OF ALL RESPONSES TO PHQ QUESTIONS 1-9: 2

## 2021-08-27 ASSESSMENT — PAIN SCALES - GENERAL: PAINLEVEL_OUTOF10: 8

## 2021-08-27 ASSESSMENT — PAIN DESCRIPTION - LOCATION: LOCATION: BACK;HIP

## 2021-08-27 ASSESSMENT — PAIN DESCRIPTION - ORIENTATION: ORIENTATION: RIGHT

## 2021-08-27 NOTE — PROGRESS NOTES
MA Rooming Questions  Patient: Martin Roberts  MRN: [id number]    Date: 8/27/2021        1. Do you have any new issues? yes - Patient had a fall lastnight and had trouble getting up she states she is unsure what happened she just passed out . 2. Do you need any refills on medications?    no    3. Have you had any imaging done since your last visit? yes - today, 08/13 and chest xray     4. Have you been hospitalized or seen in the emergency room since your last visit here?   no    5. Did the patient have a depression screening completed today?  Yes    PHQ-9 Total Score: 2 (8/27/2021 11:10 AM)       PHQ-9 Given to (if applicable):               PHQ-9 Score (if applicable):                     [] Positive     []  Negative              Does question #9 need addressed (if applicable)                     [] Yes    []  No               Viridiana Llanes CMA

## 2021-08-27 NOTE — PROGRESS NOTES
Patient Name:  Gisela Tierney  Patient :  1960  Patient MRN:  [id number]     Primary Oncologist: Addie Raman MD  Referring Provider: Christiane Laguna MD     Date of Service: 2021         Chief Complaint:    Chief Complaint   Patient presents with    Follow-up    Chemotherapy      She came in for follow-up visit. Patient Active Problem List:     Essential hypertension     Hyperlipidemia     Allergic rhinitis due to pollen     Morbid obesity due to excess calories     Hearing loss     Hot flashes due to surgical menopause     Gastroesophageal reflux disease     Chronic back pain     Anxiety and depression     Osteoarthritis     Meniere's disease     Insomnia     Precordial pain     SOB (shortness of breath)     Calculus of gallbladder without cholecystitis without obstruction     S/P laparoscopic cholecystectomy     Vasovagal syncope     Class 2 obesity due to excess calories without serious comorbidity in adult     Palpitations     Family history of early     Closed fracture of left ankle     Acute respiratory failure with hypoxia      Status post left hip replacement     Hyperglycemia     Mucinous carcinoma of breast, left     Malignant neoplasm of left female breast     Infiltrating ductal carcinoma of left breast      S/P mastectomy, bilateral     Hx of lymph node excision    HPI:   Martha Javed is a pleasant 64year old female patient who was referred for evaluation of pathologic stage T2, N0, MX invasive ductal carcinoma of the left breast, ER/WA positive HER-2 negative, status post bilateral mastectomy in May 2021. She went for the routine mammogram in 2021 and denied any palpable lump to her breast.  Mammogram at that time showed at least 2 indeterminate complex masses at the 2:00 and 12 o'clock position in the left breast in the retroareolar region.     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 ER was more than 95%, IL more than 90%, HER-2/baltazar negative by FISH. MRI of bilateral breasts on April 19, 2021 showed  1. Left breast multicentric disease with biopsy proven invasive ductal  carcinoma with mucinous component at 11 o'clock and mucinous carcinomas at 2 o'clock and 12 o'clock in the retroareolar breast. [place] is at least 5 cm of separation between the 11 o'clock mass and 12 o'clock mass.  Additional smooth masses measuring up to 21 mm at biopsy sites are hematomas. 2. No MR evidence of malignancy in the contralateral right breast.  She had bilateral mastectomy on May 6, 2021. 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 Pathological stage classification T2, N0, MX. Pathologist felt that she has 1 contiguous tumor mass measuring about 4.5 cm rather than multicentric disease. I discussed with pathologist who stated that she does not have pure mucinous carcinoma of the breast.      We went over NCCN guideline. We discussed about healthy diet and weight loss. I went over with her the potential risk and benefit of adjuvant endocrine therapy. CBC and CMP in March 2021 were grossly unremarkable. Due to family history and personal history of breast cancer, I referred for genetic counseling. She had colonoscopy with removal of 2 polyps in 2014 by Dr. Suzanne Olvera. Follow-up in 3 years was recommended. She refused to have further follow-up. Oncotype DX score was 29. I offered adjuvant chemo therapy TC vs AC +T  She opted to [facility] +T. In May 2021 she was hospitalized for nausea and vomiting. She was seen by GI. She had mild transaminitis and was suspected to have gastroenteritis.   Never had any upper endoscopic study. Colonoscopy in June 2014 showed polyps which was removed. Pathology report showed tubular adenoma.   CT abdomen and pelvis on May 30, 2021:  No acute abnormality within the abdomen and pelvis.   Status post hysterectomy, appendectomy and cholecystectomy.    15 mm subcutaneus soft tissue nodule within the left lower quadrant area of anterior abdomen.  Finding may represent sebaceous cyst or other pathology. CTA chest on May 30, 2021 showed   1. No evidence of acute pulmonary embolism. 2. Soft tissue thickening and fat stranding overlying the bilateral pectoral  muscles. CT head on May 31, 2021 showed  No acute intracranial abnormality.  No intraparenchymal abnormal enhancement to suggest intracranial metastatic disease.  MRI is a superior modality for evaluation of intracranial metastasis.   Small probable meningioma within the falx near the convexity. MRI of the brain on June 1, 2021 showed  No acute infarct scattered areas of chronic white matter change in the  periventricular white matter.   No evidence for blood products on gradient imaging.   No  focal intracranial lesion noted     Amylase and lipase were unremarkable. She was placed on reglan with improvement of nausea and vomiting. She was recommended to have upper endoscopic study as outpatient by GI. She is status post bilateral mastectomy. She is scheduled for port insertion next Thursday. She had EGD By Dr. Chuy Kaminski with diagnosis of hiatal hernia, gastric polyp, mild gastritis. She was started on pantoprazole. She reports she is struggling some with depression. She is on Prozac 20 mg/day. She has established with a counselor. She reports some suicidal ideation. I referred for our counselor today and will make a referral to mental health. She contacted for safety and  had face-to-face visit today. On August 20 21 she came in for follow-up visit.   She started adjuvant chemotherapy AC on July 16, 2021.  SBFT:  Unremarkable small bowel follow through series. Bone density in June 2021 showed normal study. ECHO 6/18/2021:   Left ventricular systolic function is normal.   Ejection fraction is visually estimated at 55-60%. C2 dose reduced by 10%  CTA 8/12 no PE    She presented for scheduled follow up on August 27, 2021. She has had falls recently due to dizziness. She declines repeat MRI brain. She reports she has been following with cardiology for further evaluation of dizziness. She had stress test 8/13/21 with no infarct or ischemia, normal EF 64%. To dose reduce per Dr. Say Goldsmith due to weight loss. We will offer supportive treatment with IVF/antimetics as needed. No acute pain, denies nausea, vomiting, has occasional loose stools, reducated on how to use imodium, no dyspnea, no headaches, no specific bone pain, no melena or hematochezia. Past Medical History:   Past Medical History:   Diagnosis Date    Allergic rhinitis due to pollen 11/19/2015    Arthritis     left hip & knee    Chronic back pain     Dehydration 7/22/2021    Depression     Gastroesophageal reflux disease 11/19/2015    Hearing loss 11/19/2015    History of blood transfusion     No reaction per pt    History of cardiac monitoring 04/18/2017    14 day event monitor. Sinus Rhythm    History of nuclear stress test 09/28/2016    cardiolite-normal,EF70%    Hot flashes due to surgical menopause 11/19/2015    Hx of echocardiogram 10/05/2016    EF: >55 %   Mild mitral and tricuspid regurg.      Hyperlipidemia     Hypertension     Follows with PCP    Lexiscan Stress Test 10/14/2020    EF 60%, Normal study, no ischemia    Meniere disease     Morbid obesity due to excess calories ([address] 11/19/2015    Sleep apnea 11/19/2015    sleep study negative    Tilt table evaluation 05/09/2017     positive for neurocardiogenic syncope      Past Surgery History:    Past Surgical History:   Procedure Laterality Date    APPENDECTOMY 1967    CHOLECYSTECTOMY      2017    COLONOSCOPY  2014    Polyps x2 - Dr. Luiza Holley Bilateral 05/06/2021    Dr. Lan Torrez Left 10/19/2020    LEFT HIP TOTAL ARTHROPLASTY - POSTERIOR performed by Mihir Govea MD at [facility] LEFT Left 3/9/2021    US BREAST BIOPSY NEEDLE ADDITIONAL LEFT 3/9/2021 Araceli Rolon MD [address] BREAST BIOPSY NEEDLE ADDITIONAL LEFT Left 3/9/2021    US BREAST BIOPSY NEEDLE ADDITIONAL LEFT 3/9/2021 Araceli Rolon MD [address] BREAST NEEDLE BIOPSY LEFT Left 3/9/2021    US BREAST NEEDLE BIOPSY LEFT 3/9/2021 Araceli Rolon  [facility]   She had complete hysterectomy in 1999 due to endometriosis. Social History:   She denies any history of smoking. No alcohol or illicit drug use. She denies any children. Family History: Mother had stomach and breast cancer, maternal grandmother had breast cancer, colon cancer and stomach cancer. Review of Systems: The remainder of the review of system is unremarkable. Vital Signs: /73 (Position: Sitting)   Pulse 107   Temp 97.3 °F (36.3 °C) (Temporal)   Ht 5' 6\" (1.676 m)   Wt 208 lb (94.3 kg)   SpO2 96%   BMI 33.57 kg/m²      Physical Exam:  CONSTITUTIONAL: awake, alert, cooperative, no apparent distress   EYES:  sclera clear and conjunctiva pallor  ENT: Normocephalic, without obvious abnormality, atraumatic  NECK: supple, symmetrical  HEMATOLOGIC/LYMPHATIC: no cervical, supraclavicular or axillary lymphadenopathy   LUNGS:CTA bilaterally. BREAST: Healed surgical incision status post bilateral mastectomy.     CARDIOVASCULAR: regular rate and rhythm, normal S1 and S2, no murmur   ABDOMEN: normal bowel sound, soft, non-distended, non-tender, no masses palpated, no hepatosplenomegaly   MUSCULOSKELETAL: full range of motion noted, tone is normal  NEUROLOGIC: awake, alert, oriented to name, place and time. Motor skills grossly intact. Cranial nerves II through XII grossly intact. SKIN: Normal skin color, texture, turgor and no jaundice. EXTREMITIES: no LE edema. No cyanosis.         Labs:  Hematology:  Lab Results   Component Value Date    WBC 6.6 08/27/2021    RBC 4.42 08/27/2021    HGB 12.5 08/27/2021    HCT 36.8 (L) 08/27/2021    MCV 83.3 08/27/2021    MCH 28.3 08/27/2021    MCHC 34.0 08/27/2021    RDW 15.4 (H) 08/27/2021     08/27/2021    MPV 9.5 08/27/2021    BANDSPCT 9 11/02/2020    SEGSPCT 48.2 08/27/2021    EOSRELPCT 0.3 08/27/2021    BASOPCT 0.9 08/27/2021    LYMPHOPCT 35.0 08/27/2021    MONOPCT 15.6 (H) 08/27/2021    BANDABS 1.86 11/02/2020    SEGSABS 3.2 08/27/2021    EOSABS 0.0 08/27/2021    BASOSABS 0.1 08/27/2021    LYMPHSABS 2.3 08/27/2021    MONOSABS 1.0 08/27/2021    DIFFTYPE AUTOMATED DIFFERENTIAL 08/27/2021    ANISOCYTOSIS 1+ 11/02/2020    POLYCHROM 1+ 11/02/2020    WBCMORP FEW 11/02/2020    PLTM ADEQUATE 08/12/2021     Lab Results   Component Value Date     (H) 10/25/2020     Chemistry:  Lab Results   Component Value Date     08/13/2021    K 3.8 08/13/2021     08/13/2021    CO2 28 08/13/2021    BUN 15 08/13/2021    CREATININE 0.9 08/13/2021    GLUCOSE 99 08/13/2021    CALCIUM 8.9 08/13/2021    PROT 6.0 (L) 08/13/2021    LABALBU 3.9 08/13/2021    BILITOT 1.0 08/13/2021    ALKPHOS 50 08/13/2021    AST 18 08/13/2021    ALT 25 08/13/2021    LABGLOM >60 08/13/2021    GFRAA >60 08/13/2021    AGRATIO 1.7 12/07/2015    GLOB 2.7 12/07/2015    MG 2.0 08/13/2021    POCGLU 188 (H) 11/16/2020     Lab Results   Component Value Date     (H) 11/14/2020     Lab Results   Component Value Date    TSHHS 2.200 03/01/2021    T4FREE 1.02 03/01/2021     Immunology:  Lab Results   Component Value Date    PROT 6.0 (L) 08/13/2021     Coagulation Panel:  Lab Results   Component Value Date    PROTIME 12.9 06/02/2021    INR 1.07 06/02/2021    APTT 31.7 06/02/2021    DDIMER 277 (H) 08/12/2021     Anemia Panel:  Lab Results   Component Value Date    DVNVZCZP28 676.0 05/13/2019    FOLATE >20.0 (H) 05/13/2019      Observations:  PHQ-9 Total Score: 2 (8/27/2021 11:10 AM)       Assessment & Plan:    1. She has mixed invasive ductal carcinoma and mucinous carcinoma of the left breast status post bilateral mastectomy on May 6, 2021, ER/AK positive and HER-2/baltazar negative. Pathological stage T2, N0 sentinel lymph node MX. Oncotype DX was 29. I offered adjuvant chemotherapy  She opted to [facility] + T. We discussed about risk and benefit of adjuvant chemo and endocrine therapy. She started chemo in June 2021. She had dehydration after the first cycle of the chemo. Dose reduction with C2 and C3    2. Genetic counseling: VUS JUHI    3. Bone density on June 11, 2021 was normal.  Echo in June 2021 showed LVEF at 55 to 60%. 4. She has N/V ? Gastroenteritis. EGD as above. Started on Pantoprazole. Bowel follow-through was requested by Dr. Juan Nguyễn. Small bowel follow through was unremarkable on 8/2/21.    5. Mental health: Reports suicidal ideation, 1 past attempt, on Prozac 20 mg, has counselor but was referred to our counselor today. Will make referral to mental health. She is contracted for safety.  had face-to-face visit with patient. 6.  Anemia is related to the chemotherapy. Will monitor CBC. We discussed about diet and weight loss. She has not considered Covid vaccine yet. Return to clinic in 3 weeks. All of her question has been answered for today.

## 2021-08-27 NOTE — TELEPHONE ENCOUNTER
Per Dr Maxwell Newby, pt instructed to increase KCL to 40 mg (2 tabs) twice daily and recheck in 1 week along with a Mg. Verified apt for lab draw. Pt expressed understanding.

## 2021-08-27 NOTE — PROGRESS NOTES
Patient arrived to treatment suite for blood draw, pre-medications and chemotherapy infusion. Right chest mediport accessed and blood drawn from site and sent to lab for processing. Patient stated fell last night due to passing out. States she has been dizzy, fatigued, and has experienced loss of appetite. Weight indicates a 15 lb weight loss in 2 weeks. Her family doctor asked her to stop her blood pressure medications for now. Discussed this and lab results with Dr. Billy Acuna. Treatment approved with reductions and given. Patient tolerated well. Left treatment suite ambulatory. Discharge instructions provided. Patient's status assessed and documented appropriately. All labs and required results were also reviewed today. Treatment parameters have been reviewed. Today's treatment has been approved by the provider. Treatment orders and medication sequencing (when applicable) was verified by 2 registered nurses. The treatment plan was confirmed with the patient prior to administration, and the patient understands the need to report any treatment-related symptoms. Prior to administration, when applicable, the following 8 elements of medication administration were reviewed with 2nd Registered Nurse prior to dosing: drug name, drug dose, infusion volume when prepared in a syringe, rate of administration, expiration dates and/or times, appearance and integrity of drug(s), and rate of pump for infusion. The 5 rights of medication administration have been verified.

## 2021-08-30 ENCOUNTER — TELEPHONE (OUTPATIENT)
Dept: INFUSION THERAPY | Age: 61
End: 2021-08-30

## 2021-08-30 ENCOUNTER — TELEPHONE (OUTPATIENT)
Dept: ONCOLOGY | Age: 61
End: 2021-08-30

## 2021-08-30 NOTE — TELEPHONE ENCOUNTER
Called pt & scheduled her for IVF on 9/1 per Antwan Mercedes; pt scheduled for 8:15 & voiced understanding.

## 2021-08-30 NOTE — TELEPHONE ENCOUNTER
Called to evaluate how she tolerated chemotherapy and offered supportive care. She does not want to come for IVF. She disclosed she is to have colonoscopy Thursday. I did discuss I don't think this is a good idea given her recent treatment with chemotherapy, weakness, dehydration and weight loss. She is advised to delay. She is willing to come in on Wednesday for supportive care with IVF and to allow for monitoring of blood counts.

## 2021-08-31 ENCOUNTER — TELEPHONE (OUTPATIENT)
Dept: ONCOLOGY | Age: 61
End: 2021-08-31

## 2021-08-31 NOTE — PROGRESS NOTES
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 ER was more than 95%, ME more than 90%, HER-2/baltazar negative by FISH. MRI of bilateral breasts on April 19, 2021 showed  1. Left breast multicentric disease with biopsy proven invasive ductal carcinoma with mucinous component at 11 o'clock and mucinous carcinomas at 2 o'clock and 12 o'clock in the retroareolar breast. [facility] is at least 5 cm of separation between the 11 o'clock mass and 12 o'clock mass.  Additional smooth masses measuring up to 21 mm at biopsy sites are hematomas. 2. No MR evidence of malignancy in the contralateral right breast.      She had bilateral mastectomy on May 6, 2021. 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 Pathological stage classification T2, N0, MX. Pathologist felt that she has 1 contiguous tumor mass measuring about 4.5 cm rather than multicentric disease. I discussed with pathologist who stated that she does not have pure mucinous carcinoma of the breast.     CBC and CMP in March 2021 were grossly unremarkable. Due to family history and personal history of breast cancer, I referred for genetic counseling. She had colonoscopy with removal of 2 polyps in 2014 by Dr. Thanh Carroll. Follow-up in 3 years was recommended. She refused to have further follow-up. Oncotype DX score was 29. I offered adjuvant chemo therapy TC vs AC +T  She opted to [facility] [place], operated by [facility] +T. In May 2021 she was hospitalized for nausea and vomiting. She was seen by GI. She had mild transaminitis and was suspected to have gastroenteritis. Never had any upper endoscopic study. Colonoscopy in June 2014 showed polyps which was removed.   Pathology report showed tubular adenoma.   CT abdomen and pelvis on May 30, 2021:  No acute abnormality within the abdomen and pelvis.   Status post hysterectomy, appendectomy and cholecystectomy.    15 mm subcutaneus soft tissue nodule within the left lower quadrant area of anterior abdomen.  Finding may represent sebaceous cyst or other pathology. CTA chest on May 30, 2021 showed   1. No evidence of acute pulmonary embolism. 2. Soft tissue thickening and fat stranding overlying the bilateral pectoral muscles. CT head on May 31, 2021 showed  No acute intracranial abnormality.  No intraparenchymal abnormal enhancement to suggest intracranial metastatic disease.  MRI is a superior modality for evaluation of intracranial metastasis.   Small probable meningioma within the falx near the convexity. MRI of the brain on June 1, 2021 showed  No acute infarct scattered areas of chronic white matter change in the  periventricular white matter.   No evidence for blood products on gradient imaging.   No  focal intracranial lesion noted     Amylase and lipase were unremarkable. She was placed on reglan with improvement of nausea and vomiting. She was recommended to have upper endoscopic study as outpatient by GI. She is status post bilateral mastectomy. She is scheduled for port insertion next [facility]. She had EGD By Dr. Ramirez Diaz with diagnosis of hiatal hernia, gastric polyp, mild gastritis. She started adjuvant chemotherapy AC on July 16, 2021. SBFT: Unremarkable small bowel follow through series. Bone density in June 2021 showed normal study. ECHO 6/18/2021:  Left ventricular systolic function is normal.  Ejection fraction is visually estimated at 55-60%. C2 dose reduced by 10%  CTA 8/12 no PE    She had stress test 8/13/21 with no infarct or ischemia, normal EF 64%. On September 17, 2021 she came in for follow up visit. She has seen GI and was recommended esophageal stretching.     Esophagram 9/13/2021:  Small hiatal hernia with narrowing of the distal thoracic esophagus just proximal to the hiatal hernia, which may reflect underlying Schatzki's ring. Ingested barium and barium tablet were initially held up at this level before eventually passing into the stomach. Recent labs were reviewed. She is also for chemotherapy today. I recommend to drink extra fluid. She has markedly elevated creatinine at 1.2. She will complete fourth cycle of AC on September 17, 2021. No acute pain. Denies any nausea, vomiting or diarrhea. No fever or chills. No chest pain, shortness of breath or palpitation. No headaches or dizzy spell. No specific bone pain. No melena or hematochezia. Denied any dysuria or hematuria. Past Medical History:   Past Medical History:   Diagnosis Date    Allergic rhinitis due to pollen 11/19/2015    Arthritis     left hip & knee    Chronic back pain     Dehydration 7/22/2021    Depression     Gastroesophageal reflux disease 11/19/2015    Hearing loss 11/19/2015    History of blood transfusion     No reaction per pt    History of cardiac monitoring 04/18/2017    14 day event monitor. Sinus Rhythm    History of nuclear stress test 09/28/2016    cardiolite-normal,EF70%    Hot flashes due to surgical menopause 11/19/2015    Hx of echocardiogram 10/05/2016    EF: >55 %   Mild mitral and tricuspid regurg.      Hyperlipidemia     Hypertension     Follows with PCP    Lexiscan Stress Test 10/14/2020    EF 60%, Normal study, no ischemia    Meniere disease     Morbid obesity due to excess calories ([address] 11/19/2015    Sleep apnea 11/19/2015    sleep study negative    Tilt table evaluation 05/09/2017     positive for neurocardiogenic syncope      Past Surgery History:    Past Surgical History:   Procedure Laterality Date    APPENDECTOMY  1967    CHOLECYSTECTOMY      2017    COLONOSCOPY  2014    Polyps x2 - Dr. Soren Johnson Bilateral 05/06/2021    Dr. Denise Sharp Left 10/19/2020    LEFT HIP TOTAL ARTHROPLASTY - POSTERIOR performed by Maria C Tony MD at [facility] LEFT Left 3/9/2021    US BREAST BIOPSY NEEDLE ADDITIONAL LEFT 3/9/2021 Elena Maloney MD [address] BREAST BIOPSY NEEDLE ADDITIONAL LEFT Left 3/9/2021    US BREAST BIOPSY NEEDLE ADDITIONAL LEFT 3/9/2021 Elena Maloney MD [address] BREAST NEEDLE BIOPSY LEFT Left 3/9/2021    US BREAST NEEDLE BIOPSY LEFT 3/9/2021 Elena Maloney  [facility]   She had complete hysterectomy in 1999 due to endometriosis. Social History:   She denies any history of smoking. No alcohol or illicit drug use. She denies any children. Family History: Mother had stomach and breast cancer, maternal grandmother had breast cancer, colon cancer and stomach cancer. Review of Systems: The remainder of the review of system is unremarkable. Vital Signs: There were no vitals taken for this visit. Physical Exam:  CONSTITUTIONAL: awake, alert, cooperative, no apparent distress   EYES:  sclera clear and conjunctiva pallor  ENT: Normocephalic, without obvious abnormality, atraumatic  NECK: supple, symmetrical  HEMATOLOGIC/LYMPHATIC: no cervical, supraclavicular or axillary lymphadenopathy   LUNGS:CTA bilaterally. No dullness on percussion bilaterally. BREAST: Healed surgical incision status post bilateral mastectomy. CARDIOVASCULAR: regular rate and rhythm, normal S1 and S2, no murmur   ABDOMEN: normal bowel sound, soft, non-distended, non-tender, no masses palpated, no hepatosplenomegaly   MUSCULOSKELETAL: full range of motion noted, tone is normal  NEUROLOGIC: awake, alert, oriented to name, place and time. Motor skills and sensory grossly intact. Cranial nerves II through XII grossly intact. SKIN: Normal skin color, texture, turgor and no jaundice. EXTREMITIES: no LE edema. No cyanosis.         Labs:  Hematology:  Lab Results   Component Value Date    WBC 6.6 08/27/2021    RBC 4.42 08/27/2021    HGB 12.5 08/27/2021    HCT 36.8 (L) 08/27/2021    MCV 83.3 08/27/2021    MCH 28.3 08/27/2021    MCHC 34.0 08/27/2021    RDW 15.4 (H) 08/27/2021     08/27/2021    MPV 9.5 08/27/2021    BANDSPCT 9 11/02/2020    SEGSPCT 48.2 08/27/2021    EOSRELPCT 0.3 08/27/2021    BASOPCT 0.9 08/27/2021    LYMPHOPCT 35.0 08/27/2021    MONOPCT 15.6 (H) 08/27/2021    BANDABS 1.86 11/02/2020    SEGSABS 3.2 08/27/2021    EOSABS 0.0 08/27/2021    BASOSABS 0.1 08/27/2021    LYMPHSABS 2.3 08/27/2021    MONOSABS 1.0 08/27/2021    DIFFTYPE AUTOMATED DIFFERENTIAL 08/27/2021    ANISOCYTOSIS 1+ 11/02/2020    POLYCHROM 1+ 11/02/2020    WBCMORP FEW 11/02/2020    PLTM ADEQUATE 08/12/2021     Lab Results   Component Value Date     (H) 10/25/2020     Chemistry:  Lab Results   Component Value Date     08/27/2021    K 2.6 (LL) 08/27/2021    CL 95 (L) 08/27/2021    CO2 30 08/27/2021    BUN 25 (H) 08/27/2021    CREATININE 1.5 (H) 08/27/2021    GLUCOSE 123 (H) 08/27/2021    CALCIUM 9.5 08/27/2021    PROT 6.8 08/27/2021    LABALBU 4.5 08/27/2021    BILITOT 0.5 08/27/2021    ALKPHOS 70 08/27/2021    AST 31 08/27/2021    ALT 35 08/27/2021    LABGLOM 36 (L) 08/27/2021    GFRAA 43 (L) 08/27/2021    AGRATIO 1.7 12/07/2015    GLOB 2.7 12/07/2015    MG 2.0 08/13/2021    POCGLU 188 (H) 11/16/2020     Lab Results   Component Value Date     (H) 11/14/2020     Lab Results   Component Value Date    TSHHS 2.200 03/01/2021    T4FREE 1.02 03/01/2021     Immunology:  Lab Results   Component Value Date    PROT 6.8 08/27/2021     Coagulation Panel:  Lab Results   Component Value Date    PROTIME 12.9 06/02/2021    INR 1.07 06/02/2021    APTT 31.7 06/02/2021    DDIMER 277 (H) 08/12/2021     Anemia Panel:  Lab Results   Component Value Date    EOMFWLRV15 676.0 05/13/2019    FOLATE >20.0 (H) 05/13/2019      Observations:  No data recorded       Assessment & Plan:    1.   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 Pathological stage T2, N0 sentinel lymph node MX. Oncotype DX was 29. I offered adjuvant chemotherapy  She opted to [facility] + T. We discussed about risk and benefit of adjuvant chemo and endocrine therapy. She started chemo in June 2021. She had dehydration after the first cycle of the chemo. She will complete [facility] on September 17, 2021. I recommend that she drink extra fluid. 2.  Genetic counseling: VUS JUHI    3. Bone density on June 11, 2021 was normal.  Echo in June 2021 showed LVEF at 55 to 60%. 4. She has N/V ? Gastroenteritis. EGD as above. Started on Pantoprazole. Small bowel follow through was unremarkable on 8/2/21. Esophagogram was noted. She was recommended to have esophageal stretching. 5. Mental health: Reports suicidal ideation, 1 past attempt, on Prozac 20 mg, has counselor but was referred to our counselor today. Will make referral to mental health. She is contracted for safety.  had face-to-face visit with patient. 6.  Anemia is related to the chemotherapy. Will monitor CBC. We discussed about diet and weight loss. She has not considered Covid vaccine yet. Return to clinic in 3 weeks. All of her question has been answered for today.

## 2021-09-01 ENCOUNTER — HOSPITAL ENCOUNTER (OUTPATIENT)
Dept: INFUSION THERAPY | Age: 61
Discharge: HOME | End: 2021-09-01
Payer: COMMERCIAL

## 2021-09-01 ENCOUNTER — VIRTUAL VISIT (OUTPATIENT)
Dept: PSYCHOLOGY | Age: 61
End: 2021-09-01
Payer: COMMERCIAL

## 2021-09-01 VITALS
HEIGHT: 66 IN | DIASTOLIC BLOOD PRESSURE: 76 MMHG | BODY MASS INDEX: 34.17 KG/M2 | OXYGEN SATURATION: 98 % | TEMPERATURE: 97 F | SYSTOLIC BLOOD PRESSURE: 132 MMHG | HEART RATE: 94 BPM | RESPIRATION RATE: 18 BRPM | WEIGHT: 212.6 LBS

## 2021-09-01 DIAGNOSIS — E86.0 DEHYDRATION: Primary | ICD-10-CM

## 2021-09-01 DIAGNOSIS — Z17.0 MALIGNANT NEOPLASM OF LEFT BREAST IN FEMALE, ESTROGEN RECEPTOR POSITIVE, UNSPECIFIED SITE OF BREAST (HCC): ICD-10-CM

## 2021-09-01 DIAGNOSIS — C50.912 MALIGNANT NEOPLASM OF LEFT BREAST IN FEMALE, ESTROGEN RECEPTOR POSITIVE, UNSPECIFIED SITE OF BREAST (HCC): ICD-10-CM

## 2021-09-01 DIAGNOSIS — F33.2 SEVERE RECURRENT MAJOR DEPRESSION WITHOUT PSYCHOTIC FEATURES (HCC): Primary | ICD-10-CM

## 2021-09-01 PROCEDURE — 2580000003 HC RX 258: Performed by: INTERNAL MEDICINE

## 2021-09-01 PROCEDURE — 96374 THER/PROPH/DIAG INJ IV PUSH: CPT

## 2021-09-01 PROCEDURE — 96361 HYDRATE IV INFUSION ADD-ON: CPT

## 2021-09-01 PROCEDURE — 96375 TX/PRO/DX INJ NEW DRUG ADDON: CPT

## 2021-09-01 PROCEDURE — 6360000002 HC RX W HCPCS: Performed by: INTERNAL MEDICINE

## 2021-09-01 PROCEDURE — 90832 PSYTX W PT 30 MINUTES: CPT | Performed by: PSYCHOLOGIST

## 2021-09-01 PROCEDURE — 96360 HYDRATION IV INFUSION INIT: CPT

## 2021-09-01 RX ORDER — 0.9 % SODIUM CHLORIDE 0.9 %
1000 INTRAVENOUS SOLUTION INTRAVENOUS ONCE
Status: CANCELLED | OUTPATIENT
Start: 2021-09-01 | End: 2021-09-01

## 2021-09-01 RX ORDER — SODIUM CHLORIDE 9 MG/ML
25 INJECTION, SOLUTION INTRAVENOUS PRN
Status: CANCELLED | OUTPATIENT
Start: 2021-09-01

## 2021-09-01 RX ORDER — HEPARIN SODIUM (PORCINE) LOCK FLUSH IV SOLN 100 UNIT/ML 100 UNIT/ML
500 SOLUTION INTRAVENOUS PRN
Status: DISCONTINUED | OUTPATIENT
Start: 2021-09-01 | End: 2021-09-02 | Stop reason: HOSPADM

## 2021-09-01 RX ORDER — DEXAMETHASONE SODIUM PHOSPHATE 10 MG/ML
8 INJECTION, SOLUTION INTRAMUSCULAR; INTRAVENOUS ONCE
Status: CANCELLED
Start: 2021-09-01 | End: 2021-09-01

## 2021-09-01 RX ORDER — 0.9 % SODIUM CHLORIDE 0.9 %
1000 INTRAVENOUS SOLUTION INTRAVENOUS ONCE
Status: COMPLETED | OUTPATIENT
Start: 2021-09-01 | End: 2021-09-01

## 2021-09-01 RX ORDER — HEPARIN SODIUM (PORCINE) LOCK FLUSH IV SOLN 100 UNIT/ML 100 UNIT/ML
500 SOLUTION INTRAVENOUS PRN
Status: CANCELLED | OUTPATIENT
Start: 2021-09-01

## 2021-09-01 RX ORDER — SODIUM CHLORIDE 0.9 % (FLUSH) 0.9 %
5-40 SYRINGE (ML) INJECTION PRN
Status: DISCONTINUED | OUTPATIENT
Start: 2021-09-01 | End: 2021-09-02 | Stop reason: HOSPADM

## 2021-09-01 RX ORDER — ONDANSETRON 2 MG/ML
8 INJECTION INTRAMUSCULAR; INTRAVENOUS ONCE
Status: COMPLETED | OUTPATIENT
Start: 2021-09-01 | End: 2021-09-01

## 2021-09-01 RX ORDER — SODIUM CHLORIDE 0.9 % (FLUSH) 0.9 %
5-40 SYRINGE (ML) INJECTION PRN
Status: CANCELLED | OUTPATIENT
Start: 2021-09-01

## 2021-09-01 RX ORDER — DEXAMETHASONE SODIUM PHOSPHATE 4 MG/ML
8 INJECTION, SOLUTION INTRA-ARTICULAR; INTRALESIONAL; INTRAMUSCULAR; INTRAVENOUS; SOFT TISSUE ONCE
Status: COMPLETED | OUTPATIENT
Start: 2021-09-01 | End: 2021-09-01

## 2021-09-01 RX ORDER — ONDANSETRON 2 MG/ML
8 INJECTION INTRAMUSCULAR; INTRAVENOUS ONCE
Status: CANCELLED
Start: 2021-09-01 | End: 2021-09-01

## 2021-09-01 RX ADMIN — ONDANSETRON 8 MG: 2 INJECTION INTRAMUSCULAR; INTRAVENOUS at 09:13

## 2021-09-01 RX ADMIN — SODIUM CHLORIDE 1000 ML: 9 INJECTION, SOLUTION INTRAVENOUS at 08:45

## 2021-09-01 RX ADMIN — SODIUM CHLORIDE, PRESERVATIVE FREE 10 ML: 5 INJECTION INTRAVENOUS at 09:55

## 2021-09-01 RX ADMIN — HEPARIN 500 UNITS: 100 SYRINGE at 09:55

## 2021-09-01 RX ADMIN — DEXAMETHASONE SODIUM PHOSPHATE 8 MG: 4 INJECTION, SOLUTION INTRAMUSCULAR; INTRAVENOUS at 09:14

## 2021-09-01 NOTE — PROGRESS NOTES
Patient Location: Home       Provider Location ([facility]/State): Suzanne Young       This virtual visit was conducted via interactive/real-time audio. Pursuant to the emergency declaration under the [facility], [facility] waiver authority and the [employer] Act, this Virtual  Visit was conducted, with patient's consent, to reduce the patient's risk of exposure to COVID-19 and provide continuity of care for an established patient. Services were provided through a telephonic synchronous discussion virtually to substitute for in-person clinic visit. Additionally, this provider made reasonable effort to verify identify of patient, conducted risk benefit analysis and have determined patient's presenting problem and condition are consistent with the use of telepsychology to patient's benefit, ensured pt has access, knowledge, and skills required to use required technology, obtained alternative means of contacting patient, provided pt with alternative means of contacting provider, reviewed informed consent and obtained verbal agreement in lieu of written informed consent, as such is rendered impossible due to the unexpected nature secondary to COVID-19 clinical recommendations. Consent:  He and/or health care decision maker is aware that that he may receive a bill for this telephone service, depending on his insurance coverage, and has provided verbal consent to proceed: Yes    I affirm this is a Patient Initiated Episode with an Established Patient who has not had a related appointment within my department in the past 7 days or scheduled within the next 24 hours. Total Time: minutes: 21-30 minutes        Behavioral Health Consultation  Keren David Psy.D.   Psychologist      Time spent with Patient: 30 minutes  Visit number: 2  Reason for Consult:  depression  Referring Provider: Sony Stanford MD  [id number] [facility] [address],  [address] [facility]    S:  ----------------------------------------------------------------------------------------------------------------------  depression  \"My thoughts are one way or another. Sometimes I feel like I can do it and other times I feel like I want to give up. \"     Worries she has become a burden on her nephew. Feels like she is dependent on him and she feels guilty bc of this. Endorsed depressed mood, anhedonia, anergia, amotivation, poor sleep, fluctuating appetite, diminished concentration, and corrosive self-image. Passive SI, w neither planning, nor intent. ONGOING:  -breast cancer treatment  -sister  in May 2018    O:  ----------------------------------------------------------------------------------------------------------------------  MSE:  Orientation:  oriented to person, place, time, and general circumstances  Appearance and behavior:  alert, cooperative  Speech:  spontaneous, normal rate and normal volume  Mood: depressed and anxious   Thought Content:  intact, hopelessness and helplessness  Thought Process:  linear, goal directed and coherent  Interest/Pleasure: Loss of Pleasure/Fun  Sleep disturbance: Yes  Motivation: Poor  Energy: Tired/Fatigued  Morbid ideation Yes  Suicide Assessment: suicidal ideation with no plan or intent    A:  ----------------------------------------------------------------------------------------------------------------------  Diagnosis:    1.  Severe recurrent major depression without psychotic features [facility])         PHQ Scores 2021 2021 2021 2021 4/15/2021 2021 2019   PHQ2 Score 0 2 1 4 5 4 1   PHQ9 Score 0 2 1 19 22 18 1     Interpretation of Total Score Depression Severity: 1-4 = Minimal depression, 5-9 = Mild depression, 10-14 = Moderate depression, 15-19 = Moderately severe depression, 20-27 = Severe depression    P:  ----------------------------------------------------------------------------------------------------------------------    General:   [x] [place]-setting to identify pt's primary goals for [facility] visit / overall health   [x] Provided psychoeducation/handout on:   1. Severe recurrent major depression without psychotic features ([facility] [address]        [x]  Supportive interventions    Cognitive:   [x] Trained in strategies for increasing balanced thinking   [x] Cognitive strategies to target current mental health sx    [x] Identified and challenged maladaptive thoughts    Behavioral:   [x] Discussed and set plan for behavioral activation   [x] Discussed and problem-solved barriers in adhering to behavioral change plan   [x] Motivational Interviewing to increase patient confidence and compliance with       adhering to behavioral change plan   [x] Discussed potential barriers to change   [x] Discussed self-care (sleep, nutrition, rewarding activities, social support, exercise)    Other:   []   []   []   []    Recommendations to patient:    1. Return to Dr. Maxi Posey in 4 week(s)    2.  Write grief narrative                  Feedback provided to pt's PCP via [employer] and/or oral report

## 2021-09-01 NOTE — PROGRESS NOTES
Patient arrived to treatment suite for  IVF infusion. Pt states that she has had an increase in nausea, fatigue and diarrhea, and a decrease in appetite. Also has c/o blurred vision that is getting worse since starting chemo, as well as numbness in the feet that has become worse since starting chemo. Does have SOB when walking  Discussed pt concerns with Dr. Karla Oconnell, Dr. Karla Oconnell recommended IV hydration, Decadron and Zofran and may need to decrease Chemo. Pt will have Appt with Francia Christy NP on 07 Sept 2021 @ 1000. Pt advised to increase PO fluids. No further complaints at this time. Right chest mediport accessed and blood return noted. Patient states nausea and fatigue. Treatment approved and given. Patient tolerated well. Fatigue and nausea have decreased with Tx. Left treatment suite ambulatory. Discharge instructions provided.

## 2021-09-05 NOTE — PROGRESS NOTES
Patient Name:  Angelina Bradford  Patient :  1960  Patient MRN:  [id number]     Primary Oncologist: Elodia Hines MD  Referring Provider: Cameron David MD     Date of Service: 2021         Chief Complaint:    Chief Complaint   Patient presents with    Follow-up      She came in for follow-up visit. Patient Active Problem List:     Essential hypertension     Hyperlipidemia     Allergic rhinitis due to pollen     Morbid obesity due to excess calories     Hearing loss     Hot flashes due to surgical menopause     Gastroesophageal reflux disease     Chronic back pain     Anxiety and depression     Osteoarthritis     Meniere's disease     Insomnia     Precordial pain     SOB (shortness of breath)     Calculus of gallbladder without cholecystitis without obstruction     S/P laparoscopic cholecystectomy     Vasovagal syncope     Class 2 obesity due to excess calories without serious comorbidity in adult     Palpitations     Family history of early     Closed fracture of left ankle     Acute respiratory failure with hypoxia      Status post left hip replacement     Hyperglycemia     Mucinous carcinoma of breast, left     Malignant neoplasm of left female breast     Infiltrating ductal carcinoma of left breast      S/P mastectomy, bilateral     Hx of lymph node excision    HPI:   Priya Méndez is a pleasant 64year old female patient who was referred for evaluation of pathologic stage T2, N0, MX invasive ductal carcinoma of the left breast, ER/ME positive HER-2 negative, status post bilateral mastectomy in May 2021. She went for the routine mammogram in 2021 and denied any palpable lump to her breast.  Mammogram at that time showed at least 2 indeterminate complex masses at the 2:00 and 12 o'clock position in the left breast in the retroareolar region.     She underwent ultrasound-guided needle core biopsy of the retroareolar 2:00, retroareolar 12:00, 11:00 left breast which showed invasive ductal endoscopic study. Colonoscopy in June 2014 showed polyps which was removed. Pathology report showed tubular adenoma.   CT abdomen and pelvis on May 30, 2021:  No acute abnormality within the abdomen and pelvis.   Status post hysterectomy, appendectomy and cholecystectomy.    15 mm subcutaneus soft tissue nodule within the left lower quadrant area of anterior abdomen.  Finding may represent sebaceous cyst or other pathology. CTA chest on May 30, 2021 showed   1. No evidence of acute pulmonary embolism. 2. Soft tissue thickening and fat stranding overlying the bilateral pectoral  muscles. CT head on May 31, 2021 showed  No acute intracranial abnormality.  No intraparenchymal abnormal enhancement to suggest intracranial metastatic disease.  MRI is a superior modality for evaluation of intracranial metastasis.   Small probable meningioma within the falx near the convexity. MRI of the brain on June 1, 2021 showed  No acute infarct scattered areas of chronic white matter change in the  periventricular white matter.   No evidence for blood products on gradient imaging.   No  focal intracranial lesion noted     Amylase and lipase were unremarkable. She was placed on reglan with improvement of nausea and vomiting. She was recommended to have upper endoscopic study as outpatient by GI. She is status post bilateral mastectomy. She is scheduled for port insertion next Thursday. She had EGD By Dr. Chuy Kaminski with diagnosis of hiatal hernia, gastric polyp, mild gastritis. She was started on pantoprazole. She reports she is struggling some with depression. She is on Prozac 20 mg/day. She has established with a counselor. She reports some suicidal ideation. I referred for our counselor today and will make a referral to mental health. She contacted for safety and  had face-to-face visit today. On August 20 21 she came in for follow-up visit.   She started adjuvant chemotherapy AC on July 16, 2021.  SBFT:  Unremarkable small bowel follow through series. Bone density in June 2021 showed normal study. ECHO 6/18/2021:   Left ventricular systolic function is normal.   Ejection fraction is visually estimated at 55-60%. C2 dose reduced by 10%  CTA 8/12 no PE    She has had falls recently due to dizziness. She declines repeat MRI brain. She reports she has been following with cardiology for further evaluation of dizziness. She had stress test 8/13/21 with no infarct or ischemia, normal EF 64%. Presented on September 7, 2021 for scheduled follow up. She reported neuropathy at last treatment with supportive care. She reports this is stable. Dose of chemo was decreased by 10%. She reports eating and drinking well. She has left hip pain that predates chemotherapy. She will see Dr. Severo Ruiz 10/21 for follow up. She has cramping at left TMJ to follow up with dentist- present since 5/21, this is intermittent. She has dysphagia and will have esophagram Monday. Denies mouth sores, no chest pain, shortness of breath, no rash, no diarrhea, no melena or hematochezia. Past Medical History:   Past Medical History:   Diagnosis Date    Allergic rhinitis due to pollen 11/19/2015    Arthritis     left hip & knee    Chronic back pain     Dehydration 7/22/2021    Depression     Gastroesophageal reflux disease 11/19/2015    Hearing loss 11/19/2015    History of blood transfusion     No reaction per pt    History of cardiac monitoring 04/18/2017    14 day event monitor. Sinus Rhythm    History of nuclear stress test 09/28/2016    cardiolite-normal,EF70%    Hot flashes due to surgical menopause 11/19/2015    Hx of echocardiogram 10/05/2016    EF: >55 %   Mild mitral and tricuspid regurg.      Hyperlipidemia     Hypertension     Follows with PCP    Lexiscan Stress Test 10/14/2020    EF 60%, Normal study, no ischemia    Meniere disease     Morbid obesity due to excess calories ([address] 11/19/2015    mastectomy. CARDIOVASCULAR: regular rate and rhythm, normal S1 and S2, no murmur   ABDOMEN: normal bowel sound, soft, non-distended, non-tender, no masses palpated, no hepatosplenomegaly   MUSCULOSKELETAL: full range of motion noted, tone is normal  NEUROLOGIC: awake, alert, oriented to name, place and time. Motor skills grossly intact. Cranial nerves II through XII grossly intact. SKIN: Normal skin color, texture, turgor and no jaundice. EXTREMITIES: no LE edema. No cyanosis.         Labs:  Hematology:  Lab Results   Component Value Date    WBC 6.6 08/27/2021    RBC 4.42 08/27/2021    HGB 12.5 08/27/2021    HCT 36.8 (L) 08/27/2021    MCV 83.3 08/27/2021    MCH 28.3 08/27/2021    MCHC 34.0 08/27/2021    RDW 15.4 (H) 08/27/2021     08/27/2021    MPV 9.5 08/27/2021    BANDSPCT 9 11/02/2020    SEGSPCT 48.2 08/27/2021    EOSRELPCT 0.3 08/27/2021    BASOPCT 0.9 08/27/2021    LYMPHOPCT 35.0 08/27/2021    MONOPCT 15.6 (H) 08/27/2021    BANDABS 1.86 11/02/2020    SEGSABS 3.2 08/27/2021    EOSABS 0.0 08/27/2021    BASOSABS 0.1 08/27/2021    LYMPHSABS 2.3 08/27/2021    MONOSABS 1.0 08/27/2021    DIFFTYPE AUTOMATED DIFFERENTIAL 08/27/2021    ANISOCYTOSIS 1+ 11/02/2020    POLYCHROM 1+ 11/02/2020    WBCMORP FEW 11/02/2020    PLTM ADEQUATE 08/12/2021     Lab Results   Component Value Date     (H) 10/25/2020     Chemistry:  Lab Results   Component Value Date     08/27/2021    K 2.6 (LL) 08/27/2021    CL 95 (L) 08/27/2021    CO2 30 08/27/2021    BUN 25 (H) 08/27/2021    CREATININE 1.5 (H) 08/27/2021    GLUCOSE 123 (H) 08/27/2021    CALCIUM 9.5 08/27/2021    PROT 6.8 08/27/2021    LABALBU 4.5 08/27/2021    BILITOT 0.5 08/27/2021    ALKPHOS 70 08/27/2021    AST 31 08/27/2021    ALT 35 08/27/2021    LABGLOM 36 (L) 08/27/2021    GFRAA 43 (L) 08/27/2021    AGRATIO 1.7 12/07/2015    GLOB 2.7 12/07/2015    MG 2.0 08/13/2021    POCGLU 188 (H) 11/16/2020     Lab Results   Component Value Date     (H) 11/14/2020     Lab Results   Component Value Date    TSHHS 2.200 03/01/2021    T4FREE 1.02 03/01/2021     Immunology:  Lab Results   Component Value Date    PROT 6.8 08/27/2021     Coagulation Panel:  Lab Results   Component Value Date    PROTIME 12.9 06/02/2021    INR 1.07 06/02/2021    APTT 31.7 06/02/2021    DDIMER 277 (H) 08/12/2021     Anemia Panel:  Lab Results   Component Value Date    DCEAAQUB04 676.0 05/13/2019    FOLATE >20.0 (H) 05/13/2019      Observations:  No data recorded       Assessment & Plan:    1. She has mixed invasive ductal carcinoma and mucinous carcinoma of the left breast status post bilateral mastectomy on May 6, 2021, ER/KS positive and HER-2/baltazar negative. Pathological stage T2, N0 sentinel lymph node MX. Oncotype DX was 29. I offered adjuvant chemotherapy  She opted to [facility] T. We discussed about risk and benefit of adjuvant chemo and endocrine therapy. She started chemo in June 2021. She had dehydration after the first cycle of the chemo. Dose reduction with C2 and C3    2. Genetic counseling: VUS JUHI    3. Bone density on June 11, 2021 was normal.  Echo in June 2021 showed LVEF at 55 to 60%. 4. She has N/V ? Gastroenteritis. EGD as above. Started on Pantoprazole. Bowel follow-through was requested by Dr. Miguel Burgos. Small bowel follow through was unremarkable on 8/2/21. Esophagram 9/21.     5. Mental health: Reports suicidal ideation, 1 past attempt, on Prozac 20 mg, has counselor but was referred to our counselor today. Will make referral to mental health. She is contracted for safety.  had face-to-face visit with patient. 6.  Anemia is related to the chemotherapy. Will monitor CBC today. She declines imaging for hip. Will follow with dentist for TMJ cramping. Weight increased today. Pain- tylenol not helping 8/10 pain. To have tramadol temporarily. OARRS reviewed. Pain contract reviewed. She has not considered Covid vaccine yet.     Return to clinic in 3 weeks. All of her question has been answered for today.

## 2021-09-07 ENCOUNTER — HOSPITAL ENCOUNTER (OUTPATIENT)
Dept: INFUSION THERAPY | Age: 61
Discharge: HOME | End: 2021-09-07
Payer: COMMERCIAL

## 2021-09-07 ENCOUNTER — OFFICE VISIT (OUTPATIENT)
Dept: ONCOLOGY | Age: 61
End: 2021-09-07
Payer: COMMERCIAL

## 2021-09-07 ENCOUNTER — CLINICAL DOCUMENTATION (OUTPATIENT)
Dept: ONCOLOGY | Age: 61
End: 2021-09-07

## 2021-09-07 VITALS
SYSTOLIC BLOOD PRESSURE: 108 MMHG | HEIGHT: 66 IN | DIASTOLIC BLOOD PRESSURE: 74 MMHG | HEART RATE: 80 BPM | BODY MASS INDEX: 35.84 KG/M2 | WEIGHT: 223 LBS | OXYGEN SATURATION: 97 % | TEMPERATURE: 96.5 F

## 2021-09-07 DIAGNOSIS — C50.912 MUCINOUS CARCINOMA OF BREAST, LEFT (HCC): Primary | ICD-10-CM

## 2021-09-07 DIAGNOSIS — R25.2 MUSCLE CRAMPING: ICD-10-CM

## 2021-09-07 DIAGNOSIS — C50.912 MUCINOUS CARCINOMA OF BREAST, LEFT (HCC): ICD-10-CM

## 2021-09-07 DIAGNOSIS — E87.6 HYPOKALEMIA: ICD-10-CM

## 2021-09-07 DIAGNOSIS — D70.1 AGRANULOCYTOSIS SECONDARY TO CANCER CHEMOTHERAPY (HCC): Primary | ICD-10-CM

## 2021-09-07 DIAGNOSIS — R53.1 WEAKNESS: ICD-10-CM

## 2021-09-07 DIAGNOSIS — T45.1X5A AGRANULOCYTOSIS SECONDARY TO CANCER CHEMOTHERAPY (HCC): Primary | ICD-10-CM

## 2021-09-07 LAB
ALBUMIN SERPL-MCNC: 3.9 GM/DL (ref 3.4–5)
ALP BLD-CCNC: 50 IU/L (ref 40–128)
ALT SERPL-CCNC: 12 U/L (ref 10–40)
ANION GAP SERPL CALCULATED.3IONS-SCNC: 11 MMOL/L (ref 4–16)
AST SERPL-CCNC: 10 IU/L (ref 15–37)
BASOPHILS ABSOLUTE: 0.1 K/CU MM
BASOPHILS RELATIVE PERCENT: 3.4 % (ref 0–1)
BILIRUB SERPL-MCNC: 0.3 MG/DL (ref 0–1)
BUN BLDV-MCNC: 17 MG/DL (ref 6–23)
CALCIUM SERPL-MCNC: 8.7 MG/DL (ref 8.3–10.6)
CHLORIDE BLD-SCNC: 102 MMOL/L (ref 99–110)
CO2: 27 MMOL/L (ref 21–32)
CREAT SERPL-MCNC: 1 MG/DL (ref 0.6–1.1)
DIFFERENTIAL TYPE: ABNORMAL
EOSINOPHILS ABSOLUTE: 0 K/CU MM
EOSINOPHILS RELATIVE PERCENT: 1 % (ref 0–3)
GFR AFRICAN AMERICAN: >60 ML/MIN/1.73M2
GFR NON-AFRICAN AMERICAN: 57 ML/MIN/1.73M2
GLUCOSE BLD-MCNC: 101 MG/DL (ref 70–99)
HCT VFR BLD CALC: 29.9 % (ref 37–47)
HEMOGLOBIN: 10.2 GM/DL (ref 12.5–16)
LYMPHOCYTES ABSOLUTE: 1.3 K/CU MM
LYMPHOCYTES RELATIVE PERCENT: 65.2 % (ref 24–44)
MAGNESIUM: 1.5 MG/DL (ref 1.8–2.4)
MCH RBC QN AUTO: 28.7 PG (ref 27–31)
MCHC RBC AUTO-ENTMCNC: 34.1 % (ref 32–36)
MCV RBC AUTO: 84.2 FL (ref 78–100)
MONOCYTES ABSOLUTE: 0.2 K/CU MM
MONOCYTES RELATIVE PERCENT: 7.4 % (ref 0–4)
PDW BLD-RTO: 15.2 % (ref 11.7–14.9)
PLATELET # BLD: 88 K/CU MM (ref 140–440)
PMV BLD AUTO: 9.1 FL (ref 7.5–11.1)
POTASSIUM SERPL-SCNC: 3.2 MMOL/L (ref 3.5–5.1)
RBC # BLD: 3.55 M/CU MM (ref 4.2–5.4)
SEGMENTED NEUTROPHILS ABSOLUTE COUNT: 0.5 K/CU MM
SEGMENTED NEUTROPHILS RELATIVE PERCENT: 23 % (ref 36–66)
SODIUM BLD-SCNC: 140 MMOL/L (ref 135–145)
TOTAL PROTEIN: 5.8 GM/DL (ref 6.4–8.2)
WBC # BLD: 2 K/CU MM (ref 4–10.5)

## 2021-09-07 PROCEDURE — 1036F TOBACCO NON-USER: CPT | Performed by: NURSE PRACTITIONER

## 2021-09-07 PROCEDURE — 99211 OFF/OP EST MAY X REQ PHY/QHP: CPT

## 2021-09-07 PROCEDURE — 85025 COMPLETE CBC W/AUTO DIFF WBC: CPT

## 2021-09-07 PROCEDURE — G8427 DOCREV CUR MEDS BY ELIG CLIN: HCPCS | Performed by: NURSE PRACTITIONER

## 2021-09-07 PROCEDURE — 83735 ASSAY OF MAGNESIUM: CPT

## 2021-09-07 PROCEDURE — 3017F COLORECTAL CA SCREEN DOC REV: CPT | Performed by: NURSE PRACTITIONER

## 2021-09-07 PROCEDURE — 80053 COMPREHEN METABOLIC PANEL: CPT

## 2021-09-07 PROCEDURE — G8417 CALC BMI ABV UP PARAM F/U: HCPCS | Performed by: NURSE PRACTITIONER

## 2021-09-07 PROCEDURE — 36415 COLL VENOUS BLD VENIPUNCTURE: CPT

## 2021-09-07 PROCEDURE — 99214 OFFICE O/P EST MOD 30 MIN: CPT | Performed by: NURSE PRACTITIONER

## 2021-09-07 RX ORDER — TRAMADOL HYDROCHLORIDE 50 MG/1
50 TABLET ORAL EVERY 8 HOURS PRN
Qty: 42 TABLET | Refills: 0 | Status: SHIPPED | OUTPATIENT
Start: 2021-09-07 | End: 2021-10-14 | Stop reason: SDUPTHER

## 2021-09-07 NOTE — PROGRESS NOTES
Called patient to review CBC results. Informed patient that her WBC was 2.0, segs absolute 0.5. Informed that Dr Vijay Patricio has ordered Granix 300 mcg injection x 1. Patient verbalized understanding of order. Patient scheduled on 09/09/21 @ 1330. Discussed universal precautions and informed that she is at risk for infection. Patient states that she is using precautions. Confirmed appointment date and time and plan of care.

## 2021-09-07 NOTE — PROGRESS NOTES
MA Rooming Questions  Patient: Michael Orozco  MRN: [id number]    Date: 9/7/2021        1. Do you have any new issues? yes - left lower jaw trembling occasionally, tired         2. Do you need any refills on medications?    no    3. Have you had any imaging done since your last visit?   no    4. Have you been hospitalized or seen in the emergency room since your last visit here?   no    5. Did the patient have a depression screening completed today?  No    No data recorded     PHQ-9 Given to (if applicable):               PHQ-9 Score (if applicable):                     [] Positive     []  Negative              Does question #9 need addressed (if applicable)                     [] Yes    []  No               Huber Kidd CMA

## 2021-09-08 ENCOUNTER — CLINICAL DOCUMENTATION (OUTPATIENT)
Dept: ONCOLOGY | Age: 61
End: 2021-09-08

## 2021-09-08 DIAGNOSIS — E83.42 HYPOMAGNESEMIA: Primary | ICD-10-CM

## 2021-09-08 RX ORDER — MAGNESIUM OXIDE 400 MG/1
400 TABLET ORAL DAILY
Qty: 14 TABLET | Refills: 0 | Status: SHIPPED | OUTPATIENT
Start: 2021-09-08 | End: 2021-09-22

## 2021-09-09 ENCOUNTER — HOSPITAL ENCOUNTER (OUTPATIENT)
Dept: INFUSION THERAPY | Age: 61
Discharge: HOME | End: 2021-09-09
Payer: COMMERCIAL

## 2021-09-09 DIAGNOSIS — C50.912 MUCINOUS CARCINOMA OF BREAST, LEFT (HCC): Primary | ICD-10-CM

## 2021-09-09 DIAGNOSIS — C50.912 MUCINOUS CARCINOMA OF BREAST, LEFT (HCC): ICD-10-CM

## 2021-09-09 DIAGNOSIS — E83.42 HYPOMAGNESEMIA: ICD-10-CM

## 2021-09-09 DIAGNOSIS — E87.6 HYPOKALEMIA: Primary | ICD-10-CM

## 2021-09-09 DIAGNOSIS — D70.1 AGRANULOCYTOSIS SECONDARY TO CANCER CHEMOTHERAPY (HCC): ICD-10-CM

## 2021-09-09 DIAGNOSIS — T45.1X5A AGRANULOCYTOSIS SECONDARY TO CANCER CHEMOTHERAPY (HCC): ICD-10-CM

## 2021-09-09 LAB
ALBUMIN SERPL-MCNC: 3.9 GM/DL (ref 3.4–5)
ALP BLD-CCNC: 53 IU/L (ref 40–129)
ALT SERPL-CCNC: 10 U/L (ref 10–40)
ANION GAP SERPL CALCULATED.3IONS-SCNC: 11 MMOL/L (ref 4–16)
AST SERPL-CCNC: 10 IU/L (ref 15–37)
BASOPHILS ABSOLUTE: 0.1 K/CU MM
BASOPHILS RELATIVE PERCENT: 2.5 % (ref 0–1)
BILIRUB SERPL-MCNC: 0.1 MG/DL (ref 0–1)
BUN BLDV-MCNC: 14 MG/DL (ref 6–23)
CALCIUM SERPL-MCNC: 8.6 MG/DL (ref 8.3–10.6)
CHLORIDE BLD-SCNC: 102 MMOL/L (ref 99–110)
CO2: 28 MMOL/L (ref 21–32)
CREAT SERPL-MCNC: 1 MG/DL (ref 0.6–1.1)
DIFFERENTIAL TYPE: ABNORMAL
EOSINOPHILS ABSOLUTE: 0 K/CU MM
EOSINOPHILS RELATIVE PERCENT: 1 % (ref 0–3)
GFR AFRICAN AMERICAN: 43 ML/MIN/1.73M2
GFR AFRICAN AMERICAN: >60 ML/MIN/1.73M2
GFR NON-AFRICAN AMERICAN: 35 ML/MIN/1.73M2
GFR NON-AFRICAN AMERICAN: 57 ML/MIN/1.73M2
GLUCOSE BLD-MCNC: 112 MG/DL (ref 70–99)
GLUCOSE BLD-MCNC: 115 MG/DL (ref 70–99)
HCT VFR BLD CALC: 28.6 % (ref 37–47)
HEMOGLOBIN: 9.5 GM/DL (ref 12.5–16)
LYMPHOCYTES ABSOLUTE: 1.2 K/CU MM
LYMPHOCYTES RELATIVE PERCENT: 61.3 % (ref 24–44)
MAGNESIUM: 1.4 MG/DL (ref 1.8–2.4)
MCH RBC QN AUTO: 28.4 PG (ref 27–31)
MCHC RBC AUTO-ENTMCNC: 33.2 % (ref 32–36)
MCV RBC AUTO: 85.6 FL (ref 78–100)
MONOCYTES ABSOLUTE: 0.4 K/CU MM
MONOCYTES RELATIVE PERCENT: 20.1 % (ref 0–4)
PDW BLD-RTO: 15.9 % (ref 11.7–14.9)
PLATELET # BLD: 149 K/CU MM (ref 140–440)
PMV BLD AUTO: 9.1 FL (ref 7.5–11.1)
POC BUN: 13 MG/DL (ref 8–26)
POC CALCIUM: 1.13 MMOL/L (ref 1.12–1.32)
POC CHLORIDE: 103 MMOL/L (ref 98–109)
POC CO2: 29 MMOL/L (ref 21–32)
POC CREATININE: 1.5 MG/DL (ref 0.6–1.1)
POTASSIUM SERPL-SCNC: 3 MMOL/L (ref 3.5–4.5)
POTASSIUM SERPL-SCNC: 3.2 MMOL/L (ref 3.5–5.1)
RBC # BLD: 3.34 M/CU MM (ref 4.2–5.4)
SEGMENTED NEUTROPHILS ABSOLUTE COUNT: 0.3 K/CU MM
SEGMENTED NEUTROPHILS RELATIVE PERCENT: 15.1 % (ref 36–66)
SODIUM BLD-SCNC: 141 MMOL/L (ref 135–145)
SODIUM BLD-SCNC: 143 MMOL/L (ref 138–146)
SOURCE, BLOOD GAS: ABNORMAL
TOTAL PROTEIN: 5.7 GM/DL (ref 6.4–8.2)
WBC # BLD: 2 K/CU MM (ref 4–10.5)

## 2021-09-09 PROCEDURE — 96372 THER/PROPH/DIAG INJ SC/IM: CPT

## 2021-09-09 PROCEDURE — 36415 COLL VENOUS BLD VENIPUNCTURE: CPT

## 2021-09-09 PROCEDURE — 83735 ASSAY OF MAGNESIUM: CPT

## 2021-09-09 PROCEDURE — 85025 COMPLETE CBC W/AUTO DIFF WBC: CPT

## 2021-09-09 PROCEDURE — 6360000002 HC RX W HCPCS: Performed by: INTERNAL MEDICINE

## 2021-09-09 PROCEDURE — 80053 COMPREHEN METABOLIC PANEL: CPT

## 2021-09-09 RX ADMIN — FILGRASTIM-SNDZ 300 MCG: 300 INJECTION, SOLUTION INTRAVENOUS; SUBCUTANEOUS at 13:46

## 2021-09-09 NOTE — PROGRESS NOTES
Patient arrived to treatment suite for Granix injection. WBC was 2.0 on 9/7. Treatment approved and given (Zarxio) subcutaneously in left arm, band-aid applied. Patient tolerated well. Left treatment suite ambulatory. Discharge instructions declined.

## 2021-09-10 ENCOUNTER — TELEPHONE (OUTPATIENT)
Dept: ONCOLOGY | Age: 61
End: 2021-09-10

## 2021-09-10 DIAGNOSIS — I10 ESSENTIAL HYPERTENSION: ICD-10-CM

## 2021-09-10 RX ORDER — POTASSIUM CHLORIDE 20 MEQ/1
40 TABLET, EXTENDED RELEASE ORAL 3 TIMES DAILY
Qty: 18 TABLET | Refills: 0 | Status: SHIPPED | OUTPATIENT
Start: 2021-09-10 | End: 2021-10-26

## 2021-09-10 NOTE — TELEPHONE ENCOUNTER
Reviewed results with pt and explained the need for increased potassium supplementation. Pt expressed understanding. Script sent to pharmacy.

## 2021-09-13 ENCOUNTER — HOSPITAL ENCOUNTER (OUTPATIENT)
Dept: GENERAL RADIOLOGY | Age: 61
Discharge: HOME | End: 2021-09-13
Payer: COMMERCIAL

## 2021-09-13 DIAGNOSIS — K63.5 HYPERPLASTIC POLYP OF INTESTINE: ICD-10-CM

## 2021-09-13 DIAGNOSIS — R13.10 PROBLEMS WITH SWALLOWING AND MASTICATION: ICD-10-CM

## 2021-09-13 PROCEDURE — 74220 X-RAY XM ESOPHAGUS 1CNTRST: CPT

## 2021-09-15 DIAGNOSIS — C50.912 MUCINOUS CARCINOMA OF BREAST, LEFT (HCC): Primary | ICD-10-CM

## 2021-09-17 ENCOUNTER — OFFICE VISIT (OUTPATIENT)
Dept: ONCOLOGY | Age: 61
End: 2021-09-17
Payer: COMMERCIAL

## 2021-09-17 ENCOUNTER — HOSPITAL ENCOUNTER (OUTPATIENT)
Dept: INFUSION THERAPY | Age: 61
Discharge: HOME | End: 2021-09-17
Payer: COMMERCIAL

## 2021-09-17 VITALS
BODY MASS INDEX: 36.03 KG/M2 | HEART RATE: 90 BPM | DIASTOLIC BLOOD PRESSURE: 71 MMHG | OXYGEN SATURATION: 98 % | WEIGHT: 224.2 LBS | HEIGHT: 66 IN | SYSTOLIC BLOOD PRESSURE: 123 MMHG

## 2021-09-17 VITALS
HEART RATE: 90 BPM | RESPIRATION RATE: 16 BRPM | BODY MASS INDEX: 36.03 KG/M2 | OXYGEN SATURATION: 98 % | HEIGHT: 66 IN | TEMPERATURE: 96.5 F | SYSTOLIC BLOOD PRESSURE: 123 MMHG | DIASTOLIC BLOOD PRESSURE: 71 MMHG | WEIGHT: 224.2 LBS

## 2021-09-17 DIAGNOSIS — C50.912 MUCINOUS CARCINOMA OF BREAST, LEFT (HCC): Primary | ICD-10-CM

## 2021-09-17 DIAGNOSIS — Z17.0 MALIGNANT NEOPLASM OF LEFT BREAST IN FEMALE, ESTROGEN RECEPTOR POSITIVE, UNSPECIFIED SITE OF BREAST (HCC): ICD-10-CM

## 2021-09-17 DIAGNOSIS — C50.912 INFILTRATING DUCTAL CARCINOMA OF LEFT BREAST (HCC): ICD-10-CM

## 2021-09-17 DIAGNOSIS — C50.912 MALIGNANT NEOPLASM OF LEFT BREAST IN FEMALE, ESTROGEN RECEPTOR POSITIVE, UNSPECIFIED SITE OF BREAST (HCC): ICD-10-CM

## 2021-09-17 LAB
ALBUMIN SERPL-MCNC: 4 GM/DL (ref 3.4–5)
ALP BLD-CCNC: 62 IU/L (ref 40–128)
ALT SERPL-CCNC: 10 U/L (ref 10–40)
ANION GAP SERPL CALCULATED.3IONS-SCNC: 14 MMOL/L (ref 4–16)
AST SERPL-CCNC: 13 IU/L (ref 15–37)
BASOPHILS ABSOLUTE: 0.1 K/CU MM
BASOPHILS RELATIVE PERCENT: 0.6 % (ref 0–1)
BILIRUB SERPL-MCNC: 0.2 MG/DL (ref 0–1)
BUN BLDV-MCNC: 17 MG/DL (ref 6–23)
CALCIUM SERPL-MCNC: 8.9 MG/DL (ref 8.3–10.6)
CHLORIDE BLD-SCNC: 101 MMOL/L (ref 99–110)
CO2: 25 MMOL/L (ref 21–32)
CREAT SERPL-MCNC: 1.2 MG/DL (ref 0.6–1.1)
DIFFERENTIAL TYPE: ABNORMAL
EOSINOPHILS ABSOLUTE: 0 K/CU MM
EOSINOPHILS RELATIVE PERCENT: 0.2 % (ref 0–3)
GFR AFRICAN AMERICAN: 56 ML/MIN/1.73M2
GFR NON-AFRICAN AMERICAN: 46 ML/MIN/1.73M2
GLUCOSE BLD-MCNC: 120 MG/DL (ref 70–99)
HCT VFR BLD CALC: 30.4 % (ref 37–47)
HEMOGLOBIN: 9.9 GM/DL (ref 12.5–16)
LYMPHOCYTES ABSOLUTE: 2.1 K/CU MM
LYMPHOCYTES RELATIVE PERCENT: 22.6 % (ref 24–44)
MCH RBC QN AUTO: 28.9 PG (ref 27–31)
MCHC RBC AUTO-ENTMCNC: 32.6 % (ref 32–36)
MCV RBC AUTO: 88.9 FL (ref 78–100)
MONOCYTES ABSOLUTE: 1.1 K/CU MM
MONOCYTES RELATIVE PERCENT: 11.8 % (ref 0–4)
PDW BLD-RTO: 18.9 % (ref 11.7–14.9)
PLATELET # BLD: 334 K/CU MM (ref 140–440)
PMV BLD AUTO: 8.9 FL (ref 7.5–11.1)
POTASSIUM SERPL-SCNC: 3.8 MMOL/L (ref 3.5–5.1)
RBC # BLD: 3.42 M/CU MM (ref 4.2–5.4)
SEGMENTED NEUTROPHILS ABSOLUTE COUNT: 6 K/CU MM
SEGMENTED NEUTROPHILS RELATIVE PERCENT: 64.8 % (ref 36–66)
SODIUM BLD-SCNC: 140 MMOL/L (ref 135–145)
TOTAL PROTEIN: 6.1 GM/DL (ref 6.4–8.2)
WBC # BLD: 9.3 K/CU MM (ref 4–10.5)

## 2021-09-17 PROCEDURE — 96413 CHEMO IV INFUSION 1 HR: CPT

## 2021-09-17 PROCEDURE — 96367 TX/PROPH/DG ADDL SEQ IV INF: CPT

## 2021-09-17 PROCEDURE — 96375 TX/PRO/DX INJ NEW DRUG ADDON: CPT

## 2021-09-17 PROCEDURE — G8427 DOCREV CUR MEDS BY ELIG CLIN: HCPCS | Performed by: INTERNAL MEDICINE

## 2021-09-17 PROCEDURE — 96409 CHEMO IV PUSH SNGL DRUG: CPT

## 2021-09-17 PROCEDURE — G8417 CALC BMI ABV UP PARAM F/U: HCPCS | Performed by: INTERNAL MEDICINE

## 2021-09-17 PROCEDURE — 3017F COLORECTAL CA SCREEN DOC REV: CPT | Performed by: INTERNAL MEDICINE

## 2021-09-17 PROCEDURE — 1036F TOBACCO NON-USER: CPT | Performed by: INTERNAL MEDICINE

## 2021-09-17 PROCEDURE — 96417 CHEMO IV INFUS EACH ADDL SEQ: CPT

## 2021-09-17 PROCEDURE — 80053 COMPREHEN METABOLIC PANEL: CPT

## 2021-09-17 PROCEDURE — 85025 COMPLETE CBC W/AUTO DIFF WBC: CPT

## 2021-09-17 PROCEDURE — 2580000003 HC RX 258: Performed by: INTERNAL MEDICINE

## 2021-09-17 PROCEDURE — 6360000002 HC RX W HCPCS: Performed by: INTERNAL MEDICINE

## 2021-09-17 PROCEDURE — 36591 DRAW BLOOD OFF VENOUS DEVICE: CPT

## 2021-09-17 PROCEDURE — 99214 OFFICE O/P EST MOD 30 MIN: CPT | Performed by: INTERNAL MEDICINE

## 2021-09-17 RX ORDER — SODIUM CHLORIDE 9 MG/ML
20 INJECTION, SOLUTION INTRAVENOUS ONCE
Status: CANCELLED | OUTPATIENT
Start: 2021-09-17 | End: 2021-09-17

## 2021-09-17 RX ORDER — PALONOSETRON 0.05 MG/ML
0.25 INJECTION, SOLUTION INTRAVENOUS ONCE
Status: CANCELLED | OUTPATIENT
Start: 2021-09-17

## 2021-09-17 RX ORDER — EPINEPHRINE 1 MG/ML
0.3 INJECTION, SOLUTION, CONCENTRATE INTRAVENOUS PRN
Status: CANCELLED | OUTPATIENT
Start: 2021-09-17

## 2021-09-17 RX ORDER — SODIUM CHLORIDE 0.9 % (FLUSH) 0.9 %
5-40 SYRINGE (ML) INJECTION PRN
Status: CANCELLED | OUTPATIENT
Start: 2021-09-17

## 2021-09-17 RX ORDER — SODIUM CHLORIDE 9 MG/ML
INJECTION, SOLUTION INTRAVENOUS CONTINUOUS
Status: CANCELLED | OUTPATIENT
Start: 2021-09-17

## 2021-09-17 RX ORDER — SODIUM CHLORIDE 0.9 % (FLUSH) 0.9 %
5-40 SYRINGE (ML) INJECTION PRN
Status: DISCONTINUED | OUTPATIENT
Start: 2021-09-17 | End: 2021-09-18 | Stop reason: HOSPADM

## 2021-09-17 RX ORDER — SODIUM CHLORIDE 9 MG/ML
20 INJECTION, SOLUTION INTRAVENOUS ONCE
Status: COMPLETED | OUTPATIENT
Start: 2021-09-17 | End: 2021-09-17

## 2021-09-17 RX ORDER — DOXORUBICIN HYDROCHLORIDE 2 MG/ML
54 INJECTION, SOLUTION INTRAVENOUS ONCE
Status: CANCELLED | OUTPATIENT
Start: 2021-09-17

## 2021-09-17 RX ORDER — PALONOSETRON 0.05 MG/ML
0.25 INJECTION, SOLUTION INTRAVENOUS ONCE
Status: COMPLETED | OUTPATIENT
Start: 2021-09-17 | End: 2021-09-17

## 2021-09-17 RX ORDER — SODIUM CHLORIDE 9 MG/ML
25 INJECTION, SOLUTION INTRAVENOUS PRN
Status: CANCELLED | OUTPATIENT
Start: 2021-09-17

## 2021-09-17 RX ORDER — HEPARIN SODIUM (PORCINE) LOCK FLUSH IV SOLN 100 UNIT/ML 100 UNIT/ML
500 SOLUTION INTRAVENOUS PRN
Status: DISCONTINUED | OUTPATIENT
Start: 2021-09-17 | End: 2021-09-18 | Stop reason: HOSPADM

## 2021-09-17 RX ORDER — HEPARIN SODIUM (PORCINE) LOCK FLUSH IV SOLN 100 UNIT/ML 100 UNIT/ML
500 SOLUTION INTRAVENOUS PRN
Status: CANCELLED | OUTPATIENT
Start: 2021-09-17

## 2021-09-17 RX ORDER — DIPHENHYDRAMINE HYDROCHLORIDE 50 MG/ML
50 INJECTION INTRAMUSCULAR; INTRAVENOUS ONCE
Status: CANCELLED | OUTPATIENT
Start: 2021-09-17 | End: 2021-09-17

## 2021-09-17 RX ORDER — METHYLPREDNISOLONE SODIUM SUCCINATE 125 MG/2ML
125 INJECTION, POWDER, LYOPHILIZED, FOR SOLUTION INTRAMUSCULAR; INTRAVENOUS ONCE
Status: CANCELLED | OUTPATIENT
Start: 2021-09-17 | End: 2021-09-17

## 2021-09-17 RX ORDER — DOXORUBICIN HYDROCHLORIDE 2 MG/ML
54 INJECTION, SOLUTION INTRAVENOUS ONCE
Status: COMPLETED | OUTPATIENT
Start: 2021-09-17 | End: 2021-09-17

## 2021-09-17 RX ADMIN — PALONOSETRON 0.25 MG: 0.25 INJECTION, SOLUTION INTRAVENOUS at 10:50

## 2021-09-17 RX ADMIN — SODIUM CHLORIDE 20 ML/HR: 9 INJECTION, SOLUTION INTRAVENOUS at 10:49

## 2021-09-17 RX ADMIN — CYCLOPHOSPHAMIDE 1140 MG: 1 INJECTION, POWDER, FOR SOLUTION INTRAVENOUS; ORAL at 12:09

## 2021-09-17 RX ADMIN — DEXAMETHASONE SODIUM PHOSPHATE 12 MG: 4 INJECTION INTRA-ARTICULAR; INTRALESIONAL; INTRAMUSCULAR; INTRAVENOUS; SOFT TISSUE at 11:26

## 2021-09-17 RX ADMIN — FOSAPREPITANT 150 MG: 150 INJECTION, POWDER, LYOPHILIZED, FOR SOLUTION INTRAVENOUS at 10:51

## 2021-09-17 RX ADMIN — HEPARIN 500 UNITS: 100 SYRINGE at 12:47

## 2021-09-17 RX ADMIN — DOXORUBICIN HYDROCHLORIDE 114 MG: 2 INJECTION, SOLUTION INTRAVENOUS at 11:51

## 2021-09-17 RX ADMIN — SODIUM CHLORIDE, PRESERVATIVE FREE 10 ML: 5 INJECTION INTRAVENOUS at 12:47

## 2021-09-17 ASSESSMENT — PAIN SCALES - GENERAL: PAINLEVEL_OUTOF10: 6

## 2021-09-17 ASSESSMENT — PATIENT HEALTH QUESTIONNAIRE - PHQ9
SUM OF ALL RESPONSES TO PHQ9 QUESTIONS 1 & 2: 0
1. LITTLE INTEREST OR PLEASURE IN DOING THINGS: 0
SUM OF ALL RESPONSES TO PHQ QUESTIONS 1-9: 0
2. FEELING DOWN, DEPRESSED OR HOPELESS: 0

## 2021-09-17 ASSESSMENT — PAIN DESCRIPTION - LOCATION: LOCATION: BACK

## 2021-09-17 NOTE — PROGRESS NOTES
MA Rooming Questions  Patient: Villa Hernandez  MRN: [id number]    Date: 9/17/2021        1. Do you have any new issues? yes - Back         2. Do you need any refills on medications?    no    3. Have you had any imaging done since your last visit? yes - Esophagram    4. Have you been hospitalized or seen in the emergency room since your last visit here?   no    5. Did the patient have a depression screening completed today?  Yes    PHQ-9 Total Score: 0 (9/17/2021 10:44 AM)       PHQ-9 Given to (if applicable):               PHQ-9 Score (if applicable):                     [] Positive     []  Negative              Does question #9 need addressed (if applicable)                     [] Yes    []  No               Jayant Thompson CMA

## 2021-09-17 NOTE — PROGRESS NOTES
Patient arrived to treatment suite for blood draw, pre-medications and chemotherapy infusions. Right chest mediport accessed and blood drawn from site and sent to lab for processing. Patient has no questions or concerns for the doctor at this time. Treatment approved and given. Blood return checked every minute of Doxorubicin push and found to be present. Patient tolerated well. Left treatment suite ambulatory. Discharge instructions provided. Patient's status assessed and documented appropriately. All labs and required results were also reviewed today. Treatment parameters have been reviewed. Today's treatment has been approved by the provider. Treatment orders and medication sequencing (when applicable) was verified by 2 registered nurses. The treatment plan was confirmed with the patient prior to administration, and the patient understands the need to report any treatment-related symptoms. Prior to administration, when applicable, the following 8 elements of medication administration were reviewed with 2nd Registered Nurse prior to dosing: drug name, drug dose, infusion volume when prepared in a syringe, rate of administration, expiration dates and/or times, appearance and integrity of drug(s), and rate of pump for infusion. The 5 rights of medication administration have been verified.

## 2021-09-30 ENCOUNTER — OFFICE VISIT (OUTPATIENT)
Dept: CARDIOLOGY CLINIC | Age: 61
End: 2021-09-30
Payer: COMMERCIAL

## 2021-09-30 VITALS
HEART RATE: 100 BPM | SYSTOLIC BLOOD PRESSURE: 136 MMHG | HEIGHT: 64 IN | DIASTOLIC BLOOD PRESSURE: 88 MMHG | BODY MASS INDEX: 38.96 KG/M2 | WEIGHT: 228.2 LBS

## 2021-09-30 DIAGNOSIS — R07.2 PRECORDIAL PAIN: ICD-10-CM

## 2021-09-30 DIAGNOSIS — R55 VASOVAGAL SYNCOPE: Primary | ICD-10-CM

## 2021-09-30 DIAGNOSIS — E66.09 CLASS 2 OBESITY DUE TO EXCESS CALORIES WITHOUT SERIOUS COMORBIDITY WITH BODY MASS INDEX (BMI) OF 37.0 TO 37.9 IN ADULT: ICD-10-CM

## 2021-09-30 DIAGNOSIS — R00.2 PALPITATIONS: ICD-10-CM

## 2021-09-30 DIAGNOSIS — R06.02 SOB (SHORTNESS OF BREATH): ICD-10-CM

## 2021-09-30 DIAGNOSIS — E78.2 MIXED HYPERLIPIDEMIA: ICD-10-CM

## 2021-09-30 DIAGNOSIS — I10 ESSENTIAL HYPERTENSION: ICD-10-CM

## 2021-09-30 PROCEDURE — 1036F TOBACCO NON-USER: CPT | Performed by: INTERNAL MEDICINE

## 2021-09-30 PROCEDURE — G8417 CALC BMI ABV UP PARAM F/U: HCPCS | Performed by: INTERNAL MEDICINE

## 2021-09-30 PROCEDURE — 99214 OFFICE O/P EST MOD 30 MIN: CPT | Performed by: INTERNAL MEDICINE

## 2021-09-30 PROCEDURE — 3017F COLORECTAL CA SCREEN DOC REV: CPT | Performed by: INTERNAL MEDICINE

## 2021-09-30 PROCEDURE — G8427 DOCREV CUR MEDS BY ELIG CLIN: HCPCS | Performed by: INTERNAL MEDICINE

## 2021-09-30 NOTE — PROGRESS NOTES
OFFICE PROGRESS NOTES      Korin George is a 63 y.o. female who has    CHIEF COMPLAINT AS FOLLOWS:  CHEST PAIN: At the place of Breast biopsy.   SOB:  Has SOB with exertion but no change over previous noted.  Had COVID back in Nov.               LEG EDEMA:  B/L Lower extremity edema is present but no change over previous.   PALPITATIONS:  C/O brief episodes of palpitations, which are not frequent.   DIZZINESS: No C/O Dizziness                       SYNCOPE: an episode recently   OTHER: Patient is on Chemo now,    HPI: Patient is here for F/U on her HTN & Dyslipidemia problems. HTN: Patient has known Hx of essential HTN. Has been treated with guideline recommended medical / physical/ diet therapy as stated below. Dyslipidemia: Patient has known Hx of mixed dyslipidemia. Has been treated with guideline recommended medical / physical/ diet therapy as stated below. She does not have any new complaints at this time. Current Outpatient Medications   Medication Sig Dispense Refill    potassium chloride (KLOR-CON M) 20 MEQ extended release tablet Take 2 tablets by mouth 3 times daily for 3 days 18 tablet 0    Magic Mouthwash (MIRACLE MOUTHWASH) Swish and spit 5 mLs 4 times daily as needed for Irritation 500 mL 0    mirtazapine (REMERON) 15 MG tablet Take 1 tablet by mouth nightly 30 tablet 3    pantoprazole (PROTONIX) 40 MG tablet Take 1 tablet by mouth 2 times daily 60 tablet 3    hydroCHLOROthiazide (HYDRODIURIL) 25 MG tablet Take 0.5 tablets by mouth daily 45 tablet 3    promethazine (PHENERGAN) 12.5 MG tablet Take 10 mg by mouth every 6 hours as needed for Nausea      prochlorperazine (COMPAZINE) 10 MG tablet Take 1 tablet by mouth every 6 hours as needed (chemotherapy induced nausea/vomiting) 30 tablet 1    dexamethasone (DECADRON) 4 MG tablet 1 tablet PO daily for 4 days starting the day after chemotherapy.  16 tablet 0    metoprolol tartrate (LOPRESSOR) 50 MG tablet Take 1 tablet by mouth 2 times daily 180 tablet 3    metoclopramide (REGLAN) 5 MG tablet Take 1 tablet by mouth 3 times daily 30 tablet 1    furosemide (LASIX) 20 MG tablet Take 20 mg by mouth daily       aspirin 81 MG chewable tablet Take 81 mg by mouth daily      Multiple Vitamins-Minerals (HAIR SKIN AND NAILS FORMULA) TABS Take 1 tablet by mouth daily       losartan (COZAAR) 25 MG tablet Take 1 tablet by mouth daily 30 tablet 5    acetaminophen (TYLENOL) 500 MG tablet Take 500 mg by mouth every 6 hours as needed for Pain      ezetimibe (ZETIA) 10 MG tablet Take 1 tablet by mouth daily 90 tablet 3    loratadine (CLARITIN) 10 MG tablet Take 1 tablet by mouth daily 30 tablet 5     No current facility-administered medications for this visit. Allergies: Celexa [citalopram], Atorvastatin, Fenofibrate, Mestinon [pyridostigmine], Penicillins, Sulfa antibiotics, Tape [adhesive tape], Gemfibrozil, and Meloxicam  Review of Systems:    Constitutional: Negative for diaphoresis and fatigue  Respiratory: Negative for shortness of breath  Cardiovascular: Negative for chest pain, dyspnea on exertion, claudication, edema, irregular heartbeat, murmur, palpitations or shortness of breath  Musculoskeletal: Negative for muscle pain, muscular weakness, negative for pain in arm and leg or swelling in foot and leg    Objective: There were no vitals taken for this visit. Wt Readings from Last 3 Encounters:   09/17/21 224 lb 3.2 oz (101.7 kg)   09/17/21 224 lb 3.2 oz (101.7 kg)   09/07/21 223 lb (101.2 kg)     There is no height or weight on file to calculate BMI. GENERAL - Alert, oriented, pleasant, in no apparent distress. EYES: No jaundice, no conjunctival pallor. Neck - Supple. No jugular venous distention noted. No carotid bruits. Cardiovascular - Normal S1 and S2 without obvious murmur or gallop. Extremities - No cyanosis, clubbing, There is B/L significant edema. Pulmonary - No respiratory distress. No wheezes or rales.       Lab Review   Lab Results   Component Value Date    TROPONINT <0.010 08/13/2021    TROPONINT <0.010 08/12/2021     Lab Results   Component Value Date    PROBNP 240.7 08/12/2021    PROBNP 190.6 05/30/2021     Lab Results   Component Value Date    INR 1.07 06/02/2021    INR 1.33 11/16/2020     Lab Results   Component Value Date    LABA1C 6.0 08/13/2021    LABA1C 5.6 05/04/2021     Lab Results   Component Value Date    WBC 9.3 09/17/2021    WBC 2.0 (L) 09/09/2021    HCT 30.4 (L) 09/17/2021    HCT 28.6 (L) 09/09/2021    MCV 88.9 09/17/2021    MCV 85.6 09/09/2021     09/17/2021     09/09/2021     Lab Results   Component Value Date    CHOL 292 (H) 08/13/2021    CHOL 270 (H) 03/01/2021    TRIG 266 (H) 08/13/2021    TRIG 385 (H) 03/01/2021    HDL 42 08/13/2021    HDL 40 (L) 03/01/2021    LDLCALC NOT VALID WHEN TRIGLYCERIDE >400 MG/DL. 05/22/2017    LDLCALC 75 12/07/2015    LDLDIRECT 176 (H) 08/13/2021    LDLDIRECT 140 (H) 03/01/2021     Lab Results   Component Value Date    ALT 10 09/17/2021    ALT 10 09/09/2021    AST 13 (L) 09/17/2021    AST 10 (L) 09/09/2021     BMP:    Lab Results   Component Value Date     09/17/2021     09/09/2021    K 3.8 09/17/2021    K 3.0 09/09/2021     09/17/2021     09/09/2021    CO2 25 09/17/2021    CO2 28 09/09/2021    BUN 17 09/17/2021    BUN 14 09/09/2021    CREATININE 1.2 09/17/2021    CREATININE 1.5 09/09/2021    CREATININE 1.0 09/09/2021     CMP:   Lab Results   Component Value Date     09/17/2021     09/09/2021    K 3.8 09/17/2021    K 3.0 09/09/2021     09/17/2021     09/09/2021    CO2 25 09/17/2021    CO2 28 09/09/2021    BUN 17 09/17/2021    BUN 14 09/09/2021    CREATININE 1.2 09/17/2021    CREATININE 1.5 09/09/2021    CREATININE 1.0 09/09/2021    PROT 6.1 09/17/2021    PROT 5.7 09/09/2021    PROT 7.5 09/20/2011     Lab Results   Component Value Date    [facility] 2.200 03/01/2021    TSH 2.740 05/13/2019     CARDIOLITE 10/2020 ORDERED:   none this visit                                      All previously ordered tests reviewed.   ARRHYTHMIAS: None known   MEDICATIONS: CPM.   Office f/u in six months.

## 2021-09-30 NOTE — PATIENT INSTRUCTIONS
CAD:None known but has multiple risk factors. HTN:well controlled on current medical regimen, see list above.            - changes in  treatment:   no   CARDIOMYOPATHY: None known   CONGESTIVE HEART FAILURE: NO KNOWN HISTORY.   VHD: No significant VHD noted  DYSLIPIDEMIA: Patient's profile is at / near [facility] is low                                Tolerating current medical regimen well no. Only on Zetia as there is Statin intolerance                                Does not tolerate Statin medications well due to side effects                                See most recent Lab values in Labs section above. OTHER RELEVANT DIAGNOSIS:as noted in patient's active problem list:  Syncope: Vasovagal   Morbid Obesity. Breast Cancer: Had Surgery now on Chemo  TESTS ORDERED:   none this visit                                      All previously ordered tests reviewed.   ARRHYTHMIAS: None known   MEDICATIONS: CPM.   Office f/u in six months.

## 2021-09-30 NOTE — LETTER
[facility]  [address] [id number]  Phone: [phone]  Fax: [phone]    Nicolas Rousseau MD    September 30, 2021     John Warren MD  [address] [facility]  [address] [place] [id number]    Patient: Edgar Ledbetter   MR Number: [id number]   YOB: 1960   Date of Visit: 9/30/2021       Dear John Warren: Thank you for referring Flako Harris to me for evaluation/treatment. Below are the relevant portions of my assessment and plan of care. If you have questions, please do not hesitate to call me. I look forward to following Charan Adames along with you.     Sincerely,      Nicolas Rousseau MD

## 2021-09-30 NOTE — LETTER
Patient Name: Alysa Root  : 1960  MRN# [id number]    REASON FOR VISIT:       Current Outpatient Medications   Medication Sig Dispense Refill    potassium chloride (KLOR-CON M) 20 MEQ extended release tablet Take 2 tablets by mouth 3 times daily for 3 days 18 tablet 0    Magic Mouthwash (MIRACLE MOUTHWASH) Swish and spit 5 mLs 4 times daily as needed for Irritation 500 mL 0    mirtazapine (REMERON) 15 MG tablet Take 1 tablet by mouth nightly 30 tablet 3    pantoprazole (PROTONIX) 40 MG tablet Take 1 tablet by mouth 2 times daily 60 tablet 3    hydroCHLOROthiazide (HYDRODIURIL) 25 MG tablet Take 0.5 tablets by mouth daily 45 tablet 3    promethazine (PHENERGAN) 12.5 MG tablet Take 10 mg by mouth every 6 hours as needed for Nausea      prochlorperazine (COMPAZINE) 10 MG tablet Take 1 tablet by mouth every 6 hours as needed (chemotherapy induced nausea/vomiting) 30 tablet 1    dexamethasone (DECADRON) 4 MG tablet 1 tablet PO daily for 4 days starting the day after chemotherapy. 16 tablet 0    metoprolol tartrate (LOPRESSOR) 50 MG tablet Take 1 tablet by mouth 2 times daily 180 tablet 3    metoclopramide (REGLAN) 5 MG tablet Take 1 tablet by mouth 3 times daily 30 tablet 1    furosemide (LASIX) 20 MG tablet Take 20 mg by mouth daily       aspirin 81 MG chewable tablet Take 81 mg by mouth daily      Multiple Vitamins-Minerals (HAIR SKIN AND NAILS FORMULA) TABS Take 1 tablet by mouth daily       losartan (COZAAR) 25 MG tablet Take 1 tablet by mouth daily 30 tablet 5    acetaminophen (TYLENOL) 500 MG tablet Take 500 mg by mouth every 6 hours as needed for Pain      ezetimibe (ZETIA) 10 MG tablet Take 1 tablet by mouth daily 90 tablet 3    loratadine (CLARITIN) 10 MG tablet Take 1 tablet by mouth daily 30 tablet 5     No current facility-administered medications for this visit. Smoke: What:                           How much:    Alcohol:                                      How Much: Caffeine: Pop:         Tea:            Coffee:                Chocolate:    Exercise:    Labs: Lipids:             CBC:       BMP/CMP:          TSH:              A1C:    Last Visit:  Complaints:  Changes:    Last EKG:      STRESS TEST:  8/2021  Normal tracer uptake in all segments of myocardium on stress and rest    images.    No infarct or ischemia noted.    Normal EF 64 % with normal ventricular contractility. ECHO: 6/2021   Left ventricular systolic function is normal.   Ejection fraction is visually estimated at 55-60%. No significant valvular disease noted. No evidence of any pericardial effusion    CAROTID: NONE    MUGA: NONE    LAST PACER CHECK: NONE    CARDIAC CATH: NONE    Amio Protocol:    CHADS: [id number] Score for Atrial Fibrillation Stroke Risk   Risk   Factors  Component Value   C CHF No 0   H HTN Yes 1   A2 Age >= 76 No,  (66 y.o.) 0   D DM No 0   S2 Prior Stroke/TIA No 0   V Vascular Disease No 0   A Age 74-69 No,  (66 y.o.) 0   Sc Sex female 1    [id number]  Score  2   Score last updated 9/30/21 5:07 AM EDT    Click here for a link to the UpToDate guideline \"Atrial Fibrillation: Anticoagulation therapy to prevent embolization    Disclaimer: Risk Score calculation is dependent on accuracy of patient problem list and past encounter diagnosis.

## 2021-10-01 ENCOUNTER — TELEPHONE (OUTPATIENT)
Dept: CARDIOLOGY CLINIC | Age: 61
End: 2021-10-01

## 2021-10-01 RX ORDER — DOXORUBICIN HYDROCHLORIDE 2 MG/ML
INJECTION, POWDER, LYOPHILIZED, FOR SOLUTION INTRAVENOUS
COMMUNITY
End: 2022-02-01

## 2021-10-01 RX ORDER — PACLITAXEL 6 MG/ML
INJECTION INTRAVENOUS ONCE
COMMUNITY
End: 2022-04-18

## 2021-10-01 RX ORDER — CYCLOPHOSPHAMIDE 1 G/50ML
INJECTION, POWDER, FOR SOLUTION INTRAVENOUS; ORAL
COMMUNITY
End: 2022-04-18

## 2021-10-05 ENCOUNTER — CLINICAL DOCUMENTATION (OUTPATIENT)
Dept: CASE MANAGEMENT | Age: 61
End: 2021-10-05

## 2021-10-05 DIAGNOSIS — C50.912 MUCINOUS CARCINOMA OF BREAST, LEFT (HCC): Primary | ICD-10-CM

## 2021-10-05 DIAGNOSIS — Z79.899 NEED FOR PROPHYLACTIC CHEMOTHERAPY: ICD-10-CM

## 2021-10-05 DIAGNOSIS — Z17.0 MALIGNANT NEOPLASM OF AREOLA OF LEFT BREAST IN FEMALE, ESTROGEN RECEPTOR POSITIVE (HCC): Primary | ICD-10-CM

## 2021-10-05 DIAGNOSIS — C50.012 MALIGNANT NEOPLASM OF AREOLA OF LEFT BREAST IN FEMALE, ESTROGEN RECEPTOR POSITIVE (HCC): Primary | ICD-10-CM

## 2021-10-05 RX ORDER — DEXAMETHASONE 2 MG/1
2 TABLET ORAL
Qty: 28 TABLET | Refills: 0 | Status: SHIPPED | OUTPATIENT
Start: 2021-10-05 | End: 2022-04-18

## 2021-10-05 NOTE — PROGRESS NOTES
Patient will be starting weekly Taxol infusions starting Friday, 10/8/21. Called and spoke with patient on phone. New calendar will be given to patient with dates and how/when to take dexamethasone - copy sent to patient. New script sent for dexamethasone 2 mg to [facility]. Patient instructed to take a total of 8 mg (4 tabs) the night before first chemo infusion only - voiced understanding. To then take 2 mg in the am x2 days after each chemo infusion. Instructed patient to call with any questions/concerns.

## 2021-10-07 RX ORDER — SODIUM CHLORIDE 9 MG/ML
INJECTION, SOLUTION INTRAVENOUS CONTINUOUS
Status: CANCELLED | OUTPATIENT
Start: 2021-10-08

## 2021-10-07 RX ORDER — MEPERIDINE HYDROCHLORIDE 50 MG/ML
12.5 INJECTION INTRAMUSCULAR; INTRAVENOUS; SUBCUTANEOUS ONCE
Status: CANCELLED | OUTPATIENT
Start: 2021-10-08 | End: 2021-10-08

## 2021-10-07 RX ORDER — EPINEPHRINE 1 MG/ML
0.3 INJECTION, SOLUTION, CONCENTRATE INTRAVENOUS PRN
Status: CANCELLED | OUTPATIENT
Start: 2021-10-08

## 2021-10-07 RX ORDER — SODIUM CHLORIDE 0.9 % (FLUSH) 0.9 %
5-40 SYRINGE (ML) INJECTION PRN
Status: CANCELLED | OUTPATIENT
Start: 2021-10-08

## 2021-10-07 RX ORDER — SODIUM CHLORIDE 9 MG/ML
25 INJECTION, SOLUTION INTRAVENOUS PRN
Status: CANCELLED | OUTPATIENT
Start: 2021-10-15

## 2021-10-07 RX ORDER — METHYLPREDNISOLONE SODIUM SUCCINATE 125 MG/2ML
125 INJECTION, POWDER, LYOPHILIZED, FOR SOLUTION INTRAMUSCULAR; INTRAVENOUS ONCE
Status: CANCELLED | OUTPATIENT
Start: 2021-10-08 | End: 2021-10-08

## 2021-10-07 RX ORDER — EPINEPHRINE 1 MG/ML
0.3 INJECTION, SOLUTION, CONCENTRATE INTRAVENOUS PRN
Status: CANCELLED | OUTPATIENT
Start: 2021-10-15

## 2021-10-07 RX ORDER — MEPERIDINE HYDROCHLORIDE 50 MG/ML
12.5 INJECTION INTRAMUSCULAR; INTRAVENOUS; SUBCUTANEOUS ONCE
Status: CANCELLED | OUTPATIENT
Start: 2021-10-15 | End: 2021-10-15

## 2021-10-07 RX ORDER — SODIUM CHLORIDE 9 MG/ML
INJECTION, SOLUTION INTRAVENOUS CONTINUOUS
Status: CANCELLED | OUTPATIENT
Start: 2021-10-15

## 2021-10-07 RX ORDER — DIPHENHYDRAMINE HYDROCHLORIDE 50 MG/ML
50 INJECTION INTRAMUSCULAR; INTRAVENOUS ONCE
Status: CANCELLED | OUTPATIENT
Start: 2021-10-15

## 2021-10-07 RX ORDER — HEPARIN SODIUM (PORCINE) LOCK FLUSH IV SOLN 100 UNIT/ML 100 UNIT/ML
500 SOLUTION INTRAVENOUS PRN
Status: CANCELLED | OUTPATIENT
Start: 2021-10-15

## 2021-10-07 RX ORDER — SODIUM CHLORIDE 0.9 % (FLUSH) 0.9 %
5-40 SYRINGE (ML) INJECTION PRN
Status: CANCELLED | OUTPATIENT
Start: 2021-10-15

## 2021-10-07 RX ORDER — METHYLPREDNISOLONE SODIUM SUCCINATE 125 MG/2ML
125 INJECTION, POWDER, LYOPHILIZED, FOR SOLUTION INTRAMUSCULAR; INTRAVENOUS ONCE
Status: CANCELLED | OUTPATIENT
Start: 2021-10-15 | End: 2021-10-15

## 2021-10-07 RX ORDER — DIPHENHYDRAMINE HYDROCHLORIDE 50 MG/ML
50 INJECTION INTRAMUSCULAR; INTRAVENOUS ONCE
Status: CANCELLED | OUTPATIENT
Start: 2021-10-15 | End: 2021-10-15

## 2021-10-07 RX ORDER — HEPARIN SODIUM (PORCINE) LOCK FLUSH IV SOLN 100 UNIT/ML 100 UNIT/ML
500 SOLUTION INTRAVENOUS PRN
Status: CANCELLED | OUTPATIENT
Start: 2021-10-08

## 2021-10-07 RX ORDER — DIPHENHYDRAMINE HYDROCHLORIDE 50 MG/ML
50 INJECTION INTRAMUSCULAR; INTRAVENOUS ONCE
Status: CANCELLED | OUTPATIENT
Start: 2021-10-08

## 2021-10-07 RX ORDER — DIPHENHYDRAMINE HYDROCHLORIDE 50 MG/ML
50 INJECTION INTRAMUSCULAR; INTRAVENOUS ONCE
Status: CANCELLED | OUTPATIENT
Start: 2021-10-08 | End: 2021-10-08

## 2021-10-07 RX ORDER — SODIUM CHLORIDE 9 MG/ML
20 INJECTION, SOLUTION INTRAVENOUS ONCE
Status: CANCELLED | OUTPATIENT
Start: 2021-10-15 | End: 2021-10-15

## 2021-10-07 RX ORDER — SODIUM CHLORIDE 9 MG/ML
20 INJECTION, SOLUTION INTRAVENOUS ONCE
Status: CANCELLED | OUTPATIENT
Start: 2021-10-08 | End: 2021-10-08

## 2021-10-07 RX ORDER — SODIUM CHLORIDE 9 MG/ML
25 INJECTION, SOLUTION INTRAVENOUS PRN
Status: CANCELLED | OUTPATIENT
Start: 2021-10-08

## 2021-10-08 ENCOUNTER — HOSPITAL ENCOUNTER (OUTPATIENT)
Dept: INFUSION THERAPY | Age: 61
Discharge: HOME | End: 2021-10-08
Payer: COMMERCIAL

## 2021-10-08 VITALS
SYSTOLIC BLOOD PRESSURE: 141 MMHG | TEMPERATURE: 96.5 F | OXYGEN SATURATION: 97 % | DIASTOLIC BLOOD PRESSURE: 77 MMHG | HEART RATE: 110 BPM | HEIGHT: 64 IN | RESPIRATION RATE: 17 BRPM | WEIGHT: 230.38 LBS | BODY MASS INDEX: 39.33 KG/M2

## 2021-10-08 DIAGNOSIS — C50.912 MALIGNANT NEOPLASM OF LEFT BREAST IN FEMALE, ESTROGEN RECEPTOR POSITIVE, UNSPECIFIED SITE OF BREAST (HCC): ICD-10-CM

## 2021-10-08 DIAGNOSIS — C50.912 INFILTRATING DUCTAL CARCINOMA OF LEFT BREAST (HCC): ICD-10-CM

## 2021-10-08 DIAGNOSIS — Z17.0 MALIGNANT NEOPLASM OF LEFT BREAST IN FEMALE, ESTROGEN RECEPTOR POSITIVE, UNSPECIFIED SITE OF BREAST (HCC): ICD-10-CM

## 2021-10-08 DIAGNOSIS — C50.912 MUCINOUS CARCINOMA OF BREAST, LEFT (HCC): Primary | ICD-10-CM

## 2021-10-08 LAB
ALBUMIN SERPL-MCNC: 4.5 GM/DL (ref 3.4–5)
ALP BLD-CCNC: 57 IU/L (ref 40–129)
ALT SERPL-CCNC: 11 U/L (ref 10–40)
ANION GAP SERPL CALCULATED.3IONS-SCNC: 20 MMOL/L (ref 4–16)
AST SERPL-CCNC: 13 IU/L (ref 15–37)
BASOPHILS ABSOLUTE: 0 K/CU MM
BASOPHILS RELATIVE PERCENT: 0.3 % (ref 0–1)
BILIRUB SERPL-MCNC: 0.2 MG/DL (ref 0–1)
BUN BLDV-MCNC: 17 MG/DL (ref 6–23)
CALCIUM SERPL-MCNC: 9.3 MG/DL (ref 8.3–10.6)
CHLORIDE BLD-SCNC: 96 MMOL/L (ref 99–110)
CO2: 21 MMOL/L (ref 21–32)
CREAT SERPL-MCNC: 0.8 MG/DL (ref 0.6–1.1)
DIFFERENTIAL TYPE: ABNORMAL
EOSINOPHILS ABSOLUTE: 0 K/CU MM
EOSINOPHILS RELATIVE PERCENT: 0 % (ref 0–3)
GFR AFRICAN AMERICAN: >60 ML/MIN/1.73M2
GFR NON-AFRICAN AMERICAN: >60 ML/MIN/1.73M2
GLUCOSE BLD-MCNC: 131 MG/DL (ref 70–99)
HCT VFR BLD CALC: 32.7 % (ref 37–47)
HEMOGLOBIN: 11 GM/DL (ref 12.5–16)
LYMPHOCYTES ABSOLUTE: 1.5 K/CU MM
LYMPHOCYTES RELATIVE PERCENT: 14.9 % (ref 24–44)
MCH RBC QN AUTO: 30 PG (ref 27–31)
MCHC RBC AUTO-ENTMCNC: 33.6 % (ref 32–36)
MCV RBC AUTO: 89.1 FL (ref 78–100)
MONOCYTES ABSOLUTE: 0.8 K/CU MM
MONOCYTES RELATIVE PERCENT: 8 % (ref 0–4)
PDW BLD-RTO: 18.6 % (ref 11.7–14.9)
PLATELET # BLD: 359 K/CU MM (ref 140–440)
PMV BLD AUTO: 8.9 FL (ref 7.5–11.1)
POTASSIUM SERPL-SCNC: 3.7 MMOL/L (ref 3.5–5.1)
RBC # BLD: 3.67 M/CU MM (ref 4.2–5.4)
SEGMENTED NEUTROPHILS ABSOLUTE COUNT: 7.6 K/CU MM
SEGMENTED NEUTROPHILS RELATIVE PERCENT: 76.8 % (ref 36–66)
SODIUM BLD-SCNC: 137 MMOL/L (ref 135–145)
TOTAL PROTEIN: 6.6 GM/DL (ref 6.4–8.2)
WBC # BLD: 9.9 K/CU MM (ref 4–10.5)

## 2021-10-08 PROCEDURE — 2500000003 HC RX 250 WO HCPCS: Performed by: INTERNAL MEDICINE

## 2021-10-08 PROCEDURE — 85025 COMPLETE CBC W/AUTO DIFF WBC: CPT

## 2021-10-08 PROCEDURE — 96413 CHEMO IV INFUSION 1 HR: CPT

## 2021-10-08 PROCEDURE — 96375 TX/PRO/DX INJ NEW DRUG ADDON: CPT

## 2021-10-08 PROCEDURE — 2580000003 HC RX 258: Performed by: INTERNAL MEDICINE

## 2021-10-08 PROCEDURE — 36591 DRAW BLOOD OFF VENOUS DEVICE: CPT

## 2021-10-08 PROCEDURE — 6360000002 HC RX W HCPCS: Performed by: INTERNAL MEDICINE

## 2021-10-08 PROCEDURE — 96367 TX/PROPH/DG ADDL SEQ IV INF: CPT

## 2021-10-08 PROCEDURE — 80053 COMPREHEN METABOLIC PANEL: CPT

## 2021-10-08 RX ORDER — SODIUM CHLORIDE 9 MG/ML
20 INJECTION, SOLUTION INTRAVENOUS ONCE
Status: DISCONTINUED | OUTPATIENT
Start: 2021-10-08 | End: 2021-10-09 | Stop reason: HOSPADM

## 2021-10-08 RX ORDER — SODIUM CHLORIDE 0.9 % (FLUSH) 0.9 %
5-40 SYRINGE (ML) INJECTION PRN
Status: DISCONTINUED | OUTPATIENT
Start: 2021-10-08 | End: 2021-10-09 | Stop reason: HOSPADM

## 2021-10-08 RX ORDER — DIPHENHYDRAMINE HYDROCHLORIDE 50 MG/ML
50 INJECTION INTRAMUSCULAR; INTRAVENOUS ONCE
Status: COMPLETED | OUTPATIENT
Start: 2021-10-08 | End: 2021-10-08

## 2021-10-08 RX ORDER — HEPARIN SODIUM (PORCINE) LOCK FLUSH IV SOLN 100 UNIT/ML 100 UNIT/ML
500 SOLUTION INTRAVENOUS PRN
Status: DISCONTINUED | OUTPATIENT
Start: 2021-10-08 | End: 2021-10-09 | Stop reason: HOSPADM

## 2021-10-08 RX ADMIN — PACLITAXEL 168 MG: 6 INJECTION, SOLUTION, CONCENTRATE INTRAVENOUS at 10:52

## 2021-10-08 RX ADMIN — DEXAMETHASONE SODIUM PHOSPHATE 10 MG: 10 INJECTION INTRAMUSCULAR; INTRAVENOUS at 10:32

## 2021-10-08 RX ADMIN — DIPHENHYDRAMINE HYDROCHLORIDE 50 MG: 50 INJECTION INTRAMUSCULAR; INTRAVENOUS at 10:31

## 2021-10-08 RX ADMIN — HEPARIN 500 UNITS: 100 SYRINGE at 12:03

## 2021-10-08 RX ADMIN — SODIUM CHLORIDE 20 ML/HR: 9 INJECTION, SOLUTION INTRAVENOUS at 10:31

## 2021-10-08 RX ADMIN — SODIUM CHLORIDE, PRESERVATIVE FREE 10 ML: 5 INJECTION INTRAVENOUS at 12:03

## 2021-10-08 RX ADMIN — FAMOTIDINE 20 MG: 10 INJECTION, SOLUTION INTRAVENOUS at 10:31

## 2021-10-11 DIAGNOSIS — C50.012 MALIGNANT NEOPLASM OF AREOLA OF LEFT BREAST IN FEMALE, ESTROGEN RECEPTOR POSITIVE (HCC): Primary | ICD-10-CM

## 2021-10-11 DIAGNOSIS — Z17.0 MALIGNANT NEOPLASM OF AREOLA OF LEFT BREAST IN FEMALE, ESTROGEN RECEPTOR POSITIVE (HCC): Primary | ICD-10-CM

## 2021-10-14 ENCOUNTER — HOSPITAL ENCOUNTER (OUTPATIENT)
Dept: INFUSION THERAPY | Age: 61
Discharge: HOME | End: 2021-10-14
Payer: COMMERCIAL

## 2021-10-14 ENCOUNTER — OFFICE VISIT (OUTPATIENT)
Dept: ONCOLOGY | Age: 61
End: 2021-10-14
Payer: COMMERCIAL

## 2021-10-14 VITALS
BODY MASS INDEX: 39.27 KG/M2 | TEMPERATURE: 97.4 F | WEIGHT: 230 LBS | DIASTOLIC BLOOD PRESSURE: 73 MMHG | SYSTOLIC BLOOD PRESSURE: 131 MMHG | HEART RATE: 73 BPM | OXYGEN SATURATION: 96 % | HEIGHT: 64 IN

## 2021-10-14 DIAGNOSIS — C50.912 MUCINOUS CARCINOMA OF BREAST, LEFT (HCC): ICD-10-CM

## 2021-10-14 DIAGNOSIS — C50.012 MALIGNANT NEOPLASM OF AREOLA OF LEFT BREAST IN FEMALE, ESTROGEN RECEPTOR POSITIVE (HCC): ICD-10-CM

## 2021-10-14 DIAGNOSIS — Z79.899 NEED FOR PROPHYLACTIC CHEMOTHERAPY: Primary | ICD-10-CM

## 2021-10-14 DIAGNOSIS — R53.1 WEAKNESS: ICD-10-CM

## 2021-10-14 DIAGNOSIS — Z17.0 MALIGNANT NEOPLASM OF AREOLA OF LEFT BREAST IN FEMALE, ESTROGEN RECEPTOR POSITIVE (HCC): ICD-10-CM

## 2021-10-14 DIAGNOSIS — R11.0 NAUSEA: ICD-10-CM

## 2021-10-14 PROCEDURE — 1036F TOBACCO NON-USER: CPT | Performed by: NURSE PRACTITIONER

## 2021-10-14 PROCEDURE — G8484 FLU IMMUNIZE NO ADMIN: HCPCS | Performed by: NURSE PRACTITIONER

## 2021-10-14 PROCEDURE — 3017F COLORECTAL CA SCREEN DOC REV: CPT | Performed by: NURSE PRACTITIONER

## 2021-10-14 PROCEDURE — G8417 CALC BMI ABV UP PARAM F/U: HCPCS | Performed by: NURSE PRACTITIONER

## 2021-10-14 PROCEDURE — 99214 OFFICE O/P EST MOD 30 MIN: CPT | Performed by: NURSE PRACTITIONER

## 2021-10-14 PROCEDURE — G8427 DOCREV CUR MEDS BY ELIG CLIN: HCPCS | Performed by: NURSE PRACTITIONER

## 2021-10-14 PROCEDURE — 99211 OFF/OP EST MAY X REQ PHY/QHP: CPT

## 2021-10-14 RX ORDER — PROCHLORPERAZINE MALEATE 10 MG
10 TABLET ORAL EVERY 6 HOURS PRN
Qty: 30 TABLET | Refills: 1 | Status: SHIPPED | OUTPATIENT
Start: 2021-10-14

## 2021-10-14 RX ORDER — TRAMADOL HYDROCHLORIDE 50 MG/1
50 TABLET ORAL EVERY 8 HOURS PRN
Qty: 42 TABLET | Refills: 0 | Status: SHIPPED | OUTPATIENT
Start: 2021-10-14 | End: 2021-10-28

## 2021-10-14 NOTE — PROGRESS NOTES
Patient Name:  Ria Cruz  Patient :  1960  Patient MRN:  [id number]     Primary Oncologist: Niesha Wall MD  Referring Provider: Cami Duarte MD     Date of Service: 10/14/2021         Chief Complaint:    Chief Complaint   Patient presents with    Follow-up      She came in for follow-up visit. Patient Active Problem List:     Essential hypertension     Hyperlipidemia     Allergic rhinitis due to pollen     Morbid obesity due to excess calories     Hearing loss     Hot flashes due to surgical menopause     Gastroesophageal reflux disease     Chronic back pain     Anxiety and depression     Osteoarthritis     Meniere's disease     Insomnia     Precordial pain     SOB (shortness of breath)     Calculus of gallbladder without cholecystitis without obstruction     S/P laparoscopic cholecystectomy     Vasovagal syncope     Class 2 obesity due to excess calories without serious comorbidity in adult     Palpitations     Family history of early     Closed fracture of left ankle     Acute respiratory failure with hypoxia      Status post left hip replacement     Hyperglycemia     Mucinous carcinoma of breast, left     Malignant neoplasm of left female breast     Infiltrating ductal carcinoma of left breast      S/P mastectomy, bilateral     Hx of lymph node excision    HPI:   Yvette Ramirez is a pleasant 64year old female patient who was referred for evaluation of pathologic stage T2, N0, MX invasive ductal carcinoma of the left breast, ER/FL positive HER-2 negative, status post bilateral mastectomy in May 2021. She went for the routine mammogram in 2021 and denied any palpable lump to her breast.  Mammogram at that time showed at least 2 indeterminate complex masses at the 2:00 and 12 o'clock position in the left breast in the retroareolar region.     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 ER was more than 95%, AL more than 90%, HER-2/baltazar negative by FISH. MRI of bilateral breasts on April 19, 2021 showed  1. Left breast multicentric disease with biopsy proven invasive ductal carcinoma with mucinous component at 11 o'clock and mucinous carcinomas at 2 o'clock and 12 o'clock in the retroareolar breast. Freeda Hung is at least 5 cm of separation between the 11 o'clock mass and 12 o'clock mass.  Additional smooth masses measuring up to 21 mm at biopsy sites are hematomas. 2. No MR evidence of malignancy in the contralateral right breast.      She had bilateral mastectomy on May 6, 2021. Pathology report showed : Invasive ductal and mucinous carcinoma of the left breast, retroareolar, grade 2, 4.5 cm, negative margin, -2 sentinel lymph nodes, ER more than 95%, AL 90%, HER-2/baltazar negative by FISH. Pathological stage classification T2, N0, MX. Pathologist felt that she has 1 contiguous tumor mass measuring about 4.5 cm rather than multicentric disease. I discussed with pathologist who stated that she does not have pure mucinous carcinoma of the breast.     CBC and CMP in March 2021 were grossly unremarkable. Due to family history and personal history of breast cancer, I referred for genetic counseling. She had colonoscopy with removal of 2 polyps in 2014 by Dr. Andee Sandhoff. Follow-up in 3 years was recommended. She refused to have further follow-up. Oncotype DX score was 29. I offered adjuvant chemo therapy TC vs AC +T  She opted to [facility] +T. In May 2021 she was hospitalized for nausea and vomiting. She was seen by GI. She had mild transaminitis and was suspected to have gastroenteritis. Never had any upper endoscopic study. Colonoscopy in June 2014 showed polyps which was removed. Pathology report showed tubular adenoma.   CT abdomen and pelvis on May 30, 2021:   No acute abnormality within the abdomen and pelvis.   Status post hysterectomy, appendectomy and cholecystectomy.    15 mm subcutaneus soft tissue nodule within the left lower quadrant area of anterior abdomen.  Finding may represent sebaceous cyst or other pathology. CTA chest on May 30, 2021 showed   1. No evidence of acute pulmonary embolism. 2. Soft tissue thickening and fat stranding overlying the bilateral pectoral muscles. CT head on May 31, 2021 showed  No acute intracranial abnormality.  No intraparenchymal abnormal enhancement to suggest intracranial metastatic disease.  MRI is a superior modality for evaluation of intracranial metastasis.   Small probable meningioma within the falx near the convexity. MRI of the brain on June 1, 2021 showed  No acute infarct scattered areas of chronic white matter change in the  periventricular white matter.   No evidence for blood products on gradient imaging.   No  focal intracranial lesion noted     Amylase and lipase were unremarkable. She was placed on reglan with improvement of nausea and vomiting. She was recommended to have upper endoscopic study as outpatient by GI. She is status post bilateral mastectomy. She is scheduled for port insertion next [facility]. She had EGD By Dr. Rylee Baca with diagnosis of hiatal hernia, gastric polyp, mild gastritis. She started adjuvant chemotherapy AC on July 16, 2021. SBFT: Unremarkable small bowel follow through series. Bone density in June 2021 showed normal study. ECHO 6/18/2021:  Left ventricular systolic function is normal.  Ejection fraction is visually estimated at 55-60%. C2 dose reduced by 10%  CTA 8/12 no PE    She had stress test 8/13/21 with no infarct or ischemia, normal EF 64%. On September 17, 2021 she came in for follow up visit. She has seen GI and was recommended esophageal stretching.     Esophagram 9/13/2021:  Small hiatal hernia with narrowing of the distal thoracic esophagus just proximal to the hiatal hernia, which may reflect underlying Schatzki's ring. Ingested barium and barium tablet were initially held up at this level before eventually passing into the stomach. Recent labs were reviewed. She is also for chemotherapy today. I recommend to drink extra fluid. She has markedly elevated creatinine at 1.2. She will complete fourth cycle of AC on September 17, 2021. Presented on October 14, 2021 for scheduled follow up. She is tolerating taxol more easily than AC. She does report occasional nausea and headache. Relates headaches to times of treatment and with nausea  I changed her antiemetic to compazine and encouraged her to take it regularly. She is using magic mouthwash regularly. Weight is stable. She denies acute pain. Denies fever, chills, night sweats, no dizziness, visual changes. Denies mucositis, dysphagia, no cough, chest pain, hemoptysis, shortness of breath, no nausea, vomiting, diarrhea, no constipation, no melena or hematochezia, no dysuria, hematuria, no lower extremity edema or calf pain. Denies acute pain. No worsening depression. Past Medical History:   Past Medical History:   Diagnosis Date    Allergic rhinitis due to pollen 11/19/2015    Arthritis     left hip & knee    Chronic back pain     Dehydration 7/22/2021    Depression     Gastroesophageal reflux disease 11/19/2015    Hearing loss 11/19/2015    History of blood transfusion     No reaction per pt    History of cardiac monitoring 04/18/2017    14 day event monitor. Sinus Rhythm    History of nuclear stress test 09/28/2016    cardiolite-normal,EF70%    Hot flashes due to surgical menopause 11/19/2015    Hx of echocardiogram 10/05/2016    EF: >55 %   Mild mitral and tricuspid regurg.      Hyperlipidemia     Hypertension     Follows with PCP    Lexiscan Stress Test 10/14/2020    EF 60%, Normal study, no ischemia    Meniere disease     Morbid obesity due to excess calories ([address] 11/19/2015    Sleep apnea 11/19/2015    sleep study negative    Tilt table evaluation 05/09/2017     positive for neurocardiogenic syncope      Past Surgery History:    Past Surgical History:   Procedure Laterality Date    APPENDECTOMY  1967    CHOLECYSTECTOMY      2017    COLONOSCOPY  2014    Polyps x2 - Dr. Collin Caro Bilateral 05/06/2021    Dr. Rosalino Araujo Left 10/19/2020    LEFT HIP TOTAL ARTHROPLASTY - POSTERIOR performed by Elli Barajas MD at [facility] LEFT Left 3/9/2021    US BREAST BIOPSY NEEDLE ADDITIONAL LEFT 3/9/2021 Charu Hoyt MD [address] BREAST BIOPSY NEEDLE ADDITIONAL LEFT Left 3/9/2021    US BREAST BIOPSY NEEDLE ADDITIONAL LEFT 3/9/2021 Charu Hoyt MD [address] BREAST NEEDLE BIOPSY LEFT Left 3/9/2021    US BREAST NEEDLE BIOPSY LEFT 3/9/2021 Charu Hoyt  [address]   She had complete hysterectomy in 1999 due to endometriosis. Social History:   She denies any history of smoking. No alcohol or illicit drug use. She denies any children. Family History: Mother had stomach and breast cancer, maternal grandmother had breast cancer, colon cancer and stomach cancer. Review of Systems: The remainder of the review of system is unremarkable. Vital Signs: /73 (Site: Right Upper Arm, Position: Sitting, Cuff Size: Large Adult)   Pulse 73   Temp 97.4 °F (36.3 °C) (Infrared)   Ht 5' 4\" (1.626 m)   Wt 230 lb (104.3 kg)   SpO2 96%   BMI 39.48 kg/m²      Physical Exam:  CONSTITUTIONAL: awake, alert, cooperative, no apparent distress   EYES:  sclera clear and conjunctiva pallor  ENT: Normocephalic, without obvious abnormality, atraumatic  NECK: supple, symmetrical  HEMATOLOGIC/LYMPHATIC: no cervical, supraclavicular or axillary lymphadenopathy   LUNGS:CTA bilaterally. No dullness on percussion bilaterally.   BREAST: Healed surgical incision status post bilateral mastectomy. CARDIOVASCULAR: regular rate and rhythm, normal S1 and S2, no murmur   ABDOMEN: normal bowel sound, soft, non-distended, non-tender, no masses palpated, no hepatosplenomegaly   MUSCULOSKELETAL: full range of motion noted, tone is normal  NEUROLOGIC: awake, alert, oriented to name, place and time. Motor skills and sensory grossly intact. Cranial nerves II through XII grossly intact. SKIN: Normal skin color, texture, turgor and no jaundice. EXTREMITIES: no LE edema. No cyanosis.         Labs:  Hematology:  Lab Results   Component Value Date    WBC 9.9 10/08/2021    RBC 3.67 (L) 10/08/2021    HGB 11.0 (L) 10/08/2021    HCT 32.7 (L) 10/08/2021    MCV 89.1 10/08/2021    MCH 30.0 10/08/2021    MCHC 33.6 10/08/2021    RDW 18.6 (H) 10/08/2021     10/08/2021    MPV 8.9 10/08/2021    BANDSPCT 9 11/02/2020    SEGSPCT 76.8 (H) 10/08/2021    EOSRELPCT 0.0 10/08/2021    BASOPCT 0.3 10/08/2021    LYMPHOPCT 14.9 (L) 10/08/2021    MONOPCT 8.0 (H) 10/08/2021    BANDABS 1.86 11/02/2020    SEGSABS 7.6 10/08/2021    EOSABS 0.0 10/08/2021    BASOSABS 0.0 10/08/2021    LYMPHSABS 1.5 10/08/2021    MONOSABS 0.8 10/08/2021    DIFFTYPE AUTOMATED DIFFERENTIAL 10/08/2021    ANISOCYTOSIS 1+ 11/02/2020    POLYCHROM 1+ 11/02/2020    WBCMORP FEW 11/02/2020    PLTM ADEQUATE 08/12/2021     Lab Results   Component Value Date     (H) 10/25/2020     Chemistry:  Lab Results   Component Value Date     10/08/2021    K 3.7 10/08/2021    CL 96 (L) 10/08/2021    CO2 21 10/08/2021    BUN 17 10/08/2021    CREATININE 0.8 10/08/2021    GLUCOSE 131 (H) 10/08/2021    CALCIUM 9.3 10/08/2021    PROT 6.6 10/08/2021    LABALBU 4.5 10/08/2021    BILITOT 0.2 10/08/2021    ALKPHOS 57 10/08/2021    AST 13 (L) 10/08/2021    ALT 11 10/08/2021    LABGLOM >60 10/08/2021    GFRAA >60 10/08/2021    AGRATIO 1.7 12/07/2015    GLOB 2.7 12/07/2015    MG 1.4 (L) 09/09/2021    POCCA 1.13 09/09/2021 POCGLU 115 (H) 09/09/2021     Lab Results   Component Value Date     (H) 11/14/2020     Lab Results   Component Value Date    TSHHS 2.200 03/01/2021    T4FREE 1.02 03/01/2021     Immunology:  Lab Results   Component Value Date    PROT 6.6 10/08/2021     Coagulation Panel:  Lab Results   Component Value Date    PROTIME 12.9 06/02/2021    INR 1.07 06/02/2021    APTT 31.7 06/02/2021    DDIMER 277 (H) 08/12/2021     Anemia Panel:  Lab Results   Component Value Date    PTSLHZFG37 676.0 05/13/2019    FOLATE >20.0 (H) 05/13/2019      Observations:  No data recorded       Assessment & Plan:    1. She has mixed invasive ductal carcinoma and mucinous carcinoma of the left breast status post bilateral mastectomy on May 6, 2021, ER/NE positive and HER-2/baltazar negative. Pathological stage T2, N0 sentinel lymph node MX. Oncotype DX was 29. I offered adjuvant chemotherapy  She opted to [facility] [place] + T. We discussed about risk and benefit of adjuvant chemo and endocrine therapy. She started chemo in June 2021. She had dehydration after the first cycle of the chemo. She will complete [facility] [place] on September 17, 2021. I recommend that she drink extra fluid. [id number] 10/8  C2 taxol 10/15    2. Genetic counseling: VUS JUHI    3. Bone density on June 11, 2021 was normal.  Echo in June 2021 showed LVEF at 55 to 60%. 4. She has N/V ? Gastroenteritis. EGD as above. Started on Pantoprazole. Small bowel follow through was unremarkable on 8/2/21. Esophagogram was noted. She was recommended to have esophageal stretching. Changed zofran to compazine. 5. Mental health: Reports suicidal ideation, 1 past attempt, on Prozac 20 mg, has counselor but was referred to our counselor today. Will make referral to mental health. She is contracted for safety.  had face-to-face visit with patient. 6.  Anemia is related to the chemotherapy. Will monitor CBC. 7. Headache- ? zofran- changed to compazine.     We discussed about diet and weight loss. She has not considered Covid vaccine yet. Return to clinic in 3 weeks. All of her question has been answered for today.

## 2021-10-14 NOTE — PROGRESS NOTES
MA Rooming Questions  Patient: Alyssa Chong  MRN: [id number]    Date: 10/14/2021        1. Do you have any new issues?   no         2. Do you need any refills on medications? yes - pain meds    3. Have you had any imaging done since your last visit?   no    4. Have you been hospitalized or seen in the emergency room since your last visit here?   no    5. Did the patient have a depression screening completed today?  No    No data recorded     PHQ-9 Given to (if applicable):               PHQ-9 Score (if applicable):                     [] Positive     []  Negative              Does question #9 need addressed (if applicable)                     [] Yes    []  No               Martin Oliva CMA

## 2021-10-15 ENCOUNTER — HOSPITAL ENCOUNTER (OUTPATIENT)
Dept: INFUSION THERAPY | Age: 61
Discharge: HOME | End: 2021-10-15
Payer: COMMERCIAL

## 2021-10-15 ENCOUNTER — TELEPHONE (OUTPATIENT)
Dept: ONCOLOGY | Age: 61
End: 2021-10-15

## 2021-10-15 VITALS
WEIGHT: 226 LBS | HEART RATE: 105 BPM | TEMPERATURE: 96.4 F | OXYGEN SATURATION: 98 % | DIASTOLIC BLOOD PRESSURE: 78 MMHG | SYSTOLIC BLOOD PRESSURE: 134 MMHG | HEIGHT: 64 IN | BODY MASS INDEX: 38.58 KG/M2

## 2021-10-15 DIAGNOSIS — C50.012 MALIGNANT NEOPLASM OF AREOLA OF LEFT BREAST IN FEMALE, ESTROGEN RECEPTOR POSITIVE (HCC): Primary | ICD-10-CM

## 2021-10-15 DIAGNOSIS — Z17.0 MALIGNANT NEOPLASM OF LEFT BREAST IN FEMALE, ESTROGEN RECEPTOR POSITIVE, UNSPECIFIED SITE OF BREAST (HCC): ICD-10-CM

## 2021-10-15 DIAGNOSIS — C50.912 MALIGNANT NEOPLASM OF LEFT BREAST IN FEMALE, ESTROGEN RECEPTOR POSITIVE, UNSPECIFIED SITE OF BREAST (HCC): ICD-10-CM

## 2021-10-15 DIAGNOSIS — C50.912 INFILTRATING DUCTAL CARCINOMA OF LEFT BREAST (HCC): ICD-10-CM

## 2021-10-15 DIAGNOSIS — Z17.0 MALIGNANT NEOPLASM OF AREOLA OF LEFT BREAST IN FEMALE, ESTROGEN RECEPTOR POSITIVE (HCC): Primary | ICD-10-CM

## 2021-10-15 LAB
ALBUMIN SERPL-MCNC: 4.2 GM/DL (ref 3.4–5)
ALP BLD-CCNC: 60 IU/L (ref 40–129)
ALT SERPL-CCNC: 11 U/L (ref 10–40)
ANION GAP SERPL CALCULATED.3IONS-SCNC: 11 MMOL/L (ref 4–16)
AST SERPL-CCNC: 13 IU/L (ref 15–37)
BASOPHILS ABSOLUTE: 0.1 K/CU MM
BASOPHILS RELATIVE PERCENT: 0.9 % (ref 0–1)
BILIRUB SERPL-MCNC: 0.3 MG/DL (ref 0–1)
BUN BLDV-MCNC: 20 MG/DL (ref 6–23)
CALCIUM SERPL-MCNC: 8.5 MG/DL (ref 8.3–10.6)
CHLORIDE BLD-SCNC: 98 MMOL/L (ref 99–110)
CO2: 25 MMOL/L (ref 21–32)
CREAT SERPL-MCNC: 0.9 MG/DL (ref 0.6–1.1)
DIFFERENTIAL TYPE: ABNORMAL
EOSINOPHILS ABSOLUTE: 0.1 K/CU MM
EOSINOPHILS RELATIVE PERCENT: 1.4 % (ref 0–3)
GFR AFRICAN AMERICAN: >60 ML/MIN/1.73M2
GFR NON-AFRICAN AMERICAN: >60 ML/MIN/1.73M2
GLUCOSE BLD-MCNC: 102 MG/DL (ref 70–99)
HCT VFR BLD CALC: 29.4 % (ref 37–47)
HEMOGLOBIN: 9.8 GM/DL (ref 12.5–16)
LYMPHOCYTES ABSOLUTE: 1.6 K/CU MM
LYMPHOCYTES RELATIVE PERCENT: 24.9 % (ref 24–44)
MCH RBC QN AUTO: 30.5 PG (ref 27–31)
MCHC RBC AUTO-ENTMCNC: 33.3 % (ref 32–36)
MCV RBC AUTO: 91.6 FL (ref 78–100)
MONOCYTES ABSOLUTE: 0.5 K/CU MM
MONOCYTES RELATIVE PERCENT: 7.1 % (ref 0–4)
PDW BLD-RTO: 17.5 % (ref 11.7–14.9)
PLATELET # BLD: 217 K/CU MM (ref 140–440)
PMV BLD AUTO: 9 FL (ref 7.5–11.1)
POTASSIUM SERPL-SCNC: 3.3 MMOL/L (ref 3.5–5.1)
RBC # BLD: 3.21 M/CU MM (ref 4.2–5.4)
SEGMENTED NEUTROPHILS ABSOLUTE COUNT: 4.2 K/CU MM
SEGMENTED NEUTROPHILS RELATIVE PERCENT: 65.7 % (ref 36–66)
SODIUM BLD-SCNC: 134 MMOL/L (ref 135–145)
TOTAL PROTEIN: 5.9 GM/DL (ref 6.4–8.2)
WBC # BLD: 6.5 K/CU MM (ref 4–10.5)

## 2021-10-15 PROCEDURE — 96375 TX/PRO/DX INJ NEW DRUG ADDON: CPT

## 2021-10-15 PROCEDURE — 2580000003 HC RX 258: Performed by: INTERNAL MEDICINE

## 2021-10-15 PROCEDURE — 36591 DRAW BLOOD OFF VENOUS DEVICE: CPT

## 2021-10-15 PROCEDURE — 6360000002 HC RX W HCPCS: Performed by: INTERNAL MEDICINE

## 2021-10-15 PROCEDURE — 2500000003 HC RX 250 WO HCPCS: Performed by: INTERNAL MEDICINE

## 2021-10-15 PROCEDURE — 80053 COMPREHEN METABOLIC PANEL: CPT

## 2021-10-15 PROCEDURE — 85025 COMPLETE CBC W/AUTO DIFF WBC: CPT

## 2021-10-15 PROCEDURE — 96413 CHEMO IV INFUSION 1 HR: CPT

## 2021-10-15 RX ORDER — POTASSIUM CHLORIDE 20 MEQ/1
20 TABLET, EXTENDED RELEASE ORAL DAILY
Qty: 5 TABLET | Refills: 0 | Status: SHIPPED | OUTPATIENT
Start: 2021-10-15 | End: 2021-10-26

## 2021-10-15 RX ORDER — DIPHENHYDRAMINE HYDROCHLORIDE 50 MG/ML
50 INJECTION INTRAMUSCULAR; INTRAVENOUS ONCE
Status: COMPLETED | OUTPATIENT
Start: 2021-10-15 | End: 2021-10-15

## 2021-10-15 RX ORDER — SODIUM CHLORIDE 0.9 % (FLUSH) 0.9 %
5-40 SYRINGE (ML) INJECTION PRN
Status: DISCONTINUED | OUTPATIENT
Start: 2021-10-15 | End: 2021-10-16 | Stop reason: HOSPADM

## 2021-10-15 RX ORDER — HEPARIN SODIUM (PORCINE) LOCK FLUSH IV SOLN 100 UNIT/ML 100 UNIT/ML
500 SOLUTION INTRAVENOUS PRN
Status: DISCONTINUED | OUTPATIENT
Start: 2021-10-15 | End: 2021-10-16 | Stop reason: HOSPADM

## 2021-10-15 RX ORDER — SODIUM CHLORIDE 9 MG/ML
20 INJECTION, SOLUTION INTRAVENOUS ONCE
Status: DISCONTINUED | OUTPATIENT
Start: 2021-10-15 | End: 2021-10-16 | Stop reason: HOSPADM

## 2021-10-15 RX ADMIN — DEXAMETHASONE SODIUM PHOSPHATE 10 MG: 10 INJECTION INTRAMUSCULAR; INTRAVENOUS at 10:59

## 2021-10-15 RX ADMIN — SODIUM CHLORIDE 20 ML/HR: 9 INJECTION, SOLUTION INTRAVENOUS at 10:52

## 2021-10-15 RX ADMIN — FAMOTIDINE 20 MG: 10 INJECTION, SOLUTION INTRAVENOUS at 10:55

## 2021-10-15 RX ADMIN — DIPHENHYDRAMINE HYDROCHLORIDE 50 MG: 50 INJECTION INTRAMUSCULAR; INTRAVENOUS at 10:52

## 2021-10-15 RX ADMIN — SODIUM CHLORIDE, PRESERVATIVE FREE 10 ML: 5 INJECTION INTRAVENOUS at 12:35

## 2021-10-15 RX ADMIN — PACLITAXEL 168 MG: 6 INJECTION, SOLUTION, CONCENTRATE INTRAVENOUS at 11:27

## 2021-10-15 RX ADMIN — HEPARIN 500 UNITS: 100 SYRINGE at 12:35

## 2021-10-15 ASSESSMENT — PAIN DESCRIPTION - LOCATION: LOCATION: LEG;HIP

## 2021-10-15 ASSESSMENT — PAIN DESCRIPTION - ORIENTATION: ORIENTATION: LEFT

## 2021-10-15 ASSESSMENT — PAIN SCALES - GENERAL: PAINLEVEL_OUTOF10: 7

## 2021-10-15 NOTE — TELEPHONE ENCOUNTER
Per DR Latanya Massey, phoned pt to review the need for potassium supplementations. Script sent to pt's pharmacy.

## 2021-10-15 NOTE — PROGRESS NOTES
Patient ambulated to treatment room with assist of cane. Gait steady. Continues to have problems with acid reflux. States she is scheduled to see a surgeon. States she has some fatigue and has nausea a few hours after first treatment with Taxol. CBC and CMP done. Chemo given as ordered. RTC 1 week.

## 2021-10-17 NOTE — PROGRESS NOTES
Patient Name:  Ivania Sharma  Patient :  1960  Patient MRN:  [id number]     Primary Oncologist: Annie Genao MD  Referring Provider: Kendall Gonzalez MD     Date of Service: 2021         Chief Complaint:    No chief complaint on file. She came in for follow-up visit. Patient Active Problem List:     Essential hypertension     Hyperlipidemia     Allergic rhinitis due to pollen     Morbid obesity due to excess calories     Hearing loss     Hot flashes due to surgical menopause     Gastroesophageal reflux disease     Chronic back pain     Anxiety and depression     Osteoarthritis     Meniere's disease     Insomnia     Precordial pain     SOB (shortness of breath)     Calculus of gallbladder without cholecystitis without obstruction     S/P laparoscopic cholecystectomy     Vasovagal syncope     Class 2 obesity due to excess calories without serious comorbidity in adult     Palpitations     Family history of early     Closed fracture of left ankle     Acute respiratory failure with hypoxia      Status post left hip replacement     Hyperglycemia     Mucinous carcinoma of breast, left     Malignant neoplasm of left female breast     Infiltrating ductal carcinoma of left breast      S/P mastectomy, bilateral     Hx of lymph node excision    HPI:   Suman Richard is a pleasant 64year old female patient who was referred for evaluation of pathologic stage T2, N0, MX invasive ductal carcinoma of the left breast, ER/[place] positive HER-2 negative, status post bilateral mastectomy in May 2021. She went for the routine mammogram in 2021 and denied any palpable lump to her breast.  Mammogram at that time showed at least 2 indeterminate complex masses at the 2:00 and 12 o'clock position in the left breast in the retroareolar region.     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 ER was more than 95%, WA more than 90%, HER-2/baltazar negative by FISH. MRI of bilateral breasts on April 19, 2021 showed  1. Left breast multicentric disease with biopsy proven invasive ductal carcinoma with mucinous component at 11 o'clock and mucinous carcinomas at 2 o'clock and 12 o'clock in the retroareolar breast. Naya Dellen is at least 5 cm of separation between the 11 o'clock mass and 12 o'clock mass.  Additional smooth masses measuring up to 21 mm at biopsy sites are hematomas. 2. No MR evidence of malignancy in the contralateral right breast.      She had bilateral mastectomy on May 6, 2021. Pathology report showed : Invasive ductal and mucinous carcinoma of the left breast, retroareolar, grade 2, 4.5 cm, negative margin, -2 sentinel lymph nodes, ER more than 95%, WA 90%, HER-2/baltazar negative by FISH. Pathological stage classification T2, N0, MX. Pathologist felt that she has 1 contiguous tumor mass measuring about 4.5 cm rather than multicentric disease. I discussed with pathologist who stated that she does not have pure mucinous carcinoma of the breast.   CBC and CMP in March 2021 were grossly unremarkable. Due to family history and personal history of breast cancer, I referred for genetic counseling. She had colonoscopy with removal of 2 polyps in 2014 by Dr. Zev Kiran. Follow-up in 3 years was recommended. She refused to have further follow-up. Oncotype DX score was 29. I offered adjuvant chemo therapy TC vs AC +T  She opted to [facility] +T. In May 2021 she was hospitalized for nausea and vomiting. She was seen by GI. She had mild transaminitis and was suspected to have gastroenteritis. Never had any upper endoscopic study. Colonoscopy in June 2014 showed polyps which was removed.   Pathology report showed tubular adenoma.   CT abdomen and pelvis on May 30, 2021:  No acute abnormality within the abdomen and pelvis.   Status post hysterectomy, appendectomy and cholecystectomy.    15 mm subcutaneus soft tissue nodule within the left lower quadrant area of anterior abdomen.  Finding may represent sebaceous cyst or other pathology. CTA chest on May 30, 2021 showed   1. No evidence of acute pulmonary embolism. 2. Soft tissue thickening and fat stranding overlying the bilateral pectoral muscles. CT head on May 31, 2021 showed  No acute intracranial abnormality.  No intraparenchymal abnormal enhancement to suggest intracranial metastatic disease.  MRI is a superior modality for evaluation of intracranial metastasis.   Small probable meningioma within the falx near the convexity. MRI of the brain on June 1, 2021 showed  No acute infarct scattered areas of chronic white matter change in the periventricular white matter.   No evidence for blood products on gradient imaging.   No  focal intracranial lesion noted     Amylase and lipase were unremarkable. She was placed on reglan with improvement of nausea and vomiting. She was recommended to have upper endoscopic study as outpatient by GI. She is status post bilateral mastectomy. She is scheduled for port insertion next [facility]. She had EGD By Dr. Jase Gonzalez with diagnosis of hiatal hernia, gastric polyp, mild gastritis. She started adjuvant chemotherapy AC on July 16, 2021. SBFT: Unremarkable small bowel follow through series. Bone density in June 2021 showed normal study. ECHO 6/18/2021:  Left ventricular systolic function is normal.  Ejection fraction is visually estimated at 55-60%. C2 dose reduced by 10%  CTA 8/12 no PE    She had stress test 8/13/21 with no infarct or ischemia, normal EF 64%. She has seen GI and was recommended esophageal stretching.     Esophagram 9/13/2021:  Small hiatal hernia with narrowing of the distal thoracic esophagus just proximal to the hiatal hernia, which may reflect underlying Schatzki's ring.  Ingested barium and barium tablet were initially held up at this level before eventually passing into the stomach. She completed fourth cycle of AC on September 17, 2021. On November 4, 2021 she came in for follow-up visit. She had episode of epistaxis like related to chemotherapy dry air. I recommend to use saline nasal spray. She started Taxol on October 8, 2021. We will check anemic work-up with the next blood test.    She has mild neuropathy to her fingers. She has chronic pain to her back. She denied any nausea, vomiting or diarrhea. No fever or chills. No chest pain, shortness of breath or palpitation. No headache or dizzy spell. No melena or hematochezia. Denied any dysuria or hematuria. Past Medical History:   Past Medical History:   Diagnosis Date    Allergic rhinitis due to pollen 11/19/2015    Arthritis     left hip & knee    Chronic back pain     Dehydration 7/22/2021    Depression     Gastroesophageal reflux disease 11/19/2015    Hearing loss 11/19/2015    History of blood transfusion     No reaction per pt    History of cardiac monitoring 04/18/2017    14 day event monitor. Sinus Rhythm    History of nuclear stress test 09/28/2016    cardiolite-normal,EF70%    Hot flashes due to surgical menopause 11/19/2015    Hx of echocardiogram 10/05/2016    EF: >55 %   Mild mitral and tricuspid regurg.      Hyperlipidemia     Hypertension     Follows with PCP    Lexiscan Stress Test 10/14/2020    EF 60%, Normal study, no ischemia    Meniere disease     Morbid obesity due to excess calories ([address] 11/19/2015    Sleep apnea 11/19/2015    sleep study negative    Tilt table evaluation 05/09/2017     positive for neurocardiogenic syncope      Past Surgery History:    Past Surgical History:   Procedure Laterality Date    APPENDECTOMY  1967    CHOLECYSTECTOMY      2017    COLONOSCOPY  2014    Polyps x2 - Dr. Vladimir Prieto Bilateral 05/06/2021 Dr. Kaitlyn De Oliveira Left 10/19/2020    LEFT HIP TOTAL ARTHROPLASTY - POSTERIOR performed by Mary Veronica MD at [facility] LEFT Left 3/9/2021    US BREAST BIOPSY NEEDLE ADDITIONAL LEFT 3/9/2021 Mark Morejon MD [address] BREAST BIOPSY NEEDLE ADDITIONAL LEFT Left 3/9/2021    US BREAST BIOPSY NEEDLE ADDITIONAL LEFT 3/9/2021 Mark Morejon MD [address] BREAST NEEDLE BIOPSY LEFT Left 3/9/2021    US BREAST NEEDLE BIOPSY LEFT 3/9/2021 Mark Morejon  [facility]   She had complete hysterectomy in 1999 due to endometriosis. Social History:   She denies any history of smoking. No alcohol or illicit drug use. She denies any children. Family History: Mother had stomach and breast cancer, maternal grandmother had breast cancer, colon cancer and stomach cancer. Review of Systems: The remainder of the review of system is unremarkable. Vital Signs: There were no vitals taken for this visit. Physical Exam:  CONSTITUTIONAL: awake, alert, cooperative, no apparent distress   EYES:  sclera clear and pale conjunctiva  ENT: Normocephalic, without obvious abnormality, atraumatic  NECK: supple, symmetrical.  No JVD. HEMATOLOGIC/LYMPHATIC: no cervical, supraclavicular or axillary lymphadenopathy   LUNGS:CTA bilaterally. No dullness on percussion bilaterally. BREAST: Healed surgical incision status post bilateral mastectomy. CARDIOVASCULAR: regular rate and rhythm, normal S1 and S2, no murmur   ABDOMEN: normal bowel sound, soft, non-distended, non-tender, no masses palpated, no hepatosplenomegaly   MUSCULOSKELETAL: full range of motion noted, tone is normal  NEUROLOGIC: awake, alert, oriented to name, place and time. CN II through XII grossly intact. Motor skill is grossly intact. SKIN: Normal skin color, texture, turgor and no jaundice. EXTREMITIES: no LE edema. No cyanosis. Labs:  Hematology:  Lab Results   Component Value Date    WBC 6.5 10/15/2021    RBC 3.21 (L) 10/15/2021    HGB 9.8 (L) 10/15/2021    HCT 29.4 (L) 10/15/2021    MCV 91.6 10/15/2021    MCH 30.5 10/15/2021    MCHC 33.3 10/15/2021    RDW 17.5 (H) 10/15/2021     10/15/2021    MPV 9.0 10/15/2021    BANDSPCT 9 11/02/2020    SEGSPCT 65.7 10/15/2021    EOSRELPCT 1.4 10/15/2021    BASOPCT 0.9 10/15/2021    LYMPHOPCT 24.9 10/15/2021    MONOPCT 7.1 (H) 10/15/2021    BANDABS 1.86 11/02/2020    SEGSABS 4.2 10/15/2021    EOSABS 0.1 10/15/2021    BASOSABS 0.1 10/15/2021    LYMPHSABS 1.6 10/15/2021    MONOSABS 0.5 10/15/2021    DIFFTYPE AUTOMATED DIFFERENTIAL 10/15/2021    ANISOCYTOSIS 1+ 11/02/2020    POLYCHROM 1+ 11/02/2020    WBCMORP FEW 11/02/2020    PLTM ADEQUATE 08/12/2021     Lab Results   Component Value Date     (H) 10/25/2020     Chemistry:  Lab Results   Component Value Date     (L) 10/15/2021    K 3.3 (L) 10/15/2021    CL 98 (L) 10/15/2021    CO2 25 10/15/2021    BUN 20 10/15/2021    CREATININE 0.9 10/15/2021    GLUCOSE 102 (H) 10/15/2021    CALCIUM 8.5 10/15/2021    PROT 5.9 (L) 10/15/2021    LABALBU 4.2 10/15/2021    BILITOT 0.3 10/15/2021    ALKPHOS 60 10/15/2021    AST 13 (L) 10/15/2021    ALT 11 10/15/2021    LABGLOM >60 10/15/2021    GFRAA >60 10/15/2021    AGRATIO 1.7 12/07/2015    GLOB 2.7 12/07/2015    MG 1.4 (L) 09/09/2021    POCCA 1.13 09/09/2021    POCGLU 115 (H) 09/09/2021     Lab Results   Component Value Date     (H) 11/14/2020     Lab Results   Component Value Date    TSHHS 2.200 03/01/2021    T4FREE 1.02 03/01/2021     Immunology:  Lab Results   Component Value Date    PROT 5.9 (L) 10/15/2021     Coagulation Panel:  Lab Results   Component Value Date    PROTIME 12.9 06/02/2021    INR 1.07 06/02/2021    APTT 31.7 06/02/2021    DDIMER 277 (H) 08/12/2021     Anemia Panel:  Lab Results   Component Value Date    ZYVACHRI63 676.0 05/13/2019    FOLATE >20.0 (H) 05/13/2019 Observations:  No data recorded       Assessment & Plan:    1. She has mixed invasive ductal carcinoma and mucinous carcinoma of the left breast status post bilateral mastectomy on May 6, 2021, ER/[place] positive and HER-2/baltazar negative. Pathological stage T2, N0 sentinel lymph node MX. Oncotype DX was 29. I offered adjuvant chemotherapy  She opted to [facility] + T. She started chemo in June 2021 and completed on September 17, 2021. She started Taxol on October 8, 2028. She has mild neuropathy. She will continue with the Taxol weekly. 2.  Genetic counseling: VUS JUHI    3. Bone density on June 11, 2021 was normal.  Echo in June 2021 showed LVEF at 55 to 60%. 4. She has N/V ? Gastroenteritis. EGD as above. Started on Pantoprazole. Small bowel follow through was unremarkable on 8/2/21. Esophagogram was noted. She was recommended to have esophageal stretching. Changed zofran to compazine. 5. Mental health: Reports suicidal ideation, 1 past attempt, on Prozac 20 mg, has counselor but was referred to our counselor today. Will make referral to mental health. She is contracted for safety.  had face-to-face visit with patient. 6.  Anemia is related to the chemotherapy. She had epistaxis related to Taxol and dry air. I recommend to use modifier. I will check anemic work-up with the next blood test.    7. Headache- ? zofran- changed to compazine. We discussed about diet and weight loss. She has not considered Covid vaccine yet. Return to clinic in 4 weeks. All of her question has been answered for today.

## 2021-10-18 DIAGNOSIS — C50.012 MALIGNANT NEOPLASM OF AREOLA OF LEFT BREAST IN FEMALE, ESTROGEN RECEPTOR POSITIVE (HCC): Primary | ICD-10-CM

## 2021-10-18 DIAGNOSIS — Z17.0 MALIGNANT NEOPLASM OF AREOLA OF LEFT BREAST IN FEMALE, ESTROGEN RECEPTOR POSITIVE (HCC): Primary | ICD-10-CM

## 2021-10-21 ENCOUNTER — OFFICE VISIT (OUTPATIENT)
Dept: ORTHOPEDIC SURGERY | Age: 61
End: 2021-10-21
Payer: COMMERCIAL

## 2021-10-21 VITALS
BODY MASS INDEX: 38.41 KG/M2 | RESPIRATION RATE: 16 BRPM | OXYGEN SATURATION: 99 % | HEART RATE: 100 BPM | HEIGHT: 64 IN | WEIGHT: 225 LBS

## 2021-10-21 DIAGNOSIS — Z96.642 STATUS POST LEFT HIP REPLACEMENT: ICD-10-CM

## 2021-10-21 DIAGNOSIS — Z09 S/P ORTHOPEDIC SURGERY, FOLLOW-UP EXAM: ICD-10-CM

## 2021-10-21 DIAGNOSIS — M48.061 SPINAL STENOSIS OF LUMBAR REGION, UNSPECIFIED WHETHER NEUROGENIC CLAUDICATION PRESENT: Primary | ICD-10-CM

## 2021-10-21 PROCEDURE — 3017F COLORECTAL CA SCREEN DOC REV: CPT | Performed by: ORTHOPAEDIC SURGERY

## 2021-10-21 PROCEDURE — 99213 OFFICE O/P EST LOW 20 MIN: CPT | Performed by: ORTHOPAEDIC SURGERY

## 2021-10-21 PROCEDURE — G8484 FLU IMMUNIZE NO ADMIN: HCPCS | Performed by: ORTHOPAEDIC SURGERY

## 2021-10-21 PROCEDURE — G8427 DOCREV CUR MEDS BY ELIG CLIN: HCPCS | Performed by: ORTHOPAEDIC SURGERY

## 2021-10-21 PROCEDURE — 1036F TOBACCO NON-USER: CPT | Performed by: ORTHOPAEDIC SURGERY

## 2021-10-21 PROCEDURE — G8417 CALC BMI ABV UP PARAM F/U: HCPCS | Performed by: ORTHOPAEDIC SURGERY

## 2021-10-21 RX ORDER — SODIUM CHLORIDE 9 MG/ML
25 INJECTION, SOLUTION INTRAVENOUS PRN
Status: CANCELLED | OUTPATIENT
Start: 2021-10-29

## 2021-10-21 RX ORDER — HEPARIN SODIUM (PORCINE) LOCK FLUSH IV SOLN 100 UNIT/ML 100 UNIT/ML
500 SOLUTION INTRAVENOUS PRN
Status: CANCELLED | OUTPATIENT
Start: 2021-11-12

## 2021-10-21 RX ORDER — SODIUM CHLORIDE 9 MG/ML
INJECTION, SOLUTION INTRAVENOUS CONTINUOUS
Status: CANCELLED | OUTPATIENT
Start: 2021-10-29

## 2021-10-21 RX ORDER — DIPHENHYDRAMINE HYDROCHLORIDE 50 MG/ML
50 INJECTION INTRAMUSCULAR; INTRAVENOUS ONCE
Status: CANCELLED | OUTPATIENT
Start: 2021-10-22 | End: 2021-10-22

## 2021-10-21 RX ORDER — DIPHENHYDRAMINE HYDROCHLORIDE 50 MG/ML
50 INJECTION INTRAMUSCULAR; INTRAVENOUS ONCE
Status: CANCELLED | OUTPATIENT
Start: 2021-10-22

## 2021-10-21 RX ORDER — HEPARIN SODIUM (PORCINE) LOCK FLUSH IV SOLN 100 UNIT/ML 100 UNIT/ML
500 SOLUTION INTRAVENOUS PRN
Status: CANCELLED | OUTPATIENT
Start: 2021-10-22

## 2021-10-21 RX ORDER — SODIUM CHLORIDE 9 MG/ML
20 INJECTION, SOLUTION INTRAVENOUS ONCE
Status: CANCELLED | OUTPATIENT
Start: 2021-10-29 | End: 2021-10-29

## 2021-10-21 RX ORDER — MEPERIDINE HYDROCHLORIDE 50 MG/ML
12.5 INJECTION INTRAMUSCULAR; INTRAVENOUS; SUBCUTANEOUS ONCE
Status: CANCELLED | OUTPATIENT
Start: 2021-10-22 | End: 2021-10-22

## 2021-10-21 RX ORDER — DIPHENHYDRAMINE HYDROCHLORIDE 50 MG/ML
50 INJECTION INTRAMUSCULAR; INTRAVENOUS ONCE
Status: CANCELLED | OUTPATIENT
Start: 2021-11-12

## 2021-10-21 RX ORDER — DIPHENHYDRAMINE HYDROCHLORIDE 50 MG/ML
50 INJECTION INTRAMUSCULAR; INTRAVENOUS ONCE
Status: CANCELLED | OUTPATIENT
Start: 2021-11-12 | End: 2021-11-12

## 2021-10-21 RX ORDER — MEPERIDINE HYDROCHLORIDE 50 MG/ML
12.5 INJECTION INTRAMUSCULAR; INTRAVENOUS; SUBCUTANEOUS ONCE
Status: CANCELLED | OUTPATIENT
Start: 2021-10-29 | End: 2021-10-29

## 2021-10-21 RX ORDER — SODIUM CHLORIDE 9 MG/ML
25 INJECTION, SOLUTION INTRAVENOUS PRN
Status: CANCELLED | OUTPATIENT
Start: 2021-10-22

## 2021-10-21 RX ORDER — METHYLPREDNISOLONE SODIUM SUCCINATE 125 MG/2ML
125 INJECTION, POWDER, LYOPHILIZED, FOR SOLUTION INTRAMUSCULAR; INTRAVENOUS ONCE
Status: CANCELLED | OUTPATIENT
Start: 2021-11-05 | End: 2021-11-05

## 2021-10-21 RX ORDER — SODIUM CHLORIDE 9 MG/ML
25 INJECTION, SOLUTION INTRAVENOUS PRN
Status: CANCELLED | OUTPATIENT
Start: 2021-11-05

## 2021-10-21 RX ORDER — MEPERIDINE HYDROCHLORIDE 50 MG/ML
12.5 INJECTION INTRAMUSCULAR; INTRAVENOUS; SUBCUTANEOUS ONCE
Status: CANCELLED | OUTPATIENT
Start: 2021-11-05 | End: 2021-11-05

## 2021-10-21 RX ORDER — EPINEPHRINE 1 MG/ML
0.3 INJECTION, SOLUTION, CONCENTRATE INTRAVENOUS PRN
Status: CANCELLED | OUTPATIENT
Start: 2021-11-05

## 2021-10-21 RX ORDER — SODIUM CHLORIDE 0.9 % (FLUSH) 0.9 %
5-40 SYRINGE (ML) INJECTION PRN
Status: CANCELLED | OUTPATIENT
Start: 2021-10-29

## 2021-10-21 RX ORDER — MEPERIDINE HYDROCHLORIDE 50 MG/ML
12.5 INJECTION INTRAMUSCULAR; INTRAVENOUS; SUBCUTANEOUS ONCE
Status: CANCELLED | OUTPATIENT
Start: 2021-11-12 | End: 2021-11-12

## 2021-10-21 RX ORDER — HEPARIN SODIUM (PORCINE) LOCK FLUSH IV SOLN 100 UNIT/ML 100 UNIT/ML
500 SOLUTION INTRAVENOUS PRN
Status: CANCELLED | OUTPATIENT
Start: 2021-10-29

## 2021-10-21 RX ORDER — SODIUM CHLORIDE 0.9 % (FLUSH) 0.9 %
5-40 SYRINGE (ML) INJECTION PRN
Status: CANCELLED | OUTPATIENT
Start: 2021-11-12

## 2021-10-21 RX ORDER — DIPHENHYDRAMINE HYDROCHLORIDE 50 MG/ML
50 INJECTION INTRAMUSCULAR; INTRAVENOUS ONCE
Status: CANCELLED | OUTPATIENT
Start: 2021-11-05 | End: 2021-11-05

## 2021-10-21 RX ORDER — DIPHENHYDRAMINE HYDROCHLORIDE 50 MG/ML
50 INJECTION INTRAMUSCULAR; INTRAVENOUS ONCE
Status: CANCELLED | OUTPATIENT
Start: 2021-10-29

## 2021-10-21 RX ORDER — DIPHENHYDRAMINE HYDROCHLORIDE 50 MG/ML
50 INJECTION INTRAMUSCULAR; INTRAVENOUS ONCE
Status: CANCELLED | OUTPATIENT
Start: 2021-10-29 | End: 2021-10-29

## 2021-10-21 RX ORDER — SODIUM CHLORIDE 9 MG/ML
INJECTION, SOLUTION INTRAVENOUS CONTINUOUS
Status: CANCELLED | OUTPATIENT
Start: 2021-11-12

## 2021-10-21 RX ORDER — SODIUM CHLORIDE 9 MG/ML
25 INJECTION, SOLUTION INTRAVENOUS PRN
Status: CANCELLED | OUTPATIENT
Start: 2021-11-12

## 2021-10-21 RX ORDER — SODIUM CHLORIDE 0.9 % (FLUSH) 0.9 %
5-40 SYRINGE (ML) INJECTION PRN
Status: CANCELLED | OUTPATIENT
Start: 2021-10-22

## 2021-10-21 RX ORDER — EPINEPHRINE 1 MG/ML
0.3 INJECTION, SOLUTION, CONCENTRATE INTRAVENOUS PRN
Status: CANCELLED | OUTPATIENT
Start: 2021-11-12

## 2021-10-21 RX ORDER — SODIUM CHLORIDE 9 MG/ML
20 INJECTION, SOLUTION INTRAVENOUS ONCE
Status: CANCELLED | OUTPATIENT
Start: 2021-10-22 | End: 2021-10-22

## 2021-10-21 RX ORDER — SODIUM CHLORIDE 0.9 % (FLUSH) 0.9 %
5-40 SYRINGE (ML) INJECTION PRN
Status: CANCELLED | OUTPATIENT
Start: 2021-11-05

## 2021-10-21 RX ORDER — SODIUM CHLORIDE 9 MG/ML
20 INJECTION, SOLUTION INTRAVENOUS ONCE
Status: CANCELLED | OUTPATIENT
Start: 2021-11-05 | End: 2021-11-05

## 2021-10-21 RX ORDER — SODIUM CHLORIDE 9 MG/ML
INJECTION, SOLUTION INTRAVENOUS CONTINUOUS
Status: CANCELLED | OUTPATIENT
Start: 2021-10-22

## 2021-10-21 RX ORDER — SODIUM CHLORIDE 9 MG/ML
INJECTION, SOLUTION INTRAVENOUS CONTINUOUS
Status: CANCELLED | OUTPATIENT
Start: 2021-11-05

## 2021-10-21 RX ORDER — METHYLPREDNISOLONE SODIUM SUCCINATE 125 MG/2ML
125 INJECTION, POWDER, LYOPHILIZED, FOR SOLUTION INTRAMUSCULAR; INTRAVENOUS ONCE
Status: CANCELLED | OUTPATIENT
Start: 2021-11-12 | End: 2021-11-12

## 2021-10-21 RX ORDER — METHYLPREDNISOLONE SODIUM SUCCINATE 125 MG/2ML
125 INJECTION, POWDER, LYOPHILIZED, FOR SOLUTION INTRAMUSCULAR; INTRAVENOUS ONCE
Status: CANCELLED | OUTPATIENT
Start: 2021-10-29 | End: 2021-10-29

## 2021-10-21 RX ORDER — SODIUM CHLORIDE 9 MG/ML
20 INJECTION, SOLUTION INTRAVENOUS ONCE
Status: CANCELLED | OUTPATIENT
Start: 2021-11-12 | End: 2021-11-12

## 2021-10-21 RX ORDER — EPINEPHRINE 1 MG/ML
0.3 INJECTION, SOLUTION, CONCENTRATE INTRAVENOUS PRN
Status: CANCELLED | OUTPATIENT
Start: 2021-10-22

## 2021-10-21 RX ORDER — EPINEPHRINE 1 MG/ML
0.3 INJECTION, SOLUTION, CONCENTRATE INTRAVENOUS PRN
Status: CANCELLED | OUTPATIENT
Start: 2021-10-29

## 2021-10-21 RX ORDER — METHYLPREDNISOLONE SODIUM SUCCINATE 125 MG/2ML
125 INJECTION, POWDER, LYOPHILIZED, FOR SOLUTION INTRAMUSCULAR; INTRAVENOUS ONCE
Status: CANCELLED | OUTPATIENT
Start: 2021-10-22 | End: 2021-10-22

## 2021-10-21 RX ORDER — DIPHENHYDRAMINE HYDROCHLORIDE 50 MG/ML
50 INJECTION INTRAMUSCULAR; INTRAVENOUS ONCE
Status: CANCELLED | OUTPATIENT
Start: 2021-11-05

## 2021-10-21 RX ORDER — HEPARIN SODIUM (PORCINE) LOCK FLUSH IV SOLN 100 UNIT/ML 100 UNIT/ML
500 SOLUTION INTRAVENOUS PRN
Status: CANCELLED | OUTPATIENT
Start: 2021-11-05

## 2021-10-21 ASSESSMENT — ENCOUNTER SYMPTOMS
SHORTNESS OF BREATH: 0
CHEST TIGHTNESS: 0
COLOR CHANGE: 0
BACK PAIN: 1

## 2021-10-21 NOTE — PATIENT INSTRUCTIONS
Weightbearing and activities as tolerated  Continue to take all prescribed pain medications as directed  Rest, ice, and elevate as needed  Continue to work on at home therapy exercises as tolerated    Referred to Dr. Kinsey Gottlieb for evaluation and treatment of spine related issues  Follow up with Dr. Kinsey Gottlieb as scheduled  Follow up as needed

## 2021-10-21 NOTE — PROGRESS NOTES
Patient returns to the office for a yearly post operative follow up on her left total hip arthroplasty that was performed on 10/19/2020. Patient reports having a dull, aching pain within the hip joint with reports of episode where she passed out and fell with the patient landing onto the left hip. Pain is rated in office at a 8/10. Associated sx: stiffness. She continues to remain active as much as possible and perform at home exercises but still has difficulty with stair climbing. She is currently in chemotherapy for breast cancer with Dr. Garnett Lanes with double mastectomy performed in May. She is asking for new referral to back and spine specialist because Dr. Karin House does not accept her insurance.

## 2021-10-21 NOTE — PROGRESS NOTES
10/21/2021   Chief Complaint   Patient presents with    1 Year Follow Up     Left LISHA, DOS: 10/19/2020        History of Present Illness:                             Shreya Kiran is a 63 y.o. female who returns today for a 1 year follow-up following left total hip replacement. She continues to have problems with stiffness and aching pain in her back that does radiate into her left hip region. She has been walking with a cane. She feels that her hip is moving better than it did prior to surgery but she still has some difficulty with walking and getting up from a seated position with pain radiating around the posterior aspect of her hip and left paraspinal region. She also has been going through chemotherapy and other treatments for medical issues. Patient returns to the office for a yearly post operative follow up on her left total hip arthroplasty that was performed on 10/19/2020. Patient reports having a dull, aching pain within the hip joint with reports of episode where she passed out and fell with the patient landing onto the left hip. Pain is rated in office at a 8/10. Associated sx: stiffness. She continues to remain active as much as possible and perform at home exercises but still has difficulty with stair climbing. She is currently in chemotherapy for breast cancer with Dr. Jagruti Adames with double mastectomy performed in May. She is asking for new referral to back and spine specialist because Dr. July Fung does not accept her insurance. Medical History  Patient's medications, allergies, past medical, surgical, social and family histories were reviewed and updated as appropriate. Review of Systems   Constitutional: Negative for activity change and fever. Respiratory: Negative for chest tightness and shortness of breath. Cardiovascular: Negative for chest pain. Musculoskeletal: Positive for arthralgias, back pain and gait problem. Skin: Negative for color change. Neurological: Negative for dizziness, weakness and numbness. Psychiatric/Behavioral: The patient is not nervous/anxious. Examination:  General Exam:  Vitals: Pulse 100   Resp 16   Ht 5' 4\" (1.626 m)   Wt 225 lb (102.1 kg)   SpO2 99%   BMI 38.62 kg/m²    Physical Exam     Left lower extremity:  Well-healed surgical scar over the hip. There is smooth painless passive range of motion at the hip joint with no restricted range of motion. Range of motion much improved from prior to surgery. Strength is 5/5 with hip flexion, extension, and abduction. There is tenderness to palpation along the posterior lateral aspects of the hip and extending up into the sacroiliac and paraspinal musculus of the lumbar spine. Sensation and motor function is intact distally at the foot with intact dorsiflexion plantarflexion of the ankle. Diagnostic testing:  X-rays reviewed in office, I independently reviewed the films in the office today:     XR HIP 1 VW W PELVIS LEFT    Result Date: 10/21/2021  AP pelvis and frog-leg lateral view of the left hip show excellent alignment of the total hip replacement with no evidence of loosening or complication. Office Procedures:  Orders Placed This Encounter   Procedures    External Referral To Orthopedic Surgery     Referral Priority:   Routine     Referral Type:   Eval and Treat     Referral Reason:   Specialty Services Required     Referred to Provider:   Mari Fuller MD     Requested Specialty:   Orthopedic Surgery     Number of Visits Requested:   1       Assessment and Plan  1. Status post left total hip replacement October 2020    2. Lumbar spinal stenosis    I have reassured the patient that her hip replacement appears to be functioning well on my exam and appears stable with the x-rays. I suspect that most of her symptoms in the left hip are actually referred from her chronic low back issues.     She has requested a

## 2021-10-22 ENCOUNTER — HOSPITAL ENCOUNTER (OUTPATIENT)
Dept: INFUSION THERAPY | Age: 61
Discharge: HOME | End: 2021-10-22
Payer: COMMERCIAL

## 2021-10-22 VITALS
HEIGHT: 64 IN | BODY MASS INDEX: 39.85 KG/M2 | SYSTOLIC BLOOD PRESSURE: 135 MMHG | WEIGHT: 233.4 LBS | DIASTOLIC BLOOD PRESSURE: 78 MMHG | TEMPERATURE: 97.7 F | HEART RATE: 107 BPM

## 2021-10-22 DIAGNOSIS — C50.912 MALIGNANT NEOPLASM OF LEFT BREAST IN FEMALE, ESTROGEN RECEPTOR POSITIVE, UNSPECIFIED SITE OF BREAST (HCC): ICD-10-CM

## 2021-10-22 DIAGNOSIS — Z17.0 MALIGNANT NEOPLASM OF AREOLA OF LEFT BREAST IN FEMALE, ESTROGEN RECEPTOR POSITIVE (HCC): Primary | ICD-10-CM

## 2021-10-22 DIAGNOSIS — C50.912 INFILTRATING DUCTAL CARCINOMA OF LEFT BREAST (HCC): ICD-10-CM

## 2021-10-22 DIAGNOSIS — C50.012 MALIGNANT NEOPLASM OF AREOLA OF LEFT BREAST IN FEMALE, ESTROGEN RECEPTOR POSITIVE (HCC): Primary | ICD-10-CM

## 2021-10-22 DIAGNOSIS — Z17.0 MALIGNANT NEOPLASM OF LEFT BREAST IN FEMALE, ESTROGEN RECEPTOR POSITIVE, UNSPECIFIED SITE OF BREAST (HCC): ICD-10-CM

## 2021-10-22 PROBLEM — K44.9 HIATAL HERNIA: Status: ACTIVE | Noted: 2021-10-22

## 2021-10-22 PROBLEM — K22.2 SCHATZKI'S RING OF DISTAL ESOPHAGUS: Status: ACTIVE | Noted: 2021-10-22

## 2021-10-22 LAB
ALBUMIN SERPL-MCNC: 4 GM/DL (ref 3.4–5)
ALP BLD-CCNC: 59 IU/L (ref 40–128)
ALT SERPL-CCNC: 12 U/L (ref 10–40)
ANION GAP SERPL CALCULATED.3IONS-SCNC: 12 MMOL/L (ref 4–16)
AST SERPL-CCNC: 14 IU/L (ref 15–37)
BASOPHILS ABSOLUTE: 0.1 K/CU MM
BASOPHILS RELATIVE PERCENT: 0.9 % (ref 0–1)
BILIRUB SERPL-MCNC: 0.2 MG/DL (ref 0–1)
BUN BLDV-MCNC: 18 MG/DL (ref 6–23)
CALCIUM SERPL-MCNC: 8.8 MG/DL (ref 8.3–10.6)
CHLORIDE BLD-SCNC: 102 MMOL/L (ref 99–110)
CO2: 26 MMOL/L (ref 21–32)
CREAT SERPL-MCNC: 1.1 MG/DL (ref 0.6–1.1)
DIFFERENTIAL TYPE: ABNORMAL
EOSINOPHILS ABSOLUTE: 0.2 K/CU MM
EOSINOPHILS RELATIVE PERCENT: 3.2 % (ref 0–3)
GFR AFRICAN AMERICAN: >60 ML/MIN/1.73M2
GFR NON-AFRICAN AMERICAN: 51 ML/MIN/1.73M2
GLUCOSE BLD-MCNC: 109 MG/DL (ref 70–99)
HCT VFR BLD CALC: 29.3 % (ref 37–47)
HEMOGLOBIN: 9.6 GM/DL (ref 12.5–16)
LYMPHOCYTES ABSOLUTE: 1.6 K/CU MM
LYMPHOCYTES RELATIVE PERCENT: 24.2 % (ref 24–44)
MCH RBC QN AUTO: 30.4 PG (ref 27–31)
MCHC RBC AUTO-ENTMCNC: 32.8 % (ref 32–36)
MCV RBC AUTO: 92.7 FL (ref 78–100)
MONOCYTES ABSOLUTE: 0.6 K/CU MM
MONOCYTES RELATIVE PERCENT: 8.6 % (ref 0–4)
PDW BLD-RTO: 17.7 % (ref 11.7–14.9)
PLATELET # BLD: 189 K/CU MM (ref 140–440)
PMV BLD AUTO: 9 FL (ref 7.5–11.1)
POTASSIUM SERPL-SCNC: 3.3 MMOL/L (ref 3.5–5.1)
RBC # BLD: 3.16 M/CU MM (ref 4.2–5.4)
SEGMENTED NEUTROPHILS ABSOLUTE COUNT: 4.2 K/CU MM
SEGMENTED NEUTROPHILS RELATIVE PERCENT: 63.1 % (ref 36–66)
SODIUM BLD-SCNC: 140 MMOL/L (ref 135–145)
TOTAL PROTEIN: 6.1 GM/DL (ref 6.4–8.2)
WBC # BLD: 6.7 K/CU MM (ref 4–10.5)

## 2021-10-22 PROCEDURE — 85025 COMPLETE CBC W/AUTO DIFF WBC: CPT

## 2021-10-22 PROCEDURE — 96375 TX/PRO/DX INJ NEW DRUG ADDON: CPT

## 2021-10-22 PROCEDURE — 6360000002 HC RX W HCPCS: Performed by: INTERNAL MEDICINE

## 2021-10-22 PROCEDURE — 2580000003 HC RX 258: Performed by: INTERNAL MEDICINE

## 2021-10-22 PROCEDURE — 2500000003 HC RX 250 WO HCPCS: Performed by: INTERNAL MEDICINE

## 2021-10-22 PROCEDURE — 96413 CHEMO IV INFUSION 1 HR: CPT

## 2021-10-22 PROCEDURE — 80053 COMPREHEN METABOLIC PANEL: CPT

## 2021-10-22 RX ORDER — SODIUM CHLORIDE 9 MG/ML
20 INJECTION, SOLUTION INTRAVENOUS ONCE
Status: DISCONTINUED | OUTPATIENT
Start: 2021-10-22 | End: 2021-10-23 | Stop reason: HOSPADM

## 2021-10-22 RX ORDER — DIPHENHYDRAMINE HYDROCHLORIDE 50 MG/ML
50 INJECTION INTRAMUSCULAR; INTRAVENOUS ONCE
Status: COMPLETED | OUTPATIENT
Start: 2021-10-22 | End: 2021-10-22

## 2021-10-22 RX ORDER — HEPARIN SODIUM (PORCINE) LOCK FLUSH IV SOLN 100 UNIT/ML 100 UNIT/ML
500 SOLUTION INTRAVENOUS PRN
Status: DISCONTINUED | OUTPATIENT
Start: 2021-10-22 | End: 2021-10-23 | Stop reason: HOSPADM

## 2021-10-22 RX ORDER — SODIUM CHLORIDE 0.9 % (FLUSH) 0.9 %
5-40 SYRINGE (ML) INJECTION PRN
Status: DISCONTINUED | OUTPATIENT
Start: 2021-10-22 | End: 2021-10-23 | Stop reason: HOSPADM

## 2021-10-22 RX ADMIN — FAMOTIDINE 20 MG: 10 INJECTION, SOLUTION INTRAVENOUS at 11:12

## 2021-10-22 RX ADMIN — PACLITAXEL 168 MG: 6 INJECTION, SOLUTION INTRAVENOUS at 11:45

## 2021-10-22 RX ADMIN — SODIUM CHLORIDE 20 ML/HR: 9 INJECTION, SOLUTION INTRAVENOUS at 11:14

## 2021-10-22 RX ADMIN — DEXAMETHASONE SODIUM PHOSPHATE 10 MG: 10 INJECTION INTRAMUSCULAR; INTRAVENOUS at 11:15

## 2021-10-22 RX ADMIN — DIPHENHYDRAMINE HYDROCHLORIDE 50 MG: 50 INJECTION INTRAMUSCULAR; INTRAVENOUS at 11:16

## 2021-10-22 RX ADMIN — SODIUM CHLORIDE, PRESERVATIVE FREE 10 ML: 5 INJECTION INTRAVENOUS at 12:55

## 2021-10-22 RX ADMIN — HEPARIN 500 UNITS: 100 SYRINGE at 12:55

## 2021-10-22 ASSESSMENT — PAIN SCALES - GENERAL: PAINLEVEL_OUTOF10: 7

## 2021-10-22 ASSESSMENT — PAIN DESCRIPTION - LOCATION: LOCATION: HIP

## 2021-10-22 ASSESSMENT — PAIN DESCRIPTION - ORIENTATION: ORIENTATION: LEFT

## 2021-10-22 NOTE — PROGRESS NOTES
Patient arrived to treatment suite for blood draw, pre-medications and chemotherapy infusion. Right chest mediport accessed and blood drawn from site and sent to lab for processing. Patient states fatigue, but no other questions or concerns for the doctor at this time. Treatment approved and given. Patient tolerated well. Left treatment suite ambulatory. Discharge instructions provided.     Patient's status assessed and documented appropriately. All labs and required results were also reviewed today. Treatment parameters have been reviewed. Today's treatment has been approved by the provider. Treatment orders and medication sequencing (when applicable) was verified by 2 registered nurses. The treatment plan was confirmed with the patient prior to administration, and the patient understands the need to report any treatment-related symptoms.     Prior to administration, when applicable, the following 8 elements of medication administration were reviewed with 2nd Registered Nurse prior to dosing: drug name, drug dose, infusion volume when prepared in a syringe, rate of administration, expiration dates and/or times, appearance and integrity of drug(s), and rate of pump for infusion. The 5 rights of medication administration have been verified.

## 2021-10-26 ENCOUNTER — OFFICE VISIT (OUTPATIENT)
Dept: FAMILY MEDICINE CLINIC | Age: 61
End: 2021-10-26
Payer: COMMERCIAL

## 2021-10-26 VITALS
DIASTOLIC BLOOD PRESSURE: 78 MMHG | OXYGEN SATURATION: 98 % | HEIGHT: 64 IN | WEIGHT: 235.8 LBS | SYSTOLIC BLOOD PRESSURE: 124 MMHG | HEART RATE: 85 BPM | BODY MASS INDEX: 40.26 KG/M2

## 2021-10-26 DIAGNOSIS — Z90.13 S/P MASTECTOMY, BILATERAL: ICD-10-CM

## 2021-10-26 DIAGNOSIS — C50.919 INVASIVE CARCINOMA OF BREAST (HCC): ICD-10-CM

## 2021-10-26 DIAGNOSIS — E78.2 MIXED HYPERLIPIDEMIA: ICD-10-CM

## 2021-10-26 DIAGNOSIS — K21.9 GASTROESOPHAGEAL REFLUX DISEASE WITHOUT ESOPHAGITIS: ICD-10-CM

## 2021-10-26 DIAGNOSIS — F32.A ANXIETY AND DEPRESSION: ICD-10-CM

## 2021-10-26 DIAGNOSIS — F41.9 ANXIETY AND DEPRESSION: ICD-10-CM

## 2021-10-26 DIAGNOSIS — I10 ESSENTIAL HYPERTENSION: Primary | ICD-10-CM

## 2021-10-26 PROCEDURE — G8484 FLU IMMUNIZE NO ADMIN: HCPCS | Performed by: FAMILY MEDICINE

## 2021-10-26 PROCEDURE — G8417 CALC BMI ABV UP PARAM F/U: HCPCS | Performed by: FAMILY MEDICINE

## 2021-10-26 PROCEDURE — 99214 OFFICE O/P EST MOD 30 MIN: CPT | Performed by: FAMILY MEDICINE

## 2021-10-26 PROCEDURE — 3017F COLORECTAL CA SCREEN DOC REV: CPT | Performed by: FAMILY MEDICINE

## 2021-10-26 PROCEDURE — 1036F TOBACCO NON-USER: CPT | Performed by: FAMILY MEDICINE

## 2021-10-26 PROCEDURE — G8427 DOCREV CUR MEDS BY ELIG CLIN: HCPCS | Performed by: FAMILY MEDICINE

## 2021-10-26 RX ORDER — EZETIMIBE 10 MG/1
10 TABLET ORAL DAILY
Qty: 90 TABLET | Refills: 3 | Status: SHIPPED | OUTPATIENT
Start: 2021-10-26

## 2021-10-26 NOTE — PROGRESS NOTES
Genia Whelan  1960  10/31/21    Chief Complaint   Patient presents with    3 Month Follow-Up    Hypertension    Hyperlipidemia    Gastroesophageal Reflux    Other     breast cancer           Patient for 3 months f/u regarding HTN, HLD, GERD, and allergic rhinitis. Had B/L matectomy and now doing chemotherapy, and make her so tired. The patient is taking hypertensive medications compliantly without side effects. Denies chest pain, dyspnea, edema, or TIA's. She is taking her cholesterol medications, her GERD under control. Past Medical History:   Diagnosis Date    Allergic rhinitis due to pollen 11/19/2015    Arthritis     left hip & knee    Chronic back pain     Dehydration 7/22/2021    Depression     Gastroesophageal reflux disease 11/19/2015    Hearing loss 11/19/2015    History of blood transfusion     No reaction per pt    History of cardiac monitoring 04/18/2017    14 day event monitor. Sinus Rhythm    History of nuclear stress test 09/28/2016    cardiolite-normal,EF70%    Hot flashes due to surgical menopause 11/19/2015    Hx of echocardiogram 10/05/2016    EF: >55 %   Mild mitral and tricuspid regurg.      Hyperlipidemia     Hypertension     Follows with PCP    Lexiscan Stress Test 10/14/2020    EF 60%, Normal study, no ischemia    Meniere disease     Morbid obesity due to excess calories ([address] 11/19/2015    Sleep apnea 11/19/2015    sleep study negative    Tilt table evaluation 05/09/2017     positive for neurocardiogenic syncope     Past Surgical History:   Procedure Laterality Date    APPENDECTOMY  1967    CHOLECYSTECTOMY      2017    COLONOSCOPY  2014    Polyps x2 - Dr. Nate Gtz Bilateral 05/06/2021    Dr. Darius Kamara Left 10/19/2020    LEFT HIP TOTAL ARTHROPLASTY - POSTERIOR performed by Roxana Mccann MD at [place] [id number] LEFT Left 3/9/2021 US BREAST BIOPSY NEEDLE ADDITIONAL LEFT 3/9/2021 Lazara Dolan MD [address] BREAST BIOPSY NEEDLE ADDITIONAL LEFT Left 3/9/2021    US BREAST BIOPSY NEEDLE ADDITIONAL LEFT 3/9/2021 Lazara Dolan MD [address] BREAST NEEDLE BIOPSY LEFT Left 3/9/2021    US BREAST NEEDLE BIOPSY LEFT 3/9/2021 Lazara Dolan  [address]     Family History   Problem Relation Age of Onset    Heart Disease Mother     High Blood Pressure Mother     High Cholesterol Mother     Cancer Mother 80        Stomach    Diabetes Mother     Stroke Mother     Heart Disease Father     High Blood Pressure Father     High Cholesterol Father     Diabetes Father     Depression Father     Cancer Sister 46        Lung cancer    High Blood Pressure Sister     Other Sister         migraines, osteoarthritis    Mental Illness Sister     Depression Sister     Cancer Maternal Grandmother         Stomach    Diabetes Maternal Grandmother     Diabetes Maternal Grandfather     Diabetes Paternal Grandmother     Diabetes Paternal Grandfather     Cancer Maternal Cousin 47        Pancreatic     Social History     Socioeconomic History    Marital status:      Spouse name: Not on file    Number of children: Not on file    Years of education: Not on file    Highest education level: Not on file   Occupational History    Not on file   Tobacco Use    Smoking status: Never Smoker    Smokeless tobacco: Never Used   Vaping Use    Vaping Use: Never used   Substance and Sexual Activity    Alcohol use: No    Drug use: No    Sexual activity: Not Currently     Partners: Male   Other Topics Concern    Not on file   Social History Narrative    Not on file     Social Determinants of Health     Financial Resource Strain:     Difficulty of Paying Living Expenses:    Food Insecurity:     Worried About Running Out of Food in the Last Year:     [address] in the Last Year:    Transportation Needs:     Lack of Transportation (Medical):  Lack of Transportation (Non-Medical):    Physical Activity:     Days of Exercise per Week:     Minutes of Exercise per Session:    Stress:     Feeling of Stress :    Social Connections:     Frequency of Communication with Friends and Family:     Frequency of Social Gatherings with Friends and Family:     Attends Church Services:     Active Member of Clubs or Organizations:     Attends Club or Organization Meetings:     Marital Status:    Intimate Partner Violence:     Fear of Current or Ex-Partner:     Emotionally Abused:     Physically Abused:     Sexually Abused: Allergies   Allergen Reactions    Celexa [Citalopram] Other (See Comments)     Stomach pain        Atorvastatin      Leg cramps      Fenofibrate      angioedema    Mestinon [Pyridostigmine] Itching and Swelling    Penicillins     Sulfa Antibiotics      Other reaction(s): Hives/Throat closes    Tape [employer] Tape]      PLASTIC TAPE    Gemfibrozil Rash    Meloxicam Other (See Comments)     moodswings     Current Outpatient Medications   Medication Sig Dispense Refill    ezetimibe (ZETIA) 10 MG tablet Take 1 tablet by mouth daily 90 tablet 3    prochlorperazine (COMPAZINE) 10 MG tablet Take 1 tablet by mouth every 6 hours as needed (chemotherapy induced nausea/vomiting) 30 tablet 1    dexamethasone (DECADRON) 2 MG tablet Take 1 tablet by mouth daily (with breakfast) for two days AFTER chemotherapy. Take 8 mg (4 tabs) the night before 1st chemo ONLY.  28 tablet 0    cyclophosphamide (CYTOXAN) 1 GM chemo injection Infuse intravenously      DOXOrubicin HCl (ADRIAMYCIN) 10 MG chemo injection Infuse intravenously      PACLitaxel (TAXOL) 100 MG/16.7ML chemo injection Infuse intravenously once      Magic Mouthwash (MIRACLE MOUTHWASH) Swish and spit 5 mLs 4 times daily as needed for Irritation 500 mL 0    mirtazapine (REMERON) 15 MG tablet Take 1 tablet by mouth nightly 30 tablet 3    pantoprazole (PROTONIX) 40 MG tablet Take 1 tablet by mouth 2 times daily 60 tablet 3    hydroCHLOROthiazide (HYDRODIURIL) 25 MG tablet Take 0.5 tablets by mouth daily 45 tablet 3    promethazine (PHENERGAN) 12.5 MG tablet Take 10 mg by mouth every 6 hours as needed for Nausea      metoprolol tartrate (LOPRESSOR) 50 MG tablet Take 1 tablet by mouth 2 times daily 180 tablet 3    metoclopramide (REGLAN) 5 MG tablet Take 1 tablet by mouth 3 times daily 30 tablet 1    furosemide (LASIX) 20 MG tablet Take 20 mg by mouth daily       aspirin 81 MG chewable tablet Take 81 mg by mouth daily      Multiple Vitamins-Minerals (HAIR SKIN AND NAILS FORMULA) TABS Take 1 tablet by mouth daily       losartan (COZAAR) 25 MG tablet Take 1 tablet by mouth daily 30 tablet 5    acetaminophen (TYLENOL) 500 MG tablet Take 500 mg by mouth every 6 hours as needed for Pain      loratadine (CLARITIN) 10 MG tablet Take 1 tablet by mouth daily 30 tablet 5     No current facility-administered medications for this visit. Review of Systems   Constitutional: Negative for activity change, chills and fatigue. HENT: Negative for congestion. Respiratory: Negative for cough and shortness of breath. Cardiovascular: Negative for chest pain and leg swelling. Gastrointestinal: Negative for abdominal pain. Genitourinary: Negative for dysuria. Musculoskeletal: Negative for arthralgias. Skin: Negative for rash. Neurological: Negative for dizziness and headaches. Psychiatric/Behavioral: Positive for agitation and sleep disturbance. Negative for self-injury and suicidal ideas. The patient is nervous/anxious.          Little better       Lab Results   Component Value Date    WBC 6.0 10/29/2021    HGB 9.0 (L) 10/29/2021    HCT 27.6 (L) 10/29/2021    MCV 93.9 10/29/2021     10/29/2021     Lab Results   Component Value Date     10/29/2021    K 3.5 10/29/2021     10/29/2021    CO2 25 10/29/2021    BUN 16 10/29/2021 CREATININE 0.9 10/29/2021    GLUCOSE 95 10/29/2021    CALCIUM 8.8 10/29/2021    PROT 5.7 (L) 10/29/2021    LABALBU 3.9 10/29/2021    BILITOT 0.3 10/29/2021    ALKPHOS 54 10/29/2021    AST 12 (L) 10/29/2021    ALT 12 10/29/2021    LABGLOM >60 10/29/2021    GFRAA >60 10/29/2021    AGRATIO 1.7 12/07/2015    GLOB 2.7 12/07/2015     Lab Results   Component Value Date    CHOL 292 (H) 08/13/2021    CHOL 270 (H) 03/01/2021    CHOL 508 (H) 05/13/2019     Lab Results   Component Value Date    TRIG 266 (H) 08/13/2021    TRIG 385 (H) 03/01/2021    TRIG 598 (H) 05/13/2019     Lab Results   Component Value Date    HDL 42 08/13/2021    HDL 40 (L) 03/01/2021    HDL 38 (L) 05/13/2019     Lab Results   Component Value Date    LDLCALC NOT VALID WHEN TRIGLYCERIDE >400 MG/DL. 05/22/2017    LDLCALC 75 12/07/2015     Lab Results   Component Value Date    LABA1C 6.0 08/13/2021     Lab Results   Component Value Date    TSHHS 2.200 03/01/2021         /78 (Site: Left Upper Arm, Position: Sitting, Cuff Size: Large Adult)   Pulse 85   Ht 5' 4\" (1.626 m)   Wt 235 lb 12.8 oz (107 kg)   SpO2 98%   BMI 40.47 kg/m²     BP Readings from Last 3 Encounters:   10/29/21 137/86   10/26/21 124/78   10/22/21 135/78       Wt Readings from Last 3 Encounters:   10/29/21 234 lb 9.6 oz (106.4 kg)   10/26/21 235 lb 12.8 oz (107 kg)   10/22/21 233 lb 6.4 oz (105.9 kg)         Physical Exam  Constitutional:       General: She is not in acute distress. Appearance: Normal appearance. She is well-developed. She is obese. She is not ill-appearing or diaphoretic. HENT:      Head: Normocephalic and atraumatic. Eyes:      General: No scleral icterus. Pupils: Pupils are equal, round, and reactive to light. Cardiovascular:      Rate and Rhythm: Normal rate and regular rhythm. Heart sounds: Normal heart sounds. No murmur heard. Pulmonary:      Effort: Pulmonary effort is normal.      Breath sounds: No wheezing or rales.    Musculoskeletal: General: Normal range of motion. Cervical back: Normal range of motion and neck supple. No rigidity. Right lower leg: No edema. Left lower leg: No edema. Neurological:      General: No focal deficit present. Mental Status: She is alert and oriented to person, place, and time. Comments: Looks better   Psychiatric:         Mood and Affect: Mood is depressed and elated. Affect is not flat. Behavior: Behavior normal.         ASSESSMENT/ PLAN:    1. Essential hypertension  - stable    2. Mixed hyperlipidemia  - stable  - ezetimibe (ZETIA) 10 MG tablet; Take 1 tablet by mouth daily  Dispense: 90 tablet; Refill: 3    3. Anxiety and depression  - stable    4. Gastroesophageal reflux disease without esophagitis  - stable    5. S/P mastectomy, bilateral  -doing chemotherapy    6. Invasive carcinoma of breast ([facility] [address]  - f/u with the oncology              - All old blood work reviewed with the patient  - Appropriate prescription are addressed. - After visit summery provided. - Questions answered and patient verbalizes understanding.  - Call for any problem, questions, or concerns. Return in about 6 months (around 4/26/2022).

## 2021-10-29 ENCOUNTER — HOSPITAL ENCOUNTER (OUTPATIENT)
Dept: INFUSION THERAPY | Age: 61
Discharge: HOME | End: 2021-10-29
Payer: COMMERCIAL

## 2021-10-29 VITALS
BODY MASS INDEX: 40.05 KG/M2 | OXYGEN SATURATION: 97 % | DIASTOLIC BLOOD PRESSURE: 86 MMHG | RESPIRATION RATE: 16 BRPM | TEMPERATURE: 97.2 F | SYSTOLIC BLOOD PRESSURE: 137 MMHG | HEART RATE: 94 BPM | WEIGHT: 234.6 LBS | HEIGHT: 64 IN

## 2021-10-29 DIAGNOSIS — C50.012 MALIGNANT NEOPLASM OF AREOLA OF LEFT BREAST IN FEMALE, ESTROGEN RECEPTOR POSITIVE (HCC): Primary | ICD-10-CM

## 2021-10-29 DIAGNOSIS — Z17.0 MALIGNANT NEOPLASM OF AREOLA OF LEFT BREAST IN FEMALE, ESTROGEN RECEPTOR POSITIVE (HCC): Primary | ICD-10-CM

## 2021-10-29 DIAGNOSIS — C50.912 INFILTRATING DUCTAL CARCINOMA OF LEFT BREAST (HCC): ICD-10-CM

## 2021-10-29 DIAGNOSIS — Z17.0 MALIGNANT NEOPLASM OF LEFT BREAST IN FEMALE, ESTROGEN RECEPTOR POSITIVE, UNSPECIFIED SITE OF BREAST (HCC): ICD-10-CM

## 2021-10-29 DIAGNOSIS — C50.912 MALIGNANT NEOPLASM OF LEFT BREAST IN FEMALE, ESTROGEN RECEPTOR POSITIVE, UNSPECIFIED SITE OF BREAST (HCC): ICD-10-CM

## 2021-10-29 LAB
ALBUMIN SERPL-MCNC: 3.9 GM/DL (ref 3.4–5)
ALP BLD-CCNC: 54 IU/L (ref 40–128)
ALT SERPL-CCNC: 12 U/L (ref 10–40)
ANION GAP SERPL CALCULATED.3IONS-SCNC: 11 MMOL/L (ref 4–16)
AST SERPL-CCNC: 12 IU/L (ref 15–37)
BASOPHILS ABSOLUTE: 0 K/CU MM
BASOPHILS RELATIVE PERCENT: 0.7 % (ref 0–1)
BILIRUB SERPL-MCNC: 0.3 MG/DL (ref 0–1)
BUN BLDV-MCNC: 16 MG/DL (ref 6–23)
CALCIUM SERPL-MCNC: 8.8 MG/DL (ref 8.3–10.6)
CHLORIDE BLD-SCNC: 103 MMOL/L (ref 99–110)
CO2: 25 MMOL/L (ref 21–32)
CREAT SERPL-MCNC: 0.9 MG/DL (ref 0.6–1.1)
DIFFERENTIAL TYPE: ABNORMAL
EOSINOPHILS ABSOLUTE: 0.3 K/CU MM
EOSINOPHILS RELATIVE PERCENT: 4.3 % (ref 0–3)
GFR AFRICAN AMERICAN: >60 ML/MIN/1.73M2
GFR NON-AFRICAN AMERICAN: >60 ML/MIN/1.73M2
GLUCOSE BLD-MCNC: 95 MG/DL (ref 70–99)
HCT VFR BLD CALC: 27.6 % (ref 37–47)
HEMOGLOBIN: 9 GM/DL (ref 12.5–16)
LYMPHOCYTES ABSOLUTE: 1.3 K/CU MM
LYMPHOCYTES RELATIVE PERCENT: 21.6 % (ref 24–44)
MCH RBC QN AUTO: 30.6 PG (ref 27–31)
MCHC RBC AUTO-ENTMCNC: 32.6 % (ref 32–36)
MCV RBC AUTO: 93.9 FL (ref 78–100)
MONOCYTES ABSOLUTE: 0.5 K/CU MM
MONOCYTES RELATIVE PERCENT: 8.8 % (ref 0–4)
PDW BLD-RTO: 17.2 % (ref 11.7–14.9)
PLATELET # BLD: 215 K/CU MM (ref 140–440)
PMV BLD AUTO: 9.1 FL (ref 7.5–11.1)
POTASSIUM SERPL-SCNC: 3.5 MMOL/L (ref 3.5–5.1)
RBC # BLD: 2.94 M/CU MM (ref 4.2–5.4)
SEGMENTED NEUTROPHILS ABSOLUTE COUNT: 3.9 K/CU MM
SEGMENTED NEUTROPHILS RELATIVE PERCENT: 64.6 % (ref 36–66)
SODIUM BLD-SCNC: 139 MMOL/L (ref 135–145)
TOTAL PROTEIN: 5.7 GM/DL (ref 6.4–8.2)
WBC # BLD: 6 K/CU MM (ref 4–10.5)

## 2021-10-29 PROCEDURE — 6360000002 HC RX W HCPCS: Performed by: INTERNAL MEDICINE

## 2021-10-29 PROCEDURE — 96374 THER/PROPH/DIAG INJ IV PUSH: CPT

## 2021-10-29 PROCEDURE — 85025 COMPLETE CBC W/AUTO DIFF WBC: CPT

## 2021-10-29 PROCEDURE — 96375 TX/PRO/DX INJ NEW DRUG ADDON: CPT

## 2021-10-29 PROCEDURE — 96367 TX/PROPH/DG ADDL SEQ IV INF: CPT

## 2021-10-29 PROCEDURE — 2580000003 HC RX 258: Performed by: INTERNAL MEDICINE

## 2021-10-29 PROCEDURE — 96413 CHEMO IV INFUSION 1 HR: CPT

## 2021-10-29 PROCEDURE — 80053 COMPREHEN METABOLIC PANEL: CPT

## 2021-10-29 PROCEDURE — 2500000003 HC RX 250 WO HCPCS: Performed by: INTERNAL MEDICINE

## 2021-10-29 RX ORDER — HEPARIN SODIUM (PORCINE) LOCK FLUSH IV SOLN 100 UNIT/ML 100 UNIT/ML
500 SOLUTION INTRAVENOUS PRN
Status: DISCONTINUED | OUTPATIENT
Start: 2021-10-29 | End: 2021-10-30 | Stop reason: HOSPADM

## 2021-10-29 RX ORDER — SODIUM CHLORIDE 0.9 % (FLUSH) 0.9 %
5-40 SYRINGE (ML) INJECTION PRN
Status: DISCONTINUED | OUTPATIENT
Start: 2021-10-29 | End: 2021-10-30 | Stop reason: HOSPADM

## 2021-10-29 RX ORDER — SODIUM CHLORIDE 9 MG/ML
20 INJECTION, SOLUTION INTRAVENOUS ONCE
Status: DISCONTINUED | OUTPATIENT
Start: 2021-10-29 | End: 2021-10-30 | Stop reason: HOSPADM

## 2021-10-29 RX ORDER — DIPHENHYDRAMINE HYDROCHLORIDE 50 MG/ML
50 INJECTION INTRAMUSCULAR; INTRAVENOUS ONCE
Status: COMPLETED | OUTPATIENT
Start: 2021-10-29 | End: 2021-10-29

## 2021-10-29 RX ADMIN — PACLITAXEL 168 MG: 6 INJECTION, SOLUTION INTRAVENOUS at 11:01

## 2021-10-29 RX ADMIN — DEXAMETHASONE SODIUM PHOSPHATE 10 MG: 10 INJECTION INTRAMUSCULAR; INTRAVENOUS at 10:29

## 2021-10-29 RX ADMIN — HEPARIN 500 UNITS: 100 SYRINGE at 12:17

## 2021-10-29 RX ADMIN — SODIUM CHLORIDE 20 ML/HR: 9 INJECTION, SOLUTION INTRAVENOUS at 10:26

## 2021-10-29 RX ADMIN — FAMOTIDINE 20 MG: 10 INJECTION, SOLUTION INTRAVENOUS at 10:22

## 2021-10-29 RX ADMIN — DIPHENHYDRAMINE HYDROCHLORIDE 50 MG: 50 INJECTION INTRAMUSCULAR; INTRAVENOUS at 10:22

## 2021-10-29 RX ADMIN — SODIUM CHLORIDE, PRESERVATIVE FREE 10 ML: 5 INJECTION INTRAVENOUS at 12:17

## 2021-10-29 ASSESSMENT — PAIN SCALES - GENERAL: PAINLEVEL_OUTOF10: 0

## 2021-10-31 ASSESSMENT — ENCOUNTER SYMPTOMS
ABDOMINAL PAIN: 0
SHORTNESS OF BREATH: 0
COUGH: 0

## 2021-11-04 ENCOUNTER — HOSPITAL ENCOUNTER (OUTPATIENT)
Dept: INFUSION THERAPY | Age: 61
Discharge: HOME | End: 2021-11-04
Payer: COMMERCIAL

## 2021-11-04 ENCOUNTER — OFFICE VISIT (OUTPATIENT)
Dept: ONCOLOGY | Age: 61
End: 2021-11-04
Payer: COMMERCIAL

## 2021-11-04 VITALS
OXYGEN SATURATION: 99 % | HEIGHT: 64 IN | WEIGHT: 235 LBS | SYSTOLIC BLOOD PRESSURE: 139 MMHG | RESPIRATION RATE: 16 BRPM | BODY MASS INDEX: 40.12 KG/M2 | HEART RATE: 104 BPM | TEMPERATURE: 97.4 F | DIASTOLIC BLOOD PRESSURE: 86 MMHG

## 2021-11-04 DIAGNOSIS — C50.012 MALIGNANT NEOPLASM OF AREOLA OF LEFT BREAST IN FEMALE, ESTROGEN RECEPTOR POSITIVE (HCC): Primary | ICD-10-CM

## 2021-11-04 DIAGNOSIS — Z17.0 MALIGNANT NEOPLASM OF AREOLA OF LEFT BREAST IN FEMALE, ESTROGEN RECEPTOR POSITIVE (HCC): Primary | ICD-10-CM

## 2021-11-04 PROCEDURE — G8427 DOCREV CUR MEDS BY ELIG CLIN: HCPCS | Performed by: INTERNAL MEDICINE

## 2021-11-04 PROCEDURE — G8484 FLU IMMUNIZE NO ADMIN: HCPCS | Performed by: INTERNAL MEDICINE

## 2021-11-04 PROCEDURE — 99211 OFF/OP EST MAY X REQ PHY/QHP: CPT

## 2021-11-04 PROCEDURE — 3017F COLORECTAL CA SCREEN DOC REV: CPT | Performed by: INTERNAL MEDICINE

## 2021-11-04 PROCEDURE — G8417 CALC BMI ABV UP PARAM F/U: HCPCS | Performed by: INTERNAL MEDICINE

## 2021-11-04 PROCEDURE — 1036F TOBACCO NON-USER: CPT | Performed by: INTERNAL MEDICINE

## 2021-11-04 PROCEDURE — 99214 OFFICE O/P EST MOD 30 MIN: CPT | Performed by: INTERNAL MEDICINE

## 2021-11-04 RX ORDER — SODIUM CHLORIDE 0.9 % (FLUSH) 0.9 %
5-40 SYRINGE (ML) INJECTION PRN
Status: CANCELLED | OUTPATIENT
Start: 2021-11-19

## 2021-11-04 RX ORDER — MEPERIDINE HYDROCHLORIDE 50 MG/ML
12.5 INJECTION INTRAMUSCULAR; INTRAVENOUS; SUBCUTANEOUS ONCE
Status: CANCELLED | OUTPATIENT
Start: 2021-11-19 | End: 2021-11-19

## 2021-11-04 RX ORDER — SODIUM CHLORIDE 9 MG/ML
25 INJECTION, SOLUTION INTRAVENOUS PRN
Status: CANCELLED | OUTPATIENT
Start: 2021-11-19

## 2021-11-04 RX ORDER — METHYLPREDNISOLONE SODIUM SUCCINATE 125 MG/2ML
125 INJECTION, POWDER, LYOPHILIZED, FOR SOLUTION INTRAMUSCULAR; INTRAVENOUS ONCE
Status: CANCELLED | OUTPATIENT
Start: 2021-11-19 | End: 2021-11-19

## 2021-11-04 RX ORDER — DIPHENHYDRAMINE HYDROCHLORIDE 50 MG/ML
50 INJECTION INTRAMUSCULAR; INTRAVENOUS ONCE
Status: CANCELLED | OUTPATIENT
Start: 2021-11-19 | End: 2021-11-19

## 2021-11-04 RX ORDER — EPINEPHRINE 1 MG/ML
0.3 INJECTION, SOLUTION, CONCENTRATE INTRAVENOUS PRN
Status: CANCELLED | OUTPATIENT
Start: 2021-11-19

## 2021-11-04 RX ORDER — SODIUM CHLORIDE 9 MG/ML
20 INJECTION, SOLUTION INTRAVENOUS ONCE
Status: CANCELLED | OUTPATIENT
Start: 2021-11-19 | End: 2021-11-19

## 2021-11-04 RX ORDER — SODIUM CHLORIDE 9 MG/ML
INJECTION, SOLUTION INTRAVENOUS CONTINUOUS
Status: CANCELLED | OUTPATIENT
Start: 2021-11-19

## 2021-11-04 RX ORDER — HEPARIN SODIUM (PORCINE) LOCK FLUSH IV SOLN 100 UNIT/ML 100 UNIT/ML
500 SOLUTION INTRAVENOUS PRN
Status: CANCELLED | OUTPATIENT
Start: 2021-11-19

## 2021-11-04 RX ORDER — DIPHENHYDRAMINE HYDROCHLORIDE 50 MG/ML
50 INJECTION INTRAMUSCULAR; INTRAVENOUS ONCE
Status: CANCELLED | OUTPATIENT
Start: 2021-11-19

## 2021-11-04 NOTE — PROGRESS NOTES
MA Rooming Questions  Patient: Yin Hernandez  MRN: [id number]    Date: 11/4/2021        1. Do you have any new issues? yes - Pt c/o daily nose bleeds x 1 week. 2. Do you need any refills on medications?    no    3. Have you had any imaging done since your last visit?   no    4. Have you been hospitalized or seen in the emergency room since your last visit here?   no    5. Did the patient have a depression screening completed today?  No    No data recorded     PHQ-9 Given to (if applicable):               PHQ-9 Score (if applicable):                     [] Positive     []  Negative              Does question #9 need addressed (if applicable)                     [] Yes    []  No               Benoit Mackey MA

## 2021-11-05 ENCOUNTER — HOSPITAL ENCOUNTER (OUTPATIENT)
Dept: INFUSION THERAPY | Age: 61
Discharge: HOME | End: 2021-11-05
Payer: COMMERCIAL

## 2021-11-05 VITALS
WEIGHT: 235 LBS | DIASTOLIC BLOOD PRESSURE: 88 MMHG | BODY MASS INDEX: 40.12 KG/M2 | HEART RATE: 116 BPM | RESPIRATION RATE: 18 BRPM | HEIGHT: 64 IN | SYSTOLIC BLOOD PRESSURE: 149 MMHG | TEMPERATURE: 97.3 F | OXYGEN SATURATION: 97 %

## 2021-11-05 DIAGNOSIS — C50.912 MALIGNANT NEOPLASM OF LEFT BREAST IN FEMALE, ESTROGEN RECEPTOR POSITIVE, UNSPECIFIED SITE OF BREAST (HCC): ICD-10-CM

## 2021-11-05 DIAGNOSIS — Z17.0 MALIGNANT NEOPLASM OF AREOLA OF LEFT BREAST IN FEMALE, ESTROGEN RECEPTOR POSITIVE (HCC): Primary | ICD-10-CM

## 2021-11-05 DIAGNOSIS — Z17.0 MALIGNANT NEOPLASM OF LEFT BREAST IN FEMALE, ESTROGEN RECEPTOR POSITIVE, UNSPECIFIED SITE OF BREAST (HCC): ICD-10-CM

## 2021-11-05 DIAGNOSIS — C50.912 INFILTRATING DUCTAL CARCINOMA OF LEFT BREAST (HCC): ICD-10-CM

## 2021-11-05 DIAGNOSIS — C50.012 MALIGNANT NEOPLASM OF AREOLA OF LEFT BREAST IN FEMALE, ESTROGEN RECEPTOR POSITIVE (HCC): Primary | ICD-10-CM

## 2021-11-05 LAB
ALBUMIN SERPL-MCNC: 3.9 GM/DL (ref 3.4–5)
ALP BLD-CCNC: 59 IU/L (ref 40–129)
ALT SERPL-CCNC: 12 U/L (ref 10–40)
ANION GAP SERPL CALCULATED.3IONS-SCNC: 13 MMOL/L (ref 4–16)
AST SERPL-CCNC: 13 IU/L (ref 15–37)
BASOPHILS ABSOLUTE: 0 K/CU MM
BASOPHILS RELATIVE PERCENT: 0.8 % (ref 0–1)
BILIRUB SERPL-MCNC: 0.3 MG/DL (ref 0–1)
BUN BLDV-MCNC: 13 MG/DL (ref 6–23)
CALCIUM SERPL-MCNC: 8.6 MG/DL (ref 8.3–10.6)
CHLORIDE BLD-SCNC: 102 MMOL/L (ref 99–110)
CO2: 23 MMOL/L (ref 21–32)
CREAT SERPL-MCNC: 1.2 MG/DL (ref 0.6–1.1)
DIFFERENTIAL TYPE: ABNORMAL
EOSINOPHILS ABSOLUTE: 0.2 K/CU MM
EOSINOPHILS RELATIVE PERCENT: 4.4 % (ref 0–3)
FERRITIN: 346 NG/ML (ref 15–150)
FOLATE: 8.8 NG/ML (ref 3.1–17.5)
GFR AFRICAN AMERICAN: 56 ML/MIN/1.73M2
GFR NON-AFRICAN AMERICAN: 46 ML/MIN/1.73M2
GLUCOSE BLD-MCNC: 136 MG/DL (ref 70–99)
HCT VFR BLD CALC: 27.7 % (ref 37–47)
HEMOGLOBIN: 9 GM/DL (ref 12.5–16)
IRON: 53 UG/DL (ref 37–145)
LYMPHOCYTES ABSOLUTE: 1.2 K/CU MM
LYMPHOCYTES RELATIVE PERCENT: 22.7 % (ref 24–44)
MCH RBC QN AUTO: 30.8 PG (ref 27–31)
MCHC RBC AUTO-ENTMCNC: 32.5 % (ref 32–36)
MCV RBC AUTO: 94.9 FL (ref 78–100)
MONOCYTES ABSOLUTE: 0.4 K/CU MM
MONOCYTES RELATIVE PERCENT: 8 % (ref 0–4)
PCT TRANSFERRIN: 20 % (ref 10–44)
PDW BLD-RTO: 17.2 % (ref 11.7–14.9)
PLATELET # BLD: 204 K/CU MM (ref 140–440)
PMV BLD AUTO: 9.2 FL (ref 7.5–11.1)
POTASSIUM SERPL-SCNC: 3.4 MMOL/L (ref 3.5–5.1)
RBC # BLD: 2.92 M/CU MM (ref 4.2–5.4)
SEGMENTED NEUTROPHILS ABSOLUTE COUNT: 3.4 K/CU MM
SEGMENTED NEUTROPHILS RELATIVE PERCENT: 64.1 % (ref 36–66)
SODIUM BLD-SCNC: 138 MMOL/L (ref 135–145)
TOTAL IRON BINDING CAPACITY: 271 UG/DL (ref 250–450)
TOTAL PROTEIN: 5.7 GM/DL (ref 6.4–8.2)
TSH HIGH SENSITIVITY: 2.82 UIU/ML (ref 0.27–4.2)
UNSATURATED IRON BINDING CAPACITY: 218 UG/DL (ref 110–370)
VITAMIN B-12: 372.9 PG/ML (ref 211–911)
WBC # BLD: 5.3 K/CU MM (ref 4–10.5)

## 2021-11-05 PROCEDURE — 96367 TX/PROPH/DG ADDL SEQ IV INF: CPT

## 2021-11-05 PROCEDURE — 2500000003 HC RX 250 WO HCPCS: Performed by: INTERNAL MEDICINE

## 2021-11-05 PROCEDURE — 82607 VITAMIN B-12: CPT

## 2021-11-05 PROCEDURE — 82728 ASSAY OF FERRITIN: CPT

## 2021-11-05 PROCEDURE — 6360000002 HC RX W HCPCS: Performed by: INTERNAL MEDICINE

## 2021-11-05 PROCEDURE — 83550 IRON BINDING TEST: CPT

## 2021-11-05 PROCEDURE — 2580000003 HC RX 258: Performed by: INTERNAL MEDICINE

## 2021-11-05 PROCEDURE — 84443 ASSAY THYROID STIM HORMONE: CPT

## 2021-11-05 PROCEDURE — 36591 DRAW BLOOD OFF VENOUS DEVICE: CPT

## 2021-11-05 PROCEDURE — 83540 ASSAY OF IRON: CPT

## 2021-11-05 PROCEDURE — 80053 COMPREHEN METABOLIC PANEL: CPT

## 2021-11-05 PROCEDURE — 96375 TX/PRO/DX INJ NEW DRUG ADDON: CPT

## 2021-11-05 PROCEDURE — 85025 COMPLETE CBC W/AUTO DIFF WBC: CPT

## 2021-11-05 PROCEDURE — 82746 ASSAY OF FOLIC ACID SERUM: CPT

## 2021-11-05 PROCEDURE — 96413 CHEMO IV INFUSION 1 HR: CPT

## 2021-11-05 RX ORDER — DIPHENHYDRAMINE HYDROCHLORIDE 50 MG/ML
50 INJECTION INTRAMUSCULAR; INTRAVENOUS ONCE
Status: COMPLETED | OUTPATIENT
Start: 2021-11-05 | End: 2021-11-05

## 2021-11-05 RX ORDER — SODIUM CHLORIDE 0.9 % (FLUSH) 0.9 %
5-40 SYRINGE (ML) INJECTION PRN
Status: DISCONTINUED | OUTPATIENT
Start: 2021-11-05 | End: 2021-11-06 | Stop reason: HOSPADM

## 2021-11-05 RX ORDER — SODIUM CHLORIDE 9 MG/ML
20 INJECTION, SOLUTION INTRAVENOUS ONCE
Status: DISCONTINUED | OUTPATIENT
Start: 2021-11-05 | End: 2021-11-06 | Stop reason: HOSPADM

## 2021-11-05 RX ORDER — HEPARIN SODIUM (PORCINE) LOCK FLUSH IV SOLN 100 UNIT/ML 100 UNIT/ML
500 SOLUTION INTRAVENOUS PRN
Status: DISCONTINUED | OUTPATIENT
Start: 2021-11-05 | End: 2021-11-06 | Stop reason: HOSPADM

## 2021-11-05 NOTE — PROGRESS NOTES
Patient arrived to treatment suite for blood draw, pre-medications and chemotherapy infusion. Right chest mediport accessed and blood drawn from site and sent to lab for processing. Patient states fatigue and nose bleeds (which Dr. Rodolfo Coffman is aware of), but no other questions or concerns for the doctor at this time. Treatment approved and given. Patient tolerated well. Left treatment suite ambulatory. Discharge instructions provided.     Patient's status assessed and documented appropriately. All labs and required results were also reviewed today. Treatment parameters have been reviewed. Today's treatment has been approved by the provider. Treatment orders and medication sequencing (when applicable) was verified by 2 registered nurses. The treatment plan was confirmed with the patient prior to administration, and the patient understands the need to report any treatment-related symptoms.     Prior to administration, when applicable, the following 8 elements of medication administration were reviewed with 2nd Registered Nurse prior to dosing: drug name, drug dose, infusion volume when prepared in a syringe, rate of administration, expiration dates and/or times, appearance and integrity of drug(s), and rate of pump for infusion. The 5 rights of medication administration have been verified.

## 2021-11-12 ENCOUNTER — HOSPITAL ENCOUNTER (OUTPATIENT)
Dept: INFUSION THERAPY | Age: 61
Discharge: HOME | End: 2021-11-12
Payer: COMMERCIAL

## 2021-11-12 ENCOUNTER — TELEPHONE (OUTPATIENT)
Dept: ONCOLOGY | Age: 61
End: 2021-11-12

## 2021-11-12 VITALS
WEIGHT: 234.8 LBS | HEART RATE: 109 BPM | HEIGHT: 64 IN | OXYGEN SATURATION: 97 % | DIASTOLIC BLOOD PRESSURE: 72 MMHG | SYSTOLIC BLOOD PRESSURE: 129 MMHG | TEMPERATURE: 98.8 F | BODY MASS INDEX: 40.08 KG/M2

## 2021-11-12 DIAGNOSIS — Z17.0 MALIGNANT NEOPLASM OF AREOLA OF LEFT BREAST IN FEMALE, ESTROGEN RECEPTOR POSITIVE (HCC): Primary | ICD-10-CM

## 2021-11-12 DIAGNOSIS — C50.912 INFILTRATING DUCTAL CARCINOMA OF LEFT BREAST (HCC): ICD-10-CM

## 2021-11-12 DIAGNOSIS — C50.912 MALIGNANT NEOPLASM OF LEFT BREAST IN FEMALE, ESTROGEN RECEPTOR POSITIVE, UNSPECIFIED SITE OF BREAST (HCC): ICD-10-CM

## 2021-11-12 DIAGNOSIS — Z17.0 MALIGNANT NEOPLASM OF LEFT BREAST IN FEMALE, ESTROGEN RECEPTOR POSITIVE, UNSPECIFIED SITE OF BREAST (HCC): ICD-10-CM

## 2021-11-12 DIAGNOSIS — C50.012 MALIGNANT NEOPLASM OF AREOLA OF LEFT BREAST IN FEMALE, ESTROGEN RECEPTOR POSITIVE (HCC): Primary | ICD-10-CM

## 2021-11-12 LAB
ALBUMIN SERPL-MCNC: 4 GM/DL (ref 3.4–5)
ALP BLD-CCNC: 60 IU/L (ref 40–128)
ALT SERPL-CCNC: 14 U/L (ref 10–40)
ANION GAP SERPL CALCULATED.3IONS-SCNC: 14 MMOL/L (ref 4–16)
AST SERPL-CCNC: 15 IU/L (ref 15–37)
BASOPHILS ABSOLUTE: 0 K/CU MM
BASOPHILS RELATIVE PERCENT: 0.6 % (ref 0–1)
BILIRUB SERPL-MCNC: 0.3 MG/DL (ref 0–1)
BUN BLDV-MCNC: 15 MG/DL (ref 6–23)
CALCIUM SERPL-MCNC: 8.7 MG/DL (ref 8.3–10.6)
CHLORIDE BLD-SCNC: 102 MMOL/L (ref 99–110)
CO2: 22 MMOL/L (ref 21–32)
CREAT SERPL-MCNC: 1.1 MG/DL (ref 0.6–1.1)
DIFFERENTIAL TYPE: ABNORMAL
EOSINOPHILS ABSOLUTE: 0.1 K/CU MM
EOSINOPHILS RELATIVE PERCENT: 2.4 % (ref 0–3)
GFR AFRICAN AMERICAN: >60 ML/MIN/1.73M2
GFR NON-AFRICAN AMERICAN: 51 ML/MIN/1.73M2
GLUCOSE BLD-MCNC: 141 MG/DL (ref 70–99)
HCT VFR BLD CALC: 27.8 % (ref 37–47)
HEMOGLOBIN: 9.4 GM/DL (ref 12.5–16)
LYMPHOCYTES ABSOLUTE: 1.1 K/CU MM
LYMPHOCYTES RELATIVE PERCENT: 20.6 % (ref 24–44)
MCH RBC QN AUTO: 31.5 PG (ref 27–31)
MCHC RBC AUTO-ENTMCNC: 33.8 % (ref 32–36)
MCV RBC AUTO: 93.3 FL (ref 78–100)
MONOCYTES ABSOLUTE: 0.5 K/CU MM
MONOCYTES RELATIVE PERCENT: 8.4 % (ref 0–4)
PDW BLD-RTO: 16.7 % (ref 11.7–14.9)
PLATELET # BLD: 206 K/CU MM (ref 140–440)
PMV BLD AUTO: 9.1 FL (ref 7.5–11.1)
POTASSIUM SERPL-SCNC: 3.1 MMOL/L (ref 3.5–5.1)
RBC # BLD: 2.98 M/CU MM (ref 4.2–5.4)
SEGMENTED NEUTROPHILS ABSOLUTE COUNT: 3.6 K/CU MM
SEGMENTED NEUTROPHILS RELATIVE PERCENT: 68 % (ref 36–66)
SODIUM BLD-SCNC: 138 MMOL/L (ref 135–145)
TOTAL PROTEIN: 6.1 GM/DL (ref 6.4–8.2)
WBC # BLD: 5.3 K/CU MM (ref 4–10.5)

## 2021-11-12 PROCEDURE — 96375 TX/PRO/DX INJ NEW DRUG ADDON: CPT

## 2021-11-12 PROCEDURE — 6360000002 HC RX W HCPCS: Performed by: INTERNAL MEDICINE

## 2021-11-12 PROCEDURE — 96413 CHEMO IV INFUSION 1 HR: CPT

## 2021-11-12 PROCEDURE — 36591 DRAW BLOOD OFF VENOUS DEVICE: CPT

## 2021-11-12 PROCEDURE — 2580000003 HC RX 258: Performed by: INTERNAL MEDICINE

## 2021-11-12 PROCEDURE — 85025 COMPLETE CBC W/AUTO DIFF WBC: CPT

## 2021-11-12 PROCEDURE — 96367 TX/PROPH/DG ADDL SEQ IV INF: CPT

## 2021-11-12 PROCEDURE — 80053 COMPREHEN METABOLIC PANEL: CPT

## 2021-11-12 PROCEDURE — 2500000003 HC RX 250 WO HCPCS: Performed by: INTERNAL MEDICINE

## 2021-11-12 RX ORDER — SODIUM CHLORIDE 9 MG/ML
20 INJECTION, SOLUTION INTRAVENOUS ONCE
Status: DISCONTINUED | OUTPATIENT
Start: 2021-11-12 | End: 2021-11-13 | Stop reason: HOSPADM

## 2021-11-12 RX ORDER — DIPHENHYDRAMINE HYDROCHLORIDE 50 MG/ML
50 INJECTION INTRAMUSCULAR; INTRAVENOUS ONCE
Status: COMPLETED | OUTPATIENT
Start: 2021-11-12 | End: 2021-11-12

## 2021-11-12 RX ORDER — SODIUM CHLORIDE 0.9 % (FLUSH) 0.9 %
5-40 SYRINGE (ML) INJECTION PRN
Status: DISCONTINUED | OUTPATIENT
Start: 2021-11-12 | End: 2021-11-13 | Stop reason: HOSPADM

## 2021-11-12 RX ORDER — HEPARIN SODIUM (PORCINE) LOCK FLUSH IV SOLN 100 UNIT/ML 100 UNIT/ML
500 SOLUTION INTRAVENOUS PRN
Status: DISCONTINUED | OUTPATIENT
Start: 2021-11-12 | End: 2021-11-13 | Stop reason: HOSPADM

## 2021-11-12 RX ADMIN — PACLITAXEL 168 MG: 6 INJECTION, SOLUTION, CONCENTRATE INTRAVENOUS at 11:24

## 2021-11-12 RX ADMIN — DIPHENHYDRAMINE HYDROCHLORIDE 50 MG: 50 INJECTION INTRAMUSCULAR; INTRAVENOUS at 10:54

## 2021-11-12 RX ADMIN — SODIUM CHLORIDE, PRESERVATIVE FREE 10 ML: 5 INJECTION INTRAVENOUS at 12:36

## 2021-11-12 RX ADMIN — FAMOTIDINE 20 MG: 10 INJECTION, SOLUTION INTRAVENOUS at 10:54

## 2021-11-12 RX ADMIN — SODIUM CHLORIDE 20 ML/HR: 9 INJECTION, SOLUTION INTRAVENOUS at 10:54

## 2021-11-12 RX ADMIN — HEPARIN 500 UNITS: 100 SYRINGE at 12:37

## 2021-11-12 RX ADMIN — DEXAMETHASONE SODIUM PHOSPHATE 10 MG: 10 INJECTION INTRAMUSCULAR; INTRAVENOUS at 11:03

## 2021-11-12 ASSESSMENT — PAIN DESCRIPTION - ORIENTATION: ORIENTATION: LEFT

## 2021-11-12 ASSESSMENT — PAIN DESCRIPTION - LOCATION: LOCATION: HIP

## 2021-11-12 ASSESSMENT — PAIN SCALES - GENERAL: PAINLEVEL_OUTOF10: 7

## 2021-11-12 NOTE — TELEPHONE ENCOUNTER
Per Dr Yessi Borjas, phoned pt to address hypokalemia. Pt is currently taking potassium once daily but missed a few days earlier in the week due to illness. Pt instructed to take KCL twice daily for three days and then decrease to once daily again. Pt expressed understanding.

## 2021-11-12 NOTE — PROGRESS NOTES
Patient arrived to treatment suite for blood draw, pre-medications and chemotherapy infusion.  Right chest mediport accessed and blood drawn from site and sent to lab for processing.  Patient states fatigue and some n/v/d on Mondays, which is helped with medication, but no other questions or concerns for the doctor at this time.  Treatment approved and given.  Patient tolerated well.  Left treatment suite ambulatory.  Discharge instructions provided.     Patient's status assessed and documented appropriately.  All labs and required results were also reviewed today.  Treatment parameters have been reviewed.  Today's treatment has been approved by the provider.  Treatment orders and medication sequencing (when applicable) was verified by 2 registered nurses.  The treatment plan was confirmed with the patient prior to administration, and the patient understands the need to report any treatment-related symptoms.     Prior to administration, when applicable, the following 8 elements of medication administration were reviewed with 2nd Registered Nurse prior to dosing: drug name, drug dose, infusion volume when prepared in a syringe, rate of administration, expiration dates and/or times, appearance and integrity of drug(s), and rate of pump for infusion.  The 5 rights of medication administration have been verified.

## 2021-11-19 ENCOUNTER — HOSPITAL ENCOUNTER (OUTPATIENT)
Dept: INFUSION THERAPY | Age: 61
Discharge: HOME | End: 2021-11-19
Payer: COMMERCIAL

## 2021-11-19 VITALS
BODY MASS INDEX: 40.46 KG/M2 | DIASTOLIC BLOOD PRESSURE: 74 MMHG | TEMPERATURE: 97.4 F | HEIGHT: 64 IN | SYSTOLIC BLOOD PRESSURE: 131 MMHG | WEIGHT: 237 LBS | RESPIRATION RATE: 16 BRPM | HEART RATE: 101 BPM | OXYGEN SATURATION: 98 %

## 2021-11-19 DIAGNOSIS — Z17.0 MALIGNANT NEOPLASM OF LEFT BREAST IN FEMALE, ESTROGEN RECEPTOR POSITIVE, UNSPECIFIED SITE OF BREAST (HCC): ICD-10-CM

## 2021-11-19 DIAGNOSIS — C50.912 MALIGNANT NEOPLASM OF LEFT BREAST IN FEMALE, ESTROGEN RECEPTOR POSITIVE, UNSPECIFIED SITE OF BREAST (HCC): ICD-10-CM

## 2021-11-19 DIAGNOSIS — Z17.0 MALIGNANT NEOPLASM OF AREOLA OF LEFT BREAST IN FEMALE, ESTROGEN RECEPTOR POSITIVE (HCC): Primary | ICD-10-CM

## 2021-11-19 DIAGNOSIS — C50.912 INFILTRATING DUCTAL CARCINOMA OF LEFT BREAST (HCC): ICD-10-CM

## 2021-11-19 DIAGNOSIS — C50.012 MALIGNANT NEOPLASM OF AREOLA OF LEFT BREAST IN FEMALE, ESTROGEN RECEPTOR POSITIVE (HCC): Primary | ICD-10-CM

## 2021-11-19 LAB
ALBUMIN SERPL-MCNC: 4.1 GM/DL (ref 3.4–5)
ALP BLD-CCNC: 59 IU/L (ref 40–128)
ALT SERPL-CCNC: 13 U/L (ref 10–40)
ANION GAP SERPL CALCULATED.3IONS-SCNC: 13 MMOL/L (ref 4–16)
AST SERPL-CCNC: 17 IU/L (ref 15–37)
BASOPHILS ABSOLUTE: 0.1 K/CU MM
BASOPHILS RELATIVE PERCENT: 1 % (ref 0–1)
BILIRUB SERPL-MCNC: 0.3 MG/DL (ref 0–1)
BUN BLDV-MCNC: 12 MG/DL (ref 6–23)
CALCIUM SERPL-MCNC: 8.4 MG/DL (ref 8.3–10.6)
CHLORIDE BLD-SCNC: 103 MMOL/L (ref 99–110)
CO2: 24 MMOL/L (ref 21–32)
CREAT SERPL-MCNC: 1.1 MG/DL (ref 0.6–1.1)
DIFFERENTIAL TYPE: ABNORMAL
EOSINOPHILS ABSOLUTE: 0.1 K/CU MM
EOSINOPHILS RELATIVE PERCENT: 2.3 % (ref 0–3)
GFR AFRICAN AMERICAN: >60 ML/MIN/1.73M2
GFR NON-AFRICAN AMERICAN: 51 ML/MIN/1.73M2
GLUCOSE BLD-MCNC: 155 MG/DL (ref 70–99)
HCT VFR BLD CALC: 28.4 % (ref 37–47)
HEMOGLOBIN: 9.3 GM/DL (ref 12.5–16)
LYMPHOCYTES ABSOLUTE: 1.4 K/CU MM
LYMPHOCYTES RELATIVE PERCENT: 23.7 % (ref 24–44)
MCH RBC QN AUTO: 30.8 PG (ref 27–31)
MCHC RBC AUTO-ENTMCNC: 32.7 % (ref 32–36)
MCV RBC AUTO: 94 FL (ref 78–100)
MONOCYTES ABSOLUTE: 0.5 K/CU MM
MONOCYTES RELATIVE PERCENT: 8.2 % (ref 0–4)
PDW BLD-RTO: 16.6 % (ref 11.7–14.9)
PLATELET # BLD: 213 K/CU MM (ref 140–440)
PMV BLD AUTO: 8.3 FL (ref 7.5–11.1)
POTASSIUM SERPL-SCNC: 3.2 MMOL/L (ref 3.5–5.1)
RBC # BLD: 3.02 M/CU MM (ref 4.2–5.4)
SEGMENTED NEUTROPHILS ABSOLUTE COUNT: 3.7 K/CU MM
SEGMENTED NEUTROPHILS RELATIVE PERCENT: 64.8 % (ref 36–66)
SODIUM BLD-SCNC: 140 MMOL/L (ref 135–145)
TOTAL PROTEIN: 6 GM/DL (ref 6.4–8.2)
WBC # BLD: 5.7 K/CU MM (ref 4–10.5)

## 2021-11-19 PROCEDURE — 96413 CHEMO IV INFUSION 1 HR: CPT

## 2021-11-19 PROCEDURE — 96375 TX/PRO/DX INJ NEW DRUG ADDON: CPT

## 2021-11-19 PROCEDURE — 6360000002 HC RX W HCPCS

## 2021-11-19 PROCEDURE — 2500000003 HC RX 250 WO HCPCS: Performed by: INTERNAL MEDICINE

## 2021-11-19 PROCEDURE — 80053 COMPREHEN METABOLIC PANEL: CPT

## 2021-11-19 PROCEDURE — 6360000002 HC RX W HCPCS: Performed by: INTERNAL MEDICINE

## 2021-11-19 PROCEDURE — 2580000003 HC RX 258: Performed by: INTERNAL MEDICINE

## 2021-11-19 PROCEDURE — 85025 COMPLETE CBC W/AUTO DIFF WBC: CPT

## 2021-11-19 PROCEDURE — 96367 TX/PROPH/DG ADDL SEQ IV INF: CPT

## 2021-11-19 RX ORDER — SODIUM CHLORIDE 9 MG/ML
20 INJECTION, SOLUTION INTRAVENOUS ONCE
Status: DISCONTINUED | OUTPATIENT
Start: 2021-11-19 | End: 2021-11-20 | Stop reason: HOSPADM

## 2021-11-19 RX ORDER — HEPARIN SODIUM (PORCINE) LOCK FLUSH IV SOLN 100 UNIT/ML 100 UNIT/ML
SOLUTION INTRAVENOUS
Status: COMPLETED
Start: 2021-11-19 | End: 2021-11-19

## 2021-11-19 RX ORDER — DIPHENHYDRAMINE HYDROCHLORIDE 50 MG/ML
50 INJECTION INTRAMUSCULAR; INTRAVENOUS ONCE
Status: COMPLETED | OUTPATIENT
Start: 2021-11-19 | End: 2021-11-19

## 2021-11-19 RX ORDER — SODIUM CHLORIDE 0.9 % (FLUSH) 0.9 %
5-40 SYRINGE (ML) INJECTION PRN
Status: DISCONTINUED | OUTPATIENT
Start: 2021-11-19 | End: 2021-11-20 | Stop reason: HOSPADM

## 2021-11-19 RX ADMIN — DIPHENHYDRAMINE HYDROCHLORIDE 50 MG: 50 INJECTION INTRAMUSCULAR; INTRAVENOUS at 12:04

## 2021-11-19 RX ADMIN — SODIUM CHLORIDE 20 ML/HR: 9 INJECTION, SOLUTION INTRAVENOUS at 12:04

## 2021-11-19 RX ADMIN — FAMOTIDINE 20 MG: 10 INJECTION, SOLUTION INTRAVENOUS at 12:04

## 2021-11-19 RX ADMIN — PACLITAXEL 168 MG: 6 INJECTION, SOLUTION, CONCENTRATE INTRAVENOUS at 12:37

## 2021-11-19 RX ADMIN — DEXAMETHASONE SODIUM PHOSPHATE 10 MG: 10 INJECTION INTRAMUSCULAR; INTRAVENOUS at 12:07

## 2021-11-19 RX ADMIN — HEPARIN 500 UNITS: 100 SYRINGE at 13:56

## 2021-11-19 RX ADMIN — SODIUM CHLORIDE, PRESERVATIVE FREE 10 ML: 5 INJECTION INTRAVENOUS at 13:56

## 2021-11-19 NOTE — PROGRESS NOTES
Pt. Here for treatment. Pt. Stated she has been having indigestion. Denies any other questions or concerns. Right upper chest mediport accessed without difficulty, good blood return noted. Lab work drawn as ordered. Labs reviewed and treatment administered without incident. Patient's status assessed and documented appropriately. All labs and required results were also reviewed today. Treatment parameters have been reviewed. Today's treatment has been approved by the provider. Treatment orders and medication sequencing (when applicable) was verified by 2 registered nurses. The treatment plan was confirmed with the patient prior to administration, and the patient understands the need to report any treatment-related symptoms. Prior to administration, when applicable, the following 8 elements of medication administration were reviewed with 2nd Registered Nurse prior to dosing: drug name, drug dose, infusion volume when prepared in a syringe, rate of administration, expiration dates and/or times, appearance and integrity of drug(s), and rate of pump for infusion. The 5 rights of medication administration have been verified.

## 2021-11-29 DIAGNOSIS — I10 ESSENTIAL HYPERTENSION: ICD-10-CM

## 2021-11-29 RX ORDER — LOSARTAN POTASSIUM 25 MG/1
25 TABLET ORAL DAILY
Qty: 90 TABLET | Refills: 3 | Status: SHIPPED | OUTPATIENT
Start: 2021-11-29

## 2021-11-30 RX ORDER — SODIUM CHLORIDE 9 MG/ML
20 INJECTION, SOLUTION INTRAVENOUS ONCE
Status: CANCELLED | OUTPATIENT
Start: 2021-12-01 | End: 2021-12-01

## 2021-11-30 RX ORDER — SODIUM CHLORIDE 9 MG/ML
INJECTION, SOLUTION INTRAVENOUS CONTINUOUS
Status: CANCELLED | OUTPATIENT
Start: 2021-12-01

## 2021-11-30 RX ORDER — EPINEPHRINE 1 MG/ML
0.3 INJECTION, SOLUTION, CONCENTRATE INTRAVENOUS PRN
Status: CANCELLED | OUTPATIENT
Start: 2021-12-01

## 2021-11-30 RX ORDER — HEPARIN SODIUM (PORCINE) LOCK FLUSH IV SOLN 100 UNIT/ML 100 UNIT/ML
500 SOLUTION INTRAVENOUS PRN
Status: CANCELLED | OUTPATIENT
Start: 2021-12-15

## 2021-11-30 RX ORDER — SODIUM CHLORIDE 0.9 % (FLUSH) 0.9 %
5-40 SYRINGE (ML) INJECTION PRN
Status: CANCELLED | OUTPATIENT
Start: 2021-12-15

## 2021-11-30 RX ORDER — MEPERIDINE HYDROCHLORIDE 50 MG/ML
12.5 INJECTION INTRAMUSCULAR; INTRAVENOUS; SUBCUTANEOUS ONCE
Status: CANCELLED | OUTPATIENT
Start: 2021-12-01 | End: 2021-12-01

## 2021-11-30 RX ORDER — HEPARIN SODIUM (PORCINE) LOCK FLUSH IV SOLN 100 UNIT/ML 100 UNIT/ML
500 SOLUTION INTRAVENOUS PRN
Status: CANCELLED | OUTPATIENT
Start: 2021-12-01

## 2021-11-30 RX ORDER — DIPHENHYDRAMINE HYDROCHLORIDE 50 MG/ML
50 INJECTION INTRAMUSCULAR; INTRAVENOUS ONCE
Status: CANCELLED | OUTPATIENT
Start: 2021-12-15 | End: 2021-12-08

## 2021-11-30 RX ORDER — METHYLPREDNISOLONE SODIUM SUCCINATE 125 MG/2ML
125 INJECTION, POWDER, LYOPHILIZED, FOR SOLUTION INTRAMUSCULAR; INTRAVENOUS ONCE
Status: CANCELLED | OUTPATIENT
Start: 2021-12-01 | End: 2021-12-01

## 2021-11-30 RX ORDER — SODIUM CHLORIDE 9 MG/ML
25 INJECTION, SOLUTION INTRAVENOUS PRN
Status: CANCELLED | OUTPATIENT
Start: 2021-12-01

## 2021-11-30 RX ORDER — DIPHENHYDRAMINE HYDROCHLORIDE 50 MG/ML
50 INJECTION INTRAMUSCULAR; INTRAVENOUS ONCE
Status: CANCELLED | OUTPATIENT
Start: 2021-12-01

## 2021-11-30 RX ORDER — SODIUM CHLORIDE 9 MG/ML
INJECTION, SOLUTION INTRAVENOUS CONTINUOUS
Status: CANCELLED | OUTPATIENT
Start: 2021-12-15

## 2021-11-30 RX ORDER — SODIUM CHLORIDE 9 MG/ML
20 INJECTION, SOLUTION INTRAVENOUS ONCE
Status: CANCELLED | OUTPATIENT
Start: 2021-12-15 | End: 2021-12-08

## 2021-11-30 RX ORDER — DIPHENHYDRAMINE HYDROCHLORIDE 50 MG/ML
50 INJECTION INTRAMUSCULAR; INTRAVENOUS ONCE
Status: CANCELLED | OUTPATIENT
Start: 2021-12-15

## 2021-11-30 RX ORDER — SODIUM CHLORIDE 0.9 % (FLUSH) 0.9 %
5-40 SYRINGE (ML) INJECTION PRN
Status: CANCELLED | OUTPATIENT
Start: 2021-12-01

## 2021-11-30 RX ORDER — SODIUM CHLORIDE 9 MG/ML
25 INJECTION, SOLUTION INTRAVENOUS PRN
Status: CANCELLED | OUTPATIENT
Start: 2021-12-15

## 2021-11-30 RX ORDER — MEPERIDINE HYDROCHLORIDE 50 MG/ML
12.5 INJECTION INTRAMUSCULAR; INTRAVENOUS; SUBCUTANEOUS ONCE
Status: CANCELLED | OUTPATIENT
Start: 2021-12-15 | End: 2021-12-08

## 2021-11-30 RX ORDER — DIPHENHYDRAMINE HYDROCHLORIDE 50 MG/ML
50 INJECTION INTRAMUSCULAR; INTRAVENOUS ONCE
Status: CANCELLED | OUTPATIENT
Start: 2021-12-01 | End: 2021-12-01

## 2021-11-30 RX ORDER — EPINEPHRINE 1 MG/ML
0.3 INJECTION, SOLUTION, CONCENTRATE INTRAVENOUS PRN
Status: CANCELLED | OUTPATIENT
Start: 2021-12-15

## 2021-11-30 RX ORDER — METHYLPREDNISOLONE SODIUM SUCCINATE 125 MG/2ML
125 INJECTION, POWDER, LYOPHILIZED, FOR SOLUTION INTRAMUSCULAR; INTRAVENOUS ONCE
Status: CANCELLED | OUTPATIENT
Start: 2021-12-15 | End: 2021-12-08

## 2021-12-01 ENCOUNTER — HOSPITAL ENCOUNTER (OUTPATIENT)
Dept: INFUSION THERAPY | Age: 61
Discharge: HOME | End: 2021-12-01
Payer: COMMERCIAL

## 2021-12-01 VITALS
HEIGHT: 64 IN | DIASTOLIC BLOOD PRESSURE: 89 MMHG | SYSTOLIC BLOOD PRESSURE: 136 MMHG | BODY MASS INDEX: 39.81 KG/M2 | HEART RATE: 113 BPM | TEMPERATURE: 97 F | OXYGEN SATURATION: 97 % | RESPIRATION RATE: 18 BRPM | WEIGHT: 233.2 LBS

## 2021-12-01 DIAGNOSIS — C50.912 INFILTRATING DUCTAL CARCINOMA OF LEFT BREAST (HCC): ICD-10-CM

## 2021-12-01 DIAGNOSIS — Z17.0 MALIGNANT NEOPLASM OF AREOLA OF LEFT BREAST IN FEMALE, ESTROGEN RECEPTOR POSITIVE (HCC): Primary | ICD-10-CM

## 2021-12-01 DIAGNOSIS — Z17.0 MALIGNANT NEOPLASM OF LEFT BREAST IN FEMALE, ESTROGEN RECEPTOR POSITIVE, UNSPECIFIED SITE OF BREAST (HCC): ICD-10-CM

## 2021-12-01 DIAGNOSIS — C50.912 MALIGNANT NEOPLASM OF LEFT BREAST IN FEMALE, ESTROGEN RECEPTOR POSITIVE, UNSPECIFIED SITE OF BREAST (HCC): ICD-10-CM

## 2021-12-01 DIAGNOSIS — C50.012 MALIGNANT NEOPLASM OF AREOLA OF LEFT BREAST IN FEMALE, ESTROGEN RECEPTOR POSITIVE (HCC): Primary | ICD-10-CM

## 2021-12-01 LAB
ALBUMIN SERPL-MCNC: 4.2 GM/DL (ref 3.4–5)
ALP BLD-CCNC: 68 IU/L (ref 40–128)
ALT SERPL-CCNC: 22 U/L (ref 10–40)
ANION GAP SERPL CALCULATED.3IONS-SCNC: 14 MMOL/L (ref 4–16)
AST SERPL-CCNC: 23 IU/L (ref 15–37)
BASOPHILS ABSOLUTE: 0.1 K/CU MM
BASOPHILS RELATIVE PERCENT: 0.8 % (ref 0–1)
BILIRUB SERPL-MCNC: 0.4 MG/DL (ref 0–1)
BUN BLDV-MCNC: 13 MG/DL (ref 6–23)
CALCIUM SERPL-MCNC: 8.9 MG/DL (ref 8.3–10.6)
CHLORIDE BLD-SCNC: 104 MMOL/L (ref 99–110)
CO2: 25 MMOL/L (ref 21–32)
CREAT SERPL-MCNC: 1.2 MG/DL (ref 0.6–1.1)
DIFFERENTIAL TYPE: ABNORMAL
EOSINOPHILS ABSOLUTE: 0.1 K/CU MM
EOSINOPHILS RELATIVE PERCENT: 1.7 % (ref 0–3)
GFR AFRICAN AMERICAN: 56 ML/MIN/1.73M2
GFR NON-AFRICAN AMERICAN: 46 ML/MIN/1.73M2
GLUCOSE BLD-MCNC: 110 MG/DL (ref 70–99)
HCT VFR BLD CALC: 31.3 % (ref 37–47)
HEMOGLOBIN: 10.3 GM/DL (ref 12.5–16)
LYMPHOCYTES ABSOLUTE: 1.3 K/CU MM
LYMPHOCYTES RELATIVE PERCENT: 20.6 % (ref 24–44)
MCH RBC QN AUTO: 30.8 PG (ref 27–31)
MCHC RBC AUTO-ENTMCNC: 32.9 % (ref 32–36)
MCV RBC AUTO: 93.7 FL (ref 78–100)
MONOCYTES ABSOLUTE: 0.7 K/CU MM
MONOCYTES RELATIVE PERCENT: 10.8 % (ref 0–4)
PDW BLD-RTO: 16.8 % (ref 11.7–14.9)
PLATELET # BLD: 221 K/CU MM (ref 140–440)
PMV BLD AUTO: 9.3 FL (ref 7.5–11.1)
POTASSIUM SERPL-SCNC: 3.3 MMOL/L (ref 3.5–5.1)
RBC # BLD: 3.34 M/CU MM (ref 4.2–5.4)
SEGMENTED NEUTROPHILS ABSOLUTE COUNT: 4.3 K/CU MM
SEGMENTED NEUTROPHILS RELATIVE PERCENT: 66.1 % (ref 36–66)
SODIUM BLD-SCNC: 143 MMOL/L (ref 135–145)
TOTAL PROTEIN: 6.2 GM/DL (ref 6.4–8.2)
WBC # BLD: 6.5 K/CU MM (ref 4–10.5)

## 2021-12-01 PROCEDURE — 85025 COMPLETE CBC W/AUTO DIFF WBC: CPT

## 2021-12-01 PROCEDURE — 2580000003 HC RX 258: Performed by: INTERNAL MEDICINE

## 2021-12-01 PROCEDURE — 2500000003 HC RX 250 WO HCPCS: Performed by: INTERNAL MEDICINE

## 2021-12-01 PROCEDURE — 6360000002 HC RX W HCPCS: Performed by: INTERNAL MEDICINE

## 2021-12-01 PROCEDURE — 96375 TX/PRO/DX INJ NEW DRUG ADDON: CPT

## 2021-12-01 PROCEDURE — 80053 COMPREHEN METABOLIC PANEL: CPT

## 2021-12-01 PROCEDURE — 96413 CHEMO IV INFUSION 1 HR: CPT

## 2021-12-01 RX ORDER — HEPARIN SODIUM (PORCINE) LOCK FLUSH IV SOLN 100 UNIT/ML 100 UNIT/ML
500 SOLUTION INTRAVENOUS PRN
Status: DISCONTINUED | OUTPATIENT
Start: 2021-12-01 | End: 2021-12-02 | Stop reason: HOSPADM

## 2021-12-01 RX ORDER — SODIUM CHLORIDE 0.9 % (FLUSH) 0.9 %
5-40 SYRINGE (ML) INJECTION PRN
Status: DISCONTINUED | OUTPATIENT
Start: 2021-12-01 | End: 2021-12-02 | Stop reason: HOSPADM

## 2021-12-01 RX ORDER — ONDANSETRON 2 MG/ML
8 INJECTION INTRAMUSCULAR; INTRAVENOUS ONCE
Status: CANCELLED
Start: 2021-12-15 | End: 2021-12-08

## 2021-12-01 RX ORDER — DIPHENHYDRAMINE HYDROCHLORIDE 50 MG/ML
50 INJECTION INTRAMUSCULAR; INTRAVENOUS ONCE
Status: COMPLETED | OUTPATIENT
Start: 2021-12-01 | End: 2021-12-01

## 2021-12-01 RX ORDER — SODIUM CHLORIDE 9 MG/ML
20 INJECTION, SOLUTION INTRAVENOUS ONCE
Status: COMPLETED | OUTPATIENT
Start: 2021-12-01 | End: 2021-12-01

## 2021-12-01 RX ORDER — ONDANSETRON 2 MG/ML
8 INJECTION INTRAMUSCULAR; INTRAVENOUS ONCE
Status: COMPLETED | OUTPATIENT
Start: 2021-12-01 | End: 2021-12-01

## 2021-12-01 RX ADMIN — DIPHENHYDRAMINE HYDROCHLORIDE 50 MG: 50 INJECTION INTRAMUSCULAR; INTRAVENOUS at 11:33

## 2021-12-01 RX ADMIN — FAMOTIDINE 20 MG: 10 INJECTION, SOLUTION INTRAVENOUS at 11:32

## 2021-12-01 RX ADMIN — ONDANSETRON 8 MG: 2 INJECTION INTRAMUSCULAR; INTRAVENOUS at 11:30

## 2021-12-01 RX ADMIN — SODIUM CHLORIDE 20 ML/HR: 9 INJECTION, SOLUTION INTRAVENOUS at 11:34

## 2021-12-01 RX ADMIN — SODIUM CHLORIDE, PRESERVATIVE FREE 10 ML: 5 INJECTION INTRAVENOUS at 13:21

## 2021-12-01 RX ADMIN — PACLITAXEL 130 MG: 6 INJECTION, SOLUTION, CONCENTRATE INTRAVENOUS at 12:08

## 2021-12-01 RX ADMIN — DEXAMETHASONE SODIUM PHOSPHATE 10 MG: 10 INJECTION, SOLUTION INTRAMUSCULAR; INTRAVENOUS at 11:35

## 2021-12-01 RX ADMIN — HEPARIN 500 UNITS: 100 SYRINGE at 13:21

## 2021-12-01 NOTE — PROGRESS NOTES
Decreased paclitaxel by 10% and added zofran 8mg IV per Marylee Almond since pt was not feeling well after last treatment.

## 2021-12-01 NOTE — PROGRESS NOTES
Pt. Here for treatment. Pt. States that she has had nausea since last treatment on 19 Nov 2021. Has been using prescribed Zofran every 8 hrs with some relief. Discussed issue with Dr. Isai Meza, Pt does not want to delay treatment this week. Dr. Isai Meza recommended reducing chemo by 10 %. Daisy, Pharmacist notified. Pt currently has no other questions or concerns. Right upper chest mediport accessed without difficulty, good blood return noted. Lab work drawn as ordered, and sent to lab for processing. Labs reviewed and treatment administered without incident.      Patient's status assessed and documented appropriately. All labs and required results were also reviewed today. Treatment parameters have been reviewed. Today's treatment has been approved by the provider. Treatment orders and medication sequencing (when applicable) was verified by 2 registered nurses. The treatment plan was confirmed with the patient prior to administration, and the patient understands the need to report any treatment-related symptoms.     Prior to administration, when applicable, the following 8 elements of medication administration were reviewed with 2nd Registered Nurse prior to dosing: drug name, drug dose, infusion volume when prepared in a syringe, rate of administration, expiration dates and/or times, appearance and integrity of drug(s), and rate of pump for infusion. The 5 rights of medication administration have been verified.

## 2021-12-07 RX ORDER — SODIUM CHLORIDE 9 MG/ML
INJECTION, SOLUTION INTRAVENOUS CONTINUOUS
Status: CANCELLED | OUTPATIENT
Start: 2021-12-22

## 2021-12-07 RX ORDER — SODIUM CHLORIDE 9 MG/ML
25 INJECTION, SOLUTION INTRAVENOUS PRN
Status: CANCELLED | OUTPATIENT
Start: 2022-01-05

## 2021-12-07 RX ORDER — HEPARIN SODIUM (PORCINE) LOCK FLUSH IV SOLN 100 UNIT/ML 100 UNIT/ML
500 SOLUTION INTRAVENOUS PRN
Status: CANCELLED | OUTPATIENT
Start: 2022-01-05

## 2021-12-07 RX ORDER — SODIUM CHLORIDE 9 MG/ML
25 INJECTION, SOLUTION INTRAVENOUS PRN
Status: CANCELLED | OUTPATIENT
Start: 2021-12-22

## 2021-12-07 RX ORDER — SODIUM CHLORIDE 0.9 % (FLUSH) 0.9 %
5-40 SYRINGE (ML) INJECTION PRN
Status: CANCELLED | OUTPATIENT
Start: 2021-12-22

## 2021-12-07 RX ORDER — DIPHENHYDRAMINE HYDROCHLORIDE 50 MG/ML
50 INJECTION INTRAMUSCULAR; INTRAVENOUS ONCE
Status: CANCELLED | OUTPATIENT
Start: 2021-12-29

## 2021-12-07 RX ORDER — ONDANSETRON 2 MG/ML
8 INJECTION INTRAMUSCULAR; INTRAVENOUS ONCE
Status: CANCELLED
Start: 2021-12-29 | End: 2021-12-22

## 2021-12-07 RX ORDER — EPINEPHRINE 1 MG/ML
0.3 INJECTION, SOLUTION, CONCENTRATE INTRAVENOUS PRN
Status: CANCELLED | OUTPATIENT
Start: 2021-12-29

## 2021-12-07 RX ORDER — ONDANSETRON 2 MG/ML
8 INJECTION INTRAMUSCULAR; INTRAVENOUS ONCE
Status: CANCELLED
Start: 2021-12-22 | End: 2021-12-15

## 2021-12-07 RX ORDER — SODIUM CHLORIDE 9 MG/ML
INJECTION, SOLUTION INTRAVENOUS CONTINUOUS
Status: CANCELLED | OUTPATIENT
Start: 2021-12-29

## 2021-12-07 RX ORDER — DIPHENHYDRAMINE HYDROCHLORIDE 50 MG/ML
50 INJECTION INTRAMUSCULAR; INTRAVENOUS ONCE
Status: CANCELLED | OUTPATIENT
Start: 2022-01-05

## 2021-12-07 RX ORDER — DIPHENHYDRAMINE HYDROCHLORIDE 50 MG/ML
50 INJECTION INTRAMUSCULAR; INTRAVENOUS ONCE
Status: CANCELLED | OUTPATIENT
Start: 2021-12-22

## 2021-12-07 RX ORDER — ONDANSETRON 2 MG/ML
8 INJECTION INTRAMUSCULAR; INTRAVENOUS ONCE
Status: CANCELLED
Start: 2022-01-05 | End: 2021-12-29

## 2021-12-07 RX ORDER — DIPHENHYDRAMINE HYDROCHLORIDE 50 MG/ML
50 INJECTION INTRAMUSCULAR; INTRAVENOUS ONCE
Status: CANCELLED | OUTPATIENT
Start: 2021-12-29 | End: 2021-12-22

## 2021-12-07 RX ORDER — HEPARIN SODIUM (PORCINE) LOCK FLUSH IV SOLN 100 UNIT/ML 100 UNIT/ML
500 SOLUTION INTRAVENOUS PRN
Status: CANCELLED | OUTPATIENT
Start: 2021-12-29

## 2021-12-07 RX ORDER — DIPHENHYDRAMINE HYDROCHLORIDE 50 MG/ML
50 INJECTION INTRAMUSCULAR; INTRAVENOUS ONCE
Status: CANCELLED | OUTPATIENT
Start: 2021-12-22 | End: 2021-12-15

## 2021-12-07 RX ORDER — EPINEPHRINE 1 MG/ML
0.3 INJECTION, SOLUTION, CONCENTRATE INTRAVENOUS PRN
Status: CANCELLED | OUTPATIENT
Start: 2022-01-05

## 2021-12-07 RX ORDER — MEPERIDINE HYDROCHLORIDE 50 MG/ML
12.5 INJECTION INTRAMUSCULAR; INTRAVENOUS; SUBCUTANEOUS ONCE
Status: CANCELLED | OUTPATIENT
Start: 2021-12-29 | End: 2021-12-22

## 2021-12-07 RX ORDER — EPINEPHRINE 1 MG/ML
0.3 INJECTION, SOLUTION, CONCENTRATE INTRAVENOUS PRN
Status: CANCELLED | OUTPATIENT
Start: 2021-12-22

## 2021-12-07 RX ORDER — METHYLPREDNISOLONE SODIUM SUCCINATE 125 MG/2ML
125 INJECTION, POWDER, LYOPHILIZED, FOR SOLUTION INTRAMUSCULAR; INTRAVENOUS ONCE
Status: CANCELLED | OUTPATIENT
Start: 2022-01-05 | End: 2021-12-29

## 2021-12-07 RX ORDER — MEPERIDINE HYDROCHLORIDE 50 MG/ML
12.5 INJECTION INTRAMUSCULAR; INTRAVENOUS; SUBCUTANEOUS ONCE
Status: CANCELLED | OUTPATIENT
Start: 2022-01-05 | End: 2021-12-29

## 2021-12-07 RX ORDER — SODIUM CHLORIDE 9 MG/ML
20 INJECTION, SOLUTION INTRAVENOUS ONCE
Status: CANCELLED | OUTPATIENT
Start: 2021-12-29 | End: 2021-12-22

## 2021-12-07 RX ORDER — METHYLPREDNISOLONE SODIUM SUCCINATE 125 MG/2ML
125 INJECTION, POWDER, LYOPHILIZED, FOR SOLUTION INTRAMUSCULAR; INTRAVENOUS ONCE
Status: CANCELLED | OUTPATIENT
Start: 2021-12-22 | End: 2021-12-15

## 2021-12-07 RX ORDER — MEPERIDINE HYDROCHLORIDE 50 MG/ML
12.5 INJECTION INTRAMUSCULAR; INTRAVENOUS; SUBCUTANEOUS ONCE
Status: CANCELLED | OUTPATIENT
Start: 2021-12-22 | End: 2021-12-15

## 2021-12-07 RX ORDER — SODIUM CHLORIDE 9 MG/ML
20 INJECTION, SOLUTION INTRAVENOUS ONCE
Status: CANCELLED | OUTPATIENT
Start: 2021-12-22 | End: 2021-12-15

## 2021-12-07 RX ORDER — SODIUM CHLORIDE 9 MG/ML
20 INJECTION, SOLUTION INTRAVENOUS ONCE
Status: CANCELLED | OUTPATIENT
Start: 2022-01-05 | End: 2021-12-29

## 2021-12-07 RX ORDER — SODIUM CHLORIDE 0.9 % (FLUSH) 0.9 %
5-40 SYRINGE (ML) INJECTION PRN
Status: CANCELLED | OUTPATIENT
Start: 2021-12-29

## 2021-12-07 RX ORDER — SODIUM CHLORIDE 9 MG/ML
INJECTION, SOLUTION INTRAVENOUS CONTINUOUS
Status: CANCELLED | OUTPATIENT
Start: 2022-01-05

## 2021-12-07 RX ORDER — DIPHENHYDRAMINE HYDROCHLORIDE 50 MG/ML
50 INJECTION INTRAMUSCULAR; INTRAVENOUS ONCE
Status: CANCELLED | OUTPATIENT
Start: 2022-01-05 | End: 2021-12-29

## 2021-12-07 RX ORDER — SODIUM CHLORIDE 9 MG/ML
25 INJECTION, SOLUTION INTRAVENOUS PRN
Status: CANCELLED | OUTPATIENT
Start: 2021-12-29

## 2021-12-07 RX ORDER — HEPARIN SODIUM (PORCINE) LOCK FLUSH IV SOLN 100 UNIT/ML 100 UNIT/ML
500 SOLUTION INTRAVENOUS PRN
Status: CANCELLED | OUTPATIENT
Start: 2021-12-22

## 2021-12-07 RX ORDER — SODIUM CHLORIDE 0.9 % (FLUSH) 0.9 %
5-40 SYRINGE (ML) INJECTION PRN
Status: CANCELLED | OUTPATIENT
Start: 2022-01-05

## 2021-12-07 RX ORDER — METHYLPREDNISOLONE SODIUM SUCCINATE 125 MG/2ML
125 INJECTION, POWDER, LYOPHILIZED, FOR SOLUTION INTRAMUSCULAR; INTRAVENOUS ONCE
Status: CANCELLED | OUTPATIENT
Start: 2021-12-29 | End: 2021-12-22

## 2021-12-08 ENCOUNTER — HOSPITAL ENCOUNTER (OUTPATIENT)
Dept: INFUSION THERAPY | Age: 61
Discharge: HOME | End: 2021-12-08
Payer: COMMERCIAL

## 2021-12-08 ENCOUNTER — OFFICE VISIT (OUTPATIENT)
Dept: ONCOLOGY | Age: 61
End: 2021-12-08
Payer: COMMERCIAL

## 2021-12-08 ENCOUNTER — TELEPHONE (OUTPATIENT)
Dept: ONCOLOGY | Age: 61
End: 2021-12-08

## 2021-12-08 VITALS
BODY MASS INDEX: 40.08 KG/M2 | SYSTOLIC BLOOD PRESSURE: 158 MMHG | HEART RATE: 96 BPM | DIASTOLIC BLOOD PRESSURE: 86 MMHG | TEMPERATURE: 98 F | RESPIRATION RATE: 16 BRPM | WEIGHT: 234.8 LBS | HEIGHT: 64 IN

## 2021-12-08 VITALS
DIASTOLIC BLOOD PRESSURE: 86 MMHG | TEMPERATURE: 98 F | SYSTOLIC BLOOD PRESSURE: 158 MMHG | HEIGHT: 64 IN | WEIGHT: 234 LBS | BODY MASS INDEX: 39.95 KG/M2 | OXYGEN SATURATION: 96 % | HEART RATE: 96 BPM

## 2021-12-08 DIAGNOSIS — R53.1 WEAKNESS: ICD-10-CM

## 2021-12-08 DIAGNOSIS — Z17.0 MALIGNANT NEOPLASM OF LEFT BREAST IN FEMALE, ESTROGEN RECEPTOR POSITIVE, UNSPECIFIED SITE OF BREAST (HCC): ICD-10-CM

## 2021-12-08 DIAGNOSIS — E86.0 DEHYDRATION: ICD-10-CM

## 2021-12-08 DIAGNOSIS — G62.0 DRUG-INDUCED PERIPHERAL NEUROPATHY (HCC): Primary | ICD-10-CM

## 2021-12-08 DIAGNOSIS — C50.012 MALIGNANT NEOPLASM OF AREOLA OF LEFT BREAST IN FEMALE, ESTROGEN RECEPTOR POSITIVE (HCC): Primary | ICD-10-CM

## 2021-12-08 DIAGNOSIS — D64.9 ANEMIA, UNSPECIFIED TYPE: ICD-10-CM

## 2021-12-08 DIAGNOSIS — C50.912 MALIGNANT NEOPLASM OF LEFT BREAST IN FEMALE, ESTROGEN RECEPTOR POSITIVE, UNSPECIFIED SITE OF BREAST (HCC): ICD-10-CM

## 2021-12-08 DIAGNOSIS — Z17.0 MALIGNANT NEOPLASM OF AREOLA OF LEFT BREAST IN FEMALE, ESTROGEN RECEPTOR POSITIVE (HCC): Primary | ICD-10-CM

## 2021-12-08 LAB
ALBUMIN SERPL-MCNC: 4.2 GM/DL (ref 3.4–5)
ALP BLD-CCNC: 62 IU/L (ref 40–128)
ALT SERPL-CCNC: 16 U/L (ref 10–40)
ANION GAP SERPL CALCULATED.3IONS-SCNC: 13 MMOL/L (ref 4–16)
AST SERPL-CCNC: 17 IU/L (ref 15–37)
BASOPHILS ABSOLUTE: 0 K/CU MM
BASOPHILS RELATIVE PERCENT: 0.3 % (ref 0–1)
BILIRUB SERPL-MCNC: 0.3 MG/DL (ref 0–1)
BUN BLDV-MCNC: 14 MG/DL (ref 6–23)
CALCIUM SERPL-MCNC: 8.9 MG/DL (ref 8.3–10.6)
CHLORIDE BLD-SCNC: 106 MMOL/L (ref 99–110)
CO2: 23 MMOL/L (ref 21–32)
CREAT SERPL-MCNC: 1.1 MG/DL (ref 0.6–1.1)
DIFFERENTIAL TYPE: ABNORMAL
EOSINOPHILS ABSOLUTE: 0.2 K/CU MM
EOSINOPHILS RELATIVE PERCENT: 2.4 % (ref 0–3)
FERRITIN: 356 NG/ML (ref 15–150)
FOLATE: 10.8 NG/ML (ref 3.1–17.5)
GFR AFRICAN AMERICAN: >60 ML/MIN/1.73M2
GFR NON-AFRICAN AMERICAN: 51 ML/MIN/1.73M2
GLUCOSE BLD-MCNC: 109 MG/DL (ref 70–99)
HCT VFR BLD CALC: 32.8 % (ref 37–47)
HEMOGLOBIN: 10.7 GM/DL (ref 12.5–16)
IRON: 54 UG/DL (ref 37–145)
LYMPHOCYTES ABSOLUTE: 1.7 K/CU MM
LYMPHOCYTES RELATIVE PERCENT: 24.7 % (ref 24–44)
MCH RBC QN AUTO: 30.5 PG (ref 27–31)
MCHC RBC AUTO-ENTMCNC: 32.6 % (ref 32–36)
MCV RBC AUTO: 93.4 FL (ref 78–100)
MONOCYTES ABSOLUTE: 0.5 K/CU MM
MONOCYTES RELATIVE PERCENT: 8.1 % (ref 0–4)
PCT TRANSFERRIN: 17 % (ref 10–44)
PDW BLD-RTO: 16.3 % (ref 11.7–14.9)
PLATELET # BLD: 240 K/CU MM (ref 140–440)
PMV BLD AUTO: 8.8 FL (ref 7.5–11.1)
POTASSIUM SERPL-SCNC: 3.8 MMOL/L (ref 3.5–5.1)
RBC # BLD: 3.51 M/CU MM (ref 4.2–5.4)
SEGMENTED NEUTROPHILS ABSOLUTE COUNT: 4.3 K/CU MM
SEGMENTED NEUTROPHILS RELATIVE PERCENT: 64.5 % (ref 36–66)
SODIUM BLD-SCNC: 142 MMOL/L (ref 135–145)
TOTAL IRON BINDING CAPACITY: 324 UG/DL (ref 250–450)
TOTAL PROTEIN: 6 GM/DL (ref 6.4–8.2)
UNSATURATED IRON BINDING CAPACITY: 270 UG/DL (ref 110–370)
VITAMIN B-12: 461.8 PG/ML (ref 211–911)
WBC # BLD: 6.7 K/CU MM (ref 4–10.5)

## 2021-12-08 PROCEDURE — 82728 ASSAY OF FERRITIN: CPT

## 2021-12-08 PROCEDURE — 2580000003 HC RX 258: Performed by: NURSE PRACTITIONER

## 2021-12-08 PROCEDURE — 6360000002 HC RX W HCPCS

## 2021-12-08 PROCEDURE — G8428 CUR MEDS NOT DOCUMENT: HCPCS | Performed by: NURSE PRACTITIONER

## 2021-12-08 PROCEDURE — 99215 OFFICE O/P EST HI 40 MIN: CPT | Performed by: NURSE PRACTITIONER

## 2021-12-08 PROCEDURE — 82607 VITAMIN B-12: CPT

## 2021-12-08 PROCEDURE — G8484 FLU IMMUNIZE NO ADMIN: HCPCS | Performed by: NURSE PRACTITIONER

## 2021-12-08 PROCEDURE — 3017F COLORECTAL CA SCREEN DOC REV: CPT | Performed by: NURSE PRACTITIONER

## 2021-12-08 PROCEDURE — 1036F TOBACCO NON-USER: CPT | Performed by: NURSE PRACTITIONER

## 2021-12-08 PROCEDURE — 6360000002 HC RX W HCPCS: Performed by: INTERNAL MEDICINE

## 2021-12-08 PROCEDURE — 83550 IRON BINDING TEST: CPT

## 2021-12-08 PROCEDURE — 80053 COMPREHEN METABOLIC PANEL: CPT

## 2021-12-08 PROCEDURE — 82746 ASSAY OF FOLIC ACID SERUM: CPT

## 2021-12-08 PROCEDURE — 96360 HYDRATION IV INFUSION INIT: CPT

## 2021-12-08 PROCEDURE — G8417 CALC BMI ABV UP PARAM F/U: HCPCS | Performed by: NURSE PRACTITIONER

## 2021-12-08 PROCEDURE — 83540 ASSAY OF IRON: CPT

## 2021-12-08 PROCEDURE — 85025 COMPLETE CBC W/AUTO DIFF WBC: CPT

## 2021-12-08 PROCEDURE — 96375 TX/PRO/DX INJ NEW DRUG ADDON: CPT

## 2021-12-08 RX ORDER — GABAPENTIN 300 MG/1
300 CAPSULE ORAL NIGHTLY
Qty: 90 CAPSULE | Refills: 3 | Status: SHIPPED | OUTPATIENT
Start: 2021-12-08 | End: 2025-07-01

## 2021-12-08 RX ORDER — DEXAMETHASONE SODIUM PHOSPHATE 4 MG/ML
8 INJECTION, SOLUTION INTRA-ARTICULAR; INTRALESIONAL; INTRAMUSCULAR; INTRAVENOUS; SOFT TISSUE ONCE
Status: COMPLETED | OUTPATIENT
Start: 2021-12-08 | End: 2021-12-08

## 2021-12-08 RX ORDER — DEXAMETHASONE SODIUM PHOSPHATE 10 MG/ML
8 INJECTION, SOLUTION INTRAMUSCULAR; INTRAVENOUS ONCE
Status: CANCELLED
Start: 2021-12-08 | End: 2021-12-08

## 2021-12-08 RX ORDER — 0.9 % SODIUM CHLORIDE 0.9 %
500 INTRAVENOUS SOLUTION INTRAVENOUS ONCE
Status: COMPLETED | OUTPATIENT
Start: 2021-12-08 | End: 2021-12-08

## 2021-12-08 RX ORDER — 0.9 % SODIUM CHLORIDE 0.9 %
500 INTRAVENOUS SOLUTION INTRAVENOUS ONCE
Status: CANCELLED | OUTPATIENT
Start: 2021-12-08 | End: 2021-12-08

## 2021-12-08 RX ORDER — SODIUM CHLORIDE 0.9 % (FLUSH) 0.9 %
5-40 SYRINGE (ML) INJECTION PRN
Status: CANCELLED | OUTPATIENT
Start: 2021-12-08

## 2021-12-08 RX ORDER — ONDANSETRON 2 MG/ML
8 INJECTION INTRAMUSCULAR; INTRAVENOUS ONCE
Status: COMPLETED | OUTPATIENT
Start: 2021-12-08 | End: 2021-12-08

## 2021-12-08 RX ORDER — LANSOPRAZOLE 30 MG/1
CAPSULE, DELAYED RELEASE ORAL
COMMUNITY
Start: 2021-11-18

## 2021-12-08 RX ORDER — POTASSIUM CHLORIDE 20 MEQ/1
TABLET, EXTENDED RELEASE ORAL
COMMUNITY
Start: 2021-11-28

## 2021-12-08 RX ORDER — SODIUM CHLORIDE 9 MG/ML
25 INJECTION, SOLUTION INTRAVENOUS PRN
Status: CANCELLED | OUTPATIENT
Start: 2021-12-08

## 2021-12-08 RX ORDER — SUCRALFATE ORAL 1 G/10ML
SUSPENSION ORAL
COMMUNITY
Start: 2021-11-16

## 2021-12-08 RX ORDER — ECHINACEA PURPUREA EXTRACT 125 MG
1 TABLET ORAL PRN
Qty: 1 EACH | Refills: 3 | Status: SHIPPED | OUTPATIENT
Start: 2021-12-08

## 2021-12-08 RX ORDER — SODIUM CHLORIDE 0.9 % (FLUSH) 0.9 %
5-40 SYRINGE (ML) INJECTION PRN
OUTPATIENT
Start: 2021-12-08

## 2021-12-08 RX ORDER — HEPARIN SODIUM (PORCINE) LOCK FLUSH IV SOLN 100 UNIT/ML 100 UNIT/ML
500 SOLUTION INTRAVENOUS PRN
OUTPATIENT
Start: 2021-12-08

## 2021-12-08 RX ORDER — HEPARIN SODIUM (PORCINE) LOCK FLUSH IV SOLN 100 UNIT/ML 100 UNIT/ML
500 SOLUTION INTRAVENOUS PRN
Status: CANCELLED | OUTPATIENT
Start: 2021-12-08

## 2021-12-08 RX ORDER — ECHINACEA PURPUREA EXTRACT 125 MG
1 TABLET ORAL 2 TIMES DAILY
Qty: 1 EACH | Refills: 3 | Status: SHIPPED | OUTPATIENT
Start: 2021-12-08 | End: 2022-03-07

## 2021-12-08 RX ORDER — SODIUM CHLORIDE 9 MG/ML
25 INJECTION, SOLUTION INTRAVENOUS PRN
OUTPATIENT
Start: 2021-12-08

## 2021-12-08 RX ORDER — ONDANSETRON 2 MG/ML
8 INJECTION INTRAMUSCULAR; INTRAVENOUS ONCE
Status: CANCELLED
Start: 2021-12-08 | End: 2021-12-08

## 2021-12-08 RX ORDER — HEPARIN SODIUM (PORCINE) LOCK FLUSH IV SOLN 100 UNIT/ML 100 UNIT/ML
SOLUTION INTRAVENOUS
Status: COMPLETED
Start: 2021-12-08 | End: 2021-12-08

## 2021-12-08 RX ADMIN — SODIUM CHLORIDE 500 ML: 9 INJECTION, SOLUTION INTRAVENOUS at 11:51

## 2021-12-08 RX ADMIN — HEPARIN 500 UNITS: 100 SYRINGE at 12:43

## 2021-12-08 RX ADMIN — DEXAMETHASONE SODIUM PHOSPHATE 8 MG: 4 INJECTION, SOLUTION INTRAMUSCULAR; INTRAVENOUS at 11:51

## 2021-12-08 RX ADMIN — ONDANSETRON 8 MG: 2 INJECTION INTRAMUSCULAR; INTRAVENOUS at 11:51

## 2021-12-08 ASSESSMENT — PAIN DESCRIPTION - ONSET: ONSET: ON-GOING

## 2021-12-08 ASSESSMENT — PATIENT HEALTH QUESTIONNAIRE - PHQ9
SUM OF ALL RESPONSES TO PHQ QUESTIONS 1-9: 1
SUM OF ALL RESPONSES TO PHQ QUESTIONS 1-9: 1
2. FEELING DOWN, DEPRESSED OR HOPELESS: 1
SUM OF ALL RESPONSES TO PHQ QUESTIONS 1-9: 1
SUM OF ALL RESPONSES TO PHQ9 QUESTIONS 1 & 2: 1
1. LITTLE INTEREST OR PLEASURE IN DOING THINGS: 0

## 2021-12-08 ASSESSMENT — PAIN DESCRIPTION - LOCATION: LOCATION: HIP

## 2021-12-08 ASSESSMENT — PAIN DESCRIPTION - ORIENTATION: ORIENTATION: LEFT

## 2021-12-08 ASSESSMENT — PAIN DESCRIPTION - FREQUENCY: FREQUENCY: CONTINUOUS

## 2021-12-08 ASSESSMENT — PAIN SCALES - GENERAL: PAINLEVEL_OUTOF10: 7

## 2021-12-08 NOTE — PROGRESS NOTES
Ambulated to infusion area, here today for chemotherapy. Patient presently c/o new numbness and tingling in hands with difficulty picking things up, nail discoloration, and BLE nonpitting edema--patient currently on 20mg lasix PO daily. Nail discoloration findings likely due to Taxol medication. Discussed findings with Arvind Bermudez NP prior to patient's OV. Per Arvind Bermudez and Dr. Fabiola Frazier, delay x1 week and decrease dosage by 10%. Gabapentin 300 mg prescribed. We will give 500 mL bolus, 8mg dexmethasone, and zofran 8mg today. Right chest mediport accessed, positive blood return noted. Labs drawn. Labs within defined limits. Call light within reach. Tolerated infusion without incident. Discharge instructions provided. Patient RTC 12/15 for next infusion date. Status appropriately assessed and documented. All required labs and results reviewed. Treatment approved by provider. Treatment orders and medications verified by 2 Registered Nurses where applicable. Treatment plan was confirmed with patient prior to administration, and educated the need to report any treatment-related symptoms    Prior to administration, when applicable, the following 8 elements of medication administration were reviewed with 2nd Registered Nurse prior to dosing: drug name, drug dose, infusion volume when prepared in a syringe, rate of administration, expiration dates and/or times, appearance and integrity of drug(s), and rate of pump for infusion. The 5 rights of medication administration have been verified.

## 2021-12-08 NOTE — PROGRESS NOTES
Patient Name:  Terrence Grove  Patient :  1960  Patient MRN:  [id number]     Primary Oncologist: Conrado Coley MD  Referring Provider: Gwyn Greenberg MD     Date of Service: 2021         Chief Complaint:    Chief Complaint   Patient presents with    Follow-up      She came in for follow-up visit. Patient Active Problem List:     Essential hypertension     Hyperlipidemia     Allergic rhinitis due to pollen     Morbid obesity due to excess calories     Hearing loss     Hot flashes due to surgical menopause     Gastroesophageal reflux disease     Chronic back pain     Anxiety and depression     Osteoarthritis     Meniere's disease     Insomnia     Precordial pain     SOB (shortness of breath)     Calculus of gallbladder without cholecystitis without obstruction     S/P laparoscopic cholecystectomy     Vasovagal syncope     Class 2 obesity due to excess calories without serious comorbidity in adult     Palpitations     Family history of early     Closed fracture of left ankle     Acute respiratory failure with hypoxia      Status post left hip replacement     Hyperglycemia     Mucinous carcinoma of breast, left     Malignant neoplasm of left female breast     Infiltrating ductal carcinoma of left breast      S/P mastectomy, bilateral     Hx of lymph node excision    HPI:   Brooke Sauceda is a pleasant 64year old female patient who was referred for evaluation of pathologic stage T2, N0, MX invasive ductal carcinoma of the left breast, ER/MI positive HER-2 negative, status post bilateral mastectomy in May 2021. She went for the routine mammogram in 2021 and denied any palpable lump to her breast.  Mammogram at that time showed at least 2 indeterminate complex masses at the 2:00 and 12 o'clock position in the left breast in the retroareolar region.     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 ER was more than 95%, CA more than 90%, HER-2/baltazar negative by FISH. MRI of bilateral breasts on April 19, 2021 showed  1. Left breast multicentric disease with biopsy proven invasive ductal carcinoma with mucinous component at 11 o'clock and mucinous carcinomas at 2 o'clock and 12 o'clock in the retroareolar breast. Shalom Rower is at least 5 cm of separation between the 11 o'clock mass and 12 o'clock mass.  Additional smooth masses measuring up to 21 mm at biopsy sites are hematomas. 2. No MR evidence of malignancy in the contralateral right breast.      She had bilateral mastectomy on May 6, 2021. Pathology report showed : Invasive ductal and mucinous carcinoma of the left breast, retroareolar, grade 2, 4.5 cm, negative margin, -2 sentinel lymph nodes, ER more than 95%, CA 90%, HER-2/baltazar negative by FISH. Pathological stage classification T2, N0, MX. Pathologist felt that she has 1 contiguous tumor mass measuring about 4.5 cm rather than multicentric disease. I discussed with pathologist who stated that she does not have pure mucinous carcinoma of the breast.   CBC and CMP in March 2021 were grossly unremarkable. Due to family history and personal history of breast cancer, I referred for genetic counseling. She had colonoscopy with removal of 2 polyps in 2014 by Dr. Ghislaine Vee. Follow-up in 3 years was recommended. She refused to have further follow-up. Oncotype DX score was 29. I offered adjuvant chemo therapy TC vs AC +T  She opted to [facility] +T. In May 2021 she was hospitalized for nausea and vomiting. She was seen by GI. She had mild transaminitis and was suspected to have gastroenteritis. Never had any upper endoscopic study. Colonoscopy in June 2014 showed polyps which was removed. Pathology report showed tubular adenoma.   CT abdomen and pelvis on May 30, 2021:   No acute abnormality within the abdomen and pelvis.   Status post hysterectomy, appendectomy and cholecystectomy.    15 mm subcutaneus soft tissue nodule within the left lower quadrant area of anterior abdomen.  Finding may represent sebaceous cyst or other pathology. CTA chest on May 30, 2021 showed   1. No evidence of acute pulmonary embolism. 2. Soft tissue thickening and fat stranding overlying the bilateral pectoral muscles. CT head on May 31, 2021 showed  No acute intracranial abnormality.  No intraparenchymal abnormal enhancement to suggest intracranial metastatic disease.  MRI is a superior modality for evaluation of intracranial metastasis.   Small probable meningioma within the falx near the convexity. MRI of the brain on June 1, 2021 showed  No acute infarct scattered areas of chronic white matter change in the periventricular white matter.   No evidence for blood products on gradient imaging.   No  focal intracranial lesion noted     Amylase and lipase were unremarkable. She was placed on reglan with improvement of nausea and vomiting. She was recommended to have upper endoscopic study as outpatient by GI. She is status post bilateral mastectomy. She is scheduled for port insertion next [facility]. She had EGD By Dr. Marisa Aguillon with diagnosis of hiatal hernia, gastric polyp, mild gastritis. She started adjuvant chemotherapy AC on July 16, 2021. SBFT: Unremarkable small bowel follow through series. Bone density in June 2021 showed normal study. ECHO 6/18/2021:  Left ventricular systolic function is normal.  Ejection fraction is visually estimated at 55-60%. C2 dose reduced by 10%  CTA 8/12 no PE    She had stress test 8/13/21 with no infarct or ischemia, normal EF 64%. She has seen GI and was recommended esophageal stretching.     Esophagram 9/13/2021:  Small hiatal hernia with narrowing of the distal thoracic esophagus just proximal to the hiatal hernia, which may reflect underlying Schatzki's ring. Ingested barium and barium tablet were initially held up at this level before eventually passing into the stomach. She completed fourth cycle of AC on September 17, 2021. On November 4, 2021 she came in for follow-up visit. She had episode of epistaxis like related to chemotherapy dry air. I recommend to use saline nasal spray. She started Taxol on October 8, 2021. We will check anemic work-up with the next blood test.    She has mild neuropathy to her fingers. She has chronic pain to her back. She denied any nausea, vomiting or diarrhea. No fever or chills. No chest pain, shortness of breath or palpitation. No headache or dizzy spell. No melena or hematochezia. Denied any dysuria or hematuria. Presented on December 8, 2021 for scheduled follow up. She has worsening neuropathy and is unable to  papers and has constant pain. We will further dose reduce by 10% and delay treatment. She will start Neurontin HS. She has ongoing diarrhea which she reports is stable for her. She plans GI procedure after completion of treatment. She has BLE with lasix and k+ use. Discussed high K+ foods. She has chronic stable dyspnea. Occasional nose bleeds, platelets adequate. Nasal saline prescribed, instructed to add humidification to environment. Denies fever, chills, night sweats, no dizziness, visual changes, or headache. Denies mucositis, dysphagia, no cough, chest pain, hemoptysis,no nausea, vomiting, diarrhea, no constipation, no melena or hematochezia, no dysuria, hematuria, no lower extremity edema or calf pain. Denies acute pain. No worsening depression.     Past Medical History:   Past Medical History:   Diagnosis Date    Allergic rhinitis due to pollen 11/19/2015    Arthritis     left hip & knee    Chronic back pain     Dehydration 7/22/2021    Depression     Gastroesophageal reflux disease 11/19/2015    Hearing loss 11/19/2015    History of blood transfusion No reaction per pt    History of cardiac monitoring 04/18/2017    14 day event monitor. Sinus Rhythm    History of nuclear stress test 09/28/2016    cardiolite-normal,EF70%    Hot flashes due to surgical menopause 11/19/2015    Hx of echocardiogram 10/05/2016    EF: >55 %   Mild mitral and tricuspid regurg.  Hyperlipidemia     Hypertension     Follows with PCP    Lexiscan Stress Test 10/14/2020    EF 60%, Normal study, no ischemia    Meniere disease     Morbid obesity due to excess calories ([address] 11/19/2015    Sleep apnea 11/19/2015    sleep study negative    Tilt table evaluation 05/09/2017     positive for neurocardiogenic syncope      Past Surgery History:    Past Surgical History:   Procedure Laterality Date    APPENDECTOMY  1967    CHOLECYSTECTOMY      2017    COLONOSCOPY  2014    Polyps x2 - Dr. Aria Tariq Bilateral 05/06/2021    Dr. Bandar Dickinson Left 10/19/2020    LEFT HIP TOTAL ARTHROPLASTY - POSTERIOR performed by Stormy Harper MD at [facility] LEFT Left 3/9/2021    US BREAST BIOPSY NEEDLE ADDITIONAL LEFT 3/9/2021 Zahraa Snow MD [address] BREAST BIOPSY NEEDLE ADDITIONAL LEFT Left 3/9/2021    US BREAST BIOPSY NEEDLE ADDITIONAL LEFT 3/9/2021 Zahraa Snow MD [address] BREAST NEEDLE BIOPSY LEFT Left 3/9/2021    US BREAST NEEDLE BIOPSY LEFT 3/9/2021 Zahraa Snow  [address]   She had complete hysterectomy in 1999 due to endometriosis. Social History:   She denies any history of smoking. No alcohol or illicit drug use. She denies any children. Family History: Mother had stomach and breast cancer, maternal grandmother had breast cancer, colon cancer and stomach cancer. Review of Systems: The remainder of the review of system is unremarkable.      Vital Signs: BP (!) 158/86 (Position: Sitting, Cuff Size: Large Adult)   Pulse 96 3.3 (L) 12/01/2021     12/01/2021    CO2 25 12/01/2021    BUN 13 12/01/2021    CREATININE 1.2 (H) 12/01/2021    GLUCOSE 110 (H) 12/01/2021    CALCIUM 8.9 12/01/2021    PROT 6.2 (L) 12/01/2021    LABALBU 4.2 12/01/2021    BILITOT 0.4 12/01/2021    ALKPHOS 68 12/01/2021    AST 23 12/01/2021    ALT 22 12/01/2021    LABGLOM 46 (L) 12/01/2021    GFRAA 56 (L) 12/01/2021    AGRATIO 1.7 12/07/2015    GLOB 2.7 12/07/2015    MG 1.4 (L) 09/09/2021    POCCA 1.13 09/09/2021    POCGLU 115 (H) 09/09/2021     Lab Results   Component Value Date     (H) 11/14/2020     Lab Results   Component Value Date    TSHHS 2.820 11/05/2021    T4FREE 1.02 03/01/2021     Immunology:  Lab Results   Component Value Date    PROT 6.2 (L) 12/01/2021     Coagulation Panel:  Lab Results   Component Value Date    PROTIME 12.9 06/02/2021    INR 1.07 06/02/2021    APTT 31.7 06/02/2021    DDIMER 277 (H) 08/12/2021     Anemia Panel:  Lab Results   Component Value Date    ZKSETPGI16 372.9 11/05/2021    FOLATE 8.8 11/05/2021      Observations:  PHQ-9 Total Score: 1 (12/8/2021 10:40 AM)       Assessment & Plan:    1. She has mixed invasive ductal carcinoma and mucinous carcinoma of the left breast status post bilateral mastectomy on May 6, 2021, ER/KS positive and HER-2/baltazar negative. Pathological stage T2, N0 sentinel lymph node MX. Oncotype DX was 29. I offered adjuvant chemotherapy  She opted to [facility] + T. She started chemo in June 2021 and completed on September 17, 2021. She started Taxol on October 8, 2028. She has mild neuropathy. She will continue with the Taxol weekly. C9D1 delayed due to neuropathy, dose decreased by 10%, delayed one week    2. Genetic counseling: VUS JUHI    3. Bone density on June 11, 2021 was normal.  Echo in June 2021 showed LVEF at 55 to 60%. 4. She has N/V ? Gastroenteritis. EGD as above. Started on Pantoprazole. Small bowel follow through was unremarkable on 8/2/21. Esophagogram was noted.   She was recommended to have esophageal stretching. Changed zofran to compazine. 5. Mental health: Reports suicidal ideation, 1 past attempt, on Prozac 20 mg, has counselor but was referred to our counselor today. Will make referral to mental health. She is contracted for safety.  had face-to-face visit with patient. 6.  Anemia is related to the chemotherapy. She had epistaxis related to Taxol and dry air. I recommend to use modifier. Anemia workup 12/8/21 ordered    7. Headache- ? zofran- changed to compazine. Stable    8. Neuropathy- started Neurontin HS    We discussed about diet and weight loss. She has not considered Covid vaccine yet. Return to clinic in 4 weeks. All of her question has been answered for today.      Next month gi for evaluation

## 2021-12-08 NOTE — PROGRESS NOTES
MA Rooming Questions  Patient: Roosvelt Spurling  MRN: [id number]    Date: 12/8/2021        1. Do you have any new issues? yes - hand numb having difficulty picking things up, nail discoloration and nose bleeds that get worse at night          2. Do you need any refills on medications?    no    3. Have you had any imaging done since your last visit? yes - today     4. Have you been hospitalized or seen in the emergency room since your last visit here?   no    5. Did the patient have a depression screening completed today?  Yes    PHQ-9 Total Score: 1 (12/8/2021 10:40 AM)       PHQ-9 Given to (if applicable):               PHQ-9 Score (if applicable):                     [] Positive     []  Negative              Does question #9 need addressed (if applicable)                     [] Yes    []  No               Amish Rodríguez CMA

## 2021-12-08 NOTE — TELEPHONE ENCOUNTER
Nikko Whitley 28 called and lvm that the nasal spray does not have a frequency. Please call and advise or send new RX.

## 2021-12-09 NOTE — PROGRESS NOTES
Patient Name:  Mario Philip  Patient :  1960  Patient MRN:  [id number]     Primary Oncologist: Marley Contreras MD  Referring Provider: Gianni Garcia MD     Date of Service: 2021         Chief Complaint:    No chief complaint on file. She came in for follow-up visit. Patient Active Problem List:     Essential hypertension     Hyperlipidemia     Allergic rhinitis due to pollen     Morbid obesity due to excess calories     Hearing loss     Hot flashes due to surgical menopause     Gastroesophageal reflux disease     Chronic back pain     Anxiety and depression     Osteoarthritis     Meniere's disease     Insomnia     Precordial pain      Calculus of gallbladder without cholecystitis without obstruction     S/P laparoscopic cholecystectomy     Vasovagal syncope     Class 2 obesity due to excess calories without serious comorbidity in adult     Family history of early     Closed fracture of left ankle     Acute respiratory failure with hypoxia      Status post left hip replacement     Hyperglycemia        Infiltrating ductal carcinoma of left breast      S/P mastectomy, bilateral     Hx of lymph node excision    HPI:   Tamiko Montez is a pleasant 64year old female patient who was referred for evaluation of pT2, N0, MX invasive ductal carcinoma of the left breast, ER/LA positive HER-2 negative, status post bilateral mastectomy in May 2021. She went for the routine mammogram in 2021 and denied any palpable lump to her breast.  Mammogram at that time showed at least 2 indeterminate complex masses at the 2:00 and 12 o'clock position in the left breast in the retroareolar region. 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   ER was more than 95%, LA more than 90%, HER-2/baltazar negative by FISH. MRI of bilateral breasts on April 19, 2021 showed  1. Left breast multicentric disease with biopsy proven invasive ductal carcinoma with mucinous component at 11 o'clock and mucinous carcinomas at 2 o'clock and 12 o'clock in the retroareolar breast. Gweneth Redfield is at least 5 cm of separation between the 11 o'clock mass and 12 o'clock mass.  Additional smooth masses measuring up to 21 mm at biopsy sites are hematomas. 2. No MR evidence of malignancy in the contralateral right breast.      She had bilateral mastectomy on May 6, 2021. Pathology report showed : Invasive ductal and mucinous carcinoma of the left breast, retroareolar, grade 2, 4.5 cm, negative margin, -2 sentinel lymph nodes, ER more than 95%, WV 90%, HER-2/baltazar negative by FISH. Pathological stage classification T2, N0, MX. Pathologist felt that she has 1 contiguous tumor mass measuring about 4.5 cm rather than multicentric disease. I discussed with pathologist who stated that she does not have pure mucinous carcinoma of the breast.   CBC and CMP in March 2021 were grossly unremarkable. Due to family history and personal history of breast cancer, I referred for genetic counseling. She had colonoscopy with removal of 2 polyps in 2014 by Dr. Bhumi Lam. Follow-up in 3 years was recommended. She refused to have further follow-up. Oncotype DX score was 29. I offered adjuvant chemo therapy TC vs AC +T  She opted to [facility] +T. In May 2021 she was hospitalized for nausea and vomiting. She was seen by GI. She had mild transaminitis and was suspected to have gastroenteritis. Never had any upper endoscopic study. Colonoscopy in June 2014 showed polyps which was removed. Pathology report showed tubular adenoma.   CT abdomen and pelvis on May 30, 2021:  No acute abnormality within the abdomen and pelvis.   Status post hysterectomy, appendectomy and cholecystectomy.    15 mm subcutaneus soft tissue nodule within the left lower quadrant area of anterior abdomen.  Finding may represent sebaceous cyst or other pathology. CTA chest on May 30, 2021 showed   1. No evidence of acute pulmonary embolism. 2. Soft tissue thickening and fat stranding overlying the bilateral pectoral muscles. CT head on May 31, 2021 showed  No acute intracranial abnormality.  No intraparenchymal abnormal enhancement to suggest intracranial metastatic disease.  MRI is a superior modality for evaluation of intracranial metastasis.   Small probable meningioma within the falx near the convexity. MRI of the brain on June 1, 2021 showed  No acute infarct scattered areas of chronic white matter change in the periventricular white matter.   No evidence for blood products on gradient imaging.   No  focal intracranial lesion noted     Amylase and lipase were unremarkable. She was placed on reglan with improvement of nausea and vomiting. She was recommended to have upper endoscopic study as outpatient by GI. She is status post bilateral mastectomy. She had EGD By Dr. Guy Diaz with diagnosis of hiatal hernia, gastric polyp, mild gastritis. She started adjuvant chemotherapy AC on July 16, 2021. SBFT: Unremarkable small bowel follow through series. Bone density in June 2021 showed normal study. ECHO 6/18/2021:  Left ventricular systolic function is normal.  Ejection fraction is visually estimated at 55-60%. C2 dose reduced by 10%  CTA 8/12 no PE    She had stress test 8/13/21 with no infarct or ischemia, normal EF 64%. She has seen GI and was recommended esophageal stretching. Esophagram 9/13/2021:  Small hiatal hernia with narrowing of the distal thoracic esophagus just proximal to the hiatal hernia, which may reflect underlying Schatzki's ring. Ingested barium and barium tablet were initially held up at this level before eventually passing into the stomach. She completed fourth cycle of AC on September 17, 2021.     She started Taxol on October 8, 2021. On December 29, 2021 she came in for follow-up visit. Due to neuropathy dose modification of Taxol has been made. She would complete Taxol on January 5, 2022. On December 8, 2021 ferritin was 256, iron 54, TIBC 2024, saturation 17%. Folate was 10.8, B12 461.8. On December 22, 2021 hemoglobin was 10.4, MCV 91.8. Anemia is likely related to chemotherapy. We will continue to monitor CBC. After 2 days chemo she will have 1 more cycle of the Taxol. She felt better since Taxol dose has been reduced. Neuropathy has also improved  She is agreeable to start adjuvant endocrine therapy, Arimidex after completion of chemo. Again we discussed about the potential side effects of the Arimidex. Chronic pain to the hips. Denied any nausea, vomiting or diarrhea. No fever or chills. No chest pain, shortness of breath or palpitation. No headache or dizzy spell. Neuropathy has improved. No melena or hematochezia. Denied any dysuria or hematuria. Past Medical History:   Past Medical History:   Diagnosis Date    Allergic rhinitis due to pollen 11/19/2015    Arthritis     left hip & knee    Chronic back pain     Dehydration 7/22/2021    Depression     Gastroesophageal reflux disease 11/19/2015    Hearing loss 11/19/2015    History of blood transfusion     No reaction per pt    History of cardiac monitoring 04/18/2017    14 day event monitor. Sinus Rhythm    History of nuclear stress test 09/28/2016    cardiolite-normal,EF70%    Hot flashes due to surgical menopause 11/19/2015    Hx of echocardiogram 10/05/2016    EF: >55 %   Mild mitral and tricuspid regurg.      Hyperlipidemia     Hypertension     Follows with PCP    Lexiscan Stress Test 10/14/2020    EF 60%, Normal study, no ischemia    Meniere disease     Morbid obesity due to excess calories ([facility] [address] 11/19/2015    Sleep apnea 11/19/2015    sleep study negative    Tilt table evaluation 05/09/2017     positive for neurocardiogenic syncope      Past Surgery History:    Past Surgical History:   Procedure Laterality Date    APPENDECTOMY  1967    CHOLECYSTECTOMY      2017    COLONOSCOPY  2014    Polyps x2 - Dr. Deidra Gilford Bilateral 05/06/2021    Dr. oCby Rivera Left 10/19/2020    LEFT HIP TOTAL ARTHROPLASTY - POSTERIOR performed by Flavio Galvan MD at [facility] LEFT Left 3/9/2021    US BREAST BIOPSY NEEDLE ADDITIONAL LEFT 3/9/2021 Jenny Woodson MD [address] BREAST BIOPSY NEEDLE ADDITIONAL LEFT Left 3/9/2021    US BREAST BIOPSY NEEDLE ADDITIONAL LEFT 3/9/2021 Jenny Woodson MD [address] BREAST NEEDLE BIOPSY LEFT Left 3/9/2021    US BREAST NEEDLE BIOPSY LEFT 3/9/2021 Jenny Woodson  [facility]   She had complete hysterectomy in 1999 due to endometriosis. Social History:   She denies any history of smoking. No alcohol or illicit drug use. She denies any children. Family History: Mother had stomach and breast cancer, maternal grandmother had breast cancer, colon cancer and stomach cancer. Review of Systems: The remainder of the review of system is unremarkable. Vital Signs: There were no vitals taken for this visit. Physical Exam:  CONSTITUTIONAL: awake, alert, cooperative, no apparent distress   EYES:  sclera clear and pale conjunctiva  ENT: Normocephalic, without obvious abnormality, atraumatic  NECK: supple, symmetrical.  No JVD. HEMATOLOGIC/LYMPHATIC: no cervical, supraclavicular or axillary lymphadenopathy   LUNGS: Clear to auscultation and percussion bilaterally. BREAST: Sdatus post bilateral mastectomy.     CARDIOVASCULAR: regular rate and rhythm, normal S1 and S2, no murmur   ABDOMEN: normal bowel sound, soft, non-distended, non-tender, no masses palpated, no hepatosplenomegaly   MUSCULOSKELETAL: full range of motion noted, tone is normal  NEUROLOGIC: awake, alert, oriented to name, place and time. Motor is grossly intact. CN II through XII are grossly intact. SKIN: Normal skin color, texture, turgor and no jaundice. EXTREMITIES: no LE edema. No cyanosis.       Labs:  Hematology:  Lab Results   Component Value Date    WBC 6.7 12/08/2021    RBC 3.51 (L) 12/08/2021    HGB 10.7 (L) 12/08/2021    HCT 32.8 (L) 12/08/2021    MCV 93.4 12/08/2021    MCH 30.5 12/08/2021    MCHC 32.6 12/08/2021    RDW 16.3 (H) 12/08/2021     12/08/2021    MPV 8.8 12/08/2021    BANDSPCT 9 11/02/2020    SEGSPCT 64.5 12/08/2021    EOSRELPCT 2.4 12/08/2021    BASOPCT 0.3 12/08/2021    LYMPHOPCT 24.7 12/08/2021    MONOPCT 8.1 (H) 12/08/2021    BANDABS 1.86 11/02/2020    SEGSABS 4.3 12/08/2021    EOSABS 0.2 12/08/2021    BASOSABS 0.0 12/08/2021    LYMPHSABS 1.7 12/08/2021    MONOSABS 0.5 12/08/2021    DIFFTYPE AUTOMATED DIFFERENTIAL 12/08/2021    ANISOCYTOSIS 1+ 11/02/2020    POLYCHROM 1+ 11/02/2020    WBCMORP FEW 11/02/2020    PLTM ADEQUATE 08/12/2021     Lab Results   Component Value Date     (H) 10/25/2020     Chemistry:  Lab Results   Component Value Date     12/08/2021    K 3.8 12/08/2021     12/08/2021    CO2 23 12/08/2021    BUN 14 12/08/2021    CREATININE 1.1 12/08/2021    GLUCOSE 109 (H) 12/08/2021    CALCIUM 8.9 12/08/2021    PROT 6.0 (L) 12/08/2021    LABALBU 4.2 12/08/2021    BILITOT 0.3 12/08/2021    ALKPHOS 62 12/08/2021    AST 17 12/08/2021    ALT 16 12/08/2021    LABGLOM 51 (L) 12/08/2021    GFRAA >60 12/08/2021    AGRATIO 1.7 12/07/2015    GLOB 2.7 12/07/2015    MG 1.4 (L) 09/09/2021    POCCA 1.13 09/09/2021    POCGLU 115 (H) 09/09/2021     Lab Results   Component Value Date     (H) 11/14/2020     Lab Results   Component Value Date    TSHHS 2.820 11/05/2021    T4FREE 1.02 03/01/2021     Immunology:  Lab Results   Component Value Date    PROT 6.0 (L) 12/08/2021     Coagulation Panel:  Lab Results   Component Value Date PROTIME 12.9 06/02/2021    INR 1.07 06/02/2021    APTT 31.7 06/02/2021    DDIMER 277 (H) 08/12/2021     Anemia Panel:  Lab Results   Component Value Date    KJUNNMUV81 461.8 12/08/2021    FOLATE 10.8 12/08/2021      Observations:  PHQ-9 Total Score: 1 (12/8/2021 10:40 AM)     Assessment & Plan:  1. 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 Pathological stage T2, N0 sentinel lymph node MX. Oncotype DX was 29. I offered adjuvant chemotherapy  She opted to [facility] + T. She started chemo in June 2021 and completed on September 17, 2021. She started Taxol on October 8, 2028. Due to neuropathy dose of the Taxol has been modified. Neuropathy improved. She will start Arimidex after completion of the Taxol. We discussed about the potential side effects of Arimidex. 2.  Genetic counseling: VUS JUHI    3. Bone density on June 11, 2021 was normal.  Echo in June 2021 showed LVEF at 55 to 60%. 4. She has N/V ? Gastroenteritis. EGD as above. Started on Pantoprazole. Small bowel follow through was unremarkable on 8/2/21. Esophagogram was noted. She was recommended to have esophageal stretching. Changed zofran to compazine. She has EGD and dilatation of the esophagus in September 2021. She stopped having issue again with dysphagia. I recommend to follow-up with GI. She plans to follow-up with surgeon in January 2022 to have surgery for the hiatal hernia. 5. Mental health: Reports suicidal ideation, 1 past attempt, on Prozac 20 mg, has counselor but was referred to our counselor today. Will make referral to mental health. She is contracted for safety.  had face-to-face visit with patient. 6.  Anemia is related to the chemotherapy. She had epistaxis related to Taxol and dry air. I recommend to use modifier. Anemia workup 12/8/21 reviewed.     7. Headache- ? zofran- changed to compazine. Stable    8. Neuropathy- started Neurontin HS. improving after dose modification of the Taxol. We discussed about diet and weight loss. She has not considered Covid vaccine yet. Return to clinic in 4 weeks. All of her question has been answered for today.      Next month gi for evaluation

## 2021-12-14 DIAGNOSIS — C50.912 INFILTRATING DUCTAL CARCINOMA OF LEFT BREAST (HCC): Primary | ICD-10-CM

## 2021-12-15 ENCOUNTER — HOSPITAL ENCOUNTER (OUTPATIENT)
Dept: INFUSION THERAPY | Age: 61
Discharge: HOME | End: 2021-12-15
Payer: COMMERCIAL

## 2021-12-15 VITALS
SYSTOLIC BLOOD PRESSURE: 136 MMHG | RESPIRATION RATE: 18 BRPM | BODY MASS INDEX: 40.29 KG/M2 | DIASTOLIC BLOOD PRESSURE: 73 MMHG | OXYGEN SATURATION: 97 % | HEART RATE: 86 BPM | WEIGHT: 236 LBS | HEIGHT: 64 IN | TEMPERATURE: 97.8 F

## 2021-12-15 DIAGNOSIS — Z17.0 MALIGNANT NEOPLASM OF AREOLA OF LEFT BREAST IN FEMALE, ESTROGEN RECEPTOR POSITIVE (HCC): Primary | ICD-10-CM

## 2021-12-15 DIAGNOSIS — C50.012 MALIGNANT NEOPLASM OF AREOLA OF LEFT BREAST IN FEMALE, ESTROGEN RECEPTOR POSITIVE (HCC): Primary | ICD-10-CM

## 2021-12-15 DIAGNOSIS — Z17.0 MALIGNANT NEOPLASM OF LEFT BREAST IN FEMALE, ESTROGEN RECEPTOR POSITIVE, UNSPECIFIED SITE OF BREAST (HCC): ICD-10-CM

## 2021-12-15 DIAGNOSIS — C50.912 MALIGNANT NEOPLASM OF LEFT BREAST IN FEMALE, ESTROGEN RECEPTOR POSITIVE, UNSPECIFIED SITE OF BREAST (HCC): ICD-10-CM

## 2021-12-15 DIAGNOSIS — C50.912 INFILTRATING DUCTAL CARCINOMA OF LEFT BREAST (HCC): ICD-10-CM

## 2021-12-15 LAB
ALBUMIN SERPL-MCNC: 4 GM/DL (ref 3.4–5)
ALP BLD-CCNC: 60 IU/L (ref 40–128)
ALT SERPL-CCNC: 12 U/L (ref 10–40)
ANION GAP SERPL CALCULATED.3IONS-SCNC: 11 MMOL/L (ref 4–16)
AST SERPL-CCNC: 13 IU/L (ref 15–37)
BASOPHILS ABSOLUTE: 0 K/CU MM
BASOPHILS RELATIVE PERCENT: 0.6 % (ref 0–1)
BILIRUB SERPL-MCNC: 0.3 MG/DL (ref 0–1)
BUN BLDV-MCNC: 19 MG/DL (ref 6–23)
CALCIUM SERPL-MCNC: 8.7 MG/DL (ref 8.3–10.6)
CHLORIDE BLD-SCNC: 104 MMOL/L (ref 99–110)
CO2: 24 MMOL/L (ref 21–32)
CREAT SERPL-MCNC: 1.1 MG/DL (ref 0.6–1.1)
DIFFERENTIAL TYPE: ABNORMAL
EOSINOPHILS ABSOLUTE: 0.2 K/CU MM
EOSINOPHILS RELATIVE PERCENT: 2.4 % (ref 0–3)
GFR AFRICAN AMERICAN: >60 ML/MIN/1.73M2
GFR NON-AFRICAN AMERICAN: 51 ML/MIN/1.73M2
GLUCOSE BLD-MCNC: 108 MG/DL (ref 70–99)
HCT VFR BLD CALC: 31 % (ref 37–47)
HEMOGLOBIN: 10.3 GM/DL (ref 12.5–16)
LYMPHOCYTES ABSOLUTE: 1.3 K/CU MM
LYMPHOCYTES RELATIVE PERCENT: 19.9 % (ref 24–44)
MCH RBC QN AUTO: 30.5 PG (ref 27–31)
MCHC RBC AUTO-ENTMCNC: 33.2 % (ref 32–36)
MCV RBC AUTO: 91.7 FL (ref 78–100)
MONOCYTES ABSOLUTE: 0.8 K/CU MM
MONOCYTES RELATIVE PERCENT: 11.8 % (ref 0–4)
PDW BLD-RTO: 15.8 % (ref 11.7–14.9)
PLATELET # BLD: 166 K/CU MM (ref 140–440)
PMV BLD AUTO: 8.9 FL (ref 7.5–11.1)
POTASSIUM SERPL-SCNC: 3.6 MMOL/L (ref 3.5–5.1)
RBC # BLD: 3.38 M/CU MM (ref 4.2–5.4)
SEGMENTED NEUTROPHILS ABSOLUTE COUNT: 4.3 K/CU MM
SEGMENTED NEUTROPHILS RELATIVE PERCENT: 65.3 % (ref 36–66)
SODIUM BLD-SCNC: 139 MMOL/L (ref 135–145)
TOTAL PROTEIN: 5.8 GM/DL (ref 6.4–8.2)
WBC # BLD: 6.6 K/CU MM (ref 4–10.5)

## 2021-12-15 PROCEDURE — 96413 CHEMO IV INFUSION 1 HR: CPT

## 2021-12-15 PROCEDURE — 85025 COMPLETE CBC W/AUTO DIFF WBC: CPT

## 2021-12-15 PROCEDURE — 96375 TX/PRO/DX INJ NEW DRUG ADDON: CPT

## 2021-12-15 PROCEDURE — 36591 DRAW BLOOD OFF VENOUS DEVICE: CPT

## 2021-12-15 PROCEDURE — 80053 COMPREHEN METABOLIC PANEL: CPT

## 2021-12-15 PROCEDURE — 2500000003 HC RX 250 WO HCPCS: Performed by: INTERNAL MEDICINE

## 2021-12-15 PROCEDURE — 2580000003 HC RX 258: Performed by: INTERNAL MEDICINE

## 2021-12-15 PROCEDURE — 6360000002 HC RX W HCPCS: Performed by: INTERNAL MEDICINE

## 2021-12-15 RX ORDER — SODIUM CHLORIDE 9 MG/ML
20 INJECTION, SOLUTION INTRAVENOUS ONCE
Status: DISCONTINUED | OUTPATIENT
Start: 2021-12-15 | End: 2021-12-16 | Stop reason: HOSPADM

## 2021-12-15 RX ORDER — HEPARIN SODIUM (PORCINE) LOCK FLUSH IV SOLN 100 UNIT/ML 100 UNIT/ML
500 SOLUTION INTRAVENOUS PRN
Status: DISCONTINUED | OUTPATIENT
Start: 2021-12-15 | End: 2021-12-16 | Stop reason: HOSPADM

## 2021-12-15 RX ORDER — ONDANSETRON 2 MG/ML
8 INJECTION INTRAMUSCULAR; INTRAVENOUS ONCE
Status: COMPLETED | OUTPATIENT
Start: 2021-12-15 | End: 2021-12-15

## 2021-12-15 RX ORDER — DIPHENHYDRAMINE HYDROCHLORIDE 50 MG/ML
50 INJECTION INTRAMUSCULAR; INTRAVENOUS ONCE
Status: COMPLETED | OUTPATIENT
Start: 2021-12-15 | End: 2021-12-15

## 2021-12-15 RX ORDER — SODIUM CHLORIDE 0.9 % (FLUSH) 0.9 %
5-40 SYRINGE (ML) INJECTION PRN
Status: DISCONTINUED | OUTPATIENT
Start: 2021-12-15 | End: 2021-12-16 | Stop reason: HOSPADM

## 2021-12-15 RX ADMIN — HEPARIN 500 UNITS: 100 SYRINGE at 13:14

## 2021-12-15 RX ADMIN — FAMOTIDINE 20 MG: 10 INJECTION, SOLUTION INTRAVENOUS at 11:09

## 2021-12-15 RX ADMIN — DIPHENHYDRAMINE HYDROCHLORIDE 50 MG: 50 INJECTION INTRAMUSCULAR; INTRAVENOUS at 11:07

## 2021-12-15 RX ADMIN — DEXAMETHASONE SODIUM PHOSPHATE 10 MG: 10 INJECTION, SOLUTION INTRAMUSCULAR; INTRAVENOUS at 11:13

## 2021-12-15 RX ADMIN — ONDANSETRON 8 MG: 2 INJECTION INTRAMUSCULAR; INTRAVENOUS at 11:10

## 2021-12-15 RX ADMIN — SODIUM CHLORIDE, PRESERVATIVE FREE 10 ML: 5 INJECTION INTRAVENOUS at 13:14

## 2021-12-15 RX ADMIN — SODIUM CHLORIDE 20 ML/HR: 9 INJECTION, SOLUTION INTRAVENOUS at 11:07

## 2021-12-15 RX ADMIN — PACLITAXEL 120 MG: 6 INJECTION, SOLUTION INTRAVENOUS at 12:00

## 2021-12-15 ASSESSMENT — PAIN DESCRIPTION - PAIN TYPE: TYPE: CHRONIC PAIN

## 2021-12-15 ASSESSMENT — PAIN SCALES - GENERAL: PAINLEVEL_OUTOF10: 7

## 2021-12-15 ASSESSMENT — PAIN DESCRIPTION - LOCATION: LOCATION: BACK;HIP

## 2021-12-15 NOTE — PROGRESS NOTES
Patient here for chemo. Gait slow and steady with assist of cane. C/O hip discomfort. Energy fair. Appetite fair with taste changes. States neuropathy in her fingertios has improved with medication. CBC and CMP done. Chemo given as ordered. RTC 1 week. AVS given to patient.

## 2021-12-22 ENCOUNTER — HOSPITAL ENCOUNTER (OUTPATIENT)
Dept: INFUSION THERAPY | Age: 61
Discharge: HOME | End: 2021-12-22
Payer: COMMERCIAL

## 2021-12-22 VITALS
HEART RATE: 120 BPM | OXYGEN SATURATION: 96 % | BODY MASS INDEX: 40.29 KG/M2 | DIASTOLIC BLOOD PRESSURE: 79 MMHG | TEMPERATURE: 97.6 F | SYSTOLIC BLOOD PRESSURE: 143 MMHG | RESPIRATION RATE: 18 BRPM | HEIGHT: 64 IN | WEIGHT: 236 LBS

## 2021-12-22 DIAGNOSIS — C50.012 MALIGNANT NEOPLASM OF AREOLA OF LEFT BREAST IN FEMALE, ESTROGEN RECEPTOR POSITIVE (HCC): Primary | ICD-10-CM

## 2021-12-22 DIAGNOSIS — C50.912 MALIGNANT NEOPLASM OF LEFT BREAST IN FEMALE, ESTROGEN RECEPTOR POSITIVE, UNSPECIFIED SITE OF BREAST (HCC): ICD-10-CM

## 2021-12-22 DIAGNOSIS — Z17.0 MALIGNANT NEOPLASM OF AREOLA OF LEFT BREAST IN FEMALE, ESTROGEN RECEPTOR POSITIVE (HCC): Primary | ICD-10-CM

## 2021-12-22 DIAGNOSIS — Z17.0 MALIGNANT NEOPLASM OF LEFT BREAST IN FEMALE, ESTROGEN RECEPTOR POSITIVE, UNSPECIFIED SITE OF BREAST (HCC): ICD-10-CM

## 2021-12-22 DIAGNOSIS — C50.912 INFILTRATING DUCTAL CARCINOMA OF LEFT BREAST (HCC): ICD-10-CM

## 2021-12-22 LAB
ALBUMIN SERPL-MCNC: 3.9 GM/DL (ref 3.4–5)
ALP BLD-CCNC: 60 IU/L (ref 40–128)
ALT SERPL-CCNC: 13 U/L (ref 10–40)
ANION GAP SERPL CALCULATED.3IONS-SCNC: 12 MMOL/L (ref 4–16)
AST SERPL-CCNC: 13 IU/L (ref 15–37)
BASOPHILS ABSOLUTE: 0 K/CU MM
BASOPHILS RELATIVE PERCENT: 0.5 % (ref 0–1)
BILIRUB SERPL-MCNC: 0.2 MG/DL (ref 0–1)
BUN BLDV-MCNC: 15 MG/DL (ref 6–23)
CALCIUM SERPL-MCNC: 8.9 MG/DL (ref 8.3–10.6)
CHLORIDE BLD-SCNC: 105 MMOL/L (ref 99–110)
CO2: 25 MMOL/L (ref 21–32)
CREAT SERPL-MCNC: 1 MG/DL (ref 0.6–1.1)
DIFFERENTIAL TYPE: ABNORMAL
EOSINOPHILS ABSOLUTE: 0.3 K/CU MM
EOSINOPHILS RELATIVE PERCENT: 3.3 % (ref 0–3)
GFR AFRICAN AMERICAN: >60 ML/MIN/1.73M2
GFR NON-AFRICAN AMERICAN: 57 ML/MIN/1.73M2
GLUCOSE BLD-MCNC: 143 MG/DL (ref 70–99)
HCT VFR BLD CALC: 31.4 % (ref 37–47)
HEMOGLOBIN: 10.4 GM/DL (ref 12.5–16)
LYMPHOCYTES ABSOLUTE: 1.7 K/CU MM
LYMPHOCYTES RELATIVE PERCENT: 21.7 % (ref 24–44)
MCH RBC QN AUTO: 30.4 PG (ref 27–31)
MCHC RBC AUTO-ENTMCNC: 33.1 % (ref 32–36)
MCV RBC AUTO: 91.8 FL (ref 78–100)
MONOCYTES ABSOLUTE: 0.4 K/CU MM
MONOCYTES RELATIVE PERCENT: 4.7 % (ref 0–4)
PDW BLD-RTO: 15.5 % (ref 11.7–14.9)
PLATELET # BLD: 222 K/CU MM (ref 140–440)
PMV BLD AUTO: 8.9 FL (ref 7.5–11.1)
POTASSIUM SERPL-SCNC: 3.2 MMOL/L (ref 3.5–5.1)
RBC # BLD: 3.42 M/CU MM (ref 4.2–5.4)
SEGMENTED NEUTROPHILS ABSOLUTE COUNT: 5.5 K/CU MM
SEGMENTED NEUTROPHILS RELATIVE PERCENT: 69.8 % (ref 36–66)
SODIUM BLD-SCNC: 142 MMOL/L (ref 135–145)
TOTAL PROTEIN: 5.7 GM/DL (ref 6.4–8.2)
WBC # BLD: 7.9 K/CU MM (ref 4–10.5)

## 2021-12-22 PROCEDURE — 36591 DRAW BLOOD OFF VENOUS DEVICE: CPT

## 2021-12-22 PROCEDURE — 2500000003 HC RX 250 WO HCPCS: Performed by: INTERNAL MEDICINE

## 2021-12-22 PROCEDURE — 96367 TX/PROPH/DG ADDL SEQ IV INF: CPT

## 2021-12-22 PROCEDURE — 96413 CHEMO IV INFUSION 1 HR: CPT

## 2021-12-22 PROCEDURE — 2580000003 HC RX 258: Performed by: INTERNAL MEDICINE

## 2021-12-22 PROCEDURE — 80053 COMPREHEN METABOLIC PANEL: CPT

## 2021-12-22 PROCEDURE — 85025 COMPLETE CBC W/AUTO DIFF WBC: CPT

## 2021-12-22 PROCEDURE — 6360000002 HC RX W HCPCS: Performed by: INTERNAL MEDICINE

## 2021-12-22 PROCEDURE — 96375 TX/PRO/DX INJ NEW DRUG ADDON: CPT

## 2021-12-22 RX ORDER — SODIUM CHLORIDE 0.9 % (FLUSH) 0.9 %
5-40 SYRINGE (ML) INJECTION PRN
Status: DISCONTINUED | OUTPATIENT
Start: 2021-12-22 | End: 2021-12-23 | Stop reason: HOSPADM

## 2021-12-22 RX ORDER — HEPARIN SODIUM (PORCINE) LOCK FLUSH IV SOLN 100 UNIT/ML 100 UNIT/ML
500 SOLUTION INTRAVENOUS PRN
Status: DISCONTINUED | OUTPATIENT
Start: 2021-12-22 | End: 2021-12-23 | Stop reason: HOSPADM

## 2021-12-22 RX ORDER — SODIUM CHLORIDE 9 MG/ML
20 INJECTION, SOLUTION INTRAVENOUS ONCE
Status: DISCONTINUED | OUTPATIENT
Start: 2021-12-22 | End: 2021-12-23 | Stop reason: HOSPADM

## 2021-12-22 RX ORDER — ONDANSETRON 2 MG/ML
8 INJECTION INTRAMUSCULAR; INTRAVENOUS ONCE
Status: COMPLETED | OUTPATIENT
Start: 2021-12-22 | End: 2021-12-22

## 2021-12-22 RX ORDER — DIPHENHYDRAMINE HYDROCHLORIDE 50 MG/ML
50 INJECTION INTRAMUSCULAR; INTRAVENOUS ONCE
Status: COMPLETED | OUTPATIENT
Start: 2021-12-22 | End: 2021-12-22

## 2021-12-22 RX ADMIN — HEPARIN 500 UNITS: 100 SYRINGE at 12:27

## 2021-12-22 RX ADMIN — ONDANSETRON 8 MG: 2 INJECTION INTRAMUSCULAR; INTRAVENOUS at 10:22

## 2021-12-22 RX ADMIN — DIPHENHYDRAMINE HYDROCHLORIDE 50 MG: 50 INJECTION INTRAMUSCULAR; INTRAVENOUS at 10:22

## 2021-12-22 RX ADMIN — FAMOTIDINE 20 MG: 10 INJECTION, SOLUTION INTRAVENOUS at 10:22

## 2021-12-22 RX ADMIN — SODIUM CHLORIDE 20 ML/HR: 9 INJECTION, SOLUTION INTRAVENOUS at 10:22

## 2021-12-22 RX ADMIN — DEXAMETHASONE SODIUM PHOSPHATE 10 MG: 10 INJECTION, SOLUTION INTRAMUSCULAR; INTRAVENOUS at 10:23

## 2021-12-22 RX ADMIN — PACLITAXEL 120 MG: 6 INJECTION, SOLUTION INTRAVENOUS at 11:02

## 2021-12-22 ASSESSMENT — PAIN SCALES - GENERAL: PAINLEVEL_OUTOF10: 7

## 2021-12-22 ASSESSMENT — PAIN DESCRIPTION - LOCATION: LOCATION: BACK;HIP;LEG

## 2021-12-22 NOTE — PROGRESS NOTES
Patient arrived to treatment suite for blood draw, pre-medications and chemotherapy infusion.  Right chest mediport accessed and blood drawn from site and sent to lab for processing.  Patient states no questions or concerns for the doctor at this time.  Treatment approved and given.  Patient tolerated well.  Left treatment suite ambulatory.  Discharge instructions provided.     Patient's status assessed and documented appropriately.  All labs and required results were also reviewed today.  Treatment parameters have been reviewed.  Today's treatment has been approved by the provider.  Treatment orders and medication sequencing (when applicable) was verified by 2 registered nurses.  The treatment plan was confirmed with the patient prior to administration, and the patient understands the need to report any treatment-related symptoms.     Prior to administration, when applicable, the following 8 elements of medication administration were reviewed with 2nd Registered Nurse prior to dosing: drug name, drug dose, infusion volume when prepared in a syringe, rate of administration, expiration dates and/or times, appearance and integrity of drug(s), and rate of pump for infusion.  The 5 rights of medication administration have been verified.

## 2021-12-29 ENCOUNTER — OFFICE VISIT (OUTPATIENT)
Dept: ONCOLOGY | Age: 61
End: 2021-12-29
Payer: COMMERCIAL

## 2021-12-29 ENCOUNTER — HOSPITAL ENCOUNTER (OUTPATIENT)
Dept: INFUSION THERAPY | Age: 61
Discharge: HOME | End: 2021-12-29
Payer: COMMERCIAL

## 2021-12-29 VITALS
RESPIRATION RATE: 20 BRPM | SYSTOLIC BLOOD PRESSURE: 165 MMHG | HEART RATE: 112 BPM | HEIGHT: 64 IN | TEMPERATURE: 98.9 F | OXYGEN SATURATION: 98 % | DIASTOLIC BLOOD PRESSURE: 86 MMHG | BODY MASS INDEX: 41.15 KG/M2 | WEIGHT: 241 LBS

## 2021-12-29 DIAGNOSIS — C50.912 MALIGNANT NEOPLASM OF LEFT BREAST IN FEMALE, ESTROGEN RECEPTOR POSITIVE, UNSPECIFIED SITE OF BREAST (HCC): ICD-10-CM

## 2021-12-29 DIAGNOSIS — C50.912 INFILTRATING DUCTAL CARCINOMA OF LEFT BREAST (HCC): ICD-10-CM

## 2021-12-29 DIAGNOSIS — C50.912 INFILTRATING DUCTAL CARCINOMA OF LEFT BREAST (HCC): Primary | ICD-10-CM

## 2021-12-29 DIAGNOSIS — Z17.0 MALIGNANT NEOPLASM OF AREOLA OF LEFT BREAST IN FEMALE, ESTROGEN RECEPTOR POSITIVE (HCC): Primary | ICD-10-CM

## 2021-12-29 DIAGNOSIS — C50.012 MALIGNANT NEOPLASM OF AREOLA OF LEFT BREAST IN FEMALE, ESTROGEN RECEPTOR POSITIVE (HCC): Primary | ICD-10-CM

## 2021-12-29 DIAGNOSIS — Z17.0 MALIGNANT NEOPLASM OF LEFT BREAST IN FEMALE, ESTROGEN RECEPTOR POSITIVE, UNSPECIFIED SITE OF BREAST (HCC): ICD-10-CM

## 2021-12-29 LAB
ALBUMIN SERPL-MCNC: 3.8 GM/DL (ref 3.4–5)
ALP BLD-CCNC: 58 IU/L (ref 40–128)
ALT SERPL-CCNC: 20 U/L (ref 10–40)
ANION GAP SERPL CALCULATED.3IONS-SCNC: 12 MMOL/L (ref 4–16)
AST SERPL-CCNC: 17 IU/L (ref 15–37)
BASOPHILS ABSOLUTE: 0 K/CU MM
BASOPHILS RELATIVE PERCENT: 0.5 % (ref 0–1)
BILIRUB SERPL-MCNC: 0.3 MG/DL (ref 0–1)
BUN BLDV-MCNC: 13 MG/DL (ref 6–23)
CALCIUM SERPL-MCNC: 8.7 MG/DL (ref 8.3–10.6)
CHLORIDE BLD-SCNC: 104 MMOL/L (ref 99–110)
CO2: 24 MMOL/L (ref 21–32)
CREAT SERPL-MCNC: 1 MG/DL (ref 0.6–1.1)
DIFFERENTIAL TYPE: ABNORMAL
EOSINOPHILS ABSOLUTE: 0.2 K/CU MM
EOSINOPHILS RELATIVE PERCENT: 2.3 % (ref 0–3)
GFR AFRICAN AMERICAN: >60 ML/MIN/1.73M2
GFR NON-AFRICAN AMERICAN: 57 ML/MIN/1.73M2
GLUCOSE BLD-MCNC: 106 MG/DL (ref 70–99)
HCT VFR BLD CALC: 32.9 % (ref 37–47)
HEMOGLOBIN: 10.7 GM/DL (ref 12.5–16)
LYMPHOCYTES ABSOLUTE: 1.5 K/CU MM
LYMPHOCYTES RELATIVE PERCENT: 21 % (ref 24–44)
MCH RBC QN AUTO: 30.1 PG (ref 27–31)
MCHC RBC AUTO-ENTMCNC: 32.5 % (ref 32–36)
MCV RBC AUTO: 92.4 FL (ref 78–100)
MONOCYTES ABSOLUTE: 0.5 K/CU MM
MONOCYTES RELATIVE PERCENT: 6.6 % (ref 0–4)
PDW BLD-RTO: 16.2 % (ref 11.7–14.9)
PLATELET # BLD: 189 K/CU MM (ref 140–440)
PMV BLD AUTO: 8.5 FL (ref 7.5–11.1)
POTASSIUM SERPL-SCNC: 3.6 MMOL/L (ref 3.5–5.1)
RBC # BLD: 3.56 M/CU MM (ref 4.2–5.4)
SEGMENTED NEUTROPHILS ABSOLUTE COUNT: 5.1 K/CU MM
SEGMENTED NEUTROPHILS RELATIVE PERCENT: 69.6 % (ref 36–66)
SODIUM BLD-SCNC: 140 MMOL/L (ref 135–145)
TOTAL PROTEIN: 5.7 GM/DL (ref 6.4–8.2)
WBC # BLD: 7.3 K/CU MM (ref 4–10.5)

## 2021-12-29 PROCEDURE — G8417 CALC BMI ABV UP PARAM F/U: HCPCS | Performed by: INTERNAL MEDICINE

## 2021-12-29 PROCEDURE — 1036F TOBACCO NON-USER: CPT | Performed by: INTERNAL MEDICINE

## 2021-12-29 PROCEDURE — 99214 OFFICE O/P EST MOD 30 MIN: CPT | Performed by: INTERNAL MEDICINE

## 2021-12-29 PROCEDURE — 80053 COMPREHEN METABOLIC PANEL: CPT

## 2021-12-29 PROCEDURE — 2500000003 HC RX 250 WO HCPCS: Performed by: INTERNAL MEDICINE

## 2021-12-29 PROCEDURE — 3017F COLORECTAL CA SCREEN DOC REV: CPT | Performed by: INTERNAL MEDICINE

## 2021-12-29 PROCEDURE — 2580000003 HC RX 258: Performed by: INTERNAL MEDICINE

## 2021-12-29 PROCEDURE — 36591 DRAW BLOOD OFF VENOUS DEVICE: CPT

## 2021-12-29 PROCEDURE — G8484 FLU IMMUNIZE NO ADMIN: HCPCS | Performed by: INTERNAL MEDICINE

## 2021-12-29 PROCEDURE — 96375 TX/PRO/DX INJ NEW DRUG ADDON: CPT

## 2021-12-29 PROCEDURE — 96367 TX/PROPH/DG ADDL SEQ IV INF: CPT

## 2021-12-29 PROCEDURE — 85025 COMPLETE CBC W/AUTO DIFF WBC: CPT

## 2021-12-29 PROCEDURE — 6360000002 HC RX W HCPCS: Performed by: INTERNAL MEDICINE

## 2021-12-29 PROCEDURE — G8427 DOCREV CUR MEDS BY ELIG CLIN: HCPCS | Performed by: INTERNAL MEDICINE

## 2021-12-29 PROCEDURE — 96413 CHEMO IV INFUSION 1 HR: CPT

## 2021-12-29 RX ORDER — ONDANSETRON 2 MG/ML
8 INJECTION INTRAMUSCULAR; INTRAVENOUS ONCE
Status: COMPLETED | OUTPATIENT
Start: 2021-12-29 | End: 2021-12-29

## 2021-12-29 RX ORDER — HEPARIN SODIUM (PORCINE) LOCK FLUSH IV SOLN 100 UNIT/ML 100 UNIT/ML
500 SOLUTION INTRAVENOUS PRN
Status: DISCONTINUED | OUTPATIENT
Start: 2021-12-29 | End: 2021-12-30 | Stop reason: HOSPADM

## 2021-12-29 RX ORDER — ANASTROZOLE 1 MG/1
1 TABLET ORAL DAILY
Qty: 30 TABLET | Refills: 3 | Status: SHIPPED | OUTPATIENT
Start: 2021-12-29 | End: 2022-08-18

## 2021-12-29 RX ORDER — SODIUM CHLORIDE 9 MG/ML
20 INJECTION, SOLUTION INTRAVENOUS ONCE
Status: DISCONTINUED | OUTPATIENT
Start: 2021-12-29 | End: 2021-12-30 | Stop reason: HOSPADM

## 2021-12-29 RX ORDER — DIPHENHYDRAMINE HYDROCHLORIDE 50 MG/ML
50 INJECTION INTRAMUSCULAR; INTRAVENOUS ONCE
Status: COMPLETED | OUTPATIENT
Start: 2021-12-29 | End: 2021-12-29

## 2021-12-29 RX ORDER — SODIUM CHLORIDE 0.9 % (FLUSH) 0.9 %
5-40 SYRINGE (ML) INJECTION PRN
Status: DISCONTINUED | OUTPATIENT
Start: 2021-12-29 | End: 2021-12-30 | Stop reason: HOSPADM

## 2021-12-29 RX ADMIN — SODIUM CHLORIDE 20 ML/HR: 9 INJECTION, SOLUTION INTRAVENOUS at 11:07

## 2021-12-29 RX ADMIN — FAMOTIDINE 20 MG: 10 INJECTION, SOLUTION INTRAVENOUS at 11:07

## 2021-12-29 RX ADMIN — SODIUM CHLORIDE, PRESERVATIVE FREE 10 ML: 5 INJECTION INTRAVENOUS at 12:52

## 2021-12-29 RX ADMIN — DEXAMETHASONE SODIUM PHOSPHATE 10 MG: 10 INJECTION, SOLUTION INTRAMUSCULAR; INTRAVENOUS at 11:08

## 2021-12-29 RX ADMIN — HEPARIN 500 UNITS: 100 SYRINGE at 12:52

## 2021-12-29 RX ADMIN — DIPHENHYDRAMINE HYDROCHLORIDE 50 MG: 50 INJECTION INTRAMUSCULAR; INTRAVENOUS at 11:07

## 2021-12-29 RX ADMIN — ONDANSETRON 8 MG: 2 INJECTION INTRAMUSCULAR; INTRAVENOUS at 11:07

## 2021-12-29 RX ADMIN — PACLITAXEL 120 MG: 6 INJECTION, SOLUTION INTRAVENOUS at 11:33

## 2021-12-29 NOTE — PROGRESS NOTES
MA Rooming Questions  Patient: Roosvelt Spurling  MRN: [id number]    Date: 12/29/2021        1. Do you have any new issues? yes - feeling more SOB than usual, off balance, finger tips num-medication helping some. 2. Do you need any refills on medications?    no    3. Have you had any imaging done since your last visit?   no    4. Have you been hospitalized or seen in the emergency room since your last visit here?   no    5. Did the patient have a depression screening completed today?  No    No data recorded     PHQ-9 Given to (if applicable):               PHQ-9 Score (if applicable):                     [] Positive     []  Negative              Does question #9 need addressed (if applicable)                     [] Yes    []  No               Marlane Ends, CMA

## 2022-01-04 DIAGNOSIS — C50.912 MUCINOUS CARCINOMA OF BREAST, LEFT (HCC): Primary | ICD-10-CM

## 2022-01-05 ENCOUNTER — HOSPITAL ENCOUNTER (OUTPATIENT)
Dept: INFUSION THERAPY | Age: 62
Discharge: HOME | End: 2022-01-05
Payer: COMMERCIAL

## 2022-01-05 VITALS
RESPIRATION RATE: 16 BRPM | HEART RATE: 109 BPM | BODY MASS INDEX: 40.87 KG/M2 | HEIGHT: 64 IN | TEMPERATURE: 97.8 F | OXYGEN SATURATION: 98 % | SYSTOLIC BLOOD PRESSURE: 152 MMHG | DIASTOLIC BLOOD PRESSURE: 91 MMHG | WEIGHT: 239.4 LBS

## 2022-01-05 DIAGNOSIS — C50.912 MUCINOUS CARCINOMA OF BREAST, LEFT (HCC): Primary | ICD-10-CM

## 2022-01-05 DIAGNOSIS — C50.912 INFILTRATING DUCTAL CARCINOMA OF LEFT BREAST (HCC): ICD-10-CM

## 2022-01-05 DIAGNOSIS — C50.912 MALIGNANT NEOPLASM OF LEFT BREAST IN FEMALE, ESTROGEN RECEPTOR POSITIVE, UNSPECIFIED SITE OF BREAST (HCC): ICD-10-CM

## 2022-01-05 DIAGNOSIS — Z17.0 MALIGNANT NEOPLASM OF LEFT BREAST IN FEMALE, ESTROGEN RECEPTOR POSITIVE, UNSPECIFIED SITE OF BREAST (HCC): ICD-10-CM

## 2022-01-05 LAB
ALBUMIN SERPL-MCNC: 3.9 GM/DL (ref 3.4–5)
ALP BLD-CCNC: 57 IU/L (ref 40–128)
ALT SERPL-CCNC: 17 U/L (ref 10–40)
ANION GAP SERPL CALCULATED.3IONS-SCNC: 14 MMOL/L (ref 4–16)
AST SERPL-CCNC: 14 IU/L (ref 15–37)
BASOPHILS ABSOLUTE: 0 K/CU MM
BASOPHILS RELATIVE PERCENT: 0.5 % (ref 0–1)
BILIRUB SERPL-MCNC: 0.4 MG/DL (ref 0–1)
BUN BLDV-MCNC: 17 MG/DL (ref 6–23)
CALCIUM SERPL-MCNC: 8.7 MG/DL (ref 8.3–10.6)
CHLORIDE BLD-SCNC: 105 MMOL/L (ref 99–110)
CO2: 22 MMOL/L (ref 21–32)
CREAT SERPL-MCNC: 1.1 MG/DL (ref 0.6–1.1)
DIFFERENTIAL TYPE: ABNORMAL
EOSINOPHILS ABSOLUTE: 0.1 K/CU MM
EOSINOPHILS RELATIVE PERCENT: 2.3 % (ref 0–3)
GFR AFRICAN AMERICAN: >60 ML/MIN/1.73M2
GFR NON-AFRICAN AMERICAN: 51 ML/MIN/1.73M2
GLUCOSE BLD-MCNC: 120 MG/DL (ref 70–99)
HCT VFR BLD CALC: 32 % (ref 37–47)
HEMOGLOBIN: 10.5 GM/DL (ref 12.5–16)
LYMPHOCYTES ABSOLUTE: 1.2 K/CU MM
LYMPHOCYTES RELATIVE PERCENT: 21.9 % (ref 24–44)
MCH RBC QN AUTO: 30.2 PG (ref 27–31)
MCHC RBC AUTO-ENTMCNC: 32.8 % (ref 32–36)
MCV RBC AUTO: 92 FL (ref 78–100)
MONOCYTES ABSOLUTE: 0.4 K/CU MM
MONOCYTES RELATIVE PERCENT: 6.6 % (ref 0–4)
PDW BLD-RTO: 15.9 % (ref 11.7–14.9)
PLATELET # BLD: 175 K/CU MM (ref 140–440)
PMV BLD AUTO: 8.4 FL (ref 7.5–11.1)
POTASSIUM SERPL-SCNC: 3.5 MMOL/L (ref 3.5–5.1)
RBC # BLD: 3.48 M/CU MM (ref 4.2–5.4)
SEGMENTED NEUTROPHILS ABSOLUTE COUNT: 3.8 K/CU MM
SEGMENTED NEUTROPHILS RELATIVE PERCENT: 68.7 % (ref 36–66)
SODIUM BLD-SCNC: 141 MMOL/L (ref 135–145)
TOTAL PROTEIN: 5.8 GM/DL (ref 6.4–8.2)
WBC # BLD: 5.6 K/CU MM (ref 4–10.5)

## 2022-01-05 PROCEDURE — 96367 TX/PROPH/DG ADDL SEQ IV INF: CPT

## 2022-01-05 PROCEDURE — 2580000003 HC RX 258: Performed by: INTERNAL MEDICINE

## 2022-01-05 PROCEDURE — 6360000002 HC RX W HCPCS: Performed by: INTERNAL MEDICINE

## 2022-01-05 PROCEDURE — 2500000003 HC RX 250 WO HCPCS: Performed by: INTERNAL MEDICINE

## 2022-01-05 PROCEDURE — 36591 DRAW BLOOD OFF VENOUS DEVICE: CPT

## 2022-01-05 PROCEDURE — 96375 TX/PRO/DX INJ NEW DRUG ADDON: CPT

## 2022-01-05 PROCEDURE — 85025 COMPLETE CBC W/AUTO DIFF WBC: CPT

## 2022-01-05 PROCEDURE — 80053 COMPREHEN METABOLIC PANEL: CPT

## 2022-01-05 PROCEDURE — 96413 CHEMO IV INFUSION 1 HR: CPT

## 2022-01-05 RX ORDER — SODIUM CHLORIDE 9 MG/ML
20 INJECTION, SOLUTION INTRAVENOUS ONCE
Status: DISCONTINUED | OUTPATIENT
Start: 2022-01-05 | End: 2022-01-06 | Stop reason: HOSPADM

## 2022-01-05 RX ORDER — ONDANSETRON 2 MG/ML
8 INJECTION INTRAMUSCULAR; INTRAVENOUS ONCE
Status: COMPLETED | OUTPATIENT
Start: 2022-01-05 | End: 2022-01-05

## 2022-01-05 RX ORDER — HEPARIN SODIUM (PORCINE) LOCK FLUSH IV SOLN 100 UNIT/ML 100 UNIT/ML
500 SOLUTION INTRAVENOUS PRN
Status: DISCONTINUED | OUTPATIENT
Start: 2022-01-05 | End: 2022-01-06 | Stop reason: HOSPADM

## 2022-01-05 RX ORDER — SODIUM CHLORIDE 0.9 % (FLUSH) 0.9 %
5-40 SYRINGE (ML) INJECTION PRN
Status: DISCONTINUED | OUTPATIENT
Start: 2022-01-05 | End: 2022-01-06 | Stop reason: HOSPADM

## 2022-01-05 RX ORDER — DIPHENHYDRAMINE HYDROCHLORIDE 50 MG/ML
50 INJECTION INTRAMUSCULAR; INTRAVENOUS ONCE
Status: COMPLETED | OUTPATIENT
Start: 2022-01-05 | End: 2022-01-05

## 2022-01-05 RX ADMIN — SODIUM CHLORIDE 20 ML/HR: 9 INJECTION, SOLUTION INTRAVENOUS at 11:19

## 2022-01-05 RX ADMIN — DEXAMETHASONE SODIUM PHOSPHATE 10 MG: 10 INJECTION, SOLUTION INTRAMUSCULAR; INTRAVENOUS at 11:20

## 2022-01-05 RX ADMIN — ONDANSETRON 8 MG: 2 INJECTION INTRAMUSCULAR; INTRAVENOUS at 11:19

## 2022-01-05 RX ADMIN — HEPARIN 500 UNITS: 100 SYRINGE at 13:23

## 2022-01-05 RX ADMIN — DIPHENHYDRAMINE HYDROCHLORIDE 50 MG: 50 INJECTION INTRAMUSCULAR; INTRAVENOUS at 11:19

## 2022-01-05 RX ADMIN — PACLITAXEL 120 MG: 6 INJECTION, SOLUTION INTRAVENOUS at 12:04

## 2022-01-05 RX ADMIN — SODIUM CHLORIDE, PRESERVATIVE FREE 10 ML: 5 INJECTION INTRAVENOUS at 13:23

## 2022-01-05 RX ADMIN — FAMOTIDINE 20 MG: 10 INJECTION, SOLUTION INTRAVENOUS at 11:19

## 2022-01-21 PROBLEM — K21.9 CHRONIC GASTROESOPHAGEAL REFLUX DISEASE: Status: ACTIVE | Noted: 2022-01-21

## 2022-01-28 ENCOUNTER — APPOINTMENT (OUTPATIENT)
Dept: CT IMAGING | Age: 62
End: 2022-01-28
Payer: COMMERCIAL

## 2022-01-28 ENCOUNTER — APPOINTMENT (OUTPATIENT)
Dept: GENERAL RADIOLOGY | Age: 62
End: 2022-01-28
Payer: COMMERCIAL

## 2022-01-28 ENCOUNTER — HOSPITAL ENCOUNTER (EMERGENCY)
Age: 62
Discharge: LEFT BEFORE DISCHARGE | End: 2022-01-28
Payer: COMMERCIAL

## 2022-01-28 VITALS
HEART RATE: 106 BPM | HEIGHT: 64 IN | BODY MASS INDEX: 37.56 KG/M2 | WEIGHT: 220 LBS | TEMPERATURE: 98.9 F | RESPIRATION RATE: 16 BRPM | SYSTOLIC BLOOD PRESSURE: 138 MMHG | OXYGEN SATURATION: 99 % | DIASTOLIC BLOOD PRESSURE: 103 MMHG

## 2022-01-28 LAB
ALBUMIN SERPL-MCNC: 4.2 GM/DL (ref 3.4–5)
ALP BLD-CCNC: 73 IU/L (ref 40–129)
ALT SERPL-CCNC: 25 U/L (ref 10–40)
ANION GAP SERPL CALCULATED.3IONS-SCNC: 17 MMOL/L (ref 4–16)
AST SERPL-CCNC: 23 IU/L (ref 15–37)
BASOPHILS ABSOLUTE: 0.1 K/CU MM
BASOPHILS RELATIVE PERCENT: 0.7 % (ref 0–1)
BILIRUB SERPL-MCNC: 0.9 MG/DL (ref 0–1)
BUN BLDV-MCNC: 12 MG/DL (ref 6–23)
CALCIUM SERPL-MCNC: 9.7 MG/DL (ref 8.3–10.6)
CHLORIDE BLD-SCNC: 99 MMOL/L (ref 99–110)
CO2: 21 MMOL/L (ref 21–32)
CREAT SERPL-MCNC: 1.1 MG/DL (ref 0.6–1.1)
DIFFERENTIAL TYPE: ABNORMAL
EKG ATRIAL RATE: 104 BPM
EKG DIAGNOSIS: NORMAL
EKG P AXIS: 39 DEGREES
EKG P-R INTERVAL: 134 MS
EKG Q-T INTERVAL: 344 MS
EKG QRS DURATION: 78 MS
EKG QTC CALCULATION (BAZETT): 452 MS
EKG R AXIS: 85 DEGREES
EKG T AXIS: -19 DEGREES
EKG VENTRICULAR RATE: 104 BPM
EOSINOPHILS ABSOLUTE: 0.1 K/CU MM
EOSINOPHILS RELATIVE PERCENT: 0.9 % (ref 0–3)
GFR AFRICAN AMERICAN: >60 ML/MIN/1.73M2
GFR NON-AFRICAN AMERICAN: 51 ML/MIN/1.73M2
GLUCOSE BLD-MCNC: 115 MG/DL (ref 70–99)
HCT VFR BLD CALC: 39.2 % (ref 37–47)
HEMOGLOBIN: 12.6 GM/DL (ref 12.5–16)
IMMATURE NEUTROPHIL %: 0.4 % (ref 0–0.43)
LACTATE: 1.3 MMOL/L (ref 0.4–2)
LIPASE: 16 IU/L (ref 13–60)
LYMPHOCYTES ABSOLUTE: 1.4 K/CU MM
LYMPHOCYTES RELATIVE PERCENT: 16.6 % (ref 24–44)
MAGNESIUM: 1.9 MG/DL (ref 1.8–2.4)
MCH RBC QN AUTO: 29.5 PG (ref 27–31)
MCHC RBC AUTO-ENTMCNC: 32.1 % (ref 32–36)
MCV RBC AUTO: 91.8 FL (ref 78–100)
MONOCYTES ABSOLUTE: 0.9 K/CU MM
MONOCYTES RELATIVE PERCENT: 10 % (ref 0–4)
NUCLEATED RBC %: 0 %
PDW BLD-RTO: 14.1 % (ref 11.7–14.9)
PLATELET # BLD: 204 K/CU MM (ref 140–440)
PMV BLD AUTO: 10 FL (ref 7.5–11.1)
POTASSIUM SERPL-SCNC: 3.2 MMOL/L (ref 3.5–5.1)
PRO-BNP: 76.43 PG/ML
RBC # BLD: 4.27 M/CU MM (ref 4.2–5.4)
SARS-COV-2, NAAT: NOT DETECTED
SEGMENTED NEUTROPHILS ABSOLUTE COUNT: 6.1 K/CU MM
SEGMENTED NEUTROPHILS RELATIVE PERCENT: 71.4 % (ref 36–66)
SODIUM BLD-SCNC: 137 MMOL/L (ref 135–145)
SOURCE: NORMAL
TOTAL IMMATURE NEUTOROPHIL: 0.03 K/CU MM
TOTAL NUCLEATED RBC: 0 K/CU MM
TOTAL PROTEIN: 6.9 GM/DL (ref 6.4–8.2)
TROPONIN T: <0.01 NG/ML
WBC # BLD: 8.6 K/CU MM (ref 4–10.5)

## 2022-01-28 PROCEDURE — 87635 SARS-COV-2 COVID-19 AMP PRB: CPT

## 2022-01-28 PROCEDURE — 83605 ASSAY OF LACTIC ACID: CPT

## 2022-01-28 PROCEDURE — 83880 ASSAY OF NATRIURETIC PEPTIDE: CPT

## 2022-01-28 PROCEDURE — 71045 X-RAY EXAM CHEST 1 VIEW: CPT

## 2022-01-28 PROCEDURE — 4500000002 HC ER NO CHARGE

## 2022-01-28 PROCEDURE — 74177 CT ABD & PELVIS W/CONTRAST: CPT

## 2022-01-28 PROCEDURE — 70450 CT HEAD/BRAIN W/O DYE: CPT

## 2022-01-28 PROCEDURE — 84484 ASSAY OF TROPONIN QUANT: CPT

## 2022-01-28 PROCEDURE — 6360000004 HC RX CONTRAST MEDICATION: Performed by: PHYSICIAN ASSISTANT

## 2022-01-28 PROCEDURE — 83735 ASSAY OF MAGNESIUM: CPT

## 2022-01-28 PROCEDURE — 99284 EMERGENCY DEPT VISIT MOD MDM: CPT

## 2022-01-28 PROCEDURE — 80053 COMPREHEN METABOLIC PANEL: CPT

## 2022-01-28 PROCEDURE — 93005 ELECTROCARDIOGRAM TRACING: CPT | Performed by: PHYSICIAN ASSISTANT

## 2022-01-28 PROCEDURE — 93010 ELECTROCARDIOGRAM REPORT: CPT | Performed by: INTERNAL MEDICINE

## 2022-01-28 PROCEDURE — 83690 ASSAY OF LIPASE: CPT

## 2022-01-28 PROCEDURE — 85025 COMPLETE CBC W/AUTO DIFF WBC: CPT

## 2022-01-28 RX ADMIN — IOPAMIDOL 80 ML: 755 INJECTION, SOLUTION INTRAVENOUS at 17:15

## 2022-01-28 NOTE — ED PROVIDER NOTES
12 lead EKG per my interpretation:  Sinus Tachycardia 104  Axis is   Normal  QTc is  452  There is no specific T wave changes appreciated. There is no specific ST wave changes appreciated.     Prior EKG to compare with was not available         vIPtela, DO  01/28/22 0660

## 2022-01-28 NOTE — ED PROVIDER NOTES
As provider-in-triage, I performed a medical screening history and physical exam on this patient. HISTORY OF PRESENT ILLNESS  Vimal Stafford is a 63 y.o. female who presents for 3 weeks of diarrhea, 7 days of epigastric pain, and 5 days of vomiting. PHYSICAL EXAM  BP (!) 148/106   Pulse 114   Temp 98.1 °F (36.7 °C) (Oral)   Resp 20   Ht 5' 4\" (1.626 m)   Wt 220 lb (99.8 kg)   SpO2 99%   BMI 37.76 kg/m²     On exam, the patient appears well-hydrated, well-nourished, and in no acute distress. Mucous membranes are moist. Speech is clear. Breathing is unlabored. Skin is dry. Mental status is normal. Moves all extremities, and is without facial droop. Comment: Please note this report has been produced using speech recognition software and may contain errors related to that system including errors in grammar, punctuation, and spelling, as well as words and phrases that may be inappropriate. If there are any questions or concerns please feel free to contact the dictating provider for clarification.        [address] [facility]  01/28/22 8837

## 2022-02-01 ENCOUNTER — VIRTUAL VISIT (OUTPATIENT)
Dept: FAMILY MEDICINE CLINIC | Age: 62
End: 2022-02-01
Payer: COMMERCIAL

## 2022-02-01 DIAGNOSIS — R11.0 NAUSEA: Primary | ICD-10-CM

## 2022-02-01 DIAGNOSIS — B34.9 VIRAL SYNDROME: ICD-10-CM

## 2022-02-01 DIAGNOSIS — R51.9 NONINTRACTABLE HEADACHE, UNSPECIFIED CHRONICITY PATTERN, UNSPECIFIED HEADACHE TYPE: ICD-10-CM

## 2022-02-01 PROCEDURE — 99442 PR PHYS/QHP TELEPHONE EVALUATION 11-20 MIN: CPT | Performed by: FAMILY MEDICINE

## 2022-02-01 NOTE — PROGRESS NOTES
Ani Champion is a 63 y.o. female evaluated via telephone on 2/1/2022. Consent:  She and/or health care decision maker is aware that that she may receive a bill for this telephone service, which includes applicable co-pays, depending on her insurance coverage, and has provided verbal consent to proceed. Documentation:  I communicated with the patient:          I affirm this is a Patient Initiated Episode with a Patient who has not had a related appointment within my department in the past 7 days or scheduled within the next 24 hours. Chief Complaint   Patient presents with    Dehydration    Nausea     has not eaten in the past nine days, having diarrhea, has taken imodium with no relief    Headache    Generalized Body Aches         Patient was on the phone, her sounds are okay, no acute distress, c/o: cough, headache, nausea, not eating well, and fatigue, going on for few wks, went to the ED, blood work are fine ,and her covid test was negative, had CT abdomin was negative. CXR was negative too. Patient stats may feels little better. No fever, no SOB, no chat pain. Past Medical History:   Diagnosis Date    Allergic rhinitis due to pollen 11/19/2015    Arthritis     left hip & knee    Chronic back pain     Dehydration 7/22/2021    Depression     Gastroesophageal reflux disease 11/19/2015    Hearing loss 11/19/2015    History of blood transfusion     No reaction per pt    History of cardiac monitoring 04/18/2017    14 day event monitor. Sinus Rhythm    History of nuclear stress test 09/28/2016    cardiolite-normal,EF70%    Hot flashes due to surgical menopause 11/19/2015    Hx of echocardiogram 10/05/2016    EF: >55 %   Mild mitral and tricuspid regurg.      Hyperlipidemia     Hypertension     Follows with PCP    Lexiscan Stress Test 10/14/2020    EF 60%, Normal study, no ischemia    Meniere disease     Morbid obesity due to excess calories ([facility] [address] 11/19/2015    Sleep apnea 11/19/2015    sleep study negative    Tilt table evaluation 05/09/2017     positive for neurocardiogenic syncope     Family History   Problem Relation Age of Onset    Heart Disease Mother     High Blood Pressure Mother     High Cholesterol Mother     Cancer Mother 80        Stomach    Diabetes Mother     Stroke Mother     Heart Disease Father     High Blood Pressure Father     High Cholesterol Father     Diabetes Father     Depression Father     Cancer Sister 46        Lung cancer    High Blood Pressure Sister     Other Sister         migraines, osteoarthritis    Mental Illness Sister     Depression Sister     Cancer Maternal Grandmother         Stomach    Diabetes Maternal Grandmother     Diabetes Maternal Grandfather     Diabetes Paternal Grandmother     Diabetes Paternal Grandfather     Cancer Maternal Cousin 47        Pancreatic     Social History     Socioeconomic History    Marital status:      Spouse name: Not on file    Number of children: Not on file    Years of education: Not on file    Highest education level: Not on file   Occupational History    Not on file   Tobacco Use    Smoking status: Never Smoker    Smokeless tobacco: Never Used   Vaping Use    Vaping Use: Never used   Substance and Sexual Activity    Alcohol use: No    Drug use: No    Sexual activity: Not Currently     Partners: Male   Other Topics Concern    Not on file   Social History Narrative    Not on file     Social Determinants of Health     Financial Resource Strain:     Difficulty of Paying Living Expenses: Not on file   Food Insecurity:     Worried About Running Out of Food in the Last Year: Not on file    Sal of Food in the Last Year: Not on file   Transportation Needs:     Lack of Transportation (Medical): Not on file    Lack of Transportation (Non-Medical):  Not on file   Physical Activity:     Days of Exercise per Week: Not on file    Minutes of Exercise per Session: Not on file Stress:     Feeling of Stress : Not on file   Social Connections:     Frequency of Communication with Friends and Family: Not on file    Frequency of Social Gatherings with Friends and Family: Not on file    Attends Roman Catholic Services: Not on file    Active Member of Clubs or Organizations: Not on file    Attends Club or Organization Meetings: Not on file    Marital Status: Not on file   Intimate Partner Violence:     Fear of Current or Ex-Partner: Not on file    Emotionally Abused: Not on file    Physically Abused: Not on file    Sexually Abused: Not on file   Housing Stability:     Unable to Pay for Housing in the Last Year: Not on file    Number of Places Lived in the Last Year: Not on file    Unstable Housing in the Last Year: Not on file       Allergies   Allergen Reactions    Celexa [Citalopram] Other (See Comments)     Stomach pain        Atorvastatin      Leg cramps      Fenofibrate      angioedema    Mestinon [Pyridostigmine] Itching and Swelling    Penicillins     Sulfa Antibiotics      Other reaction(s): Hives/Throat closes    Tape [Adhesive Tape]      PLASTIC TAPE    Gemfibrozil Rash    Meloxicam Other (See Comments)     moodswings     Current Outpatient Medications   Medication Sig Dispense Refill    anastrozole (ARIMIDEX) 1 MG tablet Take 1 tablet by mouth daily 30 tablet 3    lansoprazole (PREVACID) 30 MG delayed release capsule TAKE 1 CAPSULE BY MOUTH EVERY DAY      potassium chloride (KLOR-CON M) 20 MEQ extended release tablet TAKE 1 TABLET BY MOUTH TWO TIMES A DAY      sucralfate (CARAFATE) 1 GM/10ML suspension TAKE 10 MLS BY MOUTH FOUR TIMES A DAY ON AN EMPTY STOMACH BEFORE MEALS AND AT BEDTIME      gabapentin (NEURONTIN) 300 MG capsule Take 1 capsule by mouth nightly for 30 days.  90 capsule 3    sodium chloride (ALTAMIST SPRAY) 0.65 % nasal spray 1 spray by Nasal route as needed for Congestion 1 each 3    losartan (COZAAR) 25 MG tablet Take 1 tablet by mouth daily 90 tablet 3    ezetimibe (ZETIA) 10 MG tablet Take 1 tablet by mouth daily 90 tablet 3    prochlorperazine (COMPAZINE) 10 MG tablet Take 1 tablet by mouth every 6 hours as needed (chemotherapy induced nausea/vomiting) 30 tablet 1    dexamethasone (DECADRON) 2 MG tablet Take 1 tablet by mouth daily (with breakfast) for two days AFTER chemotherapy. Take 8 mg (4 tabs) the night before 1st chemo ONLY. 28 tablet 0    cyclophosphamide (CYTOXAN) 1 GM chemo injection Infuse intravenously      PACLitaxel (TAXOL) 100 MG/16.7ML chemo injection Infuse intravenously once      Magic Mouthwash (MIRACLE MOUTHWASH) Swish and spit 5 mLs 4 times daily as needed for Irritation 500 mL 0    mirtazapine (REMERON) 15 MG tablet Take 1 tablet by mouth nightly 30 tablet 3    hydroCHLOROthiazide (HYDRODIURIL) 25 MG tablet Take 0.5 tablets by mouth daily 45 tablet 3    promethazine (PHENERGAN) 12.5 MG tablet Take 10 mg by mouth every 6 hours as needed for Nausea      metoprolol tartrate (LOPRESSOR) 50 MG tablet Take 1 tablet by mouth 2 times daily 180 tablet 3    metoclopramide (REGLAN) 5 MG tablet Take 1 tablet by mouth 3 times daily 30 tablet 1    furosemide (LASIX) 20 MG tablet Take 20 mg by mouth daily       aspirin 81 MG chewable tablet Take 81 mg by mouth daily      Multiple Vitamins-Minerals (HAIR SKIN AND NAILS FORMULA) TABS Take 1 tablet by mouth daily       acetaminophen (TYLENOL) 500 MG tablet Take 500 mg by mouth every 6 hours as needed for Pain      loratadine (CLARITIN) 10 MG tablet Take 1 tablet by mouth daily 30 tablet 5    sodium chloride (ALTAMIST SPRAY) 0.65 % nasal spray 1 spray by Nasal route 2 times daily 1 each 3     No current facility-administered medications for this visit.          Review of Systems   General:  No fevers, no chills  Eyes:  No recent vison changes  ENT:  No sore throat, + nasal congestion  Cardiovascular:  No chest pain, no palpitations  Respiratory:  No shortness of breath, + cough, no wheezing  Gastrointestinal:  No abdominal pain, + nausea, no vomiting, no diarrhea  Musculoskeletal:  + muscle pain  Skin:  No rash  Neurologic:  + headache  Genitourinary:  No dysuria, no hematuria  Extremities:  no edema, no pain     Physical Exam     Vital signs: not taken    Per phone call, the voice sounds good, no acute distress      ASSESSMENT/ PLAN:      1. Nausea    2. Nonintractable headache, unspecified chronicity pattern, unspecified headache type    3. Viral syndrome  - patient already evaluated by the ED, blood work are normal except potassium was low, but normal WBC, covid test was negative, CXR, CT head, a-nd CT abdomin were normal no acute finidng    -her potassium was low, patient already on potassium, told her to take 2 tabs of potassium for tow days then go back on once daily    - her HG is low too, but this is chronic      - Appropriate prescription are addressed. - Questions answered and patient verbalizes understanding.  - Call for any problem, questions, or concerns. Return in about 3 months (around 5/1/2022). Patient identification was verified at the start of the visit: Yes    Total Time: minutes: 11-20 minutes    [place] Leak was evaluated through a synchronous (real-time) audio encounter. The patient was located at home in a state where the provider was licensed to provide care.     Note: not billable if this call serves to triage the patient into an appointment for the relevant concern      Dolores Barahona MD

## 2022-02-08 NOTE — PROGRESS NOTES
Patient Name:  Alvino Garcia  Patient :  1960  Patient MRN:  [id number]     Primary Oncologist: Gilberto Chua MD  Referring Provider: Saud Ward MD     Date of Service: 3/7/2022      Chief Complaint:    Chief Complaint   Patient presents with    Follow-up      She came in for follow-up visit. Patient Active Problem List:     Essential hypertension     Hyperlipidemia     Allergic rhinitis due to pollen     Morbid obesity due to excess calories     Hearing loss     Hot flashes due to surgical menopause     Gastroesophageal reflux disease     Chronic back pain     Anxiety and depression     Osteoarthritis     Meniere's disease     Insomnia     Precordial pain      Calculus of gallbladder without cholecystitis without obstruction     S/P laparoscopic cholecystectomy     Vasovagal syncope     Class 2 obesity due to excess calories without serious comorbidity in adult     Family history of early     Closed fracture of left ankle     Acute respiratory failure with hypoxia      Status post left hip replacement     Hyperglycemia        Infiltrating ductal carcinoma of left breast      S/P mastectomy, bilateral     Hx of lymph node excision    HPI:   Bella Kaminski is a pleasant 64year old female patient who was referred for evaluation of pT2, N0, MX invasive ductal carcinoma of the left breast, HR positive HER-2 negative, status post bilateral mastectomy in May 2021. She went for the routine mammogram in 2021 and denied any palpable lump to her breast.  Mammogram at that time showed at least 2 indeterminate complex masses at the 2:00 and 12 o'clock position in the left breast in the retroareolar region. 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   ER was more than 95%, TN more than 90%, HER-2/baltazar negative by FISH. MRI of bilateral breasts on April 19, 2021 showed  1. Left breast multicentric disease with biopsy proven invasive ductal carcinoma with mucinous component at 11 o'clock and mucinous carcinomas at 2 o'clock and 12 o'clock in the retroareolar breast. Rogene Chelo is at least 5 cm of separation between the 11 o'clock mass and 12 o'clock mass.  Additional smooth masses measuring up to 21 mm at biopsy sites are hematomas. 2. No MR evidence of malignancy in the contralateral right breast.      She had bilateral mastectomy on May 6, 2021. Pathology report showed : Invasive ductal and mucinous carcinoma of the left breast, retroareolar, grade 2, 4.5 cm, negative margin, -2 sentinel lymph nodes, ER more than 95%, TN 90%, HER-2/baltazar negative by FISH. Pathological stage classification T2, N0, MX. Pathologist felt that she has 1 contiguous tumor mass measuring about 4.5 cm rather than multicentric disease. I discussed with pathologist who stated that she does not have pure mucinous carcinoma of the breast.   CBC and CMP in March 2021 were grossly unremarkable. Due to family history and personal history of breast cancer, I referred for genetic counseling. She had colonoscopy with removal of 2 polyps in 2014 by Dr. Torrey Olea. Follow-up in 3 years was recommended. She refused to have further follow-up. Oncotype DX score was 29. I offered adjuvant chemo therapy TC vs AC +T  She opted to [facility] +T. In May 2021 she was hospitalized for nausea and vomiting. She was seen by GI. She had mild transaminitis and was suspected to have gastroenteritis. Never had any upper endoscopic study. Colonoscopy in June 2014 showed polyps which was removed. Pathology report showed tubular adenoma.   CT abdomen and pelvis on May 30, 2021:  No acute abnormality within the abdomen and pelvis.   Status post hysterectomy, appendectomy and cholecystectomy.    15 mm subcutaneus soft tissue nodule within the left lower quadrant area of anterior abdomen.  Finding may represent sebaceous cyst or other pathology. CTA chest on May 30, 2021 showed   1. No evidence of acute pulmonary embolism. 2. Soft tissue thickening and fat stranding overlying the bilateral pectoral muscles. CT head on May 31, 2021 showed  No acute intracranial abnormality.  No intraparenchymal abnormal enhancement to suggest intracranial metastatic disease.  MRI is a superior modality for evaluation of intracranial metastasis.   Small probable meningioma within the falx near the convexity. MRI of the brain on June 1, 2021 showed:  No acute infarct scattered areas of chronic white matter change in the periventricular white matter.   No evidence for blood products on gradient imaging.    No  focal intracranial lesion noted     Amylase and lipase were unremarkable. She was placed on reglan with improvement of nausea and vomiting. She was recommended to have upper endoscopic study as outpatient by GI. She is status post bilateral mastectomy. She had EGD By Dr. Mckay Navas with diagnosis of hiatal hernia, gastric polyp, mild gastritis. She started adjuvant chemotherapy AC on July 16, 2021. SBFT: Unremarkable small bowel follow through series. Bone density in June 2021 showed normal study. ECHO 6/18/2021:  Left ventricular systolic function is normal.  Ejection fraction is visually estimated at 55-60%. C2 dose reduced by 10%  CTA 8/12 no PE    She had stress test 8/13/21 with no infarct or ischemia, normal EF 64%. She has seen GI and was recommended esophageal stretching. Esophagram 9/13/2021:  Small hiatal hernia with narrowing of the distal thoracic esophagus just proximal to the hiatal hernia, which may reflect underlying Schatzki's ring. Ingested barium and barium tablet were initially held up at this level before eventually passing into the stomach. On 3/7/2022 she came in for follow up visit.   She completed fourth cycle of AC on September 17, 2021. She started Taxol on October 8, 2021 and completed on January 5, 2022. On December 8, 2021 ferritin was 356, iron 54, TIBC 2024, saturation 17%. Folate was 10.8, B12 461.8. On December 22, 2021 hemoglobin was 10.4, MCV 91.8. Anemia is likely related to chemotherapy. She started adjuvant Arimidex after completion of chemo. She has been tolerating it well. She was hospitalized in February 2022 due to NVD which she thought related to stomach flu. She was treated with antibiotic for bacterial colitis. She called Dr Yvrose Prado and is scheduled for colonoscopy in the near future. CT abdomen in January 2022 was negative for mets. CT head in 2/2022 was negative for mets. CTA in 2/2022 was negative for mets. MRI of brain 2/21/2022:  No acute intracranial ischemia or intracranial metastatic disease   Mild chronic microvascular disease within the periventricular white matter  She will continue with arimidex. I recommend to drink enough water. No acute pain. Denied any nausea, vomiting or diarrhea. No fever or chills. No chest pain, shortness of breath or palpitation. No headache or dizzy spell. No specific bone pain. No melena or hematochezia. Denied any dysuria or hematuria. Past Medical History:   Past Medical History:   Diagnosis Date    Allergic rhinitis due to pollen 11/19/2015    Arthritis     left hip & knee    Chronic back pain     Dehydration 7/22/2021    Depression     Gastroesophageal reflux disease 11/19/2015    Hearing loss 11/19/2015    History of blood transfusion     No reaction per pt    History of cardiac monitoring 04/18/2017    14 day event monitor. Sinus Rhythm    History of nuclear stress test 09/28/2016    cardiolite-normal,EF70%    Hot flashes due to surgical menopause 11/19/2015    Hx of echocardiogram 10/05/2016    EF: >55 %   Mild mitral and tricuspid regurg.      Hyperlipidemia     Hypertension     Follows with PCP   Maicol Brady Stress Test 10/14/2020    EF 60%, Normal study, no ischemia    Meniere disease     Morbid obesity due to excess calories ([facility] [address] 11/19/2015    Sleep apnea 11/19/2015    sleep study negative    Tilt table evaluation 05/09/2017     positive for neurocardiogenic syncope      Past Surgery History:    Past Surgical History:   Procedure Laterality Date    APPENDECTOMY  1967    CHOLECYSTECTOMY      2017    COLONOSCOPY  2014    Polyps x2 - Dr. Padmini Morton Bilateral 05/06/2021    Dr. Benny Jessica Left 10/19/2020    LEFT HIP TOTAL ARTHROPLASTY - POSTERIOR performed by Sharla Ramirez MD at [facility] LEFT Left 3/9/2021    US BREAST BIOPSY NEEDLE ADDITIONAL LEFT 3/9/2021 Paul Burleson MD [address] BREAST BIOPSY NEEDLE ADDITIONAL LEFT Left 3/9/2021    US BREAST BIOPSY NEEDLE ADDITIONAL LEFT 3/9/2021 Paul Burleson MD [address] BREAST NEEDLE BIOPSY LEFT Left 3/9/2021    US BREAST NEEDLE BIOPSY LEFT 3/9/2021 Paul Burleson  [facility]   She had complete hysterectomy in 1999 due to endometriosis. Social History:   She denies any history of smoking. No alcohol or illicit drug use. She denies any children. Family History: Mother had stomach and breast cancer, maternal grandmother had breast cancer, colon cancer and stomach cancer. Review of Systems: The remainder of the review of system is unremarkable.      Vital Signs: /80 (Site: Right Upper Arm, Position: Sitting, Cuff Size: Large Adult)   Pulse 102   Temp 98.2 °F (36.8 °C) (Infrared)   Resp 18   Ht 5' 4\" (1.626 m)   Wt 221 lb (100.2 kg)   SpO2 97%   BMI 37.93 kg/m²      Physical Exam:  CONSTITUTIONAL: awake, alert, cooperative, no apparent distress   EYES:  sclera clear and pale conjunctiva  ENT: Normocephalic, without obvious abnormality, atraumatic  NECK: supple, symmetrical.  No JVD.  HEMATOLOGIC/LYMPHATIC: no cervical, supraclavicular or axillary lymphadenopathy   LUNGS: Clear to auscultation and percussion bilaterally. BREAST: Status post bilateral mastectomy. CARDIOVASCULAR: regular rate and rhythm, normal S1 and S2, no murmur   ABDOMEN: bowel sound present, soft, non-distended, non-tender, no masses palpated, no hepatosplenomegaly. No rebound or guarding   MUSCULOSKELETAL: full range of motion noted, tone is normal  NEUROLOGIC:  Motor and sensory grossly intact. CN II through XII are grossly intact. SKIN: No jaundice. No ecchymosis. EXTREMITIES: no LE edema. No cyanosis.       Labs:  Hematology:  Lab Results   Component Value Date    WBC 5.6 02/21/2022    RBC 3.94 (L) 02/21/2022    HGB 11.2 (L) 02/21/2022    HCT 35.0 (L) 02/21/2022    MCV 88.8 02/21/2022    MCH 28.4 02/21/2022    MCHC 32.0 02/21/2022    RDW 14.0 02/21/2022     02/21/2022    MPV 10.2 02/21/2022    BANDSPCT 9 11/02/2020    SEGSPCT 64.2 02/21/2022    EOSRELPCT 3.7 (H) 02/21/2022    BASOPCT 0.7 02/21/2022    LYMPHOPCT 22.5 (L) 02/21/2022    MONOPCT 8.5 (H) 02/21/2022    BANDABS 1.86 11/02/2020    SEGSABS 3.6 02/21/2022    EOSABS 0.2 02/21/2022    BASOSABS 0.0 02/21/2022    LYMPHSABS 1.3 02/21/2022    MONOSABS 0.5 02/21/2022    DIFFTYPE AUTOMATED DIFFERENTIAL 02/21/2022    ANISOCYTOSIS 1+ 11/02/2020    POLYCHROM 1+ 11/02/2020    WBCMORP FEW 11/02/2020    PLTM ADEQUATE 08/12/2021     Lab Results   Component Value Date     (H) 10/25/2020     Chemistry:  Lab Results   Component Value Date     02/22/2022    K 4.0 02/22/2022     02/22/2022    CO2 20 (L) 02/22/2022    BUN 6 02/22/2022    CREATININE 1.1 02/22/2022    GLUCOSE 142 (H) 02/22/2022    CALCIUM 9.1 02/22/2022    PROT 5.4 (L) 02/19/2022    LABALBU 3.4 02/19/2022    BILITOT 0.6 02/19/2022    ALKPHOS 68 02/19/2022    AST 27 02/19/2022    ALT 22 02/19/2022    LABGLOM 51 (L) 02/22/2022    GFRAA >60 02/22/2022    AGRATIO 1.7 12/07/2015    GLOB 2.7 12/07/2015    MG 1.8 02/22/2022    POCCA 1.13 09/09/2021    POCGLU 115 (H) 09/09/2021     Lab Results   Component Value Date     (H) 11/14/2020     Lab Results   Component Value Date    TSHHS 2.650 02/20/2022    T4FREE 1.02 03/01/2021     Immunology:  Lab Results   Component Value Date    PROT 5.4 (L) 02/19/2022     Coagulation Panel:  Lab Results   Component Value Date    PROTIME 14.3 02/19/2022    INR 1.11 02/19/2022    APTT 40.4 (H) 02/19/2022    DDIMER 389 (H) 02/17/2022     Anemia Panel:  Lab Results   Component Value Date    RUUCSVCL22 461.8 12/08/2021    FOLATE 10.8 12/08/2021      Observations:  No data recorded     Assessment & Plan:  1. 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 Pathological stage T2, N0 sentinel lymph node MX. Oncotype DX was 29. I offered adjuvant chemotherapy  She opted to [facility] + T. She started chemo in June 2021 and completed on September 17, 2021. She started Taxol on October 8, 2028 and completed in January 2022. She started arimidex in January 2022. I reviewed CT CAP and MRI of brain from the hospital in January and February 2022. She is in remission. 2.  Genetic counseling: VUS JUHI    3. Bone density on June 11, 2021 was normal.  Echo in June 2021 showed LVEF at 55 to 60%. 4. She has N/V ? Gastroenteritis. EGD as above. Started on Pantoprazole. Small bowel follow through was unremarkable on 8/2/21. Esophagogram was noted. She was recommended to have esophageal stretching. Changed zofran to compazine. She has EGD and dilatation of the esophagus in September 2021. She stopped having issue again with dysphagia. I recommend to follow-up with GI. She plans to follow-up with surgeon in January 2022 to have surgery for the hiatal hernia. She was hospitalized in February 2022 due NVD. She is scheduled for colonoscopy in the near future. She was treated with antibiotic for presumable bacterial colitis. 5. Mental health: Reports suicidal ideation, 1 past attempt, on Prozac 20 mg, has counselor but was referred to our counselor. Will make referral to mental health. She is contracted for safety.  had face-to-face visit with patient. 6.  Anemia is related to the chemotherapy. She had epistaxis related to Taxol and dry air. I recommend to use modifier. Anemia workup 12/8/21 reviewed. I will continue to monitor CBC. We discussed about diet and weight loss. She has not considered Covid vaccine yet. Return to clinic in 6 weeks. All of her question has been answered for today.      Next month gi for evaluation

## 2022-02-10 ENCOUNTER — TELEPHONE (OUTPATIENT)
Dept: FAMILY MEDICINE CLINIC | Age: 62
End: 2022-02-10

## 2022-02-10 NOTE — TELEPHONE ENCOUNTER
----- Message from Sammy Joshi sent at 2/10/2022 12:34 PM EST -----  Subject: Message to Provider    QUESTIONS  Information for Provider? [facility] from [address] [place] [address] [address] calling stating   patient has gout in her RT foot and needs medication. Please call [facility] at   [phone]  ---------------------------------------------------------------------------  --------------  José Danger INFO  What is the best way for the office to contact you? OK to leave message on   voicemail  Preferred Call Back Phone Number? [phone]  ---------------------------------------------------------------------------  --------------  SCRIPT ANSWERS  Relationship to Patient? Third Party  Representative Name?  [facility]

## 2022-02-17 ENCOUNTER — HOSPITAL ENCOUNTER (INPATIENT)
Age: 62
LOS: 5 days | Discharge: HOME HEALTH | DRG: 248 | End: 2022-02-22
Attending: EMERGENCY MEDICINE | Admitting: INTERNAL MEDICINE
Payer: COMMERCIAL

## 2022-02-17 ENCOUNTER — APPOINTMENT (OUTPATIENT)
Dept: CT IMAGING | Age: 62
DRG: 248 | End: 2022-02-17
Payer: COMMERCIAL

## 2022-02-17 ENCOUNTER — APPOINTMENT (OUTPATIENT)
Dept: GENERAL RADIOLOGY | Age: 62
DRG: 248 | End: 2022-02-17
Payer: COMMERCIAL

## 2022-02-17 DIAGNOSIS — R11.2 NAUSEA AND VOMITING, INTRACTABILITY OF VOMITING NOT SPECIFIED, UNSPECIFIED VOMITING TYPE: ICD-10-CM

## 2022-02-17 DIAGNOSIS — R07.9 CHEST PAIN, UNSPECIFIED TYPE: Primary | ICD-10-CM

## 2022-02-17 DIAGNOSIS — R55 SYNCOPE, UNSPECIFIED SYNCOPE TYPE: ICD-10-CM

## 2022-02-17 PROBLEM — R62.51 FAILURE TO THRIVE (CHILD): Status: ACTIVE | Noted: 2022-02-17

## 2022-02-17 LAB
ALBUMIN SERPL-MCNC: 3.9 GM/DL (ref 3.4–5)
ALP BLD-CCNC: 78 IU/L (ref 40–129)
ALT SERPL-CCNC: 24 U/L (ref 10–40)
ANION GAP SERPL CALCULATED.3IONS-SCNC: 19 MMOL/L (ref 4–16)
AST SERPL-CCNC: 31 IU/L (ref 15–37)
BASOPHILS ABSOLUTE: 0 K/CU MM
BASOPHILS RELATIVE PERCENT: 0.7 % (ref 0–1)
BILIRUB SERPL-MCNC: 0.7 MG/DL (ref 0–1)
BUN BLDV-MCNC: 11 MG/DL (ref 6–23)
CALCIUM SERPL-MCNC: 9.9 MG/DL (ref 8.3–10.6)
CHLORIDE BLD-SCNC: 96 MMOL/L (ref 99–110)
CO2: 25 MMOL/L (ref 21–32)
CREAT SERPL-MCNC: 1.2 MG/DL (ref 0.6–1.1)
D DIMER: 389 NG/ML(DDU)
DIFFERENTIAL TYPE: ABNORMAL
EOSINOPHILS ABSOLUTE: 0.1 K/CU MM
EOSINOPHILS RELATIVE PERCENT: 1.2 % (ref 0–3)
GFR AFRICAN AMERICAN: 56 ML/MIN/1.73M2
GFR NON-AFRICAN AMERICAN: 46 ML/MIN/1.73M2
GLUCOSE BLD-MCNC: 106 MG/DL (ref 70–99)
HCT VFR BLD CALC: 40.6 % (ref 37–47)
HEMOGLOBIN: 12.9 GM/DL (ref 12.5–16)
IMMATURE NEUTROPHIL %: 0.9 % (ref 0–0.43)
LACTATE: 1.4 MMOL/L (ref 0.4–2)
LIPASE: 20 IU/L (ref 13–60)
LYMPHOCYTES ABSOLUTE: 1 K/CU MM
LYMPHOCYTES RELATIVE PERCENT: 17.7 % (ref 24–44)
MAGNESIUM: 1.7 MG/DL (ref 1.8–2.4)
MCH RBC QN AUTO: 27.7 PG (ref 27–31)
MCHC RBC AUTO-ENTMCNC: 31.8 % (ref 32–36)
MCV RBC AUTO: 87.1 FL (ref 78–100)
MONOCYTES ABSOLUTE: 0.5 K/CU MM
MONOCYTES RELATIVE PERCENT: 8.9 % (ref 0–4)
NUCLEATED RBC %: 0 %
PDW BLD-RTO: 13.6 % (ref 11.7–14.9)
PLATELET # BLD: 233 K/CU MM (ref 140–440)
PMV BLD AUTO: 9.4 FL (ref 7.5–11.1)
POTASSIUM SERPL-SCNC: 3.4 MMOL/L (ref 3.5–5.1)
PRO-BNP: 135.4 PG/ML
RBC # BLD: 4.66 M/CU MM (ref 4.2–5.4)
SEGMENTED NEUTROPHILS ABSOLUTE COUNT: 4 K/CU MM
SEGMENTED NEUTROPHILS RELATIVE PERCENT: 70.6 % (ref 36–66)
SODIUM BLD-SCNC: 140 MMOL/L (ref 135–145)
TOTAL IMMATURE NEUTOROPHIL: 0.05 K/CU MM
TOTAL NUCLEATED RBC: 0 K/CU MM
TOTAL PROTEIN: 6.8 GM/DL (ref 6.4–8.2)
TROPONIN T: <0.01 NG/ML
WBC # BLD: 5.7 K/CU MM (ref 4–10.5)

## 2022-02-17 PROCEDURE — 85025 COMPLETE CBC W/AUTO DIFF WBC: CPT

## 2022-02-17 PROCEDURE — 96361 HYDRATE IV INFUSION ADD-ON: CPT

## 2022-02-17 PROCEDURE — 83880 ASSAY OF NATRIURETIC PEPTIDE: CPT

## 2022-02-17 PROCEDURE — 71045 X-RAY EXAM CHEST 1 VIEW: CPT

## 2022-02-17 PROCEDURE — 2580000003 HC RX 258: Performed by: PHYSICIAN ASSISTANT

## 2022-02-17 PROCEDURE — 84145 PROCALCITONIN (PCT): CPT

## 2022-02-17 PROCEDURE — 80053 COMPREHEN METABOLIC PANEL: CPT

## 2022-02-17 PROCEDURE — 71275 CT ANGIOGRAPHY CHEST: CPT

## 2022-02-17 PROCEDURE — 83690 ASSAY OF LIPASE: CPT

## 2022-02-17 PROCEDURE — 87040 BLOOD CULTURE FOR BACTERIA: CPT

## 2022-02-17 PROCEDURE — 84484 ASSAY OF TROPONIN QUANT: CPT

## 2022-02-17 PROCEDURE — 96374 THER/PROPH/DIAG INJ IV PUSH: CPT

## 2022-02-17 PROCEDURE — 99284 EMERGENCY DEPT VISIT MOD MDM: CPT

## 2022-02-17 PROCEDURE — 6360000002 HC RX W HCPCS: Performed by: NURSE PRACTITIONER

## 2022-02-17 PROCEDURE — 1200000000 HC SEMI PRIVATE

## 2022-02-17 PROCEDURE — 83735 ASSAY OF MAGNESIUM: CPT

## 2022-02-17 PROCEDURE — 93005 ELECTROCARDIOGRAM TRACING: CPT | Performed by: PHYSICIAN ASSISTANT

## 2022-02-17 PROCEDURE — 85379 FIBRIN DEGRADATION QUANT: CPT

## 2022-02-17 PROCEDURE — 6360000004 HC RX CONTRAST MEDICATION: Performed by: PHYSICIAN ASSISTANT

## 2022-02-17 PROCEDURE — 2580000003 HC RX 258: Performed by: NURSE PRACTITIONER

## 2022-02-17 PROCEDURE — 6360000002 HC RX W HCPCS: Performed by: PHYSICIAN ASSISTANT

## 2022-02-17 PROCEDURE — 83605 ASSAY OF LACTIC ACID: CPT

## 2022-02-17 RX ORDER — ONDANSETRON 2 MG/ML
4 INJECTION INTRAMUSCULAR; INTRAVENOUS EVERY 30 MIN PRN
Status: DISCONTINUED | OUTPATIENT
Start: 2022-02-17 | End: 2022-02-17 | Stop reason: ALTCHOICE

## 2022-02-17 RX ORDER — ASPIRIN 81 MG/1
324 TABLET, CHEWABLE ORAL ONCE
Status: DISCONTINUED | OUTPATIENT
Start: 2022-02-17 | End: 2022-02-22 | Stop reason: ALTCHOICE

## 2022-02-17 RX ORDER — POTASSIUM CHLORIDE 20 MEQ/1
20 TABLET, EXTENDED RELEASE ORAL 2 TIMES DAILY WITH MEALS
Status: DISCONTINUED | OUTPATIENT
Start: 2022-02-18 | End: 2022-02-22 | Stop reason: HOSPADM

## 2022-02-17 RX ORDER — ANASTROZOLE 1 MG/1
1 TABLET ORAL DAILY
Status: DISCONTINUED | OUTPATIENT
Start: 2022-02-18 | End: 2022-02-22 | Stop reason: HOSPADM

## 2022-02-17 RX ORDER — SODIUM CHLORIDE 0.9 % (FLUSH) 0.9 %
5-40 SYRINGE (ML) INJECTION PRN
Status: DISCONTINUED | OUTPATIENT
Start: 2022-02-17 | End: 2022-02-22 | Stop reason: HOSPADM

## 2022-02-17 RX ORDER — SODIUM CHLORIDE 0.9 % (FLUSH) 0.9 %
5-40 SYRINGE (ML) INJECTION EVERY 12 HOURS SCHEDULED
Status: DISCONTINUED | OUTPATIENT
Start: 2022-02-17 | End: 2022-02-22 | Stop reason: HOSPADM

## 2022-02-17 RX ORDER — METOPROLOL TARTRATE 50 MG/1
50 TABLET, FILM COATED ORAL 2 TIMES DAILY
Status: DISCONTINUED | OUTPATIENT
Start: 2022-02-17 | End: 2022-02-22 | Stop reason: HOSPADM

## 2022-02-17 RX ORDER — EZETIMIBE 10 MG/1
10 TABLET ORAL DAILY
Status: DISCONTINUED | OUTPATIENT
Start: 2022-02-18 | End: 2022-02-22 | Stop reason: HOSPADM

## 2022-02-17 RX ORDER — PANTOPRAZOLE SODIUM 40 MG/1
40 TABLET, DELAYED RELEASE ORAL
Status: DISCONTINUED | OUTPATIENT
Start: 2022-02-18 | End: 2022-02-22 | Stop reason: HOSPADM

## 2022-02-17 RX ORDER — MORPHINE SULFATE 4 MG/ML
2 INJECTION, SOLUTION INTRAMUSCULAR; INTRAVENOUS EVERY 4 HOURS PRN
Status: DISCONTINUED | OUTPATIENT
Start: 2022-02-17 | End: 2022-02-22 | Stop reason: HOSPADM

## 2022-02-17 RX ORDER — PROCHLORPERAZINE MALEATE 5 MG/1
10 TABLET ORAL EVERY 6 HOURS PRN
Status: DISCONTINUED | OUTPATIENT
Start: 2022-02-17 | End: 2022-02-17

## 2022-02-17 RX ORDER — ACETAMINOPHEN 325 MG/1
650 TABLET ORAL EVERY 6 HOURS PRN
Status: DISCONTINUED | OUTPATIENT
Start: 2022-02-17 | End: 2022-02-22 | Stop reason: HOSPADM

## 2022-02-17 RX ORDER — POLYETHYLENE GLYCOL 3350 17 G/17G
17 POWDER, FOR SOLUTION ORAL DAILY PRN
Status: DISCONTINUED | OUTPATIENT
Start: 2022-02-17 | End: 2022-02-22 | Stop reason: HOSPADM

## 2022-02-17 RX ORDER — LORAZEPAM 0.5 MG/1
0.5 TABLET ORAL EVERY 6 HOURS PRN
Status: DISCONTINUED | OUTPATIENT
Start: 2022-02-17 | End: 2022-02-22 | Stop reason: HOSPADM

## 2022-02-17 RX ORDER — 0.9 % SODIUM CHLORIDE 0.9 %
1000 INTRAVENOUS SOLUTION INTRAVENOUS ONCE
Status: COMPLETED | OUTPATIENT
Start: 2022-02-17 | End: 2022-02-17

## 2022-02-17 RX ORDER — SODIUM CHLORIDE 0.9 % (FLUSH) 0.9 %
5-40 SYRINGE (ML) INJECTION 2 TIMES DAILY
Status: DISCONTINUED | OUTPATIENT
Start: 2022-02-17 | End: 2022-02-22 | Stop reason: HOSPADM

## 2022-02-17 RX ORDER — LORAZEPAM 2 MG/ML
0.5 INJECTION INTRAMUSCULAR EVERY 6 HOURS PRN
Status: DISCONTINUED | OUTPATIENT
Start: 2022-02-17 | End: 2022-02-22 | Stop reason: HOSPADM

## 2022-02-17 RX ORDER — PROCHLORPERAZINE MALEATE 5 MG/1
10 TABLET ORAL EVERY 6 HOURS PRN
Status: DISCONTINUED | OUTPATIENT
Start: 2022-02-17 | End: 2022-02-22 | Stop reason: HOSPADM

## 2022-02-17 RX ORDER — SODIUM CHLORIDE 9 MG/ML
INJECTION, SOLUTION INTRAVENOUS CONTINUOUS
Status: DISPENSED | OUTPATIENT
Start: 2022-02-17 | End: 2022-02-18

## 2022-02-17 RX ORDER — ACETAMINOPHEN 650 MG/1
650 SUPPOSITORY RECTAL EVERY 6 HOURS PRN
Status: DISCONTINUED | OUTPATIENT
Start: 2022-02-17 | End: 2022-02-22 | Stop reason: HOSPADM

## 2022-02-17 RX ORDER — PROMETHAZINE HYDROCHLORIDE 12.5 MG/1
12.5 TABLET ORAL EVERY 6 HOURS PRN
Status: DISCONTINUED | OUTPATIENT
Start: 2022-02-17 | End: 2022-02-22 | Stop reason: HOSPADM

## 2022-02-17 RX ORDER — PROCHLORPERAZINE EDISYLATE 5 MG/ML
10 INJECTION INTRAMUSCULAR; INTRAVENOUS EVERY 6 HOURS PRN
Status: DISCONTINUED | OUTPATIENT
Start: 2022-02-17 | End: 2022-02-22 | Stop reason: HOSPADM

## 2022-02-17 RX ORDER — MAGNESIUM HYDROXIDE/ALUMINUM HYDROXICE/SIMETHICONE 120; 1200; 1200 MG/30ML; MG/30ML; MG/30ML
30 SUSPENSION ORAL EVERY 6 HOURS PRN
Status: DISCONTINUED | OUTPATIENT
Start: 2022-02-17 | End: 2022-02-22 | Stop reason: HOSPADM

## 2022-02-17 RX ORDER — SODIUM CHLORIDE 9 MG/ML
25 INJECTION, SOLUTION INTRAVENOUS PRN
Status: DISCONTINUED | OUTPATIENT
Start: 2022-02-17 | End: 2022-02-22 | Stop reason: HOSPADM

## 2022-02-17 RX ORDER — LOSARTAN POTASSIUM 25 MG/1
25 TABLET ORAL DAILY
Status: DISCONTINUED | OUTPATIENT
Start: 2022-02-18 | End: 2022-02-22 | Stop reason: HOSPADM

## 2022-02-17 RX ORDER — DEXLANSOPRAZOLE 60 MG/1
1 CAPSULE, DELAYED RELEASE ORAL
COMMUNITY
Start: 2022-01-26 | End: 2026-07-01

## 2022-02-17 RX ORDER — SUCRALFATE 1 G/1
1 TABLET ORAL EVERY 6 HOURS SCHEDULED
Status: DISCONTINUED | OUTPATIENT
Start: 2022-02-18 | End: 2022-02-22 | Stop reason: HOSPADM

## 2022-02-17 RX ORDER — CETIRIZINE HYDROCHLORIDE 10 MG/1
10 TABLET ORAL DAILY
Status: DISCONTINUED | OUTPATIENT
Start: 2022-02-18 | End: 2022-02-22 | Stop reason: HOSPADM

## 2022-02-17 RX ORDER — MIRTAZAPINE 15 MG/1
15 TABLET, FILM COATED ORAL NIGHTLY
Status: DISCONTINUED | OUTPATIENT
Start: 2022-02-17 | End: 2022-02-22 | Stop reason: HOSPADM

## 2022-02-17 RX ORDER — METOCLOPRAMIDE 10 MG/1
5 TABLET ORAL 3 TIMES DAILY
Status: DISCONTINUED | OUTPATIENT
Start: 2022-02-17 | End: 2022-02-22 | Stop reason: HOSPADM

## 2022-02-17 RX ADMIN — SODIUM CHLORIDE, PRESERVATIVE FREE 10 ML: 5 INJECTION INTRAVENOUS at 23:48

## 2022-02-17 RX ADMIN — SODIUM CHLORIDE 1000 ML: 9 INJECTION, SOLUTION INTRAVENOUS at 16:57

## 2022-02-17 RX ADMIN — ONDANSETRON 4 MG: 2 INJECTION INTRAMUSCULAR; INTRAVENOUS at 17:00

## 2022-02-17 RX ADMIN — PROCHLORPERAZINE EDISYLATE 10 MG: 5 INJECTION INTRAMUSCULAR; INTRAVENOUS at 23:53

## 2022-02-17 RX ADMIN — IOPAMIDOL 75 ML: 755 INJECTION, SOLUTION INTRAVENOUS at 20:31

## 2022-02-17 RX ADMIN — SODIUM CHLORIDE: 9 INJECTION, SOLUTION INTRAVENOUS at 23:53

## 2022-02-17 ASSESSMENT — ENCOUNTER SYMPTOMS
NAUSEA: 0
COUGH: 0
VOMITING: 0
SHORTNESS OF BREATH: 0
EYE REDNESS: 0
DIARRHEA: 1

## 2022-02-17 ASSESSMENT — HEART SCORE: ECG: 0

## 2022-02-17 NOTE — ED TRIAGE NOTES
Patient states she has not been able to hold down food or drinks for 4 weeks due to nausea and vomiting.

## 2022-02-17 NOTE — ED PROVIDER NOTES
As provider-in-triage, I performed a medical screening history and physical exam on this patient. HISTORY OF PRESENT ILLNESS  Marlon Pompa is a 63 y.o. female who presents for weeks of NV, diarrhea, abdominal pain, CP, syncope. States hasn't been able to eat or drink anything for days. PHYSICAL EXAM  BP (!) 132/98   Pulse 98   Temp 98.2 °F (36.8 °C) (Oral)   Resp 18   SpO2 100%     On exam, the patient appears well-hydrated, well-nourished, and in no acute distress. Mucous membranes are moist. Speech is clear. Breathing is unlabored. Skin is dry. Mental status is normal. Moves all extremities, and is without facial droop. Comment: Please note this report has been produced using speech recognition software and may contain errors related to that system including errors in grammar, punctuation, and spelling, as well as words and phrases that may be inappropriate. If there are any questions or concerns please feel free to contact the dictating provider for clarification.        Gwynneth Canavan, PA-C  02/17/22 1408

## 2022-02-18 LAB
ADENOVIRUS DETECTION BY PCR: NOT DETECTED
BORDETELLA PARAPERTUSSIS BY PCR: NOT DETECTED
BORDETELLA PERTUSSIS PCR: NOT DETECTED
CHLAMYDOPHILA PNEUMONIA PCR: NOT DETECTED
CORONAVIRUS 229E PCR: NOT DETECTED
CORONAVIRUS HKU1 PCR: NOT DETECTED
CORONAVIRUS NL63 PCR: NOT DETECTED
CORONAVIRUS OC43 PCR: NOT DETECTED
EKG ATRIAL RATE: 79 BPM
EKG DIAGNOSIS: NORMAL
EKG P AXIS: 24 DEGREES
EKG P-R INTERVAL: 182 MS
EKG Q-T INTERVAL: 372 MS
EKG QRS DURATION: 74 MS
EKG QTC CALCULATION (BAZETT): 426 MS
EKG R AXIS: 71 DEGREES
EKG T AXIS: 21 DEGREES
EKG VENTRICULAR RATE: 79 BPM
HUMAN METAPNEUMOVIRUS PCR: NOT DETECTED
INFLUENZA A BY PCR: NOT DETECTED
INFLUENZA A H1 (2009) PCR: NOT DETECTED
INFLUENZA A H1 PANDEMIC PCR: NOT DETECTED
INFLUENZA A H3 PCR: NOT DETECTED
INFLUENZA B BY PCR: NOT DETECTED
MYCOPLASMA PNEUMONIAE PCR: NOT DETECTED
PARAINFLUENZA 1 PCR: NOT DETECTED
PARAINFLUENZA 2 PCR: NOT DETECTED
PARAINFLUENZA 3 PCR: NOT DETECTED
PARAINFLUENZA 4 PCR: NOT DETECTED
PROCALCITONIN: 0.22
RHINOVIRUS ENTEROVIRUS PCR: NOT DETECTED
RSV PCR: NOT DETECTED
SARS-COV-2: NOT DETECTED
TROPONIN T: <0.01 NG/ML

## 2022-02-18 PROCEDURE — 2580000003 HC RX 258: Performed by: PHYSICIAN ASSISTANT

## 2022-02-18 PROCEDURE — 6360000002 HC RX W HCPCS: Performed by: NURSE PRACTITIONER

## 2022-02-18 PROCEDURE — 6370000000 HC RX 637 (ALT 250 FOR IP): Performed by: NURSE PRACTITIONER

## 2022-02-18 PROCEDURE — 1200000000 HC SEMI PRIVATE

## 2022-02-18 PROCEDURE — 2580000003 HC RX 258: Performed by: NURSE PRACTITIONER

## 2022-02-18 PROCEDURE — 94761 N-INVAS EAR/PLS OXIMETRY MLT: CPT

## 2022-02-18 PROCEDURE — 84484 ASSAY OF TROPONIN QUANT: CPT

## 2022-02-18 PROCEDURE — 84145 PROCALCITONIN (PCT): CPT

## 2022-02-18 PROCEDURE — 0202U NFCT DS 22 TRGT SARS-COV-2: CPT

## 2022-02-18 PROCEDURE — 36415 COLL VENOUS BLD VENIPUNCTURE: CPT

## 2022-02-18 PROCEDURE — 87040 BLOOD CULTURE FOR BACTERIA: CPT

## 2022-02-18 PROCEDURE — 93010 ELECTROCARDIOGRAM REPORT: CPT | Performed by: INTERNAL MEDICINE

## 2022-02-18 RX ORDER — METHYLPREDNISOLONE 4 MG/1
TABLET ORAL
Qty: 1 KIT | Refills: 0 | Status: SHIPPED | OUTPATIENT
Start: 2022-02-18 | End: 2022-04-18

## 2022-02-18 RX ADMIN — POTASSIUM CHLORIDE 20 MEQ: 20 TABLET, EXTENDED RELEASE ORAL at 11:26

## 2022-02-18 RX ADMIN — EZETIMIBE 10 MG: 10 TABLET ORAL at 11:26

## 2022-02-18 RX ADMIN — METOPROLOL TARTRATE 50 MG: 50 TABLET, FILM COATED ORAL at 11:26

## 2022-02-18 RX ADMIN — METOCLOPRAMIDE 5 MG: 10 TABLET ORAL at 11:26

## 2022-02-18 RX ADMIN — SODIUM CHLORIDE, PRESERVATIVE FREE 10 ML: 5 INJECTION INTRAVENOUS at 21:22

## 2022-02-18 RX ADMIN — METOCLOPRAMIDE 5 MG: 10 TABLET ORAL at 21:21

## 2022-02-18 RX ADMIN — SODIUM CHLORIDE, PRESERVATIVE FREE 10 ML: 5 INJECTION INTRAVENOUS at 11:26

## 2022-02-18 RX ADMIN — MIRTAZAPINE 15 MG: 15 TABLET, FILM COATED ORAL at 21:21

## 2022-02-18 RX ADMIN — LOSARTAN POTASSIUM 25 MG: 25 TABLET, FILM COATED ORAL at 11:25

## 2022-02-18 RX ADMIN — METOCLOPRAMIDE 5 MG: 10 TABLET ORAL at 17:46

## 2022-02-18 RX ADMIN — SUCRALFATE 1 G: 1 TABLET ORAL at 11:25

## 2022-02-18 RX ADMIN — PANTOPRAZOLE SODIUM 40 MG: 40 TABLET, DELAYED RELEASE ORAL at 06:06

## 2022-02-18 RX ADMIN — MORPHINE SULFATE 2 MG: 4 INJECTION, SOLUTION INTRAMUSCULAR; INTRAVENOUS at 00:10

## 2022-02-18 RX ADMIN — SUCRALFATE 1 G: 1 TABLET ORAL at 06:06

## 2022-02-18 RX ADMIN — SODIUM CHLORIDE, PRESERVATIVE FREE 10 ML: 5 INJECTION INTRAVENOUS at 11:27

## 2022-02-18 RX ADMIN — ENOXAPARIN SODIUM 40 MG: 100 INJECTION SUBCUTANEOUS at 11:25

## 2022-02-18 RX ADMIN — POTASSIUM CHLORIDE 20 MEQ: 20 TABLET, EXTENDED RELEASE ORAL at 17:46

## 2022-02-18 RX ADMIN — METOPROLOL TARTRATE 50 MG: 50 TABLET, FILM COATED ORAL at 21:21

## 2022-02-18 RX ADMIN — SUCRALFATE 1 G: 1 TABLET ORAL at 17:46

## 2022-02-18 RX ADMIN — PROCHLORPERAZINE EDISYLATE 10 MG: 5 INJECTION INTRAMUSCULAR; INTRAVENOUS at 11:25

## 2022-02-18 RX ADMIN — CETIRIZINE HYDROCHLORIDE 10 MG: 10 TABLET, FILM COATED ORAL at 11:26

## 2022-02-18 ASSESSMENT — PAIN SCALES - GENERAL
PAINLEVEL_OUTOF10: 0
PAINLEVEL_OUTOF10: 10

## 2022-02-18 NOTE — ED PROVIDER NOTES
Gregorio        PCP: Karlee Lujan MD    CHIEF COMPLAINT    Chief Complaint   Patient presents with    Failure To Thrive     not eating x 3 weeks, weight loss, dehydration         Of note, this patient was also evaluated by the attending physician, Dr. Trenton Bender. HPI      She Wu is a 63 y.o. female nonsmoker with PMH of HTN, HLD, breast cancer who presents with chest pain, nausea, vomiting, feeling dehydrated, difficulty eating and drinking. Onset of symptoms was 3 to 4 weeks ago with chest pain has been occurring last 3 days. Chest pain located in the mid to left-sided chest.  Duration is intermittent and last approximately 30 minutes when it occurs. Aggravating factors exertion. Alleviating factors are denied. Patient reports associated nausea, shortness of breath, diaphoresis with chest pain. Patient reports the chest pain radiates to her left shoulder. Patient reports that she has been trying multiple antiemetics at home but is unable to keep food or water down. Patient reports that she felt febrile two nights ago. Patient reports that 4 days ago she had an episode of syncope with preceding lightheadedness causing her to fall backward from a sitting to a lying position on her bed. No injuries from this episode of syncope. Patient reports that her stomach feels upset but denies abdominal pain. Patient reports that she is finished with chemotherapy for her breast cancer. Her oncologist is Dr. Megan Sosa. Patient reports family history of CAD. Patient denies abdominal pain, diarrhea. REVIEW OF SYSTEMS    Constitutional:  + subjective fever  HENT:  Denies sore throat or ear pain   Cardiovascular:  +Chest Pain, synope; Denies palpitations, extremity edema.   Respiratory:  + shortness of breath; Denies hemoptysis    GI:  See HPI  :  Denies any urinary symptoms  Musculoskeletal:  Denies back pain, extremity swelling  Skin:  Denies rash  Neurologic:  + syncope, BIOPSY LEFT Left 3/9/2021     BREAST NEEDLE BIOPSY LEFT 3/9/2021 Suzanne Bland MD [facility]       CURRENT MEDICATIONS    Current Outpatient Rx   Medication Sig Dispense Refill    anastrozole (ARIMIDEX) 1 MG tablet Take 1 tablet by mouth daily 30 tablet 3    lansoprazole (PREVACID) 30 MG delayed release capsule TAKE 1 CAPSULE BY MOUTH EVERY DAY      potassium chloride (KLOR-CON M) 20 MEQ extended release tablet TAKE 1 TABLET BY MOUTH TWO TIMES A DAY      sucralfate (CARAFATE) 1 GM/10ML suspension TAKE 10 MLS BY MOUTH FOUR TIMES A DAY ON AN EMPTY STOMACH BEFORE MEALS AND AT BEDTIME      gabapentin (NEURONTIN) 300 MG capsule Take 1 capsule by mouth nightly for 30 days. 90 capsule 3    sodium chloride (ALTAMIST SPRAY) 0.65 % nasal spray 1 spray by Nasal route as needed for Congestion 1 each 3    sodium chloride (ALTAMIST SPRAY) 0.65 % nasal spray 1 spray by Nasal route 2 times daily 1 each 3    losartan (COZAAR) 25 MG tablet Take 1 tablet by mouth daily 90 tablet 3    ezetimibe (ZETIA) 10 MG tablet Take 1 tablet by mouth daily 90 tablet 3    prochlorperazine (COMPAZINE) 10 MG tablet Take 1 tablet by mouth every 6 hours as needed (chemotherapy induced nausea/vomiting) 30 tablet 1    dexamethasone (DECADRON) 2 MG tablet Take 1 tablet by mouth daily (with breakfast) for two days AFTER chemotherapy. Take 8 mg (4 tabs) the night before 1st chemo ONLY.  28 tablet 0    cyclophosphamide (CYTOXAN) 1 GM chemo injection Infuse intravenously      PACLitaxel (TAXOL) 100 MG/16.7ML chemo injection Infuse intravenously once      Magic Mouthwash (MIRACLE MOUTHWASH) Swish and spit 5 mLs 4 times daily as needed for Irritation 500 mL 0    mirtazapine (REMERON) 15 MG tablet Take 1 tablet by mouth nightly 30 tablet 3    hydroCHLOROthiazide (HYDRODIURIL) 25 MG tablet Take 0.5 tablets by mouth daily 45 tablet 3    promethazine (PHENERGAN) 12.5 MG tablet Take 10 mg by mouth every 6 hours as needed for Nausea      metoprolol tartrate (LOPRESSOR) 50 MG tablet Take 1 tablet by mouth 2 times daily 180 tablet 3    metoclopramide (REGLAN) 5 MG tablet Take 1 tablet by mouth 3 times daily 30 tablet 1    furosemide (LASIX) 20 MG tablet Take 20 mg by mouth daily       aspirin 81 MG chewable tablet Take 81 mg by mouth daily      Multiple Vitamins-Minerals (HAIR SKIN AND NAILS FORMULA) TABS Take 1 tablet by mouth daily       acetaminophen (TYLENOL) 500 MG tablet Take 500 mg by mouth every 6 hours as needed for Pain      loratadine (CLARITIN) 10 MG tablet Take 1 tablet by mouth daily 30 tablet 5       ALLERGIES    Allergies   Allergen Reactions    Celexa [Citalopram] Other (See Comments)     Stomach pain        Atorvastatin      Leg cramps      Fenofibrate      angioedema    Mestinon [Pyridostigmine] Itching and Swelling    Penicillins     Sulfa Antibiotics      Other reaction(s): Hives/Throat closes    Tape [Adhesive Tape]      PLASTIC TAPE    Gemfibrozil Rash    Meloxicam Other (See Comments)     moodswings       SOCIAL & FAMILY HISTORY    Social History     Socioeconomic History    Marital status:      Spouse name: None    Number of children: None    Years of education: None    Highest education level: None   Occupational History    None   Tobacco Use    Smoking status: Never Smoker    Smokeless tobacco: Never Used   Vaping Use    Vaping Use: Never used   Substance and Sexual Activity    Alcohol use: No    Drug use: No    Sexual activity: Not Currently     Partners: Male   Other Topics Concern    None   Social History Narrative    None     Social Determinants of Health     Financial Resource Strain:     Difficulty of Paying Living Expenses: Not on file   Food Insecurity:     Worried About Running Out of Food in the Last Year: Not on file    Sal of Food in the Last Year: Not on file   Transportation Needs:     Lack of Transportation (Medical):  Not on file    Lack of Transportation (Non-Medical):  Not on file   Physical Activity:     Days of Exercise per Week: Not on file    Minutes of Exercise per Session: Not on file   Stress:     Feeling of Stress : Not on file   Social Connections:     Frequency of Communication with Friends and Family: Not on file    Frequency of Social Gatherings with Friends and Family: Not on file    Attends Episcopal Services: Not on file    Active Member of Clubs or Organizations: Not on file    Attends Club or Organization Meetings: Not on file    Marital Status: Not on file   Intimate Partner Violence:     Fear of Current or Ex-Partner: Not on file    Emotionally Abused: Not on file    Physically Abused: Not on file    Sexually Abused: Not on file   Housing Stability:     Unable to Pay for Housing in the Last Year: Not on file    Number of Places Lived in the Last Year: Not on file    Unstable Housing in the Last Year: Not on file     Family History   Problem Relation Age of Onset    Heart Disease Mother     High Blood Pressure Mother     High Cholesterol Mother     Cancer Mother 80        Stomach    Diabetes Mother     Stroke Mother     Heart Disease Father     High Blood Pressure Father     High Cholesterol Father     Diabetes Father     Depression Father     Cancer Sister 46        Lung cancer    High Blood Pressure Sister     Other Sister         migraines, osteoarthritis    Mental Illness Sister     Depression Sister     Cancer Maternal Grandmother         Stomach    Diabetes Maternal Grandmother     Diabetes Maternal Grandfather     Diabetes Paternal Grandmother     Diabetes Paternal Grandfather     Cancer Maternal Cousin 47        Pancreatic       PHYSICAL EXAM    VITAL SIGNS: /75   Pulse 88   Temp 98.1 °F (36.7 °C)   Resp 17   SpO2 98%    Constitutional:  Well developed, well nourished, no acute distress   HENT:  Atraumatic, moist mucus membranes  Neck/Lymphatics: supple, no JVD, no swollen nodes  Respiratory: Lungs Clear, no retractions, no wheezing, rhonchi, rales  Cardiovascular:  Rate regular, regular Rhythm,  no murmurs/rubs/gallops. Vascular: Radial pulses and DP pulses 2+ equal bilaterally  GI:  Soft, nontender to palpation in all quadrants, normal bowel sounds  Musculoskeletal:  No edema, no deformities. No calf tenderness to palpation bilaterally. Compartments are soft in bilateral lower extremities. Integument:  Skin warm and dry, no petechiae   Neurologic:    - Alert & oriented person, place, time, and situation, no speech difficulties or slurring.  - No obvious gross motor deficits  - Cranial nerves 2-12 grossly intact  - Negative meningeal signs.  - Sensation intact to light touch  - Strength 5/5 in upper and lower extremities bilaterally  - Upper and lower extremity DTRs 2+ bilaterally.   - No truncal ataxia  Psych: Pleasant affect, no hallucinations        LABS:  Results for orders placed or performed during the hospital encounter of 02/17/22   CBC with Auto Differential   Result Value Ref Range    WBC 5.7 4.0 - 10.5 K/CU MM    RBC 4.66 4.2 - 5.4 M/CU MM    Hemoglobin 12.9 12.5 - 16.0 GM/DL    Hematocrit 40.6 37 - 47 %    MCV 87.1 78 - 100 FL    MCH 27.7 27 - 31 PG    MCHC 31.8 (L) 32.0 - 36.0 %    RDW 13.6 11.7 - 14.9 %    Platelets 592 760 - 361 K/CU MM    MPV 9.4 7.5 - 11.1 FL    Differential Type AUTOMATED DIFFERENTIAL     Segs Relative 70.6 (H) 36 - 66 %    Lymphocytes % 17.7 (L) 24 - 44 %    Monocytes % 8.9 (H) 0 - 4 %    Eosinophils % 1.2 0 - 3 %    Basophils % 0.7 0 - 1 %    Segs Absolute 4.0 K/CU MM    Lymphocytes Absolute 1.0 K/CU MM    Monocytes Absolute 0.5 K/CU MM    Eosinophils Absolute 0.1 K/CU MM    Basophils Absolute 0.0 K/CU MM    Nucleated RBC % 0.0 %    Total Nucleated RBC 0.0 K/CU MM    Total Immature Neutrophil 0.05 K/CU MM    Immature Neutrophil % 0.9 (H) 0 - 0.43 %   Comprehensive Metabolic Panel w/ Reflex to MG   Result Value Ref Range    Sodium 140 135 - 145 MMOL/L    Potassium 3.4 (L) 3.5 - 5.1 MMOL/L    Chloride 96 (L) 99 - 110 mMol/L    CO2 25 21 - 32 MMOL/L    BUN 11 6 - 23 MG/DL    CREATININE 1.2 (H) 0.6 - 1.1 MG/DL    Glucose 106 (H) 70 - 99 MG/DL    Calcium 9.9 8.3 - 10.6 MG/DL    Albumin 3.9 3.4 - 5.0 GM/DL    Total Protein 6.8 6.4 - 8.2 GM/DL    Total Bilirubin 0.7 0.0 - 1.0 MG/DL    ALT 24 10 - 40 U/L    AST 31 15 - 37 IU/L    Alkaline Phosphatase 78 40 - 129 IU/L    GFR Non- 46 (L) >60 mL/min/1.73m2    GFR  56 (L) >60 mL/min/1.73m2    Anion Gap 19 (H) 4 - 16   Lipase   Result Value Ref Range    Lipase 20 13 - 60 IU/L   Troponin   Result Value Ref Range    Troponin T <0.010 <0.01 NG/ML   Brain Natriuretic Peptide   Result Value Ref Range    Pro-.4 <300 PG/ML   D-Dimer, Quantitative   Result Value Ref Range    D-Dimer, Quant 389 (H) <230 NG/mL(DDU)   Lactic Acid   Result Value Ref Range    Lactate 1.4 0.4 - 2.0 mMOL/L   Magnesium   Result Value Ref Range    Magnesium 1.7 (L) 1.8 - 2.4 mg/dl   EKG 12 Lead   Result Value Ref Range    Ventricular Rate 79 BPM    Atrial Rate 79 BPM    P-R Interval 182 ms    QRS Duration 74 ms    Q-T Interval 372 ms    QTc Calculation (Bazett) 426 ms    P Axis 24 degrees    R Axis 71 degrees    T Axis 21 degrees    Diagnosis       Normal sinus rhythm  Nonspecific ST and T wave abnormality  Abnormal ECG  When compared with ECG of 28-JAN-2022 13:11,  Nonspecific T wave abnormality, worse in Lateral leads           EKG: EKG Interpretation  Please see ED physician's note for EKG interpretation- Dr. Eddie Garza    RADIOLOGY/PROCEDURES    CTA PULMONARY W CONTRAST   Final Result   No evidence of pulmonary embolism or acute pulmonary abnormality. XR CHEST PORTABLE   Final Result   No acute cardiopulmonary process. ED COURSE & MEDICAL DECISION MAKING       Vital signs and nursing notes reviewed during ED course.   Patient care and presentation staffed and seen in conjunction with supervising physician,  Eloy.   Patient presents as above. Patient placed on telemetry monitoring as well as continuous pulse oximetry monitoring. While in the ED today, labs, imaging, and EKG were obtained. For EKG interpretation- please see ED physician's note. Labs reveal no leukocytosis or leukopenia but left shift is present. Mildly elevated creatinine of 1.2. Renal hypokalemia of 3.4. Mild anion gap of 18. Lipase normal at 20. D-dimer elevated at 309. Troponin is normal. Chest xray obtained today and reveals no acute cardiopulmonary process. Due to patient's syncope and history of breast cancer as well as elevated D-dimer will obtain CTA pulmonary to rule out PE. Pulses are equal in all extremities, no ripping or tearing pain, no widened mediastinum-low clinical suspicion for AAA/thoracic dissection. CTA pulmonary reveals no evidence of PE or acute pulmonary abnormality. HEART score 4. Patient neurologically intact on exam.  Patient treated with IV fluids and IV antiemetic in the ED. She reports persistent nausea. Will admit patient for further IV hydration and ACS rule out. I consulted with hospitalist and we discussed the patient's case. Hospitalist agrees to admit the patient at this time for further evaluation and care for ACS rule out. All pertinent Lab data and radiographic results reviewed with patient at bedside. The patient and/or the family were informed of the results of any tests/labs/imaging, the treatment plan, and time was allotted to answer questions. Diagnosis, disposition, and treatment plan reviewed in detail with patient who understands and agrees to admission at this time. See chart for details of medications given during the ED stay. Clinical  IMPRESSION    1. Chest pain, unspecified type    2. Nausea and vomiting, intractability of vomiting not specified, unspecified vomiting type    3.  Syncope, unspecified syncope type        PLAN:  Admission to the hospital    Comment: Please note this report has been produced using speech recognition software and may contain errors related to that system including errors in grammar, punctuation, and spelling, as well as words and phrases that may be inappropriate. If there are any questions or concerns please feel free to contact the dictating provider for clarification.         Roel Burdick PA-C  02/17/22 2302

## 2022-02-18 NOTE — ED NOTES
Bed: ED-36  Expected date:   Expected time:   Means of arrival:   Comments:  Ander Verma RN  02/18/22 0104

## 2022-02-18 NOTE — ED PROVIDER NOTES
normal diet. Patient discussed with hospitalist team and the physician assistant patient in the medicine     Questions sought and answered with the patient. They voice understanding and agree with plan. Instructed to return for any worsening or worrisome concerns. All decisions regarding differential diagnosis, lab/radiology/EKG interpretation, risk of significant illness, specific reasons for performing tests, management/treatment, response to management/treatment, disposition, reasons for consults, results of consults, etc. were made by myself in conjunction with the Advanced Practice Provider. For all further details of the patient's emergency department visit, please see the Advanced Practice Provider's documentation.                Fadi Trinidad MD  02/17/22 2006       Fadi Trinidad MD  02/17/22 1641

## 2022-02-18 NOTE — TELEPHONE ENCOUNTER
Morgan Birch notified.  Morgan Birch reports patient is currently admitted to [facility]

## 2022-02-18 NOTE — ED NOTES
Bed: ED-31  Expected date:   Expected time:   Means of arrival:   Comments:  Andre Bowman     [facility]  02/17/22 1929

## 2022-02-18 NOTE — PROGRESS NOTES
Hospitalist Progress Note      Name:  José Covert /Age/Sex: 1960  (63 y.o. female)   MRN & CSN:  [id number] & [id number] Admission Date/Time: 2022  4:20 PM   Location:  [address] [place] [address] PCP: Mono Grant MD         Hospital Day: 2      Assessment and Plan:   Patient is a 68-year-old female past medical history invasive carcinoma of breast who was admitted for failure to thrive, intractable nausea and vomiting. #.  Intractable nausea and vomiting associated with diarrhea  ? Related to underlying malignancy ending on chemotherapy  Viral gastroenteritis cannot be excluded  Blood cultures pending  Stool studies pending  Continue conservative management with IV fluids, antiemetics  Patient had a CT on 2022: Patient symptoms does not improve, will consider repeating CT abdomen and pelvis    #. Generalized weakness and physical deconditioning  #. Failure to thrive  PT/OT  Potential rehab placement pending PT/OT evaluation    #. History of invasive ductal carcinoma breast  Patient states she completed chemotherapy a month ago  Patient was advised to follow-up with her oncologist outpatient for continued care          DVT prophylaxis: Lovenox  CODE STATUS: Total support        Subjective. :     Patient was seen and examined this morning. No acute events overnight. Remains afebrile with stable vital signs. Objective:   Vitals:   Vitals:    22 0524   BP: (!) 135/92   Pulse: 88   Resp: 18   Temp: 98.2 °F (36.8 °C)   SpO2: 97%         Physical Exam:   GEN: Awake, alert, in no apparent distress awake female, sitting upright in bed in no apparent distress. Appears given age. HEENT: Atraumatic, normocephalic, PERRLA, EOMI  NECK: Supple, no apparent thyromegaly or masses.   RESP: Clear lungs to auscultation  CARDIO/VASC: Regular rate and rhythm, normal S1, S2, no murmurs  GI: Abdomen is soft, nontender, no significant organomegaly noted, bowel sounds present  MSK: No gross joint deformities.   Skin: No significant rashes, skin lesions noted  Neuro: AOx3, cranial nerves grossly intact, no focal motor or sensory deficits   PSYCH: Affect appropriate    Medications:   Medications:    aspirin  324 mg Oral Once    sodium chloride flush  5-40 mL IntraVENous BID    anastrozole  1 mg Oral Daily    ezetimibe  10 mg Oral Daily    pantoprazole  40 mg Oral QAM AC    cetirizine  10 mg Oral Daily    losartan  25 mg Oral Daily    metoclopramide  5 mg Oral TID    mirtazapine  15 mg Oral Nightly    metoprolol tartrate  50 mg Oral BID    potassium chloride  20 mEq Oral BID WC    sucralfate  1 g Oral 4 times per day    sodium chloride flush  5-40 mL IntraVENous 2 times per day    enoxaparin  40 mg SubCUTAneous Daily      Infusions:    sodium chloride       PRN Meds: promethazine, 12.5 mg, Q6H PRN  sodium chloride flush, 5-40 mL, PRN  sodium chloride, 25 mL, PRN  polyethylene glycol, 17 g, Daily PRN  acetaminophen, 650 mg, Q6H PRN   Or  acetaminophen, 650 mg, Q6H PRN  prochlorperazine, 10 mg, Q6H PRN   Or  prochlorperazine, 10 mg, Q6H PRN  LORazepam, 0.5 mg, Q6H PRN   Or  LORazepam, 0.5 mg, Q6H PRN  aluminum & magnesium hydroxide-simethicone, 30 mL, Q6H PRN  magic (miracle) mouthwash, 5 mL, 4x Daily PRN  morphine, 2 mg, Q4H PRN          Electronically signed by Steff Gruber MD on 2/18/2022 at 10:34 AM

## 2022-02-18 NOTE — H&P
V2.0  History and Physical      Name:  Alejandra Zayas /Age/Sex: 1960  (63 y.o. female)   MRN & CSN:  [id number] & [id number] Encounter Date/Time: 2022 10:13 PM EST   Location:  [facility] PCP: Ashkan Sanchez MD       Hospital Day: 1    Assessment and Plan:   Alejandra Zayas is a 63 y.o. female with a pmh of breast cancer who presents with malaise and fatigue       Recurrent nausea vomiting diarrhea  Failure to thrive   Invasive ductal carcinoma left breast   Admit to inpatient  Patient with poor p.o. intake,  Start maintenance IV 0.9 at 100 cc/h  Chest x-ray without acute cardiopulmonary process  Lactate within normal limits  Blood cultures urine culture pending   Trend CBC, procalcitonin pending  Respiratory disease panel pending   UA and urine culture pending   GI PCR panel all pending   As needed antiemetics, antidiarrheals   No recent antibiotic use   Consult to oncology, Dr. Yolanda Nelson in a.m. Chest pain  Hypertension   Check orthostatic blood pressures every shift, hold metoprolol and losartan for SBP less than 110   Troponin negative, trend x3   EKG reviewed no acute ST elevation or depression   Likely musculoskeletal continue home aspirin and statin, beta-blocker with hold parameters as noted above   CTA negative for PE    Chronic Conditions: continue home medication as ordered       All testing  and results reviewed with patient . All questions answered. Patient and family voiced understanding    This patient was seen and examined in conjunction with Dr. Vicky Guerrero. He/She was agreeable with the plan and management as dictated above. Disposition:   Current Living situation: Home  Expected Disposition: Unclear  Estimated D/C: Greater than 2 days    Diet ADULT DIET;  Clear Liquid   GI Prophylaxis  [x] PPI,  [] H2 Blocker,  [] Carafate,  [] Diet/Tube Feeds   DVT Prophylaxis [x] Lovenox, []  Heparin, [] SCDs, [] Ambulation,  [] NOAC   Code Status Full Code   Surrogate Decision Maker/ POA History from:     patient    History of Present Illness:     Chief Complaint: Failure to thrive (child)  Jeannie Matson is a 63 y.o. female with pmh of breast cancer who presents with complaints of general malaise x3 to 4 weeks. Also reports intermittent diarrhea, poor p.o. appetite and episodes of dizziness at home. Reports to me she has not been able to keep anything down. Review of Systems: Need 10 Elements   Review of Systems   Constitutional: Positive for activity change, appetite change and fatigue. Negative for diaphoresis. HENT: Negative for congestion and postnasal drip. Eyes: Negative for redness and visual disturbance. Respiratory: Negative for cough and shortness of breath. Cardiovascular: Negative for chest pain and palpitations. Gastrointestinal: Positive for diarrhea. Negative for nausea and vomiting. Genitourinary: Negative for difficulty urinating and dysuria. Musculoskeletal: Negative for joint swelling and neck pain. Neurological: Positive for dizziness and light-headedness. Negative for speech difficulty. Psychiatric/Behavioral: Negative for agitation and confusion. Objective: Intake/Output Summary (Last 24 hours) at 2/17/2022 2213  Last data filed at 2/17/2022 1757  Gross per 24 hour   Intake 1000 ml   Output --   Net 1000 ml      Vitals:   Vitals:    02/17/22 1455 02/17/22 1931 02/17/22 2132   BP: (!) 132/98 115/75 130/79   Pulse: 98 88 92   Resp: 18 17 18   Temp: 98.2 °F (36.8 °C) 98.1 °F (36.7 °C)    TempSrc: Oral     SpO2: 100% 98% 96%       Medications Prior to Admission     Prior to Admission medications    Medication Sig Start Date End Date Taking?  Authorizing Provider   dexlansoprazole (DEXILANT) 60 MG CPDR delayed release capsule Take 1 capsule by mouth 1/26/22 3/22/22 Yes Historical Provider, MD   anastrozole (ARIMIDEX) 1 MG tablet Take 1 tablet by mouth daily 12/29/21  Yes Leonid Chaves MD   lansoprazole (PREVACID) 30 MG delayed Take 1 tablet by mouth 3 times daily 6/10/21  Yes Osmel Whitney MD   furosemide (LASIX) 20 MG tablet Take 20 mg by mouth daily    Yes Historical Provider, MD   aspirin 81 MG chewable tablet Take 81 mg by mouth daily   Yes Historical Provider, MD   Multiple Vitamins-Minerals (HAIR SKIN AND NAILS FORMULA) TABS Take 1 tablet by mouth daily    Yes Historical Provider, MD   acetaminophen (TYLENOL) 500 MG tablet Take 500 mg by mouth every 6 hours as needed for Pain   Yes Historical Provider, MD   loratadine (CLARITIN) 10 MG tablet Take 1 tablet by mouth daily 5/9/19  Yes Julio Guidry MD   gabapentin (NEURONTIN) 300 MG capsule Take 1 capsule by mouth nightly for 30 days. 12/8/21 2/1/22  LIAN Lepe CNP   sodium chloride (ALTAMIST SPRAY) 0.65 % nasal spray 1 spray by Nasal route 2 times daily 12/8/21 1/7/22  LIAN Lepe CNP       Physical Exam: Need 8 Elements   Physical Exam  Vitals and nursing note reviewed. Constitutional:       General: She is not in acute distress. Appearance: Normal appearance. HENT:      Head: Normocephalic. Cardiovascular:      Pulses: Normal pulses. Pulmonary:      Effort: Pulmonary effort is normal. No respiratory distress. Breath sounds: No wheezing or rhonchi. Abdominal:      General: Abdomen is flat. Bowel sounds are normal. There is no distension. Musculoskeletal:         General: Normal range of motion. Skin:     General: Skin is warm and dry. Capillary Refill: Capillary refill takes 2 to 3 seconds. Neurological:      General: No focal deficit present. Mental Status: She is alert and oriented to person, place, and time.             Past Medical History:   PMHx   Past Medical History:   Diagnosis Date    Allergic rhinitis due to pollen 11/19/2015    Arthritis     left hip & knee    Chronic back pain     Dehydration 7/22/2021    Depression     Gastroesophageal reflux disease 11/19/2015    Hearing loss 11/19/2015    History of blood transfusion     No reaction per pt    History of cardiac monitoring 04/18/2017    14 day event monitor. Sinus Rhythm    History of nuclear stress test 09/28/2016    cardiolite-normal,EF70%    Hot flashes due to surgical menopause 11/19/2015    Hx of echocardiogram 10/05/2016    EF: >55 %   Mild mitral and tricuspid regurg.  Hyperlipidemia     Hypertension     Follows with PCP    Lexiscan Stress Test 10/14/2020    EF 60%, Normal study, no ischemia    Meniere disease     Morbid obesity due to excess calories ([facility] [address] 11/19/2015    Sleep apnea 11/19/2015    sleep study negative    Tilt table evaluation 05/09/2017     positive for neurocardiogenic syncope     PSHX:  has a past surgical history that includes Cholecystectomy; Appendectomy (1967); Tonsillectomy (1970); Colonoscopy (2014); Hysterectomy (1989); Total hip arthroplasty (Left, 10/19/2020); US BREAST BIOPSY W LOC DEVICE EACH ADDL LESION LEFT (Left, 3/9/2021); US BREAST BIOPSY W LOC DEVICE 1ST LESION LEFT (Left, 3/9/2021); US BREAST BIOPSY W LOC DEVICE EACH ADDL LESION LEFT (Left, 3/9/2021); and Mastectomy (Bilateral, 05/06/2021). Allergies: Allergies   Allergen Reactions    Celexa [Citalopram] Other (See Comments)     Stomach pain        Atorvastatin      Leg cramps      Fenofibrate      angioedema    Mestinon [Pyridostigmine] Itching and Swelling    Penicillins     Sulfa Antibiotics      Other reaction(s): Hives/Throat closes    Tape Orestes Palm Tape]      PLASTIC TAPE    Gemfibrozil Rash    Meloxicam Other (See Comments)     moodswings     Fam HX:  family history includes Cancer in her maternal grandmother; Cancer (age of onset: 46) in her sister; Cancer (age of onset: 47) in her maternal cousin;  Cancer (age of onset: 80) in her mother; Depression in her father and sister; Diabetes in her father, maternal grandfather, maternal grandmother, mother, paternal grandfather, and paternal grandmother; Heart Disease in her father and mother; High Blood Pressure in her father, mother, and sister; High Cholesterol in her father and mother; Mental Illness in her sister; Other in her sister; Stroke in her mother. Soc HX:   Social History     Socioeconomic History    Marital status:      Spouse name: None    Number of children: None    Years of education: None    Highest education level: None   Occupational History    None   Tobacco Use    Smoking status: Never Smoker    Smokeless tobacco: Never Used   Vaping Use    Vaping Use: Never used   Substance and Sexual Activity    Alcohol use: No    Drug use: No    Sexual activity: Not Currently     Partners: Male   Other Topics Concern    None   Social History Narrative    None     Social Determinants of Health     Financial Resource Strain:     Difficulty of Paying Living Expenses: Not on file   Food Insecurity:     Worried About Running Out of Food in the Last Year: Not on file    Sal of Food in the Last Year: Not on file   Transportation Needs:     Lack of Transportation (Medical): Not on file    Lack of Transportation (Non-Medical):  Not on file   Physical Activity:     Days of Exercise per Week: Not on file    Minutes of Exercise per Session: Not on file   Stress:     Feeling of Stress : Not on file   Social Connections:     Frequency of Communication with Friends and Family: Not on file    Frequency of Social Gatherings with Friends and Family: Not on file    Attends Oriental orthodox Services: Not on file    Active Member of Clubs or Organizations: Not on file    Attends Club or Organization Meetings: Not on file    Marital Status: Not on file   Intimate Partner Violence:     Fear of Current or Ex-Partner: Not on file    Emotionally Abused: Not on file    Physically Abused: Not on file    Sexually Abused: Not on file   Housing Stability:     Unable to Pay for Housing in the Last Year: Not on file    Number of [place] in the Last Year: Not on file    Unstable Housing in the Last Year: Not on file       Medications:   Medications:    aspirin  324 mg Oral Once    sodium chloride flush  5-40 mL IntraVENous BID    [START ON 2/18/2022] anastrozole  1 mg Oral Daily    [START ON 2/18/2022] ezetimibe  10 mg Oral Daily    [START ON 2/18/2022] pantoprazole  40 mg Oral QAM AC    [START ON 2/18/2022] cetirizine  10 mg Oral Daily    [START ON 2/18/2022] losartan  25 mg Oral Daily    metoclopramide  5 mg Oral TID    mirtazapine  15 mg Oral Nightly    metoprolol tartrate  50 mg Oral BID    [START ON 2/18/2022] potassium chloride  20 mEq Oral BID WC    [START ON 2/18/2022] sucralfate  1 g Oral 4 times per day    sodium chloride flush  5-40 mL IntraVENous 2 times per day    [START ON 2/18/2022] enoxaparin  40 mg SubCUTAneous Daily      Infusions:    sodium chloride      sodium chloride       PRN Meds: ondansetron, 4 mg, Q30 Min PRN  Magic Mouthwash, 5 mL, 4x Daily PRN  prochlorperazine, 10 mg, Q6H PRN  promethazine, 12.5 mg, Q6H PRN  sodium chloride flush, 5-40 mL, PRN  sodium chloride, 25 mL, PRN  polyethylene glycol, 17 g, Daily PRN  acetaminophen, 650 mg, Q6H PRN   Or  acetaminophen, 650 mg, Q6H PRN  prochlorperazine, 10 mg, Q6H PRN   Or  prochlorperazine, 10 mg, Q6H PRN  LORazepam, 0.5 mg, Q6H PRN   Or  LORazepam, 0.5 mg, Q6H PRN  aluminum & magnesium hydroxide-simethicone, 30 mL, Q6H PRN        Labs      CBC:   Recent Labs     02/17/22  1654   WBC 5.7   HGB 12.9        BMP:    Recent Labs     02/17/22  1654      K 3.4*   CL 96*   CO2 25   BUN 11   CREATININE 1.2*   GLUCOSE 106*     Hepatic:   Recent Labs     02/17/22  1654   AST 31   ALT 24   BILITOT 0.7   ALKPHOS 78     Lipids:   Lab Results   Component Value Date    CHOL 292 08/13/2021    CHOL 153 12/07/2015    HDL 42 08/13/2021    TRIG 266 08/13/2021     Hemoglobin A1C:   Lab Results   Component Value Date    LABA1C 6.0 08/13/2021     TSH: No results found for: TSH  Troponin:   Lab Results   Component Value Date    TROPONINT <0.010 02/17/2022    TROPONINT <0.010 01/28/2022    TROPONINT <0.010 08/13/2021     Lactic Acid: No results for input(s): LACTA in the last 72 hours. BNP:   Recent Labs     02/17/22  1654   PROBNP 135.4     UA:  Lab Results   Component Value Date    NITRU NEGATIVE 05/31/2021    COLORU YELLOW 05/31/2021    WBCUA 1 05/31/2021    RBCUA <1 05/31/2021    MUCUS RARE 05/31/2021    TRICHOMONAS NONE SEEN 05/31/2021    BACTERIA RARE 05/31/2021    CLARITYU CLEAR 05/31/2021    SPECGRAV 1.049 05/31/2021    LEUKOCYTESUR NEGATIVE 05/31/2021    UROBILINOGEN NEGATIVE 05/31/2021    BILIRUBINUR NEGATIVE 05/31/2021    BLOODU NEGATIVE 05/31/2021    KETUA MODERATE 05/31/2021    AMORPHOUS RARE 05/11/2017     Urine Cultures: No results found for: Marianela Mckeon  Blood Cultures: No results found for: BC  No results found for: BLOODCULT2  Organism: No results found for: ORG    Imaging/Diagnostics Last 24 Hours   XR CHEST PORTABLE    Result Date: 2/17/2022  EXAMINATION: ONE XRAY VIEW OF THE CHEST 2/17/2022 4:12 pm COMPARISON: January 28, 2022 HISTORY: ORDERING SYSTEM PROVIDED HISTORY: SOB, CP TECHNOLOGIST PROVIDED HISTORY: Reason for exam:->SOB, CP Reason for Exam: SOB, CP Additional signs and symptoms: na Relevant Medical/Surgical History: hypertension FINDINGS: The cardiomediastinal silhouette is normal in size. Right-sided port is stable in position at the cavoatrial junction. The lungs are clear. No evidence of acute infiltrate or pulmonary edema. No pleural effusion or pneumothorax is present. No acute cardiopulmonary process. CTA PULMONARY W CONTRAST    Result Date: 2/17/2022  EXAMINATION: CTA OF THE CHEST 2/17/2022 8:11 pm TECHNIQUE: CTA of the chest was performed after the administration of intravenous contrast.  Multiplanar reformatted images are provided for review. MIP images are provided for review.  Dose modulation, iterative reconstruction, and/or weight based adjustment of the mA/kV was utilized to reduce the radiation dose to as low as reasonably achievable. COMPARISON: None. HISTORY: ORDERING SYSTEM PROVIDED HISTORY: chest pain, synope, rule out PE TECHNOLOGIST PROVIDED HISTORY: Reason for exam:->chest pain, synope, rule out PE Decision Support Exception - unselect if not a suspected or confirmed emergency medical condition->Emergency Medical Condition (MA) Reason for Exam: chest pain, synope FINDINGS: Pulmonary Arteries: Pulmonary arteries are adequately opacified for evaluation. No evidence of intraluminal filling defect to suggest pulmonary embolism. Main pulmonary artery is normal in caliber. Mediastinum: No evidence of mediastinal lymphadenopathy. The heart and pericardium demonstrate no acute abnormality. Calcific coronary artery disease. There is no acute abnormality of the thoracic aorta. Descending thoracic aorta is mildly tortuous. Lungs/pleura: The lungs are without acute process. No focal consolidation or pulmonary edema. No evidence of pleural effusion or pneumothorax. Upper Abdomen: The gallbladder is surgically absent. Soft Tissues/Bones: No acute bone or soft tissue abnormality. No evidence of pulmonary embolism or acute pulmonary abnormality. Electronically signed by LIAN Jimenez CNP on 2/17/2022 at 10:13 PM          This dictation was created with voice recognition software. While attempts have been made to review the dictation as it is transcribed, on occasion the spoken word can be misinterpreted by the technology leading to omissions or inappropriate words, phrases or sentences.      Electronically signed by LIAN Jimenez CNP on 2/17/2022 at 10:13 PM

## 2022-02-18 NOTE — PROGRESS NOTES
Pt admitted and brought to the unit by staff. All personal belongings accounted for. Two RN skin assessment completed. Noted no-pitting edema to bilateral lower extremities. All safety precautions in place. Educated on use of call light button to call for help.  Notified NP [employer].

## 2022-02-18 NOTE — CARE COORDINATION
CM to bedside to introduce self and initiate DC planning. Pt is alert and oriented. Pt lives at home by self in a 2 story home , but she only resides on the main floor. Pt was independent PTA. Pt does use a cane for ambulation. Pt is active with [facility] . Pt has rx coverage and a PCP. Pt states she plans to return home with [address]. Sticky note posted for provider requesting [address] orders at DC to send to [facility]. CM will continue to follow.

## 2022-02-19 ENCOUNTER — APPOINTMENT (OUTPATIENT)
Dept: CT IMAGING | Age: 62
DRG: 248 | End: 2022-02-19
Payer: COMMERCIAL

## 2022-02-19 LAB
ALBUMIN SERPL-MCNC: 3.4 GM/DL (ref 3.4–5)
ALP BLD-CCNC: 68 IU/L (ref 40–129)
ALT SERPL-CCNC: 22 U/L (ref 10–40)
ANION GAP SERPL CALCULATED.3IONS-SCNC: 12 MMOL/L (ref 4–16)
APTT: 40.4 SECONDS (ref 25.1–37.1)
AST SERPL-CCNC: 27 IU/L (ref 15–37)
BACTERIA: ABNORMAL /HPF
BASOPHILS ABSOLUTE: 0 K/CU MM
BASOPHILS RELATIVE PERCENT: 0.6 % (ref 0–1)
BILIRUB SERPL-MCNC: 0.6 MG/DL (ref 0–1)
BILIRUBIN URINE: ABNORMAL MG/DL
BLOOD, URINE: ABNORMAL
BUN BLDV-MCNC: 6 MG/DL (ref 6–23)
CALCIUM SERPL-MCNC: 8.8 MG/DL (ref 8.3–10.6)
CHLORIDE BLD-SCNC: 107 MMOL/L (ref 99–110)
CLARITY: CLEAR
CO2: 24 MMOL/L (ref 21–32)
COLOR: YELLOW
CREAT SERPL-MCNC: 1.1 MG/DL (ref 0.6–1.1)
DIFFERENTIAL TYPE: ABNORMAL
EOSINOPHILS ABSOLUTE: 0.2 K/CU MM
EOSINOPHILS RELATIVE PERCENT: 3.1 % (ref 0–3)
GFR AFRICAN AMERICAN: >60 ML/MIN/1.73M2
GFR NON-AFRICAN AMERICAN: 51 ML/MIN/1.73M2
GLUCOSE BLD-MCNC: 137 MG/DL (ref 70–99)
GLUCOSE, URINE: NEGATIVE MG/DL
HCT VFR BLD CALC: 37.7 % (ref 37–47)
HEMOGLOBIN: 11.9 GM/DL (ref 12.5–16)
ICTOTEST: NEGATIVE
IMMATURE NEUTROPHIL %: 0.5 % (ref 0–0.43)
INR BLD: 1.11 INDEX
KETONES, URINE: NEGATIVE MG/DL
LEUKOCYTE ESTERASE, URINE: ABNORMAL
LYMPHOCYTES ABSOLUTE: 1.3 K/CU MM
LYMPHOCYTES RELATIVE PERCENT: 20.3 % (ref 24–44)
MAGNESIUM: 1.7 MG/DL (ref 1.8–2.4)
MCH RBC QN AUTO: 28 PG (ref 27–31)
MCHC RBC AUTO-ENTMCNC: 31.6 % (ref 32–36)
MCV RBC AUTO: 88.7 FL (ref 78–100)
MONOCYTES ABSOLUTE: 0.4 K/CU MM
MONOCYTES RELATIVE PERCENT: 7.1 % (ref 0–4)
MUCUS: ABNORMAL HPF
NITRITE URINE, QUANTITATIVE: NEGATIVE
NUCLEATED RBC %: 0 %
PDW BLD-RTO: 13.9 % (ref 11.7–14.9)
PH, URINE: 6 (ref 5–8)
PLATELET # BLD: 203 K/CU MM (ref 140–440)
PMV BLD AUTO: 9.1 FL (ref 7.5–11.1)
POTASSIUM SERPL-SCNC: 3.2 MMOL/L (ref 3.5–5.1)
PROTEIN UA: NEGATIVE MG/DL
PROTHROMBIN TIME: 14.3 SECONDS (ref 11.7–14.5)
RBC # BLD: 4.25 M/CU MM (ref 4.2–5.4)
RBC URINE: 11 /HPF (ref 0–6)
SEGMENTED NEUTROPHILS ABSOLUTE COUNT: 4.2 K/CU MM
SEGMENTED NEUTROPHILS RELATIVE PERCENT: 68.4 % (ref 36–66)
SODIUM BLD-SCNC: 143 MMOL/L (ref 135–145)
SPECIFIC GRAVITY UA: 1.01 (ref 1–1.03)
SQUAMOUS EPITHELIAL: 27 /HPF
TOTAL IMMATURE NEUTOROPHIL: 0.03 K/CU MM
TOTAL NUCLEATED RBC: 0 K/CU MM
TOTAL PROTEIN: 5.4 GM/DL (ref 6.4–8.2)
TRICHOMONAS: ABNORMAL /HPF
UROBILINOGEN, URINE: 0.2 MG/DL (ref 0.2–1)
WBC # BLD: 6.2 K/CU MM (ref 4–10.5)
WBC UA: 84 /HPF (ref 0–5)

## 2022-02-19 PROCEDURE — 94760 N-INVAS EAR/PLS OXIMETRY 1: CPT

## 2022-02-19 PROCEDURE — 70450 CT HEAD/BRAIN W/O DYE: CPT

## 2022-02-19 PROCEDURE — 85610 PROTHROMBIN TIME: CPT

## 2022-02-19 PROCEDURE — 2580000003 HC RX 258: Performed by: PHYSICIAN ASSISTANT

## 2022-02-19 PROCEDURE — 2580000003 HC RX 258: Performed by: NURSE PRACTITIONER

## 2022-02-19 PROCEDURE — 6370000000 HC RX 637 (ALT 250 FOR IP): Performed by: NURSE PRACTITIONER

## 2022-02-19 PROCEDURE — 81001 URINALYSIS AUTO W/SCOPE: CPT

## 2022-02-19 PROCEDURE — 83735 ASSAY OF MAGNESIUM: CPT

## 2022-02-19 PROCEDURE — 6360000002 HC RX W HCPCS: Performed by: NURSE PRACTITIONER

## 2022-02-19 PROCEDURE — 85025 COMPLETE CBC W/AUTO DIFF WBC: CPT

## 2022-02-19 PROCEDURE — 85730 THROMBOPLASTIN TIME PARTIAL: CPT

## 2022-02-19 PROCEDURE — 87086 URINE CULTURE/COLONY COUNT: CPT

## 2022-02-19 PROCEDURE — 36415 COLL VENOUS BLD VENIPUNCTURE: CPT

## 2022-02-19 PROCEDURE — 99233 SBSQ HOSP IP/OBS HIGH 50: CPT | Performed by: INTERNAL MEDICINE

## 2022-02-19 PROCEDURE — 1200000000 HC SEMI PRIVATE

## 2022-02-19 PROCEDURE — 80053 COMPREHEN METABOLIC PANEL: CPT

## 2022-02-19 RX ORDER — MAGNESIUM SULFATE 1 G/100ML
1000 INJECTION INTRAVENOUS ONCE
Status: COMPLETED | OUTPATIENT
Start: 2022-02-19 | End: 2022-02-20

## 2022-02-19 RX ADMIN — SODIUM CHLORIDE, PRESERVATIVE FREE 10 ML: 5 INJECTION INTRAVENOUS at 20:59

## 2022-02-19 RX ADMIN — ENOXAPARIN SODIUM 40 MG: 100 INJECTION SUBCUTANEOUS at 08:21

## 2022-02-19 RX ADMIN — SODIUM CHLORIDE, PRESERVATIVE FREE 10 ML: 5 INJECTION INTRAVENOUS at 09:33

## 2022-02-19 RX ADMIN — METOPROLOL TARTRATE 50 MG: 50 TABLET, FILM COATED ORAL at 08:21

## 2022-02-19 RX ADMIN — POTASSIUM CHLORIDE 20 MEQ: 20 TABLET, EXTENDED RELEASE ORAL at 17:12

## 2022-02-19 RX ADMIN — SUCRALFATE 1 G: 1 TABLET ORAL at 11:47

## 2022-02-19 RX ADMIN — CETIRIZINE HYDROCHLORIDE 10 MG: 10 TABLET, FILM COATED ORAL at 08:22

## 2022-02-19 RX ADMIN — LOSARTAN POTASSIUM 25 MG: 25 TABLET, FILM COATED ORAL at 08:21

## 2022-02-19 RX ADMIN — SUCRALFATE 1 G: 1 TABLET ORAL at 00:46

## 2022-02-19 RX ADMIN — PANTOPRAZOLE SODIUM 40 MG: 40 TABLET, DELAYED RELEASE ORAL at 07:15

## 2022-02-19 RX ADMIN — METOCLOPRAMIDE 5 MG: 10 TABLET ORAL at 08:21

## 2022-02-19 RX ADMIN — SODIUM CHLORIDE, PRESERVATIVE FREE 10 ML: 5 INJECTION INTRAVENOUS at 08:22

## 2022-02-19 RX ADMIN — ANASTROZOLE 1 MG: 1 TABLET, COATED ORAL at 08:22

## 2022-02-19 RX ADMIN — EZETIMIBE 10 MG: 10 TABLET ORAL at 08:20

## 2022-02-19 RX ADMIN — METOCLOPRAMIDE 5 MG: 10 TABLET ORAL at 20:59

## 2022-02-19 RX ADMIN — SUCRALFATE 1 G: 1 TABLET ORAL at 07:15

## 2022-02-19 RX ADMIN — SUCRALFATE 1 G: 1 TABLET ORAL at 17:12

## 2022-02-19 RX ADMIN — MORPHINE SULFATE 2 MG: 4 INJECTION, SOLUTION INTRAMUSCULAR; INTRAVENOUS at 14:02

## 2022-02-19 RX ADMIN — POTASSIUM CHLORIDE 20 MEQ: 20 TABLET, EXTENDED RELEASE ORAL at 08:21

## 2022-02-19 RX ADMIN — METOCLOPRAMIDE 5 MG: 10 TABLET ORAL at 13:28

## 2022-02-19 ASSESSMENT — PAIN DESCRIPTION - LOCATION
LOCATION: FACE;HIP
LOCATION: FACE;HIP

## 2022-02-19 ASSESSMENT — PAIN DESCRIPTION - FREQUENCY
FREQUENCY: CONTINUOUS
FREQUENCY: CONTINUOUS

## 2022-02-19 ASSESSMENT — PAIN DESCRIPTION - ORIENTATION
ORIENTATION: LEFT
ORIENTATION: LEFT

## 2022-02-19 ASSESSMENT — PAIN SCALES - GENERAL
PAINLEVEL_OUTOF10: 4
PAINLEVEL_OUTOF10: 8
PAINLEVEL_OUTOF10: 8
PAINLEVEL_OUTOF10: 4

## 2022-02-19 ASSESSMENT — PAIN DESCRIPTION - PAIN TYPE
TYPE: ACUTE PAIN
TYPE: ACUTE PAIN

## 2022-02-19 ASSESSMENT — PAIN DESCRIPTION - DESCRIPTORS
DESCRIPTORS: CONSTANT;ACHING;SHARP
DESCRIPTORS: CONSTANT;ACHING

## 2022-02-19 ASSESSMENT — PAIN DESCRIPTION - ONSET
ONSET: GRADUAL
ONSET: GRADUAL

## 2022-02-19 NOTE — PROGRESS NOTES
Hospitalist Progress Note      Name:  Jennifer Noland /Age/Sex: 1960  (63 y.o. female)   MRN & CSN:  [id number] & [id number] Admission Date/Time: 2022  4:20 PM   Location:  [address] [place] [address] PCP: Jerry Blakely MD         Hospital Day: 3    Assessment and Plan:   Jennifer Noland is a 63 y.o.  female  who presents with Failure to thrive (child)    1. L Facial drop: it was noticed when the patient woke up today, patient has difficult hearing at left side as well. Most likely local facial nerve problem (facial palsy) but can not rule out stroke completely, she is not eligible for tPA anyway as she woke up with symptoms and she has mild symptoms anyway. For stat CT head, consult neurology. 2. Failure to thrive: unclear etiology, she is done with chemo more than a month ago, better now, pending cultures, repeat labs. She had diarrhea as well. If more diarrhea, will check c dif. Check TSH. She has EGD and dilatation of the esophagus in 2021  3. Breast cancer: ER +ve, HER2  -ve, patient had mastectomy in May 2021, adjuvant chemo last on 2022, she is on Arimidex now. 4. Depression: she reported to her oncologist last December suicidal ideation, referral to mental health at that time. Continue mirtazapine. Diet ADULT DIET;  Clear Liquid  ADULT ORAL NUTRITION SUPPLEMENT; Breakfast, Lunch, Dinner; Clear Liquid Oral Supplement   DVT Prophylaxis [x] Lovenox, []  Heparin, [] SCDs, [] Ambulation   GI Prophylaxis [] PPI,  [] H2 Blocker,  [] Carafate,  [] Diet/Tube Feeds   Code Status Full Code   Disposition Patient requires continued admission due to weakness   MDM [] Low, [] Moderate,[x]  High  Patient's risk as above due to Lovenox     History of Present Illness:     Chief Complaint: Failure to thrive (child)  Jennifer Noland is a 63 y.o.  female  who presents with failure to thrive, no abdominal pain now, nurse noticed facial drop left side, no new weakness, numbness both sides that is Daily PRN  acetaminophen, 650 mg, Q6H PRN   Or  acetaminophen, 650 mg, Q6H PRN  prochlorperazine, 10 mg, Q6H PRN   Or  prochlorperazine, 10 mg, Q6H PRN  LORazepam, 0.5 mg, Q6H PRN   Or  LORazepam, 0.5 mg, Q6H PRN  aluminum & magnesium hydroxide-simethicone, 30 mL, Q6H PRN  magic (miracle) mouthwash, 5 mL, 4x Daily PRN  morphine, 2 mg, Q4H PRN          Electronically signed by Osmar Whipple MD on 2/19/2022 at 10:40 AM

## 2022-02-19 NOTE — CONSULTS
11/19/2015    Sleep apnea 11/19/2015    sleep study negative    Tilt table evaluation 05/09/2017     positive for neurocardiogenic syncope       SURGICAL HISTORY    Past Surgical History:   Procedure Laterality Date    APPENDECTOMY  1967    CHOLECYSTECTOMY      2017    COLONOSCOPY  2014    Polyps x2 - Dr. Sheri Godinez Bilateral 05/06/2021    Dr. Marlene Donovansins Left 10/19/2020    LEFT HIP TOTAL ARTHROPLASTY - POSTERIOR performed by Madison Cornejo MD at [place] [address] LEFT Left 3/9/2021    US BREAST BIOPSY NEEDLE ADDITIONAL LEFT 3/9/2021 Julieth Person MD [address] BREAST BIOPSY NEEDLE ADDITIONAL LEFT Left 3/9/2021    US BREAST BIOPSY NEEDLE ADDITIONAL LEFT 3/9/2021 Julieth Person MD [address] BREAST NEEDLE BIOPSY LEFT Left 3/9/2021    US BREAST NEEDLE BIOPSY LEFT 3/9/2021 Julieth Person MD [facility] HISTORY    Family History   Problem Relation Age of Onset    Heart Disease Mother     High Blood Pressure Mother     High Cholesterol Mother     Cancer Mother 80        Stomach    Diabetes Mother     Stroke Mother     Heart Disease Father     High Blood Pressure Father     High Cholesterol Father     Diabetes Father     Depression Father     Cancer Sister 46        Lung cancer    High Blood Pressure Sister     Other Sister         migraines, osteoarthritis    Mental Illness Sister     Depression Sister     Cancer Maternal Grandmother         Stomach    Diabetes Maternal Grandmother     Diabetes Maternal Grandfather     Diabetes Paternal Grandmother     Diabetes Paternal Grandfather     Cancer Maternal Cousin 47        Pancreatic       SOCIAL HISTORY    Social History     Socioeconomic History    Marital status:      Spouse name: None    Number of children: None    Years of education: None    Highest education level: None Occupational History    None   Tobacco Use    Smoking status: Never Smoker    Smokeless tobacco: Never Used   Vaping Use    Vaping Use: Never used   Substance and Sexual Activity    Alcohol use: No    Drug use: No    Sexual activity: Not Currently     Partners: Male   Other Topics Concern    None   Social History Narrative    None     Social Determinants of Health     Financial Resource Strain:     Difficulty of Paying Living Expenses: Not on file   Food Insecurity:     Worried About Running Out of Food in the Last Year: Not on file    Sal of Food in the Last Year: Not on file   Transportation Needs:     Lack of Transportation (Medical): Not on file    Lack of Transportation (Non-Medical):  Not on file   Physical Activity:     Days of Exercise per Week: Not on file    Minutes of Exercise per Session: Not on file   Stress:     Feeling of Stress : Not on file   Social Connections:     Frequency of Communication with Friends and Family: Not on file    Frequency of Social Gatherings with Friends and Family: Not on file    Attends Denominational Services: Not on file    Active Member of [address] [place] [address] or Organizations: Not on file    Attends Club or Organization Meetings: Not on file    Marital Status: Not on file   Intimate Partner Violence:     Fear of Current or Ex-Partner: Not on file    Emotionally Abused: Not on file    Physically Abused: Not on file    Sexually Abused: Not on file   Housing Stability:     Unable to Pay for Housing in the Last Year: Not on file    Number of [place] in the Last Year: Not on file    Unstable Housing in the Last Year: Not on file       REVIEW OF SYSTEMS    Constitutional:  Denies fever, chills, loss of appetite, weight loss, tiredness, fatigue or weakness   HEENT:  Denies swelling of neck glands  Respiratory:  Denies cough, shortness of breath or hemoptysis  Cardiovascular:  Denies chest pain, palpitations or swelling   GI:  Denies abdominal pain, nausea, vomiting, constipation or diarrhea   Musculoskeletal:  Denies back pain   Skin:  Denies rash   Neurologic:  Denies headache, focal weakness or sensory changes   All systems negative except as marked. PHYSICAL EXAM    Vitals: /72   Pulse 76   Temp 97.8 °F (36.6 °C) (Oral)   Resp 16   Ht 5' 4.02\" (1.626 m)   Wt 209 lb 14.1 oz (95.2 kg)   SpO2 95%   BMI 36.01 kg/m²   CONSTITUTIONAL: awake, alert, cooperative, no apparent distress   EYES: pupils equal, round and reactive to light, sclera clear and conjunctiva normal  ENT: Normocephalic, without obvious abnormality, atraumatic  NECK: supple, symmetrical, no jugular venous distension and no carotid bruits   HEMATOLOGIC/LYMPHATIC: no cervical, supraclavicular or axillary lymphadenopathy   LUNGS: VBS, no wheezes, no crackles, no rhonchi, no increased work of breathing and clear to auscultation   CARDIOVASCULAR: regular rate and rhythm, normal S1 and S2, no murmur noted  ABDOMEN: normal bowel sounds x 4, soft, non-distended, non-tender, no masses palpated, no hepatosplenomgaly   MUSCULOSKELETAL: full range of motion noted, tone is normal  NEUROLOGIC: awake, alert, oriented to name, place and time. Motor skills grossly intact. SKIN: Normal skin color, texture, turgor and no jaundice. Skin appears intact   EXTREMITIES: no LE edema, no cyanosis, no leg swelling, no clubbing  BREASTS: no palpable mass noted on mastectomy sites. LABORATORY RESULTS  CBC:   Recent Labs     02/17/22  1654   WBC 5.7   HGB 12.9        BMP:    Recent Labs     02/17/22  1654      K 3.4*   CL 96*   CO2 25   BUN 11   CREATININE 1.2*   GLUCOSE 106*     Hepatic:   Recent Labs     02/17/22  1654   AST 31   ALT 24   BILITOT 0.7   ALKPHOS 78     INR: No results for input(s): INR in the last 72 hours. RADIOLOGY REPORTS  CT scan of the chest  No evidence of pulmonary embolism or acute pulmonary abnormality. CT scan of the abdomen & pelvis  1.  No diverticulitis or small bowel obstruction. 2. Other findings as described. CT scan of the head  No acute intracranial abnormality.       Chronic microvascular disease. ASSESSMENT/PLAN  Left breast cancer (pT2, pN0) - s/p bilateral mastectomy in May 2021, status post adjuvant chemotherapy (completed on 1/5/2022), currently on adjuvant endocrine therapy with anastrozole. CT scan reviewed. No sign of recurrent or metastatic disease. To continue with anastrozole and will follow her as an out patient. Intractable nausea and vomiting - to continue with current conservative management as per hospitalist.     Agree to have PT/OT evaluation for generalized weakness and deconditioning. We will follow the patient. Thank you for allowing me to participate in the care of this very pleasant patient.

## 2022-02-19 NOTE — PROGRESS NOTES
Comprehensive Nutrition Assessment    Type and Reason for Visit:  Positive Nutrition Screen    Nutrition Recommendations/Plan:   Correct depleted lytes  Add clear liquid oral nutrition supplement TID  Encourage po intake as able  Please document all po intake  Will closely monitor po intake, weight trends, poc    Nutrition Assessment:  Pt admitted for failure to thrive, nausea and vomiting, chest pain, syncope, PMH: GERD, Depression, Positive nutrition screen for weight loss and poor appetite, significant weight loss per weight hx review, pt currently on a clear liquid diet, will follow at high nutrition risk    Malnutrition Assessment:  Malnutrition Status: At risk for malnutrition (Comment)    Context:  Acute Illness       Estimated Daily Nutrient Needs:  Energy (kcal):  0438-2290; Weight Used for Energy Requirements:  Current     Protein (g):  61-72 (1.1-1.3 g/kg); Weight Used for Protein Requirements:  Ideal          Nutrition Related Findings:  K+ 3.4, Mag 1.7      Wounds:  None       Current Nutrition Therapies:    ADULT DIET;  Clear Liquid    Anthropometric Measures:  · Height: 5' 4.02\" (162.6 cm)  · Current Body Weight: 209 lb 14.1 oz (95.2 kg) (unknown weight source)   · Admission Body Weight:  (n/a)    · Usual Body Weight: 234 lb 12.6 oz (106.5 kg) ((11/12/21) 1/5/.6 kg per chart review)     · Ideal Body Weight: 120 lbs; % Ideal Body Weight 174.9 %   · BMI: 36  · BMI Categories: Obese Class 2 (BMI 35.0 -39.9)       Nutrition Diagnosis:   · Inadequate oral intake related to inadequate protein-energy intake as evidenced by weight loss 7.5% in 3 months    Nutrition Interventions:   Food and/or Nutrient Delivery:  Continue Current Diet,Start Oral Nutrition Supplement (advance diet diet when medically appropriate)  Nutrition Education/Counseling:  No recommendation at this time   Coordination of Nutrition Care:  Continue to monitor while inpatient    Goals:  Pt will advance to at least full liquid diet by day 5 LOS       Nutrition Monitoring and Evaluation:   Behavioral-Environmental Outcomes:  None Identified   Food/Nutrient Intake Outcomes:  Diet Advancement/Tolerance  Physical Signs/Symptoms Outcomes:  Biochemical Data,GI Status,Weight,Nausea or Vomiting,Hemodynamic Status,Fluid Status or Edema,Diarrhea     Discharge Planning:     Too soon to determine     Electronically signed by Estevan Stovall MS, RD, LD on 2/18/22 at 8:49 PM EST    Contact: [phone]

## 2022-02-19 NOTE — PLAN OF CARE
Problem: Skin Integrity:  Goal: Will show no infection signs and symptoms  Description: Will show no infection signs and symptoms  Outcome: Ongoing  Goal: Absence of new skin breakdown  Description: Absence of new skin breakdown  Outcome: Ongoing     Problem: Nutrition  Goal: Optimal nutrition therapy  Outcome: Ongoing

## 2022-02-19 NOTE — PLAN OF CARE
Nutrition Problem #1: Inadequate oral intake  Intervention: Food and/or Nutrient Delivery: Continue Current Diet,Start Oral Nutrition Supplement (advance diet diet when medically appropriate)  Nutritional Goals: Pt will advance to at least full liquid diet by day 5 LOS

## 2022-02-19 NOTE — PROGRESS NOTES
This student nurse was rounding on patient at 1019. Noticed swelling left side of face from  left ear to left lower lip. Patient then stated \" it is not normally like this, it's pretty painful. \" Primary nurse notified and in to assess. Hospitalist in to assess patient. Hospitalist ordered CT.

## 2022-02-20 ENCOUNTER — APPOINTMENT (OUTPATIENT)
Dept: MRI IMAGING | Age: 62
DRG: 248 | End: 2022-02-20
Payer: COMMERCIAL

## 2022-02-20 LAB
CULTURE: NORMAL
Lab: NORMAL
SPECIMEN: NORMAL
TSH HIGH SENSITIVITY: 2.65 UIU/ML (ref 0.27–4.2)

## 2022-02-20 PROCEDURE — 2580000003 HC RX 258: Performed by: HOSPITALIST

## 2022-02-20 PROCEDURE — 84443 ASSAY THYROID STIM HORMONE: CPT

## 2022-02-20 PROCEDURE — 36591 DRAW BLOOD OFF VENOUS DEVICE: CPT

## 2022-02-20 PROCEDURE — 2580000003 HC RX 258: Performed by: NURSE PRACTITIONER

## 2022-02-20 PROCEDURE — 6360000002 HC RX W HCPCS: Performed by: NURSE PRACTITIONER

## 2022-02-20 PROCEDURE — 6360000002 HC RX W HCPCS: Performed by: HOSPITALIST

## 2022-02-20 PROCEDURE — 6370000000 HC RX 637 (ALT 250 FOR IP): Performed by: NURSE PRACTITIONER

## 2022-02-20 PROCEDURE — 99255 IP/OBS CONSLTJ NEW/EST HI 80: CPT | Performed by: PSYCHIATRY & NEUROLOGY

## 2022-02-20 PROCEDURE — 1200000000 HC SEMI PRIVATE

## 2022-02-20 RX ADMIN — EZETIMIBE 10 MG: 10 TABLET ORAL at 08:17

## 2022-02-20 RX ADMIN — SUCRALFATE 1 G: 1 TABLET ORAL at 16:27

## 2022-02-20 RX ADMIN — METOCLOPRAMIDE 5 MG: 10 TABLET ORAL at 21:54

## 2022-02-20 RX ADMIN — ANASTROZOLE 1 MG: 1 TABLET, COATED ORAL at 08:18

## 2022-02-20 RX ADMIN — MAGNESIUM SULFATE HEPTAHYDRATE 1000 MG: 1 INJECTION, SOLUTION INTRAVENOUS at 01:01

## 2022-02-20 RX ADMIN — SUCRALFATE 1 G: 1 TABLET ORAL at 06:31

## 2022-02-20 RX ADMIN — LOSARTAN POTASSIUM 25 MG: 25 TABLET, FILM COATED ORAL at 08:18

## 2022-02-20 RX ADMIN — SODIUM CHLORIDE, PRESERVATIVE FREE 10 ML: 5 INJECTION INTRAVENOUS at 08:19

## 2022-02-20 RX ADMIN — CEFEPIME HYDROCHLORIDE 2000 MG: 2 INJECTION, POWDER, FOR SOLUTION INTRAVENOUS at 16:29

## 2022-02-20 RX ADMIN — SUCRALFATE 1 G: 1 TABLET ORAL at 12:19

## 2022-02-20 RX ADMIN — POTASSIUM CHLORIDE 20 MEQ: 20 TABLET, EXTENDED RELEASE ORAL at 16:27

## 2022-02-20 RX ADMIN — PANTOPRAZOLE SODIUM 40 MG: 40 TABLET, DELAYED RELEASE ORAL at 06:31

## 2022-02-20 RX ADMIN — SODIUM CHLORIDE 25 ML: 9 INJECTION, SOLUTION INTRAVENOUS at 00:59

## 2022-02-20 RX ADMIN — POTASSIUM CHLORIDE 20 MEQ: 20 TABLET, EXTENDED RELEASE ORAL at 08:17

## 2022-02-20 RX ADMIN — METOCLOPRAMIDE 5 MG: 10 TABLET ORAL at 12:19

## 2022-02-20 RX ADMIN — MIRTAZAPINE 15 MG: 15 TABLET, FILM COATED ORAL at 21:55

## 2022-02-20 RX ADMIN — METOCLOPRAMIDE 5 MG: 10 TABLET ORAL at 08:18

## 2022-02-20 RX ADMIN — CETIRIZINE HYDROCHLORIDE 10 MG: 10 TABLET, FILM COATED ORAL at 08:17

## 2022-02-20 RX ADMIN — SUCRALFATE 1 G: 1 TABLET ORAL at 00:54

## 2022-02-20 RX ADMIN — SODIUM CHLORIDE, PRESERVATIVE FREE 10 ML: 5 INJECTION INTRAVENOUS at 21:56

## 2022-02-20 RX ADMIN — MIRTAZAPINE 15 MG: 15 TABLET, FILM COATED ORAL at 00:54

## 2022-02-20 RX ADMIN — METOPROLOL TARTRATE 50 MG: 50 TABLET, FILM COATED ORAL at 08:18

## 2022-02-20 ASSESSMENT — PAIN SCALES - GENERAL: PAINLEVEL_OUTOF10: 0

## 2022-02-20 NOTE — PROGRESS NOTES
vomiting. Ten point ROS reviewed negative, unless as noted above    Objective: Intake/Output Summary (Last 24 hours) at 2/20/2022 1501  Last data filed at 2/20/2022 8609  Gross per 24 hour   Intake 240 ml   Output --   Net 240 ml      Vitals:   Vitals:    02/20/22 0757   BP: 99/65   Pulse: 85   Resp: 16   Temp: 97 °F (36.1 °C)   SpO2: 96%     Physical Exam:   GEN Awake female, sitting upright in bed in no apparent distress. Appears given age. EYES Pupils are equally round. No scleral erythema, discharge, or conjunctivitis. HENT Mild facial drop left side, the same as yesterday  RESP Clear to auscultation, no wheezes, rales or rhonchi. Symmetric chest movement while on room air. CARDIO/VASC S1/S2 auscultated. Regular rate without appreciable murmurs, rubs, or gallops. No JVD or carotid bruits. Peripheral pulses equal bilaterally and palpable. No peripheral edema. GI Abdomen is soft without significant tenderness, masses, or guarding. NEURO Facial drop left side, less hearing left ear, normal frowning, motor 5/5 both sides, equal sensation (changed from yesterday where mild weakness and numbness at left side). PSYCH Awake, alert, oriented x 4. Affect appropriate.     Medications:   Medications:    aspirin  324 mg Oral Once    sodium chloride flush  5-40 mL IntraVENous BID    anastrozole  1 mg Oral Daily    ezetimibe  10 mg Oral Daily    pantoprazole  40 mg Oral QAM AC    cetirizine  10 mg Oral Daily    losartan  25 mg Oral Daily    metoclopramide  5 mg Oral TID    mirtazapine  15 mg Oral Nightly    metoprolol tartrate  50 mg Oral BID    potassium chloride  20 mEq Oral BID WC    sucralfate  1 g Oral 4 times per day    sodium chloride flush  5-40 mL IntraVENous 2 times per day    enoxaparin  40 mg SubCUTAneous Daily      Infusions:    sodium chloride 25 mL (02/20/22 0059)     PRN Meds: promethazine, 12.5 mg, Q6H PRN  sodium chloride flush, 5-40 mL, PRN  sodium chloride, 25 mL, PRN  polyethylene glycol, 17 g, Daily PRN  acetaminophen, 650 mg, Q6H PRN   Or  acetaminophen, 650 mg, Q6H PRN  prochlorperazine, 10 mg, Q6H PRN   Or  prochlorperazine, 10 mg, Q6H PRN  LORazepam, 0.5 mg, Q6H PRN   Or  LORazepam, 0.5 mg, Q6H PRN  aluminum & magnesium hydroxide-simethicone, 30 mL, Q6H PRN  magic (miracle) mouthwash, 5 mL, 4x Daily PRN  morphine, 2 mg, Q4H PRN          Electronically signed by Itz Mercado MD on 2/20/2022 at 3:01 PM

## 2022-02-20 NOTE — PROGRESS NOTES
Attempted pt MRI - about [facility] thru exam pt squeezed call button - \"needed out\" - was in a panic - asked if she wanted me to call nurse - she did not wanted out of machine - I asked if she knew she was claustrophobic & she said she didn't - told her we could see about getting some meds & maybe try later.  Pt returned to the floor & nurse notified

## 2022-02-20 NOTE — CONSULTS
Neurology Service Consult Note  [facility]  Patient Name: Kevin Mcgowan  : 1960        Subjective:   Reason for consult: \"My nurse in my face was drooping\"  63 y.o. right-handed female with past medical history listed below presenting to [facility] for irretractable nausea and vomiting, since she has been here nursing staff noticed yesterday, 1 day prior to exam, when patient woke up her face was drooping she had no other symptoms, they wanted neurology's recommendations and evaluation, patient denies any headache she does not feel like she has any unilateral arm or leg weakness or sensation changes, and her last known normal was not clear the stroke alert not called. Patient is agreeable to an MRI. She denies any tunnel vision or visual field cuts. No neck pain, back pain or fever. No family at bedside. Past Medical History:   Diagnosis Date    Allergic rhinitis due to pollen 2015    Arthritis     left hip & knee    Chronic back pain     Dehydration 2021    Depression     Gastroesophageal reflux disease 2015    Hearing loss 2015    History of blood transfusion     No reaction per pt    History of cardiac monitoring 2017    14 day event monitor. Sinus Rhythm    History of nuclear stress test 2016    cardiolite-normal,EF70%    Hot flashes due to surgical menopause 2015    Hx of echocardiogram 10/05/2016    EF: >55 %   Mild mitral and tricuspid regurg.      Hyperlipidemia     Hypertension     Follows with PCP    Lexiscan Stress Test 10/14/2020    EF 60%, Normal study, no ischemia    Meniere disease     Morbid obesity due to excess calories ([address] 2015    Sleep apnea 2015    sleep study negative    Tilt table evaluation 2017     positive for neurocardiogenic syncope    :   Past Surgical History:   Procedure Laterality Date    APPENDECTOMY  1967    CHOLECYSTECTOMY          COLONOSCOPY  2014    Polyps x2 - Dr. Tang Woo HYSTERECTOMY  1989    MASTECTOMY Bilateral 05/06/2021    Dr. Juanjose Arias Left 10/19/2020    LEFT HIP TOTAL ARTHROPLASTY - POSTERIOR performed by Chepe Solo MD at [place] [address] LEFT Left 3/9/2021     BREAST BIOPSY NEEDLE ADDITIONAL LEFT 3/9/2021 [address],  [facility] BREAST BIOPSY NEEDLE ADDITIONAL LEFT Left 3/9/2021    US BREAST BIOPSY NEEDLE ADDITIONAL LEFT 3/9/2021 [address],  [facility] BREAST NEEDLE BIOPSY LEFT Left 3/9/2021    US BREAST NEEDLE BIOPSY LEFT 3/9/2021 [address], [facility]     Medications:  Scheduled Meds:   aspirin  324 mg Oral Once    sodium chloride flush  5-40 mL IntraVENous BID    anastrozole  1 mg Oral Daily    ezetimibe  10 mg Oral Daily    pantoprazole  40 mg Oral QAM AC    cetirizine  10 mg Oral Daily    losartan  25 mg Oral Daily    metoclopramide  5 mg Oral TID    mirtazapine  15 mg Oral Nightly    metoprolol tartrate  50 mg Oral BID    potassium chloride  20 mEq Oral BID WC    sucralfate  1 g Oral 4 times per day    sodium chloride flush  5-40 mL IntraVENous 2 times per day    enoxaparin  40 mg SubCUTAneous Daily     Continuous Infusions:   sodium chloride 25 mL (02/20/22 0059)     PRN Meds:.promethazine, sodium chloride flush, sodium chloride, polyethylene glycol, acetaminophen **OR** acetaminophen, prochlorperazine **OR** prochlorperazine, LORazepam **OR** LORazepam, aluminum & magnesium hydroxide-simethicone, magic (miracle) mouthwash, morphine    Allergies   Allergen Reactions    Celexa [Citalopram] Other (See Comments)     Stomach pain        Atorvastatin      Leg cramps      Fenofibrate      angioedema    Mestinon [Pyridostigmine] Itching and Swelling    Penicillins     Sulfa Antibiotics      Other reaction(s): Hives/Throat closes    Tape Willodean Swapnil Tape]      PLASTIC TAPE    Gemfibrozil Rash    Meloxicam Other (See Comments) maurice     Social History     Socioeconomic History    Marital status:      Spouse name: Not on file    Number of children: Not on file    Years of education: Not on file    Highest education level: Not on file   Occupational History    Not on file   Tobacco Use    Smoking status: Never Smoker    Smokeless tobacco: Never Used   Vaping Use    Vaping Use: Never used   Substance and Sexual Activity    Alcohol use: No    Drug use: No    Sexual activity: Not Currently     Partners: Male   Other Topics Concern    Not on file   Social History Narrative    Not on file     Social Determinants of Health     Financial Resource Strain:     Difficulty of Paying Living Expenses: Not on file   Food Insecurity:     Worried About Running Out of Food in the Last Year: Not on file    Sal of Food in the Last Year: Not on file   Transportation Needs:     Lack of Transportation (Medical): Not on file    Lack of Transportation (Non-Medical):  Not on file   Physical Activity:     Days of Exercise per Week: Not on file    Minutes of Exercise per Session: Not on file   Stress:     Feeling of Stress : Not on file   Social Connections:     Frequency of Communication with Friends and Family: Not on file    Frequency of Social Gatherings with Friends and Family: Not on file    Attends [employer] Services: Not on file    Active Member of [address] [place] [address] Active Implants or Organizations: Not on file    Attends Club or Organization Meetings: Not on file    Marital Status: Not on file   Intimate Partner Violence:     Fear of Current or Ex-Partner: Not on file    Emotionally Abused: Not on file    Physically Abused: Not on file    Sexually Abused: Not on file   Housing Stability:     Unable to Pay for Housing in the Last Year: Not on file    Number of [place] in the Last Year: Not on file    Unstable Housing in the Last Year: Not on file      Family History   Problem Relation Age of Onset    Heart Disease Mother    Mohsen with Gilson Flor CNP. I have reviewed the chart and we have discussed this case in detail. The patient was seen and examined by myself. Pertinent labs and imaging have been personally reviewed. Our findings and impressions were discussed with the patient. I concur with the Nurse Practioner's assessment and plan.       Jerilyn Powers DO 2/20/2022 2:03 PM

## 2022-02-21 ENCOUNTER — APPOINTMENT (OUTPATIENT)
Dept: MRI IMAGING | Age: 62
DRG: 248 | End: 2022-02-21
Payer: COMMERCIAL

## 2022-02-21 LAB
ADENOVIRUS F 40 41 PCR: NOT DETECTED
ANION GAP SERPL CALCULATED.3IONS-SCNC: 10 MMOL/L (ref 4–16)
ASTROVIRUS PCR: NOT DETECTED
BASOPHILS ABSOLUTE: 0 K/CU MM
BASOPHILS RELATIVE PERCENT: 0.7 % (ref 0–1)
BUN BLDV-MCNC: 7 MG/DL (ref 6–23)
CALCIUM SERPL-MCNC: 8.6 MG/DL (ref 8.3–10.6)
CAMPYLOBACTER PCR: NOT DETECTED
CHLORIDE BLD-SCNC: 108 MMOL/L (ref 99–110)
CO2: 24 MMOL/L (ref 21–32)
CREAT SERPL-MCNC: 1.2 MG/DL (ref 0.6–1.1)
CRYPTOSPORIDIUM PCR: NOT DETECTED
CYCLOSPORA CAYETANENSIS PCR: NOT DETECTED
DIFFERENTIAL TYPE: ABNORMAL
E COLI 0157 PCR: NOT DETECTED
E COLI ENTEROAGGREGATIVE PCR: NOT DETECTED
E COLI ENTEROPATHOGENIC PCR: NOT DETECTED
E COLI ENTEROTOXIGENIC PCR: NOT DETECTED
E COLI SHIGA LIKE TOXIN PCR: NOT DETECTED
E COLI SHIGELLA/ENTEROINVASIVE PCR: NOT DETECTED
ENTAMOEBA HISTOLYTICA PCR: NOT DETECTED
EOSINOPHILS ABSOLUTE: 0.2 K/CU MM
EOSINOPHILS RELATIVE PERCENT: 3.7 % (ref 0–3)
GFR AFRICAN AMERICAN: 56 ML/MIN/1.73M2
GFR NON-AFRICAN AMERICAN: 46 ML/MIN/1.73M2
GIARDIA LAMBLIA PCR: NOT DETECTED
GLUCOSE BLD-MCNC: 104 MG/DL (ref 70–99)
HCT VFR BLD CALC: 35 % (ref 37–47)
HEMOGLOBIN: 11.2 GM/DL (ref 12.5–16)
IMMATURE NEUTROPHIL %: 0.4 % (ref 0–0.43)
LYMPHOCYTES ABSOLUTE: 1.3 K/CU MM
LYMPHOCYTES RELATIVE PERCENT: 22.5 % (ref 24–44)
MAGNESIUM: 1.8 MG/DL (ref 1.8–2.4)
MCH RBC QN AUTO: 28.4 PG (ref 27–31)
MCHC RBC AUTO-ENTMCNC: 32 % (ref 32–36)
MCV RBC AUTO: 88.8 FL (ref 78–100)
MONOCYTES ABSOLUTE: 0.5 K/CU MM
MONOCYTES RELATIVE PERCENT: 8.5 % (ref 0–4)
NOROVIRUS GI GII PCR: NOT DETECTED
NUCLEATED RBC %: 0 %
PDW BLD-RTO: 14 % (ref 11.7–14.9)
PLATELET # BLD: 170 K/CU MM (ref 140–440)
PLESIOMONAS SHIGELLOIDES PCR: NOT DETECTED
PMV BLD AUTO: 10.2 FL (ref 7.5–11.1)
POTASSIUM SERPL-SCNC: 3.3 MMOL/L (ref 3.5–5.1)
RBC # BLD: 3.94 M/CU MM (ref 4.2–5.4)
ROTAVIRUS A PCR: NOT DETECTED
SALMONELLA PCR: NOT DETECTED
SAPOVIRUS PCR: NOT DETECTED
SEGMENTED NEUTROPHILS ABSOLUTE COUNT: 3.6 K/CU MM
SEGMENTED NEUTROPHILS RELATIVE PERCENT: 64.2 % (ref 36–66)
SODIUM BLD-SCNC: 142 MMOL/L (ref 135–145)
TOTAL IMMATURE NEUTOROPHIL: 0.02 K/CU MM
TOTAL NUCLEATED RBC: 0 K/CU MM
VIBRIO CHOLERAE PCR: NOT DETECTED
VIBRIO PCR: NOT DETECTED
WBC # BLD: 5.6 K/CU MM (ref 4–10.5)
YERSINIA ENTEROCOLITICA PCR: NOT DETECTED

## 2022-02-21 PROCEDURE — 99223 1ST HOSP IP/OBS HIGH 75: CPT | Performed by: INTERNAL MEDICINE

## 2022-02-21 PROCEDURE — 6360000004 HC RX CONTRAST MEDICATION: Performed by: NURSE PRACTITIONER

## 2022-02-21 PROCEDURE — 85025 COMPLETE CBC W/AUTO DIFF WBC: CPT

## 2022-02-21 PROCEDURE — 2580000003 HC RX 258: Performed by: HOSPITALIST

## 2022-02-21 PROCEDURE — 87507 IADNA-DNA/RNA PROBE TQ 12-25: CPT

## 2022-02-21 PROCEDURE — 6360000002 HC RX W HCPCS: Performed by: HOSPITALIST

## 2022-02-21 PROCEDURE — 6370000000 HC RX 637 (ALT 250 FOR IP): Performed by: NURSE PRACTITIONER

## 2022-02-21 PROCEDURE — 70553 MRI BRAIN STEM W/O & W/DYE: CPT

## 2022-02-21 PROCEDURE — 80048 BASIC METABOLIC PNL TOTAL CA: CPT

## 2022-02-21 PROCEDURE — 6360000002 HC RX W HCPCS: Performed by: NURSE PRACTITIONER

## 2022-02-21 PROCEDURE — 83735 ASSAY OF MAGNESIUM: CPT

## 2022-02-21 PROCEDURE — 1200000000 HC SEMI PRIVATE

## 2022-02-21 PROCEDURE — 2580000003 HC RX 258: Performed by: PHYSICIAN ASSISTANT

## 2022-02-21 PROCEDURE — 99232 SBSQ HOSP IP/OBS MODERATE 35: CPT | Performed by: INTERNAL MEDICINE

## 2022-02-21 PROCEDURE — 2580000003 HC RX 258: Performed by: NURSE PRACTITIONER

## 2022-02-21 PROCEDURE — A9577 INJ MULTIHANCE: HCPCS | Performed by: NURSE PRACTITIONER

## 2022-02-21 RX ORDER — POTASSIUM CHLORIDE 7.45 MG/ML
10 INJECTION INTRAVENOUS
Status: DISPENSED | OUTPATIENT
Start: 2022-02-21 | End: 2022-02-21

## 2022-02-21 RX ORDER — MAGNESIUM SULFATE IN WATER 40 MG/ML
2000 INJECTION, SOLUTION INTRAVENOUS ONCE
Status: COMPLETED | OUTPATIENT
Start: 2022-02-21 | End: 2022-02-21

## 2022-02-21 RX ORDER — POTASSIUM CHLORIDE 7.45 MG/ML
10 INJECTION INTRAVENOUS
Status: COMPLETED | OUTPATIENT
Start: 2022-02-21 | End: 2022-02-21

## 2022-02-21 RX ADMIN — SUCRALFATE 1 G: 1 TABLET ORAL at 03:14

## 2022-02-21 RX ADMIN — POTASSIUM CHLORIDE 20 MEQ: 20 TABLET, EXTENDED RELEASE ORAL at 09:40

## 2022-02-21 RX ADMIN — ENOXAPARIN SODIUM 40 MG: 100 INJECTION SUBCUTANEOUS at 09:37

## 2022-02-21 RX ADMIN — POTASSIUM CHLORIDE 10 MEQ: 7.45 INJECTION INTRAVENOUS at 17:16

## 2022-02-21 RX ADMIN — CEFEPIME HYDROCHLORIDE 2000 MG: 2 INJECTION, POWDER, FOR SOLUTION INTRAVENOUS at 17:11

## 2022-02-21 RX ADMIN — SODIUM CHLORIDE, PRESERVATIVE FREE 10 ML: 5 INJECTION INTRAVENOUS at 09:35

## 2022-02-21 RX ADMIN — SODIUM CHLORIDE, PRESERVATIVE FREE 10 ML: 5 INJECTION INTRAVENOUS at 21:23

## 2022-02-21 RX ADMIN — EZETIMIBE 10 MG: 10 TABLET ORAL at 09:38

## 2022-02-21 RX ADMIN — SODIUM CHLORIDE, PRESERVATIVE FREE 10 ML: 5 INJECTION INTRAVENOUS at 09:40

## 2022-02-21 RX ADMIN — POTASSIUM CHLORIDE 10 MEQ: 7.45 INJECTION INTRAVENOUS at 12:45

## 2022-02-21 RX ADMIN — POTASSIUM CHLORIDE 10 MEQ: 7.45 INJECTION INTRAVENOUS at 15:19

## 2022-02-21 RX ADMIN — CETIRIZINE HYDROCHLORIDE 10 MG: 10 TABLET, FILM COATED ORAL at 09:36

## 2022-02-21 RX ADMIN — SUCRALFATE 1 G: 1 TABLET ORAL at 16:36

## 2022-02-21 RX ADMIN — ANASTROZOLE 1 MG: 1 TABLET, COATED ORAL at 09:36

## 2022-02-21 RX ADMIN — POTASSIUM CHLORIDE 10 MEQ: 7.45 INJECTION INTRAVENOUS at 16:19

## 2022-02-21 RX ADMIN — SUCRALFATE 1 G: 1 TABLET ORAL at 23:54

## 2022-02-21 RX ADMIN — METOPROLOL TARTRATE 50 MG: 50 TABLET, FILM COATED ORAL at 09:35

## 2022-02-21 RX ADMIN — LORAZEPAM 0.5 MG: 2 INJECTION, SOLUTION INTRAMUSCULAR; INTRAVENOUS at 09:44

## 2022-02-21 RX ADMIN — CEFEPIME HYDROCHLORIDE 2000 MG: 2 INJECTION, POWDER, FOR SOLUTION INTRAVENOUS at 03:18

## 2022-02-21 RX ADMIN — LOSARTAN POTASSIUM 25 MG: 25 TABLET, FILM COATED ORAL at 09:36

## 2022-02-21 RX ADMIN — MAGNESIUM SULFATE 2000 MG: 2 INJECTION INTRAVENOUS at 09:33

## 2022-02-21 RX ADMIN — POTASSIUM CHLORIDE 20 MEQ: 20 TABLET, EXTENDED RELEASE ORAL at 16:36

## 2022-02-21 RX ADMIN — SODIUM CHLORIDE 25 ML: 9 INJECTION, SOLUTION INTRAVENOUS at 03:17

## 2022-02-21 RX ADMIN — METOCLOPRAMIDE 5 MG: 10 TABLET ORAL at 16:36

## 2022-02-21 RX ADMIN — METOCLOPRAMIDE 5 MG: 10 TABLET ORAL at 09:36

## 2022-02-21 RX ADMIN — METOPROLOL TARTRATE 50 MG: 50 TABLET, FILM COATED ORAL at 20:47

## 2022-02-21 RX ADMIN — PANTOPRAZOLE SODIUM 40 MG: 40 TABLET, DELAYED RELEASE ORAL at 06:22

## 2022-02-21 RX ADMIN — SUCRALFATE 1 G: 1 TABLET ORAL at 09:39

## 2022-02-21 RX ADMIN — MIRTAZAPINE 15 MG: 15 TABLET, FILM COATED ORAL at 20:47

## 2022-02-21 RX ADMIN — GADOBENATE DIMEGLUMINE 10 ML: 529 INJECTION, SOLUTION INTRAVENOUS at 10:22

## 2022-02-21 RX ADMIN — METOCLOPRAMIDE 5 MG: 10 TABLET ORAL at 20:47

## 2022-02-21 ASSESSMENT — PAIN SCALES - GENERAL
PAINLEVEL_OUTOF10: 0

## 2022-02-21 NOTE — PROGRESS NOTES
Comprehensive Nutrition Assessment    Type and Reason for Visit:  Reassess    Nutrition Recommendations/Plan:   · Advance to at least a Full Liquid Diet as timely as able    Nutrition Assessment:  Tolerating Clear Liquids with improved N/V noted. Today is day 4 Clear Liquid Diet here. Pt is off unit during my room visit. Will continue to follow as high nutrition risk. Malnutrition Assessment:  Malnutrition Status: At risk for malnutrition   Context:  Acute Illness       Estimated Daily Nutrient Needs:  Energy (kcal):  3128-3163; Weight Used for Energy Requirements:  Current     Protein (g):  61-72 (1.1-1.3 g/kg); Weight Used for Protein Requirements:  Ideal        Fluid (ml/day): 9229-9217  ; Method Used for Fluid Requirements:         Nutrition Related Findings:  K+ 3.3, Cr 1.2      Wounds:  None       Current Nutrition Therapies:    ADULT DIET;  Clear Liquid  ADULT ORAL NUTRITION SUPPLEMENT; Breakfast, Lunch, Dinner; Clear Liquid Oral Supplement    Anthropometric Measures:  · Height: 5' 4.02\" (162.6 cm)  · Current Body Weight: 209 lb 14.1 oz (95.2 kg) (unknown weight source)   · Admission Body Weight:  (n/a)    · Usual Body Weight: 234 lb 12.6 oz (106.5 kg) ((11/12/21) 1/5/.6 kg per chart review)     · Ideal Body Weight: 120 lbs; % Ideal Body Weight 174.9 %   · BMI: 36  · BMI Categories: Obese Class 2 (BMI 35.0 -39.9)       Nutrition Diagnosis:   · Inadequate oral intake related to catabolic illness,altered GI function as evidenced by nausea,vomiting,poor intake prior to admission,weight loss 7.5% in 3 months,intake 26-50%,NPO or clear liquid status due to medical condition    Nutrition Interventions:   Food and/or Nutrient Delivery:  Modify Current Diet,Continue Oral Nutrition Supplement  Nutrition Education/Counseling:  No recommendation at this time   Coordination of Nutrition Care:  Continue to monitor while inpatient    Goals:  Pt will advance to at least full liquid diet by day 5 LOS Nutrition Monitoring and Evaluation:   Behavioral-Environmental Outcomes:  None Identified   Food/Nutrient Intake Outcomes:  Diet Advancement/Tolerance,Food and Nutrient Intake,Supplement Intake  Physical Signs/Symptoms Outcomes:  Biochemical Data,GI Status,Weight,Nausea or Vomiting,Hemodynamic Status,Fluid Status or Edema,Diarrhea     Discharge Planning:     Too soon to determine     Electronically signed by Moni Queen RD, LD on 2/21/22 at 11:07 AM EST    Contact: [phone]

## 2022-02-21 NOTE — CARE COORDINATION
This RN CM to bedside to revisit with pt and inquire if DC plan is still to return home with [facility]. Pt states this is still her discharge plan. CM sticky note updated.

## 2022-02-21 NOTE — CONSULTS
Infectious Disease Consult Note  2022   Patient Name: Alejandra Zayas : 1960   Impression   Gram-positive bacilli in blood culture: Gram-positive baseline blood cultures suggestive of either corynebacterium or Bacillus spp. Oftentimes a contaminant.  T2N0 invasive ductal breast cancer double mastectomy and chemotherapy   Right ACW mediport: does not appear infected   Multi-morbidity: per PMHx  Plan:   Therapeutic: d/c cefepime   Diagnostic:   F/u test: blood cx     Thank you for allowing me to consult in the care of this patient.  ------------------------  REASON FOR CONSULT: Infective syndrome-Gram-positive bacilli in blood culture  Requested by: Dr. Jeff Hartley is a 63 y.o.  female who was admitted 2022 for further evaluation and management of malaise, intermittent diarrhea, anorexia and dizziness at home for the past 3-4 weeks prior to admission. She has a medical history of left breast invasive ductal carcinoma, status post bilateral mastectomy in May 2021, status post adjuvant chemotherapy (completed on 2022), currently receiving anastrozole. On admission she received a clinical diagnosis of failure to thrive. She was treated with intravenous fluids. 1 out of 2 sets of blood cultures was positive for gram-positive bacilli. Prior to admission she had a central abdominal pain, worsened by food ingestion and alleviated by defecation. ? Infectious diseases service was consulted to evaluate the pt, and recommend further investigative and therapeutic measures. Review and summary of old records:  ROS: Other systems reviewed Including eyes, ENT, respiratory, cardiovascular, GI, , dermatologic, neurologic, psych, hem/lymphatic, musculoskeletal and endocrine were negative other than what is mentioned above.      Patient Active Problem List    Diagnosis Date Noted    Vasovagal syncope     Failure to thrive (child) 2022    Chronic gastroesophageal reflux disease 01/21/2022    Hiatal hernia 10/22/2021    Schatzki's ring of distal esophagus 10/22/2021    Spinal stenosis of lumbar region 10/21/2021    Agranulocytosis secondary to cancer chemotherapy ([address] 09/07/2021    Chest pain 08/12/2021    Dehydration 07/22/2021    Port-A-Cath in place 07/21/2021    Personal history of breast cancer 06/22/2021    Nausea and vomiting 05/30/2021    Post-op pain 05/17/2021    S/P mastectomy, bilateral 05/16/2021    Hx of lymph node excision 05/16/2021    Infiltrating ductal carcinoma of left breast ([address] 04/23/2021    Mucinous carcinoma of breast, left ([address] 04/06/2021    Malignant neoplasm of left female breast ([address] 04/06/2021    Hyperglycemia 02/19/2021    Status post left hip replacement 12/16/2020    Acute respiratory failure with hypoxia ([address] 11/13/2020    Closed fracture of left ankle 10/22/2020    Family history of early CAD 09/22/2020    Palpitations 02/12/2019    Class 2 obesity due to excess calories without serious comorbidity in adult 05/11/2018    S/P laparoscopic cholecystectomy 01/27/2017    Calculus of gallbladder without cholecystitis without obstruction 01/09/2017    Precordial pain 09/28/2016    SOB (shortness of breath) 09/28/2016    Insomnia 04/18/2016    Anxiety and depression 02/18/2016    Osteoarthritis 02/18/2016    Meniere's disease 02/18/2016    Essential hypertension 11/19/2015    Mixed hyperlipidemia 11/19/2015    Allergic rhinitis due to pollen 11/19/2015    Morbid obesity due to excess calories ([address] 11/19/2015    Hearing loss 11/19/2015    Hot flashes due to surgical menopause 11/19/2015    Gastroesophageal reflux disease 11/19/2015    Chronic back pain 11/19/2015     Past Medical History:   Diagnosis Date    Allergic rhinitis due to pollen 11/19/2015    Arthritis     left hip & knee    Chronic back pain     Dehydration 7/22/2021    Depression     Gastroesophageal reflux disease 11/19/2015    Hearing loss 11/19/2015    History of blood transfusion     No reaction per pt    History of cardiac monitoring 04/18/2017    14 day event monitor. Sinus Rhythm    History of nuclear stress test 09/28/2016    cardiolite-normal,EF70%    Hot flashes due to surgical menopause 11/19/2015    Hx of echocardiogram 10/05/2016    EF: >55 %   Mild mitral and tricuspid regurg.      Hyperlipidemia     Hypertension     Follows with PCP    Lexiscan Stress Test 10/14/2020    EF 60%, Normal study, no ischemia    Meniere disease     Morbid obesity due to excess calories ([address] 11/19/2015    Sleep apnea 11/19/2015    sleep study negative    Tilt table evaluation 05/09/2017     positive for neurocardiogenic syncope      Past Surgical History:   Procedure Laterality Date    APPENDECTOMY  1967    CHOLECYSTECTOMY      2017    COLONOSCOPY  2014    Polyps x2 - Dr. Mari Cortes Bilateral 05/06/2021    Dr. Rosa Canela Left 10/19/2020    LEFT HIP TOTAL ARTHROPLASTY - POSTERIOR performed by Vitor Wayne MD at [place] [address] LEFT Left 3/9/2021    US BREAST BIOPSY NEEDLE ADDITIONAL LEFT 3/9/2021 Sapna Singh  [facility]    US BREAST BIOPSY NEEDLE ADDITIONAL LEFT Left 3/9/2021    US BREAST BIOPSY NEEDLE ADDITIONAL LEFT 3/9/2021 Sapna Singh MD [address] BREAST NEEDLE BIOPSY LEFT Left 3/9/2021    US BREAST NEEDLE BIOPSY LEFT 3/9/2021 Sapna Singh MD [address] [facility] [place] [address] [facility]      Family History   Problem Relation Age of Onset    Heart Disease Mother     High Blood Pressure Mother     High Cholesterol Mother     Cancer Mother 80        Stomach    Diabetes Mother     Stroke Mother     Heart Disease Father     High Blood Pressure Father     High Cholesterol Father     Diabetes Father     Depression Father     Cancer Sister 46        Lung cancer    High Blood Pressure Sister     Other Sister         migraines, osteoarthritis    Mental Illness Sister     Depression Sister     Cancer Maternal Grandmother         Stomach    Diabetes Maternal Grandmother     Diabetes Maternal Grandfather     Diabetes Paternal Grandmother     Diabetes Paternal Grandfather     Cancer Maternal Cousin 47        Pancreatic      Infectious disease related family history - not contibutory. SOCIAL HISTORY  Social History     Tobacco Use    Smoking status: Never Smoker    Smokeless tobacco: Never Used   Substance Use Topics    Alcohol use: No       Born:   Lived   Occupation: formerly worked at [employer] but left in 2003 to take care of her parents.  No recent travel of significance.  No recent unusual exposures.  Pet: terrier dog    ? ALLERGIES  Allergies   Allergen Reactions    Celexa [Citalopram] Other (See Comments)     Stomach pain        Atorvastatin      Leg cramps      Fenofibrate      angioedema    Mestinon [Pyridostigmine] Itching and Swelling    Penicillins     Sulfa Antibiotics      Other reaction(s): Hives/Throat closes    Tape Hardeman Jose Alfredo Tape]      PLASTIC TAPE    Gemfibrozil Rash    Meloxicam Other (See Comments)     moodswings      MEDICATIONS  Reviewed and are per the chart/EMR. IMMUNIZATION HISTORY  Immunization History   Administered Date(s) Administered    Influenza Virus Vaccine 11/19/2015    Influenza, Quadv, IM, PF (6 mo and older Fluzone, Flulaval, Fluarix, and 3 yrs and older Afluria) 10/21/2020    Pneumococcal Polysaccharide ([id number]) 11/19/2015    Tdap (Boostrix, Adacel) 12/18/2015     ?   Antibiotics:   ?  -------------------------------------------------------------------------------------------------------------------    Vital Signs:  Vitals:    02/21/22 0900   BP: 138/83   Pulse: 98   Resp: 16   Temp: 98.3 °F (36.8 °C)   SpO2: 96%         Exam:    VS: noted; wt 95.2 kg  Gen: alert and oriented X3, no distress  Skin: no stigmata of endocarditis  Wounds: C/D/I  HEMT: AT/NC Oropharynx pink, moist, and without lesions or exudates; dentition in good state of repair  Eyes: PERRLA, EOMI, conjunctiva pink, sclera anicteric. Neck: Supple. Trachea midline. No LAD. Chest: no distress and CTA. Good air movement. Heart: RRR and no MRG. Abd: soft, non-distended, no tenderness, no hepatomegaly. Normoactive bowel sounds. Ext: no clubbing, cyanosis, or edema  Catheter Site: without erythema or tenderness  LDA:   Neuro: Mental status intact. CN 2-12 intact and no focal sensory or motor deficits    ? Diagnostic Studies: reviewed  ? ? I have examined this patient and available medical records on this date and have made the above observations, conclusions and recommendations.   Electronically signed by: Electronically signed by Roddy Sousa MD on 2/21/2022 at 11:21 AM

## 2022-02-21 NOTE — PROGRESS NOTES
Hospitalist Progress Note      Name:  She Wu /Age/Sex: 1960  (63 y.o. female)   MRN & CSN:  [id number] & [id number] Admission Date/Time: 2022  4:20 PM   Location:  [address] [place] [address] PCP: Karlee Lujan MD         Hospital Day: 5      Assessment and Plan:     Patient is a 51-year-old female past medical history invasive carcinoma of breast who was admitted for failure to thrive, intractable nausea and vomiting. Patient was found to have bacteremia and therefore she was started on cefepime. On the morning of 2022, she was noted to have left facial droop when she woke up in the morning. Patient was seen by neurology for that and currently her MRI is pending. #.  Bacteremia with gram-positive bacilli without clear source of infection  Repeat blood cultures obtained on 2022 remains negative  Repeat blood cultures obtained 2022 pending  Continue cefepime  ID consulted    #. Reported left facial droop on the morning of 2022  Patient woke up with a left facial droop per progress note review on the morning of 2022  MRI pending to rule out CVA  Neurology following  Continue aspirin, Zetia    #. Intractable nausea and vomiting associated with diarrhea: Resolved  Patient states she does not feel nauseated anymore and feels better  Continue conservative management with IV fluids, antiemetics       #. Generalized weakness and physical deconditioning  #. Failure to thrive  PT/OT       #. History of left breast cancer status post bilateral mastectomy, status post adjuvant chemotherapy, currently on adjuvant endocrine therapy with anastrozole  Patient was evaluated by hematology/oncology during hospitalization           DVT prophylaxis: Lovenox  CODE STATUS: Total support      Subjective. :     Patient was seen and examined today. She states she does not feel nauseated and feels better. No acute events overnight. Patient MRI is still pending rule out CVA.   Repeat blood cultures pending. Objective:   Vitals:   Vitals:    02/21/22 0330   BP: 106/62   Pulse: 81   Resp: 16   Temp: 97.7 °F (36.5 °C)   SpO2: 95%         Physical Exam:   GEN: Awake, alert, in no apparent distress awake female, sitting upright in bed in no apparent distress. Appears given age. HEENT: Atraumatic, normocephalic, PERRLA, EOMI  NECK: Supple, no apparent thyromegaly or masses. RESP: Clear lungs to auscultation  CARDIO/VASC: Regular rate and rhythm, normal S1, S2, no murmurs  GI: Abdomen is soft, nontender, no significant organomegaly noted, bowel sounds present  MSK: No gross joint deformities.   Skin: No significant rashes, skin lesions noted  Neuro: AOx3, cranial nerves grossly intact, no focal motor or sensory deficits   PSYCH: Affect appropriate    Medications:   Medications:    magnesium sulfate  2,000 mg IntraVENous Once    potassium chloride  40 mEq IntraVENous Once    cefepime  2,000 mg IntraVENous Q12H    aspirin  324 mg Oral Once    sodium chloride flush  5-40 mL IntraVENous BID    anastrozole  1 mg Oral Daily    ezetimibe  10 mg Oral Daily    pantoprazole  40 mg Oral QAM AC    cetirizine  10 mg Oral Daily    losartan  25 mg Oral Daily    metoclopramide  5 mg Oral TID    mirtazapine  15 mg Oral Nightly    metoprolol tartrate  50 mg Oral BID    potassium chloride  20 mEq Oral BID WC    sucralfate  1 g Oral 4 times per day    sodium chloride flush  5-40 mL IntraVENous 2 times per day    enoxaparin  40 mg SubCUTAneous Daily      Infusions:    sodium chloride 25 mL (02/21/22 0317)     PRN Meds: promethazine, 12.5 mg, Q6H PRN  sodium chloride flush, 5-40 mL, PRN  sodium chloride, 25 mL, PRN  polyethylene glycol, 17 g, Daily PRN  acetaminophen, 650 mg, Q6H PRN   Or  acetaminophen, 650 mg, Q6H PRN  prochlorperazine, 10 mg, Q6H PRN   Or  prochlorperazine, 10 mg, Q6H PRN  LORazepam, 0.5 mg, Q6H PRN   Or  LORazepam, 0.5 mg, Q6H PRN  aluminum & magnesium hydroxide-simethicone, 30 mL, Q6H PRN  magic (miracle) mouthwash, 5 mL, 4x Daily PRN  morphine, 2 mg, Q4H PRN          Electronically signed by Malachi Becerril MD on 2/21/2022 at 9:01 AM

## 2022-02-21 NOTE — PROGRESS NOTES
ONCOLOGY HEMATOLOGY CARE [facility])  PROGRESS NOTE    HISTORY OF PRESENT ILLNESS   Carlos Vance is a 63 y.o. female with medical history significant for T2N0 left breast invasive ductal carcinoma, status post bilateral mastectomy in May 2021, status post adjuvant chemotherapy [completed on 1/5/2022], currently on adjuvant endocrine therapy with anastrozole, presented to emergency department on 2/17/2022 with malaise, intermittent diarrhea, loss of appetite and dizziness for 3 to 4 weeks duration. She called Dr David Osorio and was told the she has stomach flu. She is tolerating anastrozole very well and she does not encounter any major side effects from it. CT scan of the chest n 2/17/22 and CT abdomen and pelvis on 1/28/22 did not showed any sign of recurrent or metastatic breast cancer. No NVD this morning. Denied acute pain. No SOB or chest pain. I advise to ambulate and follow up with Dr David Osorio and cancer ctr on discharge. PHYSICAL EXAM    Vitals: /62   Pulse 81   Temp 97.7 °F (36.5 °C) (Oral)   Resp 16   Ht 5' 4.02\" (1.626 m)   Wt 209 lb 14.1 oz (95.2 kg)   SpO2 95%   BMI 36.01 kg/m²   CONSTITUTIONAL: awake, alert, cooperative, no apparent distress   EYES: pupils equal, round and reactive to light, sclera clear and conjunctiva normal  ENT: Normocephalic, without obvious abnormality, atraumatic  NECK: supple, symmetrical, no jugular venous distension and no carotid bruits   HEMATOLOGIC/LYMPHATIC: no cervical, supraclavicular or axillary lymphadenopathy   LUNGS: VBS, no wheezes, no crackles, no rhonchi, no increased work of breathing and clear to auscultation   CARDIOVASCULAR: regular rate and rhythm, normal S1 and S2, no murmur   ABDOMEN: normal bowel sound, soft, non-distended, non-tender, no masses palpated, no hepatosplenomegaly   MUSCULOSKELETAL: full range of motion noted, tone is normal  NEUROLOGIC: awake, alert, oriented to name, place and time. Motor skills grossly intact.    SKIN: No jaundice. Skin appears intact   EXTREMITIES: no LE edema, no cyanosis, no leg swelling, no clubbing  BREASTS: no palpable mass noted on mastectomy sites. LABORATORY RESULTS  CBC:   Recent Labs     02/19/22  1255 02/21/22  0342   WBC 6.2 5.6   HGB 11.9* 11.2*    170     BMP:    Recent Labs     02/19/22  1255 02/21/22  0342    142   K 3.2* 3.3*    108   CO2 24 24   BUN 6 7   CREATININE 1.1 1.2*   GLUCOSE 137* 104*     Hepatic:   Recent Labs     02/19/22  1255   AST 27   ALT 22   BILITOT 0.6   ALKPHOS 68     INR:   Recent Labs     02/19/22  1255   INR 1.11       RADIOLOGY REPORTS  CT scan of the chest  No evidence of pulmonary embolism or acute pulmonary abnormality. CT scan of the abdomen & pelvis  1. No diverticulitis or small bowel obstruction. 2. Other findings as described. CT scan of the head  No acute intracranial abnormality.       Chronic microvascular disease. ASSESSMENT/PLAN  Left breast cancer (pT2, pN0) - s/p bilateral mastectomy in May 2021, status post adjuvant chemotherapy (completed on 1/5/2022), currently on adjuvant endocrine therapy with anastrozole. CT scan reviewed. No sign of recurrent or metastatic disease. To continue with anastrozole and will follow her as an out patient. Intractable nausea and vomiting - to continue with current conservative management as per hospitalist. I recommend to follow up with Dr Karson Elizondo on discharge. Agree to have PT/OT evaluation for generalized weakness and deconditioning. We will follow the patient. Thank you for allowing me to participate in the care of this very pleasant patient.

## 2022-02-22 VITALS
SYSTOLIC BLOOD PRESSURE: 118 MMHG | WEIGHT: 209.88 LBS | TEMPERATURE: 97.9 F | OXYGEN SATURATION: 99 % | BODY MASS INDEX: 35.83 KG/M2 | HEART RATE: 95 BPM | DIASTOLIC BLOOD PRESSURE: 86 MMHG | HEIGHT: 64 IN | RESPIRATION RATE: 16 BRPM

## 2022-02-22 PROBLEM — R78.81 GRAM-POSITIVE BACTEREMIA: Status: ACTIVE | Noted: 2022-02-22

## 2022-02-22 LAB
ANION GAP SERPL CALCULATED.3IONS-SCNC: 13 MMOL/L (ref 4–16)
BUN BLDV-MCNC: 6 MG/DL (ref 6–23)
CALCIUM SERPL-MCNC: 9.1 MG/DL (ref 8.3–10.6)
CHLORIDE BLD-SCNC: 106 MMOL/L (ref 99–110)
CO2: 20 MMOL/L (ref 21–32)
CREAT SERPL-MCNC: 1.1 MG/DL (ref 0.6–1.1)
CULTURE: NORMAL
CULTURE: NORMAL
GFR AFRICAN AMERICAN: >60 ML/MIN/1.73M2
GFR NON-AFRICAN AMERICAN: 51 ML/MIN/1.73M2
GLUCOSE BLD-MCNC: 142 MG/DL (ref 70–99)
Lab: NORMAL
MAGNESIUM: 1.8 MG/DL (ref 1.8–2.4)
POTASSIUM SERPL-SCNC: 4 MMOL/L (ref 3.5–5.1)
SODIUM BLD-SCNC: 139 MMOL/L (ref 135–145)
SPECIMEN: NORMAL

## 2022-02-22 PROCEDURE — 80048 BASIC METABOLIC PNL TOTAL CA: CPT

## 2022-02-22 PROCEDURE — 2580000003 HC RX 258: Performed by: PHYSICIAN ASSISTANT

## 2022-02-22 PROCEDURE — 83735 ASSAY OF MAGNESIUM: CPT

## 2022-02-22 PROCEDURE — 6370000000 HC RX 637 (ALT 250 FOR IP): Performed by: INTERNAL MEDICINE

## 2022-02-22 PROCEDURE — 87040 BLOOD CULTURE FOR BACTERIA: CPT

## 2022-02-22 PROCEDURE — 6360000002 HC RX W HCPCS: Performed by: HOSPITALIST

## 2022-02-22 PROCEDURE — 99231 SBSQ HOSP IP/OBS SF/LOW 25: CPT | Performed by: INTERNAL MEDICINE

## 2022-02-22 PROCEDURE — 2580000003 HC RX 258: Performed by: HOSPITALIST

## 2022-02-22 PROCEDURE — 36415 COLL VENOUS BLD VENIPUNCTURE: CPT

## 2022-02-22 PROCEDURE — 99233 SBSQ HOSP IP/OBS HIGH 50: CPT

## 2022-02-22 PROCEDURE — 2580000003 HC RX 258: Performed by: NURSE PRACTITIONER

## 2022-02-22 PROCEDURE — 6370000000 HC RX 637 (ALT 250 FOR IP): Performed by: NURSE PRACTITIONER

## 2022-02-22 PROCEDURE — 6360000002 HC RX W HCPCS: Performed by: NURSE PRACTITIONER

## 2022-02-22 RX ORDER — HEPARIN SODIUM (PORCINE) LOCK FLUSH IV SOLN 100 UNIT/ML 100 UNIT/ML
100 SOLUTION INTRAVENOUS PRN
Status: DISCONTINUED | OUTPATIENT
Start: 2022-02-22 | End: 2022-02-22 | Stop reason: HOSPADM

## 2022-02-22 RX ORDER — ASPIRIN 81 MG/1
81 TABLET, CHEWABLE ORAL DAILY
Status: DISCONTINUED | OUTPATIENT
Start: 2022-02-22 | End: 2022-02-22 | Stop reason: HOSPADM

## 2022-02-22 RX ADMIN — ENOXAPARIN SODIUM 40 MG: 100 INJECTION SUBCUTANEOUS at 08:13

## 2022-02-22 RX ADMIN — POTASSIUM CHLORIDE 20 MEQ: 20 TABLET, EXTENDED RELEASE ORAL at 08:13

## 2022-02-22 RX ADMIN — ASPIRIN 81 MG 81 MG: 81 TABLET ORAL at 11:46

## 2022-02-22 RX ADMIN — CEFEPIME HYDROCHLORIDE 2000 MG: 2 INJECTION, POWDER, FOR SOLUTION INTRAVENOUS at 02:57

## 2022-02-22 RX ADMIN — SUCRALFATE 1 G: 1 TABLET ORAL at 11:46

## 2022-02-22 RX ADMIN — SUCRALFATE 1 G: 1 TABLET ORAL at 06:00

## 2022-02-22 RX ADMIN — EZETIMIBE 10 MG: 10 TABLET ORAL at 08:13

## 2022-02-22 RX ADMIN — PANTOPRAZOLE SODIUM 40 MG: 40 TABLET, DELAYED RELEASE ORAL at 06:00

## 2022-02-22 RX ADMIN — CETIRIZINE HYDROCHLORIDE 10 MG: 10 TABLET, FILM COATED ORAL at 08:13

## 2022-02-22 RX ADMIN — ANASTROZOLE 1 MG: 1 TABLET, COATED ORAL at 08:13

## 2022-02-22 RX ADMIN — SODIUM CHLORIDE, PRESERVATIVE FREE 10 ML: 5 INJECTION INTRAVENOUS at 08:14

## 2022-02-22 RX ADMIN — METOCLOPRAMIDE 5 MG: 10 TABLET ORAL at 08:13

## 2022-02-22 ASSESSMENT — PAIN SCALES - GENERAL
PAINLEVEL_OUTOF10: 0
PAINLEVEL_OUTOF10: 0

## 2022-02-22 NOTE — PROGRESS NOTES
Brief neurology note. Attempted to see patient, patient an MRI at this time. We will follow-up with patient tomorrow.     Brianna Florez, Acute Care [facility]  Neurology Services

## 2022-02-22 NOTE — PROGRESS NOTES
Infectious Disease Progress Note  2022   Patient Name: Ld Kimball : 1960       Reason for visit: F/u Gram-positive bacilli in blood, T2 N0 breast cancer status post bilateral mastectomy, chemotherapy presently on endocrine treatment-anastrozole ? History:? Interval history noted  Denies n/v/d/f or untoward effects of antimicrobials  Physical Exam:  Vital Signs: /70   Pulse 81   Temp 98 °F (36.7 °C) (Oral)   Resp 16   Ht 5' 4.02\" (1.626 m)   Wt 209 lb 14.1 oz (95.2 kg)   SpO2 96%   BMI 36.01 kg/m²     Gen: alert and oriented X3, no distress  Skin: no stigmata of endocarditis  Wounds: C/D/I  HEMT: AT/NC Oropharynx pink, moist, and without lesions or exudates; dentition in good state of repair  Eyes: PERRLA, EOMI, conjunctiva pink, sclera anicteric. Neck: Supple. Trachea midline. No LAD. Chest: no distress and CTA. Good air movement. Heart: RRR and no MRG. Abd: soft, non-distended, no tenderness, no hepatomegaly. Normoactive bowel sounds. Ext: no clubbing, cyanosis, or edema  Catheter Site: without erythema or tenderness  LDA:   Neuro: Mental status intact. CN 2-12 intact and no focal sensory or motor deficits       Radiologic / Imaging / TESTING  No results found.      Labs:    Recent Results (from the past 24 hour(s))   GI Disease Panel PCR    Collection Time: 22  5:03 PM    Specimen: Stool   Result Value Ref Range    Campylobacter PCR NOT DETECTED NOT DETECTED    Plesiomonas Shigelloides PCR NOT DETECTED NOT DETECTED    Salmonella PCR NOT DETECTED NOT DETECTED    Vibrio PCR NOT DETECTED NOT DETECTED    Vibrio Cholerae PCR NOT DETECTED NOT DETECTED    Yersinia Enterocolitica PCR NOT DETECTED NOT DETECTED    E Coli Enteroaggregative PCR NOT DETECTED NOT DETECTED    E Coli Enteropathogenic PCR NOT DETECTED NOT DETECTED    E Coli Enterotoxigenic PCR NOT DETECTED NOT DETECTED    E Coli Shiga Like Toxin PCR NOT DETECTED NOT DETECTED    E Coli O157 PCR NOT DETECTED NOT DETECTED E Coli Shigella/Enteroinvasive PCR NOT DETECTED NOT DETECTED    Cryptosporidium PCR NOT DETECTED NOT DETECTED    Cyclospora Cayetanensis PCR NOT DETECTED NOT DETECTED    Entamoeba Histolytica PCR NOT DETECTED NOT DETECTED    Giardia Lamblia PCR NOT DETECTED NOT DETECTED    Adenovirus F 40 41 PCR NOT DETECTED NOT DETECTED    Astrovirus PCR NOT DETECTED NOT DETECTED    Norovirus GI GII PCR NOT DETECTED NOT DETECTED    Rotavirus A PCR NOT DETECTED NOT DETECTED    Sapovirus PCR NOT DETECTED NOT DETECTED     CULTURE results: Invalid input(s): BLOOD CULTURE,  URINE CULTURE, SURGICAL CULTURE    Diagnosis:  Patient Active Problem List   Diagnosis    Essential hypertension    Mixed hyperlipidemia    Allergic rhinitis due to pollen    Morbid obesity due to excess calories (HCC)    Hearing loss    Hot flashes due to surgical menopause    Gastroesophageal reflux disease    Chronic back pain    Anxiety and depression    Osteoarthritis    Meniere's disease    Insomnia    Precordial pain    SOB (shortness of breath)    Calculus of gallbladder without cholecystitis without obstruction    S/P laparoscopic cholecystectomy    Vasovagal syncope    Class 2 obesity due to excess calories without serious comorbidity in adult    Palpitations    Family history of early CAD    Closed fracture of left ankle    Acute respiratory failure with hypoxia (HCC)    Status post left hip replacement    Hyperglycemia    Mucinous carcinoma of breast, left (HCC)    Malignant neoplasm of left female breast (HCC)    Infiltrating ductal carcinoma of left breast (HCC)    S/P mastectomy, bilateral    Hx of lymph node excision    Post-op pain    Nausea and vomiting    Personal history of breast cancer    Port-A-Cath in place    Dehydration    Chest pain    Agranulocytosis secondary to cancer chemotherapy ([facility] [address]    Spinal stenosis of lumbar region    Hiatal hernia    Schatzki's ring of distal esophagus    Chronic gastroesophageal reflux disease    Failure to thrive (child)    Gram-positive bacteremia       Active Problems  Principal Problem:    Failure to thrive (child)  Active Problems:    Gram-positive bacteremia  Resolved Problems:    * No resolved hospital problems. *      Impression and plan   Summary and rationale: Patient is a 63 y.o.  female with a medical history of T2N0 breast cancer status post bilateral mastectomy, status post adjuvant chemotherapy (completed on 1/5/2022), currently receiving anastrozole. Admitted for malaise, intermittent diarrhea, anorexia and dizziness. Diagnosed with failure to thrive. 1 out of 2 sets of blood cultures positive for gram-positive bacilli. Probable contaminant. Cefepime was discontinued on 2/21/2022.  Clinical status: She remains afebrile, without overt signs of sepsis.      Therapeutic: Continue to monitor off antibiotics   Diagnostic:   F/u: F/u identification and susceptibility of cultures, will then adjust antimicrobials when available   Other:        Electronically signed by: Electronically signed by Hollis Kaur MD on 2/22/2022 at 7:17 AM

## 2022-02-22 NOTE — DISCHARGE SUMMARY
Hospital Medicine Discharge Summary    Patient: José Covert     Gender: female  : 1960   Age: 63 y.o. MRN: [id number]    Admitting Physician: Jennifer Worley MD  Discharge Physician: Markel Betancourt MD     Code Status: Full Code     Admit Date: 2022   Discharge Date:  2022    Disposition:  Home    Discharge Diagnoses: Active Hospital Problems    Diagnosis Date Noted    Gram-positive bacteremia [R78.81] 2022    Failure to thrive (child) [R62.51] 2022       Follow-up appointments:  one week    Outpatient to do list: follow with PCP    Condition at Discharge:  [address] Course:   Patient is a 62-year-old female past medical history invasive carcinoma of breast who was admitted for failure to thrive, intractable nausea and vomiting. Patient was found to have bacteremia and therefore she was started on cefepime. On the morning of 2022, she was noted to have left facial droop when she woke up in the morning. #.  Bacteremia with gram-positive bacilli without clear source of infection  Repeat blood cultures obtained on 2022 remains negative  Repeat blood cultures obtained 2022 pending  Patient was initially treated with IV cefepime. She was reviewed by the infectious disease specialist who deemed the organism as a contaminant and not true infection. Antibiotics was discontinued. Patient remained stable. She was subsequently discharged home in stable condition.     #.  Reported left facial droop on the morning of 2022  Patient woke up with a left facial droop per progress note review on the morning of 2022  Patient was comanaged with the neurologist.  MRI brain was nonrevealing. Continue aspirin, Zetia     #.   Intractable nausea and vomiting associated with diarrhea: Resolved  Due to bacterial colitis  Patient states she does not feel nauseated anymore and feels better  Resolved with conservative management with IV fluids, antiemetics        #.  Generalized weakness and physical deconditioning  #. Failure to thrive  Patient received physical and occupational therapy while in-house.         #.  History of left breast cancer status post bilateral mastectomy, status post adjuvant chemotherapy, currently on adjuvant endocrine therapy with anastrozole  Patient was evaluated by hematology/oncology during hospitalization    Discharge Medications:   Current Discharge Medication List        Current Discharge Medication List        Current Discharge Medication List      CONTINUE these medications which have NOT CHANGED    Details   dexlansoprazole (DEXILANT) 60 MG CPDR delayed release capsule Take 1 capsule by mouth      anastrozole (ARIMIDEX) 1 MG tablet Take 1 tablet by mouth daily  Qty: 30 tablet, Refills: 3      lansoprazole (PREVACID) 30 MG delayed release capsule TAKE 1 CAPSULE BY MOUTH EVERY DAY      potassium chloride (KLOR-CON M) 20 MEQ extended release tablet TAKE 1 TABLET BY MOUTH TWO TIMES A DAY      sucralfate (CARAFATE) 1 GM/10ML suspension TAKE 10 MLS BY MOUTH FOUR TIMES A DAY ON AN EMPTY STOMACH BEFORE MEALS AND AT BEDTIME      sodium chloride (ALTAMIST SPRAY) 0.65 % nasal spray 1 spray by Nasal route as needed for Congestion  Qty: 1 each, Refills: 3      losartan (COZAAR) 25 MG tablet Take 1 tablet by mouth daily  Qty: 90 tablet, Refills: 3    Associated Diagnoses: Essential hypertension      ezetimibe (ZETIA) 10 MG tablet Take 1 tablet by mouth daily  Qty: 90 tablet, Refills: 3    Associated Diagnoses: Mixed hyperlipidemia      prochlorperazine (COMPAZINE) 10 MG tablet Take 1 tablet by mouth every 6 hours as needed (chemotherapy induced nausea/vomiting)  Qty: 30 tablet, Refills: 1    Associated Diagnoses: Malignant neoplasm of areola of left breast in female, estrogen receptor positive (HCC)      dexamethasone (DECADRON) 2 MG tablet Take 1 tablet by mouth daily (with breakfast) for two days AFTER chemotherapy. Take 8 mg (4 tabs) the night before 1st chemo ONLY. Qty: 28 tablet, Refills: 0    Associated Diagnoses: Malignant neoplasm of areola of left breast in female, estrogen receptor positive ([address] Need for prophylactic chemotherapy      cyclophosphamide (CYTOXAN) 1 GM chemo injection Infuse intravenously      PACLitaxel (TAXOL) 100 MG/16.7ML chemo injection Infuse intravenously once      Magic Mouthwash (MIRACLE MOUTHWASH) Swish and spit 5 mLs 4 times daily as needed for Irritation  Qty: 500 mL, Refills: 0    Comments: Diphenhydramine, nystatin, lidocaine in equal parts please  Associated Diagnoses: Mucinous carcinoma of breast, left (HCC)      mirtazapine (REMERON) 15 MG tablet Take 1 tablet by mouth nightly  Qty: 30 tablet, Refills: 3      hydroCHLOROthiazide (HYDRODIURIL) 25 MG tablet Take 0.5 tablets by mouth daily  Qty: 45 tablet, Refills: 3    Associated Diagnoses: Essential hypertension      promethazine (PHENERGAN) 12.5 MG tablet Take 10 mg by mouth every 6 hours as needed for Nausea      metoprolol tartrate (LOPRESSOR) 50 MG tablet Take 1 tablet by mouth 2 times daily  Qty: 180 tablet, Refills: 3    Associated Diagnoses: Essential hypertension      metoclopramide (REGLAN) 5 MG tablet Take 1 tablet by mouth 3 times daily  Qty: 30 tablet, Refills: 1      furosemide (LASIX) 20 MG tablet Take 20 mg by mouth daily       aspirin 81 MG chewable tablet Take 81 mg by mouth daily      Multiple Vitamins-Minerals (HAIR SKIN AND NAILS FORMULA) TABS Take 1 tablet by mouth daily       acetaminophen (TYLENOL) 500 MG tablet Take 500 mg by mouth every 6 hours as needed for Pain      loratadine (CLARITIN) 10 MG tablet Take 1 tablet by mouth daily  Qty: 30 tablet, Refills: 5    Associated Diagnoses: Seasonal allergic rhinitis due to pollen      methylPREDNISolone (MEDROL, JEWEL,) 4 MG tablet Take by mouth.  As prescribed  Qty: 1 kit, Refills: 0      gabapentin (NEURONTIN) 300 MG capsule Take 1 capsule by mouth nightly for 30 days.  Silvia Moh: 90 capsule, Refills: 3           Current Discharge Medication List            Discharge Exam:    /67   Pulse 80   Temp 98 °F (36.7 °C) (Oral)   Resp 16   Ht 5' 4.02\" (1.626 m)   Wt 209 lb 14.1 oz (95.2 kg)   SpO2 95%   BMI 36.01 kg/m²   General appearance:  NAD  Gen/overall appearance: Not in acute distress. Alert. Oriented x3. Head: Normocephalic, atraumatic  Eyes: EOMI, good acuity  ENT: Oral mucosa moist  Neck: No JVD, thyromegaly  CVS: Nml S1S2, no MRG. Slightly tachycardic. Pulm: Clear bilaterally. No crackles/wheezes  Gastrointestinal: Soft, NT/ND, +BS  Musculoskeletal: No edema. Warm  Neuro: No focal deficit. Moves extremity spontaneously. Psychiatry: Appropriate affect. Not agitated. Skin: Warm, dry with normal turgor. No rash  Capillary refill: Brisk,< 3 seconds   Peripheral Pulses: +2 palpable, equal bilaterally     Labs:  For convenience and continuity at follow-up the following most recent labs are provided:    Lab Results   Component Value Date    WBC 5.6 02/21/2022    HGB 11.2 02/21/2022    HCT 35.0 02/21/2022    MCV 88.8 02/21/2022     02/21/2022     02/22/2022    K 4.0 02/22/2022     02/22/2022    CO2 20 02/22/2022    BUN 6 02/22/2022    CREATININE 1.1 02/22/2022    CALCIUM 9.1 02/22/2022    ALKPHOS 68 02/19/2022    ALT 22 02/19/2022    AST 27 02/19/2022    BILITOT 0.6 02/19/2022    BILIDIR 0.2 09/27/2017    LABALBU 3.4 02/19/2022    LDLCALC NOT VALID WHEN TRIGLYCERIDE >400 MG/DL. 05/22/2017    TRIG 266 08/13/2021     Lab Results   Component Value Date    INR 1.11 02/19/2022    INR 1.07 06/02/2021    INR 1.33 11/16/2020       Radiology:  CT HEAD WO CONTRAST    Result Date: 2/19/2022  EXAMINATION: CT OF THE HEAD WITHOUT CONTRAST  2/19/2022 11:05 am TECHNIQUE: CT of the head was performed without the administration of intravenous contrast. Dose modulation, iterative reconstruction, and/or weight based adjustment of the mA/kV was utilized to reduce the radiation dose to as low as reasonably achievable. COMPARISON: 01/28/2022 HISTORY: ORDERING SYSTEM PROVIDED HISTORY: facial weakness TECHNOLOGIST PROVIDED HISTORY: Reason for exam:->facial weakness Has a \"code stroke\" or \"stroke alert\" been called? ->No Reason for Exam: facial weakness FINDINGS: BRAIN/VENTRICLES: There is no acute infarct or acute intracranial hemorrhage present. There is no mass effect or midline shift present. There is periventricular hypoattenuation. There is no ventriculomegaly or abnormal extra-axial fluid collection present. ORBITS: Limited evaluation of the orbits is unremarkable. SINUSES: The paranasal sinuses and mastoid air cells are clear. SOFT TISSUES/SKULL:  No lytic or blastic osseous lesions are identified. 1. No acute intracranial process identified. 2. Stable mild chronic small vessel ischemic changes. CT HEAD WO CONTRAST    Result Date: 1/28/2022  EXAMINATION: CT OF THE HEAD WITHOUT CONTRAST  1/28/2022 5:13 pm TECHNIQUE: CT of the head was performed without the administration of intravenous contrast. Dose modulation, iterative reconstruction, and/or weight based adjustment of the mA/kV was utilized to reduce the radiation dose to as low as reasonably achievable. COMPARISON: 05/31/2021 HISTORY: ORDERING SYSTEM PROVIDED HISTORY: HA TECHNOLOGIST PROVIDED HISTORY: Has a \"code stroke\" or \"stroke alert\" been called? ->No Reason for exam:->HA Decision Support Exception - unselect if not a suspected or confirmed emergency medical condition->Emergency Medical Condition (MA) Reason for Exam: headache Additional signs and symptoms: no Relevant Medical/Surgical History: none FINDINGS: BRAIN/VENTRICLES: There is no acute intracranial hemorrhage, mass effect or midline shift. No abnormal extra-axial fluid collection. The gray-white differentiation is maintained without evidence of an acute infarct. There is no evidence of hydrocephalus.  Involutional changes and chronic microvascular disease. Vascular calcifications. ORBITS: The visualized portion of the orbits demonstrate no acute abnormality. SINUSES: Mild ethmoid sinus mucosal thickening. Mastoids grossly clear. SOFT TISSUES/SKULL:  No acute abnormality of the visualized skull or soft tissues. No acute intracranial abnormality. Chronic microvascular disease. CT ABDOMEN PELVIS W IV CONTRAST Additional Contrast? None    Result Date: 1/28/2022  EXAMINATION: CT OF THE ABDOMEN AND PELVIS WITH CONTRAST 1/28/2022 5:14 pm TECHNIQUE: CT of the abdomen and pelvis was performed with the administration of intravenous contrast. Multiplanar reformatted images are provided for review. Dose modulation, iterative reconstruction, and/or weight based adjustment of the mA/kV was utilized to reduce the radiation dose to as low as reasonably achievable. COMPARISON: CT abdomen pelvis 05/30/2021. HISTORY: ORDERING SYSTEM PROVIDED HISTORY: abdominal pain, vomiting, h/o breast cancer TECHNOLOGIST PROVIDED HISTORY: Reason for exam:->abdominal pain, vomiting, h/o breast cancer Additional Contrast?->None Decision Support Exception - unselect if not a suspected or confirmed emergency medical condition->Emergency Medical Condition (MA) Reason for Exam: abdominal pain, vomiting, h/o breast cancer Additional signs and symptoms: surg. hx; hysterectomy,appendectomy,gallbladder. Relevant Medical/Surgical History: 80 nl isovue 370 used. FINDINGS: Lower Chest: Lung bases demonstrate no focal consolidation or pleural effusion. Left lower lobe granuloma. Organs: Liver: Unremarkable on this phase of enhancement. Granulomas. Spleen: Unremarkable on this phase of enhancement. Pancreas: Unremarkable on this phase of enhancement. Adrenal glands: Unremarkable on this phase of enhancement. Kidneys: Unremarkable on this phase of enhancement. No renal, ureteral, or bladder calculi. Gallbladder: Surgically absent.  GI/Bowel: Evaluation of the bowel and mesentery is limited due to lack of enteric contrast. Visualized esophagus/stomach: Unremarkable given lack of enteric contrast and degree of distension. Small bowel: No dilated small bowel. Large bowel: Scattered colonic diverticula without adjacent stranding. Appendix: Not visualized. Pelvis: Bladder is incompletely distended. Uterus and ovaries are not visualized. Peritoneum/Retroperitoneum: Adenopathy: Suboptimal evaluation for adenopathy due to lack of enteric contrast. Abdominal aorta: No abdominal aortic aneurysm. Free fluid/air: No free fluid. No free air. Bones/Soft Tissues: Scattered degenerative changes noted in the visualized spine with spondylolistheses. Moderate to severe canal stenosis at L4-L5. 1. No diverticulitis or small bowel obstruction. 2. Other findings as described. XR CHEST PORTABLE    Result Date: 2/17/2022  EXAMINATION: ONE XRAY VIEW OF THE CHEST 2/17/2022 4:12 pm COMPARISON: January 28, 2022 HISTORY: ORDERING SYSTEM PROVIDED HISTORY: SOB, CP TECHNOLOGIST PROVIDED HISTORY: Reason for exam:->SOB, CP Reason for Exam: SOB, CP Additional signs and symptoms: na Relevant Medical/Surgical History: hypertension FINDINGS: The cardiomediastinal silhouette is normal in size. Right-sided port is stable in position at the cavoatrial junction. The lungs are clear. No evidence of acute infiltrate or pulmonary edema. No pleural effusion or pneumothorax is present. No acute cardiopulmonary process. XR CHEST PORTABLE    Result Date: 1/28/2022  EXAMINATION: ONE XRAY VIEW OF THE CHEST 1/28/2022 1:38 pm COMPARISON: 08/12/2021. Chest CT dated 08/12/2021. HISTORY: ORDERING SYSTEM PROVIDED HISTORY: vomiting TECHNOLOGIST PROVIDED HISTORY: Reason for exam:->vomiting Reason for Exam: vomiting FINDINGS: There is redemonstration of right chest wall port catheter, appearing similar to the prior study. The cardiac silhouette appears within normal limits. No confluent airspace opacity, pleural effusion or pneumothorax is seen. There are surgical clips overlying the right upper abdomen. No radiographic evidence of acute pulmonary abnormality seen. CTA PULMONARY W CONTRAST    Result Date: 2/17/2022  EXAMINATION: CTA OF THE CHEST 2/17/2022 8:11 pm TECHNIQUE: CTA of the chest was performed after the administration of intravenous contrast.  Multiplanar reformatted images are provided for review. MIP images are provided for review. Dose modulation, iterative reconstruction, and/or weight based adjustment of the mA/kV was utilized to reduce the radiation dose to as low as reasonably achievable. COMPARISON: None. HISTORY: ORDERING SYSTEM PROVIDED HISTORY: chest pain, synope, rule out PE TECHNOLOGIST PROVIDED HISTORY: Reason for exam:->chest pain, synope, rule out PE Decision Support Exception - unselect if not a suspected or confirmed emergency medical condition->Emergency Medical Condition (MA) Reason for Exam: chest pain, synope FINDINGS: Pulmonary Arteries: Pulmonary arteries are adequately opacified for evaluation. No evidence of intraluminal filling defect to suggest pulmonary embolism. Main pulmonary artery is normal in caliber. Mediastinum: No evidence of mediastinal lymphadenopathy. The heart and pericardium demonstrate no acute abnormality. Calcific coronary artery disease. There is no acute abnormality of the thoracic aorta. Descending thoracic aorta is mildly tortuous. Lungs/pleura: The lungs are without acute process. No focal consolidation or pulmonary edema. No evidence of pleural effusion or pneumothorax. Upper Abdomen: The gallbladder is surgically absent. Soft Tissues/Bones: No acute bone or soft tissue abnormality. No evidence of pulmonary embolism or acute pulmonary abnormality.      MRI BRAIN W WO CONTRAST    Result Date: 2/21/2022  EXAMINATION: MRI OF THE BRAIN WITHOUT AND WITH CONTRAST  2/21/2022 10:09 am TECHNIQUE: Multiplanar multisequence MRI of the head/brain was performed without and with the administration of intravenous contrast. COMPARISON: 06/01/2021 HISTORY: ORDERING SYSTEM PROVIDED HISTORY: left favial droop, rule out stroke v. METs TECHNOLOGIST PROVIDED HISTORY: Reason for exam:->left favial droop, rule out stroke v. METs What is the sedation requirement?->None Reason for Exam: left favial droop, rule out stroke v. METs/gfr 51 10 ml mulithance FINDINGS: INTRACRANIAL STRUCTURES/VENTRICLES:  There is no acute infarct. No mass effect or midline shift. No evidence of an acute intracranial hemorrhage. The ventricles and sulci are normal in size and configuration. The sellar/suprasellar regions appear unremarkable. The normal signal voids within the major intracranial vessels appear maintained. No abnormal focus of enhancement is seen within the brain. Mild chronic microvascular disease is identified within the periventricular white matter ORBITS: The visualized portion of the orbits demonstrate no acute abnormality. SINUSES: Left maxillary sinus polyp versus mucous retention cyst is noted. BONES/SOFT TISSUES: The bone marrow signal intensity appears normal. The soft tissues demonstrate no acute abnormality. No acute intracranial ischemia or intracranial metastatic disease Mild chronic microvascular disease within the periventricular white matter          Note that > than 30 minutes was spent in preparing discharge papers, discussing discharge with patient, medication review, etc.       Signed:    Markel Betancourt MD   2/22/2022      Thank you Mono Grant MD for the opportunity to be involved in this patient's care. If you have any questions or concerns please feel free to contact me.

## 2022-02-22 NOTE — PROGRESS NOTES
Neurology Service Progress Note  [facility]  Patient Name: Alvino Garcia  : 1960        Subjective:   Reason for consult: Left facial droop  Patient seen and examined. Chart reviewed in detail. Patient states that she feels \" a lot better\" Slight left facial droop remains. MRI results discussed in detail. Patient denies, numbness/ tingling, vision/speech changes, dizziness, or unilateral weakness. Past Medical History:   Diagnosis Date    Allergic rhinitis due to pollen 2015    Arthritis     left hip & knee    Chronic back pain     Dehydration 2021    Depression     Gastroesophageal reflux disease 2015    Hearing loss 2015    History of blood transfusion     No reaction per pt    History of cardiac monitoring 2017    14 day event monitor. Sinus Rhythm    History of nuclear stress test 2016    cardiolite-normal,EF70%    Hot flashes due to surgical menopause 2015    Hx of echocardiogram 10/05/2016    EF: >55 %   Mild mitral and tricuspid regurg.      Hyperlipidemia     Hypertension     Follows with PCP    Lexiscan Stress Test 10/14/2020    EF 60%, Normal study, no ischemia    Meniere disease     Morbid obesity due to excess calories ([address] 2015    Sleep apnea 2015    sleep study negative    Tilt table evaluation 2017     positive for neurocardiogenic syncope    :   Past Surgical History:   Procedure Laterality Date    APPENDECTOMY  1967    CHOLECYSTECTOMY      2017    COLONOSCOPY  2014    Polyps x2 - Dr. Mehrdad Montague Bilateral 2021    Dr. Audrey Martinez Left 10/19/2020    LEFT HIP TOTAL ARTHROPLASTY - POSTERIOR performed by Priscila Kessler MD at [facility] LEFT Left 3/9/2021     BREAST BIOPSY NEEDLE ADDITIONAL LEFT 3/9/2021 Wilberto Gavin MD [address] BREAST BIOPSY NEEDLE ADDITIONAL LEFT Left 3/9/2021     BREAST BIOPSY NEEDLE ADDITIONAL LEFT 3/9/2021 [address], MD [address] BREAST NEEDLE BIOPSY LEFT Left 3/9/2021     BREAST NEEDLE BIOPSY LEFT 3/9/2021 [address], MD [facility]     Medications:  Scheduled Meds:   cefepime  2,000 mg IntraVENous Q12H    aspirin  324 mg Oral Once    sodium chloride flush  5-40 mL IntraVENous BID    anastrozole  1 mg Oral Daily    ezetimibe  10 mg Oral Daily    pantoprazole  40 mg Oral QAM AC    cetirizine  10 mg Oral Daily    losartan  25 mg Oral Daily    metoclopramide  5 mg Oral TID    mirtazapine  15 mg Oral Nightly    metoprolol tartrate  50 mg Oral BID    potassium chloride  20 mEq Oral BID WC    sucralfate  1 g Oral 4 times per day    sodium chloride flush  5-40 mL IntraVENous 2 times per day    enoxaparin  40 mg SubCUTAneous Daily     Continuous Infusions:   sodium chloride 25 mL (02/21/22 0317)     PRN Meds:.promethazine, sodium chloride flush, sodium chloride, polyethylene glycol, acetaminophen **OR** acetaminophen, prochlorperazine **OR** prochlorperazine, LORazepam **OR** LORazepam, aluminum & magnesium hydroxide-simethicone, magic (miracle) mouthwash, morphine    Allergies   Allergen Reactions    Celexa [Citalopram] Other (See Comments)     Stomach pain        Atorvastatin      Leg cramps      Fenofibrate      angioedema    Mestinon [Pyridostigmine] Itching and Swelling    Penicillins     Sulfa Antibiotics      Other reaction(s): Hives/Throat closes    Tape Román Spitz Tape]      PLASTIC TAPE    Gemfibrozil Rash    Meloxicam Other (See Comments)     moodswings     Social History     Socioeconomic History    Marital status:      Spouse name: Not on file    Number of children: Not on file    Years of education: Not on file    Highest education level: Not on file   Occupational History    Not on file   Tobacco Use    Smoking status: Never Smoker    Smokeless tobacco: Never Used   Vaping Use    Vaping Use: Never used   Substance and Sexual Activity    Alcohol use: No    Drug use: No    Sexual activity: Not Currently     Partners: Male   Other Topics Concern    Not on file   Social History Narrative    Not on file     Social Determinants of Health     Financial Resource Strain:     Difficulty of Paying Living Expenses: Not on file   Food Insecurity:     Worried About Running Out of Food in the Last Year: Not on file    Sal of Food in the Last Year: Not on file   Transportation Needs:     Lack of Transportation (Medical): Not on file    Lack of Transportation (Non-Medical):  Not on file   Physical Activity:     Days of Exercise per Week: Not on file    Minutes of Exercise per Session: Not on file   Stress:     Feeling of Stress : Not on file   Social Connections:     Frequency of Communication with Friends and Family: Not on file    Frequency of Social Gatherings with Friends and Family: Not on file    Attends Muslim Services: Not on file    Active Member of Clubs or Organizations: Not on file    Attends Club or Organization Meetings: Not on file    Marital Status: Not on file   Intimate Partner Violence:     Fear of Current or Ex-Partner: Not on file    Emotionally Abused: Not on file    Physically Abused: Not on file    Sexually Abused: Not on file   Housing Stability:     Unable to Pay for Housing in the Last Year: Not on file    Number of [place] in the Last Year: Not on file    Unstable Housing in the Last Year: Not on file      Family History   Problem Relation Age of Onset    Heart Disease Mother     High Blood Pressure Mother     High Cholesterol Mother     Cancer Mother 80        Stomach    Diabetes Mother     Stroke Mother     Heart Disease Father     High Blood Pressure Father     High Cholesterol Father     Diabetes Father     Depression Father     Cancer Sister 46        Lung cancer    High Blood Pressure Sister     Other Sister         migraines, osteoarthritis    Mental Illness Sister     Depression Sister     Cancer Maternal Grandmother         Stomach    Diabetes Maternal Grandmother     Diabetes Maternal Grandfather     Diabetes Paternal Grandmother     Diabetes Paternal Grandfather     Cancer Maternal Cousin 47        Pancreatic       Physical Exam:         Wt Readings from Last 3 Encounters:   02/18/22 209 lb 14.1 oz (95.2 kg)   01/28/22 220 lb (99.8 kg)   01/19/22 230 lb (104.3 kg)     Temp Readings from Last 3 Encounters:   02/22/22 98 °F (36.7 °C) (Oral)   01/28/22 98.9 °F (37.2 °C)   01/19/22 97 °F (36.1 °C)     BP Readings from Last 3 Encounters:   02/22/22 100/67   01/28/22 (!) 138/103   01/05/22 (!) 152/91     Pulse Readings from Last 3 Encounters:   02/22/22 80   01/28/22 106   01/05/22 109        Gen: A&O x 4, NAD, cooperative  HEENT: NC/AT, EOMI, PERRL, mmm, neck supple, no meningeal signs; Heart: Regular   Lungs: unlabored RR  Ext: no edema, no calf tenderness b/l  Psych: normal mood and affect  Skin: no rashes or lesions    NEUROLOGIC EXAM:    Mental Status: A&O to self, location, month and year, NAD, speech clear, language fluent, repetition and naming intact, follows commands appropriately    Cranial Nerve Exam:   CN II-XII: n, PERRL, VFF, no nystagmus, no gaze paresis, sensation V1-V3 intact b/l, muscles of facial expression there is a mild left facial droop hearing intact to conversational tone, palate elevates symmetrically, shoulder elevation symmetric and tongue protrudes midline with movement side to side.     Motor Exam:       Strength 5/5 UE's/LE's b/l  Tone and bulk normal   No pronator drift    Deep Tendon Reflexes: 2/4 biceps, triceps, brachioradialis, patellar, and achilles b/l; flexor plantar responses b/l    Sensation: Intact light touch/vibration UE's/LE's b/l    Coordination/Cerebellum:       Tremors--none      Rapidly alternating movements: no dysdiadochokinesia b/l                Finger-to-Nose: no dysmetria b/l    Gait and stance:      Gait: deferred      LABS:     Recent Labs     02/19/22  1255 02/21/22  0342   WBC 6.2 5.6    142   K 3.2* 3.3*    108   CO2 24 24   BUN 6 7   CREATININE 1.1 1.2*   GLUCOSE 137* 104*   INR 1.11  --          IMAGING:    MRI brain with and without      Impression   No acute intracranial ischemia or intracranial metastatic disease       Mild chronic microvascular disease within the periventricular white matter           ASSESSMENT/PLAN:     3 73-year-old female with reported left facial droop. Clear etiology unknown. MRI unremarkable as stated above. Left facial droop possibly due to facial nerve palsy, or perhaps, her anatomy. Looking at her identifier in epic, in her picture, her faces appears asymmetrical, in which was taken approximately 6-8 months prior according to patient. Plan of care as follows:  1. Neuro exam:  1. Mild left facial droop otherwise nonfocal exam  2. Neurodiagnostics:  1. MRI brain with and without as above  3. Medications:  1. Aspirin/zetia per your home regimen  4. PT/OT/ST:  1. Per their recommendations  5. Follow-up:  1. As outpatient with Neurology     Nothing further from neurology standpoint, we will sign off. If you have any further questions please do not hesitate to contact us. Thank you for your consultation    Patient discussed with Dr. Kourtney Correa in detail. Thank you for allowing us to participate in the care of your patient. If there are any questions regarding evaluation please feel free to contact us.      LIAN Mccarty - CATARINO, 2/22/2022

## 2022-02-23 LAB
CULTURE: NORMAL
Lab: NORMAL
SPECIMEN: NORMAL

## 2022-02-26 NOTE — PROGRESS NOTES
Physician Progress Note      PATIENT:               Nolan Rodriguez  CSN #:                  [id number]  :                       1960  ADMIT DATE:       2022 4:20 PM  [address] [place] DATE:        2022 3:18 PM  RESPONDING  PROVIDER #:        Talha Allen          QUERY TEXT:    Pt admitted with Nausea and vomiting and diarrhea. Pt noted to have recent   chemotherapy . If possible, please document in progress notes and discharge   summary if you are evaluating and /or treating any of the following: The medical record reflects the following:  Risk Factors: T2NO left breast cancer, s/p adjuvant chemo,  Anastrozole tx   current  Clinical Indicators: \"T2N0 left breast invasive ductal carcinoma, status post   bilateral mastectomy in May 2021, status post adjuvant chemotherapy [completed   on 2022], currently on adjuvant endocrine therapy with anastrozole,   presented to emergency department on 2022 with malaise, intermittent   diarrhea, loss of appetite and dizziness for 3 to 4 weeks duration. She called Dr Erin Hung and was told the she has stomach flu. \"\"presented to   emergency department on 2022 with malaise, intermittent diarrhea, loss of   appetite and dizziness for 3 to 4 weeks duration. \"Per Dr Tanya Hernandez, 1 out of 2 sets   of blood cultures was positive for gram-positive bacilli, \"? Related to   underlying malignancy ending on chemotherapy Viral gastroenteritis cannot be   excluded \" documented by Dt T Mickeal Seats  Treatment: IV fluids, Blood cx, ID consult, Cefepime ,Clear Liquid Oral      Thank you, Emily Tubbs RN, CDS ([phone])  Options provided:  -- Bacterial Colitis  -- Toxic Gastroenteritis  -- Viral Gastroenteritis  -- Other - I will add my own diagnosis  -- Disagree - Not applicable / Not valid  -- Disagree - Clinically unable to determine / Unknown  -- Refer to Clinical Documentation Reviewer    PROVIDER RESPONSE TEXT:    This patient has bacterial colitis.     Query created by: Viet Acosta on 2/25/2022 7:07 AM      Electronically signed by:  Jess Ramos 2/26/2022 4:36 PM

## 2022-02-27 LAB
CULTURE: NORMAL
CULTURE: NORMAL
Lab: NORMAL
Lab: NORMAL
SPECIMEN: NORMAL
SPECIMEN: NORMAL

## 2022-03-07 ENCOUNTER — HOSPITAL ENCOUNTER (OUTPATIENT)
Dept: INFUSION THERAPY | Age: 62
Discharge: HOME | End: 2022-03-07

## 2022-03-07 ENCOUNTER — OFFICE VISIT (OUTPATIENT)
Dept: ONCOLOGY | Age: 62
End: 2022-03-07
Payer: COMMERCIAL

## 2022-03-07 VITALS
RESPIRATION RATE: 18 BRPM | HEIGHT: 64 IN | OXYGEN SATURATION: 97 % | HEART RATE: 102 BPM | DIASTOLIC BLOOD PRESSURE: 80 MMHG | WEIGHT: 221 LBS | BODY MASS INDEX: 37.73 KG/M2 | SYSTOLIC BLOOD PRESSURE: 132 MMHG | TEMPERATURE: 98.2 F

## 2022-03-07 DIAGNOSIS — C50.912 MUCINOUS CARCINOMA OF BREAST, LEFT (HCC): Primary | ICD-10-CM

## 2022-03-07 PROCEDURE — 99214 OFFICE O/P EST MOD 30 MIN: CPT | Performed by: INTERNAL MEDICINE

## 2022-03-07 PROCEDURE — G8484 FLU IMMUNIZE NO ADMIN: HCPCS | Performed by: INTERNAL MEDICINE

## 2022-03-07 PROCEDURE — 1111F DSCHRG MED/CURRENT MED MERGE: CPT | Performed by: INTERNAL MEDICINE

## 2022-03-07 PROCEDURE — 3017F COLORECTAL CA SCREEN DOC REV: CPT | Performed by: INTERNAL MEDICINE

## 2022-03-07 PROCEDURE — 1036F TOBACCO NON-USER: CPT | Performed by: INTERNAL MEDICINE

## 2022-03-07 PROCEDURE — G8427 DOCREV CUR MEDS BY ELIG CLIN: HCPCS | Performed by: INTERNAL MEDICINE

## 2022-03-07 PROCEDURE — G8417 CALC BMI ABV UP PARAM F/U: HCPCS | Performed by: INTERNAL MEDICINE

## 2022-03-07 NOTE — PROGRESS NOTES
[facility] Rooming Questions  Patient: Snehal Saravia  MRN: [id number]    Date: 3/7/2022        1. Do you have any new issues? yes - Pt c/o feeling lightheaded x 2 weeks         2. Do you need any refills on medications?    no    3. Have you had any imaging done since your last visit?   no    4. Have you been hospitalized or seen in the emergency room since your last visit here?   no    5. Did the patient have a depression screening completed today?  No    No data recorded     PHQ-9 Given to (if applicable):               PHQ-9 Score (if applicable):                     [] Positive     []  Negative              Does question #9 need addressed (if applicable)                     [] Yes    []  No               Louis Jones MA

## 2022-03-22 NOTE — PROGRESS NOTES
Patient Name:  Alannah Sheriff  Patient :  1960  Patient MRN:  <[id number]>     Primary Oncologist: Luis Alberto Gardiner MD  Referring Provider: Mariana Neil MD     Date of Service: 2022      Chief Complaint:    No chief complaint on file. She came in for follow-up visit. Patient Active Problem List:     Essential hypertension     Hyperlipidemia     Allergic rhinitis due to pollen     Morbid obesity due to excess calories     Hearing loss     Hot flashes due to surgical menopause     Gastroesophageal reflux disease     Chronic back pain     Anxiety and depression     Osteoarthritis     Meniere's disease     Insomnia     Precordial pain      Calculus of gallbladder without cholecystitis without obstruction     S/P laparoscopic cholecystectomy     Vasovagal syncope     Class 2 obesity due to excess calories without serious comorbidity in adult     Family history of early     Closed fracture of left ankle     Acute respiratory failure with hypoxia      Status post left hip replacement     Hyperglycemia        Infiltrating ductal carcinoma of left breast      S/P mastectomy, bilateral     Hx of lymph node excision    HPI:   Lelia Bauer is a pleasant 63year old female patient who was referred for evaluation of pT2, N0, MX invasive ductal carcinoma of the left breast, HR positive HER-2 negative, status post bilateral mastectomy in May 2021. She went for the routine mammogram in 2021 and denied any palpable lump to her breast.  Mammogram at that time showed at least 2 indeterminate complex masses at the 2:00 and 12 o'clock position in the left breast in the retroareolar region. 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   ER was more than 95%, NC more than 90%, HER-2/baltazar negative by FISH. MRI of bilateral breasts on April 19, 2021 showed:  1. Left breast multicentric disease with biopsy proven invasive ductal carcinoma with mucinous component at 11 o'clock and mucinous carcinomas at 2 o'clock and 12 o'clock in the retroareolar breast. [place] is at least 5 cm of separation between the 11 o'clock mass and 12 o'clock mass.  Additional smooth masses measuring up to 21 mm at biopsy sites are hematomas. 2. No MR evidence of malignancy in the contralateral right breast.      She had bilateral mastectomy on May 6, 2021. Pathology report showed : Invasive ductal and mucinous carcinoma of the left breast, retroareolar, grade 2, 4.5 cm, negative margin, -2 sentinel lymph nodes, ER more than 95%, FL 90%, HER-2/baltazar negative by FISH. Pathological stage classification T2, N0, MX. Pathologist felt that she has 1 contiguous tumor mass measuring about 4.5 cm rather than multicentric disease. I discussed with pathologist who stated that she does not have pure mucinous carcinoma of the breast.   CBC and CMP in March 2021 were grossly unremarkable. Due to family history and personal history of breast cancer, I referred for genetic counseling. She had colonoscopy with removal of 2 polyps in 2014 by Dr. Suzanne Olvera. Follow-up in 3 years was recommended. She refused to have further follow-up. Oncotype DX score was 29. I offered adjuvant chemo therapy TC vs AC +T  She opted to [facility] [place] by [facility] +T. In May 2021 she was hospitalized for nausea and vomiting. She was seen by GI. She had mild transaminitis and was suspected to have gastroenteritis. Never had any upper endoscopic study. Colonoscopy in June 2014 showed polyps which was removed. Pathology report showed tubular adenoma.   CT abdomen and pelvis on May 30, 2021:  No acute abnormality within the abdomen and pelvis.   Status post hysterectomy, appendectomy and cholecystectomy.    15 mm subcutaneus soft tissue nodule within the left lower quadrant area of anterior abdomen.  Finding may represent sebaceous cyst or other pathology. CTA chest on May 30, 2021 showed   1. No evidence of acute pulmonary embolism. 2. Soft tissue thickening and fat stranding overlying the bilateral pectoral muscles. CT head on May 31, 2021 showed  No acute intracranial abnormality.  No intraparenchymal abnormal enhancement to suggest intracranial metastatic disease.  MRI is a superior modality for evaluation of intracranial metastasis.   Small probable meningioma within the falx near the convexity. MRI of the brain on June 1, 2021 showed:  No acute infarct scattered areas of chronic white matter change in the periventricular white matter.   No evidence for blood products on gradient imaging.    No  focal intracranial lesion noted     Amylase and lipase were unremarkable. She was placed on reglan with improvement of nausea and vomiting. She was recommended to have upper endoscopic study as outpatient by GI. She is status post bilateral mastectomy. She had EGD By Dr. Israel Escalante with diagnosis of hiatal hernia, gastric polyp, mild gastritis. She started adjuvant chemotherapy AC on July 16, 2021. SBFT: Unremarkable small bowel follow through series. Bone density in June 2021 showed normal study. ECHO 6/18/2021:  Left ventricular systolic function is normal.  Ejection fraction is visually estimated at 55-60%. CTA 8/12 no PE. She had stress test 8/13/21 with no infarct or ischemia, normal EF 64%. She has seen GI and was recommended esophageal stretching. Esophagram 9/13/2021:  Small hiatal hernia with narrowing of the distal thoracic esophagus just proximal to the hiatal hernia, which may reflect underlying Schatzki's ring. Ingested barium and barium tablet were initially held up at this level before eventually passing into the stomach. She completed fourth cycle of AC on September 17, 2021.   She started Taxol on October 8, 2021 and completed on January 5, 2022. On December 8, 2021 ferritin was 356, iron 54, TIBC 2024, saturation 17%. Folate was 10.8, B12 461.8. On December 22, 2021 hemoglobin was 10.4, MCV 91.8. Anemia is likely related to chemotherapy. She started adjuvant Arimidex after completion of chemo. She was hospitalized in February 2022 due to NVD which she thought related to stomach flu. She was treated with antibiotic for bacterial colitis. She called Dr Ramirez Diaz and is scheduled for colonoscopy in the near future. CT abdomen in January 2022 was negative for mets. CT head in 2/2022 was negative for mets. CTA in 2/2022 was negative for mets. MRI of brain 2/21/2022:  No acute intracranial ischemia or intracranial metastatic disease   Mild chronic microvascular disease within the periventricular white matter  She will continue with arimidex. On 4/18/2022 she came in for follow up visit. She still has the intermittent diarrhea. I will switch Arimidex to Femara. I strongly recommend she follow-up with GI.  I recommend to drink enough water. Also recommend to drink Pedialyte whenever she has diarrhea. I recommend to take Imodium. We discussed about the potential side effect of Femara. Femara may cause nausea also. Advised to call us if she has any issue with Femara. We will reassess again in 4 weeks. Denies any acute pain. She has intermittent nausea and diarrhea. She has low appetite. I recommend to eat frequent meals. No fever or chills. No chest pain, shortness of breath or palpitation. No headache or dizzy spell. No specific bone pain. No melena or hematochezia. Denied any dysuria or hematuria.     Past Medical History:   Past Medical History:   Diagnosis Date    Allergic rhinitis due to pollen 11/19/2015    Arthritis     left hip & knee    Chronic back pain     Dehydration 7/22/2021    Depression     Gastroesophageal reflux disease 11/19/2015    Hearing loss 11/19/2015    History of blood transfusion No reaction per pt    History of cardiac monitoring 04/18/2017    14 day event monitor. Sinus Rhythm    History of nuclear stress test 09/28/2016    cardiolite-normal,EF70%    Hot flashes due to surgical menopause 11/19/2015    Hx of echocardiogram 10/05/2016    EF: >55 %   Mild mitral and tricuspid regurg.  Hyperlipidemia     Hypertension     Follows with PCP    Lexiscan Stress Test 10/14/2020    EF 60%, Normal study, no ischemia    Meniere disease     Morbid obesity due to excess calories ([address] 11/19/2015    Sleep apnea 11/19/2015    sleep study negative    Tilt table evaluation 05/09/2017     positive for neurocardiogenic syncope      Past Surgery History:    Past Surgical History:   Procedure Laterality Date    APPENDECTOMY  1967    CHOLECYSTECTOMY      2017    COLONOSCOPY  2014    Polyps x2 - Dr. Sherren Rily Bilateral 05/06/2021    Dr. Simba Mena Left 10/19/2020    LEFT HIP TOTAL ARTHROPLASTY - POSTERIOR performed by Fabián Guerra MD at [facility] LEFT Left 3/9/2021    US BREAST BIOPSY NEEDLE ADDITIONAL LEFT 3/9/2021 Fitz Werner MD [address] BREAST BIOPSY NEEDLE ADDITIONAL LEFT Left 3/9/2021    US BREAST BIOPSY NEEDLE ADDITIONAL LEFT 3/9/2021 Fitz Werner MD [address] BREAST NEEDLE BIOPSY LEFT Left 3/9/2021    US BREAST NEEDLE BIOPSY LEFT 3/9/2021 Fitz Werner  [facility]   She had complete hysterectomy in 1999 due to endometriosis. Social History:   She denies any history of smoking. No alcohol or illicit drug use. She denies any children. Family History: Mother had stomach and breast cancer, maternal grandmother had breast cancer, colon cancer and stomach cancer. Review of Systems: The remainder of the review of system is unremarkable. Vital Signs: There were no vitals taken for this visit.      Physical Exam:  CONSTITUTIONAL: awake, alert, cooperative, no apparent distress   EYES:  sclera clear and pale conjunctiva  ENT: Normocephalic, without obvious abnormality, atraumatic  NECK: supple, symmetrical.  No JVD. HEMATOLOGIC/LYMPHATIC: no cervical, supraclavicular or axillary lymphadenopathy   LUNGS: Clear to auscultation and percussion bilaterally. BREAST: Status post bilateral mastectomy. CARDIOVASCULAR: regular rate and rhythm, normal S1 and S2, no murmur   ABDOMEN: bowel sound present, soft, non-distended, non-tender, no masses palpated, no hepatosplenomegaly. No palpable mass. MUSCULOSKELETAL: full range of motion noted, tone is normal  NEUROLOGIC:  Motor and sensory grossly intact. CN II - XII grossly intact. SKIN: No jaundice. Dry and warm skin no ecchymosis. EXTREMITIES: no LE edema. No cyanosis.       Labs:  Hematology:  Lab Results   Component Value Date    WBC 5.6 02/21/2022    RBC 3.94 (L) 02/21/2022    HGB 11.2 (L) 02/21/2022    HCT 35.0 (L) 02/21/2022    MCV 88.8 02/21/2022    MCH 28.4 02/21/2022    MCHC 32.0 02/21/2022    RDW 14.0 02/21/2022     02/21/2022    MPV 10.2 02/21/2022    BANDSPCT 9 11/02/2020    SEGSPCT 64.2 02/21/2022    EOSRELPCT 3.7 (H) 02/21/2022    BASOPCT 0.7 02/21/2022    LYMPHOPCT 22.5 (L) 02/21/2022    MONOPCT 8.5 (H) 02/21/2022    BANDABS 1.86 11/02/2020    SEGSABS 3.6 02/21/2022    EOSABS 0.2 02/21/2022    BASOSABS 0.0 02/21/2022    LYMPHSABS 1.3 02/21/2022    MONOSABS 0.5 02/21/2022    DIFFTYPE AUTOMATED DIFFERENTIAL 02/21/2022    ANISOCYTOSIS 1+ 11/02/2020    POLYCHROM 1+ 11/02/2020    WBCMORP FEW 11/02/2020    PLTM ADEQUATE 08/12/2021     Lab Results   Component Value Date     (H) 10/25/2020     Chemistry:  Lab Results   Component Value Date     02/22/2022    K 4.0 02/22/2022     02/22/2022    CO2 20 (L) 02/22/2022    BUN 6 02/22/2022    CREATININE 1.1 02/22/2022    GLUCOSE 142 (H) 02/22/2022    CALCIUM 9.1 02/22/2022    PROT 5.4 (L) 02/19/2022    LABALBU 3.4 02/19/2022    BILITOT 0.6 02/19/2022    ALKPHOS 68 02/19/2022    AST 27 02/19/2022    ALT 22 02/19/2022    LABGLOM 51 (L) 02/22/2022    GFRAA >60 02/22/2022    AGRATIO 1.7 12/07/2015    GLOB 2.7 12/07/2015    MG 1.8 02/22/2022    POCCA 1.13 09/09/2021    POCGLU 115 (H) 09/09/2021     Lab Results   Component Value Date     (H) 11/14/2020     Lab Results   Component Value Date    TSHHS 2.650 02/20/2022    T4FREE 1.02 03/01/2021     Immunology:  Lab Results   Component Value Date    PROT 5.4 (L) 02/19/2022     Coagulation Panel:  Lab Results   Component Value Date    PROTIME 14.3 02/19/2022    INR 1.11 02/19/2022    APTT 40.4 (H) 02/19/2022    DDIMER 389 (H) 02/17/2022     Anemia Panel:  Lab Results   Component Value Date    PJESIFYF30 461.8 12/08/2021    FOLATE 10.8 12/08/2021      Observations:  No data recorded     Assessment & Plan:  1. 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 Pathological stage T2, N0 sentinel lymph node MX. Oncotype DX was 29. I offered adjuvant chemotherapy  She opted to [facility] + T. She started chemo in June 2021 and completed on September 17, 2021. She started Taxol on October 8, 2028 and completed in January 2022. She started arimidex in January 2022. I reviewed CT CAP and MRI of brain from the hospital in January and February 2022. She is in remission. Due to the intermittent diarrhea, I will switch Arimidex to Femara. 2.  Genetic counseling: VUS JUHI    3. Bone density on June 11, 2021 was normal.  Echo in June 2021 showed LVEF at 55 to 60%. 4. She has N/V ? Gastroenteritis. EGD as above. Started on Pantoprazole. Small bowel follow through was unremarkable on 8/2/21. Esophagogram was noted. She was recommended to have esophageal stretching. Changed zofran to compazine.    She has EGD and dilatation of the esophagus in September 2021. She stopped having issue again with dysphagia. I recommend to follow-up with GI. She plans to follow-up with surgeon in January 2022 to have surgery for the hiatal hernia. She was hospitalized in February 2022 due NVD. She is scheduled for colonoscopy in the near future. She was treated with antibiotic for presumable bacterial colitis. She still has intermittent diarrhea. I recommend to take Imodium and drink enough fluid at home. I will switch Arimidex to Femara. I recommend follow-up with GI.    5. Mental health: Reports suicidal ideation, 1 past attempt, on Prozac 20 mg, has counselor but was referred to our counselor. We mad referral to mental health. She is contracted for safety.  had face-to-face visit with patient. 6.  She has anemia. Anemia workup 12/8/21 reviewed. I will continue to monitor CBC. We discussed about diet and weight loss. She has not considered Covid vaccine yet. Return to clinic in 4 weeks. All of her question has been answered for today.

## 2022-04-18 ENCOUNTER — OFFICE VISIT (OUTPATIENT)
Dept: ONCOLOGY | Age: 62
End: 2022-04-18
Payer: COMMERCIAL

## 2022-04-18 ENCOUNTER — HOSPITAL ENCOUNTER (OUTPATIENT)
Dept: INFUSION THERAPY | Age: 62
Discharge: HOME | End: 2022-04-18

## 2022-04-18 VITALS
HEART RATE: 91 BPM | OXYGEN SATURATION: 98 % | RESPIRATION RATE: 18 BRPM | BODY MASS INDEX: 37.73 KG/M2 | WEIGHT: 221 LBS | DIASTOLIC BLOOD PRESSURE: 84 MMHG | HEIGHT: 64 IN | TEMPERATURE: 97.2 F | SYSTOLIC BLOOD PRESSURE: 143 MMHG

## 2022-04-18 DIAGNOSIS — C50.912 INFILTRATING DUCTAL CARCINOMA OF LEFT BREAST (HCC): Primary | ICD-10-CM

## 2022-04-18 PROCEDURE — 3017F COLORECTAL CA SCREEN DOC REV: CPT | Performed by: INTERNAL MEDICINE

## 2022-04-18 PROCEDURE — 99214 OFFICE O/P EST MOD 30 MIN: CPT | Performed by: INTERNAL MEDICINE

## 2022-04-18 PROCEDURE — G8427 DOCREV CUR MEDS BY ELIG CLIN: HCPCS | Performed by: INTERNAL MEDICINE

## 2022-04-18 PROCEDURE — G8417 CALC BMI ABV UP PARAM F/U: HCPCS | Performed by: INTERNAL MEDICINE

## 2022-04-18 PROCEDURE — 1036F TOBACCO NON-USER: CPT | Performed by: INTERNAL MEDICINE

## 2022-04-18 RX ORDER — LETROZOLE 2.5 MG/1
2.5 TABLET, FILM COATED ORAL DAILY
Qty: 30 TABLET | Refills: 3 | Status: SHIPPED | OUTPATIENT
Start: 2022-04-18

## 2022-04-18 NOTE — PROGRESS NOTES
MA Rooming Questions  Patient: Leah Child  MRN: [id number]    Date: 4/18/2022        1. Do you have any new issues? yes - Pt c/o loss of appetite         2. Do you need any refills on medications?    no    3. Have you had any imaging done since your last visit?   no    4. Have you been hospitalized or seen in the emergency room since your last visit here?   no    5. Did the patient have a depression screening completed today?  No    No data recorded     PHQ-9 Given to (if applicable):               PHQ-9 Score (if applicable):                     [] Positive     []  Negative              Does question #9 need addressed (if applicable)                     [] Yes    []  No               Briana Mackey MA

## 2022-04-20 NOTE — PROGRESS NOTES
Patient Name:  Gail Stockton  Patient :  1960  Patient MRN:  [id number]     Primary Oncologist: Flor Damon MD  Referring Provider: Nelly Gomes MD     Date of Service: 2022      Chief Complaint:    Chief Complaint   Patient presents with    Follow-up      She came in for follow-up visit. Patient Active Problem List:     Essential hypertension     Hyperlipidemia     Allergic rhinitis due to pollen     Morbid obesity due to excess calories     Hearing loss     Hot flashes due to surgical menopause     Gastroesophageal reflux disease     Chronic back pain     Anxiety and depression     Osteoarthritis     Meniere's disease     Insomnia     Precordial pain      Calculus of gallbladder without cholecystitis without obstruction     S/P laparoscopic cholecystectomy     Vasovagal syncope     Class 2 obesity due to excess calories without serious comorbidity in adult     Family history of early     Closed fracture of left ankle     Acute respiratory failure with hypoxia      Status post left hip replacement     Hyperglycemia        Infiltrating ductal carcinoma of left breast      S/P mastectomy, bilateral     Hx of lymph node excision    HPI:   Mickey Gramajo is a pleasant 63year old female patient who was referred for evaluation of pT2, N0, MX invasive ductal carcinoma of the left breast, HR positive HER-2 negative, status post bilateral mastectomy in May 2021. She went for the routine mammogram in 2021 and denied any palpable lump to her breast.  Mammogram at that time showed at least 2 indeterminate complex masses at the 2:00 and 12 o'clock position in the left breast in the retroareolar region. 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   ER was more than 95%, MO more than 90%, HER-2/baltazar negative by FISH. MRI of bilateral breasts on April 19, 2021 showed:  1. Left breast multicentric disease with biopsy proven invasive ductal carcinoma with mucinous component at 11 o'clock and mucinous carcinomas at 2 o'clock and 12 o'clock in the retroareolar breast. Calvin Lava is at least 5 cm of separation between the 11 o'clock mass and 12 o'clock mass.  Additional smooth masses measuring up to 21 mm at biopsy sites are hematomas. 2. No MR evidence of malignancy in the contralateral right breast.      She had bilateral mastectomy on May 6, 2021. Pathology report showed : Invasive ductal and mucinous carcinoma of the left breast, retroareolar, grade 2, 4.5 cm, negative margin, -2 sentinel lymph nodes, ER more than 95%, MO 90%, HER-2/baltazar negative by FISH. Pathological stage classification T2, N0, MX. Pathologist felt that she has 1 contiguous tumor mass measuring about 4.5 cm rather than multicentric disease. I discussed with pathologist who stated that she does not have pure mucinous carcinoma of the breast.   CBC and CMP in March 2021 were grossly unremarkable. Due to family history and personal history of breast cancer, I referred for genetic counseling. She had colonoscopy with removal of 2 polyps in 2014 by Dr. Warren Daigle. Follow-up in 3 years was recommended. She refused to have further follow-up. Oncotype DX score was 29. I offered adjuvant chemo therapy TC vs AC +T  She opted to [facility] +T. In May 2021 she was hospitalized for nausea and vomiting. She was seen by GI. She had mild transaminitis and was suspected to have gastroenteritis. Never had any upper endoscopic study. Colonoscopy in June 2014 showed polyps which was removed. Pathology report showed tubular adenoma.   CT abdomen and pelvis on May 30, 2021:  No acute abnormality within the abdomen and pelvis.   Status post hysterectomy, appendectomy and cholecystectomy.    15 mm subcutaneus soft tissue nodule within the left lower quadrant area of anterior abdomen.  Finding may represent sebaceous cyst or other pathology. CTA chest on May 30, 2021 showed   1. No evidence of acute pulmonary embolism. 2. Soft tissue thickening and fat stranding overlying the bilateral pectoral muscles. CT head on May 31, 2021 showed  No acute intracranial abnormality.  No intraparenchymal abnormal enhancement to suggest intracranial metastatic disease.  MRI is a superior modality for evaluation of intracranial metastasis.   Small probable meningioma within the falx near the convexity. MRI of the brain on June 1, 2021 showed:  No acute infarct scattered areas of chronic white matter change in the periventricular white matter.   No evidence for blood products on gradient imaging.    No  focal intracranial lesion noted     Amylase and lipase were unremarkable. She was placed on reglan with improvement of nausea and vomiting. She was recommended to have upper endoscopic study as outpatient by GI. She is status post bilateral mastectomy. She had EGD By Dr. Sarah Deng with diagnosis of hiatal hernia, gastric polyp, mild gastritis. She started adjuvant chemotherapy AC on July 16, 2021. SBFT: Unremarkable small bowel follow through series. Bone density in June 2021 showed normal study. ECHO 6/18/2021:  Left ventricular systolic function is normal.  Ejection fraction is visually estimated at 55-60%. CTA 8/12 no PE. She had stress test 8/13/21 with no infarct or ischemia, normal EF 64%. She has seen GI and was recommended esophageal stretching. Esophagram 9/13/2021:  Small hiatal hernia with narrowing of the distal thoracic esophagus just proximal to the hiatal hernia, which may reflect underlying Schatzki's ring. Ingested barium and barium tablet were initially held up at this level before eventually passing into the stomach. She completed fourth cycle of AC on September 17, 2021.   She started Taxol on October 8, 2021 and completed on January 5, 2022. On December 8, 2021 ferritin was 356, iron 54, TIBC 2024, saturation 17%. Folate was 10.8, B12 461.8. On December 22, 2021 hemoglobin was 10.4, MCV 91.8. Anemia is likely related to chemotherapy. She started adjuvant Arimidex after completion of chemo. She was hospitalized in February 2022 due to NVD which she thought related to stomach flu. She was treated with antibiotic for bacterial colitis. She called Dr Kallie Barron and is scheduled for colonoscopy in the near future. CT abdomen in January 2022 was negative for mets. CT head in 2/2022 was negative for mets. CTA in 2/2022 was negative for mets. MRI of brain 2/21/2022:  No acute intracranial ischemia or intracranial metastatic disease   Mild chronic microvascular disease within the periventricular white matter  She will continue with arimidex. She has the intermittent diarrhea. I switched Arimidex to Femara in April 2022. I strongly recommend she follow-up with GI.  I recommend to drink enough water. Also recommend to drink Pedialyte whenever she has diarrhea. I recommend to take Imodium. We discussed about the potential side effect of Femara. Femara may cause nausea also. Advised to call us if she has any issue with Femara. On May 18, 2022 she came in for follow up visit. She still has intermittent diarrhea. She will continue with femara. I recommend to follow up with Dr Kallie Barron for diarrhea. Reportedly last colonoscopy was in 2014. I recommend to drink pedialyte for each diarrhea. Denied any acute pain. She has intermittent nausea and diarrhea. She has low appetite. I recommend to eat frequent meals. No fever or chills. No chest pain, shortness of breath or palpitation. No headache or dizzy spell. No specific bone pain. No melena or hematochezia. Denied any dysuria or hematuria.     Past Medical History:   Past Medical History:   Diagnosis Date    Allergic rhinitis due to pollen 11/19/2015  Arthritis     left hip & knee    Chronic back pain     Dehydration 7/22/2021    Depression     Gastroesophageal reflux disease 11/19/2015    Hearing loss 11/19/2015    History of blood transfusion     No reaction per pt    History of cardiac monitoring 04/18/2017    14 day event monitor. Sinus Rhythm    History of nuclear stress test 09/28/2016    cardiolite-normal,EF70%    Hot flashes due to surgical menopause 11/19/2015    Hx of echocardiogram 10/05/2016    EF: >55 %   Mild mitral and tricuspid regurg.  Hyperlipidemia     Hypertension     Follows with PCP    Lexiscan Stress Test 10/14/2020    EF 60%, Normal study, no ischemia    Meniere disease     Morbid obesity due to excess calories ([facility] [address] 11/19/2015    Sleep apnea 11/19/2015    sleep study negative    Tilt table evaluation 05/09/2017     positive for neurocardiogenic syncope      Past Surgery History:    Past Surgical History:   Procedure Laterality Date    APPENDECTOMY  1967    CHOLECYSTECTOMY      2017    COLONOSCOPY  2014    Polyps x2 - Dr. Myriam Fink Bilateral 05/06/2021    Dr. Uriel Nguyen Left 10/19/2020    LEFT HIP TOTAL ARTHROPLASTY - POSTERIOR performed by Sahil Toledo MD at [facility] LEFT Left 3/9/2021    US BREAST BIOPSY NEEDLE ADDITIONAL LEFT 3/9/2021 Lizzie Soliz MD [address] BREAST BIOPSY NEEDLE ADDITIONAL LEFT Left 3/9/2021    US BREAST BIOPSY NEEDLE ADDITIONAL LEFT 3/9/2021 Lizzie Soliz MD [address] BREAST NEEDLE BIOPSY LEFT Left 3/9/2021    US BREAST NEEDLE BIOPSY LEFT 3/9/2021 Lizzie Soliz  [facility]   She had complete hysterectomy in 1999 due to endometriosis. Social History:   She denies any history of smoking. No alcohol or illicit drug use. She denies any children. Family History:    Mother had stomach and breast cancer, maternal grandmother had breast cancer, colon cancer and stomach cancer. Review of Systems: The remainder of the review of system is unremarkable. Vital Signs: /83 (Site: Right Upper Arm, Position: Sitting, Cuff Size: Large Adult)   Pulse 91   Temp 97.1 °F (36.2 °C) (Infrared)   Resp 16   Ht 5' 4\" (1.626 m)   Wt 220 lb (99.8 kg)   SpO2 98%   BMI 37.76 kg/m²      Physical Exam:  CONSTITUTIONAL: awake, alert, cooperative, no apparent distress   EYES:  sclera clear and pale conjunctiva  ENT: Normocephalic, without obvious abnormality, atraumatic  NECK: supple, symmetrical.  No JVD. HEMATOLOGIC/LYMPHATIC: no cervical, supraclavicular or axillary lymphadenopathy   LUNGS: Clear to auscultation and percussion bilaterally. BREAST: Status post bilateral mastectomy. CARDIOVASCULAR: regular rate and rhythm, normal S1 and S2, no murmur   ABDOMEN: bowel sound present, soft, non-distended, mild tenderness. No palpable mass. MUSCULOSKELETAL: full range of motion noted, tone is normal  NEUROLOGIC:  Motor and sensory grossly intact. CN II - XII grossly intact. SKIN: No jaundice. Dry and warm skin no ecchymosis. EXTREMITIES: no LE edema. No cyanosis.       Labs:  Hematology:  Lab Results   Component Value Date    WBC 5.6 02/21/2022    RBC 3.94 (L) 02/21/2022    HGB 11.2 (L) 02/21/2022    HCT 35.0 (L) 02/21/2022    MCV 88.8 02/21/2022    MCH 28.4 02/21/2022    MCHC 32.0 02/21/2022    RDW 14.0 02/21/2022     02/21/2022    MPV 10.2 02/21/2022    BANDSPCT 9 11/02/2020    SEGSPCT 64.2 02/21/2022    EOSRELPCT 3.7 (H) 02/21/2022    BASOPCT 0.7 02/21/2022    LYMPHOPCT 22.5 (L) 02/21/2022    MONOPCT 8.5 (H) 02/21/2022    BANDABS 1.86 11/02/2020    SEGSABS 3.6 02/21/2022    EOSABS 0.2 02/21/2022    BASOSABS 0.0 02/21/2022    LYMPHSABS 1.3 02/21/2022    MONOSABS 0.5 02/21/2022    DIFFTYPE AUTOMATED DIFFERENTIAL 02/21/2022    ANISOCYTOSIS 1+ 11/02/2020    POLYCHROM 1+ 11/02/2020    WBCMORP FEW 11/02/2020    PLTM ADEQUATE 08/12/2021 Lab Results   Component Value Date     (H) 10/25/2020     Chemistry:  Lab Results   Component Value Date     02/22/2022    K 4.0 02/22/2022     02/22/2022    CO2 20 (L) 02/22/2022    BUN 6 02/22/2022    CREATININE 1.1 02/22/2022    GLUCOSE 142 (H) 02/22/2022    CALCIUM 9.1 02/22/2022    PROT 5.4 (L) 02/19/2022    LABALBU 3.4 02/19/2022    BILITOT 0.6 02/19/2022    ALKPHOS 68 02/19/2022    AST 27 02/19/2022    ALT 22 02/19/2022    LABGLOM 51 (L) 02/22/2022    GFRAA >60 02/22/2022    AGRATIO 1.7 12/07/2015    GLOB 2.7 12/07/2015    MG 1.8 02/22/2022    POCCA 1.13 09/09/2021    POCGLU 115 (H) 09/09/2021     Lab Results   Component Value Date     (H) 11/14/2020     Lab Results   Component Value Date    TSHHS 2.650 02/20/2022    T4FREE 1.02 03/01/2021     Immunology:  Lab Results   Component Value Date    PROT 5.4 (L) 02/19/2022     Coagulation Panel:  Lab Results   Component Value Date    PROTIME 14.3 02/19/2022    INR 1.11 02/19/2022    APTT 40.4 (H) 02/19/2022    DDIMER 389 (H) 02/17/2022     Anemia Panel:  Lab Results   Component Value Date    [id number] 461.8 12/08/2021    FOLATE 10.8 12/08/2021      Observations:  No data recorded     Assessment & Plan:  1. 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 Pathological stage T2, N0 sentinel lymph node MX. Oncotype DX was 29. I offered adjuvant chemotherapy  She opted to [facility] + T. She started chemo in June 2021 and completed on September 17, 2021. She started Taxol on October 8, 2028 and completed in January 2022. She started arimidex in January 2022. I reviewed CT CAP and MRI of brain from the hospital in January and February 2022. She is in remission. Due to the diarrhea, I switched Arimidex to Femara in April 2022. She still has intermittent diarrhea. I recommend to follow up with Dr Jeannene Goldberg.     2.  Genetic

## 2022-04-21 ENCOUNTER — OFFICE VISIT (OUTPATIENT)
Dept: CARDIOLOGY CLINIC | Age: 62
End: 2022-04-21
Payer: COMMERCIAL

## 2022-04-21 VITALS
WEIGHT: 216 LBS | HEIGHT: 64 IN | SYSTOLIC BLOOD PRESSURE: 80 MMHG | BODY MASS INDEX: 36.88 KG/M2 | DIASTOLIC BLOOD PRESSURE: 60 MMHG

## 2022-04-21 DIAGNOSIS — E78.2 MIXED HYPERLIPIDEMIA: ICD-10-CM

## 2022-04-21 DIAGNOSIS — R00.2 PALPITATIONS: ICD-10-CM

## 2022-04-21 DIAGNOSIS — R07.2 PRECORDIAL PAIN: ICD-10-CM

## 2022-04-21 DIAGNOSIS — R06.02 SOB (SHORTNESS OF BREATH): Primary | ICD-10-CM

## 2022-04-21 DIAGNOSIS — I10 ESSENTIAL HYPERTENSION: ICD-10-CM

## 2022-04-21 DIAGNOSIS — R42 DIZZINESS: ICD-10-CM

## 2022-04-21 DIAGNOSIS — R55 VASOVAGAL SYNCOPE: ICD-10-CM

## 2022-04-21 PROCEDURE — G8417 CALC BMI ABV UP PARAM F/U: HCPCS | Performed by: INTERNAL MEDICINE

## 2022-04-21 PROCEDURE — 1036F TOBACCO NON-USER: CPT | Performed by: INTERNAL MEDICINE

## 2022-04-21 PROCEDURE — 93000 ELECTROCARDIOGRAM COMPLETE: CPT | Performed by: INTERNAL MEDICINE

## 2022-04-21 PROCEDURE — 3017F COLORECTAL CA SCREEN DOC REV: CPT | Performed by: INTERNAL MEDICINE

## 2022-04-21 PROCEDURE — 99214 OFFICE O/P EST MOD 30 MIN: CPT | Performed by: INTERNAL MEDICINE

## 2022-04-21 PROCEDURE — G8427 DOCREV CUR MEDS BY ELIG CLIN: HCPCS | Performed by: INTERNAL MEDICINE

## 2022-04-21 RX ORDER — MIDODRINE HYDROCHLORIDE 5 MG/1
5 TABLET ORAL 3 TIMES DAILY
Qty: 90 TABLET | Refills: 3 | Status: SHIPPED | OUTPATIENT
Start: 2022-04-21

## 2022-04-21 NOTE — LETTER
2022  3:30 PM    Patient Name: Emily Willams  : 1960  MRN# [id number]    REASON FOR VISIT: 6 month     Patient Active Problem List    Diagnosis Date Noted    Vasovagal syncope     Gram-positive bacteremia 2022    Failure to thrive (child) 2022    Chronic gastroesophageal reflux disease 2022    Hiatal hernia 10/22/2021    Schatzki's ring of distal esophagus 10/22/2021    Spinal stenosis of lumbar region 10/21/2021    Agranulocytosis secondary to cancer chemotherapy ([address] 2021    Chest pain 2021    Dehydration 2021    Port-A-Cath in place 2021    Personal history of breast cancer 2021    Nausea and vomiting 2021    Post-op pain 2021    S/P mastectomy, bilateral 2021    Hx of lymph node excision 2021    Infiltrating ductal carcinoma of left breast ([address] 2021    Mucinous carcinoma of breast, left ([address] 2021    Malignant neoplasm of left female breast ([address] 2021    Hyperglycemia 2021    Status post left hip replacement 2020    Acute respiratory failure with hypoxia ([address] 2020    Closed fracture of left ankle 10/22/2020    Family history of early CAD 2020    Palpitations 2019    Class 2 obesity due to excess calories without serious comorbidity in adult 2018    S/P laparoscopic cholecystectomy 2017    Calculus of gallbladder without cholecystitis without obstruction 2017    Precordial pain 2016    SOB (shortness of breath) 2016    Insomnia 2016    Anxiety and depression 2016    Osteoarthritis 2016    Meniere's disease 2016    Essential hypertension 2015    Mixed hyperlipidemia 2015    Allergic rhinitis due to pollen 2015    Morbid obesity due to excess calories ([address] 2015    Hearing loss 2015    Hot flashes due to surgical menopause 2015    Gastroesophageal reflux disease 11/19/2015    Chronic back pain 11/19/2015       Allergies: Celexa [citalopram], Atorvastatin, Fenofibrate, Mestinon [pyridostigmine], Penicillins, Sulfa antibiotics, Tape [adhesive tape], Gemfibrozil, and Meloxicam      Current Outpatient Medications   Medication Sig Dispense Refill    letrozole (FEMARA) 2.5 MG tablet Take 1 tablet by mouth daily 30 tablet 3    dexlansoprazole (DEXILANT) 60 MG CPDR delayed release capsule Take 1 capsule by mouth      anastrozole (ARIMIDEX) 1 MG tablet Take 1 tablet by mouth daily 30 tablet 3    lansoprazole (PREVACID) 30 MG delayed release capsule TAKE 1 CAPSULE BY MOUTH EVERY DAY      potassium chloride (KLOR-CON M) 20 MEQ extended release tablet TAKE 1 TABLET BY MOUTH TWO TIMES A DAY      sucralfate (CARAFATE) 1 GM/10ML suspension TAKE 10 MLS BY MOUTH FOUR TIMES A DAY ON AN EMPTY STOMACH BEFORE MEALS AND AT BEDTIME      gabapentin (NEURONTIN) 300 MG capsule Take 1 capsule by mouth nightly for 30 days.  90 capsule 3    sodium chloride (ALTAMIST SPRAY) 0.65 % nasal spray 1 spray by Nasal route as needed for Congestion 1 each 3    losartan (COZAAR) 25 MG tablet Take 1 tablet by mouth daily 90 tablet 3    ezetimibe (ZETIA) 10 MG tablet Take 1 tablet by mouth daily 90 tablet 3    prochlorperazine (COMPAZINE) 10 MG tablet Take 1 tablet by mouth every 6 hours as needed (chemotherapy induced nausea/vomiting) 30 tablet 1    Magic Mouthwash (MIRACLE MOUTHWASH) Swish and spit 5 mLs 4 times daily as needed for Irritation 500 mL 0    mirtazapine (REMERON) 15 MG tablet Take 1 tablet by mouth nightly 30 tablet 3    hydroCHLOROthiazide (HYDRODIURIL) 25 MG tablet Take 0.5 tablets by mouth daily 45 tablet 3    promethazine (PHENERGAN) 12.5 MG tablet Take 10 mg by mouth every 6 hours as needed for Nausea      metoprolol tartrate (LOPRESSOR) 50 MG tablet Take 1 tablet by mouth 2 times daily 180 tablet 3    metoclopramide (REGLAN) 5 MG tablet Take 1 tablet by mouth 3 times daily 30 tablet 1    furosemide (LASIX) 20 MG tablet Take 20 mg by mouth daily       aspirin 81 MG chewable tablet Take 81 mg by mouth daily      Multiple Vitamins-Minerals (HAIR SKIN AND NAILS FORMULA) TABS Take 1 tablet by mouth daily       acetaminophen (TYLENOL) 500 MG tablet Take 500 mg by mouth every 6 hours as needed for Pain      loratadine (CLARITIN) 10 MG tablet Take 1 tablet by mouth daily 30 tablet 5     No current facility-administered medications for this visit.          Lab Review   Lab Results   Component Value Date    CKTOTAL 97 09/20/2011    CKMB <0.2 09/20/2011    TROPONINT <0.010 02/18/2022    TROPONINT <0.010 02/17/2022     BNP:    Lab Results   Component Value Date    PROBNP 135.4 02/17/2022    PROBNP 76.43 01/28/2022     PT/INR:    Lab Results   Component Value Date    INR 1.11 02/19/2022    INR 1.07 06/02/2021     Lab Results   Component Value Date    LABA1C 6.0 08/13/2021    LABA1C 5.6 05/04/2021     Lab Results   Component Value Date    WBC 5.6 02/21/2022    WBC 6.2 02/19/2022    HCT 35.0 (L) 02/21/2022    HCT 37.7 02/19/2022    MCV 88.8 02/21/2022    MCV 88.7 02/19/2022     02/21/2022     02/19/2022     Lab Results   Component Value Date    CHOL 292 (H) 08/13/2021    CHOL 270 (H) 03/01/2021    TRIG 266 (H) 08/13/2021    TRIG 385 (H) 03/01/2021    HDL 42 08/13/2021    HDL 40 (L) 03/01/2021    LDLCALC NOT VALID WHEN TRIGLYCERIDE >400 MG/DL. 05/22/2017    LDLCALC 75 12/07/2015    LDLDIRECT 176 (H) 08/13/2021    LDLDIRECT 140 (H) 03/01/2021     Lab Results   Component Value Date    ALT 22 02/19/2022    ALT 24 02/17/2022    AST 27 02/19/2022    AST 31 02/17/2022     BMP:    Lab Results   Component Value Date     02/22/2022     02/21/2022    K 4.0 02/22/2022    K 3.3 02/21/2022     02/22/2022     02/21/2022    CO2 20 02/22/2022    CO2 24 02/21/2022    BUN 6 02/22/2022    BUN 7 02/21/2022    CREATININE 1.1 02/22/2022    CREATININE 1.2 02/21/2022 CMP:   Lab Results   Component Value Date     2022     2022    K 4.0 2022    K 3.3 2022     2022     2022    CO2 20 2022    CO2 24 2022    BUN 6 2022    BUN 7 2022    CREATININE 1.1 2022    CREATININE 1.2 2022    PROT 5.4 2022    PROT 6.8 2022    PROT 7.5 2011     TSH:    Lab Results   Component Value Date    TSHHS 2.650 2022    TSHHS 2.820 2021       Smoke: What:                           How much:    Alcohol: How Much:     Caffeine: Pop:         Tea:            Coffee:                Chocolate:    Exercise:    Last Visit:21  Complaints: At the place of Breast biopsy.   SOB:  Has SOB with exertion but no change over previous noted.  Had COVID back in Nov.               LEG EDEMA:  B/L Lower extremity edema is present but no change over previous.   PALPITATIONS:  C/O brief episodes of palpitations, which are not frequent. SYNCOPE: an episode recently  [address] [facility] is on Chemo now  Changes:none this visit    LAST EK22    VENOUS DOPPLER: NONE    HOLTER/ EVENT MONITOR: 2017    STRESS TEST:  2021  Normal tracer uptake in all segments of myocardium on stress and rest    images.    No infarct or ischemia noted.    Normal EF 64 % with normal ventricular contractility. ECHO: 2021   Left ventricular systolic function is normal.   Ejection fraction is visually estimated at 55-60%. No significant valvular disease noted.    No evidence of any pericardial effusion    CAROTID: NONE    MUGA: NONE    LAST PACER CHECK: NONE    CARDIAC CATH: NONE    Amio Protocol:    CHADS: [id number] Score for Atrial Fibrillation Stroke Risk   Risk   Factors  Component Value   C CHF No 0   H HTN Yes 1   A2 Age >= 76 No,  (66 y.o.) 0   D DM No 0   S2 Prior Stroke/TIA No 0   V Vascular Disease No 0   A Age 74-69 No,  (66 y.o.) 0   Sc Sex female 1 [id number]  Score  2   Score last updated 4/21/22 5:06 AM EDT    Click here for a link to the UpToDate guideline \"Atrial Fibrillation: Anticoagulation therapy to prevent embolization    Disclaimer: Risk Score calculation is dependent on accuracy of patient problem list and past encounter diagnosis.

## 2022-04-21 NOTE — PROGRESS NOTES
OFFICE PROGRESS NOTES      Riley Muñoz is a 63 y.o. female who has    CHIEF COMPLAINT AS FOLLOWS:  CHEST PAIN: Patient C/O sharp chest pains can happen any time. Associated with nausea & SOB.                          8/10 rest makes it better. SOB:   Has SOB with exertion. SOB is worsening. LEG EDEMA: No leg edema   PALPITATIONS: Denies any C/O Palpitations   DIZZINESS:  C/O positional Dizziness but no black out spells. SYNCOPE: None   OTHER/ ADDITIONAL COMPLAINTS:                                     HPI: Patient is here for F/U on her, HTN & Dyslipidemia problems. HTN: Patient has known essential HTN. Has been treated with guideline recommended medical / physical/ diet therapy as stated below. Dyslipidemia: Patient has known mixed dyslipidemia. Has been treated with guideline recommended medical / physical/ diet therapy as stated below. Current Outpatient Medications   Medication Sig Dispense Refill    letrozole (FEMARA) 2.5 MG tablet Take 1 tablet by mouth daily 30 tablet 3    dexlansoprazole (DEXILANT) 60 MG CPDR delayed release capsule Take 1 capsule by mouth      anastrozole (ARIMIDEX) 1 MG tablet Take 1 tablet by mouth daily 30 tablet 3    lansoprazole (PREVACID) 30 MG delayed release capsule TAKE 1 CAPSULE BY MOUTH EVERY DAY      potassium chloride (KLOR-CON M) 20 MEQ extended release tablet TAKE 1 TABLET BY MOUTH TWO TIMES A DAY      sucralfate (CARAFATE) 1 GM/10ML suspension TAKE 10 MLS BY MOUTH FOUR TIMES A DAY ON AN EMPTY STOMACH BEFORE MEALS AND AT BEDTIME      gabapentin (NEURONTIN) 300 MG capsule Take 1 capsule by mouth nightly for 30 days.  90 capsule 3    sodium chloride (ALTAMIST SPRAY) 0.65 % nasal spray 1 spray by Nasal route as needed for Congestion 1 each 3    losartan (COZAAR) 25 MG tablet Take 1 tablet by mouth daily 90 tablet 3    ezetimibe (ZETIA) 10 MG tablet Take 1 tablet by mouth daily 90 tablet 3    prochlorperazine (COMPAZINE) 10 MG tablet Take 1 tablet by mouth every 6 hours as needed (chemotherapy induced nausea/vomiting) 30 tablet 1    Magic Mouthwash (MIRACLE MOUTHWASH) Swish and spit 5 mLs 4 times daily as needed for Irritation 500 mL 0    mirtazapine (REMERON) 15 MG tablet Take 1 tablet by mouth nightly 30 tablet 3    hydroCHLOROthiazide (HYDRODIURIL) 25 MG tablet Take 0.5 tablets by mouth daily 45 tablet 3    promethazine (PHENERGAN) 12.5 MG tablet Take 10 mg by mouth every 6 hours as needed for Nausea      metoprolol tartrate (LOPRESSOR) 50 MG tablet Take 1 tablet by mouth 2 times daily 180 tablet 3    metoclopramide (REGLAN) 5 MG tablet Take 1 tablet by mouth 3 times daily 30 tablet 1    furosemide (LASIX) 20 MG tablet Take 20 mg by mouth daily       aspirin 81 MG chewable tablet Take 81 mg by mouth daily      Multiple Vitamins-Minerals (HAIR SKIN AND NAILS FORMULA) TABS Take 1 tablet by mouth daily       acetaminophen (TYLENOL) 500 MG tablet Take 500 mg by mouth every 6 hours as needed for Pain      loratadine (CLARITIN) 10 MG tablet Take 1 tablet by mouth daily 30 tablet 5     No current facility-administered medications for this visit.      Allergies: Celexa [citalopram], Atorvastatin, Fenofibrate, Mestinon [pyridostigmine], Penicillins, Sulfa antibiotics, Tape [adhesive tape], Gemfibrozil, and Meloxicam  Review of Systems:    Constitutional: Negative for diaphoresis and fatigue  Respiratory: Negative for shortness of breath  Cardiovascular: Negative for chest pain, dyspnea on exertion, claudication, edema, irregular heartbeat, murmur, palpitations or shortness of breath  Musculoskeletal: Negative for muscle pain, muscular weakness, negative for pain in arm and leg or swelling in foot and leg    Objective:  BP 80/60 (Position: Standing)   Ht 5' 4\" (1.626 m)   Wt 216 lb (98 kg)   BMI 37.08 kg/m²   Wt Readings from Last 3 Encounters:   04/21/22 216 lb (98 kg)   04/18/22 221 lb (100.2 kg)   03/07/22 221 lb (100.2 kg)     Body mass index is 37.08 kg/m². GENERAL - Alert, oriented, pleasant, in no apparent distress. EYES: No jaundice, no conjunctival pallor. Neck - Supple. No jugular venous distention noted. No carotid bruits. Cardiovascular - Normal S1 and S2 without obvious murmur or gallop. Extremities - No cyanosis, clubbing, or significant edema. Pulmonary - No respiratory distress. No wheezes or rales.       MEDICAL DECISION MAKING & DATA REVIEW:    Lab Review   Lab Results   Component Value Date    TROPONINT <0.010 02/18/2022    TROPONINT <0.010 02/17/2022     Lab Results   Component Value Date    PROBNP 135.4 02/17/2022    PROBNP 76.43 01/28/2022     Lab Results   Component Value Date    INR 1.11 02/19/2022    INR 1.07 06/02/2021     Lab Results   Component Value Date    LABA1C 6.0 08/13/2021    LABA1C 5.6 05/04/2021     Lab Results   Component Value Date    WBC 5.6 02/21/2022    WBC 6.2 02/19/2022    HCT 35.0 (L) 02/21/2022    HCT 37.7 02/19/2022    MCV 88.8 02/21/2022    MCV 88.7 02/19/2022     02/21/2022     02/19/2022     Lab Results   Component Value Date    CHOL 292 (H) 08/13/2021    CHOL 270 (H) 03/01/2021    TRIG 266 (H) 08/13/2021    TRIG 385 (H) 03/01/2021    HDL 42 08/13/2021    HDL 40 (L) 03/01/2021    LDLCALC NOT VALID WHEN TRIGLYCERIDE >400 MG/DL. 05/22/2017    LDLCALC 75 12/07/2015    LDLDIRECT 176 (H) 08/13/2021    LDLDIRECT 140 (H) 03/01/2021     Lab Results   Component Value Date    ALT 22 02/19/2022    ALT 24 02/17/2022    AST 27 02/19/2022    AST 31 02/17/2022     BMP:    Lab Results   Component Value Date     02/22/2022     02/21/2022    K 4.0 02/22/2022    K 3.3 02/21/2022     02/22/2022     02/21/2022    CO2 20 02/22/2022    CO2 24 02/21/2022    BUN 6 02/22/2022    BUN 7 02/21/2022    CREATININE 1.1 02/22/2022    CREATININE 1.2 02/21/2022     CMP:   Lab Results   Component Value Date     02/22/2022     02/21/2022    K 4.0 02/22/2022    K 3.3 02/21/2022     02/22/2022     02/21/2022    CO2 20 02/22/2022    CO2 24 02/21/2022    BUN 6 02/22/2022    BUN 7 02/21/2022    CREATININE 1.1 02/22/2022    CREATININE 1.2 02/21/2022    PROT 5.4 02/19/2022    PROT 6.8 02/17/2022    PROT 7.5 09/20/2011     Lab Results   Component Value Date    [facility] 2.650 02/20/2022    [facility] 2.820 11/05/2021       QUALITY MEASURES REVIEWED:  1.CAD:Patient is taking anti platelet agent:Yes  Patient does not have Hx of documented CAD  2. DYSLIPIDEMIA: Patient is on cholesterol lowering medication:Yes  3. Beta-Blocker therapy for CAD, if prior Myocardial Infarction:Yes   4. Counselled regarding smoking cessation. No   Patient does not Smoke. 5.Anticoagulation therapy (for A.Fib) No   Does Not have A.Fib.  6.Discussed weight management strategies. Assessment & Plan:  Primary / Secondary prevention is the goal by aggressive risk modification, healthy and therapeutic life style changes for cardiovascular risk reduction. CORONARY ARTERY DISEASE:None known but has multiple risk factors. CARDIOLITE 10/2020    Normal tracer uptake in all segments of myocardium on stress ans rest    images. Normal Lexiscan nuclear scintigraphic study suggestive of normal    myocardial perfusion. Gated images demonstrate normal left ventricular    systolic function with EF of 60 %. HTN:well controlled on current medical regimen, see list above.            - changes in  treatment: no, on Cozaar, Lopressor & HCTZ   CARDIOMYOPATHY: None known   CONGESTIVE HEART FAILURE: NO KNOWN HISTORY.   VHD: No significant VHD noted   ECHO 6/2021   Left ventricular systolic function is normal.   Ejection fraction is visually estimated at 55-60%.   No significant valvular disease noted.   No evidence of any pericardial effusion. DYSLIPIDEMIA: Patient's profile is at / near [facility] is low                                Tolerating current medical regimen well no.  Only on Zetia as there is Statin intolerance                                Does not tolerate Statin medications well due to side effects                                See most recent Lab values in Labs section above. OTHER RELEVANT DIAGNOSIS:as noted in patient's active problem list:  Syncope: Vasovagal   Morbid Obesity. Breast Cancer: Had Surgery & Chemo both   ARRHYTHMIAS: None known    Orthostatic Hypotension: Lying down /88 & STANDING 80/60    TESTS ORDERED: Lexiscan Cardiolite     PREVIOUSLY ORDERED TESTS REVIEWED & DISCUSSED WITH THE PATIENT:     I personally reviewed & interpreted, all previously ordered tests as copied above. Latest Labs are pulled in to the note with dates. Labs, specially in Reference to Lipid profile, Cardiac testing in the form of Echo ( dated: ), stress tests ( dated: ) & other relevant cardiac testing reviewed with patient & recommendations made based on assessment of the results. Discussed role of Cardiac risk factors & effects + treatment of co morbidities with patient & advised accordingly. MEDICATIONS: List of medications patient is currently taking is reviewed in detail with the patient & family member present. Discussed any side effects or problems taking the medication. Recommend Disontinue HCTZ. Start Midodrine 5 mg TID. Fortino Carias AFFIRMATION: I reviewed patient's history, previous & current medical problems & all Labs + testing. This includes chart prep even prior to the vosit. Various goals are discussed and multiple questions answered. Relevant concelling performed. Office follow up in six months.  Orthostatic BP check with nurse in a week

## 2022-04-21 NOTE — LETTER
[address] [address]  Phone: [phone]  Fax: [phone]    Rigoberto Bailey MD    April 21, 2022     Kamille Recinos MD  [address] [place] [address]    Patient: Bia Montelongo   MR Number: [id number]   YOB: 1960   Date of Visit: 4/21/2022       Dear Kamille Recinos: Thank you for referring Parviz Sarabia to me for evaluation/treatment. Below are the relevant portions of my assessment and plan of care. If you have questions, please do not hesitate to call me. I look forward to following Kira Mendoza along with you.     Sincerely,      Rigoberto Bailey MD

## 2022-04-28 ENCOUNTER — NURSE ONLY (OUTPATIENT)
Dept: CARDIOLOGY CLINIC | Age: 62
End: 2022-04-28
Payer: COMMERCIAL

## 2022-04-28 VITALS
OXYGEN SATURATION: 99 % | SYSTOLIC BLOOD PRESSURE: 110 MMHG | BODY MASS INDEX: 36.88 KG/M2 | HEART RATE: 83 BPM | WEIGHT: 216 LBS | DIASTOLIC BLOOD PRESSURE: 78 MMHG | HEIGHT: 64 IN

## 2022-04-28 DIAGNOSIS — I95.1 ORTHOSTATIC HYPOTENSION: Primary | ICD-10-CM

## 2022-04-28 DIAGNOSIS — R00.2 PALPITATIONS: ICD-10-CM

## 2022-04-28 PROCEDURE — 93000 ELECTROCARDIOGRAM COMPLETE: CPT | Performed by: NURSE PRACTITIONER

## 2022-04-28 NOTE — PROGRESS NOTES
Emily Willams is here for a 1 week BP check  Last visit she was noted to have orthostatic hypotension : Hydrochlorothiazide was discontinued and she was started on midodrine 5 mg bid     BP Readings from Last 3 Encounters:   04/28/22 110/78 standing   116/74 supine    118/82 sitting    04/21/22 80/60   04/18/22 (!) 143/84     Pulse Readings from Last 3 Encounters:   04/28/22 83           69 supine      65 sitting   04/18/22 91   03/07/22 102     Wt Readings from Last 3 Encounters:   04/28/22 216 lb (98 kg)   04/21/22 216 lb (98 kg)   04/18/22 221 lb (100.2 kg)       bp has improved with midodrine    Plan:   Emily Willams is to continue ProAmatine 5 mg tid   Follow up as scheduled

## 2022-05-03 ENCOUNTER — PROCEDURE VISIT (OUTPATIENT)
Dept: CARDIOLOGY CLINIC | Age: 62
End: 2022-05-03
Payer: COMMERCIAL

## 2022-05-03 DIAGNOSIS — R42 DIZZINESS: ICD-10-CM

## 2022-05-03 DIAGNOSIS — R07.2 PRECORDIAL PAIN: ICD-10-CM

## 2022-05-03 DIAGNOSIS — R00.2 PALPITATIONS: ICD-10-CM

## 2022-05-03 DIAGNOSIS — I10 ESSENTIAL HYPERTENSION: ICD-10-CM

## 2022-05-03 DIAGNOSIS — R06.02 SOB (SHORTNESS OF BREATH): ICD-10-CM

## 2022-05-03 DIAGNOSIS — R55 VASOVAGAL SYNCOPE: ICD-10-CM

## 2022-05-03 DIAGNOSIS — E78.2 MIXED HYPERLIPIDEMIA: ICD-10-CM

## 2022-05-03 DIAGNOSIS — R07.9 CHEST PAIN, UNSPECIFIED TYPE: Primary | ICD-10-CM

## 2022-05-03 LAB
LV EF: 70 %
LVEF MODALITY: NORMAL

## 2022-05-03 PROCEDURE — 93017 CV STRESS TEST TRACING ONLY: CPT | Performed by: INTERNAL MEDICINE

## 2022-05-03 PROCEDURE — 93018 CV STRESS TEST I&R ONLY: CPT | Performed by: INTERNAL MEDICINE

## 2022-05-03 PROCEDURE — 78452 HT MUSCLE IMAGE SPECT MULT: CPT | Performed by: INTERNAL MEDICINE

## 2022-05-03 PROCEDURE — 93016 CV STRESS TEST SUPVJ ONLY: CPT | Performed by: INTERNAL MEDICINE

## 2022-05-03 PROCEDURE — A9500 TC99M SESTAMIBI: HCPCS | Performed by: INTERNAL MEDICINE

## 2022-05-05 ENCOUNTER — TELEPHONE (OUTPATIENT)
Dept: CARDIOLOGY CLINIC | Age: 62
End: 2022-05-05

## 2022-05-05 NOTE — TELEPHONE ENCOUNTER
Nm:  Normal study    Normal perfusion study with normal distribution in all coronal, short, and    horizontal axis.    The observed defect is consistent with diaphragmatic attenuation.    Normal LV function.  LVEF is > 70 %

## 2022-05-19 ENCOUNTER — OFFICE VISIT (OUTPATIENT)
Dept: ONCOLOGY | Age: 62
End: 2022-05-19
Payer: COMMERCIAL

## 2022-05-19 ENCOUNTER — HOSPITAL ENCOUNTER (OUTPATIENT)
Dept: INFUSION THERAPY | Age: 62
Discharge: HOME | End: 2022-05-19

## 2022-05-19 VITALS
HEIGHT: 64 IN | SYSTOLIC BLOOD PRESSURE: 138 MMHG | WEIGHT: 220 LBS | OXYGEN SATURATION: 98 % | TEMPERATURE: 97.1 F | DIASTOLIC BLOOD PRESSURE: 83 MMHG | HEART RATE: 91 BPM | RESPIRATION RATE: 16 BRPM | BODY MASS INDEX: 37.56 KG/M2

## 2022-05-19 DIAGNOSIS — R19.7 DIARRHEA, UNSPECIFIED TYPE: Primary | ICD-10-CM

## 2022-05-19 PROCEDURE — 3017F COLORECTAL CA SCREEN DOC REV: CPT | Performed by: INTERNAL MEDICINE

## 2022-05-19 PROCEDURE — 1036F TOBACCO NON-USER: CPT | Performed by: INTERNAL MEDICINE

## 2022-05-19 PROCEDURE — G8417 CALC BMI ABV UP PARAM F/U: HCPCS | Performed by: INTERNAL MEDICINE

## 2022-05-19 PROCEDURE — 99214 OFFICE O/P EST MOD 30 MIN: CPT | Performed by: INTERNAL MEDICINE

## 2022-05-19 PROCEDURE — G8427 DOCREV CUR MEDS BY ELIG CLIN: HCPCS | Performed by: INTERNAL MEDICINE

## 2022-05-19 NOTE — PROGRESS NOTES
MA Rooming Questions  Patient: Gisela Tierney  MRN: [id number]    Date: 5/19/2022        1. Do you have any new issues? yes - Pt c/o diarrhea and HTN         2. Do you need any refills on medications?    no    3. Have you had any imaging done since your last visit?   no    4. Have you been hospitalized or seen in the emergency room since your last visit here?   no    5. Did the patient have a depression screening completed today?  No    No data recorded     PHQ-9 Given to (if applicable):               PHQ-9 Score (if applicable):                     [] Positive     []  Negative              Does question #9 need addressed (if applicable)                     [] Yes    []  No               Darrius Sheets MA

## 2022-07-31 PROBLEM — Z09 POSTOP CHECK: Status: RESOLVED | Noted: 2021-07-21 | Resolved: 2022-07-31

## 2022-08-18 ENCOUNTER — HOSPITAL ENCOUNTER (OUTPATIENT)
Dept: INFUSION THERAPY | Age: 62
Discharge: HOME | End: 2022-08-18
Payer: COMMERCIAL

## 2022-08-18 ENCOUNTER — OFFICE VISIT (OUTPATIENT)
Dept: ONCOLOGY | Age: 62
End: 2022-08-18
Payer: COMMERCIAL

## 2022-08-18 VITALS
RESPIRATION RATE: 16 BRPM | HEART RATE: 81 BPM | SYSTOLIC BLOOD PRESSURE: 141 MMHG | BODY MASS INDEX: 38.14 KG/M2 | DIASTOLIC BLOOD PRESSURE: 84 MMHG | TEMPERATURE: 98.1 F | WEIGHT: 223.4 LBS | OXYGEN SATURATION: 99 % | HEIGHT: 64 IN

## 2022-08-18 DIAGNOSIS — F32.A DEPRESSION, UNSPECIFIED DEPRESSION TYPE: ICD-10-CM

## 2022-08-18 DIAGNOSIS — C50.912 INFILTRATING DUCTAL CARCINOMA OF LEFT BREAST (HCC): ICD-10-CM

## 2022-08-18 DIAGNOSIS — C50.912 INFILTRATING DUCTAL CARCINOMA OF LEFT BREAST (HCC): Primary | ICD-10-CM

## 2022-08-18 DIAGNOSIS — E86.0 DEHYDRATION: ICD-10-CM

## 2022-08-18 DIAGNOSIS — R19.7 DIARRHEA, UNSPECIFIED TYPE: ICD-10-CM

## 2022-08-18 DIAGNOSIS — R11.0 NAUSEA: ICD-10-CM

## 2022-08-18 DIAGNOSIS — Z79.811 LONG TERM (CURRENT) USE OF AROMATASE INHIBITORS: ICD-10-CM

## 2022-08-18 LAB
ALBUMIN SERPL-MCNC: 4.8 GM/DL (ref 3.4–5)
ALP BLD-CCNC: 83 IU/L (ref 40–129)
ALT SERPL-CCNC: 12 U/L (ref 10–40)
ANION GAP SERPL CALCULATED.3IONS-SCNC: 13 MMOL/L (ref 4–16)
AST SERPL-CCNC: 14 IU/L (ref 15–37)
BASOPHILS ABSOLUTE: 0 K/CU MM
BASOPHILS RELATIVE PERCENT: 0.5 % (ref 0–1)
BILIRUB SERPL-MCNC: 0.4 MG/DL (ref 0–1)
BUN BLDV-MCNC: 20 MG/DL (ref 6–23)
CALCIUM SERPL-MCNC: 8.9 MG/DL (ref 8.3–10.6)
CHLORIDE BLD-SCNC: 105 MMOL/L (ref 99–110)
CO2: 27 MMOL/L (ref 21–32)
CREAT SERPL-MCNC: 1.1 MG/DL (ref 0.6–1.1)
DIFFERENTIAL TYPE: ABNORMAL
EOSINOPHILS ABSOLUTE: 0.2 K/CU MM
EOSINOPHILS RELATIVE PERCENT: 2.3 % (ref 0–3)
GFR AFRICAN AMERICAN: >60 ML/MIN/1.73M2
GFR NON-AFRICAN AMERICAN: 51 ML/MIN/1.73M2
GLUCOSE BLD-MCNC: 99 MG/DL (ref 70–99)
HCT VFR BLD CALC: 40.2 % (ref 37–47)
HEMOGLOBIN: 12.9 GM/DL (ref 12.5–16)
LYMPHOCYTES ABSOLUTE: 2.2 K/CU MM
LYMPHOCYTES RELATIVE PERCENT: 27.4 % (ref 24–44)
MCH RBC QN AUTO: 27.6 PG (ref 27–31)
MCHC RBC AUTO-ENTMCNC: 32.1 % (ref 32–36)
MCV RBC AUTO: 86.1 FL (ref 78–100)
MONOCYTES ABSOLUTE: 0.7 K/CU MM
MONOCYTES RELATIVE PERCENT: 8.2 % (ref 0–4)
PDW BLD-RTO: 15.2 % (ref 11.7–14.9)
PLATELET # BLD: 181 K/CU MM (ref 140–440)
PMV BLD AUTO: 8.8 FL (ref 7.5–11.1)
POTASSIUM SERPL-SCNC: 4.1 MMOL/L (ref 3.5–5.1)
RBC # BLD: 4.67 M/CU MM (ref 4.2–5.4)
SEGMENTED NEUTROPHILS ABSOLUTE COUNT: 4.9 K/CU MM
SEGMENTED NEUTROPHILS RELATIVE PERCENT: 61.6 % (ref 36–66)
SODIUM BLD-SCNC: 145 MMOL/L (ref 135–145)
TOTAL PROTEIN: 6.8 GM/DL (ref 6.4–8.2)
WBC # BLD: 8 K/CU MM (ref 4–10.5)

## 2022-08-18 PROCEDURE — 3017F COLORECTAL CA SCREEN DOC REV: CPT | Performed by: NURSE PRACTITIONER

## 2022-08-18 PROCEDURE — 80053 COMPREHEN METABOLIC PANEL: CPT

## 2022-08-18 PROCEDURE — 85025 COMPLETE CBC W/AUTO DIFF WBC: CPT

## 2022-08-18 PROCEDURE — 36415 COLL VENOUS BLD VENIPUNCTURE: CPT

## 2022-08-18 PROCEDURE — G8427 DOCREV CUR MEDS BY ELIG CLIN: HCPCS | Performed by: NURSE PRACTITIONER

## 2022-08-18 PROCEDURE — G8417 CALC BMI ABV UP PARAM F/U: HCPCS | Performed by: NURSE PRACTITIONER

## 2022-08-18 PROCEDURE — 1036F TOBACCO NON-USER: CPT | Performed by: NURSE PRACTITIONER

## 2022-08-18 PROCEDURE — 99214 OFFICE O/P EST MOD 30 MIN: CPT | Performed by: NURSE PRACTITIONER

## 2022-08-18 RX ORDER — ONDANSETRON HYDROCHLORIDE 8 MG/1
8 TABLET, FILM COATED ORAL EVERY 8 HOURS PRN
Qty: 30 TABLET | Refills: 1 | Status: SHIPPED | OUTPATIENT
Start: 2022-08-18

## 2022-08-18 ASSESSMENT — COLUMBIA-SUICIDE SEVERITY RATING SCALE - C-SSRS
1. WITHIN THE PAST MONTH, HAVE YOU WISHED YOU WERE DEAD OR WISHED YOU COULD GO TO SLEEP AND NOT WAKE UP?: YES
3. HAVE YOU BEEN THINKING ABOUT HOW YOU MIGHT KILL YOURSELF?: YES
2. HAVE YOU ACTUALLY HAD ANY THOUGHTS OF KILLING YOURSELF?: YES
4. HAVE YOU HAD THESE THOUGHTS AND HAD SOME INTENTION OF ACTING ON THEM?: NO
6. HAVE YOU EVER DONE ANYTHING, STARTED TO DO ANYTHING, OR PREPARED TO DO ANYTHING TO END YOUR LIFE?: YES
7. DID THIS OCCUR IN THE LAST THREE MONTHS: NO
5. HAVE YOU STARTED TO WORK OUT OR WORKED OUT THE DETAILS OF HOW TO KILL YOURSELF? DO YOU INTEND TO CARRY OUT THIS PLAN?: NO

## 2022-08-18 ASSESSMENT — PATIENT HEALTH QUESTIONNAIRE - PHQ9
SUM OF ALL RESPONSES TO PHQ QUESTIONS 1-9: 21
8. MOVING OR SPEAKING SO SLOWLY THAT OTHER PEOPLE COULD HAVE NOTICED. OR THE OPPOSITE, BEING SO FIGETY OR RESTLESS THAT YOU HAVE BEEN MOVING AROUND A LOT MORE THAN USUAL: 0
7. TROUBLE CONCENTRATING ON THINGS, SUCH AS READING THE NEWSPAPER OR WATCHING TELEVISION: 3
4. FEELING TIRED OR HAVING LITTLE ENERGY: 3
SUM OF ALL RESPONSES TO PHQ9 QUESTIONS 1 & 2: 6
SUM OF ALL RESPONSES TO PHQ QUESTIONS 1-9: 21
2. FEELING DOWN, DEPRESSED OR HOPELESS: 3
1. LITTLE INTEREST OR PLEASURE IN DOING THINGS: 3
10. IF YOU CHECKED OFF ANY PROBLEMS, HOW DIFFICULT HAVE THESE PROBLEMS MADE IT FOR YOU TO DO YOUR WORK, TAKE CARE OF THINGS AT HOME, OR GET ALONG WITH OTHER PEOPLE: 2
3. TROUBLE FALLING OR STAYING ASLEEP: 3
SUM OF ALL RESPONSES TO PHQ QUESTIONS 1-9: 19
SUM OF ALL RESPONSES TO PHQ QUESTIONS 1-9: 21
9. THOUGHTS THAT YOU WOULD BE BETTER OFF DEAD, OR OF HURTING YOURSELF: 2
5. POOR APPETITE OR OVEREATING: 3
6. FEELING BAD ABOUT YOURSELF - OR THAT YOU ARE A FAILURE OR HAVE LET YOURSELF OR YOUR FAMILY DOWN: 1

## 2022-08-18 NOTE — PROGRESS NOTES
MA Rooming Questions  Patient: Mario Philip  MRN: [id number]    Date: 8/18/2022        1. Do you have any new issues?   no         2. Do you need any refills on medications?    no    3. Have you had any imaging done since your last visit?   no    4. Have you been hospitalized or seen in the emergency room since your last visit here?   no    5. Did the patient have a depression screening completed today?  Yes    PHQ-9 Total Score: 21 (8/18/2022 10:44 AM)  Thoughts that you would be better off dead, or of hurting yourself in some way: More than half the days (8/18/2022 10:44 AM)       PHQ-9 Given to (if applicable): [place]              PHQ-9 Score (if applicable):                     [x] Positive     []  Negative              Does question #9 need addressed (if applicable)                     [x] Yes    []  No               Ze Muse CMA

## 2022-08-18 NOTE — PROGRESS NOTES
Patient Name:  Mario Philip  Patient :  1960  Patient MRN:  [id number]     Primary Oncologist: Marley Contreras MD  Referring Provider: Gianni Garcia MD     Date of Service: 2022      Chief Complaint:    Chief Complaint   Patient presents with    Discuss Labs      She came in for follow-up visit. Patient Active Problem List:     Essential hypertension     Hyperlipidemia     Allergic rhinitis due to pollen     Morbid obesity due to excess calories     Hearing loss     Hot flashes due to surgical menopause     Gastroesophageal reflux disease     Chronic back pain     Anxiety and depression     Osteoarthritis     Meniere's disease     Insomnia     Precordial pain      Calculus of gallbladder without cholecystitis without obstruction     S/P laparoscopic cholecystectomy     Vasovagal syncope     Class 2 obesity due to excess calories without serious comorbidity in adult     Family history of early     Closed fracture of left ankle     Acute respiratory failure with hypoxia      Status post left hip replacement     Hyperglycemia        Infiltrating ductal carcinoma of left breast      S/P mastectomy, bilateral     Hx of lymph node excision    HPI:   Tamiko Montez is a pleasant 63year old female patient who was referred for evaluation of pT2, N0, MX invasive ductal carcinoma of the left breast, HR positive HER-2 negative, status post bilateral mastectomy in May 2021. She went for the routine mammogram in 2021 and denied any palpable lump to her breast.  Mammogram at that time showed at least 2 indeterminate complex masses at the 2:00 and 12 o'clock position in the left breast in the retroareolar region. 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   ER was more than 95%, AR more than 90%, HER-2/baltazar negative by FISH. MRI of bilateral breasts on April 19, 2021 showed:  1. Left breast multicentric disease with biopsy proven invasive ductal carcinoma with mucinous component at 11 o'clock and mucinous carcinomas at 2 o'clock and 12 o'clock in the retroareolar breast.  There is at least 5 cm of separation between the 11 o'clock mass and 12 o'clock mass. Additional smooth masses measuring up to 21 mm at biopsy sites are hematomas. 2. No MR evidence of malignancy in the contralateral right breast.      She had bilateral mastectomy on May 6, 2021. Pathology report showed : Invasive ductal and mucinous carcinoma of the left breast, retroareolar, grade 2, 4.5 cm, negative margin, -2 sentinel lymph nodes, ER more than 95%, AR 90%, HER-2/baltazar negative by FISH. Pathological stage classification T2, N0, MX. Pathologist felt that she has 1 contiguous tumor mass measuring about 4.5 cm rather than multicentric disease. I discussed with pathologist who stated that she does not have pure mucinous carcinoma of the breast.   CBC and CMP in March 2021 were grossly unremarkable. Due to family history and personal history of breast cancer, I referred for genetic counseling. She had colonoscopy with removal of 2 polyps in 2014 by Dr. Quinn Ramirez. Follow-up in 3 years was recommended. She refused to have further follow-up. Oncotype DX score was 29. I offered adjuvant chemo therapy TC vs AC +T  She opted to [facility] +T. In May 2021 she was hospitalized for nausea and vomiting. She was seen by GI. She had mild transaminitis and was suspected to have gastroenteritis. Never had any upper endoscopic study. Colonoscopy in June 2014 showed polyps which was removed. Pathology report showed tubular adenoma. CT abdomen and pelvis on May 30, 2021:  No acute abnormality within the abdomen and pelvis. Status post hysterectomy, appendectomy and cholecystectomy.    15 mm subcutaneus soft tissue nodule within the left lower quadrant area of anterior abdomen. Finding may represent sebaceous cyst or other pathology. CTA chest on May 30, 2021 showed   1. No evidence of acute pulmonary embolism. 2. Soft tissue thickening and fat stranding overlying the bilateral pectoral muscles. CT head on May 31, 2021 showed  No acute intracranial abnormality. No intraparenchymal abnormal enhancement to suggest intracranial metastatic disease. MRI is a superior modality for evaluation of intracranial metastasis. Small probable meningioma within the falx near the convexity. MRI of the brain on June 1, 2021 showed:  No acute infarct scattered areas of chronic white matter change in the periventricular white matter. No evidence for blood products on gradient imaging. No  focal intracranial lesion noted     Amylase and lipase were unremarkable. She was placed on reglan with improvement of nausea and vomiting. She was recommended to have upper endoscopic study as outpatient by GI. She is status post bilateral mastectomy. She had EGD By Dr. Katy Mcneill with diagnosis of hiatal hernia, gastric polyp, mild gastritis. She started adjuvant chemotherapy AC on July 16, 2021. SBFT: Unremarkable small bowel follow through series. Bone density in June 2021 showed normal study. ECHO 6/18/2021:  Left ventricular systolic function is normal.  Ejection fraction is visually estimated at 55-60%. CTA 8/12 no PE. She had stress test 8/13/21 with no infarct or ischemia, normal EF 64%. She has seen GI and was recommended esophageal stretching. Esophagram 9/13/2021:  Small hiatal hernia with narrowing of the distal thoracic esophagus just proximal to the hiatal hernia, which may reflect underlying Schatzki's ring. Ingested barium and barium tablet were initially held up at this level before eventually passing into the stomach. She completed fourth cycle of AC on September 17, 2021.   She started Taxol on October 8, 2021 and completed on January 5, 2022. On December 8, 2021 ferritin was 356, iron 54, TIBC 2024, saturation 17%. Folate was 10.8, B12 461.8. On December 22, 2021 hemoglobin was 10.4, MCV 91.8. Anemia is likely related to chemotherapy. She started adjuvant Arimidex after completion of chemo. She was hospitalized in February 2022 due to NVD which she thought related to stomach flu. She was treated with antibiotic for bacterial colitis. She called Dr Inda Soulier and is scheduled for colonoscopy in the near future. CT abdomen in January 2022 was negative for mets. CT head in 2/2022 was negative for mets. CTA in 2/2022 was negative for mets. MRI of brain 2/21/2022:  No acute intracranial ischemia or intracranial metastatic disease   Mild chronic microvascular disease within the periventricular white matter  She will continue with arimidex. She has the intermittent diarrhea. I switched Arimidex to Femara in April 2022. I strongly recommend she follow-up with GI.  I recommend to drink enough water. Also recommend to drink Pedialyte whenever she has diarrhea. I recommend to take Imodium. We discussed about the potential side effect of Femara. Femara may cause nausea also. Advised to call us if she has any issue with Femara. Presented on August 18, 2022 for scheduled follow up. She reports her depression has worsened. She denies plan for suicide but feels incapacitated by her depression. Referral to counselor placed,  informed. She has ongoing nausea, early satiety without weight loss. She is taking Femara in am, instructed to take PM. She was seen by GI last week with scope. Will request records. Review s/sx of recurrence and issues of survivorship. To have labs today. Denies fever, chills, night sweats, no dizziness, visual changes, or headache.  Denies mucositis, dysphagia, no cough, chest pain, hemoptysis, shortness of breath, no constipation, no melena or hematochezia, no dysuria, hematuria, no lower extremity edema or calf pain. Denies acute pain. No worsening depression. Past Medical History:   Past Medical History:   Diagnosis Date    Allergic rhinitis due to pollen 11/19/2015    Arthritis     left hip & knee    Chronic back pain     Dehydration 7/22/2021    Depression     Gastroesophageal reflux disease 11/19/2015    Hearing loss 11/19/2015    History of blood transfusion     No reaction per pt    History of cardiac monitoring 04/18/2017    14 day event monitor. Sinus Rhythm    History of nuclear stress test 09/28/2016    cardiolite-normal,EF70%    Hot flashes due to surgical menopause 11/19/2015    Hx of echocardiogram 10/05/2016    EF: >55 %   Mild mitral and tricuspid regurg.      Hyperlipidemia     Hypertension     Follows with PCP    Lexiscan Stress Test 10/14/2020    EF 60%, Normal study, no ischemia    Meniere disease     Morbid obesity due to excess calories ([facility] [address] 11/19/2015    Sleep apnea 11/19/2015    sleep study negative    Tilt table evaluation 05/09/2017     positive for neurocardiogenic syncope      Past Surgery History:    Past Surgical History:   Procedure Laterality Date    APPENDECTOMY  1967    CHOLECYSTECTOMY      2017    COLONOSCOPY  2014    Polyps x2 - Dr. Mono Arredondo ([address] [facility])  1989    MASTECTOMY Bilateral 05/06/2021    Dr. Smith Fresh Left 10/19/2020    LEFT HIP TOTAL ARTHROPLASTY - POSTERIOR performed by Wanda Espana MD at [address] LEFT Left 3/9/2021    US BREAST BIOPSY NEEDLE ADDITIONAL LEFT 3/9/2021 Naga Severino MD [address] [facility] LEFT Left 3/9/2021    US BREAST BIOPSY NEEDLE ADDITIONAL LEFT 3/9/2021 Naga Severino  [facility] [address] LEFT Left 3/9/2021    US BREAST NEEDLE BIOPSY LEFT 3/9/2021 Naga Severino  [address] [facility]   She had complete hysterectomy in 1999 due to endometriosis. Social History:   She denies any history of smoking. No alcohol or illicit drug use. She denies any children. Family History: Mother had stomach and breast cancer, maternal grandmother had breast cancer, colon cancer and stomach cancer. Review of Systems: The remainder of the review of system is unremarkable. Vital Signs: BP (!) 141/84 (Site: Right Upper Arm, Position: Sitting, Cuff Size: Large Adult)   Pulse 81   Temp 98.1 °F (36.7 °C) (Infrared)   Resp 16   Ht 5' 4\" (1.626 m)   Wt 223 lb 6.4 oz (101.3 kg)   SpO2 99%   BMI 38.35 kg/m²      Physical Exam:  CONSTITUTIONAL: awake, alert, cooperative, no apparent distress   EYES:  sclera clear and pale conjunctiva  ENT: Normocephalic, without obvious abnormality, atraumatic  NECK: supple, symmetrical.  No JVD. HEMATOLOGIC/LYMPHATIC: no cervical, supraclavicular or axillary lymphadenopathy   LUNGS: Clear to auscultation and percussion bilaterally. BREAST: Status post bilateral mastectomy. No changes to scar, no new lumps or bumps  CARDIOVASCULAR: regular rate and rhythm, normal S1 and S2, no murmur   ABDOMEN: bowel sound present, soft, non-distended, mild tenderness. No palpable mass. MUSCULOSKELETAL: full range of motion noted, tone is normal  NEUROLOGIC:  Motor and sensory grossly intact. CN II - XII grossly intact. SKIN: No jaundice. Dry and warm skin no ecchymosis. EXTREMITIES: no LE edema. No cyanosis.       Labs:  Hematology:  Lab Results   Component Value Date    WBC 5.6 02/21/2022    RBC 3.94 (L) 02/21/2022    HGB 11.2 (L) 02/21/2022    HCT 35.0 (L) 02/21/2022    MCV 88.8 02/21/2022    MCH 28.4 02/21/2022    MCHC 32.0 02/21/2022    RDW 14.0 02/21/2022     02/21/2022    MPV 10.2 02/21/2022    BANDSPCT 9 11/02/2020    SEGSPCT 64.2 02/21/2022    EOSRELPCT 3.7 (H) 02/21/2022    BASOPCT 0.7 02/21/2022    LYMPHOPCT 22.5 (L) 02/21/2022    MONOPCT 8.5 (H) 02/21/2022    BANDABS 1.86 11/02/2020    SEGSABS 3.6 02/21/2022    EOSABS 0.2 02/21/2022    BASOSABS 0.0 02/21/2022    LYMPHSABS 1.3 02/21/2022    MONOSABS 0.5 02/21/2022    DIFFTYPE AUTOMATED DIFFERENTIAL 02/21/2022    ANISOCYTOSIS 1+ 11/02/2020    POLYCHROM 1+ 11/02/2020    WBCMORP FEW 11/02/2020    PLTM ADEQUATE 08/12/2021     Lab Results   Component Value Date     (H) 10/25/2020     Chemistry:  Lab Results   Component Value Date     02/22/2022    K 4.0 02/22/2022     02/22/2022    CO2 20 (L) 02/22/2022    BUN 6 02/22/2022    CREATININE 1.1 02/22/2022    GLUCOSE 142 (H) 02/22/2022    CALCIUM 9.1 02/22/2022    PROT 5.4 (L) 02/19/2022    LABALBU 3.4 02/19/2022    BILITOT 0.6 02/19/2022    ALKPHOS 68 02/19/2022    AST 27 02/19/2022    ALT 22 02/19/2022    LABGLOM 51 (L) 02/22/2022    GFRAA >60 02/22/2022    AGRATIO 1.7 12/07/2015    GLOB 2.7 12/07/2015    MG 1.8 02/22/2022    POCCA 1.13 09/09/2021    POCGLU 115 (H) 09/09/2021     Lab Results   Component Value Date     (H) 11/14/2020     Lab Results   Component Value Date    TSHHS 2.650 02/20/2022    T4FREE 1.02 03/01/2021     Immunology:  Lab Results   Component Value Date    PROT 5.4 (L) 02/19/2022     Coagulation Panel:  Lab Results   Component Value Date    PROTIME 14.3 02/19/2022    INR 1.11 02/19/2022    APTT 40.4 (H) 02/19/2022    DDIMER 389 (H) 02/17/2022     Anemia Panel:  Lab Results   Component Value Date    JPIGWFCQ74 461.8 12/08/2021    FOLATE 10.8 12/08/2021      Observations:  No data recorded     Assessment & Plan:  1. 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 Pathological stage T2, N0 sentinel lymph node MX. Oncotype DX was 29. I offered adjuvant chemotherapy  She opted to [facility] + T. She started chemo in June 2021 and completed on September 17, 2021. She started Taxol on October 8, 2028 and completed in January 2022.  She started arimidex in January 2022.  I reviewed CT CAP and MRI of brain from the hospital in January and February 2022. She is in remission. Due to the diarrhea, I switched Arimidex to Femara in April 2022. She has ongoing nausea and diarrhea. Seen by Dr. Jairo Chaudhary week of 8/8/22. Records requested. Instructed to take femara at night. Ongoing nausea for which she was given zofran PRN. 2.  Genetic counseling: VUS JUHI    3. Bone density on June 11, 2021 was normal.  Echo in June 2021 showed LVEF at 55 to 60%. 4. She has N/V ? Gastroenteritis. EGD as above. Started on Pantoprazole. Small bowel follow through was unremarkable on 8/2/21. Esophagogram was noted. She was recommended to have esophageal stretching. Changed zofran to compazine. She has EGD and dilatation of the esophagus in September 2021. She stopped having issue again with dysphagia. I recommend to follow-up with GI. She plans to follow-up with surgeon in January 2022 to have surgery for the hiatal hernia. She was hospitalized in February 2022 due NVD. She is scheduled for colonoscopy in the near future. She was treated with antibiotic for presumable bacterial colitis. She still has intermittent diarrhea. I recommend to take Imodium and drink enough fluid at home. I switched Arimidex to Femara in April 2022. Seen by GI week of 8/8/22 requested records    5. Mental health: Reports suicidal ideation, 1 past attempt. Reports depression has worsened. Re-referred to counselor. Denies suicide plan, or means. 6.  She has anemia. Anemia workup 12/8/21 reviewed. I will continue to monitor CBC. Venous access- port removed by Dr. Chito Grider, to follow up in six months for routine surveillance s/p mastectomy    Dehydration- declines fluids today    We discussed about diet and weight loss. She has not considered Covid vaccine yet. Return to clinic in 4 weeks. All of her question has been answered for today.

## 2022-08-24 ENCOUNTER — CLINICAL DOCUMENTATION (OUTPATIENT)
Dept: RADIATION ONCOLOGY | Age: 62
End: 2022-08-24

## 2022-09-07 NOTE — PROGRESS NOTES
Patient Name:  Marisela Cameron  Patient :  1960  Patient MRN:  [id number]     Primary Oncologist: Radha Grijalva MD  Referring Provider: Ac Harris MD     Date of Service: 10/4/2022      Chief Complaint:    Chief Complaint   Patient presents with    Follow-up      She came in for follow-up visit. Patient Active Problem List:     Essential hypertension     Hyperlipidemia     Allergic rhinitis due to pollen     Morbid obesity due to excess calories     Hearing loss     Hot flashes due to surgical menopause     Gastroesophageal reflux disease     Chronic back pain     Anxiety and depression     Osteoarthritis     Meniere's disease     Insomnia     Precordial pain      Calculus of gallbladder without cholecystitis without obstruction     S/P laparoscopic cholecystectomy     Vasovagal syncope     Class 2 obesity due to excess calories without serious comorbidity in adult     Family history of early     Closed fracture of left ankle     Acute respiratory failure with hypoxia      Status post left hip replacement     Hyperglycemia        Infiltrating ductal carcinoma of left breast      S/P mastectomy, bilateral     Hx of lymph node excision    HPI:   Drea Ramirez is a pleasant 63year old female patient who was referred for evaluation of pT2, N0, MX invasive ductal carcinoma of the left breast, HR positive HER-2 negative, status post bilateral mastectomy in May 2021. She went for the routine mammogram in 2021 and denied any palpable lump to her breast.  Mammogram at that time showed at least 2 indeterminate complex masses at the 2:00 and 12 o'clock position in the left breast in the retroareolar region. 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   ER was more than 95%, AK more than 90%, HER-2/baltazar negative by FISH. MRI of bilateral breasts on April 19, 2021 showed:  1. Left breast multicentric disease with biopsy proven invasive ductal carcinoma with mucinous component at 11 o'clock and mucinous carcinomas at 2 o'clock and 12 o'clock in the retroareolar breast.  There is at least 5 cm of separation between the 11 o'clock mass and 12 o'clock mass. Additional smooth masses measuring up to 21 mm at biopsy sites are hematomas. 2. No MR evidence of malignancy in the contralateral right breast.      She had bilateral mastectomy on May 6, 2021. Pathology report showed : Invasive ductal and mucinous carcinoma of the left breast, retroareolar, grade 2, 4.5 cm, negative margin, -2 sentinel lymph nodes, ER more than 95%, AK 90%, HER-2/baltazar negative by FISH. Pathological stage classification T2, N0, MX. Pathologist felt that she has 1 contiguous tumor mass measuring about 4.5 cm rather than multicentric disease. I discussed with pathologist who stated that she does not have pure mucinous carcinoma of the breast.   CBC and CMP in March 2021 were grossly unremarkable. Due to family history and personal history of breast cancer, I referred for genetic counseling. She had colonoscopy with removal of 2 polyps in 2014 by Dr. Lynn Gonzalez. Follow-up in 3 years was recommended. She refused to have further follow-up. Oncotype DX score was 29. I offered adjuvant chemo therapy TC vs AC +T  She opted to [facility] +T. In May 2021 she was hospitalized for nausea and vomiting. She was seen by GI. She had mild transaminitis and was suspected to have gastroenteritis. Never had any upper endoscopic study. Colonoscopy in June 2014 showed polyps which was removed. Pathology report showed tubular adenoma. CT abdomen and pelvis on May 30, 2021:  No acute abnormality within the abdomen and pelvis. Status post hysterectomy, appendectomy and cholecystectomy.    15 mm subcutaneus soft tissue nodule within the left lower quadrant area of anterior abdomen. Finding may represent sebaceous cyst or other pathology. CTA chest on May 30, 2021 showed   1. No evidence of acute pulmonary embolism. 2. Soft tissue thickening and fat stranding overlying the bilateral pectoral muscles. CT head on May 31, 2021 showed  No acute intracranial abnormality. No intraparenchymal abnormal enhancement to suggest intracranial metastatic disease. MRI is a superior modality for evaluation of intracranial metastasis. Small probable meningioma within the falx near the convexity. MRI of the brain on June 1, 2021 showed:  No acute infarct scattered areas of chronic white matter change in the periventricular white matter. No evidence for blood products on gradient imaging. No  focal intracranial lesion noted     Amylase and lipase were unremarkable. She was placed on reglan with improvement of nausea and vomiting. She was recommended to have upper endoscopic study as outpatient by GI. She is status post bilateral mastectomy. She had EGD By Dr. Johnell Nageotte with diagnosis of hiatal hernia, gastric polyp, mild gastritis. She started adjuvant chemotherapy AC on July 16, 2021. SBFT: Unremarkable small bowel follow through series. Bone density in June 2021 showed normal study. ECHO 6/18/2021:  Left ventricular systolic function is normal.  Ejection fraction is visually estimated at 55-60%. CTA 8/12 no PE. She had stress test 8/13/21 with no infarct or ischemia, normal EF 64%. She has seen GI and was recommended esophageal stretching. Esophagram 9/13/2021:  Small hiatal hernia with narrowing of the distal thoracic esophagus just proximal to the hiatal hernia, which may reflect underlying Schatzki's ring. Ingested barium and barium tablet were initially held up at this level before eventually passing into the stomach. She completed fourth cycle of AC on September 17, 2021.   She started Taxol on October 8, 2021 and completed on January 5, 2022. On December 8, 2021 ferritin was 356, iron 54, TIBC 2024, saturation 17%. Folate was 10.8, B12 461.8. On December 22, 2021 hemoglobin was 10.4, MCV 91.8. Anemia is likely related to chemotherapy. She started adjuvant Arimidex after completion of chemo. She was hospitalized in February 2022 due to NVD which she thought related to stomach flu. She was treated with antibiotic for bacterial colitis. She called Dr Orlando Ormond and is scheduled for colonoscopy in the near future. CT abdomen in January 2022 was negative for mets. CT head in 2/2022 was negative for mets. CTA in 2/2022 was negative for mets. MRI of brain 2/21/2022:  No acute intracranial ischemia or intracranial metastatic disease   Mild chronic microvascular disease within the periventricular white matter  She will continue with arimidex. She has the intermittent diarrhea. I switched Arimidex to Femara in April 2022. I strongly recommend she follow-up with GI.  I recommend to drink enough water. Also recommend to drink Pedialyte whenever she has diarrhea. I recommend to take Imodium. We discussed about the potential side effect of Femara. Femara may cause nausea also. Advised to call us if she has any issue with Femara. 10/4/2022 she came in for follow up visit. 8/2022 CMP and CBC grossly stable for her. She had colonoscopy with polypectomy in August 2022. She has 2 adenomas. Follow-up in 5 years was recommended. Diarrhea is about the same. She still has intermittent nausea and vomiting. She will follow-up with surgeon to discuss about potential hiatal hernia surgery. She will also follow-up with GI on October 19, 2022. She is scheduled to see cardiologist for the hypertension on October 17, 2022. She is agreeable to continue with letrozole. I recommend monthly self breast exam.  No acute pain. Denied any nausea, vomiting or diarrhea. No fever or chills.   No chest pain, shortness of breath or palpitation. No headache or dizzy spell. No specific bone pain. No melena or hematochezia. Denied any dysuria or hematuria. Past Medical History:   Diagnosis Date    Allergic rhinitis due to pollen 11/19/2015    Arthritis     left hip & knee    Chronic back pain     Dehydration 7/22/2021    Depression     Gastroesophageal reflux disease 11/19/2015    Hearing loss 11/19/2015    History of blood transfusion     No reaction per pt    History of cardiac monitoring 04/18/2017    14 day event monitor. Sinus Rhythm    History of nuclear stress test 09/28/2016    cardiolite-normal,EF70%    Hot flashes due to surgical menopause 11/19/2015    Hx of echocardiogram 10/05/2016    EF: >55 %   Mild mitral and tricuspid regurg.      Hyperlipidemia     Hypertension     Follows with PCP    Lexiscan Stress Test 10/14/2020    EF 60%, Normal study, no ischemia    Meniere disease     Morbid obesity due to excess calories ([facility] [address] 11/19/2015    Sleep apnea 11/19/2015    sleep study negative    Tilt table evaluation 05/09/2017     positive for neurocardiogenic syncope     Past Surgical History:   Procedure Laterality Date    APPENDECTOMY  1967    CHOLECYSTECTOMY      2017    COLONOSCOPY  2014    Polyps x2 - Dr. Chadwick Gomez ([address] [facility])  1989    MASTECTOMY Bilateral 05/06/2021    Dr. Wendy Alvarez Left 10/19/2020    LEFT HIP TOTAL ARTHROPLASTY - POSTERIOR performed by Tonia Hodge MD at [address] [facility] LEFT Left 3/9/2021    US BREAST BIOPSY NEEDLE ADDITIONAL LEFT 3/9/2021 Corey Winslow MD [address] [facility] LEFT Left 3/9/2021    US BREAST BIOPSY NEEDLE ADDITIONAL LEFT 3/9/2021 Corey Winslow MD [address] [place] [address] Dr LEFT Left 3/9/2021    US BREAST NEEDLE BIOPSY LEFT 3/9/2021 Corey Winslow  [place] [address] [facility]   She had complete hysterectomy in 1999 due to endometriosis. Social History:   She denies any history of smoking. No alcohol or illicit drug use. She denies any children. Family History: Mother had stomach and breast cancer, maternal grandmother had breast cancer, colon cancer and stomach cancer. Review of Systems: The remainder of the review of system is unremarkable. Vital Signs: BP (!) 147/86 (Site: Right Upper Arm, Position: Sitting, Cuff Size: Large Adult)   Pulse 82   Temp 97.2 °F (36.2 °C) (Infrared)   Resp 16   Ht 5' 4\" (1.626 m)   Wt 226 lb (102.5 kg)   SpO2 99%   BMI 38.79 kg/m²      Physical Exam:  CONSTITUTIONAL: awake, alert, cooperative, no apparent distress   EYES:  sclera clear and normal conjunctiva  ENT: Normocephalic, without obvious abnormality, atraumatic  NECK: supple, symmetrical.  No JVD. HEMATOLOGIC/LYMPHATIC: no cervical, supraclavicular or axillary lymphadenopathy   LUNGS: Clear to auscultation and percussion bilaterally. BREAST: Status post bilateral mastectomy. No changes to scar, no new lumps or bumps  CARDIOVASCULAR: regular rate and rhythm, normal S1 and S2, no murmur   ABDOMEN: bowel sound present, soft, non-distended, mild tenderness. No palpable mass. MUSCULOSKELETAL: full range of motion noted, tone is normal  NEUROLOGIC:  Motor and sensory grossly intact. CN II - XII grossly intact. SKIN: No jaundice. Dry and warm skin no ecchymosis. EXTREMITIES: no LE edema. No cyanosis.       Labs:  Hematology:  Lab Results   Component Value Date    WBC 8.0 08/18/2022    RBC 4.67 08/18/2022    HGB 12.9 08/18/2022    HCT 40.2 08/18/2022    MCV 86.1 08/18/2022    MCH 27.6 08/18/2022    MCHC 32.1 08/18/2022    RDW 15.2 (H) 08/18/2022     08/18/2022    MPV 8.8 08/18/2022    BANDSPCT 9 11/02/2020    SEGSPCT 61.6 08/18/2022    EOSRELPCT 2.3 08/18/2022    BASOPCT 0.5 08/18/2022    LYMPHOPCT 27.4 08/18/2022    MONOPCT 8.2 (H) 08/18/2022    BANDABS 1.86 11/02/2020    SEGSABS 4.9 08/18/2022    EOSABS 0.2 08/18/2022    BASOSABS 0.0 08/18/2022    LYMPHSABS 2.2 08/18/2022    MONOSABS 0.7 08/18/2022    DIFFTYPE AUTOMATED DIFFERENTIAL 08/18/2022    ANISOCYTOSIS 1+ 11/02/2020    POLYCHROM 1+ 11/02/2020    WBCMORP FEW 11/02/2020    PLTM ADEQUATE 08/12/2021     Lab Results   Component Value Date     (H) 10/25/2020     Chemistry:  Lab Results   Component Value Date     08/18/2022    K 4.1 08/18/2022     08/18/2022    CO2 27 08/18/2022    BUN 20 08/18/2022    CREATININE 1.1 08/18/2022    GLUCOSE 99 08/18/2022    CALCIUM 8.9 08/18/2022    PROT 6.8 08/18/2022    LABALBU 4.8 08/18/2022    BILITOT 0.4 08/18/2022    ALKPHOS 83 08/18/2022    AST 14 (L) 08/18/2022    ALT 12 08/18/2022    LABGLOM 51 (L) 08/18/2022    GFRAA >60 08/18/2022    AGRATIO 1.7 12/07/2015    GLOB 2.7 12/07/2015    MG 1.8 02/22/2022    POCCA 1.13 09/09/2021    POCGLU 115 (H) 09/09/2021     Lab Results   Component Value Date     (H) 11/14/2020     Lab Results   Component Value Date    TSHHS 2.650 02/20/2022    T4FREE 1.02 03/01/2021     Immunology:  Lab Results   Component Value Date    PROT 6.8 08/18/2022     Coagulation Panel:  Lab Results   Component Value Date    PROTIME 14.3 02/19/2022    INR 1.11 02/19/2022    APTT 40.4 (H) 02/19/2022    DDIMER 389 (H) 02/17/2022     Anemia Panel:  Lab Results   Component Value Date    QDPRDOFV60 461.8 12/08/2021    FOLATE 10.8 12/08/2021      Observations:  No data recorded     Assessment & Plan:  1. 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 Stage pT2, N0 sentinel lymph node MX. Oncotype DX was 29. I offered adjuvant chemotherapy  She opted to [facility] + T. She started chemo in June 2021 and completed on September 17, 2021. She started Taxol on October 8, 2028 and completed in January 2022. She started arimidex in January 2022.   I reviewed CT CAP and MRI of brain from the hospital in January and February 2022. She is in remission. Due to the diarrhea, I switched Arimidex to Femara in April 2022. She has ongoing nausea and diarrhea. Seen by Dr. Yoselin Gudinoiday week of 8/8/22. Records requested. Instructed to take femara at night. Ongoing nausea for which she was given zofran PRN. She stated diarrhea is about the same. She has intermittent nausea and vomiting. She will follow-up with GI and surgeon. She will also follow-up with cardiologist for hypertension. She will continue with the appointment. I recommend to do monthly self breast exam.    2.  Genetic counseling: VUS JUHI    3. Bone density on June 11, 2021 was normal.  Echo in June 2021 showed LVEF at 55 to 60%. 4. She has N/V ? Gastroenteritis. EGD as above. She started on Pantoprazole. Small bowel follow through was unremarkable on 8/2/21. Esophagogram was noted. She was recommended to have esophageal stretching. Changed zofran to compazine. She has EGD and dilatation of the esophagus in September 2021. S  She had colonoscopy in August 2022.  2 adenomas were removed. Follow-up in 5 years recommended. 5. Mental health: Reports suicidal ideation, 1 past attempt. Reports depression has worsened. Re-referred to counselor. Denies suicide plan, or means. 6.  She has anemia. Anemia workup 12/8/21 reviewed. CBC in August 2022 was grossly unremarkable. I will continue to monitor CBC. We discussed about diet and weight loss. Return to clinic in 12 weeks or sooner. All of her question has been answered for today.

## 2022-10-04 ENCOUNTER — HOSPITAL ENCOUNTER (OUTPATIENT)
Dept: INFUSION THERAPY | Age: 62
Discharge: HOME | End: 2022-10-04
Payer: MEDICAID

## 2022-10-04 ENCOUNTER — OFFICE VISIT (OUTPATIENT)
Dept: ONCOLOGY | Age: 62
End: 2022-10-04
Payer: COMMERCIAL

## 2022-10-04 VITALS
DIASTOLIC BLOOD PRESSURE: 86 MMHG | TEMPERATURE: 97.2 F | SYSTOLIC BLOOD PRESSURE: 147 MMHG | HEART RATE: 82 BPM | WEIGHT: 226 LBS | OXYGEN SATURATION: 99 % | RESPIRATION RATE: 16 BRPM | HEIGHT: 64 IN | BODY MASS INDEX: 38.58 KG/M2

## 2022-10-04 DIAGNOSIS — C50.912 INFILTRATING DUCTAL CARCINOMA OF LEFT BREAST (HCC): Primary | ICD-10-CM

## 2022-10-04 PROCEDURE — G8427 DOCREV CUR MEDS BY ELIG CLIN: HCPCS | Performed by: INTERNAL MEDICINE

## 2022-10-04 PROCEDURE — 1036F TOBACCO NON-USER: CPT | Performed by: INTERNAL MEDICINE

## 2022-10-04 PROCEDURE — 99213 OFFICE O/P EST LOW 20 MIN: CPT | Performed by: INTERNAL MEDICINE

## 2022-10-04 PROCEDURE — 99211 OFF/OP EST MAY X REQ PHY/QHP: CPT

## 2022-10-04 PROCEDURE — G8417 CALC BMI ABV UP PARAM F/U: HCPCS | Performed by: INTERNAL MEDICINE

## 2022-10-04 PROCEDURE — 3017F COLORECTAL CA SCREEN DOC REV: CPT | Performed by: INTERNAL MEDICINE

## 2022-10-04 PROCEDURE — G8484 FLU IMMUNIZE NO ADMIN: HCPCS | Performed by: INTERNAL MEDICINE

## 2022-10-04 NOTE — PROGRESS NOTES
MA Rooming Questions  Patient: Ju Linton  MRN: [id number]    Date: 10/4/2022        1. Do you have any new issues? yes - Pt c/o SOB, nausea, and breast pain         2. Do you need any refills on medications?    no    3. Have you had any imaging done since your last visit? yes - Colonoscopy with Biopsy    4. Have you been hospitalized or seen in the emergency room since your last visit here?   no    5. Did the patient have a depression screening completed today?  No    No data recorded     PHQ-9 Given to (if applicable):               PHQ-9 Score (if applicable):                     [] Positive     []  Negative              Does question #9 need addressed (if applicable)                     [] Yes    []  No               Agustina Avila MA

## 2022-10-29 ENCOUNTER — HOSPITAL ENCOUNTER (EMERGENCY)
Age: 62
Discharge: HOME | End: 2022-10-29
Attending: EMERGENCY MEDICINE
Payer: MEDICAID

## 2022-10-29 ENCOUNTER — APPOINTMENT (OUTPATIENT)
Dept: CT IMAGING | Age: 62
End: 2022-10-29
Payer: MEDICAID

## 2022-10-29 ENCOUNTER — APPOINTMENT (OUTPATIENT)
Dept: GENERAL RADIOLOGY | Age: 62
End: 2022-10-29
Payer: MEDICAID

## 2022-10-29 VITALS
WEIGHT: 232 LBS | OXYGEN SATURATION: 96 % | DIASTOLIC BLOOD PRESSURE: 89 MMHG | HEIGHT: 64 IN | BODY MASS INDEX: 39.61 KG/M2 | TEMPERATURE: 98 F | RESPIRATION RATE: 16 BRPM | HEART RATE: 75 BPM | SYSTOLIC BLOOD PRESSURE: 151 MMHG

## 2022-10-29 DIAGNOSIS — N20.0 KIDNEY STONE: Primary | ICD-10-CM

## 2022-10-29 LAB
ALBUMIN SERPL-MCNC: 4.2 GM/DL (ref 3.4–5)
ALP BLD-CCNC: 75 IU/L (ref 40–129)
ALT SERPL-CCNC: 11 U/L (ref 10–40)
ANION GAP SERPL CALCULATED.3IONS-SCNC: 11 MMOL/L (ref 4–16)
AST SERPL-CCNC: 18 IU/L (ref 15–37)
BACTERIA: ABNORMAL /HPF
BASOPHILS ABSOLUTE: 0 K/CU MM
BASOPHILS RELATIVE PERCENT: 0.3 % (ref 0–1)
BILIRUB SERPL-MCNC: 0.3 MG/DL (ref 0–1)
BILIRUBIN URINE: NEGATIVE MG/DL
BLOOD, URINE: NORMAL
BUN BLDV-MCNC: 22 MG/DL (ref 6–23)
CALCIUM SERPL-MCNC: 9.3 MG/DL (ref 8.3–10.6)
CHLORIDE BLD-SCNC: 105 MMOL/L (ref 99–110)
CLARITY: CLEAR
CO2: 25 MMOL/L (ref 21–32)
COLOR: YELLOW
CREAT SERPL-MCNC: 1.3 MG/DL (ref 0.6–1.1)
DIFFERENTIAL TYPE: ABNORMAL
EOSINOPHILS ABSOLUTE: 0 K/CU MM
EOSINOPHILS RELATIVE PERCENT: 0.2 % (ref 0–3)
GFR SERPL CREATININE-BSD FRML MDRD: 47 ML/MIN/1.73M2
GLUCOSE BLD-MCNC: 128 MG/DL (ref 70–99)
GLUCOSE, URINE: NEGATIVE MG/DL
HCT VFR BLD CALC: 39.4 % (ref 37–47)
HEMOGLOBIN: 12.8 GM/DL (ref 12.5–16)
IMMATURE NEUTROPHIL %: 0.4 % (ref 0–0.43)
KETONES, URINE: NEGATIVE MG/DL
LACTATE: 1.7 MMOL/L (ref 0.4–2)
LEUKOCYTE ESTERASE, URINE: NEGATIVE
LIPASE: 19 IU/L (ref 13–60)
LYMPHOCYTES ABSOLUTE: 1 K/CU MM
LYMPHOCYTES RELATIVE PERCENT: 9 % (ref 24–44)
MCH RBC QN AUTO: 27.6 PG (ref 27–31)
MCHC RBC AUTO-ENTMCNC: 32.5 % (ref 32–36)
MCV RBC AUTO: 85.1 FL (ref 78–100)
MONOCYTES ABSOLUTE: 0.5 K/CU MM
MONOCYTES RELATIVE PERCENT: 4.6 % (ref 0–4)
NITRITE URINE, QUANTITATIVE: NEGATIVE
NUCLEATED RBC %: 0 %
PDW BLD-RTO: 14.1 % (ref 11.7–14.9)
PH, URINE: 5 (ref 5–8)
PLATELET # BLD: 197 K/CU MM (ref 140–440)
PMV BLD AUTO: 9 FL (ref 7.5–11.1)
POTASSIUM SERPL-SCNC: 4 MMOL/L (ref 3.5–5.1)
PROTEIN UA: NEGATIVE MG/DL
RBC # BLD: 4.63 M/CU MM (ref 4.2–5.4)
RBC URINE: 13 /HPF (ref 0–6)
SEGMENTED NEUTROPHILS ABSOLUTE COUNT: 9.9 K/CU MM
SEGMENTED NEUTROPHILS RELATIVE PERCENT: 85.5 % (ref 36–66)
SODIUM BLD-SCNC: 141 MMOL/L (ref 135–145)
SPECIFIC GRAVITY UA: <=1.005 (ref 1–1.03)
SQUAMOUS EPITHELIAL: 1 /HPF
TOTAL IMMATURE NEUTOROPHIL: 0.05 K/CU MM
TOTAL NUCLEATED RBC: 0 K/CU MM
TOTAL PROTEIN: 6.9 GM/DL (ref 6.4–8.2)
TRICHOMONAS: ABNORMAL /HPF
UROBILINOGEN, URINE: 0.2 MG/DL (ref 0.2–1)
WBC # BLD: 11.6 K/CU MM (ref 4–10.5)
WBC UA: 1 /HPF (ref 0–5)

## 2022-10-29 PROCEDURE — 96374 THER/PROPH/DIAG INJ IV PUSH: CPT

## 2022-10-29 PROCEDURE — 6360000004 HC RX CONTRAST MEDICATION: Performed by: NURSE PRACTITIONER

## 2022-10-29 PROCEDURE — 99285 EMERGENCY DEPT VISIT HI MDM: CPT

## 2022-10-29 PROCEDURE — 96375 TX/PRO/DX INJ NEW DRUG ADDON: CPT

## 2022-10-29 PROCEDURE — 74177 CT ABD & PELVIS W/CONTRAST: CPT

## 2022-10-29 PROCEDURE — 83605 ASSAY OF LACTIC ACID: CPT

## 2022-10-29 PROCEDURE — 2580000003 HC RX 258: Performed by: NURSE PRACTITIONER

## 2022-10-29 PROCEDURE — 6360000002 HC RX W HCPCS: Performed by: NURSE PRACTITIONER

## 2022-10-29 PROCEDURE — 71046 X-RAY EXAM CHEST 2 VIEWS: CPT

## 2022-10-29 PROCEDURE — 87086 URINE CULTURE/COLONY COUNT: CPT

## 2022-10-29 PROCEDURE — 6370000000 HC RX 637 (ALT 250 FOR IP): Performed by: NURSE PRACTITIONER

## 2022-10-29 PROCEDURE — 81001 URINALYSIS AUTO W/SCOPE: CPT

## 2022-10-29 PROCEDURE — 80053 COMPREHEN METABOLIC PANEL: CPT

## 2022-10-29 PROCEDURE — 85025 COMPLETE CBC W/AUTO DIFF WBC: CPT

## 2022-10-29 PROCEDURE — 83690 ASSAY OF LIPASE: CPT

## 2022-10-29 RX ORDER — FAMOTIDINE 20 MG/1
20 TABLET, FILM COATED ORAL ONCE
Status: COMPLETED | OUTPATIENT
Start: 2022-10-29 | End: 2022-10-29

## 2022-10-29 RX ORDER — OXYCODONE HYDROCHLORIDE AND ACETAMINOPHEN 5; 325 MG/1; MG/1
1 TABLET ORAL EVERY 6 HOURS PRN
Qty: 12 TABLET | Refills: 0 | Status: SHIPPED | OUTPATIENT
Start: 2022-10-29 | End: 2022-11-01

## 2022-10-29 RX ORDER — ONDANSETRON 2 MG/ML
4 INJECTION INTRAMUSCULAR; INTRAVENOUS ONCE
Status: COMPLETED | OUTPATIENT
Start: 2022-10-29 | End: 2022-10-29

## 2022-10-29 RX ORDER — DIPHENHYDRAMINE HYDROCHLORIDE 50 MG/ML
25 INJECTION INTRAMUSCULAR; INTRAVENOUS ONCE
Status: COMPLETED | OUTPATIENT
Start: 2022-10-29 | End: 2022-10-29

## 2022-10-29 RX ORDER — 0.9 % SODIUM CHLORIDE 0.9 %
1000 INTRAVENOUS SOLUTION INTRAVENOUS ONCE
Status: COMPLETED | OUTPATIENT
Start: 2022-10-29 | End: 2022-10-29

## 2022-10-29 RX ORDER — ONDANSETRON 4 MG/1
4 TABLET, FILM COATED ORAL 3 TIMES DAILY PRN
Qty: 15 TABLET | Refills: 0 | Status: SHIPPED | OUTPATIENT
Start: 2022-10-29

## 2022-10-29 RX ORDER — DEXAMETHASONE SODIUM PHOSPHATE 10 MG/ML
10 INJECTION, SOLUTION INTRAMUSCULAR; INTRAVENOUS ONCE
Status: COMPLETED | OUTPATIENT
Start: 2022-10-29 | End: 2022-10-29

## 2022-10-29 RX ORDER — CEPHALEXIN 250 MG/1
500 CAPSULE ORAL ONCE
Status: COMPLETED | OUTPATIENT
Start: 2022-10-29 | End: 2022-10-29

## 2022-10-29 RX ORDER — CEPHALEXIN 500 MG/1
500 CAPSULE ORAL 2 TIMES DAILY
Qty: 20 CAPSULE | Refills: 0 | Status: SHIPPED | OUTPATIENT
Start: 2022-10-29 | End: 2022-11-08

## 2022-10-29 RX ORDER — FENTANYL CITRATE 50 UG/ML
25 INJECTION, SOLUTION INTRAMUSCULAR; INTRAVENOUS ONCE
Status: COMPLETED | OUTPATIENT
Start: 2022-10-29 | End: 2022-10-29

## 2022-10-29 RX ADMIN — ONDANSETRON 4 MG: 2 INJECTION INTRAMUSCULAR; INTRAVENOUS at 16:59

## 2022-10-29 RX ADMIN — CEPHALEXIN 500 MG: 250 CAPSULE ORAL at 20:08

## 2022-10-29 RX ADMIN — FAMOTIDINE 20 MG: 20 TABLET ORAL at 20:08

## 2022-10-29 RX ADMIN — SODIUM CHLORIDE 1000 ML: 9 INJECTION, SOLUTION INTRAVENOUS at 16:58

## 2022-10-29 RX ADMIN — DEXAMETHASONE SODIUM PHOSPHATE 10 MG: 10 INJECTION, SOLUTION INTRAMUSCULAR; INTRAVENOUS at 19:23

## 2022-10-29 RX ADMIN — IOPAMIDOL 75 ML: 755 INJECTION, SOLUTION INTRAVENOUS at 17:46

## 2022-10-29 RX ADMIN — DIPHENHYDRAMINE HYDROCHLORIDE 25 MG: 50 INJECTION, SOLUTION INTRAMUSCULAR; INTRAVENOUS at 19:22

## 2022-10-29 RX ADMIN — FENTANYL CITRATE 25 MCG: 50 INJECTION, SOLUTION INTRAMUSCULAR; INTRAVENOUS at 16:59

## 2022-10-29 ASSESSMENT — PAIN DESCRIPTION - ORIENTATION: ORIENTATION: LEFT

## 2022-10-29 ASSESSMENT — PAIN DESCRIPTION - PAIN TYPE: TYPE: ACUTE PAIN

## 2022-10-29 ASSESSMENT — PAIN - FUNCTIONAL ASSESSMENT: PAIN_FUNCTIONAL_ASSESSMENT: 0-10

## 2022-10-29 ASSESSMENT — PAIN SCALES - GENERAL
PAINLEVEL_OUTOF10: 9
PAINLEVEL_OUTOF10: 9

## 2022-10-29 ASSESSMENT — PAIN DESCRIPTION - DESCRIPTORS: DESCRIPTORS: STABBING

## 2022-10-29 ASSESSMENT — PAIN DESCRIPTION - LOCATION: LOCATION: FLANK;ABDOMEN

## 2022-10-29 NOTE — ED PROVIDER NOTES
[address] ENCOUNTER        Pt Name: Marlon Gordon  MRN: [id number]  [place] 1960  Date of evaluation: 10/29/2022  Provider: LIAN Villavicencio - CNP  PCP: Keysha Duron MD     I have seen and evaluated this patient with my supervising physician Lorraine Ibrahim DO. Triage CHIEF COMPLAINT       Chief Complaint   Patient presents with    Back Pain     Abdominal pain, nausea and vomiting         HISTORY OF PRESENT ILLNESS      Chief Complaint: Left flank and left lower quadrant pain    Marlon Gordon is a 63 y.o. female who presents for evaluation of left lower quadrant and left flank pain that started this morning. Patient reports she woke up feeling nauseous in the night, took Zofran. She does have a history of chronic nausea since completing chemotherapy for breast cancer in January 2022. She did have 2 episodes of vomiting prior to arrival.  She denies any fever, chills, urinary symptoms, or diarrhea. She does have a history of diverticulosis and reports she may have been treated for diverticulitis as an inpatient in the past    Nursing Notes were all reviewed and agreed with or any disagreements were addressed in the HPI.     REVIEW OF SYSTEMS     Constitutional:   Denies fever, chills, weight loss or weakness   HENT:   Denies sore throat or ear pain   Cardiovascular:   Denies chest pain, palpitations   Respiratory:  Denies cough or shortness of breath    GI: See HPI  :  Denies any urinary symptoms   Musculoskeletal:   Denies neck, back, or extremity pain  Skin:   Denies rash  Neurologic:   Denies headache, focal weakness or sensory changes   Endocrine:  Denies polyuria or polydypsia   Lymphatic:  Denies swollen glands     PAST MEDICAL HISTORY     Past Medical History:   Diagnosis Date    Allergic rhinitis due to pollen 11/19/2015    Arthritis     left hip & knee    Chronic back pain Dehydration 7/22/2021    Depression     Gastroesophageal reflux disease 11/19/2015    Hearing loss 11/19/2015    History of blood transfusion     No reaction per pt    History of cardiac monitoring 04/18/2017    14 day event monitor. Sinus Rhythm    History of nuclear stress test 09/28/2016    cardiolite-normal,EF70%    Hot flashes due to surgical menopause 11/19/2015    Hx of echocardiogram 10/05/2016    EF: >55 %   Mild mitral and tricuspid regurg.      Hyperlipidemia     Hypertension     Follows with PCP    Lexiscan Stress Test 10/14/2020    EF 60%, Normal study, no ischemia    Meniere disease     Morbid obesity due to excess calories ([address] 11/19/2015    Sleep apnea 11/19/2015    sleep study negative    Tilt table evaluation 05/09/2017     positive for neurocardiogenic syncope       SURGICAL HISTORY     Past Surgical History:   Procedure Laterality Date    APPENDECTOMY  1967    CHOLECYSTECTOMY      2017    COLONOSCOPY  2014    Polyps x2 - Dr. Jeff Albarran (CERVIX STATUS UNKNOWN)  1989    MASTECTOMY Bilateral 05/06/2021    Dr. Alysha Almeida Left 10/19/2020    LEFT HIP TOTAL ARTHROPLASTY - POSTERIOR performed by Basilia Leal MD at [address] LEFT Left 3/9/2021    US BREAST BIOPSY NEEDLE ADDITIONAL LEFT 3/9/2021 Elsa Goodell, MD [address] BREAST BIOPSY NEEDLE ADDITIONAL LEFT Left 3/9/2021    US BREAST BIOPSY NEEDLE ADDITIONAL LEFT 3/9/2021 Elsa Goodell, MD [address] BREAST NEEDLE BIOPSY LEFT Left 3/9/2021    US BREAST NEEDLE BIOPSY LEFT 3/9/2021 Elsa Goodell, MD [facility]       Previous Medications    ACETAMINOPHEN (TYLENOL) 500 MG TABLET    Take 500 mg by mouth every 6 hours as needed for Pain    ASPIRIN 81 MG CHEWABLE TABLET    Take 81 mg by mouth daily    DEXLANSOPRAZOLE (DEXILANT) 60 MG CPDR DELAYED RELEASE CAPSULE    Take 1 capsule by mouth    EZETIMIBE (ZETIA) 10 MG TABLET    Take 1 tablet by mouth daily    FUROSEMIDE (LASIX) 20 MG TABLET    Take 20 mg by mouth daily     GABAPENTIN (NEURONTIN) 300 MG CAPSULE    Take 1 capsule by mouth nightly for 30 days.     LANSOPRAZOLE (PREVACID) 30 MG DELAYED RELEASE CAPSULE    TAKE 1 CAPSULE BY MOUTH EVERY DAY    LETROZOLE (FEMARA) 2.5 MG TABLET    Take 1 tablet by mouth daily    LORATADINE (CLARITIN) 10 MG TABLET    Take 1 tablet by mouth daily    LOSARTAN (COZAAR) 25 MG TABLET    Take 1 tablet by mouth daily    MAGIC MOUTHWASH (MIRACLE MOUTHWASH)    Swish and spit 5 mLs 4 times daily as needed for Irritation    METOCLOPRAMIDE (REGLAN) 5 MG TABLET    Take 1 tablet by mouth 3 times daily    METOPROLOL TARTRATE (LOPRESSOR) 50 MG TABLET    Take 1 tablet by mouth 2 times daily    MIDODRINE (PROAMATINE) 5 MG TABLET    Take 1 tablet by mouth 3 times daily    MIRTAZAPINE (REMERON) 15 MG TABLET    Take 1 tablet by mouth nightly    MULTIPLE VITAMINS-MINERALS (HAIR SKIN AND NAILS FORMULA) TABS    Take 1 tablet by mouth daily     ONDANSETRON (ZOFRAN) 8 MG TABLET    Take 1 tablet by mouth every 8 hours as needed for Nausea or Vomiting    POTASSIUM CHLORIDE (KLOR-CON M) 20 MEQ EXTENDED RELEASE TABLET    TAKE 1 TABLET BY MOUTH TWO TIMES A DAY    PROCHLORPERAZINE (COMPAZINE) 10 MG TABLET    Take 1 tablet by mouth every 6 hours as needed (chemotherapy induced nausea/vomiting)    PROMETHAZINE (PHENERGAN) 12.5 MG TABLET    Take 10 mg by mouth every 6 hours as needed for Nausea    SODIUM CHLORIDE (ALTAMIST SPRAY) 0.65 % NASAL SPRAY    1 spray by Nasal route as needed for Congestion    SUCRALFATE (CARAFATE) 1 GM/10ML SUSPENSION    TAKE 10 MLS BY MOUTH FOUR TIMES A DAY ON AN EMPTY STOMACH BEFORE MEALS AND AT BEDTIME       ALLERGIES     Celexa [citalopram], Atorvastatin, Fenofibrate, Mestinon [pyridostigmine], Penicillins, Sulfa antibiotics, Tape [adhesive tape], Gemfibrozil, and Meloxicam    FAMILYHISTORY       Family History   Problem Relation Age of Onset Heart Disease Mother     High Blood Pressure Mother     High Cholesterol Mother     Cancer Mother 80        Stomach    Diabetes Mother     Stroke Mother     Heart Disease Father     High Blood Pressure Father     High Cholesterol Father     Diabetes Father     Depression Father     Cancer Sister 46        Lung cancer    High Blood Pressure Sister     Other Sister         migraines, osteoarthritis    Mental Illness Sister     Depression Sister     Cancer Maternal Grandmother         Stomach    Diabetes Maternal Grandmother     Diabetes Maternal Grandfather     Diabetes Paternal Grandmother     Diabetes Paternal Grandfather     Cancer Maternal Cousin 47        Pancreatic        SOCIAL HISTORY       Social History     Socioeconomic History    Marital status:    Tobacco Use    Smoking status: Never    Smokeless tobacco: Never   Vaping Use    Vaping Use: Never used   Substance and Sexual Activity    Alcohol use: No    Drug use: No    Sexual activity: Not Currently     Partners: Male       SCREENINGS    Porter Coma Scale  Eye Opening: Spontaneous  Best Verbal Response: Oriented  Best Motor Response: Obeys commands  Alexis Coma Scale Score: 15      PHYSICAL EXAM       ED Triage Vitals [10/29/22 1327]   BP Temp Temp Source Heart Rate Resp SpO2 Height Weight   (!) 157/97 98 °F (36.7 °C) Oral 77 15 99 % 5' 4\" (1.626 m) 232 lb (105.2 kg)      Constitutional:  Well developed, Well nourished. Mild distress  HENT:  Normocephalic, Atraumatic. Moist mucus membranes. Neck/Lymphatics: supple, no JVD, no swollen nodes  Cardiovascular:   RRR,  no murmurs/rubs/gallops. Distal cap refill and pulses intact bilateral upper and lower extremities. Mild bilateral peripheral edema. Respiratory:  Non labored breathing. Normal breath sounds, No wheezing  Abdomen:   Bowel sounds normal, Soft, no masses, mild left lower quadrant abdominal tenderness with more moderate tenderness over the left lateral abdomen and left CVA.  Musculoskeletal:  Bilateral upper and lower extremity ROM intact without pain or obvious deficit  Integument:   Warm, Dry, No rashes. Neurologic:  Alert & oriented , No focal deficits noted. Psychiatric:  Affect normal, Mood normal.     DIAGNOSTIC RESULTS   LABS:    Labs Reviewed   CBC WITH AUTO DIFFERENTIAL - Abnormal; Notable for the following components:       Result Value    WBC 11.6 (*)     Segs Relative 85.5 (*)     Lymphocytes % 9.0 (*)     Monocytes % 4.6 (*)     All other components within normal limits   COMPREHENSIVE METABOLIC PANEL - Abnormal; Notable for the following components:    Creatinine 1.3 (*)     Est, Glom Filt Rate 47 (*)     Glucose 128 (*)     All other components within normal limits   MICROSCOPIC URINALYSIS - Abnormal; Notable for the following components:    RBC, UA 13 (*)     Bacteria, UA MODERATE (*)     All other components within normal limits   CULTURE, URINE   LIPASE   URINALYSIS   LACTIC ACID       When ordered, only abnormal lab results are displayed. All other labs were within normal range or not returned as of this dictation. EKG: When ordered, EKG's are interpreted by the Emergency Department Physician in the absence of a cardiologist.  Please see their note for interpretation of EKG. RADIOLOGY:   Non-plain film images such as CT, Ultrasound and MRI are read by the radiologist. Plain radiographic images are visualized and preliminarily interpreted by the  ED Provider with the below findings:    Interpretation perthe Radiologist below, if available at the time of this note:    CT ABDOMEN PELVIS W IV CONTRAST Additional Contrast? None   Final Result   1. Obstructive uropathy left kidney with hydronephrosis as result of a 4 x 5   mm proximal left ureter calculus. 2. Mild calcific atherosclerosis.    3. Mild-to-moderate degenerative changes predominate lower lumbar spine with   grade 1 degenerative anterolisthesis L4 on L5.   4. Other portions of the CT abdomen and pelvis appear unremarkable. XR CHEST (2 VW)   Final Result   Minimal to mild peribronchial cuffing potentially due to reactive airways   disease or bronchitis. No results found. PROCEDURES   Unless otherwise noted below, none         CRITICAL CARE   CRITICAL CARE NOTE:  N/A    CONSULTS:  None      EMERGENCY DEPARTMENT COURSE and MDM:   Vitals:    Vitals:    10/29/22 1400 10/29/22 1500 10/29/22 1630 10/29/22 1800   BP: 105/89 (!) 141/90 (!) 159/85 (!) 157/81   Pulse: 72 77 79 87   Resp: 14 13 12 12   Temp:       TempSrc:       SpO2: 99% 97% 98% 99%   Weight:       Height:           Patient was given thefollowing medications:  Medications   famotidine (PEPCID) tablet 20 mg (has no administration in time range)   cephALEXin (KEFLEX) capsule 500 mg (has no administration in time range)   0.9 % sodium chloride bolus (0 mLs IntraVENous Stopped 10/29/22 1922)   ondansetron (ZOFRAN) injection 4 mg (4 mg IntraVENous Given 10/29/22 1659)   fentaNYL (SUBLIMAZE) injection 25 mcg (25 mcg IntraVENous Given 10/29/22 1659)   iopamidol (ISOVUE-370) 76 % injection 75 mL (75 mLs IntraVENous Given 10/29/22 1746)   diphenhydrAMINE (BENADRYL) injection 25 mg (25 mg IntraVENous Given 10/29/22 1922)   dexamethasone (PF) (DECADRON) injection 10 mg (10 mg IntraVENous Given 10/29/22 1923)         Is this patient to be included in the SEP-1 Core Measure due to severe sepsis or septic shock? No   Exclusion criteria - the patient is NOT to be included for SEP-1 Core Measure due to: Infection is not suspected    MDM:  Patient presents as above. Emergent etiologies considered. Patient seen and examined. Work-up initiated secondary to presentation, physical exam findings, vital signs and medical chart review. In brief, patient presented for evaluation of left lower quadrant and left flank pain that started this morning and was accompanied by nausea and vomiting.   She was tender on exam but did not have a surgical abdomen. Laboratory studies including a CMP and CBC were significant for creatinine of 1.3 as well as a WBC of 11.6. Clinically she did not appear significantly dehydrated but she did feel little dry on exam.  She was given a liter of IV fluids as well as pain medication and nausea medication. Urinalysis showed no acute leukocytes or nitrites with hematuria. Micro evaluation showed only 1 WBC and squamous cell but a moderate amount of bacteria. Urine culture was ordered. Clinically she does not have symptoms consistent with a urinary infection but will treat with Keflex while we await urine culture. CT of the abdomen and pelvis did reveal a 4 to 5 mm proximal left ureter stone with mild hydronephrosis. 1921: I went in to discuss the CT results with the patient and upon reexam, the patient was feeling shaky, itchy, a little dizzy, and felt like her tongue was \"thick\". Vital signs were stable. O2 was normal.  Lungs were clear without wheezing. She had no posterior oropharynx edema or angioedema on exam.  There was no rash. I ordered Benadryl, Pepcid, and a dose of dexamethasone for treatment of potential contrast reaction. Patient has had IV contrast in the past and cannot recall having any reaction similarly. We will monitor the patient and ensure resolution of symptoms with medication prior to discharge. We did discuss the discharge plan. Advised the patient the kidney stone. Will discharge home with pain medication and more Zofran. Encouraged patient to call and follow-up with urology early next week and return to the ED immediately should she develop any new or worsening symptoms. 1956:  Patient reassessed and feeling much better. Reiterated discharge plan. Will monitor for another 30 minutes and then discharge. Case was discussed with Dr. Marcell Burger including patient's presenting history, clinical exam findings, and imaging results.   She agreed on the patient's evaluation as well as disposition plan but did not personally evaluate the patient. CLINICAL IMPRESSION      1. Kidney stone          DISPOSITION/PLAN   DISPOSITION        PATIENT REFERREDTO:  Jomarie Severe, MD  [address] [place] [address]  [phone]      1-2 days    Que Cheatham MD  [address] [facility] [address]  [phone]    Schedule an appointment as soon as possible for a visit   for follow up on kidney stone    [facility] Emergency Department  [place] [facility] [phone]    If symptoms worsen    DISCHARGE MEDICATIONS:  New Prescriptions    ONDANSETRON (ZOFRAN) 4 MG TABLET    Take 1 tablet by mouth 3 times daily as needed for Nausea or Vomiting    OXYCODONE-ACETAMINOPHEN (PERCOCET) 5-325 MG PER TABLET    Take 1 tablet by mouth every 6 hours as needed for Pain for up to 3 days. Intended supply: 3 days.  Take lowest dose possible to manage pain       DISCONTINUED MEDICATIONS:  Discontinued Medications    No medications on file              (Please note that portions ofthis note were completed with a voice recognition program.  Efforts were made to edit the dictations but occasionally words are mis-transcribed.)    LIAN Seth CNP (electronically signed)             LIAN Ly CNP  10/29/22 1957

## 2022-10-29 NOTE — ED NOTES
IV attempt x 1 to right wrist. Able to obtain blood and send. Pricila Crawford.  OSWALDO Clark  10/29/22 7240

## 2022-10-29 NOTE — ED PROVIDER NOTES
I independently examined and evaluated Deborah Solis. In brief, patient presented to the emergency department complaints of left flank pain radiates to the left lower quadrant started this morning. Patient states nausea took some Zofran with minimal relief. States history of nausea because she has been on chemotherapy for breast cancer. She states she did vomit twice prior to arrival.  She states no other chest pain shortness of breath fever chills cough sore throat runny nose earache. Patient here for evaluation. Focused exam revealed positive tenderness to the left lower abdomen and left CVA, no rigidity, some guarding noted. ED course: Patient presents emerged department complaint of left flank and left side abdominal pain. Patient ordered laboratory studies elevated white count 11.6. Patient normal hemoglobin and platelets. Patient normal sodium potassium and calcium. Patient now insufficient creatinine 1.3. Patient normal liver enzymes and lipase. Urinalysis negative for any urinary tract infection does shows moderate bacteria 1 white blood cells will be sent for culture. Patient normal lactic acid 1.7. CT scan does show obstructive uropathy left kidney with hydronephrosis as a result of 4 x 5 mm proximal left ureteral calculus, mouth calcific atherosclerosis, degenerative changes lumbar spine. Patient did have a reaction to the IV contrast dye. She was ordered Benadryl Pepcid and steroid. Unsure if patient has a new allergy to this she has had IV contrast dye in the past.  Patient stable prior to discharge. Patient updated on lab and imaging study findings. Patient will discharge home with Percocet Zofran and Keflex. Patient to follow-up with her urology for further evaluation treatment and management. Patient return if any worsening symptoms. All questions answered.     All diagnostic, treatment, and disposition decisions were made by myself in conjunction with the advanced practice provider. I personally saw the patient and performed a substantive portion of the visit including all aspects of the medical decision making. ICD-10-CM    1. Kidney stone  N20.0 oxyCODONE-acetaminophen (PERCOCET) 5-325 MG per tablet            For all further details of the patient's emergency department visit, please see the advanced practice provider's documentation. Comment: Please note this report has been produced using speech recognition software and may contain errors related to that system including errors in grammar, punctuation, and spelling, as well as words and phrases that may be inappropriate. If there are any questions or concerns please feel free to contact the dictating provider for clarification.         Keenan Hurd DO  11/02/22 5797

## 2022-10-30 NOTE — ED PROVIDER NOTES
Emergency Department Encounter  Location: [address] [place] [address] EMERGENCY DEPARTMENT    Patient: Deborah Solis  MRN: [id number]  : 1960  Date of evaluation: 10/29/2022  ED Provider: Adali George PA-C    1900 PM  Deborah Solis was checked out to me by Trinidad Ibarra CNP. Please see his/her initial documentation for details of the patient's initial ED presentation, physical exam and completed studies. In brief, Deborah Solis is a 63 y.o. female that presented to the emergency department for a kidney stone. Patient was ready for discharge and then began to have itching/shaking/funny sensation of her tongue. She was given Benadryl, Pepcid, Decadron and recommended to stay for monitoring.        I have reviewed and interpreted all of the currently available lab results and diagnostics from this visit:  Results for orders placed or performed during the hospital encounter of 10/29/22   CBC with Auto Differential   Result Value Ref Range    WBC 11.6 (H) 4.0 - 10.5 K/CU MM    RBC 4.63 4.2 - 5.4 M/CU MM    Hemoglobin 12.8 12.5 - 16.0 GM/DL    Hematocrit 39.4 37 - 47 %    MCV 85.1 78 - 100 FL    MCH 27.6 27 - 31 PG    MCHC 32.5 32.0 - 36.0 %    RDW 14.1 11.7 - 14.9 %    Platelets 182 396 - 358 K/CU MM    MPV 9.0 7.5 - 11.1 FL    Differential Type AUTOMATED DIFFERENTIAL     Segs Relative 85.5 (H) 36 - 66 %    Lymphocytes % 9.0 (L) 24 - 44 %    Monocytes % 4.6 (H) 0 - 4 %    Eosinophils % 0.2 0 - 3 %    Basophils % 0.3 0 - 1 %    Segs Absolute 9.9 K/CU MM    Lymphocytes Absolute 1.0 K/CU MM    Monocytes Absolute 0.5 K/CU MM    Eosinophils Absolute 0.0 K/CU MM    Basophils Absolute 0.0 K/CU MM    Nucleated RBC % 0.0 %    Total Nucleated RBC 0.0 K/CU MM    Total Immature Neutrophil 0.05 K/CU MM    Immature Neutrophil % 0.4 0 - 0.43 %   CMP   Result Value Ref Range    Sodium 141 135 - 145 MMOL/L    Potassium 4.0 3.5 - 5.1 MMOL/L    Chloride 105 99 - 110 mMol/L    CO2 25 21 - 32 MMOL/L    BUN 22 6 - 23 MG/DL    Creatinine 1.3 (H) 0.6 - 1.1 MG/DL    Est, Glom Filt Rate 47 (L) >60 mL/min/1.73m2    Glucose 128 (H) 70 - 99 MG/DL    Calcium 9.3 8.3 - 10.6 MG/DL    Albumin 4.2 3.4 - 5.0 GM/DL    Total Protein 6.9 6.4 - 8.2 GM/DL    Total Bilirubin 0.3 0.0 - 1.0 MG/DL    ALT 11 10 - 40 U/L    AST 18 15 - 37 IU/L    Alkaline Phosphatase 75 40 - 129 IU/L    Anion Gap 11 4 - 16   Lipase   Result Value Ref Range    Lipase 19 13 - 60 IU/L   Urinalysis   Result Value Ref Range    Color, UA YELLOW YELLOW    Clarity, UA CLEAR CLEAR    Glucose, Urine NEGATIVE NEGATIVE MG/DL    Bilirubin Urine NEGATIVE NEGATIVE MG/DL    Ketones, Urine NEGATIVE NEGATIVE MG/DL    Specific Gravity, UA <=1.005 1.001 - 1.035    Blood, Urine LARGE NEGATIVE    pH, Urine 5.0 5.0 - 8.0    Protein, UA NEGATIVE NEGATIVE MG/DL    Urobilinogen, Urine 0.2 0.2 - 1.0 MG/DL    Nitrite Urine, Quantitative NEGATIVE NEGATIVE    Leukocyte Esterase, Urine NEGATIVE NEGATIVE   Lactic Acid   Result Value Ref Range    Lactate 1.7 0.4 - 2.0 mMOL/L   Microscopic Urinalysis   Result Value Ref Range    RBC, UA 13 (H) 0 - 6 /HPF    WBC, UA 1 0 - 5 /HPF    Bacteria, UA MODERATE (A) NEGATIVE /HPF    Squam Epithel, UA 1 /HPF    Trichomonas, UA NONE SEEN NONE SEEN /HPF     XR CHEST (2 VW)    Result Date: 10/29/2022  EXAMINATION: TWO XRAY VIEWS OF THE CHEST 10/29/2022 2:54 pm COMPARISON: Chest CTA 02/17/2022, chest radiograph 02/17/2022 HISTORY: ORDERING SYSTEM PROVIDED HISTORY: shortness of breath TECHNOLOGIST PROVIDED HISTORY: Reason for exam:->shortness of breath Reason for Exam: shortness of breath FINDINGS: Removal of the previously seen right subclavian central venous port catheter. Overlying heart monitor leads. Minimal to mild bilateral peribronchial cuffing. Clear lungs. No findings of pneumothorax or pleural effusion. Normal mediastinal, hilar, and cardiac contours. No acute fracture.      Minimal to mild peribronchial cuffing potentially due to reactive airways disease or bronchitis. CT ABDOMEN PELVIS W IV CONTRAST Additional Contrast? None    Result Date: 10/29/2022  EXAMINATION: CT OF THE ABDOMEN AND PELVIS WITH CONTRAST 10/29/2022 5:47 pm TECHNIQUE: CT of the abdomen and pelvis was performed with the administration of intravenous contrast. Multiplanar reformatted images are provided for review. Automated exposure control, iterative reconstruction, and/or weight based adjustment of the mA/kV was utilized to reduce the radiation dose to as low as reasonably achievable. COMPARISON: CT abdomen and pelvis 01/28/2022 and 05/30/2021 HISTORY: ORDERING SYSTEM PROVIDED HISTORY: left flank, LLQ abdominal pain Decision Support Exception - unselect if not a suspected or confirmed emergency medical condition->Emergency Medical Condition (MA) Reason for Exam: left flank, LLQ abdominal pain FINDINGS: Lower Chest: Visualized portions of the lungs are clear. Cardiac and posterior mediastinal structures visualized are unremarkable. Kidneys: Unremarkable appearance right kidney and ureter. Slightly diminished enhancement left kidney with perinephric stranding and mild left hydronephrosis, result of 4 x 5 mm proximal left ureter calculus axial image 80. Other organs: Calcifications liver and spleen reflect sequela of old granulomatous disease (benign finding requiring no additional evaluation or follow-up). No other hepatic or splenic lesion evident. Status post cholecystectomy. Unremarkable appearance of the pancreas and adrenal glands. GI/Bowel: The stomach and small bowel appear unremarkable. No diffuse or focal small bowel wall thickening or inflammatory changes evident. No obstruction is seen. The appendix is absent following appendectomy. The colon appears unremarkable. Pelvis: Partially filled urinary bladder appears unremarkable. No pelvic adenopathy or free fluid is seen. Vascular calcifications are noted reflecting calcific atherosclerosis. Peritoneum/Retroperitoneum: No pneumoperitoneum. Calcific atherosclerotic disease aorta. No aneurysm. Unremarkable appearance of the IVC. No adenopathy or fluid. Bones/Soft Tissues: Left hip arthroplasty. No acute superficial soft tissue or osseous structure abnormality evident. Mild-to-moderate degenerative changes predominate lower lumbar spine with grade 1 degenerative anterolisthesis L4 on L5.     1. Obstructive uropathy left kidney with hydronephrosis as result of a 4 x 5 mm proximal left ureter calculus. 2. Mild calcific atherosclerosis. 3. Mild-to-moderate degenerative changes predominate lower lumbar spine with grade 1 degenerative anterolisthesis L4 on L5. 4. Other portions of the CT abdomen and pelvis appear unremarkable. Final ED Course and MDM:  -  Patient seen and evaluated in the emergency department. -  Triage and nursing notes reviewed and incorporated. -  Old chart records reviewed and incorporated. -  Supervising physician was Dr. Lina Truong. Patient was seen independently. -  I re-checked patient at 2020 and she reports complete resolution of symptoms and readiness for discharge. -  Patient discharged home. In light of current events, I did utilize appropriate PPE (including N95 and surgical face mask, safety glasses, and gloves, as recommended by the health facility/national standard best practice, during my bedside interactions with the patient. Final Impression      1.  Kidney stone        DISPOSITION Decision To Discharge 10/29/2022 07:57:54 PM     (Please note that portions of this note may have been completed with a voice recognition program. Efforts were made to edit the dictations but occasionally words are mis-transcribed.)    Fe Sy, [address] Due  10/29/22 2024

## 2022-10-31 LAB
CULTURE: NORMAL
Lab: NORMAL
SPECIMEN: NORMAL

## 2022-11-03 ENCOUNTER — TELEPHONE (OUTPATIENT)
Dept: PHARMACY | Age: 62
End: 2022-11-03

## 2022-11-03 NOTE — PROGRESS NOTES
Pharmacy Note  ED Culture Follow-up    Michael Barcenas is a 63 y.o. female. Allergies: Celexa [citalopram], Atorvastatin, Fenofibrate, Mestinon [pyridostigmine], Penicillins, Sulfa antibiotics, Tape [adhesive tape], Gemfibrozil, and Meloxicam     Labs:  Lab Results   Component Value Date    BUN 22 10/29/2022    CREATININE 1.3 (H) 10/29/2022    WBC 11.6 (H) 10/29/2022     Estimated Creatinine Clearance: 54 mL/min (A) (based on SCr of 1.3 mg/dL (H)). Current antimicrobials:   Cephalexin 500 mg by mouth twice daily for 10 days    ASSESSMENT:  Micro results:   Urine culture no growth at 18 to 36 hours     PLAN:  Need for intervention: Yes  Discussed with: Dr. Génesis Fierro treatment:    Informed patient of negative urine culture and instructed patient to discontinue cephalexin. Patient response:   Called and spoke with patient. Counseled patient on following up with PCP    Called/sent in prescription to: Not applicable    Please call with any questions.  Addison Garcias [facility] [place], PharmD 3:52 PM 11/3/2022

## 2022-11-03 NOTE — TELEPHONE ENCOUNTER
Pharmacy Note  ED Culture Follow-up    Idalia Washington is a 63 y.o. female. Allergies: Celexa [citalopram], Atorvastatin, Fenofibrate, Mestinon [pyridostigmine], Penicillins, Sulfa antibiotics, Tape [adhesive tape], Gemfibrozil, and Meloxicam     Labs:  Lab Results   Component Value Date    BUN 22 10/29/2022    CREATININE 1.3 (H) 10/29/2022    WBC 11.6 (H) 10/29/2022     Estimated Creatinine Clearance: 54 mL/min (A) (based on SCr of 1.3 mg/dL (H)). Current antimicrobials:   Cephalexin 500 mg by mouth twice daily for 10 days    ASSESSMENT:  Micro results:   Urine culture no growth at 18 to 36 hours     PLAN:  Need for intervention: Yes  Discussed with: Dr. Mehrdad Ventura treatment:    Informed patient of negative urine culture and instructed patient to discontinue cephalexin. Patient response:   Called and spoke with patient. Counseled patient on following up with PCP    Called/sent in prescription to: Not applicable    Please call with any questions.  Temitope Ferguson, [facility] [place], PharmD 3:52 PM 11/3/2022

## 2022-11-18 ENCOUNTER — HOSPITAL ENCOUNTER (OUTPATIENT)
Age: 62
Discharge: HOME | End: 2022-11-18
Payer: MEDICAID

## 2022-11-18 LAB
ALBUMIN SERPL-MCNC: 4.7 GM/DL (ref 3.4–5)
ALP BLD-CCNC: 79 IU/L (ref 40–128)
ALT SERPL-CCNC: 12 U/L (ref 10–40)
ANION GAP SERPL CALCULATED.3IONS-SCNC: 12 MMOL/L (ref 4–16)
AST SERPL-CCNC: 14 IU/L (ref 15–37)
BASOPHILS ABSOLUTE: 0.1 K/CU MM
BASOPHILS RELATIVE PERCENT: 0.8 % (ref 0–1)
BILIRUB SERPL-MCNC: 0.4 MG/DL (ref 0–1)
BUN BLDV-MCNC: 24 MG/DL (ref 6–23)
CALCIUM SERPL-MCNC: 9.2 MG/DL (ref 8.3–10.6)
CHLORIDE BLD-SCNC: 101 MMOL/L (ref 99–110)
CO2: 27 MMOL/L (ref 21–32)
CREAT SERPL-MCNC: 1.5 MG/DL (ref 0.6–1.1)
DIFFERENTIAL TYPE: ABNORMAL
EOSINOPHILS ABSOLUTE: 0.2 K/CU MM
EOSINOPHILS RELATIVE PERCENT: 2.2 % (ref 0–3)
GFR SERPL CREATININE-BSD FRML MDRD: 40 ML/MIN/1.73M2
GLUCOSE BLD-MCNC: 102 MG/DL (ref 70–99)
HCT VFR BLD CALC: 42.6 % (ref 37–47)
HEMOGLOBIN: 13.6 GM/DL (ref 12.5–16)
IMMATURE NEUTROPHIL %: 0.3 % (ref 0–0.43)
LYMPHOCYTES ABSOLUTE: 2.2 K/CU MM
LYMPHOCYTES RELATIVE PERCENT: 30.4 % (ref 24–44)
MCH RBC QN AUTO: 27.3 PG (ref 27–31)
MCHC RBC AUTO-ENTMCNC: 31.9 % (ref 32–36)
MCV RBC AUTO: 85.5 FL (ref 78–100)
MONOCYTES ABSOLUTE: 0.6 K/CU MM
MONOCYTES RELATIVE PERCENT: 7.7 % (ref 0–4)
NUCLEATED RBC %: 0 %
PDW BLD-RTO: 13.9 % (ref 11.7–14.9)
PLATELET # BLD: 215 K/CU MM (ref 140–440)
PMV BLD AUTO: 8.9 FL (ref 7.5–11.1)
POTASSIUM SERPL-SCNC: 4 MMOL/L (ref 3.5–5.1)
PRO-BNP: 134.7 PG/ML
RBC # BLD: 4.98 M/CU MM (ref 4.2–5.4)
SEGMENTED NEUTROPHILS ABSOLUTE COUNT: 4.3 K/CU MM
SEGMENTED NEUTROPHILS RELATIVE PERCENT: 58.6 % (ref 36–66)
SODIUM BLD-SCNC: 140 MMOL/L (ref 135–145)
T4 FREE: 0.94 NG/DL (ref 0.9–1.8)
TOTAL IMMATURE NEUTOROPHIL: 0.02 K/CU MM
TOTAL NUCLEATED RBC: 0 K/CU MM
TOTAL PROTEIN: 6.8 GM/DL (ref 6.4–8.2)
TSH HIGH SENSITIVITY: 2.39 UIU/ML (ref 0.27–4.2)
WBC # BLD: 7.3 K/CU MM (ref 4–10.5)

## 2022-11-18 PROCEDURE — 83880 ASSAY OF NATRIURETIC PEPTIDE: CPT

## 2022-11-18 PROCEDURE — 36415 COLL VENOUS BLD VENIPUNCTURE: CPT

## 2022-11-18 PROCEDURE — 84443 ASSAY THYROID STIM HORMONE: CPT

## 2022-11-18 PROCEDURE — 85025 COMPLETE CBC W/AUTO DIFF WBC: CPT

## 2022-11-18 PROCEDURE — 84439 ASSAY OF FREE THYROXINE: CPT

## 2022-11-18 PROCEDURE — 80053 COMPREHEN METABOLIC PANEL: CPT

## 2022-11-28 NOTE — TELEPHONE ENCOUNTER
Patient called in requesting a refill for her  gabapentin. Please send in to pharmacy. Last appointment was 10/04 and next appointment is scheduled for 01/05. To room 302 per w/c per myself.

## 2022-12-01 RX ORDER — GABAPENTIN 300 MG/1
300 CAPSULE ORAL NIGHTLY
Qty: 90 CAPSULE | Refills: 0 | Status: SHIPPED | OUTPATIENT
Start: 2022-12-01 | End: 2022-12-31

## 2022-12-02 PROBLEM — Z85.3 HISTORY OF CANCER OF LEFT BREAST: Status: ACTIVE | Noted: 2022-12-02

## 2022-12-02 PROBLEM — R63.4 WEIGHT LOSS, ABNORMAL: Status: ACTIVE | Noted: 2022-12-02

## 2022-12-06 NOTE — PROGRESS NOTES
Patient Name:  Torrie Gipson  Patient :  1960  Patient MRN:  [id number]     Primary Oncologist: Adelaida Evans MD  Referring Provider: Irma Figueroa     Date of Service: 2023      Chief Complaint:    Chief Complaint   Patient presents with    Follow-up      She came in for follow-up visit. Patient Active Problem List:     Essential hypertension     Hyperlipidemia     Allergic rhinitis due to pollen     Morbid obesity due to excess calories     Hearing loss     Hot flashes due to surgical menopause     Gastroesophageal reflux disease     Chronic back pain     Anxiety and depression     Osteoarthritis     Meniere's disease     Insomnia     Precordial pain      Calculus of gallbladder without cholecystitis without obstruction     S/P laparoscopic cholecystectomy     Vasovagal syncope     Class 2 obesity due to excess calories without serious comorbidity in adult     Family history of early     Closed fracture of left ankle     Acute respiratory failure with hypoxia      Status post left hip replacement     Hyperglycemia        Infiltrating ductal carcinoma of left breast      S/P mastectomy, bilateral     Hx of lymph node excision    HPI:   Dale Gonzalez is a pleasant 63year old female patient who was referred for evaluation of [id number] invasive ductal carcinoma of the left breast, HR positive HER-2 negative, status post bilateral mastectomy in May 2021. She went for the routine mammogram in 2021 and denied any palpable lump to her breast.  Mammogram at that time showed at least 2 indeterminate complex masses at the 2:00 and 12 o'clock position in the left breast in the retroareolar region. 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   ER was more than 95%, KY more than 90%, HER-2/baltazar negative by FISH. MRI of bilateral breasts on April 19, 2021 showed:  1. Left breast multicentric disease with biopsy proven invasive ductal carcinoma with mucinous component at 11 o'clock and mucinous carcinomas at 2 o'clock and 12 o'clock in the retroareolar breast.  There is at least 5 cm of separation between the 11 o'clock mass and 12 o'clock mass. Additional smooth masses measuring up to 21 mm at biopsy sites are hematomas. 2. No MR evidence of malignancy in the contralateral right breast.      She had bilateral mastectomy on May 6, 2021. Pathology report showed : Invasive ductal and mucinous carcinoma of the left breast, retroareolar, grade 2, 4.5 cm, negative margin, -2 sentinel lymph nodes, ER more than 95%, VA 90%, HER-2/baltazar negative by FISH. Pathological stage classification T2, N0, MX. Pathologist felt that she has 1 contiguous tumor mass measuring about 4.5 cm rather than multicentric disease. I discussed with pathologist who stated that she does not have pure mucinous carcinoma of the breast.   CBC and CMP in March 2021grossly unremarkable. Due to family history and personal history of breast cancer, I referred for genetic counseling. She had colonoscopy with removal of 2 polyps in 2014 by Dr. David Gillespie. Follow-up in 3 years was recommended. She refused to have further follow-up. Oncotype DX score was 29. I offered adjuvant chemo therapy TC vs AC +T  She opted to [facility], operated by [facility] +T. In May 2021 she was hospitalized for nausea and vomiting. She was seen by GI. She had mild transaminitis and was suspected to have gastroenteritis. Never had any upper endoscopic study. Colonoscopy in June 2014 showed polyps which was removed. Pathology report showed tubular adenoma. CT abdomen and pelvis on May 30, 2021:  No acute abnormality within the abdomen and pelvis. Status post hysterectomy, appendectomy and cholecystectomy.    15 mm subcutaneus soft tissue nodule within the left lower quadrant area of anterior abdomen. Finding may represent sebaceous cyst or other pathology. CTA chest on May 30, 2021 showed   1. No evidence of acute pulmonary embolism. 2. Soft tissue thickening and fat stranding overlying the bilateral pectoral muscles. CT head on May 31, 2021 showed  No acute intracranial abnormality. No intraparenchymal abnormal enhancement to suggest intracranial metastatic disease. MRI is a superior modality for evaluation of intracranial metastasis. Small probable meningioma within the falx near the convexity. MRI of the brain on June 1, 2021 showed:  No acute infarct scattered areas of chronic white matter change in the periventricular white matter. No evidence for blood products on gradient imaging. No  focal intracranial lesion noted     Amylase and lipase were unremarkable. She was placed on reglan with improvement of nausea and vomiting. She was recommended to have upper endoscopic study as outpatient by GI. She is status post bilateral mastectomy. She had EGD By Dr. Danielle Stallings with diagnosis of hiatal hernia, gastric polyp, mild gastritis. She started adjuvant chemotherapy AC on July 16, 2021. SBFT: Unremarkable small bowel follow through series. Bone density in June 2021 showed normal study. ECHO 6/18/2021:  Left ventricular systolic function is normal.  Ejection fraction is visually estimated at 55-60%. CTA 8/12 no PE. She had stress test 8/13/21 with no infarct or ischemia, normal EF 64%. She has seen GI and was recommended esophageal stretching. Esophagram 9/13/2021:  Small hiatal hernia with narrowing of the distal thoracic esophagus just proximal to the hiatal hernia, which may reflect underlying Schatzki's ring. Ingested barium and barium tablet were initially held up at this level before eventually passing into the stomach. She completed fourth cycle of AC on September 17, 2021.   She started Taxol on October 8, 2021 and completed on January 5, 2022. December 8, 2021 ferritin 356, iron 54, TIBC 2024, saturation 17%. Folate was 10.8, B12 461.8. December 22, 2021 hemoglobin 10.4, MCV 91.8. Anemia is likely related to chemotherapy. She started adjuvant Arimidex after completion of chemo. She was hospitalized in February 2022 due to NVD which she thought related to stomach flu. She was treated with antibiotic for bacterial colitis. She called Dr Anne Navarrete and is scheduled for colonoscopy in the near future. CT abdomen in January 2022 was negative for mets. CT head in 2/2022 was negative for mets. CTA in 2/2022 was negative for mets. MRI of brain 2/21/2022:  No acute intracranial ischemia or intracranial metastatic disease   Mild chronic microvascular disease within the periventricular white matter  She will continue with arimidex. She has the intermittent diarrhea. I switched Arimidex to Femara in April 2022. I strongly recommend she follow-up with GI.  I recommend to drink enough water. Also recommend to drink Pedialyte whenever she has diarrhea. I recommend to take Imodium. We discussed about the potential side effect of Femara. Femara may cause nausea also. Advised to call us if she has any issue with Femara. 8/2022 CMP and CBC grossly stable for her. She had colonoscopy with polypectomy in August 2022. She has 2 adenomas. Follow-up in 5 years was recommended. Diarrhea is about the same. She still has intermittent nausea and vomiting. She will follow-up with surgeon to discuss about potential hiatal hernia surgery. She had follow-up with GI on October 19, 2022. She saw  cardiologist for the hypertension on October 17, 2022. 1/5/2023 she came in for follow up visit. 12/9/2022 Gastric emptying is within normal limits. 12/2022 creat 1.2. LFTs and CBC grossly unremarkable. She is agreeable to continue with letrozole.   I recommend monthly self breast exam.  She continues to have NVD diarrhea twice a week. She requested second opinion. Weight is up and down. No acute pain. No fever or chills. No chest pain, shortness of breath or palpitation. No headache or dizzy spell. No specific bone pain. No melena or hematochezia. Denied any dysuria or hematuria. Past Medical History:   Diagnosis Date    Allergic rhinitis due to pollen 11/19/2015    Arthritis     left hip & knee    Chronic back pain     Dehydration 7/22/2021    Depression     Gastroesophageal reflux disease 11/19/2015    Hearing loss 11/19/2015    History of blood transfusion     No reaction per pt    History of cardiac monitoring 04/18/2017    14 day event monitor. Sinus Rhythm    History of nuclear stress test 09/28/2016    cardiolite-normal,EF70%    Hot flashes due to surgical menopause 11/19/2015    Hx of echocardiogram 10/05/2016    EF: >55 %   Mild mitral and tricuspid regurg.      Hyperlipidemia     Hypertension     Follows with PCP    Lexiscan Stress Test 10/14/2020    EF 60%, Normal study, no ischemia    Meniere disease     Morbid obesity due to excess calories ([address] 11/19/2015    Sleep apnea 11/19/2015    sleep study negative    Tilt table evaluation 05/09/2017     positive for neurocardiogenic syncope     Past Surgical History:   Procedure Laterality Date    APPENDECTOMY  1967    CHOLECYSTECTOMY      2017    COLONOSCOPY  2014    Polyps x2 - Dr. Debria Cranker ([facility])  1989    MASTECTOMY Bilateral 05/06/2021    Dr. Yfn Kelly Left 10/19/2020    LEFT HIP TOTAL ARTHROPLASTY - POSTERIOR performed by Stormy Harper MD at [address] LEFT Left 3/9/2021    US BREAST BIOPSY NEEDLE ADDITIONAL LEFT 3/9/2021 Zahraa Snow MD [facility] LEFT Left 3/9/2021    US BREAST BIOPSY NEEDLE ADDITIONAL LEFT 3/9/2021 Zahraa Snow MD [address] [place] [address] Dr NDIAYE Left 3/9/2021    US BREAST NEEDLE BIOPSY LEFT 3/9/2021 Clotilde Lynn  [facility]   She had complete hysterectomy in 1999 due to endometriosis. Social History:   She denies any history of smoking. No alcohol or illicit drug use. She denies any children. Family History: Mother had stomach and breast cancer, maternal grandmother had breast cancer, colon cancer and stomach cancer. Review of Systems: The remainder of the review of system is unremarkable. Vital Signs: BP (!) 173/92 (Site: Right Lower Arm, Position: Sitting, Cuff Size: Medium Adult)   Pulse 85   Temp 97.8 °F (36.6 °C) (Temporal)   Resp 18   Ht 5' 4\" (1.626 m)   Wt 224 lb (101.6 kg)   SpO2 96%   BMI 38.45 kg/m²      Physical Exam:  CONSTITUTIONAL: awake, alert, cooperative, no apparent distress   EYES:  sclera clear and normal conjunctiva. Pupils equal and round  ENT: Normocephalic, without obvious abnormality, atraumatic  NECK: supple, symmetrical.  No JVD. HEMATOLOGIC/LYMPHATIC: no cervical, supraclavicular or axillary lymphadenopathy   LUNGS: Clear to auscultation and percussion bilaterally. BREAST: Status post bilateral mastectomy. No changes to scar, no new lumps or bumps  CARDIOVASCULAR: regular rate and rhythm, normal S1 and S2, no murmur   ABDOMEN: bowel sound present, soft, non-distended. No palpable masses. No rebound or guarding. MUSCULOSKELETAL: full range of motion noted, tone is normal  NEUROLOGIC:  Motor and sensory grossly intact. CN II - XII grossly intact. SKIN: No jaundice. Dry and warm skin no ecchymosis. EXTREMITIES: no LE edema. No cyanosis.       Labs:  Hematology:  Lab Results   Component Value Date    WBC 8.9 12/29/2022    RBC 4.86 12/29/2022    HGB 13.4 12/29/2022    HCT 41.4 12/29/2022    MCV 85.2 12/29/2022    MCH 27.6 12/29/2022    MCHC 32.4 12/29/2022    RDW 15.1 (H) 12/29/2022     12/29/2022    MPV 9.1 12/29/2022    BANDSPCT 9 11/02/2020    SEGSPCT 67.2 (H) 12/29/2022    EOSRELPCT 1.0 12/29/2022    BASOPCT 0.3 12/29/2022    LYMPHOPCT 24.3 12/29/2022    MONOPCT 7.2 (H) 12/29/2022    BANDABS 1.86 11/02/2020    SEGSABS 6.0 12/29/2022    EOSABS 0.1 12/29/2022    BASOSABS 0.0 12/29/2022    LYMPHSABS 2.2 12/29/2022    MONOSABS 0.6 12/29/2022    DIFFTYPE AUTOMATED DIFFERENTIAL 12/29/2022    ANISOCYTOSIS 1+ 11/02/2020    POLYCHROM 1+ 11/02/2020    WBCMORP FEW 11/02/2020    PLTM ADEQUATE 08/12/2021     Lab Results   Component Value Date     (H) 10/25/2020     Chemistry:  Lab Results   Component Value Date     12/29/2022    K 3.8 12/29/2022     12/29/2022    CO2 28 12/29/2022    BUN 16 12/29/2022    CREATININE 1.2 (H) 12/29/2022    GLUCOSE 106 (H) 12/29/2022    CALCIUM 9.5 12/29/2022    PROT 6.7 12/29/2022    LABALBU 4.7 12/29/2022    BILITOT 0.4 12/29/2022    ALKPHOS 75 12/29/2022    AST 13 (L) 12/29/2022    ALT 13 12/29/2022    LABGLOM 52 (L) 12/29/2022    GFRAA >60 08/18/2022    AGRATIO 1.7 12/07/2015    GLOB 2.7 12/07/2015    MG 1.8 02/22/2022    POCCA 1.13 09/09/2021    POCGLU 115 (H) 09/09/2021     Lab Results   Component Value Date     (H) 11/14/2020     Lab Results   Component Value Date    TSHHS 2.390 11/18/2022    T4FREE 0.94 11/18/2022     Immunology:  Lab Results   Component Value Date    PROT 6.7 12/29/2022     Coagulation Panel:  Lab Results   Component Value Date    PROTIME 14.3 02/19/2022    INR 1.11 02/19/2022    APTT 40.4 (H) 02/19/2022    DDIMER 389 (H) 02/17/2022     Anemia Panel:  Lab Results   Component Value Date    ZPKOKGYQ03 461.8 12/08/2021    FOLATE 10.8 12/08/2021      Observations:  No data recorded     Assessment & Plan:  1. 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 Stage pT2, N0 sentinel lymph node MX. Oncotype DX was 29. I offered adjuvant chemotherapy  She opted to [facility] + T. She started chemo in June 2021 and completed on September 17, 2021.   She started Taxol on October 8, 2028 and completed in January 2022. She started arimidex in January 2022. I reviewed CT CAP and MRI of brain from the hospital in January and February 2022. Due to the diarrhea, I switched Arimidex to Femara in April 2022. 12/2022 creat 1.2. LFTs and CBC grossly unremarkable. She is agreeable to continue with letrozole. I recommend monthly self breast exam.    2.  Genetic counseling: VUS JUHI    3. Bone density on June 11, 2021 was normal.  DEXA in June 2023 ordered. Echo in June 2021 showed LVEF at 55 to 60%. 4. She has N/V ? Gastroenteritis. EGD as above. She started on Pantoprazole. Small bowel follow through was unremarkable on 8/2/21. Esophagogram was noted. She was recommended to have esophageal stretching. Changed zofran to compazine. She has EGD and dilatation of the esophagus in September 2021. S  She had colonoscopy in August 2022.  2 adenomas were removed. Follow-up in 5 years recommended. She has ongoing nausea and diarrhea. Seen by Dr. Asher Boys week of 8/8/22. Records requested. Instructed to take femara at night. Ongoing nausea for which she was given zofran PRN. She stated diarrhea is about the same. She has intermittent nausea and vomiting. She requested second opinion. 5. Mental health: Reports suicidal ideation, 1 past attempt. Reports depression has worsened. Re-referred to counselor. Denies suicide plan, or means. 6.  She has anemia. Anemia workup 12/8/21 reviewed. CBC in August 2022 grossly unremarkable. I will continue to monitor CBC. We discussed about diet and weight loss. Return to clinic in 12 weeks or sooner. All of her question has been answered for today.

## 2022-12-19 ENCOUNTER — HOSPITAL ENCOUNTER (OUTPATIENT)
Dept: NUCLEAR MEDICINE | Age: 62
Discharge: HOME | End: 2022-12-19
Payer: MEDICAID

## 2022-12-19 DIAGNOSIS — R11.2 NAUSEA AND VOMITING, UNSPECIFIED VOMITING TYPE: ICD-10-CM

## 2022-12-19 DIAGNOSIS — R63.4 WEIGHT LOSS, ABNORMAL: ICD-10-CM

## 2022-12-19 PROCEDURE — 3430000000 HC RX DIAGNOSTIC RADIOPHARMACEUTICAL: Performed by: SURGERY

## 2022-12-19 PROCEDURE — 78264 GASTRIC EMPTYING IMG STUDY: CPT

## 2022-12-19 PROCEDURE — A9541 TC99M SULFUR COLLOID: HCPCS | Performed by: SURGERY

## 2022-12-19 RX ADMIN — Medication 1.1 MILLICURIE: at 13:42

## 2022-12-29 ENCOUNTER — HOSPITAL ENCOUNTER (OUTPATIENT)
Dept: INFUSION THERAPY | Age: 62
Discharge: HOME | End: 2022-12-29
Payer: MEDICAID

## 2022-12-29 DIAGNOSIS — C50.912 INFILTRATING DUCTAL CARCINOMA OF LEFT BREAST (HCC): ICD-10-CM

## 2022-12-29 LAB
ALBUMIN SERPL-MCNC: 4.7 GM/DL (ref 3.4–5)
ALP BLD-CCNC: 75 IU/L (ref 40–128)
ALT SERPL-CCNC: 13 U/L (ref 10–40)
ANION GAP SERPL CALCULATED.3IONS-SCNC: 14 MMOL/L (ref 4–16)
AST SERPL-CCNC: 13 IU/L (ref 15–37)
BASOPHILS ABSOLUTE: 0 K/CU MM
BASOPHILS RELATIVE PERCENT: 0.3 % (ref 0–1)
BILIRUB SERPL-MCNC: 0.4 MG/DL (ref 0–1)
BUN BLDV-MCNC: 16 MG/DL (ref 6–23)
CALCIUM SERPL-MCNC: 9.5 MG/DL (ref 8.3–10.6)
CHLORIDE BLD-SCNC: 101 MMOL/L (ref 99–110)
CO2: 28 MMOL/L (ref 21–32)
CREAT SERPL-MCNC: 1.2 MG/DL (ref 0.6–1.1)
DIFFERENTIAL TYPE: ABNORMAL
EOSINOPHILS ABSOLUTE: 0.1 K/CU MM
EOSINOPHILS RELATIVE PERCENT: 1 % (ref 0–3)
GFR SERPL CREATININE-BSD FRML MDRD: 52 ML/MIN/1.73M2
GLUCOSE BLD-MCNC: 106 MG/DL (ref 70–99)
HCT VFR BLD CALC: 41.4 % (ref 37–47)
HEMOGLOBIN: 13.4 GM/DL (ref 12.5–16)
LYMPHOCYTES ABSOLUTE: 2.2 K/CU MM
LYMPHOCYTES RELATIVE PERCENT: 24.3 % (ref 24–44)
MCH RBC QN AUTO: 27.6 PG (ref 27–31)
MCHC RBC AUTO-ENTMCNC: 32.4 % (ref 32–36)
MCV RBC AUTO: 85.2 FL (ref 78–100)
MONOCYTES ABSOLUTE: 0.6 K/CU MM
MONOCYTES RELATIVE PERCENT: 7.2 % (ref 0–4)
PDW BLD-RTO: 15.1 % (ref 11.7–14.9)
PLATELET # BLD: 188 K/CU MM (ref 140–440)
PMV BLD AUTO: 9.1 FL (ref 7.5–11.1)
POTASSIUM SERPL-SCNC: 3.8 MMOL/L (ref 3.5–5.1)
RBC # BLD: 4.86 M/CU MM (ref 4.2–5.4)
SEGMENTED NEUTROPHILS ABSOLUTE COUNT: 6 K/CU MM
SEGMENTED NEUTROPHILS RELATIVE PERCENT: 67.2 % (ref 36–66)
SODIUM BLD-SCNC: 143 MMOL/L (ref 135–145)
TOTAL PROTEIN: 6.7 GM/DL (ref 6.4–8.2)
WBC # BLD: 8.9 K/CU MM (ref 4–10.5)

## 2022-12-29 PROCEDURE — 36415 COLL VENOUS BLD VENIPUNCTURE: CPT

## 2022-12-29 PROCEDURE — 85025 COMPLETE CBC W/AUTO DIFF WBC: CPT

## 2022-12-29 PROCEDURE — 80053 COMPREHEN METABOLIC PANEL: CPT

## 2023-01-05 ENCOUNTER — OFFICE VISIT (OUTPATIENT)
Dept: ONCOLOGY | Age: 63
End: 2023-01-05
Payer: MEDICAID

## 2023-01-05 ENCOUNTER — HOSPITAL ENCOUNTER (OUTPATIENT)
Dept: INFUSION THERAPY | Age: 63
Discharge: HOME | End: 2023-01-05
Payer: MEDICAID

## 2023-01-05 VITALS
OXYGEN SATURATION: 96 % | HEIGHT: 64 IN | RESPIRATION RATE: 18 BRPM | DIASTOLIC BLOOD PRESSURE: 92 MMHG | WEIGHT: 224 LBS | TEMPERATURE: 97.8 F | HEART RATE: 85 BPM | BODY MASS INDEX: 38.24 KG/M2 | SYSTOLIC BLOOD PRESSURE: 173 MMHG

## 2023-01-05 DIAGNOSIS — C50.912 INFILTRATING DUCTAL CARCINOMA OF LEFT BREAST (HCC): Primary | ICD-10-CM

## 2023-01-05 PROCEDURE — 99214 OFFICE O/P EST MOD 30 MIN: CPT | Performed by: INTERNAL MEDICINE

## 2023-01-05 PROCEDURE — 3077F SYST BP >= 140 MM HG: CPT | Performed by: INTERNAL MEDICINE

## 2023-01-05 PROCEDURE — 99211 OFF/OP EST MAY X REQ PHY/QHP: CPT

## 2023-01-05 PROCEDURE — 3080F DIAST BP >= 90 MM HG: CPT | Performed by: INTERNAL MEDICINE

## 2023-01-05 NOTE — PROGRESS NOTES
MA Rooming Questions  Patient: Conchita Aguillon  MRN: [id number]    Date: 1/5/2023        1. Do you have any new issues?   yes - pt states b/p has been running high-is on a few meds and not seeming to help, hot flashes, tired, fatigue-ongoing nausea/vomiting-meds also not helping          2. Do you need any refills on medications?    no    3. Have you had any imaging done since your last visit?   Yes-MRI 12/9    4. Have you been hospitalized or seen in the emergency room since your last visit here?   Yes-ED in NOV    5. Did the patient have a depression screening completed today? No    No data recorded     PHQ-9 Given to (if applicable):               PHQ-9 Score (if applicable):                     [] Positive     []  Negative              Does question #9 need addressed (if applicable)                     [] Yes    []  No               Meghna Nielsen CMA

## 2023-01-25 ENCOUNTER — TELEPHONE (OUTPATIENT)
Dept: GASTROENTEROLOGY | Age: 63
End: 2023-01-25

## 2023-01-25 ENCOUNTER — OFFICE VISIT (OUTPATIENT)
Dept: GASTROENTEROLOGY | Age: 63
End: 2023-01-25
Payer: MEDICAID

## 2023-01-25 VITALS
BODY MASS INDEX: 38.24 KG/M2 | HEIGHT: 64 IN | HEART RATE: 105 BPM | OXYGEN SATURATION: 98 % | SYSTOLIC BLOOD PRESSURE: 138 MMHG | DIASTOLIC BLOOD PRESSURE: 98 MMHG | WEIGHT: 224 LBS | TEMPERATURE: 97.3 F

## 2023-01-25 DIAGNOSIS — K59.00 CONSTIPATION, UNSPECIFIED CONSTIPATION TYPE: ICD-10-CM

## 2023-01-25 DIAGNOSIS — R13.19 ESOPHAGEAL DYSPHAGIA: Primary | ICD-10-CM

## 2023-01-25 DIAGNOSIS — Z98.890 HISTORY OF ESOPHAGEAL DILATATION: ICD-10-CM

## 2023-01-25 DIAGNOSIS — K21.9 GASTROESOPHAGEAL REFLUX DISEASE, UNSPECIFIED WHETHER ESOPHAGITIS PRESENT: ICD-10-CM

## 2023-01-25 DIAGNOSIS — R11.0 NAUSEA: ICD-10-CM

## 2023-01-25 PROCEDURE — 3074F SYST BP LT 130 MM HG: CPT | Performed by: NURSE PRACTITIONER

## 2023-01-25 PROCEDURE — 99204 OFFICE O/P NEW MOD 45 MIN: CPT | Performed by: NURSE PRACTITIONER

## 2023-01-25 PROCEDURE — 3078F DIAST BP <80 MM HG: CPT | Performed by: NURSE PRACTITIONER

## 2023-01-25 RX ORDER — DOCUSATE SODIUM 100 MG/1
100 CAPSULE, LIQUID FILLED ORAL 2 TIMES DAILY
Qty: 60 CAPSULE | Refills: 0 | Status: SHIPPED | OUTPATIENT
Start: 2023-01-25 | End: 2023-02-24

## 2023-01-25 RX ORDER — POLYETHYLENE GLYCOL 3350 17 G/17G
17 POWDER, FOR SOLUTION ORAL 2 TIMES DAILY
Qty: 1530 G | Refills: 1 | Status: SHIPPED | OUTPATIENT
Start: 2023-01-25 | End: 2023-02-24

## 2023-01-25 ASSESSMENT — ENCOUNTER SYMPTOMS
WHEEZING: 0
COUGH: 1
EYE PAIN: 0
COLOR CHANGE: 0
BACK PAIN: 1
NAUSEA: 1
SHORTNESS OF BREATH: 0
CONSTIPATION: 1
DIARRHEA: 0
PHOTOPHOBIA: 0
BLOOD IN STOOL: 0
VOMITING: 0
ABDOMINAL PAIN: 0

## 2023-01-25 NOTE — H&P (VIEW-ONLY)
Thomas Thomas 63 y.o. female was seen by PREETI Hooper on 1/25/2023     Wt Readings from Last 3 Encounters:   01/25/23 224 lb (101.6 kg)   01/05/23 224 lb (101.6 kg)   12/30/22 224 lb (101.6 kg)       HPI  Thomas Thomas is a pleasant 63 y.o.  female who presents today for acid reflux, nausea and weight loss. She has a past medical history of allergic rhinitis due to pollen, arthritis, chronic back pain, dehydration, depression, gastroesophageal reflux disease, hearing loss, history of blood transfusion, history of cardiac monitoring, history of nuclear stress test, hot flashes due to surgical menopause, hx of echocardiogram, hyperlipidemia, hypertension, lexiscan stress test, Meniere disease, morbid obesity due to excess calories, sleep apnea, and tilt table evaluation. She was treated for breast cancer with chemotherapy with last dose in January 2022. Her gastric emptying study done on 12- showed normal gastric emptying. She has been evaluated by Dr. She Morris for hiatal hernia and wanted second opinion on GI care due to persistent nausea and ineffective treatment for her acid reflux. Her last EGD/colonoscopy were done by Dr. Iris Gresham on August 2022. Her EGD showed small hiatal hernia with dilated Schatzki's ring in her distal esophagus. Her colonoscopy showed two colon polyps that were removed. She mentioned that her nausea and vomiting and acid reflux worsened after her procedures. She mentioned it \"feels like her stomach is coming up into her chest\". Her weight is stable. Her CT of abdomen/pelvis on 10- showed:    1. Obstructive uropathy left kidney with hydronephrosis as result of a 4 x 5   mm proximal left ureter calculus. 2. Mild calcific atherosclerosis. 3. Mild-to-moderate degenerative changes predominate lower lumbar spine with   grade 1 degenerative anterolisthesis L4 on L5.   4. Other portions of the CT abdomen and pelvis appear unremarkable.    Her nausea and vomiting has been ongoing for four years. Her appetite is poor with early satiety. Her weight is stable. Nausea occurs six days a week; Zofran takes daily with help. She is taking Reglan three times a day with no help. Phenergan use once a week. Vomiting every three to four days undigested food. She has tried food elimination with no improvement. Her heartburn and acid reflux has been ongoing for four years. Her heartburn is worse in the evenings and at night. She sleeps on an incline. She is not taking any medication; in past she has tried OTC Prilosec, Zantac, Prilosec, Prevacid, Protonix and Dexilant with no help. Nocturnal awakenings with acid reflux occurring three nights a week. She mentioned since November 2022 having esophageal dysphagia with food getting stuck in her mid esophagus. Her last episode occurred January 2 while eating mac and cheese. Her food is slow to go down. She denies pain with swallowing. No abdominal pain. Intermittent right lower quadrant dull ache that has been ongoing for two years ago. Nothing makes it better or worse. She denies changes in her bowel pattern  Her typical bowel pattern is once every two weeks with hard pellet like stools. More constipation. Stool softeners no help. No diarrhea. No blood in her stools or melena. Her maternal grandmother and mother had colon cancer. Her mother had stomach cancer. ROS  Review of Systems   Constitutional:  Positive for fatigue. Negative for appetite change, chills, diaphoresis, fever and unexpected weight change. HENT:  Positive for hearing loss and tinnitus. Negative for ear pain. Eyes:  Positive for visual disturbance. Negative for photophobia and pain. Respiratory:  Positive for cough (intermittent). Negative for shortness of breath and wheezing. Cardiovascular:  Positive for leg swelling. Negative for chest pain and palpitations. Gastrointestinal:  Positive for constipation and nausea. Negative for abdominal pain, blood in stool, diarrhea and vomiting. Endocrine: Negative for cold intolerance, heat intolerance and polydipsia. Genitourinary:  Negative for dysuria, frequency and urgency. Musculoskeletal:  Positive for back pain and myalgias. Negative for neck pain. Skin:  Negative for color change, pallor and rash. Allergic/Immunologic: Positive for environmental allergies. Negative for food allergies. Neurological:  Positive for dizziness and headaches. Negative for seizures and weakness. Hematological:  Bruises/bleeds easily. Psychiatric/Behavioral:  Positive for dysphoric mood and sleep disturbance. Negative for suicidal ideas. The patient is nervous/anxious. Allergies  Allergies   Allergen Reactions    Celexa [Citalopram] Other (See Comments)     Stomach pain        Atorvastatin      Leg cramps      Fenofibrate      angioedema    Mestinon [Pyridostigmine] Itching and Swelling    Penicillins     Sulfa Antibiotics      Other reaction(s): Hives/Throat closes    Tape [RWYJEBMA Tape]      PLASTIC TAPE    Gemfibrozil Rash    Meloxicam Other (See Comments)     moodswings       Medications  Current Outpatient Medications   Medication Sig Dispense Refill    VENLAFAXINE HCL PO VENLAFAXINE CAP 75MG ER 75 mg      gabapentin (NEURONTIN) 300 MG capsule Take 1 capsule by mouth nightly for 30 days.  90 capsule 0    ondansetron (ZOFRAN) 8 MG tablet Take 1 tablet by mouth every 8 hours as needed for Nausea or Vomiting 30 tablet 1    midodrine (PROAMATINE) 5 MG tablet Take 1 tablet by mouth 3 times daily 90 tablet 3    letrozole (FEMARA) 2.5 MG tablet Take 1 tablet by mouth daily 30 tablet 3    dexlansoprazole (DEXILANT) 60 MG CPDR delayed release capsule Take 1 capsule by mouth      lansoprazole (PREVACID) 30 MG delayed release capsule TAKE 1 CAPSULE BY MOUTH EVERY DAY      sodium chloride (ALTAMIST SPRAY) 0.65 % nasal spray 1 spray by Nasal route as needed for Congestion 1 each 3    losartan (COZAAR) 25 MG tablet Take 1 tablet by mouth daily 90 tablet 3    ezetimibe (ZETIA) 10 MG tablet Take 1 tablet by mouth daily 90 tablet 3    prochlorperazine (COMPAZINE) 10 MG tablet Take 1 tablet by mouth every 6 hours as needed (chemotherapy induced nausea/vomiting) 30 tablet 1    Magic Mouthwash (MIRACLE MOUTHWASH) Swish and spit 5 mLs 4 times daily as needed for Irritation 500 mL 0    mirtazapine (REMERON) 15 MG tablet Take 1 tablet by mouth nightly 30 tablet 3    promethazine (PHENERGAN) 12.5 MG tablet Take 10 mg by mouth every 6 hours as needed for Nausea      metoprolol tartrate (LOPRESSOR) 50 MG tablet Take 1 tablet by mouth 2 times daily 180 tablet 3    metoclopramide (REGLAN) 5 MG tablet Take 1 tablet by mouth 3 times daily 30 tablet 1    furosemide (LASIX) 20 MG tablet Take 20 mg by mouth daily       aspirin 81 MG chewable tablet Take 81 mg by mouth daily      Multiple Vitamins-Minerals (HAIR SKIN AND NAILS FORMULA) TABS Take 1 tablet by mouth daily       loratadine (CLARITIN) 10 MG tablet Take 1 tablet by mouth daily 30 tablet 5    acetaminophen (TYLENOL) 500 MG tablet Take 500 mg by mouth every 6 hours as needed for Pain (Patient not taking: Reported on 1/25/2023)       No current facility-administered medications for this visit. Past medical history:   She has a past medical history of Allergic rhinitis due to pollen, Arthritis, Chronic back pain, Dehydration, Depression, Gastroesophageal reflux disease, Hearing loss, History of blood transfusion, History of cardiac monitoring, History of nuclear stress test, Hot flashes due to surgical menopause, Hx of echocardiogram, Hyperlipidemia, Hypertension, Lexiscan Stress Test, Meniere disease, Morbid obesity due to excess calories [facility]), Sleep apnea, and Tilt table evaluation. Past surgical history:  She has a past surgical history that includes Cholecystectomy; Appendectomy (1967); Tonsillectomy (1970); Colonoscopy (2014); Hysterectomy (1989);  Total hip arthroplasty (Left, 10/19/2020); US BREAST BIOPSY W LOC DEVICE EACH ADDL LESION LEFT (Left, 3/9/2021); US BREAST BIOPSY W LOC DEVICE 1ST LESION LEFT (Left, 3/9/2021); US BREAST BIOPSY W LOC DEVICE EACH ADDL LESION LEFT (Left, 3/9/2021); and Mastectomy (Bilateral, 05/06/2021). Social History:  She reports that she has never smoked. She has never used smokeless tobacco. She reports that she does not drink alcohol and does not use drugs. Family history:  Her family history includes Cancer in her maternal grandmother; Cancer (age of onset: 46) in her sister; Cancer (age of onset: 47) in her maternal cousin; Cancer (age of onset: 80) in her mother; Depression in her father and sister; Diabetes in her father, maternal grandfather, maternal grandmother, mother, paternal grandfather, and paternal grandmother; Heart Disease in her father and mother; High Blood Pressure in her father, mother, and sister; High Cholesterol in her father and mother; Mental Illness in her sister; Other in her sister; Stroke in her mother. Objective    Vitals:    01/25/23 1059   BP: (!) 138/98   Pulse: (!) 105   Temp: 97.3 °F (36.3 °C)   SpO2: 98%        Physical exam    Physical Exam  Vitals reviewed. Constitutional:       General: She is not in acute distress. Appearance: Normal appearance. She is well-developed. She is obese. She is not ill-appearing, toxic-appearing or diaphoretic. HENT:      Head: Normocephalic and atraumatic. Nose: Nose normal.      Mouth/Throat:      Mouth: Mucous membranes are moist.   Eyes:      Conjunctiva/sclera: Conjunctivae normal.      Pupils: Pupils are equal, round, and reactive to light. Neck:      Thyroid: No thyromegaly. Vascular: No JVD. Trachea: No tracheal deviation. Cardiovascular:      Rate and Rhythm: Normal rate and regular rhythm. Pulses: Normal pulses. Heart sounds: Normal heart sounds. No murmur heard. No friction rub. No gallop.    Pulmonary: Effort: Pulmonary effort is normal. No respiratory distress. Breath sounds: Normal breath sounds. No stridor. No wheezing, rhonchi or rales. Chest:      Chest wall: No tenderness. Abdominal:      General: Bowel sounds are normal. There is no distension. Palpations: Abdomen is soft. There is no mass. Tenderness: There is no abdominal tenderness. There is no guarding or rebound. Hernia: No hernia is present. Musculoskeletal:         General: Normal range of motion. Cervical back: Normal range of motion and neck supple. Lymphadenopathy:      Cervical: No cervical adenopathy. Skin:     General: Skin is warm and dry. Neurological:      Mental Status: She is alert and oriented to person, place, and time.    Psychiatric:         Mood and Affect: Mood normal.       Hospital Outpatient Visit on 12/29/2022   Component Date Value Ref Range Status    WBC 12/29/2022 8.9  4.0 - 10.5 K/CU MM Final    RBC 12/29/2022 4.86  4.2 - 5.4 M/CU MM Final    Hemoglobin 12/29/2022 13.4  12.5 - 16.0 GM/DL Final    Hematocrit 12/29/2022 41.4  37 - 47 % Final    MCV 12/29/2022 85.2  78 - 100 FL Final    MCH 12/29/2022 27.6  27 - 31 PG Final    MCHC 12/29/2022 32.4  32.0 - 36.0 % Final    RDW 12/29/2022 15.1 (A)  11.7 - 14.9 % Final    Platelets 27/57/0211 188  140 - 440 K/CU MM Final    MPV 12/29/2022 9.1  7.5 - 11.1 FL Final    Differential Type 12/29/2022 AUTOMATED DIFFERENTIAL   Final    Segs Relative 12/29/2022 67.2 (A)  36 - 66 % Final    Lymphocytes % 12/29/2022 24.3  24 - 44 % Final    Monocytes % 12/29/2022 7.2 (A)  0 - 4 % Final    Eosinophils % 12/29/2022 1.0  0 - 3 % Final    Basophils % 12/29/2022 0.3  0 - 1 % Final    Segs Absolute 12/29/2022 6.0  K/CU MM Final    Lymphocytes Absolute 12/29/2022 2.2  K/CU MM Final    Monocytes Absolute 12/29/2022 0.6  K/CU MM Final    Eosinophils Absolute 12/29/2022 0.1  K/CU MM Final    Basophils Absolute 12/29/2022 0.0  K/CU MM Final    Sodium 12/29/2022 143 135 - 145 MMOL/L Final    Potassium 12/29/2022 3.8  3.5 - 5.1 MMOL/L Final    Chloride 12/29/2022 101  99 - 110 mMol/L Final    CO2 12/29/2022 28  21 - 32 MMOL/L Final    BUN 12/29/2022 16  6 - 23 MG/DL Final    Creatinine 12/29/2022 1.2 (A)  0.6 - 1.1 MG/DL Final    Est, Glom Filt Rate 12/29/2022 52 (A)  >60 mL/min/1.73m2 Final    Comment:         Effective Oct 17, 2022          These results are not intended for use in patients <25years of age. eGFR results are calculated without a race factor using the 2021 CKD-EPI equation. Careful clinical correlation is recommended, particularly when comparing to results   calculated using previous equations. The CKD-EPI equation is less accurate in patients with extremes of muscle mass, extra-renal   metabolism of creatine, excessive creatine ingestion, or following therapy that affects   renal tubular secretion. Glucose 12/29/2022 106 (A)  70 - 99 MG/DL Final    Calcium 12/29/2022 9.5  8.3 - 10.6 MG/DL Final    Albumin 12/29/2022 4.7  3.4 - 5.0 GM/DL Final    Total Protein 12/29/2022 6.7  6.4 - 8.2 GM/DL Final    Total Bilirubin 12/29/2022 0.4  0.0 - 1.0 MG/DL Final    ALT 12/29/2022 13  10 - 40 U/L Final    AST 12/29/2022 13 (A)  15 - 37 IU/L Final    Alkaline Phosphatase 12/29/2022 75  40 - 128 IU/L Final    Anion Gap 12/29/2022 14  4 - 16 Final       Assessment and Plan:  1. Will plan for a EGD with MAC anesthesia. The patient was informed of the risks and benefits of the procedure. 2.  Esophageal dysphagia most likely due to acid reflux esophagitis, possible Schatzki's ring or eosinophilic esophagitis. Recommend EGD to rule out abnormalities and treat as indicated. She was offered esophageal manometry but declined at this time. The patient was encouraged to continue with dysphagia measures. 3.  GERD that is long term without odynophagia and not well controlled on PPI. She has trialed all the PPI's and did not tolerate them.   She is currently not taking medication for treatment. The patient was encouraged to continue with anti-reflux measures and avoid foods that trigger. Recommend continue to sleep on an incline and would benefit from weight loss. 4.  Persistent nausea most likely due to medication or possible functional dyspepsia/IBS. Her gastric emptying scan was normal.  Discussed trying Buspar 5 mg tid for improved gastric accomodation and she declined at this time. She mentioned she did not want to take more medication. 5.  Hiatal hernia noted on EGD and has been evaluated by Dr. Nhan Vitale with no surgical indication at this time. She did not want to take medication for treatment of acid reflux since all prior PPI's did not help her symptoms; concern for medically refractory GERD. 6.  Constipation possibly due to diet, medication or IBS. Recommend taking Miralax bid and Colace bid if this does not help try Linzess. She was given samples of Linzess 145 mcg take once daily for treatment. Recommend increase in fruit, fiber and fluids. Avoid foods that are constipating. 7.  Further recommendations for follow-up will be determined after the EGD has been completed.

## 2023-01-25 NOTE — PROGRESS NOTES
Indio Ahuja 63 y.o. female was seen by PREETI Guerrero on 1/25/2023     Wt Readings from Last 3 Encounters:   01/25/23 224 lb (101.6 kg)   01/05/23 224 lb (101.6 kg)   12/30/22 224 lb (101.6 kg)       FRANCISCA  Indio Ahuja is a pleasant 63 y.o.  female who presents today for acid reflux, nausea and weight loss. She has a past medical history of allergic rhinitis due to pollen, arthritis, chronic back pain, dehydration, depression, gastroesophageal reflux disease, hearing loss, history of blood transfusion, history of cardiac monitoring, history of nuclear stress test, hot flashes due to surgical menopause, hx of echocardiogram, hyperlipidemia, hypertension, lexiscan stress test, Meniere disease, morbid obesity due to excess calories, sleep apnea, and tilt table evaluation. She was treated for breast cancer with chemotherapy with last dose in January 2022. Her gastric emptying study done on 12- showed normal gastric emptying. She has been evaluated by Dr. Kenny Villegas for hiatal hernia and wanted second opinion on GI care due to persistent nausea and ineffective treatment for her acid reflux. Her last EGD/colonoscopy were done by Dr. Lamont Neal on August 2022. Her EGD showed small hiatal hernia with dilated Schatzki's ring in her distal esophagus. Her colonoscopy showed two colon polyps that were removed. She mentioned that her nausea and vomiting and acid reflux worsened after her procedures. She mentioned it \"feels like her stomach is coming up into her chest\". Her weight is stable. Her CT of abdomen/pelvis on 10- showed:    1. Obstructive uropathy left kidney with hydronephrosis as result of a 4 x 5   mm proximal left ureter calculus. 2. Mild calcific atherosclerosis. 3. Mild-to-moderate degenerative changes predominate lower lumbar spine with   grade 1 degenerative anterolisthesis L4 on L5.   4. Other portions of the CT abdomen and pelvis appear unremarkable.    Her nausea and vomiting has been ongoing for four years. Her appetite is poor with early satiety. Her weight is stable. Nausea occurs six days a week; Zofran takes daily with help. She is taking Reglan three times a day with no help. Phenergan use once a week. Vomiting every three to four days undigested food. She has tried food elimination with no improvement. Her heartburn and acid reflux has been ongoing for four years. Her heartburn is worse in the evenings and at night. She sleeps on an incline. She is not taking any medication; in past she has tried OTC Prilosec, Zantac, Prilosec, Prevacid, Protonix and Dexilant with no help. Nocturnal awakenings with acid reflux occurring three nights a week. She mentioned since November 2022 having esophageal dysphagia with food getting stuck in her mid esophagus. Her last episode occurred January 2 while eating mac and cheese. Her food is slow to go down. She denies pain with swallowing. No abdominal pain. Intermittent right lower quadrant dull ache that has been ongoing for two years ago. Nothing makes it better or worse. She denies changes in her bowel pattern  Her typical bowel pattern is once every two weeks with hard pellet like stools. More constipation. Stool softeners no help. No diarrhea. No blood in her stools or melena. Her maternal grandmother and mother had colon cancer. Her mother had stomach cancer. ROS  Review of Systems   Constitutional:  Positive for fatigue. Negative for appetite change, chills, diaphoresis, fever and unexpected weight change. HENT:  Positive for hearing loss and tinnitus. Negative for ear pain. Eyes:  Positive for visual disturbance. Negative for photophobia and pain. Respiratory:  Positive for cough (intermittent). Negative for shortness of breath and wheezing. Cardiovascular:  Positive for leg swelling. Negative for chest pain and palpitations. Gastrointestinal:  Positive for constipation and nausea. Negative for abdominal pain, blood in stool, diarrhea and vomiting. Endocrine: Negative for cold intolerance, heat intolerance and polydipsia. Genitourinary:  Negative for dysuria, frequency and urgency. Musculoskeletal:  Positive for back pain and myalgias. Negative for neck pain. Skin:  Negative for color change, pallor and rash. Allergic/Immunologic: Positive for environmental allergies. Negative for food allergies. Neurological:  Positive for dizziness and headaches. Negative for seizures and weakness. Hematological:  Bruises/bleeds easily. Psychiatric/Behavioral:  Positive for dysphoric mood and sleep disturbance. Negative for suicidal ideas. The patient is nervous/anxious. Allergies  Allergies   Allergen Reactions    Celexa [Citalopram] Other (See Comments)     Stomach pain        Atorvastatin      Leg cramps      Fenofibrate      angioedema    Mestinon [Pyridostigmine] Itching and Swelling    Penicillins     Sulfa Antibiotics      Other reaction(s): Hives/Throat closes    Tape [BDRHATLA Tape]      PLASTIC TAPE    Gemfibrozil Rash    Meloxicam Other (See Comments)     moodswings       Medications  Current Outpatient Medications   Medication Sig Dispense Refill    VENLAFAXINE HCL PO VENLAFAXINE CAP 75MG ER 75 mg      gabapentin (NEURONTIN) 300 MG capsule Take 1 capsule by mouth nightly for 30 days.  90 capsule 0    ondansetron (ZOFRAN) 8 MG tablet Take 1 tablet by mouth every 8 hours as needed for Nausea or Vomiting 30 tablet 1    midodrine (PROAMATINE) 5 MG tablet Take 1 tablet by mouth 3 times daily 90 tablet 3    letrozole (FEMARA) 2.5 MG tablet Take 1 tablet by mouth daily 30 tablet 3    dexlansoprazole (DEXILANT) 60 MG CPDR delayed release capsule Take 1 capsule by mouth      lansoprazole (PREVACID) 30 MG delayed release capsule TAKE 1 CAPSULE BY MOUTH EVERY DAY      sodium chloride (ALTAMIST SPRAY) 0.65 % nasal spray 1 spray by Nasal route as needed for Congestion 1 each 3    losartan (COZAAR) 25 MG tablet Take 1 tablet by mouth daily 90 tablet 3    ezetimibe (ZETIA) 10 MG tablet Take 1 tablet by mouth daily 90 tablet 3    prochlorperazine (COMPAZINE) 10 MG tablet Take 1 tablet by mouth every 6 hours as needed (chemotherapy induced nausea/vomiting) 30 tablet 1    Magic Mouthwash (MIRACLE MOUTHWASH) Swish and spit 5 mLs 4 times daily as needed for Irritation 500 mL 0    mirtazapine (REMERON) 15 MG tablet Take 1 tablet by mouth nightly 30 tablet 3    promethazine (PHENERGAN) 12.5 MG tablet Take 10 mg by mouth every 6 hours as needed for Nausea      metoprolol tartrate (LOPRESSOR) 50 MG tablet Take 1 tablet by mouth 2 times daily 180 tablet 3    metoclopramide (REGLAN) 5 MG tablet Take 1 tablet by mouth 3 times daily 30 tablet 1    furosemide (LASIX) 20 MG tablet Take 20 mg by mouth daily       aspirin 81 MG chewable tablet Take 81 mg by mouth daily      Multiple Vitamins-Minerals (HAIR SKIN AND NAILS FORMULA) TABS Take 1 tablet by mouth daily       loratadine (CLARITIN) 10 MG tablet Take 1 tablet by mouth daily 30 tablet 5    acetaminophen (TYLENOL) 500 MG tablet Take 500 mg by mouth every 6 hours as needed for Pain (Patient not taking: Reported on 1/25/2023)       No current facility-administered medications for this visit. Past medical history:   She has a past medical history of Allergic rhinitis due to pollen, Arthritis, Chronic back pain, Dehydration, Depression, Gastroesophageal reflux disease, Hearing loss, History of blood transfusion, History of cardiac monitoring, History of nuclear stress test, Hot flashes due to surgical menopause, Hx of echocardiogram, Hyperlipidemia, Hypertension, Lexiscan Stress Test, Meniere disease, Morbid obesity due to excess calories [facility]), Sleep apnea, and Tilt table evaluation. Past surgical history:  She has a past surgical history that includes Cholecystectomy; Appendectomy (1967); Tonsillectomy (1970); Colonoscopy (2014); Hysterectomy (1989);  Total hip arthroplasty (Left, 10/19/2020); US BREAST BIOPSY W LOC DEVICE EACH ADDL LESION LEFT (Left, 3/9/2021); US BREAST BIOPSY W LOC DEVICE 1ST LESION LEFT (Left, 3/9/2021); US BREAST BIOPSY W LOC DEVICE EACH ADDL LESION LEFT (Left, 3/9/2021); and Mastectomy (Bilateral, 05/06/2021). Social History:  She reports that she has never smoked. She has never used smokeless tobacco. She reports that she does not drink alcohol and does not use drugs. Family history:  Her family history includes Cancer in her maternal grandmother; Cancer (age of onset: 46) in her sister; Cancer (age of onset: 47) in her maternal cousin; Cancer (age of onset: 80) in her mother; Depression in her father and sister; Diabetes in her father, maternal grandfather, maternal grandmother, mother, paternal grandfather, and paternal grandmother; Heart Disease in her father and mother; High Blood Pressure in her father, mother, and sister; High Cholesterol in her father and mother; Mental Illness in her sister; Other in her sister; Stroke in her mother. Objective    Vitals:    01/25/23 1059   BP: (!) 138/98   Pulse: (!) 105   Temp: 97.3 °F (36.3 °C)   SpO2: 98%        Physical exam    Physical Exam  Vitals reviewed. Constitutional:       General: She is not in acute distress. Appearance: Normal appearance. She is well-developed. She is obese. She is not ill-appearing, toxic-appearing or diaphoretic. HENT:      Head: Normocephalic and atraumatic. Nose: Nose normal.      Mouth/Throat:      Mouth: Mucous membranes are moist.   Eyes:      Conjunctiva/sclera: Conjunctivae normal.      Pupils: Pupils are equal, round, and reactive to light. Neck:      Thyroid: No thyromegaly. Vascular: No JVD. Trachea: No tracheal deviation. Cardiovascular:      Rate and Rhythm: Normal rate and regular rhythm. Pulses: Normal pulses. Heart sounds: Normal heart sounds. No murmur heard. No friction rub. No gallop.    Pulmonary: Effort: Pulmonary effort is normal. No respiratory distress. Breath sounds: Normal breath sounds. No stridor. No wheezing, rhonchi or rales. Chest:      Chest wall: No tenderness. Abdominal:      General: Bowel sounds are normal. There is no distension. Palpations: Abdomen is soft. There is no mass. Tenderness: There is no abdominal tenderness. There is no guarding or rebound. Hernia: No hernia is present. Musculoskeletal:         General: Normal range of motion. Cervical back: Normal range of motion and neck supple. Lymphadenopathy:      Cervical: No cervical adenopathy. Skin:     General: Skin is warm and dry. Neurological:      Mental Status: She is alert and oriented to person, place, and time.    Psychiatric:         Mood and Affect: Mood normal.       Hospital Outpatient Visit on 12/29/2022   Component Date Value Ref Range Status    WBC 12/29/2022 8.9  4.0 - 10.5 K/CU MM Final    RBC 12/29/2022 4.86  4.2 - 5.4 M/CU MM Final    Hemoglobin 12/29/2022 13.4  12.5 - 16.0 GM/DL Final    Hematocrit 12/29/2022 41.4  37 - 47 % Final    MCV 12/29/2022 85.2  78 - 100 FL Final    MCH 12/29/2022 27.6  27 - 31 PG Final    MCHC 12/29/2022 32.4  32.0 - 36.0 % Final    RDW 12/29/2022 15.1 (A)  11.7 - 14.9 % Final    Platelets 85/55/1128 188  140 - 440 K/CU MM Final    MPV 12/29/2022 9.1  7.5 - 11.1 FL Final    Differential Type 12/29/2022 AUTOMATED DIFFERENTIAL   Final    Segs Relative 12/29/2022 67.2 (A)  36 - 66 % Final    Lymphocytes % 12/29/2022 24.3  24 - 44 % Final    Monocytes % 12/29/2022 7.2 (A)  0 - 4 % Final    Eosinophils % 12/29/2022 1.0  0 - 3 % Final    Basophils % 12/29/2022 0.3  0 - 1 % Final    Segs Absolute 12/29/2022 6.0  K/CU MM Final    Lymphocytes Absolute 12/29/2022 2.2  K/CU MM Final    Monocytes Absolute 12/29/2022 0.6  K/CU MM Final    Eosinophils Absolute 12/29/2022 0.1  K/CU MM Final    Basophils Absolute 12/29/2022 0.0  K/CU MM Final    Sodium 12/29/2022 143 135 - 145 MMOL/L Final    Potassium 12/29/2022 3.8  3.5 - 5.1 MMOL/L Final    Chloride 12/29/2022 101  99 - 110 mMol/L Final    CO2 12/29/2022 28  21 - 32 MMOL/L Final    BUN 12/29/2022 16  6 - 23 MG/DL Final    Creatinine 12/29/2022 1.2 (A)  0.6 - 1.1 MG/DL Final    Est, Glom Filt Rate 12/29/2022 52 (A)  >60 mL/min/1.73m2 Final    Comment:         Effective Oct 17, 2022          These results are not intended for use in patients <25years of age. eGFR results are calculated without a race factor using the 2021 CKD-EPI equation. Careful clinical correlation is recommended, particularly when comparing to results   calculated using previous equations. The CKD-EPI equation is less accurate in patients with extremes of muscle mass, extra-renal   metabolism of creatine, excessive creatine ingestion, or following therapy that affects   renal tubular secretion. Glucose 12/29/2022 106 (A)  70 - 99 MG/DL Final    Calcium 12/29/2022 9.5  8.3 - 10.6 MG/DL Final    Albumin 12/29/2022 4.7  3.4 - 5.0 GM/DL Final    Total Protein 12/29/2022 6.7  6.4 - 8.2 GM/DL Final    Total Bilirubin 12/29/2022 0.4  0.0 - 1.0 MG/DL Final    ALT 12/29/2022 13  10 - 40 U/L Final    AST 12/29/2022 13 (A)  15 - 37 IU/L Final    Alkaline Phosphatase 12/29/2022 75  40 - 128 IU/L Final    Anion Gap 12/29/2022 14  4 - 16 Final       Assessment and Plan:  1. Will plan for a EGD with MAC anesthesia. The patient was informed of the risks and benefits of the procedure. 2.  Esophageal dysphagia most likely due to acid reflux esophagitis, possible Schatzki's ring or eosinophilic esophagitis. Recommend EGD to rule out abnormalities and treat as indicated. She was offered esophageal manometry but declined at this time. The patient was encouraged to continue with dysphagia measures. 3.  GERD that is long term without odynophagia and not well controlled on PPI. She has trialed all the PPI's and did not tolerate them.   She is currently not taking medication for treatment. The patient was encouraged to continue with anti-reflux measures and avoid foods that trigger. Recommend continue to sleep on an incline and would benefit from weight loss. 4.  Persistent nausea most likely due to medication or possible functional dyspepsia/IBS. Her gastric emptying scan was normal.  Discussed trying Buspar 5 mg tid for improved gastric accomodation and she declined at this time. She mentioned she did not want to take more medication. 5.  Hiatal hernia noted on EGD and has been evaluated by Dr. Johana Nicole with no surgical indication at this time. She did not want to take medication for treatment of acid reflux since all prior PPI's did not help her symptoms; concern for medically refractory GERD. 6.  Constipation possibly due to diet, medication or IBS. Recommend taking Miralax bid and Colace bid if this does not help try Linzess. She was given samples of Linzess 145 mcg take once daily for treatment. Recommend increase in fruit, fiber and fluids. Avoid foods that are constipating. 7.  Further recommendations for follow-up will be determined after the EGD has been completed.

## 2023-01-30 ENCOUNTER — PREP FOR PROCEDURE (OUTPATIENT)
Dept: GASTROENTEROLOGY | Age: 63
End: 2023-01-30

## 2023-01-30 RX ORDER — SODIUM CHLORIDE, SODIUM LACTATE, POTASSIUM CHLORIDE, CALCIUM CHLORIDE 600; 310; 30; 20 MG/100ML; MG/100ML; MG/100ML; MG/100ML
INJECTION, SOLUTION INTRAVENOUS CONTINUOUS
Status: CANCELLED | OUTPATIENT
Start: 2023-01-30

## 2023-01-30 RX ORDER — SODIUM CHLORIDE 0.9 % (FLUSH) 0.9 %
5-40 SYRINGE (ML) INJECTION PRN
Status: CANCELLED | OUTPATIENT
Start: 2023-01-30

## 2023-01-30 RX ORDER — SODIUM CHLORIDE 0.9 % (FLUSH) 0.9 %
5-40 SYRINGE (ML) INJECTION EVERY 12 HOURS SCHEDULED
Status: CANCELLED | OUTPATIENT
Start: 2023-01-30

## 2023-01-30 RX ORDER — SODIUM CHLORIDE 9 MG/ML
25 INJECTION, SOLUTION INTRAVENOUS PRN
Status: CANCELLED | OUTPATIENT
Start: 2023-01-30

## 2023-02-01 ENCOUNTER — OFFICE VISIT (OUTPATIENT)
Dept: FAMILY MEDICINE CLINIC | Age: 63
End: 2023-02-01
Payer: MEDICAID

## 2023-02-01 VITALS
WEIGHT: 223 LBS | DIASTOLIC BLOOD PRESSURE: 100 MMHG | BODY MASS INDEX: 38.28 KG/M2 | HEART RATE: 82 BPM | SYSTOLIC BLOOD PRESSURE: 155 MMHG | OXYGEN SATURATION: 96 %

## 2023-02-01 DIAGNOSIS — R73.9 HYPERGLYCEMIA: ICD-10-CM

## 2023-02-01 DIAGNOSIS — K21.9 GASTROESOPHAGEAL REFLUX DISEASE WITHOUT ESOPHAGITIS: ICD-10-CM

## 2023-02-01 DIAGNOSIS — F32.A ANXIETY AND DEPRESSION: ICD-10-CM

## 2023-02-01 DIAGNOSIS — M54.50 CHRONIC LOW BACK PAIN WITHOUT SCIATICA, UNSPECIFIED BACK PAIN LATERALITY: ICD-10-CM

## 2023-02-01 DIAGNOSIS — G89.29 CHRONIC LOW BACK PAIN WITHOUT SCIATICA, UNSPECIFIED BACK PAIN LATERALITY: ICD-10-CM

## 2023-02-01 DIAGNOSIS — F41.9 ANXIETY AND DEPRESSION: ICD-10-CM

## 2023-02-01 DIAGNOSIS — E78.2 MIXED HYPERLIPIDEMIA: ICD-10-CM

## 2023-02-01 DIAGNOSIS — I10 ESSENTIAL HYPERTENSION: Primary | ICD-10-CM

## 2023-02-01 DIAGNOSIS — Z90.13 S/P MASTECTOMY, BILATERAL: ICD-10-CM

## 2023-02-01 LAB — HBA1C MFR BLD: 5.6 %

## 2023-02-01 PROCEDURE — 83036 HEMOGLOBIN GLYCOSYLATED A1C: CPT | Performed by: FAMILY MEDICINE

## 2023-02-01 PROCEDURE — 99214 OFFICE O/P EST MOD 30 MIN: CPT | Performed by: FAMILY MEDICINE

## 2023-02-01 PROCEDURE — 3074F SYST BP LT 130 MM HG: CPT | Performed by: FAMILY MEDICINE

## 2023-02-01 PROCEDURE — 3078F DIAST BP <80 MM HG: CPT | Performed by: FAMILY MEDICINE

## 2023-02-01 RX ORDER — EZETIMIBE 10 MG/1
10 TABLET ORAL DAILY
Qty: 90 TABLET | Refills: 3 | Status: SHIPPED | OUTPATIENT
Start: 2023-02-01

## 2023-02-01 RX ORDER — METOPROLOL TARTRATE 50 MG/1
50 TABLET, FILM COATED ORAL 2 TIMES DAILY
Qty: 180 TABLET | Refills: 3 | Status: SHIPPED | OUTPATIENT
Start: 2023-02-01

## 2023-02-01 RX ORDER — LOSARTAN POTASSIUM 50 MG/1
50 TABLET ORAL DAILY
Qty: 90 TABLET | Refills: 3 | Status: SHIPPED | OUTPATIENT
Start: 2023-02-01

## 2023-02-01 RX ORDER — LOSARTAN POTASSIUM 25 MG/1
25 TABLET ORAL DAILY
Qty: 90 TABLET | Refills: 3 | Status: SHIPPED | OUTPATIENT
Start: 2023-02-01 | End: 2023-02-01 | Stop reason: SDUPTHER

## 2023-02-01 SDOH — ECONOMIC STABILITY: HOUSING INSECURITY
IN THE LAST 12 MONTHS, WAS THERE A TIME WHEN YOU DID NOT HAVE A STEADY PLACE TO SLEEP OR SLEPT IN A SHELTER (INCLUDING NOW)?: PATIENT REFUSED

## 2023-02-01 SDOH — ECONOMIC STABILITY: FOOD INSECURITY: WITHIN THE PAST 12 MONTHS, YOU WORRIED THAT YOUR FOOD WOULD RUN OUT BEFORE YOU GOT MONEY TO BUY MORE.: PATIENT DECLINED

## 2023-02-01 SDOH — ECONOMIC STABILITY: FOOD INSECURITY: WITHIN THE PAST 12 MONTHS, THE FOOD YOU BOUGHT JUST DIDN'T LAST AND YOU DIDN'T HAVE MONEY TO GET MORE.: PATIENT DECLINED

## 2023-02-01 SDOH — ECONOMIC STABILITY: INCOME INSECURITY: HOW HARD IS IT FOR YOU TO PAY FOR THE VERY BASICS LIKE FOOD, HOUSING, MEDICAL CARE, AND HEATING?: PATIENT DECLINED

## 2023-02-01 ASSESSMENT — PATIENT HEALTH QUESTIONNAIRE - PHQ9
4. FEELING TIRED OR HAVING LITTLE ENERGY: 3
SUM OF ALL RESPONSES TO PHQ QUESTIONS 1-9: 19
SUM OF ALL RESPONSES TO PHQ QUESTIONS 1-9: 19
10. IF YOU CHECKED OFF ANY PROBLEMS, HOW DIFFICULT HAVE THESE PROBLEMS MADE IT FOR YOU TO DO YOUR WORK, TAKE CARE OF THINGS AT HOME, OR GET ALONG WITH OTHER PEOPLE: 1
5. POOR APPETITE OR OVEREATING: 3
8. MOVING OR SPEAKING SO SLOWLY THAT OTHER PEOPLE COULD HAVE NOTICED. OR THE OPPOSITE, BEING SO FIGETY OR RESTLESS THAT YOU HAVE BEEN MOVING AROUND A LOT MORE THAN USUAL: 2
SUM OF ALL RESPONSES TO PHQ QUESTIONS 1-9: 19
SUM OF ALL RESPONSES TO PHQ QUESTIONS 1-9: 18
7. TROUBLE CONCENTRATING ON THINGS, SUCH AS READING THE NEWSPAPER OR WATCHING TELEVISION: 2
3. TROUBLE FALLING OR STAYING ASLEEP: 3
9. THOUGHTS THAT YOU WOULD BE BETTER OFF DEAD, OR OF HURTING YOURSELF: 1
SUM OF ALL RESPONSES TO PHQ9 QUESTIONS 1 & 2: 3
1. LITTLE INTEREST OR PLEASURE IN DOING THINGS: 2
6. FEELING BAD ABOUT YOURSELF - OR THAT YOU ARE A FAILURE OR HAVE LET YOURSELF OR YOUR FAMILY DOWN: 2
2. FEELING DOWN, DEPRESSED OR HOPELESS: 1

## 2023-02-01 ASSESSMENT — COLUMBIA-SUICIDE SEVERITY RATING SCALE - C-SSRS
2. HAVE YOU ACTUALLY HAD ANY THOUGHTS OF KILLING YOURSELF?: NO
6. HAVE YOU EVER DONE ANYTHING, STARTED TO DO ANYTHING, OR PREPARED TO DO ANYTHING TO END YOUR LIFE?: NO
1. WITHIN THE PAST MONTH, HAVE YOU WISHED YOU WERE DEAD OR WISHED YOU COULD GO TO SLEEP AND NOT WAKE UP?: NO

## 2023-02-01 NOTE — PROGRESS NOTES
Eddie   1960 02/03/23    Chief Complaint   Patient presents with    Follow-up    Hypertension    Hyperlipidemia    Gastroesophageal Reflux    Medication Refill           Patient for more than a year f/u regarding HTN, HLD, GERD, and allergic rhinitis. Had B/L matectomy. The patient is taking hypertensive medications compliantly without side effects. Denies chest pain, dyspnea, edema, or TIA's. She is taking her cholesterol medications, her GERD under control. Past Medical History:   Diagnosis Date    Allergic rhinitis due to pollen 11/19/2015    Arthritis     left hip & knee    Chronic back pain     Dehydration 7/22/2021    Depression     Gastroesophageal reflux disease 11/19/2015    Hearing loss 11/19/2015    History of blood transfusion     No reaction per pt    History of cardiac monitoring 04/18/2017    14 day event monitor. Sinus Rhythm    History of nuclear stress test 09/28/2016    cardiolite-normal,EF70%    Hot flashes due to surgical menopause 11/19/2015    Hx of echocardiogram 10/05/2016    EF: >55 %   Mild mitral and tricuspid regurg.      Hyperlipidemia     Hypertension     Follows with PCP    Lexiscan Stress Test 10/14/2020    EF 60%, Normal study, no ischemia    Meniere disease     Morbid obesity due to excess calories ([facility] [address] 11/19/2015    Sleep apnea 11/19/2015    sleep study negative    Tilt table evaluation 05/09/2017     positive for neurocardiogenic syncope     Past Surgical History:   Procedure Laterality Date    APPENDECTOMY  1967    CHOLECYSTECTOMY      2017    COLONOSCOPY  2014    Polyps x2 - Dr. Derick Delaney ([facility]  1989    MASTECTOMY Bilateral 05/06/2021    Dr. Arnoldo Reddy Left 10/19/2020    LEFT HIP TOTAL ARTHROPLASTY - POSTERIOR performed by Cayden Mendoza MD at [address] LEFT Left 3/9/2021     BREAST BIOPSY NEEDLE ADDITIONAL LEFT 3/9/2021 Anish Waddell MD [facility] BREAST BIOPSY NEEDLE ADDITIONAL LEFT Left 3/9/2021    US BREAST BIOPSY NEEDLE ADDITIONAL LEFT 3/9/2021 MD [facility] BREAST BIOPSY W LOC DEVICE 1ST LESION LEFT Left 3/9/2021    US BREAST NEEDLE BIOPSY LEFT 3/9/2021 MD [facility]     Family History   Problem Relation Age of Onset    Heart Disease Mother     High Blood Pressure Mother     High Cholesterol Mother     Cancer Mother 86        Stomach    Diabetes Mother     Stroke Mother     Heart Disease Father     High Blood Pressure Father     High Cholesterol Father     Diabetes Father     Depression Father     Cancer Sister 51        Lung cancer    High Blood Pressure Sister     Other Sister         migraines, osteoarthritis    Mental Illness Sister     Depression Sister     Cancer Maternal Grandmother         Stomach    Diabetes Maternal Grandmother     Diabetes Maternal Grandfather     Diabetes Paternal Grandmother     Diabetes Paternal Grandfather     Cancer Maternal Cousin 54        Pancreatic     Social History     Socioeconomic History    Marital status:      Spouse name: Not on file    Number of children: Not on file    Years of education: Not on file    Highest education level: Not on file   Occupational History    Not on file   Tobacco Use    Smoking status: Never    Smokeless tobacco: Never   Vaping Use    Vaping Use: Never used   Substance and Sexual Activity    Alcohol use: No    Drug use: No    Sexual activity: Not Currently     Partners: Male   Other Topics Concern    Not on file   Social History Narrative    Not on file     Social Determinants of Health     Financial Resource Strain: Unknown    Difficulty of Paying Living Expenses: Patient refused   Food Insecurity: Unknown    Worried About Running Out of Food in the Last Year: Patient refused    Ran Out of Food in the Last Year: Patient refused   Transportation Needs: Unknown    Lack of Transportation (Medical): Not on file  Lack of Transportation (Non-Medical): Patient refused   Physical Activity: Not on file   Stress: Not on file   Social Connections: Not on file   Intimate Partner Violence: Not on file   Housing Stability: Unknown    Unable to Pay for Housing in the Last Year: Not on file    Number of Aura in the Last Year: Not on file    Unstable Housing in the Last Year: Patient refused       Allergies   Allergen Reactions    Celexa [Citalopram] Other (See Comments)     Stomach pain        Atorvastatin      Leg cramps      Fenofibrate      angioedema    Mestinon [Pyridostigmine] Itching and Swelling    Penicillins     Sulfa Antibiotics      Other reaction(s): Hives/Throat closes    Tape [[id number] Tape]      PLASTIC TAPE    Gemfibrozil Rash    Meloxicam Other (See Comments)     moodswings     Current Outpatient Medications   Medication Sig Dispense Refill    ezetimibe (ZETIA) 10 MG tablet Take 1 tablet by mouth daily 90 tablet 3    metoprolol tartrate (LOPRESSOR) 50 MG tablet Take 1 tablet by mouth 2 times daily 180 tablet 3    losartan (COZAAR) 50 MG tablet Take 1 tablet by mouth daily 90 tablet 3    polyethylene glycol (GLYCOLAX) 17 GM/SCOOP powder Take 17 g by mouth 2 times daily 1530 g 1    docusate sodium (COLACE) 100 MG capsule Take 1 capsule by mouth 2 times daily 60 capsule 0    VENLAFAXINE HCL PO VENLAFAXINE CAP 75MG ER 75 mg      ondansetron (ZOFRAN) 8 MG tablet Take 1 tablet by mouth every 8 hours as needed for Nausea or Vomiting 30 tablet 1    midodrine (PROAMATINE) 5 MG tablet Take 1 tablet by mouth 3 times daily 90 tablet 3    letrozole (FEMARA) 2.5 MG tablet Take 1 tablet by mouth daily 30 tablet 3    sodium chloride (ALTAMIST SPRAY) 0.65 % nasal spray 1 spray by Nasal route as needed for Congestion 1 each 3    prochlorperazine (COMPAZINE) 10 MG tablet Take 1 tablet by mouth every 6 hours as needed (chemotherapy induced nausea/vomiting) 30 tablet 1    Magic Mouthwash (MIRACLE MOUTHWASH) Swish and spit 5 mLs 4 times daily as needed for Irritation 500 mL 0    mirtazapine (REMERON) 15 MG tablet Take 1 tablet by mouth nightly 30 tablet 3    promethazine (PHENERGAN) 12.5 MG tablet Take 10 mg by mouth every 6 hours as needed for Nausea      metoclopramide (REGLAN) 5 MG tablet Take 1 tablet by mouth 3 times daily 30 tablet 1    furosemide (LASIX) 20 MG tablet Take 20 mg by mouth daily       Multiple Vitamins-Minerals (HAIR SKIN AND NAILS FORMULA) TABS Take 1 tablet by mouth daily       loratadine (CLARITIN) 10 MG tablet Take 1 tablet by mouth daily 30 tablet 5    gabapentin (NEURONTIN) 300 MG capsule Take 1 capsule by mouth nightly for 30 days. 90 capsule 0     No current facility-administered medications for this visit. Review of Systems   Constitutional:  Negative for activity change, chills and fatigue. HENT:  Negative for congestion. Respiratory:  Negative for cough and shortness of breath. Cardiovascular:  Negative for chest pain and leg swelling. Gastrointestinal:  Negative for abdominal pain. Genitourinary:  Negative for dysuria. Musculoskeletal:  Positive for back pain. Neurological:  Negative for dizziness and headaches. Psychiatric/Behavioral:  Negative for agitation and sleep disturbance. The patient is not nervous/anxious.       Lab Results   Component Value Date    WBC 8.9 12/29/2022    HGB 13.4 12/29/2022    HCT 41.4 12/29/2022    MCV 85.2 12/29/2022     12/29/2022     Lab Results   Component Value Date     12/29/2022    K 3.8 12/29/2022     12/29/2022    CO2 28 12/29/2022    BUN 16 12/29/2022    CREATININE 1.2 (H) 12/29/2022    GLUCOSE 106 (H) 12/29/2022    CALCIUM 9.5 12/29/2022    PROT 6.7 12/29/2022    LABALBU 4.7 12/29/2022    BILITOT 0.4 12/29/2022    ALKPHOS 75 12/29/2022    AST 13 (L) 12/29/2022    ALT 13 12/29/2022    LABGLOM 52 (L) 12/29/2022    GFRAA >60 08/18/2022    AGRATIO 1.7 12/07/2015    GLOB 2.7 12/07/2015     Lab Results   Component Value Date    CHOL 292 (H) 08/13/2021    CHOL 270 (H) 03/01/2021    CHOL 508 (H) 05/13/2019     Lab Results   Component Value Date    TRIG 266 (H) 08/13/2021    TRIG 385 (H) 03/01/2021    TRIG 598 (H) 05/13/2019     Lab Results   Component Value Date    HDL 42 08/13/2021    HDL 40 (L) 03/01/2021    HDL 38 (L) 05/13/2019     Lab Results   Component Value Date    LDLCALC NOT VALID WHEN TRIGLYCERIDE >400 MG/DL. 05/22/2017    LDLCALC 75 12/07/2015     Lab Results   Component Value Date    LABA1C 5.6 02/01/2023     Lab Results   Component Value Date    TSHHS 2.390 11/18/2022         BP (!) 155/100   Pulse 82   Wt 223 lb (101.2 kg)   SpO2 96%   BMI 38.28 kg/m²     BP Readings from Last 3 Encounters:   02/01/23 (!) 155/100   01/25/23 (!) 138/98   01/05/23 (!) 173/92       Wt Readings from Last 3 Encounters:   02/01/23 223 lb (101.2 kg)   01/25/23 224 lb (101.6 kg)   01/05/23 224 lb (101.6 kg)         Physical Exam  Constitutional:       General: She is not in acute distress. Appearance: Normal appearance. She is well-developed. She is obese. She is not ill-appearing or diaphoretic. HENT:      Head: Normocephalic and atraumatic. Eyes:      General: No scleral icterus. Pupils: Pupils are equal, round, and reactive to light. Cardiovascular:      Rate and Rhythm: Normal rate and regular rhythm. Heart sounds: Normal heart sounds. No murmur heard. Pulmonary:      Effort: Pulmonary effort is normal.      Breath sounds: No wheezing or rales. Musculoskeletal:         General: Normal range of motion. Cervical back: Normal range of motion and neck supple. No rigidity. Right lower leg: No edema. Left lower leg: No edema. Neurological:      General: No focal deficit present. Mental Status: She is alert and oriented to person, place, and time. Psychiatric:         Mood and Affect: Mood is depressed and elated. Affect is not flat. Behavior: Behavior normal.       ASSESSMENT/ PLAN:    1.  Essential hypertension  - elevated so will increase the losartan to 50 mg and continue same metoprolol.  - metoprolol tartrate (LOPRESSOR) 50 MG tablet; Take 1 tablet by mouth 2 times daily  Dispense: 180 tablet; Refill: 3  - losartan (COZAAR) 50 MG tablet; Take 1 tablet by mouth daily  Dispense: 90 tablet; Refill: 3    2. Mixed hyperlipidemia  - Last  on 8/21 will do blood work next visit  - ezetimibe (ZETIA) 10 MG tablet; Take 1 tablet by mouth daily  Dispense: 90 tablet; Refill: 3    3. Gastroesophageal reflux disease without esophagitis  - stable    4. Anxiety and depression  - stable on medication    5. Hyperglycemia  - POCT glycosylated hemoglobin (Hb A1C)  - WNL    6. S/P mastectomy, bilateral  - f/u with the oncology    7. Chronic low back pain without sciatica, unspecified back pain laterality  - on gabapentin              - All old blood work reviewed with the patient  - Appropriate prescription are addressed. - After visit summery provided. - Questions answered and patient verbalizes understanding.  - Call for any problem, questions, or concerns. Return in about 1 month (around 3/1/2023).   To check the blood pressure and check the lipid

## 2023-02-03 PROBLEM — M54.50 CHRONIC LOW BACK PAIN WITHOUT SCIATICA: Status: ACTIVE | Noted: 2023-02-03

## 2023-02-03 PROBLEM — G89.29 CHRONIC LOW BACK PAIN WITHOUT SCIATICA: Status: ACTIVE | Noted: 2023-02-03

## 2023-02-03 ASSESSMENT — ENCOUNTER SYMPTOMS
BACK PAIN: 1
COUGH: 0
ABDOMINAL PAIN: 0
SHORTNESS OF BREATH: 0

## 2023-02-06 RX ORDER — GABAPENTIN 300 MG/1
300 CAPSULE ORAL NIGHTLY
Qty: 90 CAPSULE | Refills: 0 | Status: SHIPPED | OUTPATIENT
Start: 2023-02-06 | End: 2023-03-08

## 2023-02-06 NOTE — PROGRESS NOTES
.Surgery @ [facility] on 2/9/23 you will be called 2/8/23 with times               1. Do not eat or drink anything after midnight - unless instructed by your doctor prior to surgery. This includes                   no water, chewing gum or mints. 2. Follow your directions as prescribed by the doctor for your procedure and medications. Take Metoprolol & Midodrine morning of surgery with sips of water. 3. Check with your Doctor regarding stopping vitamins, supplements, blood thinners and follow their instructions. Stop vitamins, supplements and NSAIDS: Today   4. Do not smoke, vape or use chewing tobacco morning of surgery. Do not drink any alcoholic beverages 24 hours prior to surgery. This includes NA Beer. No street drugs 7 days prior to surgery. 5. You may brush your teeth and gargle the morning of surgery. DO NOT SWALLOW WATER   6. You MUST make arrangements for a responsible adult to take you home after your surgery and be able to check on you every couple                   hours for the day. You will not be allowed to leave alone or drive yourself home. It is strongly suggested someone stay with you the first 24                   hrs. Your surgery will be cancelled if you do not have a ride home. 7. Please wear simple, loose fitting clothing to the hospital.  Vianne Danger not bring valuables (money, credit cards, checkbooks, etc.) Do not wear any                   makeup (including no eye makeup) or nail polish on your fingers or toes. 8. DO NOT wear any jewelry or piercings on day of surgery. All body piercing jewelry must be removed. 9. If you have dentures, they will be removed before going to the OR; we will provide you a container. If you wear contact lenses or glasses,                  they will be removed; please bring a case for them. 10. If you  have a Living Will and Durable Power of  for Healthcare, please bring in a copy.            11. Please bring picture ID,  insurance card, paperwork from the doctors office    (H & P, Consent, & card for implantable devices). 12. Take a shower with soap the morning of surgery. Do not apply any make-up, lotion, oil or powder after the morning shower. 15.  Enter thru the main entrance on the day of surgery.

## 2023-02-08 ENCOUNTER — ANESTHESIA EVENT (OUTPATIENT)
Dept: ENDOSCOPY | Age: 63
End: 2023-02-08
Payer: MEDICAID

## 2023-02-08 ASSESSMENT — ENCOUNTER SYMPTOMS: SHORTNESS OF BREATH: 1

## 2023-02-08 NOTE — PROGRESS NOTES
Spoke with patient and she will arrive at 1100 at [facility] on 2/9/2023 for her procedure at 1230. . Orders in epic, placed by Winston Sanford.

## 2023-02-08 NOTE — ANESTHESIA PRE PROCEDURE
Department of Anesthesiology  Preprocedure Note       Name:  Batsheva Johnston   Age:  63 y.o.  :  1960                                          MRN:  [id number]         Date:  2023      Surgeon: Jose Alexandre):  Leonila Phelps MD    Procedure: Procedure(s):  EGD ESOPHAGOGASTRODUODENOSCOPY    Medications prior to admission:   Prior to Admission medications    Medication Sig Start Date End Date Taking? Authorizing Provider   gabapentin (NEURONTIN) 300 MG capsule Take 1 capsule by mouth nightly for 30 days.  2/6/23 3/8/23  Kulwinder Hoover MD   ezetimibe (ZETIA) 10 MG tablet Take 1 tablet by mouth daily 23   Vinay Hinojosa MD   metoprolol tartrate (LOPRESSOR) 50 MG tablet Take 1 tablet by mouth 2 times daily 23   Vinay Hinojosa MD   losartan (COZAAR) 50 MG tablet Take 1 tablet by mouth daily 23   Vinay Hinojosa MD   polyethylene glycol (GLYCOLAX) 17 GM/SCOOP powder Take 17 g by mouth 2 times daily 23  LIAN Flores CNP   docusate sodium (COLACE) 100 MG capsule Take 1 capsule by mouth 2 times daily 23  LIAN Flores CNP   VENLAFAXINE HCL PO VENLAFAXINE CAP 75MG ER 75 mg 12/18/15   Historical Provider, MD   ondansetron (ZOFRAN) 8 MG tablet Take 1 tablet by mouth every 8 hours as needed for Nausea or Vomiting 22   LIAN Domínguez CNP   midodrine (PROAMATINE) 5 MG tablet Take 1 tablet by mouth 3 times daily 22   Ehsan Brooks MD   letrozole [facility]) 2.5 MG tablet Take 1 tablet by mouth daily 22   Kulwinder Hoover MD   sodium chloride (ALTAMIST SPRAY) 0.65 % nasal spray 1 spray by Nasal route as needed for Congestion 21   LIAN Domínguez CNP   prochlorperazine (COMPAZINE) 10 MG tablet Take 1 tablet by mouth every 6 hours as needed (chemotherapy induced nausea/vomiting) 10/14/21   Jasso Zoar, APRN - CNP   Magic Mouthwash (MIRACLE MOUTHWASH) Swish and spit 5 mLs 4 times daily as needed for Irritation 21   Kita Candelario Saint LIAN robbins - CNP   mirtazapine (REMERON) 15 MG tablet Take 1 tablet by mouth nightly 8/27/21   LIAN Barrera - CNP   promethazine (PHENERGAN) 12.5 MG tablet Take 10 mg by mouth every 6 hours as needed for Nausea    Historical Provider, MD   metoclopramide (REGLAN) 5 MG tablet Take 1 tablet by mouth 3 times daily  Patient taking differently: Take 5 mg by mouth 3 times daily as needed 6/10/21   Silvia Mann MD   furosemide (LASIX) 20 MG tablet Take 20 mg by mouth daily     Historical Provider, MD   Multiple Vitamins-Minerals (HAIR SKIN AND NAILS FORMULA) TABS Take 1 tablet by mouth daily     Historical Provider, MD   loratadine (CLARITIN) 10 MG tablet Take 1 tablet by mouth daily 5/9/19   Agustin Roth MD       Current medications:    No current facility-administered medications for this encounter. Current Outpatient Medications   Medication Sig Dispense Refill    gabapentin (NEURONTIN) 300 MG capsule Take 1 capsule by mouth nightly for 30 days.  90 capsule 0    ezetimibe (ZETIA) 10 MG tablet Take 1 tablet by mouth daily 90 tablet 3    metoprolol tartrate (LOPRESSOR) 50 MG tablet Take 1 tablet by mouth 2 times daily 180 tablet 3    losartan (COZAAR) 50 MG tablet Take 1 tablet by mouth daily 90 tablet 3    polyethylene glycol (GLYCOLAX) 17 GM/SCOOP powder Take 17 g by mouth 2 times daily 1530 g 1    docusate sodium (COLACE) 100 MG capsule Take 1 capsule by mouth 2 times daily 60 capsule 0    VENLAFAXINE HCL PO VENLAFAXINE CAP 75MG ER 75 mg      ondansetron (ZOFRAN) 8 MG tablet Take 1 tablet by mouth every 8 hours as needed for Nausea or Vomiting 30 tablet 1    midodrine (PROAMATINE) 5 MG tablet Take 1 tablet by mouth 3 times daily 90 tablet 3    letrozole (FEMARA) 2.5 MG tablet Take 1 tablet by mouth daily 30 tablet 3    sodium chloride (ALTAMIST SPRAY) 0.65 % nasal spray 1 spray by Nasal route as needed for Congestion 1 each 3    prochlorperazine (COMPAZINE) 10 MG tablet Take 1 tablet by mouth every 6 hours as needed (chemotherapy induced nausea/vomiting) 30 tablet 1    Magic Mouthwash (MIRACLE MOUTHWASH) Swish and spit 5 mLs 4 times daily as needed for Irritation 500 mL 0    mirtazapine (REMERON) 15 MG tablet Take 1 tablet by mouth nightly 30 tablet 3    promethazine (PHENERGAN) 12.5 MG tablet Take 10 mg by mouth every 6 hours as needed for Nausea      metoclopramide (REGLAN) 5 MG tablet Take 1 tablet by mouth 3 times daily (Patient taking differently: Take 5 mg by mouth 3 times daily as needed) 30 tablet 1    furosemide (LASIX) 20 MG tablet Take 20 mg by mouth daily       Multiple Vitamins-Minerals (HAIR SKIN AND NAILS FORMULA) TABS Take 1 tablet by mouth daily       loratadine (CLARITIN) 10 MG tablet Take 1 tablet by mouth daily 30 tablet 5       Allergies: Allergies   Allergen Reactions    Celexa [Citalopram] Other (See Comments)     Stomach pain        Penicillins Hives    Atorvastatin      Leg cramps      Fenofibrate      angioedema    Mestinon [Pyridostigmine] Itching and Swelling    Sulfa Antibiotics      Other reaction(s): Hives/Throat closes    Tape [employer] Tape]      PLASTIC TAPE    Gemfibrozil Rash    Meloxicam Other (See Comments)     moodswings       Problem List:    Patient Active Problem List   Diagnosis Code    Essential hypertension I10    Mixed hyperlipidemia E78.2    Allergic rhinitis due to pollen J30.1    Morbid obesity due to excess calories ([facility] E66.01    Hearing loss H91.90    Hot flashes due to surgical menopause E89.41    Gastroesophageal reflux disease K21.9    Chronic back pain M54.9, G89.29    Anxiety and depression F41.9, F32. A    Osteoarthritis M19.90    Meniere's disease H81.09    Insomnia G47.00    Precordial pain R07.2    SOB (shortness of breath) R06.02    Calculus of gallbladder without cholecystitis without obstruction K80.20    S/P laparoscopic cholecystectomy Z90.49    Vasovagal syncope R55    Class 2 obesity due to excess calories without serious comorbidity in adult E66.09    Palpitations R00.2    Family history of early CAD Z82.49    Closed fracture of left ankle S82.892A    Acute respiratory failure with hypoxia (HCC) J96.01    Status post left hip replacement Z96.642    Hyperglycemia R73.9    Mucinous carcinoma of breast, 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    Malignant neoplasm of left female breast (HCC) C50.912    Infiltrating ductal carcinoma of left breast (HCC) C50.912    S/P mastectomy, bilateral Z90.13    Hx of lymph node excision Z98.890    Post-op pain G89.18    Nausea and vomiting R11.2    Personal history of breast cancer Z85.3    Port-A-Cath in place Z95.828    Dehydration E86.0    Chest pain R07.9    Agranulocytosis secondary to cancer chemotherapy ([facility] [address] D70.1, T45.1X5A    Spinal stenosis of lumbar region M48.061    Hiatal hernia K44.9    Schatzki's ring of distal esophagus K22.2    Chronic gastroesophageal reflux disease K21.9    Failure to thrive (child) R62.51    Gram-positive bacteremia R78.81    Dizziness R42    Weight loss, abnormal R63.4    History of cancer of left breast Z85.3    Chronic low back pain without sciatica M54.50, G89.29       Past Medical History:        Diagnosis Date    Allergic rhinitis due to pollen 11/19/2015    Arthritis     left hip & knee    Chronic back pain     Dehydration 7/22/2021    Depression     Gastroesophageal reflux disease 11/19/2015    Hearing loss 11/19/2015    History of blood transfusion     No reaction per pt    History of cardiac monitoring 04/18/2017    14 day event monitor. Sinus Rhythm    History of nuclear stress test 09/28/2016    cardiolite-normal,EF70%    Hot flashes due to surgical menopause 11/19/2015    Hx of echocardiogram 10/05/2016    EF: >55 %   Mild mitral and tricuspid regurg.      Hyperlipidemia     Hypertension     Follows with PCP    Lexiscan Stress Test 10/14/2020    EF 60%, Normal study, no ischemia    Meniere disease     Morbid obesity due to excess calories ([facility] [address] 11/19/2015    Sleep apnea 11/19/2015    sleep study negative    Tilt table evaluation 05/09/2017     positive for neurocardiogenic syncope       Past Surgical History:        Procedure Laterality Date    APPENDECTOMY  1967    CHOLECYSTECTOMY      2017    COLONOSCOPY  2014    Polyps x2 - Dr. Quintin Cheatham (CERVIX STATUS UNKNOWN)  [address] Bilateral 05/06/2021    Dr. Ardon Phi Left 10/19/2020    LEFT HIP TOTAL ARTHROPLASTY - POSTERIOR performed by Maninder Puckett MD at [facility] [address] LEFT Left 3/9/2021    US BREAST BIOPSY NEEDLE ADDITIONAL LEFT 3/9/2021 Cheryl Harrison MD [address] BREAST BIOPSY NEEDLE ADDITIONAL LEFT Left 3/9/2021    US BREAST BIOPSY NEEDLE ADDITIONAL LEFT 3/9/2021 Cheryl Harrison  [facility] BREAST BIOPSY W LOC DEVICE 1ST LESION LEFT Left 3/9/2021    US BREAST NEEDLE BIOPSY LEFT 3/9/2021 Cheryl Harrison MD [address] [facility]       Social History:    Social History     Tobacco Use    Smoking status: Never    Smokeless tobacco: Never   Substance Use Topics    Alcohol use: No                                Counseling given: Not Answered      Vital Signs (Current):   Vitals:    02/06/23 1055   Weight: 223 lb (101.2 kg)   Height: 5' 4\" (1.626 m)                                              BP Readings from Last 3 Encounters:   02/01/23 (!) 155/100   01/25/23 (!) 138/98   01/05/23 (!) 173/92       NPO Status:                                                                                 BMI:   Wt Readings from Last 3 Encounters:   02/01/23 223 lb (101.2 kg)   01/25/23 224 lb (101.6 kg)   01/05/23 224 lb (101.6 kg)     Body mass index is 38.28 kg/m².     CBC:   Lab Results   Component Value Date/Time    WBC 8.9 12/29/2022 10:24 AM    RBC 4.86 12/29/2022 10:24 AM    HGB 13.4 12/29/2022 10:24 AM    HCT 41.4 12/29/2022 10:24 AM    MCV 85.2 12/29/2022 10:24 AM    RDW 15.1 12/29/2022 10:24 AM     12/29/2022 10:24 AM       CMP:   Lab Results   Component Value Date/Time     12/29/2022 10:24 AM    K 3.8 12/29/2022 10:24 AM     12/29/2022 10:24 AM    CO2 28 12/29/2022 10:24 AM    BUN 16 12/29/2022 10:24 AM    CREATININE 1.2 12/29/2022 10:24 AM    GFRAA >60 08/18/2022 11:00 AM    AGRATIO 1.7 12/07/2015 04:17 PM    LABGLOM 52 12/29/2022 10:24 AM    GLUCOSE 106 12/29/2022 10:24 AM    PROT 6.7 12/29/2022 10:24 AM    PROT 7.5 09/20/2011 09:04 AM    CALCIUM 9.5 12/29/2022 10:24 AM    BILITOT 0.4 12/29/2022 10:24 AM    ALKPHOS 75 12/29/2022 10:24 AM    AST 13 12/29/2022 10:24 AM    ALT 13 12/29/2022 10:24 AM       POC Tests: No results for input(s): POCGLU, POCNA, POCK, POCCL, POCBUN, POCHEMO, POCHCT in the last 72 hours.     Coags:   Lab Results   Component Value Date/Time    PROTIME 14.3 02/19/2022 12:55 PM    PROTIME 12.6 09/20/2011 09:04 AM    INR 1.11 02/19/2022 12:55 PM    APTT 40.4 02/19/2022 12:55 PM       HCG (If Applicable):   Lab Results   Component Value Date    PREGTESTUR NEGATIVE 05/11/2017        ABGs: No results found for: PHART, PO2ART, QTJ9DPM, MEG7NPG, BEART, G9XUZDEL     Type & Screen (If Applicable):  No results found for: LABABO, LABRH    Drug/Infectious Status (If Applicable):  No results found for: HIV, HEPCAB    COVID-19 Screening (If Applicable):   Lab Results   Component Value Date/Time    COVID19 NOT DETECTED 02/18/2022 12:18 AM           Anesthesia Evaluation  Patient summary reviewed no history of anesthetic complications:   Airway: Mallampati: II  TM distance: >3 FB   Neck ROM: full  Mouth opening: > = 3 FB   Dental:          Pulmonary:   (+) shortness of breath: chronic,  sleep apnea: on CPAP,                             Cardiovascular:  Exercise tolerance: poor (<4 METS),   (+) hypertension:, hyperlipidemia            Echocardiogram reviewed  Stress test reviewed       Beta Blocker:  Dose within 24 Hrs         Neuro/Psych:   (+) depression/anxiety             GI/Hepatic/Renal:   (+) hiatal hernia, GERD:,           Endo/Other:    (+) : arthritis: OA., .                 Abdominal:             Vascular: negative vascular ROS. Other Findings:           Anesthesia Plan      MAC     ASA 3             Anesthetic plan and risks discussed with patient. Plan discussed with CRNA. Pre Anesthesia Evaluation complete. Anesthesia plan, risks, benefits, alternatives, and personnel involved discussed with patient. Patients and/or legal guardian verbalized an understanding and agreed to proceed.   Donnell Clarke DO  2/9/2023

## 2023-02-09 ENCOUNTER — HOSPITAL ENCOUNTER (OUTPATIENT)
Age: 63
Setting detail: OUTPATIENT SURGERY
Discharge: HOME | End: 2023-02-09
Attending: INTERNAL MEDICINE | Admitting: INTERNAL MEDICINE
Payer: MEDICAID

## 2023-02-09 ENCOUNTER — ANESTHESIA (OUTPATIENT)
Dept: ENDOSCOPY | Age: 63
End: 2023-02-09
Payer: MEDICAID

## 2023-02-09 VITALS
WEIGHT: 223 LBS | TEMPERATURE: 97.7 F | SYSTOLIC BLOOD PRESSURE: 140 MMHG | BODY MASS INDEX: 38.07 KG/M2 | OXYGEN SATURATION: 96 % | DIASTOLIC BLOOD PRESSURE: 83 MMHG | HEIGHT: 64 IN | HEART RATE: 78 BPM | RESPIRATION RATE: 18 BRPM

## 2023-02-09 DIAGNOSIS — K21.9 GASTROESOPHAGEAL REFLUX DISEASE, UNSPECIFIED WHETHER ESOPHAGITIS PRESENT: ICD-10-CM

## 2023-02-09 DIAGNOSIS — Z98.890 HISTORY OF ESOPHAGEAL DILATATION: ICD-10-CM

## 2023-02-09 DIAGNOSIS — R13.19 OTHER DYSPHAGIA: ICD-10-CM

## 2023-02-09 PROCEDURE — 3700000001 HC ADD 15 MINUTES (ANESTHESIA): Performed by: INTERNAL MEDICINE

## 2023-02-09 PROCEDURE — 7100000011 HC PHASE II RECOVERY - ADDTL 15 MIN: Performed by: INTERNAL MEDICINE

## 2023-02-09 PROCEDURE — 3700000000 HC ANESTHESIA ATTENDED CARE: Performed by: INTERNAL MEDICINE

## 2023-02-09 PROCEDURE — 3609012700 HC EGD DILATION SAVORY: Performed by: INTERNAL MEDICINE

## 2023-02-09 PROCEDURE — 2580000003 HC RX 258: Performed by: NURSE PRACTITIONER

## 2023-02-09 PROCEDURE — 88305 TISSUE EXAM BY PATHOLOGIST: CPT

## 2023-02-09 PROCEDURE — 7100000010 HC PHASE II RECOVERY - FIRST 15 MIN: Performed by: INTERNAL MEDICINE

## 2023-02-09 PROCEDURE — 6360000002 HC RX W HCPCS

## 2023-02-09 PROCEDURE — 43450 DILATE ESOPHAGUS 1/MULT PASS: CPT | Performed by: INTERNAL MEDICINE

## 2023-02-09 PROCEDURE — 43239 EGD BIOPSY SINGLE/MULTIPLE: CPT | Performed by: INTERNAL MEDICINE

## 2023-02-09 PROCEDURE — 2500000003 HC RX 250 WO HCPCS

## 2023-02-09 PROCEDURE — 2709999900 HC NON-CHARGEABLE SUPPLY: Performed by: INTERNAL MEDICINE

## 2023-02-09 PROCEDURE — 88342 IMHCHEM/IMCYTCHM 1ST ANTB: CPT

## 2023-02-09 RX ORDER — SODIUM CHLORIDE, SODIUM LACTATE, POTASSIUM CHLORIDE, CALCIUM CHLORIDE 600; 310; 30; 20 MG/100ML; MG/100ML; MG/100ML; MG/100ML
INJECTION, SOLUTION INTRAVENOUS CONTINUOUS
Status: DISCONTINUED | OUTPATIENT
Start: 2023-02-09 | End: 2023-02-09 | Stop reason: HOSPADM

## 2023-02-09 RX ORDER — SODIUM CHLORIDE 0.9 % (FLUSH) 0.9 %
5-40 SYRINGE (ML) INJECTION PRN
Status: DISCONTINUED | OUTPATIENT
Start: 2023-02-09 | End: 2023-02-09 | Stop reason: HOSPADM

## 2023-02-09 RX ORDER — LIDOCAINE HYDROCHLORIDE 20 MG/ML
INJECTION, SOLUTION EPIDURAL; INFILTRATION; INTRACAUDAL; PERINEURAL PRN
Status: DISCONTINUED | OUTPATIENT
Start: 2023-02-09 | End: 2023-02-09 | Stop reason: SDUPTHER

## 2023-02-09 RX ORDER — PANTOPRAZOLE SODIUM 40 MG/1
40 TABLET, DELAYED RELEASE ORAL
Qty: 90 TABLET | Refills: 0 | Status: SHIPPED
Start: 2023-02-09 | End: 2023-02-13 | Stop reason: SDUPTHER

## 2023-02-09 RX ORDER — PROPOFOL 10 MG/ML
INJECTION, EMULSION INTRAVENOUS PRN
Status: DISCONTINUED | OUTPATIENT
Start: 2023-02-09 | End: 2023-02-09 | Stop reason: SDUPTHER

## 2023-02-09 RX ORDER — SODIUM CHLORIDE 0.9 % (FLUSH) 0.9 %
5-40 SYRINGE (ML) INJECTION EVERY 12 HOURS SCHEDULED
Status: DISCONTINUED | OUTPATIENT
Start: 2023-02-09 | End: 2023-02-09 | Stop reason: HOSPADM

## 2023-02-09 RX ORDER — SODIUM CHLORIDE 9 MG/ML
25 INJECTION, SOLUTION INTRAVENOUS PRN
Status: DISCONTINUED | OUTPATIENT
Start: 2023-02-09 | End: 2023-02-09 | Stop reason: HOSPADM

## 2023-02-09 RX ADMIN — SODIUM CHLORIDE, POTASSIUM CHLORIDE, SODIUM LACTATE AND CALCIUM CHLORIDE: 600; 310; 30; 20 INJECTION, SOLUTION INTRAVENOUS at 11:25

## 2023-02-09 RX ADMIN — LIDOCAINE HYDROCHLORIDE 100 MG: 20 INJECTION, SOLUTION EPIDURAL; INFILTRATION; INTRACAUDAL; PERINEURAL at 13:57

## 2023-02-09 RX ADMIN — PROPOFOL 150 MG: 10 INJECTION, EMULSION INTRAVENOUS at 13:57

## 2023-02-09 ASSESSMENT — PAIN DESCRIPTION - LOCATION
LOCATION: ABDOMEN;THROAT
LOCATION: ABDOMEN;THROAT

## 2023-02-09 ASSESSMENT — PAIN SCALES - GENERAL
PAINLEVEL_OUTOF10: 8
PAINLEVEL_OUTOF10: 8

## 2023-02-09 ASSESSMENT — PAIN DESCRIPTION - FREQUENCY
FREQUENCY: CONTINUOUS
FREQUENCY: CONTINUOUS

## 2023-02-09 ASSESSMENT — PAIN - FUNCTIONAL ASSESSMENT
PAIN_FUNCTIONAL_ASSESSMENT: ACTIVITIES ARE NOT PREVENTED
PAIN_FUNCTIONAL_ASSESSMENT: 0-10
PAIN_FUNCTIONAL_ASSESSMENT: ACTIVITIES ARE NOT PREVENTED
PAIN_FUNCTIONAL_ASSESSMENT: ACTIVITIES ARE NOT PREVENTED

## 2023-02-09 ASSESSMENT — PAIN DESCRIPTION - ONSET
ONSET: ON-GOING
ONSET: ON-GOING

## 2023-02-09 ASSESSMENT — PAIN DESCRIPTION - PAIN TYPE
TYPE: CHRONIC PAIN;SURGICAL PAIN
TYPE: SURGICAL PAIN

## 2023-02-09 ASSESSMENT — PAIN DESCRIPTION - DESCRIPTORS
DESCRIPTORS: DISCOMFORT
DESCRIPTORS: ACHING
DESCRIPTORS: DISCOMFORT

## 2023-02-09 NOTE — DISCHARGE INSTRUCTIONS
EGD    DR. SAENZ    OFFICE NUMBER [phone] [facility]. REPEAT PROCEDURE AS  NEEDED. TEST ORDERED:BIOPSIES    What to Expect at Home  Your Recovery:  The only discomfort after your EGD is generally limited to a mild soreness of the throat, which may last a day or two. Call your physician immediately if you have severe chest pain, shortness of breath or a temperature of 100 degrees or higher if taken orally. How can you care for yourself at home? Activity  Rest as much as you need to after you go home. You should be able to go back to your usual activities the day after the test.  Diet  Follow your doctor's directions for eating after the test.  Drink plenty of fluids (unless your doctor has told you not to). Medications  If you have a sore throat the day after the test, use an over-the-counter spray to numb your throat. Your doctor will tell you if and when you can restart your medicines. He or she will also give you instructions about taking any new medicines. If you take blood thinners, such as warfarin (Coumadin), clopidogrel (Plavix), or aspirin, be sure to talk to your doctor. He or she will tell you if and when to start taking those medicines again. Make sure that you understand exactly what your doctor wants you to do. If a biopsy was done during the test, your doctor may tell you not to take aspirin or other anti-inflammatory medicines for a few days. These include ibuprofen (Advil, Motrin) and naproxen (Aleve). DO NOT DRINK ANYTHING WITH ALCOHOL TODAY. Other instructions:Anesthesia  For your safety, do not drive or operate machinery for 24 hours. Do not sign legal documents or make major decisions for 24 hours. The anesthesia can make it hard for you to fully understand what you are agreeing to. Follow-up care is a key part of your treatment and safety. Be sure to make and go to all appointments, and call your doctor if you are having problems.  It's also a good idea to know your test results and keep a list of the medicines you take. When should you call for help? [address] [facility] [address] Ronny Castaneda Barbara [phone]  Call 911 anytime you think you may need emergency care. For example, call if:  You passed out (lost consciousness). You cough up blood. You vomit blood or what looks like coffee grounds. You pass maroon or very bloody stools. Call your doctor now or seek immediate medical care if:  You have trouble swallowing. You have belly pain. Your stools are black and tarlike or have streaks of blood. You are sick to your stomach or cannot keep fluids down. Watch closely for changes in your health, and be sure to contact your doctor if:  Your throat still hurts after a day or two. You do not get better as expected. Where can you learn more? Go to https://[email or web address]. org and sign in to your [employer] account. Enter D951 in the Fair Observer box to learn more about Upper GI Endoscopy: What to Expect at Home.     If you do not have an account, please click on the Sign Up Now link. © 4976-6860 Healthwise, Incorporated. Care instructions adapted under license by [facility] ([facility]). This care instruction is for use with your licensed healthcare professional. If you have questions about a medical condition or this instruction, always ask your healthcare professional. [place] [address] any warranty or liability for your use of this information. Content Version: 95.1.236434; Current as of: November 20, 2015           Advance Care Planning  People with COVID-19 may have no symptoms, mild symptoms, such as fever, cough, and shortness of breath or they may have more severe illness, developing severe and fatal pneumonia.   As a result, Advance Care Planning with attention to naming a health care decision maker (someone you trust to make healthcare decisions for you if you could not speak for yourself) and sharing other health care preferences is important BEFORE a possible health crisis. Please contact your Primary Care Provider to discuss Advance Care Planning. Preventing the Spread of Coronavirus Disease 2019 in Homes and Residential Communities  For the most recent information go to [employer]    Prevention steps for People with confirmed or suspected COVID-19 (including persons under investigation) who do not need to be hospitalized  and   People with confirmed COVID-19 who were hospitalized and determined to be medically stable to go home    Your healthcare provider and public health staff will evaluate whether you can be cared for at home. If it is determined that you do not need to be hospitalized and can be isolated at home, you will be monitored by staff from your local or state health department. You should follow the prevention steps below until a healthcare provider or local or state health department says you can return to your normal activities. Stay home except to get medical care  People who are mildly ill with COVID-19 are able to isolate at home during their illness. You should restrict activities outside your home, except for getting medical care. Do not go to work, school, or public areas. Avoid using public transportation, ride-sharing, or taxis. Separate yourself from other people and animals in your home  People: As much as possible, you should stay in a specific room and away from other people in your home. Also, you should use a separate bathroom, if available. Animals: You should restrict contact with pets and other animals while you are sick with COVID-19, just like you would around other people.  Although there have not been reports of pets or other animals becoming sick with COVID-19, it is still recommended that people sick with COVID-19 limit contact with animals until more information is known about the virus. When possible, have another member of your household care for your animals while you are sick. If you are sick with COVID-19, avoid contact with your pet, including petting, snuggling, being kissed or licked, and sharing food. If you must care for your pet or be around animals while you are sick, wash your hands before and after you interact with pets and wear a facemask. Call ahead before visiting your doctor  If you have a medical appointment, call the healthcare provider and tell them that you have or may have COVID-19. This will help the healthcare providers office take steps to keep other people from getting infected or exposed. Wear a facemask  You should wear a facemask when you are around other people (e.g., sharing a room or vehicle) or pets and before you enter a healthcare providers office. If you are not able to wear a facemask (for example, because it causes trouble breathing), then people who live with you should not stay in the same room with you, or they should wear a facemask if they enter your room. Cover your coughs and sneezes  Cover your mouth and nose with a tissue when you cough or sneeze. Throw used tissues in a lined trash can. Immediately wash your hands with soap and water for at least 20 seconds or, if soap and water are not available, clean your hands with an alcohol-based hand  that contains at least 60% alcohol. Clean your hands often  Wash your hands often with soap and water for at least 20 seconds, especially after blowing your nose, coughing, or sneezing; going to the bathroom; and before eating or preparing food. If soap and water are not readily available, use an alcohol-based hand  with at least 60% alcohol, covering all surfaces of your hands and rubbing them together until they feel dry. Soap and water are the best option if hands are visibly dirty.  Avoid touching your eyes, nose, and mouth with unwashed hands. Avoid sharing personal household items  You should not share dishes, drinking glasses, cups, eating utensils, towels, or bedding with other people or pets in your home. After using these items, they should be washed thoroughly with soap and water. Clean all high-touch surfaces everyday  High touch surfaces include counters, tabletops, doorknobs, bathroom fixtures, toilets, phones, keyboards, tablets, and bedside tables. Also, clean any surfaces that may have blood, stool, or body fluids on them. Use a household cleaning spray or wipe, according to the label instructions. Labels contain instructions for safe and effective use of the cleaning product including precautions you should take when applying the product, such as wearing gloves and making sure you have good ventilation during use of the product. Monitor your symptoms  Seek prompt medical attention if your illness is worsening (e.g., difficulty breathing). Before seeking care, call your healthcare provider and tell them that you have, or are being evaluated for, COVID-19. Put on a facemask before you enter the facility. These steps will help the healthcare providers office to keep other people in the office or waiting room from getting infected or exposed. Ask your healthcare provider to call the local or state health department. Persons who are placed under active monitoring or facilitated self-monitoring should follow instructions provided by their local health department or occupational health professionals, as appropriate. When working with your local health department check their available hours. If you have a medical emergency and need to call 911, notify the dispatch personnel that you have, or are being evaluated for COVID-19. If possible, put on a facemask before emergency medical services arrive.   Discontinuing home isolation  Patients with confirmed COVID-19 should remain under home isolation precautions until the risk of secondary transmission to others is thought to be low. The decision to discontinue home isolation precautions should be made on a case-by-case basis, in consultation with healthcare providers and state and local health departments.

## 2023-02-09 NOTE — INTERVAL H&P NOTE
Update History & Physical    The patient's History and Physical of January 25, 2023 was reviewed with the patient and I examined the patient. There was no change. The surgical site was confirmed by the patient and me. Plan: The risks, benefits, expected outcome, and alternative to the recommended procedure have been discussed with the patient. Patient understands and wants to proceed with the procedure.      Electronically signed by Lisy Villatoro MD on 2/9/2023 at 11:02 AM

## 2023-02-09 NOTE — PROGRESS NOTES
1642 Secure text sent to Dr Geri Clifford, [address] [facility] called, they are unable to fill Rx, because Dr Geri Clifford is not an approved [facility].   1653 Waiting for response

## 2023-02-09 NOTE — PROGRESS NOTES
1813 Patient returned to room from STEPHANIE levine+RADHA Lentz (see doc flow), assessment completed as per doc flow. Patient has 8/10 c/o  chronic pain,  and denies any needs at this time. Beverage offered. Call light in reach, bed in low position. RN to continue to monitor. Will call to waiting room for visitor/family. [phone] Patient discharge instructions and prescriptions reviewed and verified. RN reviewed d/c instructions with patient and her brother Kian Diss via phone. All questions answered. Both patient and her brother verbalized understanding of discharge instructions. Discharge paperwork signed by RN. [phone] Discharged to car  via wheelchair, home with brother Kian Diss.

## 2023-02-09 NOTE — ANESTHESIA POSTPROCEDURE EVALUATION
Department of Anesthesiology  Postprocedure Note    Patient: Modesta Almonte  MRN: [id number]  YOB: 1960  Date of evaluation: 2/9/2023      Procedure Summary     Date: 02/09/23 Room / Location: [address] [place] [address]    Anesthesia Start: 4519 Anesthesia Stop: 4920    Procedure: EGD DILATION with # 47 frech Barry tube. Diagnosis:       Gastroesophageal reflux disease, unspecified whether esophagitis present      Other dysphagia      History of esophageal dilatation      (Gastroesophageal reflux disease, unspecified whether esophagitis present [K21.9])      (Other dysphagia [R13.19])      (History of esophageal dilatation [J77.277])    Surgeons:  Jory Graham MD Responsible Provider: Génesis Christiansen MD    Anesthesia Type: MAC ASA Status: 3          Anesthesia Type: MAC    Chris Phase I: Chris Score: 10    Chris Phase II:        Anesthesia Post Evaluation    Patient location during evaluation: PACU  Patient participation: complete - patient participated  Level of consciousness: awake and alert  Airway patency: patent  Nausea & Vomiting: no nausea and no vomiting  Complications: no  Cardiovascular status: hemodynamically stable  Respiratory status: spontaneous ventilation and room air  Hydration status: stable

## 2023-02-09 NOTE — PROGRESS NOTES
Patient is back to baseline. Alert and oriented. Report given to [facility], [facility]. No family/friend at bedside.

## 2023-02-09 NOTE — PROGRESS NOTES
Received report from Graham, [facility], rn. Patient alert and oriented. Verified patient's name, , allergies and procedure. Took metoprolol on 23, no blood thinners, has left hip implant. H&P on chart. Waiting for procedural consent by Dr. Montez Bear.

## 2023-02-13 ENCOUNTER — TELEPHONE (OUTPATIENT)
Dept: GASTROENTEROLOGY | Age: 63
End: 2023-02-13

## 2023-02-13 RX ORDER — PANTOPRAZOLE SODIUM 40 MG/1
40 TABLET, DELAYED RELEASE ORAL
Qty: 90 TABLET | Refills: 3 | Status: SHIPPED | OUTPATIENT
Start: 2023-02-13

## 2023-02-13 NOTE — TELEPHONE ENCOUNTER
Patient states that pharmacy will not fill script written by Dr. Thomas Chan as he is not in their network.   Please send script for protonix to her pharmacy

## 2023-03-02 ENCOUNTER — OFFICE VISIT (OUTPATIENT)
Dept: FAMILY MEDICINE CLINIC | Age: 63
End: 2023-03-02
Payer: MEDICAID

## 2023-03-02 VITALS
OXYGEN SATURATION: 98 % | WEIGHT: 221 LBS | HEART RATE: 77 BPM | SYSTOLIC BLOOD PRESSURE: 135 MMHG | HEIGHT: 64 IN | DIASTOLIC BLOOD PRESSURE: 89 MMHG | BODY MASS INDEX: 37.73 KG/M2

## 2023-03-02 DIAGNOSIS — I10 ESSENTIAL HYPERTENSION: Primary | ICD-10-CM

## 2023-03-02 DIAGNOSIS — E78.2 MIXED HYPERLIPIDEMIA: ICD-10-CM

## 2023-03-02 LAB
CHOLESTEROL, TOTAL: 298 MG/DL (ref 0–199)
HDLC SERPL-MCNC: 47 MG/DL (ref 40–60)
LDL CHOLESTEROL CALCULATED: 210 MG/DL
TRIGL SERPL-MCNC: 204 MG/DL (ref 0–150)
VLDLC SERPL CALC-MCNC: 41 MG/DL

## 2023-03-02 PROCEDURE — 3079F DIAST BP 80-89 MM HG: CPT | Performed by: FAMILY MEDICINE

## 2023-03-02 PROCEDURE — 3074F SYST BP LT 130 MM HG: CPT | Performed by: FAMILY MEDICINE

## 2023-03-02 PROCEDURE — 99214 OFFICE O/P EST MOD 30 MIN: CPT | Performed by: FAMILY MEDICINE

## 2023-03-02 RX ORDER — FUROSEMIDE 20 MG/1
20 TABLET ORAL DAILY
Qty: 30 TABLET | Refills: 5 | Status: SHIPPED | OUTPATIENT
Start: 2023-03-02

## 2023-03-02 ASSESSMENT — ENCOUNTER SYMPTOMS
SHORTNESS OF BREATH: 0
COUGH: 0

## 2023-03-02 NOTE — PROGRESS NOTES
Glorious Drivers  1960 03/02/23    Chief Complaint   Patient presents with    1 Month Follow-Up    Hypertension           Patient here for 1 month f/u regarding HTN, we did increase her losartan last visit, patient denies headache or blurry vision. Past Medical History:   Diagnosis Date    Allergic rhinitis due to pollen 11/19/2015    Arthritis     left hip & knee    Chronic back pain     Dehydration 7/22/2021    Depression     Gastroesophageal reflux disease 11/19/2015    Hearing loss 11/19/2015    History of blood transfusion     No reaction per pt    History of cardiac monitoring 04/18/2017    14 day event monitor. Sinus Rhythm    History of nuclear stress test 09/28/2016    cardiolite-normal,EF70%    Hot flashes due to surgical menopause 11/19/2015    Hx of echocardiogram 10/05/2016    EF: >55 %   Mild mitral and tricuspid regurg. Hyperlipidemia     Hypertension     Follows with PCP    Lexiscan Stress Test 10/14/2020    EF 60%, Normal study, no ischemia    Meniere disease     Morbid obesity due to excess calories ([address] 11/19/2015    Sleep apnea 11/19/2015    sleep study negative    Tilt table evaluation 05/09/2017     positive for neurocardiogenic syncope     Past Surgical History:   Procedure Laterality Date    APPENDECTOMY  1967    CHOLECYSTECTOMY      2017    COLONOSCOPY  2014    Polyps x2 - Dr. Oh  ([facility])  [address] [facility] Bilateral 05/06/2021    Dr. Barrett Spears Left 10/19/2020    LEFT HIP TOTAL ARTHROPLASTY - POSTERIOR performed by Khadra Avalos MD at [address] [place] [address] 2/9/2023    EGD DILATION with # 47 frech Barry tube.  performed by Stephanie Ronquillo MD at [address] LEFT Left 3/9/2021    US BREAST BIOPSY NEEDLE ADDITIONAL LEFT 3/9/2021 Ciara Fleming MD [address] [employer] BREAST BIOPSY NEEDLE ADDITIONAL LEFT Left 3/9/2021    US BREAST BIOPSY NEEDLE ADDITIONAL LEFT 3/9/2021 Shay Heaton MD [facility] 166 LOC DEVICE 1ST LESION LEFT Left 3/9/2021     BREAST NEEDLE BIOPSY LEFT 3/9/2021 Shay Heaton MD [facility] [address] [facility]     Family History   Problem Relation Age of Onset    Heart Disease Mother     High Blood Pressure Mother     High Cholesterol Mother     Cancer Mother 80        Stomach    Diabetes Mother     Stroke Mother     Heart Disease Father     High Blood Pressure Father     High Cholesterol Father     Diabetes Father     Depression Father     Cancer Sister 46        Lung cancer    High Blood Pressure Sister     Other Sister         migraines, osteoarthritis    Mental Illness Sister     Depression Sister     Cancer Maternal Grandmother         Stomach    Diabetes Maternal Grandmother     Diabetes Maternal Grandfather     Diabetes Paternal Grandmother     Diabetes Paternal Grandfather     Cancer Maternal Cousin 47        Pancreatic     Social History     Socioeconomic History    Marital status:      Spouse name: Not on file    Number of children: Not on file    Years of education: Not on file    Highest education level: Not on file   Occupational History    Not on file   Tobacco Use    Smoking status: Never    Smokeless tobacco: Never   Vaping Use    Vaping Use: Never used   Substance and Sexual Activity    Alcohol use: No    Drug use: No    Sexual activity: Not Currently     Partners: Male   Other Topics Concern    Not on file   Social History Narrative    Not on file     Social Determinants of Health     Financial Resource Strain: Unknown    Difficulty of Paying Living Expenses: Patient refused   Food Insecurity: Unknown    Worried About Running Out of Food in the Last Year: Patient refused    [address] in the Last Year: Patient refused   Transportation Needs: Unknown    Lack of Transportation (Medical):  Not on file    Lack of Transportation (Non-Medical): Patient refused   Physical Activity: Not on file   Stress: Not on file   Social Connections: Not on file   Intimate Partner Violence: Not on file   Housing Stability: Unknown    Unable to Pay for Housing in the Last Year: Not on file    Number of [place] in the Last Year: Not on file    Unstable Housing in the Last Year: Patient refused       Allergies   Allergen Reactions    Celexa [Citalopram] Other (See Comments)     Stomach pain        Penicillins Hives    Atorvastatin      Leg cramps      Fenofibrate      angioedema    Mestinon [Pyridostigmine] Itching and Swelling    Sulfa Antibiotics      Other reaction(s): Hives/Throat closes    Tape [UGYUPUTV Tape]      PLASTIC TAPE    Gemfibrozil Rash    Meloxicam Other (See Comments)     moodswings     Current Outpatient Medications   Medication Sig Dispense Refill    furosemide (LASIX) 20 MG tablet Take 1 tablet by mouth daily 30 tablet 5    pantoprazole (PROTONIX) 40 MG tablet Take 1 tablet by mouth every morning (before breakfast) 90 tablet 3    gabapentin (NEURONTIN) 300 MG capsule Take 1 capsule by mouth nightly for 30 days.  90 capsule 0    ezetimibe (ZETIA) 10 MG tablet Take 1 tablet by mouth daily 90 tablet 3    metoprolol tartrate (LOPRESSOR) 50 MG tablet Take 1 tablet by mouth 2 times daily 180 tablet 3    losartan (COZAAR) 50 MG tablet Take 1 tablet by mouth daily 90 tablet 3    VENLAFAXINE HCL PO VENLAFAXINE CAP 75MG ER 75 mg      ondansetron (ZOFRAN) 8 MG tablet Take 1 tablet by mouth every 8 hours as needed for Nausea or Vomiting 30 tablet 1    midodrine (PROAMATINE) 5 MG tablet Take 1 tablet by mouth 3 times daily 90 tablet 3    letrozole (FEMARA) 2.5 MG tablet Take 1 tablet by mouth daily 30 tablet 3    sodium chloride (ALTAMIST SPRAY) 0.65 % nasal spray 1 spray by Nasal route as needed for Congestion 1 each 3    prochlorperazine (COMPAZINE) 10 MG tablet Take 1 tablet by mouth every 6 hours as needed (chemotherapy induced nausea/vomiting) 30 tablet 1    Magic Mouthwash (MIRACLE MOUTHWASH) Swish and spit 5 mLs 4 times daily as needed for Irritation 500 mL 0    mirtazapine (REMERON) 15 MG tablet Take 1 tablet by mouth nightly 30 tablet 3    promethazine (PHENERGAN) 12.5 MG tablet Take 10 mg by mouth every 6 hours as needed for Nausea      metoclopramide (REGLAN) 5 MG tablet Take 1 tablet by mouth 3 times daily (Patient taking differently: Take 5 mg by mouth 3 times daily as needed) 30 tablet 1    Multiple Vitamins-Minerals (HAIR SKIN AND NAILS FORMULA) TABS Take 1 tablet by mouth daily       loratadine (CLARITIN) 10 MG tablet Take 1 tablet by mouth daily 30 tablet 5     No current facility-administered medications for this visit. Review of Systems   Constitutional:  Negative for activity change, chills and fever. Respiratory:  Negative for cough and shortness of breath. Cardiovascular:  Negative for chest pain. Neurological:  Negative for dizziness and headaches. Psychiatric/Behavioral:  Negative for agitation. The patient is not nervous/anxious.       Lab Results   Component Value Date    WBC 8.9 12/29/2022    HGB 13.4 12/29/2022    HCT 41.4 12/29/2022    MCV 85.2 12/29/2022     12/29/2022     Lab Results   Component Value Date     12/29/2022    K 3.8 12/29/2022     12/29/2022    CO2 28 12/29/2022    BUN 16 12/29/2022    CREATININE 1.2 (H) 12/29/2022    GLUCOSE 106 (H) 12/29/2022    CALCIUM 9.5 12/29/2022    PROT 6.7 12/29/2022    LABALBU 4.7 12/29/2022    BILITOT 0.4 12/29/2022    ALKPHOS 75 12/29/2022    AST 13 (L) 12/29/2022    ALT 13 12/29/2022    LABGLOM 52 (L) 12/29/2022    GFRAA >60 08/18/2022    AGRATIO 1.7 12/07/2015    GLOB 2.7 12/07/2015     Lab Results   Component Value Date    CHOL 292 (H) 08/13/2021    CHOL 270 (H) 03/01/2021    CHOL 508 (H) 05/13/2019     Lab Results   Component Value Date    TRIG 266 (H) 08/13/2021    TRIG 385 (H) 03/01/2021    TRIG 598 (H) 05/13/2019     Lab Results   Component Value Date    HDL 42 08/13/2021    HDL 40 (L) 03/01/2021    HDL 38 (L) 05/13/2019     Lab Results   Component Value Date    LDLCALC NOT VALID WHEN TRIGLYCERIDE >400 MG/DL. 05/22/2017    LDLCALC 75 12/07/2015     Lab Results   Component Value Date    LABA1C 5.6 02/01/2023     Lab Results   Component Value Date    TSHHS 2.390 11/18/2022         /89   Pulse 77   Ht 5' 4\" (1.626 m)   Wt 221 lb (100.2 kg)   SpO2 98%   BMI 37.93 kg/m²     BP Readings from Last 3 Encounters:   03/02/23 135/89   02/09/23 (!) 140/83   02/01/23 (!) 155/100       Wt Readings from Last 3 Encounters:   03/02/23 221 lb (100.2 kg)   02/09/23 223 lb (101.2 kg)   02/01/23 223 lb (101.2 kg)         Physical Exam  Constitutional:       Appearance: Normal appearance. She is not ill-appearing. Cardiovascular:      Rate and Rhythm: Normal rate and regular rhythm. Heart sounds: No murmur heard. Pulmonary:      Effort: Pulmonary effort is normal.      Breath sounds: Normal breath sounds. No wheezing. Musculoskeletal:         General: Normal range of motion. Cervical back: Normal range of motion and neck supple. Neurological:      General: No focal deficit present. Mental Status: She is alert and oriented to person, place, and time. ASSESSMENT/ PLAN:    1. Essential hypertension  - better, we increased her losartan last visit. No changes at this time    2. Mixed hyperlipidemia  - Lipid Panel; Future          - Appropriate prescription are addressed. - After visit summery provided. - Questions answered and patient verbalizes understanding.  - Call for any problem, questions, or concerns. Return in about 3 months (around 6/2/2023).

## 2023-03-13 RX ORDER — GABAPENTIN 300 MG/1
300 CAPSULE ORAL NIGHTLY
Qty: 90 CAPSULE | Refills: 0 | Status: SHIPPED | OUTPATIENT
Start: 2023-03-13 | End: 2023-04-12

## 2023-03-13 NOTE — TELEPHONE ENCOUNTER
Patient called in needing refill on Gabapentin 300 mg sent to [facility].

## 2023-03-14 ENCOUNTER — TELEMEDICINE (OUTPATIENT)
Dept: FAMILY MEDICINE CLINIC | Age: 63
End: 2023-03-14
Payer: MEDICAID

## 2023-03-14 DIAGNOSIS — E78.2 MIXED HYPERLIPIDEMIA: Primary | ICD-10-CM

## 2023-03-14 PROCEDURE — 99212 OFFICE O/P EST SF 10 MIN: CPT | Performed by: FAMILY MEDICINE

## 2023-03-14 RX ORDER — EVOLOCUMAB 140 MG/ML
140 INJECTION, SOLUTION SUBCUTANEOUS
Qty: 2 ADJUSTABLE DOSE PRE-FILLED PEN SYRINGE | Refills: 5 | Status: SHIPPED | OUTPATIENT
Start: 2023-03-14

## 2023-03-14 NOTE — PROGRESS NOTES
Conchita Aguillon is a 62 y.o. female evaluated via telephone on 3/14/2023 for Results (Would like to go over the cholesterol results)  .        Documentation:  I communicated with the patient and/or health care decision maker about to discuss the lab results  Details of this discussion including any medical advice provided:    Chief Complaint   Patient presents with    Results     Would like to go over the cholesterol results         Patient called by phone to discuss the lab results, her , patient on zetia, and could not tolerate the statin.      Past Medical History:   Diagnosis Date    Allergic rhinitis due to pollen 11/19/2015    Arthritis     left hip & knee    Chronic back pain     Dehydration 7/22/2021    Depression     Gastroesophageal reflux disease 11/19/2015    Hearing loss 11/19/2015    History of blood transfusion     No reaction per pt    History of cardiac monitoring 04/18/2017    14 day event monitor. Sinus Rhythm    History of nuclear stress test 09/28/2016    cardiolite-normal,EF70%    Hot flashes due to surgical menopause 11/19/2015    Hx of echocardiogram 10/05/2016    EF: >55 %   Mild mitral and tricuspid regurg.     Hyperlipidemia     Hypertension     Follows with PCP    Lexiscan Stress Test 10/14/2020    EF 60%, Normal study, no ischemia    Meniere disease     Morbid obesity due to excess calories (HCC) 11/19/2015    Sleep apnea 11/19/2015    sleep study negative    Tilt table evaluation 05/09/2017     positive for neurocardiogenic syncope     Family History   Problem Relation Age of Onset    Heart Disease Mother     High Blood Pressure Mother     High Cholesterol Mother     Cancer Mother 86        Stomach    Diabetes Mother     Stroke Mother     Heart Disease Father     High Blood Pressure Father     High Cholesterol Father     Diabetes Father     Depression Father     Cancer Sister 51        Lung cancer    High Blood Pressure Sister     Other Sister         migraines, osteoarthritis  Mental Illness Sister     Depression Sister     Cancer Maternal Grandmother         Stomach    Diabetes Maternal Grandmother     Diabetes Maternal Grandfather     Diabetes Paternal Grandmother     Diabetes Paternal Grandfather     Cancer Maternal Cousin 47        Pancreatic     Social History     Socioeconomic History    Marital status:      Spouse name: Not on file    Number of children: Not on file    Years of education: Not on file    Highest education level: Not on file   Occupational History    Not on file   Tobacco Use    Smoking status: Never    Smokeless tobacco: Never   Vaping Use    Vaping Use: Never used   Substance and Sexual Activity    Alcohol use: No    Drug use: No    Sexual activity: Not Currently     Partners: Male   Other Topics Concern    Not on file   Social History Narrative    Not on file     Social Determinants of Health     Financial Resource Strain: Unknown    Difficulty of Paying Living Expenses: Patient refused   Food Insecurity: Unknown    Worried About Running Out of Food in the Last Year: Patient refused    [address] in the Last Year: Patient refused   Transportation Needs: Unknown    Lack of Transportation (Medical):  Not on file    Lack of Transportation (Non-Medical): Patient refused   Physical Activity: Not on file   Stress: Not on file   Social Connections: Not on file   Intimate Partner Violence: Not on file   Housing Stability: Unknown    Unable to Pay for Housing in the Last Year: Not on file    Number of [place] in the Last Year: Not on file    Unstable Housing in the Last Year: Patient refused       Allergies   Allergen Reactions    Celexa [Citalopram] Other (See Comments)     Stomach pain        Penicillins Hives    Atorvastatin      Leg cramps      Fenofibrate      angioedema    Mestinon [Pyridostigmine] Itching and Swelling    Sulfa Antibiotics      Other reaction(s): Hives/Throat closes    Tape [WGBIWUID Tape]      PLASTIC TAPE    Gemfibrozil Rash Meloxicam Other (See Comments)     moodswings     Current Outpatient Medications   Medication Sig Dispense Refill    Evolocumab (REPATHA SURECLICK) 791 MG/ML SOAJ Inject 140 mg into the skin every 14 days 2 Adjustable Dose Pre-filled Pen Syringe 5    gabapentin (NEURONTIN) 300 MG capsule Take 1 capsule by mouth nightly for 30 days.  90 capsule 0    furosemide (LASIX) 20 MG tablet Take 1 tablet by mouth daily 30 tablet 5    pantoprazole (PROTONIX) 40 MG tablet Take 1 tablet by mouth every morning (before breakfast) 90 tablet 3    ezetimibe (ZETIA) 10 MG tablet Take 1 tablet by mouth daily 90 tablet 3    metoprolol tartrate (LOPRESSOR) 50 MG tablet Take 1 tablet by mouth 2 times daily 180 tablet 3    losartan (COZAAR) 50 MG tablet Take 1 tablet by mouth daily 90 tablet 3    VENLAFAXINE HCL PO VENLAFAXINE CAP 75MG ER 75 mg      ondansetron (ZOFRAN) 8 MG tablet Take 1 tablet by mouth every 8 hours as needed for Nausea or Vomiting 30 tablet 1    midodrine (PROAMATINE) 5 MG tablet Take 1 tablet by mouth 3 times daily 90 tablet 3    letrozole (FEMARA) 2.5 MG tablet Take 1 tablet by mouth daily 30 tablet 3    sodium chloride (ALTAMIST SPRAY) 0.65 % nasal spray 1 spray by Nasal route as needed for Congestion 1 each 3    prochlorperazine (COMPAZINE) 10 MG tablet Take 1 tablet by mouth every 6 hours as needed (chemotherapy induced nausea/vomiting) 30 tablet 1    mirtazapine (REMERON) 15 MG tablet Take 1 tablet by mouth nightly 30 tablet 3    promethazine (PHENERGAN) 12.5 MG tablet Take 10 mg by mouth every 6 hours as needed for Nausea      metoclopramide (REGLAN) 5 MG tablet Take 1 tablet by mouth 3 times daily (Patient taking differently: Take 5 mg by mouth 3 times daily as needed) 30 tablet 1    Multiple Vitamins-Minerals (HAIR SKIN AND NAILS FORMULA) TABS Take 1 tablet by mouth daily       loratadine (CLARITIN) 10 MG tablet Take 1 tablet by mouth daily 30 tablet 5    Magic Mouthwash (MIRACLE MOUTHWASH) Swish and spit 5 mLs 4 times daily as needed for Irritation (Patient not taking: Reported on 3/14/2023) 500 mL 0     No current facility-administered medications for this visit. Review of Systems   General:  No fevers, no chills  Eyes:  No recent vison changes  ENT:  No sore throat, no nasal congestion  Cardiovascular:  No chest pain, no palpitations  Respiratory:  No shortness of breath, no cough, no wheezing  Gastrointestinal:  No abdominal pain, no nausea, no vomiting, no diarrhea  Musculoskeletal:  No muscle pain, no joint pain  Skin:  No rash  Neurologic:  No headache or weakness  Genitourinary:  No dysuria, no hematuria  Extremities:  no edema, no pain     Physical Exam     Vital signs: not taken    Per phone call, the voice sounds good, no acute distress      ASSESSMENT/ PLAN:      1. Mixed hyperlipidemia  - , patient on zetia, could not tolerate the statin, so will try:  - Evolocumab (REPATHA SURECLICK) 673 MG/ML SOAJ; Inject 140 mg into the skin every 14 days  Dispense: 2 Adjustable Dose Pre-filled Pen Syringe; Refill: 5        - Appropriate prescription are addressed. - Questions answered and patient verbalizes understanding.  - Call for any problem, questions, or concerns. Return in about 3 months (around 6/14/2023), or if symptoms worsen or fail to improve. Total Time: minutes: 5-10 minutes    Shanelle Cowan was evaluated through a synchronous (real-time) audio encounter. Patient identification was verified at the start of the visit. She (or guardian if applicable) is aware that this is a billable service, which includes applicable co-pays. This visit was conducted with the patient's (and/or legal guardian's) verbal consent. She has not had a related appointment within my department in the past 7 days or scheduled within the next 24 hours. The patient was located at Home: [address] [place] [id number]. The provider was located at Home ([address]: [place].     Note: not billable if this call serves to triage the patient into an appointment for the relevant concern    Olga Soriano MD

## 2023-03-29 ENCOUNTER — TELEPHONE (OUTPATIENT)
Dept: GASTROENTEROLOGY | Age: 63
End: 2023-03-29

## 2023-03-30 ENCOUNTER — HOSPITAL ENCOUNTER (OUTPATIENT)
Dept: INFUSION THERAPY | Age: 63
Discharge: HOME | End: 2023-03-30
Payer: MEDICAID

## 2023-03-30 DIAGNOSIS — C50.912 INFILTRATING DUCTAL CARCINOMA OF LEFT BREAST (HCC): ICD-10-CM

## 2023-03-30 LAB
ALBUMIN SERPL-MCNC: 4.2 GM/DL (ref 3.4–5)
ALP BLD-CCNC: 73 IU/L (ref 40–128)
ALT SERPL-CCNC: 8 U/L (ref 10–40)
ANION GAP SERPL CALCULATED.3IONS-SCNC: 8 MMOL/L (ref 4–16)
AST SERPL-CCNC: 11 IU/L (ref 15–37)
BASOPHILS ABSOLUTE: 0 K/CU MM
BASOPHILS RELATIVE PERCENT: 0.4 % (ref 0–1)
BILIRUB SERPL-MCNC: 0.3 MG/DL (ref 0–1)
BUN SERPL-MCNC: 18 MG/DL (ref 6–23)
CALCIUM SERPL-MCNC: 9.1 MG/DL (ref 8.3–10.6)
CHLORIDE BLD-SCNC: 105 MMOL/L (ref 99–110)
CO2: 29 MMOL/L (ref 21–32)
CREAT SERPL-MCNC: 1.1 MG/DL (ref 0.6–1.1)
DIFFERENTIAL TYPE: ABNORMAL
EOSINOPHILS ABSOLUTE: 0.2 K/CU MM
EOSINOPHILS RELATIVE PERCENT: 2 % (ref 0–3)
GFR SERPL CREATININE-BSD FRML MDRD: 58 ML/MIN/1.73M2
GLUCOSE SERPL-MCNC: 97 MG/DL (ref 70–99)
HCT VFR BLD CALC: 39 % (ref 37–47)
HEMOGLOBIN: 12.5 GM/DL (ref 12.5–16)
LYMPHOCYTES ABSOLUTE: 1.9 K/CU MM
LYMPHOCYTES RELATIVE PERCENT: 25.7 % (ref 24–44)
MCH RBC QN AUTO: 28.1 PG (ref 27–31)
MCHC RBC AUTO-ENTMCNC: 32.1 % (ref 32–36)
MCV RBC AUTO: 87.6 FL (ref 78–100)
MONOCYTES ABSOLUTE: 0.6 K/CU MM
MONOCYTES RELATIVE PERCENT: 7.6 % (ref 0–4)
PDW BLD-RTO: 15.2 % (ref 11.7–14.9)
PLATELET # BLD: 181 K/CU MM (ref 140–440)
PMV BLD AUTO: 8.8 FL (ref 7.5–11.1)
POTASSIUM SERPL-SCNC: 4 MMOL/L (ref 3.5–5.1)
RBC # BLD: 4.45 M/CU MM (ref 4.2–5.4)
SEGMENTED NEUTROPHILS ABSOLUTE COUNT: 4.9 K/CU MM
SEGMENTED NEUTROPHILS RELATIVE PERCENT: 64.3 % (ref 36–66)
SODIUM BLD-SCNC: 142 MMOL/L (ref 135–145)
TOTAL PROTEIN: 6 GM/DL (ref 6.4–8.2)
WBC # BLD: 7.5 K/CU MM (ref 4–10.5)

## 2023-03-30 PROCEDURE — 36415 COLL VENOUS BLD VENIPUNCTURE: CPT

## 2023-03-30 PROCEDURE — 80053 COMPREHEN METABOLIC PANEL: CPT

## 2023-03-30 PROCEDURE — 85025 COMPLETE CBC W/AUTO DIFF WBC: CPT

## 2023-04-06 ENCOUNTER — HOSPITAL ENCOUNTER (OUTPATIENT)
Dept: INFUSION THERAPY | Age: 63
Discharge: HOME | End: 2023-04-06
Payer: MEDICAID

## 2023-04-06 ENCOUNTER — OFFICE VISIT (OUTPATIENT)
Dept: ONCOLOGY | Age: 63
End: 2023-04-06
Payer: MEDICAID

## 2023-04-06 VITALS
HEIGHT: 64 IN | BODY MASS INDEX: 38.17 KG/M2 | DIASTOLIC BLOOD PRESSURE: 90 MMHG | HEART RATE: 103 BPM | WEIGHT: 223.6 LBS | OXYGEN SATURATION: 97 % | TEMPERATURE: 97.6 F | SYSTOLIC BLOOD PRESSURE: 160 MMHG

## 2023-04-06 DIAGNOSIS — C50.912 INFILTRATING DUCTAL CARCINOMA OF LEFT BREAST (HCC): Primary | ICD-10-CM

## 2023-04-06 PROCEDURE — 3077F SYST BP >= 140 MM HG: CPT | Performed by: INTERNAL MEDICINE

## 2023-04-06 PROCEDURE — 3080F DIAST BP >= 90 MM HG: CPT | Performed by: INTERNAL MEDICINE

## 2023-04-06 PROCEDURE — 99213 OFFICE O/P EST LOW 20 MIN: CPT | Performed by: INTERNAL MEDICINE

## 2023-04-06 PROCEDURE — 99211 OFF/OP EST MAY X REQ PHY/QHP: CPT

## 2023-04-06 NOTE — PROGRESS NOTES
MA Rooming Questions  Patient: Eddie Joyce  MRN: [id number]    Date: 4/6/2023        1. Do you have any new issues?   no         2. Do you need any refills on medications? yes - Letrozole    3. Have you had any imaging done since your last visit? Yes - CT scan, EDG    4. Have you been hospitalized or seen in the emergency room since your last visit here?   no    5. Did the patient have a depression screening completed today?  No    No data recorded     PHQ-9 Given to (if applicable):               PHQ-9 Score (if applicable):                     [] Positive     []  Negative              Does question #9 need addressed (if applicable)                     [] Yes    []  No               Jamila Cruz CMA
will continue to monitor CBC. 7. She has chronic back pain. I recommend to FU with spine doctor. We discussed about diet and weight loss. Return to clinic in 12 weeks or sooner. All of her question has been answered for today.

## 2023-04-17 RX ORDER — GABAPENTIN 300 MG/1
300 CAPSULE ORAL NIGHTLY
Qty: 90 CAPSULE | Refills: 1 | Status: SHIPPED | OUTPATIENT
Start: 2023-04-17 | End: 2023-05-17

## 2023-04-17 NOTE — TELEPHONE ENCOUNTER
Patient left message requesting a refill for gabapentin to be sent to [facility]. Pending RX to Provider to be sent to pharmacy.

## 2023-07-06 ENCOUNTER — HOSPITAL ENCOUNTER (OUTPATIENT)
Dept: INFUSION THERAPY | Age: 63
Discharge: HOME | End: 2023-07-06
Payer: MEDICAID

## 2023-07-06 DIAGNOSIS — C50.912 INFILTRATING DUCTAL CARCINOMA OF LEFT BREAST (HCC): ICD-10-CM

## 2023-07-06 LAB
ALBUMIN SERPL-MCNC: 4.4 GM/DL (ref 3.4–5)
ALP BLD-CCNC: 81 IU/L (ref 40–128)
ALT SERPL-CCNC: 13 U/L (ref 10–40)
ANION GAP SERPL CALCULATED.3IONS-SCNC: 12 MMOL/L (ref 4–16)
AST SERPL-CCNC: 15 IU/L (ref 15–37)
BASOPHILS ABSOLUTE: 0 K/CU MM
BASOPHILS RELATIVE PERCENT: 0.4 % (ref 0–1)
BILIRUB SERPL-MCNC: 0.5 MG/DL (ref 0–1)
BUN SERPL-MCNC: 17 MG/DL (ref 6–23)
CALCIUM SERPL-MCNC: 9 MG/DL (ref 8.3–10.6)
CHLORIDE BLD-SCNC: 103 MMOL/L (ref 99–110)
CO2: 24 MMOL/L (ref 21–32)
CREAT SERPL-MCNC: 1.2 MG/DL (ref 0.6–1.1)
DIFFERENTIAL TYPE: ABNORMAL
EOSINOPHILS ABSOLUTE: 0.1 K/CU MM
EOSINOPHILS RELATIVE PERCENT: 1.6 % (ref 0–3)
GFR SERPL CREATININE-BSD FRML MDRD: 52 ML/MIN/1.73M2
GLUCOSE SERPL-MCNC: 107 MG/DL (ref 70–99)
HCT VFR BLD CALC: 42.7 % (ref 37–47)
HEMOGLOBIN: 13.8 GM/DL (ref 12.5–16)
LYMPHOCYTES ABSOLUTE: 2.1 K/CU MM
LYMPHOCYTES RELATIVE PERCENT: 27.6 % (ref 24–44)
MCH RBC QN AUTO: 27.9 PG (ref 27–31)
MCHC RBC AUTO-ENTMCNC: 32.3 % (ref 32–36)
MCV RBC AUTO: 86.4 FL (ref 78–100)
MONOCYTES ABSOLUTE: 0.5 K/CU MM
MONOCYTES RELATIVE PERCENT: 6.1 % (ref 0–4)
PDW BLD-RTO: 14.7 % (ref 11.7–14.9)
PLATELET # BLD: 183 K/CU MM (ref 140–440)
PMV BLD AUTO: 8.8 FL (ref 7.5–11.1)
POTASSIUM SERPL-SCNC: 4.1 MMOL/L (ref 3.5–5.1)
RBC # BLD: 4.94 M/CU MM (ref 4.2–5.4)
SEGMENTED NEUTROPHILS ABSOLUTE COUNT: 4.8 K/CU MM
SEGMENTED NEUTROPHILS RELATIVE PERCENT: 64.3 % (ref 36–66)
SODIUM BLD-SCNC: 139 MMOL/L (ref 135–145)
TOTAL PROTEIN: 6.6 GM/DL (ref 6.4–8.2)
WBC # BLD: 7.5 K/CU MM (ref 4–10.5)

## 2023-07-06 PROCEDURE — 36415 COLL VENOUS BLD VENIPUNCTURE: CPT

## 2023-07-06 PROCEDURE — 85025 COMPLETE CBC W/AUTO DIFF WBC: CPT

## 2023-07-06 PROCEDURE — 80053 COMPREHEN METABOLIC PANEL: CPT

## 2023-07-13 ENCOUNTER — TELEPHONE (OUTPATIENT)
Dept: CARDIOLOGY CLINIC | Age: 63
End: 2023-07-13

## 2023-07-13 ENCOUNTER — HOSPITAL ENCOUNTER (OUTPATIENT)
Dept: INFUSION THERAPY | Age: 63
Discharge: HOME | End: 2023-07-13
Payer: MEDICAID

## 2023-07-13 ENCOUNTER — OFFICE VISIT (OUTPATIENT)
Dept: ONCOLOGY | Age: 63
End: 2023-07-13
Payer: MEDICAID

## 2023-07-13 VITALS
WEIGHT: 218.2 LBS | HEART RATE: 72 BPM | HEIGHT: 64 IN | TEMPERATURE: 97.5 F | BODY MASS INDEX: 37.25 KG/M2 | RESPIRATION RATE: 18 BRPM | DIASTOLIC BLOOD PRESSURE: 77 MMHG | OXYGEN SATURATION: 99 % | SYSTOLIC BLOOD PRESSURE: 145 MMHG

## 2023-07-13 DIAGNOSIS — C50.912 INFILTRATING DUCTAL CARCINOMA OF LEFT BREAST (HCC): Primary | ICD-10-CM

## 2023-07-13 PROCEDURE — 3078F DIAST BP <80 MM HG: CPT | Performed by: INTERNAL MEDICINE

## 2023-07-13 PROCEDURE — 99211 OFF/OP EST MAY X REQ PHY/QHP: CPT

## 2023-07-13 PROCEDURE — 99213 OFFICE O/P EST LOW 20 MIN: CPT | Performed by: INTERNAL MEDICINE

## 2023-07-13 PROCEDURE — 3077F SYST BP >= 140 MM HG: CPT | Performed by: INTERNAL MEDICINE

## 2023-07-13 NOTE — PROGRESS NOTES
MA Rooming Questions  Patient: Charlie Alcaraz  MRN: [id number]    Date: 7/13/2023        1. Do you have any new issues?   no         2. Do you need any refills on medications?    no    3. Have you had any imaging done since your last visit? yes - CT 5/17    4. Have you been hospitalized or seen in the emergency room since your last visit here?   no    5. Did the patient have a depression screening completed today?  No    No data recorded     PHQ-9 Given to (if applicable):               PHQ-9 Score (if applicable):                     [] Positive     []  Negative              Does question #9 need addressed (if applicable)                     [] Yes    []  No               Deborah Villela MA

## 2023-07-13 NOTE — TELEPHONE ENCOUNTER
Faxed back the clearance to Dr Guzman [facility] office that Dr Lena Bruno doesn't do clearances for EGD only.

## 2023-08-01 ENCOUNTER — OFFICE VISIT (OUTPATIENT)
Dept: GASTROENTEROLOGY | Age: 63
End: 2023-08-01
Payer: MEDICAID

## 2023-08-01 VITALS
WEIGHT: 217.2 LBS | TEMPERATURE: 97.3 F | SYSTOLIC BLOOD PRESSURE: 122 MMHG | DIASTOLIC BLOOD PRESSURE: 82 MMHG | OXYGEN SATURATION: 98 % | HEIGHT: 64 IN | BODY MASS INDEX: 37.08 KG/M2 | HEART RATE: 77 BPM

## 2023-08-01 DIAGNOSIS — K21.9 GASTROESOPHAGEAL REFLUX DISEASE WITHOUT ESOPHAGITIS: Primary | ICD-10-CM

## 2023-08-01 DIAGNOSIS — K58.1 IRRITABLE BOWEL SYNDROME WITH CONSTIPATION: ICD-10-CM

## 2023-08-01 DIAGNOSIS — Z98.890 HISTORY OF ESOPHAGEAL DILATATION: ICD-10-CM

## 2023-08-01 DIAGNOSIS — R11.0 CHRONIC NAUSEA: ICD-10-CM

## 2023-08-01 PROCEDURE — 3078F DIAST BP <80 MM HG: CPT | Performed by: NURSE PRACTITIONER

## 2023-08-01 PROCEDURE — 99213 OFFICE O/P EST LOW 20 MIN: CPT | Performed by: NURSE PRACTITIONER

## 2023-08-01 PROCEDURE — 3074F SYST BP LT 130 MM HG: CPT | Performed by: NURSE PRACTITIONER

## 2023-08-01 RX ORDER — DICYCLOMINE HCL 20 MG
20 TABLET ORAL EVERY 6 HOURS PRN
COMMUNITY
Start: 2023-07-27 | End: 2023-08-26

## 2023-08-01 RX ORDER — COLCHICINE 0.6 MG/1
0.6 TABLET ORAL 2 TIMES DAILY
COMMUNITY
Start: 2023-06-13

## 2023-08-01 RX ORDER — POLYETHYLENE GLYCOL 3350 17 G/17G
17 POWDER, FOR SOLUTION ORAL DAILY PRN
Qty: 527 G | Refills: 1 | Status: SHIPPED | OUTPATIENT
Start: 2023-08-01 | End: 2023-08-31

## 2023-08-01 RX ORDER — ALLOPURINOL 300 MG/1
TABLET ORAL
COMMUNITY
Start: 2023-07-27

## 2023-08-01 RX ORDER — AMITRIPTYLINE HYDROCHLORIDE 10 MG/1
20 TABLET, FILM COATED ORAL NIGHTLY
COMMUNITY
Start: 2023-07-12

## 2023-08-01 NOTE — PROGRESS NOTES
Augustine Ervin 63 y.o. female was seen by PREETI Flanagan on 8/1/2023     Wt Readings from Last 3 Encounters:   08/01/23 217 lb 3.2 oz (98.5 kg)   07/13/23 218 lb 3.2 oz (99 kg)   06/02/23 219 lb (99.3 kg)       FRANCISCA Ervin is a pleasant 63 y.o.  female who presents today for follow-up on acid reflux. She has refractory GERD with no improvement with multiple PPI's in the past.  She has been evaluated by Dr. Júnior Feliz for hiatal hernia surgery. Her last EGD was done by Dr. Lupe Holbrook on 2-9-2023 showing normal appearing esophagus, a non-obstructing Schatzki's ring that was dilated with 47 Irish dilator, diffuse moderate inflammation in gastric antrum, 3 cm hiatal hernia and normal appearing duodenum. Her stomach biopsies showed minimal chronic inflammation without evidence of H. Pylori infection. Her esophageal biopsies showed minimal chronic inflammation with no evidence of Rosenberg's esophagus. Her last colonoscopy was done by Dr. Dionne Tejeda in August 2022 showing two colon polyps that were removed. She had esophageal manometry done last month by Dr. Lydia Tan office with per patient record normal results. She has an EGD with ph probe scheduled with Dr. Trinidad King on August 18. Her appetite is fair and weight is stable. She continues to have severe acid reflux. She is eating small meals. She is not taking PPI at this time. She has to stop PPI week before the EGD with ph probe. No nocturnal awakenings with acid reflux. She does endorse globus sensation and voice hoarseness. She has been on Prilosec, Prevacid and Dexilant with no help in the past.  She has been following anti-reflux measures. Nocturnal awakenings with acid reflux every night. No current dysphagia or pain with swallowing. Intermittent nausea. Zofran helps. No vomiting. No abdominal pain. Intermittent bloating that is non-worsening. No excess belching or flatulence.   Her constipation improved with taking Linzess but

## 2023-08-11 ENCOUNTER — TELEPHONE (OUTPATIENT)
Dept: CARDIOLOGY CLINIC | Age: 63
End: 2023-08-11

## 2023-08-17 RX ORDER — GABAPENTIN 300 MG/1
300 CAPSULE ORAL NIGHTLY
Qty: 90 CAPSULE | Refills: 0 | Status: SHIPPED | OUTPATIENT
Start: 2023-08-17 | End: 2023-11-15

## 2023-08-17 RX ORDER — LETROZOLE 2.5 MG/1
2.5 TABLET, FILM COATED ORAL DAILY
Qty: 30 TABLET | Refills: 3 | Status: SHIPPED | OUTPATIENT
Start: 2023-08-17

## 2023-08-17 NOTE — TELEPHONE ENCOUNTER
Patient contacted our office in need of refill for GABAPENTIN AND LETROZOLE . Patient has a scheduled appointment on 1/15. Patients preferred pharmacy is [facility]. Pending for patients chart provider [facility].

## 2023-09-18 RX ORDER — GABAPENTIN 300 MG/1
300 CAPSULE ORAL NIGHTLY
Qty: 90 CAPSULE | Refills: 0 | Status: SHIPPED | OUTPATIENT
Start: 2023-09-18 | End: 2023-12-17

## 2023-09-18 NOTE — TELEPHONE ENCOUNTER
PLEASE LET PATIENT KNOW FOLLOWING:    Dr Moe reviewed recent labs/ imaging studies:    __XX____1. The following  results were reviewed and please inform patient of following:  Please let patient or patient's daughter know using  that patient has old stroke still present on the MRI but no new strokes  .  No cause towards recent vertigo symptoms with the room spinning.  Take meclizine as prescribed to help with vertigo symptoms.  Also prescribed nausea medication if needed.  Also prescribed medication for constipation which she can take for constipation symptoms  ______2. Please schedule a visit with Dr Moe  In _______weeeks to  review labs/ tests   Patient contacted our office in need of refill for GABAPENTIN. Patient has a scheduled appointment on 1/15. Patients preferred pharmacy is [facility]. Pending for patients chart provider [facility].

## 2023-11-29 RX ORDER — GABAPENTIN 300 MG/1
300 CAPSULE ORAL NIGHTLY
Qty: 90 CAPSULE | Refills: 0 | Status: SHIPPED | OUTPATIENT
Start: 2023-11-29 | End: 2024-02-27

## 2023-11-29 NOTE — TELEPHONE ENCOUNTER
Patient contacted our office in need of refill for GABAPENTIN. Patient has a scheduled appointment on 1/16/23. Patients preferred pharmacy is [facility]. Pending for patients chart provider [facility].

## 2023-12-08 ENCOUNTER — OFFICE VISIT (OUTPATIENT)
Dept: GASTROENTEROLOGY | Age: 63
End: 2023-12-08
Payer: MEDICAID

## 2023-12-08 VITALS
BODY MASS INDEX: 36.95 KG/M2 | WEIGHT: 216.4 LBS | HEIGHT: 64 IN | OXYGEN SATURATION: 99 % | SYSTOLIC BLOOD PRESSURE: 124 MMHG | HEART RATE: 80 BPM | TEMPERATURE: 97.2 F | DIASTOLIC BLOOD PRESSURE: 68 MMHG

## 2023-12-08 DIAGNOSIS — R13.19 ESOPHAGEAL DYSPHAGIA: ICD-10-CM

## 2023-12-08 DIAGNOSIS — R11.0 NAUSEA: ICD-10-CM

## 2023-12-08 DIAGNOSIS — K58.1 IRRITABLE BOWEL SYNDROME WITH CONSTIPATION: ICD-10-CM

## 2023-12-08 DIAGNOSIS — K21.9 GASTROESOPHAGEAL REFLUX DISEASE WITHOUT ESOPHAGITIS: Primary | ICD-10-CM

## 2023-12-08 PROCEDURE — 3078F DIAST BP <80 MM HG: CPT | Performed by: NURSE PRACTITIONER

## 2023-12-08 PROCEDURE — 3074F SYST BP LT 130 MM HG: CPT | Performed by: NURSE PRACTITIONER

## 2023-12-08 PROCEDURE — 99213 OFFICE O/P EST LOW 20 MIN: CPT | Performed by: NURSE PRACTITIONER

## 2023-12-08 RX ORDER — CHOLESTYRAMINE 4 G/9G
POWDER, FOR SUSPENSION ORAL
COMMUNITY
Start: 2023-10-19

## 2023-12-08 RX ORDER — METOCLOPRAMIDE 5 MG/1
TABLET ORAL EVERY 8 HOURS
COMMUNITY
Start: 2022-10-21

## 2023-12-08 RX ORDER — OXYCODONE HYDROCHLORIDE 5 MG/1
TABLET ORAL
COMMUNITY
Start: 2023-11-10

## 2023-12-08 RX ORDER — METHOCARBAMOL 500 MG/1
500 TABLET, FILM COATED ORAL 4 TIMES DAILY PRN
COMMUNITY
Start: 2023-11-13

## 2023-12-08 RX ORDER — AMOXICILLIN 250 MG
1 CAPSULE ORAL 2 TIMES DAILY
COMMUNITY
Start: 2023-11-10

## 2023-12-08 RX ORDER — MONTELUKAST SODIUM 4 MG/1
TABLET, CHEWABLE ORAL
COMMUNITY
Start: 2023-09-12

## 2023-12-08 RX ORDER — PSEUDOEPHED/ACETAMINOPH/DIPHEN 30MG-500MG
TABLET ORAL
COMMUNITY
Start: 2023-11-10

## 2023-12-08 RX ORDER — TRAMADOL HYDROCHLORIDE 50 MG/1
TABLET ORAL
COMMUNITY
Start: 2023-11-13

## 2023-12-08 RX ORDER — BACLOFEN 10 MG/1
TABLET ORAL
COMMUNITY
Start: 2023-10-18

## 2023-12-08 RX ORDER — LISINOPRIL 40 MG/1
40 TABLET ORAL DAILY
COMMUNITY
Start: 2023-11-21 | End: 2024-11-20

## 2023-12-08 RX ORDER — DEXLANSOPRAZOLE 60 MG/1
CAPSULE, DELAYED RELEASE ORAL
COMMUNITY
Start: 2023-10-18

## 2023-12-08 NOTE — PROGRESS NOTES
Emely Daigle 66 y.o. female was seen by PREETI Lund on 12/8/2023     Wt Readings from Last 3 Encounters:   12/08/23 98.2 kg (216 lb 6.4 oz)   08/01/23 98.5 kg (217 lb 3.2 oz)   07/13/23 99 kg (218 lb 3.2 oz)       HPI  Emely Daigle is a pleasant 66 y.o.  female who presents today for follow-up on acid reflux and irritable bowel syndrome with constipation. She has refractory GERD with no improvement with multiple PPI's in the past.  She has been evaluated by Dr. Dariela Barajas for hiatal hernia surgery. Her last EGD was done by Dr. Tj Lozano on 2-9-2023 showing normal appearing esophagus, a non-obstructing Schatzki's ring that was dilated with 47 Azeri dilator, diffuse moderate inflammation in gastric antrum, 3 cm hiatal hernia and normal appearing duodenum. Her stomach biopsies showed minimal chronic inflammation without evidence of H. Pylori infection. Her esophageal biopsies showed minimal chronic inflammation with no evidence of Rosenberg's esophagus. Her last colonoscopy was done by Dr. Skyler Ortiz on August 2022 showing two colon polyps that were removed. She had esophageal manometry done last month by Dr. Hiram Yap office with per patient record normal results. She has EGD with ph probe schedule with Dr. Ruth Holland on Monday. She had recent back surgery and is taking Oxycontin one tablet two times per day. Her appetite is fair and weight is stable. Her heartburn is not well controlled with Protonix. She is currently not taking any medication. Ramses Spurr was not covered. She sleeps on an incline with blocks under her mattress. Nocturnal awakenings with acid reflux four to five nights a week. Intermittent dysphagia a couple times a week. No pain with swallowing or choking episodes. No current abdominal pain. Intermittent periumbilical cramping. Peppermint helps. No excess belching or flatulence. She continues to have constipation.   Her typical bowel pattern is once every three to four days

## 2024-01-08 ENCOUNTER — HOSPITAL ENCOUNTER (OUTPATIENT)
Dept: INFUSION THERAPY | Age: 64
Discharge: HOME | End: 2024-01-08
Payer: MEDICAID

## 2024-01-08 DIAGNOSIS — C50.912 INFILTRATING DUCTAL CARCINOMA OF LEFT BREAST (HCC): ICD-10-CM

## 2024-01-08 LAB
ALBUMIN SERPL-MCNC: 4.5 GM/DL (ref 3.4–5)
ALP BLD-CCNC: 68 IU/L (ref 40–128)
ALT SERPL-CCNC: 13 U/L (ref 10–40)
ANION GAP SERPL CALCULATED.3IONS-SCNC: 11 MMOL/L (ref 7–16)
AST SERPL-CCNC: 12 IU/L (ref 15–37)
BASOPHILS ABSOLUTE: 0 K/CU MM
BASOPHILS RELATIVE PERCENT: 0.3 % (ref 0–1)
BILIRUB SERPL-MCNC: 0.4 MG/DL (ref 0–1)
BUN SERPL-MCNC: 23 MG/DL (ref 6–23)
CALCIUM SERPL-MCNC: 8.8 MG/DL (ref 8.3–10.6)
CHLORIDE BLD-SCNC: 105 MMOL/L (ref 99–110)
CO2: 26 MMOL/L (ref 21–32)
CREAT SERPL-MCNC: 1.5 MG/DL (ref 0.6–1.1)
DIFFERENTIAL TYPE: ABNORMAL
EOSINOPHILS ABSOLUTE: 0.1 K/CU MM
EOSINOPHILS RELATIVE PERCENT: 2 % (ref 0–3)
GFR SERPL CREATININE-BSD FRML MDRD: 39 ML/MIN/1.73M2
GLUCOSE SERPL-MCNC: 105 MG/DL (ref 70–99)
HCT VFR BLD CALC: 43.1 % (ref 37–47)
HEMOGLOBIN: 13.1 GM/DL (ref 12.5–16)
LYMPHOCYTES ABSOLUTE: 2.2 K/CU MM
LYMPHOCYTES RELATIVE PERCENT: 33.5 % (ref 24–44)
MCH RBC QN AUTO: 28.7 PG (ref 27–31)
MCHC RBC AUTO-ENTMCNC: 30.4 % (ref 32–36)
MCV RBC AUTO: 94.3 FL (ref 78–100)
MONOCYTES ABSOLUTE: 0.5 K/CU MM
MONOCYTES RELATIVE PERCENT: 7.2 % (ref 0–4)
PDW BLD-RTO: 14.1 % (ref 11.7–14.9)
PLATELET # BLD: 158 K/CU MM (ref 140–440)
PMV BLD AUTO: 9.2 FL (ref 7.5–11.1)
POTASSIUM SERPL-SCNC: 4.1 MMOL/L (ref 3.5–5.1)
RBC # BLD: 4.57 M/CU MM (ref 4.2–5.4)
SEGMENTED NEUTROPHILS ABSOLUTE COUNT: 3.7 K/CU MM
SEGMENTED NEUTROPHILS RELATIVE PERCENT: 57 % (ref 36–66)
SODIUM BLD-SCNC: 142 MMOL/L (ref 135–145)
TOTAL PROTEIN: 6.5 GM/DL (ref 6.4–8.2)
WBC # BLD: 6.5 K/CU MM (ref 4–10.5)

## 2024-01-08 PROCEDURE — 80053 COMPREHEN METABOLIC PANEL: CPT

## 2024-01-08 PROCEDURE — 36415 COLL VENOUS BLD VENIPUNCTURE: CPT

## 2024-01-08 PROCEDURE — 85025 COMPLETE CBC W/AUTO DIFF WBC: CPT

## 2024-01-16 ENCOUNTER — OFFICE VISIT (OUTPATIENT)
Dept: ONCOLOGY | Age: 64
End: 2024-01-16
Payer: MEDICAID

## 2024-01-16 ENCOUNTER — HOSPITAL ENCOUNTER (OUTPATIENT)
Dept: INFUSION THERAPY | Age: 64
Discharge: HOME | End: 2024-01-16
Payer: MEDICAID

## 2024-01-16 VITALS
SYSTOLIC BLOOD PRESSURE: 151 MMHG | OXYGEN SATURATION: 99 % | WEIGHT: 215.6 LBS | HEART RATE: 98 BPM | TEMPERATURE: 97.5 F | DIASTOLIC BLOOD PRESSURE: 74 MMHG | BODY MASS INDEX: 36.81 KG/M2 | HEIGHT: 64 IN

## 2024-01-16 DIAGNOSIS — C50.912 INFILTRATING DUCTAL CARCINOMA OF LEFT BREAST (HCC): ICD-10-CM

## 2024-01-16 DIAGNOSIS — Z78.0 MENOPAUSE: Primary | ICD-10-CM

## 2024-01-16 PROCEDURE — 3078F DIAST BP <80 MM HG: CPT | Performed by: INTERNAL MEDICINE

## 2024-01-16 PROCEDURE — 99211 OFF/OP EST MAY X REQ PHY/QHP: CPT

## 2024-01-16 PROCEDURE — 3077F SYST BP >= 140 MM HG: CPT | Performed by: INTERNAL MEDICINE

## 2024-01-16 PROCEDURE — 99213 OFFICE O/P EST LOW 20 MIN: CPT | Performed by: INTERNAL MEDICINE

## 2024-01-16 RX ORDER — GABAPENTIN 300 MG/1
300 CAPSULE ORAL NIGHTLY
Qty: 90 CAPSULE | Refills: 1 | Status: SHIPPED | OUTPATIENT
Start: 2024-01-16 | End: 2024-04-15

## 2024-01-16 RX ORDER — ONDANSETRON HYDROCHLORIDE 8 MG/1
8 TABLET, FILM COATED ORAL EVERY 8 HOURS PRN
Qty: 30 TABLET | Refills: 1 | Status: SHIPPED | OUTPATIENT
Start: 2024-01-16

## 2024-01-16 ASSESSMENT — PATIENT HEALTH QUESTIONNAIRE - PHQ9
4. FEELING TIRED OR HAVING LITTLE ENERGY: 3
6. FEELING BAD ABOUT YOURSELF - OR THAT YOU ARE A FAILURE OR HAVE LET YOURSELF OR YOUR FAMILY DOWN: 3
2. FEELING DOWN, DEPRESSED OR HOPELESS: 3
8. MOVING OR SPEAKING SO SLOWLY THAT OTHER PEOPLE COULD HAVE NOTICED. OR THE OPPOSITE, BEING SO FIGETY OR RESTLESS THAT YOU HAVE BEEN MOVING AROUND A LOT MORE THAN USUAL: 0
SUM OF ALL RESPONSES TO PHQ QUESTIONS 1-9: 18
SUM OF ALL RESPONSES TO PHQ QUESTIONS 1-9: 18
9. THOUGHTS THAT YOU WOULD BE BETTER OFF DEAD, OR OF HURTING YOURSELF: 1
1. LITTLE INTEREST OR PLEASURE IN DOING THINGS: 1
SUM OF ALL RESPONSES TO PHQ9 QUESTIONS 1 & 2: 4
SUM OF ALL RESPONSES TO PHQ QUESTIONS 1-9: 17
SUM OF ALL RESPONSES TO PHQ QUESTIONS 1-9: 18
5. POOR APPETITE OR OVEREATING: 3
3. TROUBLE FALLING OR STAYING ASLEEP: 1
10. IF YOU CHECKED OFF ANY PROBLEMS, HOW DIFFICULT HAVE THESE PROBLEMS MADE IT FOR YOU TO DO YOUR WORK, TAKE CARE OF THINGS AT HOME, OR GET ALONG WITH OTHER PEOPLE: 1
7. TROUBLE CONCENTRATING ON THINGS, SUCH AS READING THE NEWSPAPER OR WATCHING TELEVISION: 3

## 2024-01-16 ASSESSMENT — COLUMBIA-SUICIDE SEVERITY RATING SCALE - C-SSRS
2. HAVE YOU ACTUALLY HAD ANY THOUGHTS OF KILLING YOURSELF?: NO
4. HAVE YOU HAD THESE THOUGHTS AND HAD SOME INTENTION OF ACTING ON THEM?: NO
1. WITHIN THE PAST MONTH, HAVE YOU WISHED YOU WERE DEAD OR WISHED YOU COULD GO TO SLEEP AND NOT WAKE UP?: NO
5. HAVE YOU STARTED TO WORK OUT OR WORKED OUT THE DETAILS OF HOW TO KILL YOURSELF? DO YOU INTEND TO CARRY OUT THIS PLAN?: NO
7. DID THIS OCCUR IN THE LAST THREE MONTHS: NO
3. HAVE YOU BEEN THINKING ABOUT HOW YOU MIGHT KILL YOURSELF?: NO
6. HAVE YOU EVER DONE ANYTHING, STARTED TO DO ANYTHING, OR PREPARED TO DO ANYTHING TO END YOUR LIFE?: NO

## 2024-01-16 NOTE — PROGRESS NOTES
MA Rooming Questions  Patient: Conchita Aguillon  MRN: [id number]    Date: 1/16/2024        1. Do you have any new issues?   no         2. Do you need any refills on medications?    Yes- Gabapentin, Zofran    3. Have you had any imaging done since your last visit?   yes - [facility]    4. Have you been hospitalized or seen in the emergency room since your last visit here?   no    5. Did the patient have a depression screening completed today? Yes    PHQ-9 Total Score: 18 (1/16/2024 10:44 AM)  Thoughts that you would be better off dead, or of hurting yourself in some way: 1 (1/16/2024 10:44 AM)       PHQ-9 Given to (if applicable):               PHQ-9 Score (if applicable):                     [x] Positive     []  Negative              Does question #9 need addressed (if applicable)                     [] Yes    [x]  No               Chelsy Arriaga CMA      
nodule within the left lower quadrant area of anterior abdomen.  Finding may represent sebaceous cyst or other pathology.     CTA chest on May 30, 2021 showed   1. No evidence of acute pulmonary embolism.  2. Soft tissue thickening and fat stranding overlying the bilateral pectoral muscles.     CT head on May 31, 2021 showed:  No acute intracranial abnormality.  No intraparenchymal abnormal enhancement to suggest intracranial metastatic disease.  MRI is a superior modality for evaluation of intracranial metastasis. Small probable meningioma within the falx near the convexity.    MRI of the brain on June 1, 2021 showed: No acute infarct scattered areas of chronic white matter change in the periventricular white matter.   No evidence for blood products on gradient imaging.    No  focal intracranial lesion noted     Amylase and lipase were unremarkable.  She was placed on reglan with improvement of nausea and vomiting. She was recommended to have upper endoscopic study as outpatient by GI.    She is status post bilateral mastectomy. She had EGD By Dr. Galvez with diagnosis of hiatal hernia, gastric polyp, mild gastritis.         She started adjuvant chemotherapy AC on July 16, 2021.  SBFT: Unremarkable small bowel follow through series.     Bone density in June 2021 showed normal study.  ECHO 6/18/2021:  Left ventricular systolic function is normal.  Ejection fraction is visually estimated at 55-60%.  CTA 8/12 no PE.  She had stress test 8/13/21 with no infarct or ischemia, normal EF 64%.     She has seen GI and was recommended esophageal stretching.  Esophagram 9/13/2021: Small hiatal hernia with narrowing of the distal thoracic esophagus just proximal to the hiatal hernia, which may reflect underlying Schatzki's ring.  Ingested barium and barium tablet were initially held up at this level before eventually passing into the stomach.    She completed fourth cycle of AC on September 17, 2021.  She started Taxol on October

## 2024-01-30 PROBLEM — N18.32 STAGE 3B CHRONIC KIDNEY DISEASE (HCC): Status: ACTIVE | Noted: 2024-01-30

## 2024-01-30 PROBLEM — N17.9 AKI (ACUTE KIDNEY INJURY) (HCC): Status: ACTIVE | Noted: 2024-01-30

## 2024-02-06 ENCOUNTER — HOSPITAL ENCOUNTER (OUTPATIENT)
Age: 64
Discharge: HOME | End: 2024-02-06
Attending: INTERNAL MEDICINE
Payer: MEDICAID

## 2024-02-06 LAB
ALBUMIN SERPL-MCNC: 4.5 GM/DL (ref 3.4–5)
ALP BLD-CCNC: 65 IU/L (ref 40–128)
ALT SERPL-CCNC: 13 U/L (ref 10–40)
ANION GAP SERPL CALCULATED.3IONS-SCNC: 12 MMOL/L (ref 7–16)
AST SERPL-CCNC: 14 IU/L (ref 15–37)
BILIRUB SERPL-MCNC: 0.3 MG/DL (ref 0–1)
BILIRUBIN URINE: NEGATIVE MG/DL
BLOOD, URINE: NEGATIVE
BUN SERPL-MCNC: 20 MG/DL (ref 6–23)
CALCIUM SERPL-MCNC: 8.9 MG/DL (ref 8.3–10.6)
CHLORIDE BLD-SCNC: 102 MMOL/L (ref 99–110)
CLARITY: CLEAR
CO2: 25 MMOL/L (ref 21–32)
COLOR: YELLOW
COMMENT UA: NORMAL
CREAT SERPL-MCNC: 1.2 MG/DL (ref 0.6–1.1)
CREATININE URINE: 33.9 MG/DL (ref 28–217)
GFR SERPL CREATININE-BSD FRML MDRD: 52 ML/MIN/1.73M2
GLUCOSE SERPL-MCNC: 98 MG/DL (ref 70–99)
GLUCOSE, URINE: NEGATIVE MG/DL
KETONES, URINE: NEGATIVE MG/DL
LEUKOCYTE ESTERASE, URINE: NEGATIVE
NITRITE URINE, QUANTITATIVE: NEGATIVE
PH, URINE: 5.5 (ref 5–8)
PHOSPHORUS: 3 MG/DL (ref 2.5–4.9)
POTASSIUM SERPL-SCNC: 4.1 MMOL/L (ref 3.5–5.1)
PROT/CREAT RATIO, UR: 0.1
PROTEIN UA: NEGATIVE MG/DL
SODIUM BLD-SCNC: 139 MMOL/L (ref 135–145)
SPECIFIC GRAVITY UA: <1.005 (ref 1–1.03)
TOTAL PROTEIN: 6.6 GM/DL (ref 6.4–8.2)
URATE SERPL-MCNC: 8.6 MG/DL (ref 2.6–6)
URINE TOTAL PROTEIN: 4 MG/DL
UROBILINOGEN, URINE: 0.2 MG/DL (ref 0.2–1)

## 2024-02-06 PROCEDURE — 82570 ASSAY OF URINE CREATININE: CPT

## 2024-02-06 PROCEDURE — 80053 COMPREHEN METABOLIC PANEL: CPT

## 2024-02-06 PROCEDURE — 81003 URINALYSIS AUTO W/O SCOPE: CPT

## 2024-02-06 PROCEDURE — 36415 COLL VENOUS BLD VENIPUNCTURE: CPT

## 2024-02-06 PROCEDURE — 84550 ASSAY OF BLOOD/URIC ACID: CPT

## 2024-02-06 PROCEDURE — 84100 ASSAY OF PHOSPHORUS: CPT

## 2024-02-06 PROCEDURE — 84156 ASSAY OF PROTEIN URINE: CPT

## 2024-02-06 PROCEDURE — 83970 ASSAY OF PARATHORMONE: CPT

## 2024-02-08 LAB — PTH-INTACT SERPL-MCNC: 62 PG/ML (ref 15–65)

## 2024-02-09 ENCOUNTER — HOSPITAL ENCOUNTER (OUTPATIENT)
Dept: CT IMAGING | Age: 64
Discharge: HOME | End: 2024-02-09
Attending: SURGERY

## 2024-02-09 DIAGNOSIS — Z00.6 EXAMINATION FOR NORMAL COMPARISON OR CONTROL IN CLINICAL RESEARCH: ICD-10-CM

## 2024-02-12 ENCOUNTER — OFFICE VISIT (OUTPATIENT)
Dept: SURGERY | Age: 64
End: 2024-02-12
Payer: MEDICAID

## 2024-02-12 VITALS
SYSTOLIC BLOOD PRESSURE: 126 MMHG | OXYGEN SATURATION: 98 % | HEIGHT: 64 IN | HEART RATE: 94 BPM | WEIGHT: 216.6 LBS | BODY MASS INDEX: 36.98 KG/M2 | DIASTOLIC BLOOD PRESSURE: 86 MMHG

## 2024-02-12 DIAGNOSIS — K44.9 HIATAL HERNIA: ICD-10-CM

## 2024-02-12 DIAGNOSIS — K21.9 GASTROESOPHAGEAL REFLUX DISEASE WITHOUT ESOPHAGITIS: Primary | ICD-10-CM

## 2024-02-12 PROCEDURE — 99203 OFFICE O/P NEW LOW 30 MIN: CPT | Performed by: SURGERY

## 2024-02-12 PROCEDURE — 3079F DIAST BP 80-89 MM HG: CPT | Performed by: SURGERY

## 2024-02-12 PROCEDURE — 3074F SYST BP LT 130 MM HG: CPT | Performed by: SURGERY

## 2024-02-12 NOTE — PROGRESS NOTES
GENERAL SURGERY NOTE - Outpatient Consult  [facility]    PATIENT: Conchita Aguillon 1960, 63 y.o., female   Date: 2/12/2024    MRN: [id number]   Requesting Provider:  Juwan Boston MD History Obtained From:  patient, electronic medical record     Reason for Evaluation & Chief Complaint:    Chief Complaint   Patient presents with    New Patient     Hiatal Hernia, Ref Dr Juwan Boston     HISTORY OF PRESENT ILLNESS:    Conchita Aguillon is a 63 y.o. female presenting with concern for a hiatal hernia.   She thinks she has had a Schatzki ring dilated about 3 times. She has chest pain and nausea, has intermittent regurgitation and trouble swallowing - feels like things won't go down about a couple times a week - sometimes even with water. She has reflux when she lays down - tries to keep the head of the bed propped up. She has had a cardiac workup for the chest/epigastric pain, which was negative.   She has tried a lot of different medications, but nothing seems to help. She saw Dr. Galvez in the past, now sees Dr. Means/Juwan.   She has had a manometry test - per patient was years ago, none recently.   She has seen Dr. Talavera for the hiatal hernia in the past but he doesn't do hiatal hernia repairs, Dr. Roman is out until April.      Esophagram 9/13/2021: Small hiatal hernia with narrowing of the distal thoracic esophagus just proximal to the hiatal hernia, which may reflect underlying Schatzki's ring. Ingested barium and barium tablet were initially held up at this level before eventually passing into the stomach.    12/9/2022 Gastric emptying study within normal limits.  GI recommended Linzess. Not surgical candidate for hiatal hernia (per Oncology notes).    From Taylor Del Rio, LIAN-CNP, GI Office notes:  Her last EGD was done by Dr. Talbert on 2-9-2023 showing normal appearing esophagus, a non-obstructing Schatzki's ring that was dilated with 54 Paraguayan dilator, diffuse moderate inflammation in

## 2024-02-15 ENCOUNTER — TELEPHONE (OUTPATIENT)
Dept: BARIATRICS/WEIGHT MGMT | Age: 64
End: 2024-02-15

## 2024-02-15 NOTE — TELEPHONE ENCOUNTER
Office staff called Dr. Echeverria's office to request esophageal manometry results per Dr. Kothari's request.

## 2024-03-17 NOTE — PROGRESS NOTES
Patient Name:  Conchita Aguillon  Patient :  1960  Patient MRN:  [id number]     Primary Oncologist: Kristan Murphy MD  Referring Provider: Lazaro Darden MD     Date of Service: 2024      Chief Complaint:    Chief Complaint   Patient presents with    Follow-up      She came in for follow-up visit.    Patient Active Problem List:     Essential hypertension     Hyperlipidemia     Allergic rhinitis due to pollen     Morbid obesity due to excess calories     Hearing loss     Hot flashes due to surgical menopause     Gastroesophageal reflux disease     Chronic back pain     Anxiety and depression     Osteoarthritis     Meniere's disease     Insomnia     Precordial pain      Calculus of gallbladder without cholecystitis without obstruction     S/P laparoscopic cholecystectomy     Vasovagal syncope     Class 2 obesity due to excess calories without serious comorbidity in adult     Family history of early     Closed fracture of left ankle     Acute respiratory failure with hypoxia      Status post left hip replacement     Hyperglycemia        Infiltrating ductal carcinoma of left breast      S/P mastectomy, bilateral     Hx of lymph node excision    HPI:   Conchita Aguillon is a pleasant 63 year old female patient who was referred for evaluation of [id number] invasive ductal carcinoma of the left breast, HR positive HER-2 negative, status post bilateral mastectomy in May 2021.  She went for the routine mammogram in 2021 and denied any palpable lump to her breast.  Mammogram at that time showed at least 2 indeterminate complex masses at the 2:00 and 12 o'clock position in the left breast in the retroareolar region.    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  ER was more than 95%,

## 2024-03-20 ENCOUNTER — OFFICE VISIT (OUTPATIENT)
Dept: GASTROENTEROLOGY | Age: 64
End: 2024-03-20
Payer: MEDICAID

## 2024-03-20 VITALS
HEIGHT: 64 IN | WEIGHT: 223 LBS | BODY MASS INDEX: 38.07 KG/M2 | SYSTOLIC BLOOD PRESSURE: 140 MMHG | OXYGEN SATURATION: 99 % | HEART RATE: 83 BPM | DIASTOLIC BLOOD PRESSURE: 90 MMHG

## 2024-03-20 DIAGNOSIS — K21.9 GASTROESOPHAGEAL REFLUX DISEASE WITHOUT ESOPHAGITIS: ICD-10-CM

## 2024-03-20 DIAGNOSIS — R13.19 ESOPHAGEAL DYSPHAGIA: ICD-10-CM

## 2024-03-20 DIAGNOSIS — R11.0 NAUSEA: ICD-10-CM

## 2024-03-20 DIAGNOSIS — K58.1 IRRITABLE BOWEL SYNDROME WITH CONSTIPATION: Primary | ICD-10-CM

## 2024-03-20 PROCEDURE — 99213 OFFICE O/P EST LOW 20 MIN: CPT | Performed by: NURSE PRACTITIONER

## 2024-03-20 PROCEDURE — 3080F DIAST BP >= 90 MM HG: CPT | Performed by: NURSE PRACTITIONER

## 2024-03-20 PROCEDURE — 3077F SYST BP >= 140 MM HG: CPT | Performed by: NURSE PRACTITIONER

## 2024-03-20 RX ORDER — SUCRALFATE 1 G/1
1 TABLET ORAL 2 TIMES DAILY PRN
Qty: 120 TABLET | Refills: 0 | Status: SHIPPED | OUTPATIENT
Start: 2024-03-20

## 2024-03-20 NOTE — PROGRESS NOTES
[place] [address]  : Reji Howard MD, PhD  IA Number: [id number]      Uric Acid 02/06/2024 8.6 (H)  2.6 - 6.0 MG/DL Final    Urine Total Protein 02/06/2024 4.0  <12 MG/DL Final    Creatinine, Ur 02/06/2024 33.9  28 - 217 MG/DL Final    Prot/Creat Ratio, Ur 02/06/2024 0.1  <0.2 Final    Comment: NORMAL:    <0.2  NEPHROTIC: >3.5      Color, UA 02/06/2024 YELLOW  YELLOW Final    Clarity, UA 02/06/2024 CLEAR  CLEAR Final    Glucose, Urine 02/06/2024 NEGATIVE  NEGATIVE MG/DL Final    Bilirubin Urine 02/06/2024 NEGATIVE  NEGATIVE MG/DL Final    Ketones, Urine 02/06/2024 NEGATIVE  NEGATIVE MG/DL Final    Specific Gravity, UA 02/06/2024 <1.005  1.001 - 1.035 Final    Blood, Urine 02/06/2024 NEGATIVE  NEGATIVE Final    pH, Urine 02/06/2024 5.5  5.0 - 8.0 Final    Protein, UA 02/06/2024 NEGATIVE  NEGATIVE MG/DL Final    Urobilinogen, Urine 02/06/2024 0.2  0.2 - 1.0 MG/DL Final    Nitrite Urine, Quantitative 02/06/2024 NEGATIVE  NEGATIVE Final    Leukocyte Esterase, Urine 02/06/2024 NEGATIVE  NEGATIVE Final    Urinalysis Comments 02/06/2024 Microscopic exam not performed based on chemical results.   Final       Assessment and Plan:  1.  Esophageal dysphagia that has improved with esophageal dilation secondary to Schatzki's ring.  Her esophageal manometry procedure was normal.  The patient was encouraged to continue with dysphagia measures.    2.  GERD that is long term and refractory to PPI.  She has been taken off Protonix.  Will order Carafate since she is having terrible heartburn.  She completed EGD with ph probe placement.  Her EGD showed no evidence of Rosenberg's esophagus.  Her GERD is refractory to PPI's and she is considering hiatal hernia repair with Dr. Kothari.  The patient was encouraged to continue with anti-reflux measures and avoid foods that trigger.  Recommend continue to sleep on an incline and would benefit from weight loss.  3.  Persistent nausea most likely due to pain

## 2024-03-27 RX ORDER — GABAPENTIN 300 MG/1
300 CAPSULE ORAL NIGHTLY
Qty: 90 CAPSULE | Refills: 0 | Status: SHIPPED | OUTPATIENT
Start: 2024-03-27 | End: 2024-06-25

## 2024-03-27 NOTE — TELEPHONE ENCOUNTER
Patient left message requesting a refill for Gabapentin 300mg qhs to be sent to [facility]. Pending RX to Provider to be sent to pharmacy.

## 2024-04-09 ENCOUNTER — HOSPITAL ENCOUNTER (OUTPATIENT)
Dept: INFUSION THERAPY | Age: 64
Discharge: HOME | End: 2024-04-09
Payer: MEDICAID

## 2024-04-09 DIAGNOSIS — Z78.0 MENOPAUSE: ICD-10-CM

## 2024-04-09 DIAGNOSIS — C50.912 INFILTRATING DUCTAL CARCINOMA OF LEFT BREAST (HCC): ICD-10-CM

## 2024-04-09 LAB
ALBUMIN SERPL-MCNC: 4 GM/DL (ref 3.4–5)
ALP BLD-CCNC: 64 IU/L (ref 40–128)
ALT SERPL-CCNC: 13 U/L (ref 10–40)
ANION GAP SERPL CALCULATED.3IONS-SCNC: 13 MMOL/L (ref 7–16)
AST SERPL-CCNC: 12 IU/L (ref 15–37)
BASOPHILS ABSOLUTE: 0 K/CU MM
BASOPHILS RELATIVE PERCENT: 0.5 % (ref 0–1)
BILIRUB SERPL-MCNC: 0.3 MG/DL (ref 0–1)
BUN SERPL-MCNC: 28 MG/DL (ref 6–23)
CALCIUM SERPL-MCNC: 8.7 MG/DL (ref 8.3–10.6)
CHLORIDE BLD-SCNC: 103 MMOL/L (ref 99–110)
CO2: 25 MMOL/L (ref 21–32)
CREAT SERPL-MCNC: 1.5 MG/DL (ref 0.6–1.1)
DIFFERENTIAL TYPE: ABNORMAL
EOSINOPHILS ABSOLUTE: 0.2 K/CU MM
EOSINOPHILS RELATIVE PERCENT: 3.2 % (ref 0–3)
GFR SERPL CREATININE-BSD FRML MDRD: 39 ML/MIN/1.73M2
GLUCOSE SERPL-MCNC: 88 MG/DL (ref 70–99)
HCT VFR BLD CALC: 37.2 % (ref 37–47)
HEMOGLOBIN: 12 GM/DL (ref 12.5–16)
LYMPHOCYTES ABSOLUTE: 2 K/CU MM
LYMPHOCYTES RELATIVE PERCENT: 25.9 % (ref 24–44)
MCH RBC QN AUTO: 29.2 PG (ref 27–31)
MCHC RBC AUTO-ENTMCNC: 32.3 % (ref 32–36)
MCV RBC AUTO: 90.5 FL (ref 78–100)
MONOCYTES ABSOLUTE: 0.6 K/CU MM
MONOCYTES RELATIVE PERCENT: 8.2 % (ref 0–4)
NEUTROPHILS RELATIVE PERCENT: 62.2 % (ref 36–66)
PDW BLD-RTO: 14.9 % (ref 11.7–14.9)
PLATELET # BLD: 197 K/CU MM (ref 140–440)
PMV BLD AUTO: 8.9 FL (ref 7.5–11.1)
POTASSIUM SERPL-SCNC: 3.9 MMOL/L (ref 3.5–5.1)
RBC # BLD: 4.11 M/CU MM (ref 4.2–5.4)
SEGMENTED NEUTROPHILS ABSOLUTE COUNT: 4.7 K/CU MM
SODIUM BLD-SCNC: 141 MMOL/L (ref 135–145)
TOTAL PROTEIN: 6.2 GM/DL (ref 6.4–8.2)
WBC # BLD: 7.6 K/CU MM (ref 4–10.5)

## 2024-04-09 PROCEDURE — 80053 COMPREHEN METABOLIC PANEL: CPT

## 2024-04-09 PROCEDURE — 36415 COLL VENOUS BLD VENIPUNCTURE: CPT

## 2024-04-09 PROCEDURE — 85025 COMPLETE CBC W/AUTO DIFF WBC: CPT

## 2024-04-16 ENCOUNTER — HOSPITAL ENCOUNTER (OUTPATIENT)
Dept: INFUSION THERAPY | Age: 64
Discharge: HOME | End: 2024-04-16
Payer: MEDICAID

## 2024-04-16 ENCOUNTER — OFFICE VISIT (OUTPATIENT)
Dept: ONCOLOGY | Age: 64
End: 2024-04-16
Payer: MEDICAID

## 2024-04-16 VITALS
WEIGHT: 220.4 LBS | HEIGHT: 64 IN | BODY MASS INDEX: 37.63 KG/M2 | SYSTOLIC BLOOD PRESSURE: 137 MMHG | HEART RATE: 73 BPM | OXYGEN SATURATION: 99 % | DIASTOLIC BLOOD PRESSURE: 65 MMHG | TEMPERATURE: 97.5 F

## 2024-04-16 DIAGNOSIS — D64.9 ANEMIA, UNSPECIFIED TYPE: ICD-10-CM

## 2024-04-16 DIAGNOSIS — C50.912 INFILTRATING DUCTAL CARCINOMA OF LEFT BREAST (HCC): Primary | ICD-10-CM

## 2024-04-16 PROCEDURE — 3075F SYST BP GE 130 - 139MM HG: CPT | Performed by: INTERNAL MEDICINE

## 2024-04-16 PROCEDURE — 99213 OFFICE O/P EST LOW 20 MIN: CPT | Performed by: INTERNAL MEDICINE

## 2024-04-16 PROCEDURE — 3078F DIAST BP <80 MM HG: CPT | Performed by: INTERNAL MEDICINE

## 2024-04-16 PROCEDURE — 99211 OFF/OP EST MAY X REQ PHY/QHP: CPT

## 2024-04-16 RX ORDER — GABAPENTIN 300 MG/1
300 CAPSULE ORAL NIGHTLY
Qty: 90 CAPSULE | Refills: 1 | Status: SHIPPED | OUTPATIENT
Start: 2024-04-16 | End: 2024-10-13

## 2024-04-16 NOTE — PROGRESS NOTES
MA Rooming Questions  Patient: Conchita Aguillon  MRN: [id number]    Date: 4/16/2024        1. Do you have any new issues?   yes - new pain in abdomen that started on Friday         2. Do you need any refills on medications?    yes - Gabapentin,    3. Have you had any imaging done since your last visit?   no    4. Have you been hospitalized or seen in the emergency room since your last visit here?   no    5. Did the patient have a depression screening completed today? No    No data recorded     PHQ-9 Given to (if applicable):               PHQ-9 Score (if applicable):                     [] Positive     []  Negative              Does question #9 need addressed (if applicable)                     [] Yes    []  No               Chelsy Arriaga CMA

## 2024-04-17 NOTE — PROGRESS NOTES
Pt ambulated into infusion clinic for IV hydration. Pt ha first [facility] treatment on Friday and had 2 episodes of vomiting on Saturday. Has had nausea since with no relief. Has had no BM since Friday and is unable to take PO meds due to nausea. Does c/o 5/10 cramping pain in lower ABD. discussed with Breast coordinator and Génesis Georges NP, Recommended proceeding with IV hydration and pre Tx meds. Accessed Port in Rt chest. Good blood flow , I.V of normal saline 1,000 mL, *mg Zofran (IV) and 8 mg Dexamethasone (IV) given. Pt has had relief of nausea with medications. Breast coordinator discussed stool softener and laxatives with Pt. to help relieve constipation. Pt instructed to go to ER if S/S persist or worsen. Pt verbalized understanding. IV fluids infused over 1 hour. Pt tolerated procedure well. Negative

## 2024-04-22 ENCOUNTER — HOSPITAL ENCOUNTER (OUTPATIENT)
Dept: GENERAL RADIOLOGY | Age: 64
Discharge: HOME | End: 2024-04-22
Attending: SURGERY
Payer: MEDICAID

## 2024-04-22 DIAGNOSIS — K21.9 GASTROESOPHAGEAL REFLUX DISEASE WITHOUT ESOPHAGITIS: ICD-10-CM

## 2024-04-22 DIAGNOSIS — K44.9 HIATAL HERNIA: ICD-10-CM

## 2024-04-22 PROCEDURE — 2500000003 HC RX 250 WO HCPCS: Performed by: SURGERY

## 2024-04-22 PROCEDURE — 74240 X-RAY XM UPR GI TRC 1CNTRST: CPT

## 2024-04-22 RX ADMIN — BARIUM SULFATE 30 ML: 960 POWDER, FOR SUSPENSION ORAL at 10:31

## 2024-04-29 ASSESSMENT — ENCOUNTER SYMPTOMS
SHORTNESS OF BREATH: 0
DIARRHEA: 1
BACK PAIN: 1
ABDOMINAL DISTENTION: 0
EYE DISCHARGE: 0
EYE REDNESS: 0
VOMITING: 0
SORE THROAT: 0
CONSTIPATION: 1
ABDOMINAL PAIN: 1
COLOR CHANGE: 0
CHEST TIGHTNESS: 0
NAUSEA: 0

## 2024-04-29 NOTE — PROGRESS NOTES
left ankle 10/22/2020    Family history of early CAD 09/22/2020    Palpitations 02/12/2019    Class 2 obesity due to excess calories without serious comorbidity in adult 05/11/2018    S/P laparoscopic cholecystectomy 01/27/2017    Calculus of gallbladder without cholecystitis without obstruction 01/09/2017    Precordial pain 09/28/2016    SOB (shortness of breath) 09/28/2016    Insomnia 04/18/2016    Anxiety and depression 02/18/2016    Osteoarthritis 02/18/2016    Meniere's disease 02/18/2016    Essential hypertension 11/19/2015    Mixed hyperlipidemia 11/19/2015    Allergic rhinitis due to pollen 11/19/2015    Morbid obesity due to excess calories (HCC) 11/19/2015    Hearing loss 11/19/2015    Hot flashes due to surgical menopause 11/19/2015    Gastroesophageal reflux disease 11/19/2015    Chronic back pain 11/19/2015     PLAN:  Discussed findings and options with Conchita Aguillon.   GERD - Workup to date shows elevated DeMeester score, patient reports significant refractory symptoms not improved with medications.   Hiatal hernia - moderate size, last EGD estimated as 3cm, likely sliding type as not seen on CT chest.   Possible dysphagia or esophageal dysmotility - had an upper GI in 2021 with suspected Schatzki ring and delayed esophageal emptying. Had EGD with dilation to 54F on 2/9/23. Repeat the upper GI study shows no apparent ring or narrowing, consistent with a hiatal hernia. Discussed esophageal manometry - has had trouble scheduling, reports prior manometry was normal. She wishes to proceed without manometry, understands risks.   Planned procedure:  robotic assisted hiatal hernia repair and fundoplication, possible mesh  The risks, benefits, potential complications and possible alternatives of the procedure were discussed in detail, including complications of but not limited to infection, bleeding, anesthesia-related complications, death, pain, poor healing or cosmesis, drain placement, hernia

## 2024-04-30 ENCOUNTER — OFFICE VISIT (OUTPATIENT)
Dept: SURGERY | Age: 64
End: 2024-04-30
Payer: MEDICAID

## 2024-04-30 VITALS
HEART RATE: 101 BPM | SYSTOLIC BLOOD PRESSURE: 136 MMHG | HEIGHT: 64 IN | OXYGEN SATURATION: 99 % | WEIGHT: 221.6 LBS | DIASTOLIC BLOOD PRESSURE: 72 MMHG | BODY MASS INDEX: 37.83 KG/M2

## 2024-04-30 DIAGNOSIS — K21.9 CHRONIC GASTROESOPHAGEAL REFLUX DISEASE: ICD-10-CM

## 2024-04-30 DIAGNOSIS — K44.9 HIATAL HERNIA: ICD-10-CM

## 2024-04-30 DIAGNOSIS — K21.9 GASTROESOPHAGEAL REFLUX DISEASE WITHOUT ESOPHAGITIS: Primary | ICD-10-CM

## 2024-04-30 PROCEDURE — 99213 OFFICE O/P EST LOW 20 MIN: CPT | Performed by: SURGERY

## 2024-04-30 PROCEDURE — 3078F DIAST BP <80 MM HG: CPT | Performed by: SURGERY

## 2024-04-30 PROCEDURE — 3075F SYST BP GE 130 - 139MM HG: CPT | Performed by: SURGERY

## 2024-04-30 ASSESSMENT — PATIENT HEALTH QUESTIONNAIRE - PHQ9
SUM OF ALL RESPONSES TO PHQ QUESTIONS 1-9: 0
1. LITTLE INTEREST OR PLEASURE IN DOING THINGS: NOT AT ALL
SUM OF ALL RESPONSES TO PHQ QUESTIONS 1-9: 0
SUM OF ALL RESPONSES TO PHQ QUESTIONS 1-9: 0
SUM OF ALL RESPONSES TO PHQ9 QUESTIONS 1 & 2: 0
2. FEELING DOWN, DEPRESSED OR HOPELESS: NOT AT ALL
SUM OF ALL RESPONSES TO PHQ QUESTIONS 1-9: 0

## 2024-05-01 RX ORDER — SODIUM CHLORIDE, SODIUM LACTATE, POTASSIUM CHLORIDE, CALCIUM CHLORIDE 600; 310; 30; 20 MG/100ML; MG/100ML; MG/100ML; MG/100ML
INJECTION, SOLUTION INTRAVENOUS CONTINUOUS
OUTPATIENT
Start: 2024-05-01

## 2024-05-01 RX ORDER — SODIUM CHLORIDE 0.9 % (FLUSH) 0.9 %
5-40 SYRINGE (ML) INJECTION EVERY 12 HOURS SCHEDULED
OUTPATIENT
Start: 2024-05-01

## 2024-05-01 RX ORDER — SODIUM CHLORIDE 9 MG/ML
INJECTION, SOLUTION INTRAVENOUS PRN
OUTPATIENT
Start: 2024-05-01

## 2024-05-01 RX ORDER — SODIUM CHLORIDE 0.9 % (FLUSH) 0.9 %
5-40 SYRINGE (ML) INJECTION PRN
OUTPATIENT
Start: 2024-05-01

## 2024-05-06 ENCOUNTER — PREP FOR PROCEDURE (OUTPATIENT)
Dept: SURGERY | Age: 64
End: 2024-05-06

## 2024-05-08 ENCOUNTER — TELEPHONE (OUTPATIENT)
Dept: SURGERY | Age: 64
End: 2024-05-08

## 2024-05-08 NOTE — TELEPHONE ENCOUNTER
SPOKE TO  Conchita Aguillon REGARDING SURGERY ( ROBOTIC HIATAL HERNIA REPAIR) SCHEDULED @ [facility]. NOTIFIED OF DATES, TIMES AND LOCATION    PHONE ASSESSMENT   SURGERY - 5/31/24 @ 7:30 AM ARRIVAL 6AM  P/O - 6/10 @ 9AM    NPO AFTER MIDNIGHT    HOLD BLOOD THINNERS - NA

## 2024-05-23 NOTE — PROGRESS NOTES
Patient to come in 5/28/24 for pre-surgery labs, EKG and to  soap    Surgery @ [facility] on 5/31/24 you will be called 5/30/24 with times    NOTHING TO EAT OR DRINK AFTER MIDNIGHT DAY OF SURGERY    1. Enter thru the hospital main entrance on day of surgery, check in at the Information Desk. If you arrive prior to 6:00am, enter thru the ER entrance.    2. Follow the directions as prescribed by the doctor for your procedure and medications.         Morning of surgery take: Metoprolol, Famotidine & Dicyclomine with sips of water         Stop vitamins, supplements and NSAIDS:  5/24/24    3. Check with your Doctor regarding stopping blood thinners and follow their instructions.    4. Do not smoke, vape or use chewing tobacco morning of surgery. Do not drink any alcoholic beverages 24 hours prior to surgery.       This includes NA Beer. No street drugs 7 days prior to surgery.    5. If you have dentures, contacts of glasses they will be removed before going to the OR; please bring a case.    6. Please bring picture ID, insurance card, paperwork from the doctor’s office (H & P, Consent, & card for implantable devices).    7. Take a shower with an antibacterial soap the night before surgery and the morning of surgery. Do not put anything on your skin      After your morning shower.    8. You will need a responsible adult to drive you home and check on you after surgery.

## 2024-05-28 ENCOUNTER — HOSPITAL ENCOUNTER (OUTPATIENT)
Age: 64
Discharge: HOME | End: 2024-05-30
Payer: MEDICAID

## 2024-05-28 ENCOUNTER — HOSPITAL ENCOUNTER (OUTPATIENT)
Age: 64
Discharge: HOME | End: 2024-05-28
Payer: MEDICAID

## 2024-05-28 DIAGNOSIS — Z01.818 PRE-OP TESTING: ICD-10-CM

## 2024-05-28 LAB
ANION GAP SERPL CALCULATED.3IONS-SCNC: 16 MMOL/L (ref 7–16)
BASOPHILS ABSOLUTE: 0.1 K/CU MM
BASOPHILS RELATIVE PERCENT: 0.8 % (ref 0–1)
BUN SERPL-MCNC: 39 MG/DL (ref 6–23)
CALCIUM SERPL-MCNC: 9.6 MG/DL (ref 8.3–10.6)
CHLORIDE BLD-SCNC: 103 MMOL/L (ref 99–110)
CO2: 22 MMOL/L (ref 21–32)
CREAT SERPL-MCNC: 1.3 MG/DL (ref 0.6–1.1)
DIFFERENTIAL TYPE: ABNORMAL
EKG ATRIAL RATE: 77 BPM
EKG DIAGNOSIS: NORMAL
EKG P AXIS: 64 DEGREES
EKG P-R INTERVAL: 166 MS
EKG Q-T INTERVAL: 380 MS
EKG QRS DURATION: 78 MS
EKG QTC CALCULATION (BAZETT): 430 MS
EKG R AXIS: 72 DEGREES
EKG T AXIS: 30 DEGREES
EKG VENTRICULAR RATE: 77 BPM
EOSINOPHILS ABSOLUTE: 0.1 K/CU MM
EOSINOPHILS RELATIVE PERCENT: 1.1 % (ref 0–3)
GFR, ESTIMATED: 47 ML/MIN/1.73M2
GLUCOSE SERPL-MCNC: 99 MG/DL (ref 70–99)
HCT VFR BLD CALC: 42.4 % (ref 37–47)
HEMOGLOBIN: 13.7 GM/DL (ref 12.5–16)
IMMATURE NEUTROPHIL %: 0.4 % (ref 0–0.43)
LYMPHOCYTES ABSOLUTE: 1.8 K/CU MM
LYMPHOCYTES RELATIVE PERCENT: 22.6 % (ref 24–44)
MCH RBC QN AUTO: 28.5 PG (ref 27–31)
MCHC RBC AUTO-ENTMCNC: 32.3 % (ref 32–36)
MCV RBC AUTO: 88.3 FL (ref 78–100)
MONOCYTES ABSOLUTE: 0.5 K/CU MM
MONOCYTES RELATIVE PERCENT: 6.5 % (ref 0–4)
NEUTROPHILS ABSOLUTE: 5.5 K/CU MM
NEUTROPHILS RELATIVE PERCENT: 68.6 % (ref 36–66)
NUCLEATED RBC %: 0 %
PDW BLD-RTO: 13.6 % (ref 11.7–14.9)
PLATELET # BLD: 202 K/CU MM (ref 140–440)
PMV BLD AUTO: 9.1 FL (ref 7.5–11.1)
POTASSIUM SERPL-SCNC: 4.6 MMOL/L (ref 3.5–5.1)
RBC # BLD: 4.8 M/CU MM (ref 4.2–5.4)
SODIUM BLD-SCNC: 141 MMOL/L (ref 135–145)
TOTAL IMMATURE NEUTOROPHIL: 0.03 K/CU MM
TOTAL NUCLEATED RBC: 0 K/CU MM
WBC # BLD: 8 K/CU MM (ref 4–10.5)

## 2024-05-28 PROCEDURE — 93005 ELECTROCARDIOGRAM TRACING: CPT | Performed by: ANESTHESIOLOGY

## 2024-05-28 PROCEDURE — 80048 BASIC METABOLIC PNL TOTAL CA: CPT

## 2024-05-28 PROCEDURE — 85025 COMPLETE CBC W/AUTO DIFF WBC: CPT

## 2024-05-28 PROCEDURE — 36415 COLL VENOUS BLD VENIPUNCTURE: CPT

## 2024-05-30 ENCOUNTER — ANESTHESIA EVENT (OUTPATIENT)
Dept: OPERATING ROOM | Age: 64
End: 2024-05-30
Payer: MEDICAID

## 2024-05-30 NOTE — PROGRESS NOTES
5/30/24 - .Notified patient surgery @ [facility] on  5/31/24 @ 0730, arrival 0600. NPO status and morning medications reviewed. Understanding verbalized.

## 2024-05-30 NOTE — ANESTHESIA PRE PROCEDURE
Department of Anesthesiology  Preprocedure Note       Name:  Conchita Aguillon   Age:  63 y.o.  :  1960                                          MRN:  [id number]         Date:  2024      Surgeon: Surgeon(s):  Clari Kothari MD    Procedure: Procedure(s):  NISSEN FUNDOPLICATION LAPAROSCOPIC ROBOTIC    Medications prior to admission:   Prior to Admission medications    Medication Sig Start Date End Date Taking? Authorizing Provider   gabapentin (NEURONTIN) 300 MG capsule Take 1 capsule by mouth nightly for 180 days. 4/16/24 10/13/24  Kristan Murphy MD   sucralfate (CARAFATE) 1 GM tablet Take 1 tablet by mouth 2 times daily as needed (worsening heartburn and acid reflux) 3/20/24   Taylor Del Rio APRN - CNP   dicyclomine (BENTYL) 20 MG tablet Take 1 tablet by mouth every morning    Rey Lopez MD   spironolactone (ALDACTONE) 100 MG tablet Take 1 tablet by mouth daily    Rey Lopez MD   famotidine (PEPCID) 40 MG tablet Take 1 tablet by mouth daily    Rey Lopez MD   azelastine (ASTELIN) 0.1 % nasal spray 1 spray by Nasal route 2 times daily Use in each nostril as directed    Rey Lopez MD   fluticasone (FLONASE) 50 MCG/ACT nasal spray 1 spray by Each Nostril route daily    Rey Lopez MD   hyoscyamine (LEVSIN) 125 MCG/ML solution Take by mouth every 4 hours as needed    Rey Lopez MD   aspirin 81 MG chewable tablet Take 1 tablet by mouth daily    Rey Lopez MD   cetirizine (ZYRTEC) 10 MG tablet Take 1 tablet by mouth daily    Rey Lopez MD   ondansetron (ZOFRAN) 8 MG tablet Take 1 tablet by mouth every 8 hours as needed for Nausea or Vomiting 24   Kristan Murphy MD   baclofen (LIORESAL) 10 MG tablet Take 1 tablet by mouth daily 10/18/23   Rey Lopez MD   colestipol (COLESTID) 1 g tablet Take 1 tablet by mouth daily 23   Rey Lopez MD   methocarbamol (ROBAXIN) 500 MG tablet

## 2024-05-31 ENCOUNTER — HOSPITAL ENCOUNTER (OUTPATIENT)
Age: 64
Setting detail: OBSERVATION
Discharge: HOME | End: 2024-06-02
Attending: SURGERY | Admitting: SURGERY
Payer: MEDICAID

## 2024-05-31 ENCOUNTER — ANESTHESIA (OUTPATIENT)
Dept: OPERATING ROOM | Age: 64
End: 2024-05-31
Payer: MEDICAID

## 2024-05-31 DIAGNOSIS — K21.9 GASTROESOPHAGEAL REFLUX DISEASE WITHOUT ESOPHAGITIS: ICD-10-CM

## 2024-05-31 DIAGNOSIS — K44.9 HIATAL HERNIA: ICD-10-CM

## 2024-05-31 DIAGNOSIS — Z01.818 PRE-OP TESTING: Primary | ICD-10-CM

## 2024-05-31 PROCEDURE — 7100000001 HC PACU RECOVERY - ADDTL 15 MIN: Performed by: SURGERY

## 2024-05-31 PROCEDURE — 6370000000 HC RX 637 (ALT 250 FOR IP): Performed by: SURGERY

## 2024-05-31 PROCEDURE — 2780000010 HC IMPLANT OTHER: Performed by: SURGERY

## 2024-05-31 PROCEDURE — 3700000000 HC ANESTHESIA ATTENDED CARE: Performed by: SURGERY

## 2024-05-31 PROCEDURE — 6360000002 HC RX W HCPCS: Performed by: SURGERY

## 2024-05-31 PROCEDURE — 6370000000 HC RX 637 (ALT 250 FOR IP): Performed by: NURSE ANESTHETIST, CERTIFIED REGISTERED

## 2024-05-31 PROCEDURE — 2580000003 HC RX 258: Performed by: SURGERY

## 2024-05-31 PROCEDURE — 7100000000 HC PACU RECOVERY - FIRST 15 MIN: Performed by: SURGERY

## 2024-05-31 PROCEDURE — 3600000009 HC SURGERY ROBOT BASE: Performed by: SURGERY

## 2024-05-31 PROCEDURE — C1889 IMPLANT/INSERT DEVICE, NOC: HCPCS | Performed by: SURGERY

## 2024-05-31 PROCEDURE — S2900 ROBOTIC SURGICAL SYSTEM: HCPCS | Performed by: SURGERY

## 2024-05-31 PROCEDURE — 2709999900 HC NON-CHARGEABLE SUPPLY: Performed by: SURGERY

## 2024-05-31 PROCEDURE — 43280 LAPAROSCOPY FUNDOPLASTY: CPT | Performed by: SURGERY

## 2024-05-31 PROCEDURE — 6360000002 HC RX W HCPCS: Performed by: NURSE ANESTHETIST, CERTIFIED REGISTERED

## 2024-05-31 PROCEDURE — G0378 HOSPITAL OBSERVATION PER HR: HCPCS

## 2024-05-31 PROCEDURE — 2720000010 HC SURG SUPPLY STERILE: Performed by: SURGERY

## 2024-05-31 PROCEDURE — 2500000003 HC RX 250 WO HCPCS: Performed by: NURSE ANESTHETIST, CERTIFIED REGISTERED

## 2024-05-31 PROCEDURE — 2700000000 HC OXYGEN THERAPY PER DAY

## 2024-05-31 PROCEDURE — 3700000001 HC ADD 15 MINUTES (ANESTHESIA): Performed by: SURGERY

## 2024-05-31 PROCEDURE — 6360000002 HC RX W HCPCS: Performed by: ANESTHESIOLOGY

## 2024-05-31 PROCEDURE — 3600000019 HC SURGERY ROBOT ADDTL 15MIN: Performed by: SURGERY

## 2024-05-31 PROCEDURE — 94761 N-INVAS EAR/PLS OXIMETRY MLT: CPT

## 2024-05-31 DEVICE — HEMOLOK L 6 CLIPS/CART
Type: IMPLANTABLE DEVICE | Status: FUNCTIONAL
Brand: WECK

## 2024-05-31 RX ORDER — FUROSEMIDE 20 MG/1
20 TABLET ORAL DAILY
Status: DISCONTINUED | OUTPATIENT
Start: 2024-05-31 | End: 2024-06-02 | Stop reason: HOSPADM

## 2024-05-31 RX ORDER — FENTANYL CITRATE 50 UG/ML
INJECTION, SOLUTION INTRAMUSCULAR; INTRAVENOUS PRN
Status: DISCONTINUED | OUTPATIENT
Start: 2024-05-31 | End: 2024-05-31 | Stop reason: SDUPTHER

## 2024-05-31 RX ORDER — GABAPENTIN 300 MG/1
300 CAPSULE ORAL NIGHTLY
Status: DISCONTINUED | OUTPATIENT
Start: 2024-05-31 | End: 2024-06-02 | Stop reason: HOSPADM

## 2024-05-31 RX ORDER — LIDOCAINE HYDROCHLORIDE 20 MG/ML
INJECTION, SOLUTION INTRAVENOUS PRN
Status: DISCONTINUED | OUTPATIENT
Start: 2024-05-31 | End: 2024-05-31 | Stop reason: SDUPTHER

## 2024-05-31 RX ORDER — HYDRALAZINE HYDROCHLORIDE 20 MG/ML
10 INJECTION INTRAMUSCULAR; INTRAVENOUS
Status: DISCONTINUED | OUTPATIENT
Start: 2024-05-31 | End: 2024-05-31 | Stop reason: HOSPADM

## 2024-05-31 RX ORDER — SODIUM CHLORIDE 9 MG/ML
INJECTION, SOLUTION INTRAVENOUS PRN
Status: DISCONTINUED | OUTPATIENT
Start: 2024-05-31 | End: 2024-05-31 | Stop reason: HOSPADM

## 2024-05-31 RX ORDER — ROCURONIUM BROMIDE 10 MG/ML
INJECTION, SOLUTION INTRAVENOUS PRN
Status: DISCONTINUED | OUTPATIENT
Start: 2024-05-31 | End: 2024-05-31 | Stop reason: SDUPTHER

## 2024-05-31 RX ORDER — PROPOFOL 10 MG/ML
INJECTION, EMULSION INTRAVENOUS PRN
Status: DISCONTINUED | OUTPATIENT
Start: 2024-05-31 | End: 2024-05-31 | Stop reason: SDUPTHER

## 2024-05-31 RX ORDER — DEXAMETHASONE SODIUM PHOSPHATE 4 MG/ML
INJECTION, SOLUTION INTRA-ARTICULAR; INTRALESIONAL; INTRAMUSCULAR; INTRAVENOUS; SOFT TISSUE PRN
Status: DISCONTINUED | OUTPATIENT
Start: 2024-05-31 | End: 2024-05-31 | Stop reason: SDUPTHER

## 2024-05-31 RX ORDER — ONDANSETRON 4 MG/1
4 TABLET, ORALLY DISINTEGRATING ORAL EVERY 8 HOURS PRN
Status: DISCONTINUED | OUTPATIENT
Start: 2024-05-31 | End: 2024-06-02 | Stop reason: HOSPADM

## 2024-05-31 RX ORDER — SODIUM CHLORIDE 0.9 % (FLUSH) 0.9 %
5-40 SYRINGE (ML) INJECTION PRN
Status: DISCONTINUED | OUTPATIENT
Start: 2024-05-31 | End: 2024-06-02 | Stop reason: HOSPADM

## 2024-05-31 RX ORDER — ENOXAPARIN SODIUM 100 MG/ML
40 INJECTION SUBCUTANEOUS DAILY
Status: DISCONTINUED | OUTPATIENT
Start: 2024-06-01 | End: 2024-06-02 | Stop reason: HOSPADM

## 2024-05-31 RX ORDER — METHOCARBAMOL 500 MG/1
500 TABLET, FILM COATED ORAL 4 TIMES DAILY PRN
Status: DISCONTINUED | OUTPATIENT
Start: 2024-05-31 | End: 2024-06-02 | Stop reason: HOSPADM

## 2024-05-31 RX ORDER — ONDANSETRON 2 MG/ML
4 INJECTION INTRAMUSCULAR; INTRAVENOUS EVERY 6 HOURS PRN
Status: DISCONTINUED | OUTPATIENT
Start: 2024-05-31 | End: 2024-06-02 | Stop reason: HOSPADM

## 2024-05-31 RX ORDER — OXYCODONE HYDROCHLORIDE 5 MG/1
5 TABLET ORAL EVERY 4 HOURS PRN
Status: DISCONTINUED | OUTPATIENT
Start: 2024-05-31 | End: 2024-06-02 | Stop reason: HOSPADM

## 2024-05-31 RX ORDER — FAMOTIDINE 20 MG/1
40 TABLET, FILM COATED ORAL DAILY
Status: DISCONTINUED | OUTPATIENT
Start: 2024-05-31 | End: 2024-06-02 | Stop reason: HOSPADM

## 2024-05-31 RX ORDER — NALOXONE HYDROCHLORIDE 0.4 MG/ML
INJECTION, SOLUTION INTRAMUSCULAR; INTRAVENOUS; SUBCUTANEOUS PRN
Status: DISCONTINUED | OUTPATIENT
Start: 2024-05-31 | End: 2024-05-31 | Stop reason: HOSPADM

## 2024-05-31 RX ORDER — ALBUTEROL SULFATE 90 UG/1
AEROSOL, METERED RESPIRATORY (INHALATION) PRN
Status: DISCONTINUED | OUTPATIENT
Start: 2024-05-31 | End: 2024-05-31 | Stop reason: SDUPTHER

## 2024-05-31 RX ORDER — SODIUM CHLORIDE 9 MG/ML
INJECTION, SOLUTION INTRAVENOUS PRN
Status: DISCONTINUED | OUTPATIENT
Start: 2024-05-31 | End: 2024-06-02 | Stop reason: HOSPADM

## 2024-05-31 RX ORDER — AZELASTINE 1 MG/ML
1 SPRAY, METERED NASAL 2 TIMES DAILY
Status: DISCONTINUED | OUTPATIENT
Start: 2024-05-31 | End: 2024-05-31

## 2024-05-31 RX ORDER — ALLOPURINOL 100 MG/1
300 TABLET ORAL DAILY
Status: DISCONTINUED | OUTPATIENT
Start: 2024-05-31 | End: 2024-06-02 | Stop reason: HOSPADM

## 2024-05-31 RX ORDER — PHENYLEPHRINE HCL IN 0.9% NACL 1 MG/10 ML
SYRINGE (ML) INTRAVENOUS PRN
Status: DISCONTINUED | OUTPATIENT
Start: 2024-05-31 | End: 2024-05-31 | Stop reason: SDUPTHER

## 2024-05-31 RX ORDER — MORPHINE SULFATE 2 MG/ML
2 INJECTION, SOLUTION INTRAMUSCULAR; INTRAVENOUS
Status: DISCONTINUED | OUTPATIENT
Start: 2024-05-31 | End: 2024-06-02 | Stop reason: HOSPADM

## 2024-05-31 RX ORDER — SODIUM CHLORIDE, SODIUM LACTATE, POTASSIUM CHLORIDE, CALCIUM CHLORIDE 600; 310; 30; 20 MG/100ML; MG/100ML; MG/100ML; MG/100ML
INJECTION, SOLUTION INTRAVENOUS CONTINUOUS
Status: DISCONTINUED | OUTPATIENT
Start: 2024-05-31 | End: 2024-05-31 | Stop reason: HOSPADM

## 2024-05-31 RX ORDER — DROPERIDOL 2.5 MG/ML
0.62 INJECTION, SOLUTION INTRAMUSCULAR; INTRAVENOUS
Status: DISCONTINUED | OUTPATIENT
Start: 2024-05-31 | End: 2024-05-31 | Stop reason: HOSPADM

## 2024-05-31 RX ORDER — LABETALOL HYDROCHLORIDE 5 MG/ML
10 INJECTION, SOLUTION INTRAVENOUS
Status: DISCONTINUED | OUTPATIENT
Start: 2024-05-31 | End: 2024-05-31 | Stop reason: HOSPADM

## 2024-05-31 RX ORDER — ASPIRIN 81 MG/1
81 TABLET, CHEWABLE ORAL DAILY
Status: DISCONTINUED | OUTPATIENT
Start: 2024-06-01 | End: 2024-06-02 | Stop reason: HOSPADM

## 2024-05-31 RX ORDER — METOPROLOL TARTRATE 50 MG/1
50 TABLET, FILM COATED ORAL 2 TIMES DAILY
Status: DISCONTINUED | OUTPATIENT
Start: 2024-05-31 | End: 2024-06-02 | Stop reason: HOSPADM

## 2024-05-31 RX ORDER — LOSARTAN POTASSIUM 25 MG/1
50 TABLET ORAL DAILY
Status: DISCONTINUED | OUTPATIENT
Start: 2024-05-31 | End: 2024-06-02 | Stop reason: HOSPADM

## 2024-05-31 RX ORDER — ONDANSETRON 2 MG/ML
4 INJECTION INTRAMUSCULAR; INTRAVENOUS
Status: COMPLETED | OUTPATIENT
Start: 2024-05-31 | End: 2024-05-31

## 2024-05-31 RX ORDER — LETROZOLE 2.5 MG/1
2.5 TABLET, FILM COATED ORAL DAILY
Status: DISCONTINUED | OUTPATIENT
Start: 2024-05-31 | End: 2024-06-02 | Stop reason: HOSPADM

## 2024-05-31 RX ORDER — CETIRIZINE HYDROCHLORIDE 10 MG/1
10 TABLET ORAL DAILY
Status: DISCONTINUED | OUTPATIENT
Start: 2024-05-31 | End: 2024-06-02 | Stop reason: HOSPADM

## 2024-05-31 RX ORDER — VENLAFAXINE HYDROCHLORIDE 37.5 MG/1
75 CAPSULE, EXTENDED RELEASE ORAL NIGHTLY
Status: DISCONTINUED | OUTPATIENT
Start: 2024-05-31 | End: 2024-06-02 | Stop reason: HOSPADM

## 2024-05-31 RX ORDER — SPIRONOLACTONE 50 MG/1
100 TABLET, FILM COATED ORAL DAILY
Status: DISCONTINUED | OUTPATIENT
Start: 2024-05-31 | End: 2024-06-02 | Stop reason: HOSPADM

## 2024-05-31 RX ORDER — OXYCODONE HYDROCHLORIDE 5 MG/1
5 TABLET ORAL
Status: DISCONTINUED | OUTPATIENT
Start: 2024-05-31 | End: 2024-05-31 | Stop reason: HOSPADM

## 2024-05-31 RX ORDER — MIRTAZAPINE 15 MG/1
15 TABLET, FILM COATED ORAL NIGHTLY
Status: DISCONTINUED | OUTPATIENT
Start: 2024-05-31 | End: 2024-06-02 | Stop reason: HOSPADM

## 2024-05-31 RX ORDER — SODIUM CHLORIDE 0.9 % (FLUSH) 0.9 %
5-40 SYRINGE (ML) INJECTION PRN
Status: DISCONTINUED | OUTPATIENT
Start: 2024-05-31 | End: 2024-05-31 | Stop reason: HOSPADM

## 2024-05-31 RX ORDER — SODIUM CHLORIDE, SODIUM LACTATE, POTASSIUM CHLORIDE, CALCIUM CHLORIDE 600; 310; 30; 20 MG/100ML; MG/100ML; MG/100ML; MG/100ML
INJECTION, SOLUTION INTRAVENOUS CONTINUOUS
Status: DISCONTINUED | OUTPATIENT
Start: 2024-05-31 | End: 2024-06-02 | Stop reason: HOSPADM

## 2024-05-31 RX ORDER — MEPERIDINE HYDROCHLORIDE 25 MG/ML
12.5 INJECTION INTRAMUSCULAR; INTRAVENOUS; SUBCUTANEOUS EVERY 5 MIN PRN
Status: DISCONTINUED | OUTPATIENT
Start: 2024-05-31 | End: 2024-05-31 | Stop reason: HOSPADM

## 2024-05-31 RX ORDER — MORPHINE SULFATE 4 MG/ML
4 INJECTION, SOLUTION INTRAMUSCULAR; INTRAVENOUS
Status: DISCONTINUED | OUTPATIENT
Start: 2024-05-31 | End: 2024-06-02 | Stop reason: HOSPADM

## 2024-05-31 RX ORDER — OXYCODONE HYDROCHLORIDE 10 MG/1
10 TABLET ORAL EVERY 4 HOURS PRN
Status: DISCONTINUED | OUTPATIENT
Start: 2024-05-31 | End: 2024-06-02 | Stop reason: HOSPADM

## 2024-05-31 RX ORDER — SODIUM CHLORIDE 0.9 % (FLUSH) 0.9 %
5-40 SYRINGE (ML) INJECTION EVERY 12 HOURS SCHEDULED
Status: DISCONTINUED | OUTPATIENT
Start: 2024-05-31 | End: 2024-06-02 | Stop reason: HOSPADM

## 2024-05-31 RX ORDER — EZETIMIBE 10 MG/1
10 TABLET ORAL DAILY
Status: DISCONTINUED | OUTPATIENT
Start: 2024-05-31 | End: 2024-06-02 | Stop reason: HOSPADM

## 2024-05-31 RX ORDER — FLUTICASONE PROPIONATE 50 MCG
1 SPRAY, SUSPENSION (ML) NASAL DAILY
Status: DISCONTINUED | OUTPATIENT
Start: 2024-05-31 | End: 2024-06-02 | Stop reason: HOSPADM

## 2024-05-31 RX ORDER — SODIUM CHLORIDE 0.9 % (FLUSH) 0.9 %
5-40 SYRINGE (ML) INJECTION EVERY 12 HOURS SCHEDULED
Status: DISCONTINUED | OUTPATIENT
Start: 2024-05-31 | End: 2024-05-31 | Stop reason: HOSPADM

## 2024-05-31 RX ORDER — ONDANSETRON 2 MG/ML
INJECTION INTRAMUSCULAR; INTRAVENOUS PRN
Status: DISCONTINUED | OUTPATIENT
Start: 2024-05-31 | End: 2024-05-31 | Stop reason: SDUPTHER

## 2024-05-31 RX ORDER — BUPIVACAINE HYDROCHLORIDE 5 MG/ML
INJECTION, SOLUTION EPIDURAL; INTRACAUDAL
Status: COMPLETED | OUTPATIENT
Start: 2024-05-31 | End: 2024-05-31

## 2024-05-31 RX ORDER — ACETAMINOPHEN 325 MG/1
650 TABLET ORAL EVERY 6 HOURS
Status: DISCONTINUED | OUTPATIENT
Start: 2024-05-31 | End: 2024-06-02 | Stop reason: HOSPADM

## 2024-05-31 RX ORDER — FENTANYL CITRATE 50 UG/ML
50 INJECTION, SOLUTION INTRAMUSCULAR; INTRAVENOUS EVERY 5 MIN PRN
Status: DISCONTINUED | OUTPATIENT
Start: 2024-05-31 | End: 2024-05-31 | Stop reason: HOSPADM

## 2024-05-31 RX ADMIN — METOPROLOL TARTRATE 50 MG: 50 TABLET, FILM COATED ORAL at 21:16

## 2024-05-31 RX ADMIN — ROCURONIUM BROMIDE 20 MG: 10 INJECTION INTRAVENOUS at 08:48

## 2024-05-31 RX ADMIN — SODIUM CHLORIDE, POTASSIUM CHLORIDE, SODIUM LACTATE AND CALCIUM CHLORIDE: 600; 310; 30; 20 INJECTION, SOLUTION INTRAVENOUS at 06:35

## 2024-05-31 RX ADMIN — LIDOCAINE HYDROCHLORIDE 100 MG: 20 INJECTION, SOLUTION INTRAVENOUS at 07:44

## 2024-05-31 RX ADMIN — ONDANSETRON 4 MG: 2 INJECTION INTRAMUSCULAR; INTRAVENOUS at 09:20

## 2024-05-31 RX ADMIN — ONDANSETRON 4 MG: 2 INJECTION INTRAMUSCULAR; INTRAVENOUS at 15:58

## 2024-05-31 RX ADMIN — FENTANYL CITRATE 50 MCG: 50 INJECTION, SOLUTION INTRAMUSCULAR; INTRAVENOUS at 10:29

## 2024-05-31 RX ADMIN — GABAPENTIN 300 MG: 300 CAPSULE ORAL at 21:22

## 2024-05-31 RX ADMIN — DEXAMETHASONE SODIUM PHOSPHATE 8 MG: 4 INJECTION, SOLUTION INTRAMUSCULAR; INTRAVENOUS at 09:20

## 2024-05-31 RX ADMIN — FENTANYL CITRATE 50 MCG: 50 INJECTION, SOLUTION INTRAMUSCULAR; INTRAVENOUS at 10:52

## 2024-05-31 RX ADMIN — MIRTAZAPINE 15 MG: 15 TABLET, FILM COATED ORAL at 21:19

## 2024-05-31 RX ADMIN — VENLAFAXINE HYDROCHLORIDE 75 MG: 37.5 CAPSULE, EXTENDED RELEASE ORAL at 21:24

## 2024-05-31 RX ADMIN — ACETAMINOPHEN 650 MG: 325 TABLET ORAL at 23:37

## 2024-05-31 RX ADMIN — ONDANSETRON 4 MG: 2 INJECTION INTRAMUSCULAR; INTRAVENOUS at 10:29

## 2024-05-31 RX ADMIN — Medication 100 MCG: at 09:51

## 2024-05-31 RX ADMIN — SODIUM CHLORIDE, POTASSIUM CHLORIDE, SODIUM LACTATE AND CALCIUM CHLORIDE: 600; 310; 30; 20 INJECTION, SOLUTION INTRAVENOUS at 10:49

## 2024-05-31 RX ADMIN — PROPOFOL 200 MG: 10 INJECTION, EMULSION INTRAVENOUS at 07:44

## 2024-05-31 RX ADMIN — PROPOFOL 50 MG: 10 INJECTION, EMULSION INTRAVENOUS at 10:07

## 2024-05-31 RX ADMIN — ROCURONIUM BROMIDE 50 MG: 10 INJECTION INTRAVENOUS at 07:44

## 2024-05-31 RX ADMIN — ALBUTEROL SULFATE 8 PUFF: 90 AEROSOL, METERED RESPIRATORY (INHALATION) at 09:40

## 2024-05-31 RX ADMIN — FENTANYL CITRATE 50 MCG: 50 INJECTION, SOLUTION INTRAMUSCULAR; INTRAVENOUS at 07:36

## 2024-05-31 RX ADMIN — CEFAZOLIN 2000 MG: 2 INJECTION, POWDER, FOR SOLUTION INTRAMUSCULAR; INTRAVENOUS at 07:44

## 2024-05-31 RX ADMIN — HYDROMORPHONE HYDROCHLORIDE 1 MG: 1 INJECTION, SOLUTION INTRAMUSCULAR; INTRAVENOUS; SUBCUTANEOUS at 08:47

## 2024-05-31 RX ADMIN — FENTANYL CITRATE 100 MCG: 50 INJECTION, SOLUTION INTRAMUSCULAR; INTRAVENOUS at 10:12

## 2024-05-31 RX ADMIN — FENTANYL CITRATE 50 MCG: 50 INJECTION, SOLUTION INTRAMUSCULAR; INTRAVENOUS at 07:44

## 2024-05-31 RX ADMIN — MORPHINE SULFATE 4 MG: 4 INJECTION, SOLUTION INTRAMUSCULAR; INTRAVENOUS at 15:59

## 2024-05-31 RX ADMIN — SUGAMMADEX 200 MG: 100 INJECTION, SOLUTION INTRAVENOUS at 10:05

## 2024-05-31 ASSESSMENT — PAIN DESCRIPTION - FREQUENCY
FREQUENCY: CONTINUOUS

## 2024-05-31 ASSESSMENT — PAIN DESCRIPTION - DESCRIPTORS
DESCRIPTORS: PRESSURE
DESCRIPTORS: DISCOMFORT
DESCRIPTORS: DISCOMFORT
DESCRIPTORS: ACHING;DISCOMFORT
DESCRIPTORS: DISCOMFORT

## 2024-05-31 ASSESSMENT — PAIN SCALES - GENERAL
PAINLEVEL_OUTOF10: 7
PAINLEVEL_OUTOF10: 5
PAINLEVEL_OUTOF10: 0
PAINLEVEL_OUTOF10: 7
PAINLEVEL_OUTOF10: 8
PAINLEVEL_OUTOF10: 8

## 2024-05-31 ASSESSMENT — PAIN DESCRIPTION - ORIENTATION
ORIENTATION: MID
ORIENTATION: MID;UPPER
ORIENTATION: RIGHT

## 2024-05-31 ASSESSMENT — PAIN DESCRIPTION - LOCATION
LOCATION: ABDOMEN
LOCATION: ABDOMEN
LOCATION: ABDOMEN;RIB CAGE
LOCATION: ABDOMEN
LOCATION: ABDOMEN

## 2024-05-31 ASSESSMENT — PAIN DESCRIPTION - PAIN TYPE
TYPE: SURGICAL PAIN

## 2024-05-31 ASSESSMENT — PAIN - FUNCTIONAL ASSESSMENT
PAIN_FUNCTIONAL_ASSESSMENT: 0-10
PAIN_FUNCTIONAL_ASSESSMENT: ACTIVITIES ARE NOT PREVENTED
PAIN_FUNCTIONAL_ASSESSMENT: PREVENTS OR INTERFERES SOME ACTIVE ACTIVITIES AND ADLS
PAIN_FUNCTIONAL_ASSESSMENT: PREVENTS OR INTERFERES SOME ACTIVE ACTIVITIES AND ADLS
PAIN_FUNCTIONAL_ASSESSMENT: ACTIVITIES ARE NOT PREVENTED
PAIN_FUNCTIONAL_ASSESSMENT: PREVENTS OR INTERFERES SOME ACTIVE ACTIVITIES AND ADLS

## 2024-05-31 ASSESSMENT — PAIN DESCRIPTION - ONSET
ONSET: ON-GOING

## 2024-05-31 NOTE — H&P
GENERAL SURGERY NOTE - Outpatient History & Physical  [facility]    PATIENT: Conchita Aguillon 1960, 63 y.o., female   Date: 5/31/2024    MRN: [id number]   Requesting Provider:  Clari Kothari MD History Obtained From:  patient, electronic medical record     Reason for Evaluation & Chief Complaint:    No chief complaint on file.  Hiatal hernia, acid reflux    HISTORY OF PRESENT ILLNESS:    Conchita Aguillon is a 63 y.o. female presenting in follow up with concern for a hiatal hernia. Her symptoms are persistent and unchanged - she wishes to proceed with surgery.     She had an UGI 4/22/24   IMPRESSION:  Minimal sliding axial hiatus hernia. No free gastroesophageal reflux  witnessed. No evidence of achalasia. No evidence of esophageal stricture. No  change since September 13, 2021.    From Previously:   She thinks she has had a Schatzki ring dilated about 3 times. She has chest pain and nausea, has intermittent regurgitation and trouble swallowing - feels like things won't go down about a couple times a week - sometimes even with water. She has reflux when she lays down - tries to keep the head of the bed propped up. She has had a cardiac workup for the chest/epigastric pain, which was negative.   She has tried a lot of different medications, but nothing seems to help. She saw Dr. Galvez in the past, now sees Dr. Means/Juwan.   She has had a manometry test - per patient was years ago, none recently.   She has seen Dr. Talavera for the hiatal hernia in the past but he doesn't do hiatal hernia repairs, Dr. Roman is out until April.      Esophagram 9/13/2021: Small hiatal hernia with narrowing of the distal thoracic esophagus just proximal to the hiatal hernia, which may reflect underlying Schatzki's ring. Ingested barium and barium tablet were initially held up at this level before eventually passing into the stomach.    12/9/2022 Gastric emptying study within normal limits.  GI recommended  04/09/2024    ALT 13 04/09/2024    ALKPHOS 64 04/09/2024    AMYLASE 23 (L) 06/02/2021    LIPASE 19 10/29/2022    INR 1.11 02/19/2022    GLUF 106 (H) 05/05/2017    LABA1C 5.6 02/01/2023       Imaging:   Pertinent laboratory and imaging studies were personally reviewed if available.    IMPRESSION:    Conchita Aguillon is a 63 y.o. female with chronic refractory acid reflux and hiatal hernia    1. Pre-op testing        Patient Active Problem List    Diagnosis Date Noted    Vasovagal syncope     Other dysphagia 02/09/2023    Chronic low back pain without sciatica 02/03/2023    Weight loss, abnormal 12/02/2022    History of cancer of left breast 12/02/2022    Dizziness 04/21/2022    Stage 3b chronic kidney disease (HCC) 01/30/2024    JORDAN (acute kidney injury) (HCC) 01/30/2024    Gram-positive bacteremia 02/22/2022    Failure to thrive (child) 02/17/2022    Chronic gastroesophageal reflux disease 01/21/2022    Hiatal hernia 10/22/2021    Schatzki's ring of distal esophagus 10/22/2021    Spinal stenosis of lumbar region 10/21/2021    Agranulocytosis secondary to cancer chemotherapy (HCC) 09/07/2021    Chest pain 08/12/2021    Dehydration 07/22/2021    Port-A-Cath in place 07/21/2021    Personal history of breast cancer 06/22/2021    Nausea and vomiting 05/30/2021    Post-op pain 05/17/2021    S/P mastectomy, bilateral 05/16/2021    Hx of lymph node excision 05/16/2021    Infiltrating ductal carcinoma of left breast (HCC) 04/23/2021    Mucinous carcinoma of breast, 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

## 2024-05-31 NOTE — PROGRESS NOTES
4 Eyes Skin Assessment     NAME:  Conchita Aguillon  YOB: 1960  MEDICAL RECORD NUMBER:  [id number]    The patient is being assessed for  Admission    I agree that at least one RN has performed a thorough Head to Toe Skin Assessment on the patient. ALL assessment sites listed below have been assessed.      Areas assessed by both nurses:    Head, Face, Ears, Shoulders, Back, Chest, Arms, Elbows, Hands, Sacrum. Buttock, Coccyx, Ischium, Legs. Feet and Heels, and Under Medical Devices         Does the Patient have a Wound? No noted wound(s)       Jimmy Prevention initiated by RN: No  Wound Care Orders initiated by RN: No    Pressure Injury (Stage 3,4, Unstageable, DTI, NWPT, and Complex wounds) if present, place Wound referral order by RN under : No    New Ostomies, if present place, Ostomy referral order under : No     Nurse 1 eSignature: Electronically signed by Cherise Adams RN on 5/31/24 at 12:25 PM EDT    **SHARE this note so that the co-signing nurse can place an eSignature**    Nurse 2 eSignature: Electronically signed by Cami Franco LPN on 5/31/24 at 7:25 PM EDT

## 2024-05-31 NOTE — PROGRESS NOTES
1020: Arrived to PACU from OR. Monitors applied, alarms on. Report obtained from Ming CHACON and Autumn BURDEN.  1029: Medicated for abdominal pain and nausea. Much reassurance given.  1048: Nephew Faraz called and update given. Patient dozing  1052: Turned and repositioned in bed, warm blankets on. Medicated for abdominal pain. States nausea was better.  1115: Dozing.  1129: Transported to room 1114. Belongings with patient.

## 2024-05-31 NOTE — PLAN OF CARE
Problem: Chronic Conditions and Co-morbidities  Goal: Patient's chronic conditions and co-morbidity symptoms are monitored and maintained or improved  Outcome: Progressing     Problem: Discharge Planning  Goal: Discharge to home or other facility with appropriate resources  Outcome: Progressing  Flowsheets (Taken 5/31/2024 1220)  Discharge to home or other facility with appropriate resources: Identify barriers to discharge with patient and caregiver     Problem: Pain  Goal: Verbalizes/displays adequate comfort level or baseline comfort level  Outcome: Progressing  Flowsheets  Taken 5/31/2024 1130 by Cherise Adams RN  Verbalizes/displays adequate comfort level or baseline comfort level:   Encourage patient to monitor pain and request assistance   Assess pain using appropriate pain scale  Taken 5/31/2024 1029 by Loretta Lira RN  Verbalizes/displays adequate comfort level or baseline comfort level: Administer analgesics based on type and severity of pain and evaluate response     Problem: ABCDS Injury Assessment  Goal: Absence of physical injury  Outcome: Progressing

## 2024-05-31 NOTE — ANESTHESIA POSTPROCEDURE EVALUATION
Department of Anesthesiology  Postprocedure Note    Patient: Conchita Aguillon  MRN: [id number]  YOB: 1960  Date of evaluation: 5/31/2024    Procedure Summary       Date: 05/31/24 Room / Location: [address]    Anesthesia Start: 0739 Anesthesia Stop: 1014    Procedure: LAPAROSCOPIC ROBOTIC NISSEN FUNDOPLICATION AND HIATAL HERNIA REPAIR (Abdomen) Diagnosis:       Gastroesophageal reflux disease      Hiatal hernia      (Gastroesophageal reflux disease [K21.9])      (Hiatal hernia [K44.9])    Surgeons: Clari Kothari MD Responsible Provider: Domenico Russell MD    Anesthesia Type: general ASA Status: 3            Anesthesia Type: No value filed.    Chris Phase I: Chris Score: 3    Chris Phase II:      Anesthesia Post Evaluation    Patient location during evaluation: PACU  Patient participation: complete - patient participated  Level of consciousness: awake  Pain score: 4  Airway patency: patent  Nausea & Vomiting: nausea (2nd dose of zofran in PACU)  Cardiovascular status: hemodynamically stable  Respiratory status: airway suctioned, acceptable and face mask  Hydration status: stable  Pain management: adequate    No notable events documented.

## 2024-05-31 NOTE — CARE COORDINATION
Chart reviewed. From home, independent. PCP and insurance. No needs identified. CM available should any new needs arise.

## 2024-05-31 NOTE — BRIEF OP NOTE
GENERAL SURGERY BRIEF OPERATIVE NOTE  [facility]    PATIENT: Conchita Aguillon : 1960     MRN: [id number]   DATE: 2024 CASE ID: [id number]     SURGEON(S): JATIN WU MD     PROCEDURE(S) PERFORMED:   NISSEN FUNDOPLICATION LAPAROSCOPIC ROBOTIC, AND HIATAL HERNIA REPAIR    PREOPERATIVE DIAGNOSIS:  Gastroesophageal reflux disease [K21.9]  Hiatal hernia [K44.9]    POSTOPERATIVE DIAGNOSIS:   same    INDICATIONS: Conchita Aguillon is a 63 y.o. female presenting with  a hiatal hernia and refractory acid reflux  for which  robotic assisted Nissen fundoplication and hiatal hernia repair  has been discussed. The risks, benefits, potential complications and possible alternatives of the procedure were discussed in detail, including complications of but not limited to infection, bleeding, anesthesia-related complications, death, injury to surrounding structures, pain, poor healing or cosmesis, fluid collections (seroma, hematoma), hernia recurrence, mesh complications or infection, perforation, and recurrent symptoms or pathology, or need for additional interventions. All questions were answered. The patient wished to proceed and consent was documented in the medical record.      FINDINGS:   Sliding hiatal hernia  Infection Present At Time Of Surgery (PATOS) (choose all levels that have infection present):  No infection present    ANESTHESIA:   General endotracheal anesthesia  Anesthesiologist: Domenico Russell MD  CRNA: Carlos Quick APRN - CRNA; Autumn Null APRN - CRNA    FIRST ASSISTANT:   The use of a First Assistant was necessary for the proper positioning, prepping, and draping of the patient, as well as the safe and expeditious execution of the case and closure of skin and subcutaneous tissues.     STAFF:   First Assistant: Jose F Wilhelm, OSWALDO  Relief Scrub: Mandi Harris Tyneshia  Scrub Person First: Stacey Vu    ESTIMATED BLOOD LOSS: Minimal    SPECIMEN(S):   * No  specimens in log *     DRAINS & IMPLANTS:  Implant Name Type Inv. Item Serial No.  Lot No. LRB No. Used Action   CLIP INT L POLYMER KEEGAN LIG HEM O KEEGAN (6EA/PK) - [id number]  CLIP INT L POLYMER KEEGAN LIG HEM O KEEGAN (6EA/PK)  [employer]- [id number] N/A 1 Implanted        COMPLICATIONS: none    DISPOSITION: PACU - hemodynamically stable.     CONDITION: stable     Electronically signed:   JATIN WU MD 5/31/2024 9:48 AM

## 2024-05-31 NOTE — OP NOTE
GENERAL SURGERY OPERATIVE NOTE  [facility]    PATIENT: Conchita Aguillon : 1960     MRN: [id number]   DATE: 2024 CASE ID: [id number]     SURGEON(S): JATIN WU MD     PROCEDURE(S) PERFORMED:   NISSEN FUNDOPLICATION LAPAROSCOPIC ROBOTIC, AND HIATAL HERNIA REPAIR    PREOPERATIVE DIAGNOSIS:  Gastroesophageal reflux disease [K21.9]  Hiatal hernia [K44.9]    POSTOPERATIVE DIAGNOSIS:   same    INDICATIONS: Conchita Aguillon is a 63 y.o. female presenting with a hiatal hernia and refractory acid reflux for which robotic assisted Nissen fundoplication and hiatal hernia repair has been discussed. The risks, benefits, potential complications and possible alternatives of the procedure were discussed in detail, including complications of but not limited to infection, bleeding, anesthesia-related complications, death, injury to surrounding structures, pain, poor healing or cosmesis, fluid collections (seroma, hematoma), hernia recurrence, mesh complications or infection, perforation, and recurrent symptoms or pathology, or need for additional interventions. All questions were answered. The patient wished to proceed and consent was documented in the medical record.      FINDINGS:   Sliding hiatal hernia  Infection Present At Time Of Surgery (PATOS) (choose all levels that have infection present):  No infection present    ANESTHESIA:   General endotracheal anesthesia  Anesthesiologist: Domenico Russell MD  CRNA: Carlos Quick APRN - CRNA; Autumn Null APRN - CRNA    FIRST ASSISTANT:   The use of a First Assistant was necessary for the proper positioning, prepping, and draping of the patient, as well as the safe and expeditious execution of the case and closure of skin and subcutaneous tissues.     STAFF:   First Assistant: Jose F Wilhelm, OSWALDO  Relief Scrub: Mandi Harris Tyneshia  Scrub Person First: Stacey Vu    ESTIMATED BLOOD LOSS: Minimal    SPECIMEN(S):   * No specimens in log *

## 2024-06-01 LAB
ANION GAP SERPL CALCULATED.3IONS-SCNC: 13 MMOL/L (ref 7–16)
BUN SERPL-MCNC: 19 MG/DL (ref 6–23)
CALCIUM SERPL-MCNC: 8.3 MG/DL (ref 8.3–10.6)
CHLORIDE BLD-SCNC: 105 MMOL/L (ref 99–110)
CO2: 23 MMOL/L (ref 21–32)
CREAT SERPL-MCNC: 1.2 MG/DL (ref 0.6–1.1)
GFR, ESTIMATED: 52 ML/MIN/1.73M2
GLUCOSE SERPL-MCNC: 96 MG/DL (ref 70–99)
HCT VFR BLD CALC: 37.9 % (ref 37–47)
HEMOGLOBIN: 12.1 GM/DL (ref 12.5–16)
MCH RBC QN AUTO: 28.5 PG (ref 27–31)
MCHC RBC AUTO-ENTMCNC: 31.9 % (ref 32–36)
MCV RBC AUTO: 89.4 FL (ref 78–100)
PDW BLD-RTO: 13.3 % (ref 11.7–14.9)
PLATELET # BLD: 176 K/CU MM (ref 140–440)
PMV BLD AUTO: 9.6 FL (ref 7.5–11.1)
POTASSIUM SERPL-SCNC: 4.2 MMOL/L (ref 3.5–5.1)
RBC # BLD: 4.24 M/CU MM (ref 4.2–5.4)
SODIUM BLD-SCNC: 141 MMOL/L (ref 135–145)
WBC # BLD: 11.1 K/CU MM (ref 4–10.5)

## 2024-06-01 PROCEDURE — 85027 COMPLETE CBC AUTOMATED: CPT

## 2024-06-01 PROCEDURE — 36415 COLL VENOUS BLD VENIPUNCTURE: CPT

## 2024-06-01 PROCEDURE — 6370000000 HC RX 637 (ALT 250 FOR IP): Performed by: SURGERY

## 2024-06-01 PROCEDURE — 80048 BASIC METABOLIC PNL TOTAL CA: CPT

## 2024-06-01 PROCEDURE — APPNB15 APP NON BILLABLE TIME 0-15 MINS: Performed by: NURSE PRACTITIONER

## 2024-06-01 PROCEDURE — 99024 POSTOP FOLLOW-UP VISIT: CPT | Performed by: NURSE PRACTITIONER

## 2024-06-01 PROCEDURE — 2580000003 HC RX 258: Performed by: SURGERY

## 2024-06-01 PROCEDURE — 6360000002 HC RX W HCPCS: Performed by: SURGERY

## 2024-06-01 PROCEDURE — G0378 HOSPITAL OBSERVATION PER HR: HCPCS

## 2024-06-01 PROCEDURE — 96374 THER/PROPH/DIAG INJ IV PUSH: CPT

## 2024-06-01 PROCEDURE — 96375 TX/PRO/DX INJ NEW DRUG ADDON: CPT

## 2024-06-01 PROCEDURE — 96372 THER/PROPH/DIAG INJ SC/IM: CPT

## 2024-06-01 RX ADMIN — ONDANSETRON 4 MG: 2 INJECTION INTRAMUSCULAR; INTRAVENOUS at 12:01

## 2024-06-01 RX ADMIN — LOSARTAN POTASSIUM 50 MG: 25 TABLET, FILM COATED ORAL at 11:40

## 2024-06-01 RX ADMIN — EZETIMIBE 10 MG: 10 TABLET ORAL at 11:39

## 2024-06-01 RX ADMIN — MIRTAZAPINE 15 MG: 15 TABLET, FILM COATED ORAL at 20:34

## 2024-06-01 RX ADMIN — ENOXAPARIN SODIUM 40 MG: 100 INJECTION SUBCUTANEOUS at 11:56

## 2024-06-01 RX ADMIN — ALLOPURINOL 300 MG: 100 TABLET ORAL at 11:40

## 2024-06-01 RX ADMIN — SPIRONOLACTONE 100 MG: 50 TABLET ORAL at 11:40

## 2024-06-01 RX ADMIN — METOPROLOL TARTRATE 50 MG: 50 TABLET, FILM COATED ORAL at 11:40

## 2024-06-01 RX ADMIN — GABAPENTIN 300 MG: 300 CAPSULE ORAL at 20:34

## 2024-06-01 RX ADMIN — FAMOTIDINE 40 MG: 20 TABLET ORAL at 11:39

## 2024-06-01 RX ADMIN — CETIRIZINE HYDROCHLORIDE 10 MG: 10 TABLET, FILM COATED ORAL at 11:40

## 2024-06-01 RX ADMIN — SODIUM CHLORIDE, POTASSIUM CHLORIDE, SODIUM LACTATE AND CALCIUM CHLORIDE: 600; 310; 30; 20 INJECTION, SOLUTION INTRAVENOUS at 11:45

## 2024-06-01 RX ADMIN — MORPHINE SULFATE 4 MG: 4 INJECTION, SOLUTION INTRAMUSCULAR; INTRAVENOUS at 02:12

## 2024-06-01 RX ADMIN — LETROZOLE 2.5 MG: 2.5 TABLET ORAL at 11:54

## 2024-06-01 RX ADMIN — OXYCODONE HYDROCHLORIDE 5 MG: 5 TABLET ORAL at 12:00

## 2024-06-01 RX ADMIN — VENLAFAXINE HYDROCHLORIDE 75 MG: 37.5 CAPSULE, EXTENDED RELEASE ORAL at 20:33

## 2024-06-01 RX ADMIN — SODIUM CHLORIDE, PRESERVATIVE FREE 10 ML: 5 INJECTION INTRAVENOUS at 02:13

## 2024-06-01 RX ADMIN — ASPIRIN 81 MG: 81 TABLET, CHEWABLE ORAL at 11:40

## 2024-06-01 RX ADMIN — ACETAMINOPHEN 650 MG: 325 TABLET ORAL at 17:52

## 2024-06-01 RX ADMIN — OXYCODONE HYDROCHLORIDE 10 MG: 10 TABLET ORAL at 22:01

## 2024-06-01 RX ADMIN — FLUTICASONE PROPIONATE 1 SPRAY: 50 SPRAY, METERED NASAL at 11:55

## 2024-06-01 RX ADMIN — FUROSEMIDE 20 MG: 20 TABLET ORAL at 11:40

## 2024-06-01 RX ADMIN — ACETAMINOPHEN 650 MG: 325 TABLET ORAL at 12:06

## 2024-06-01 RX ADMIN — OXYCODONE HYDROCHLORIDE 5 MG: 5 TABLET ORAL at 17:52

## 2024-06-01 ASSESSMENT — PAIN SCALES - GENERAL
PAINLEVEL_OUTOF10: 9
PAINLEVEL_OUTOF10: 8
PAINLEVEL_OUTOF10: 4
PAINLEVEL_OUTOF10: 4
PAINLEVEL_OUTOF10: 6
PAINLEVEL_OUTOF10: 5
PAINLEVEL_OUTOF10: 7
PAINLEVEL_OUTOF10: 8
PAINLEVEL_OUTOF10: 4
PAINLEVEL_OUTOF10: 8

## 2024-06-01 ASSESSMENT — PAIN DESCRIPTION - ONSET
ONSET: ON-GOING
ONSET: ON-GOING

## 2024-06-01 ASSESSMENT — PAIN DESCRIPTION - PAIN TYPE
TYPE: SURGICAL PAIN
TYPE: SURGICAL PAIN

## 2024-06-01 ASSESSMENT — PAIN - FUNCTIONAL ASSESSMENT
PAIN_FUNCTIONAL_ASSESSMENT: ACTIVITIES ARE NOT PREVENTED
PAIN_FUNCTIONAL_ASSESSMENT: PREVENTS OR INTERFERES SOME ACTIVE ACTIVITIES AND ADLS

## 2024-06-01 ASSESSMENT — PAIN DESCRIPTION - DESCRIPTORS
DESCRIPTORS: DISCOMFORT
DESCRIPTORS: PRESSURE
DESCRIPTORS: PRESSURE

## 2024-06-01 ASSESSMENT — PAIN DESCRIPTION - FREQUENCY
FREQUENCY: CONTINUOUS
FREQUENCY: CONTINUOUS

## 2024-06-01 ASSESSMENT — PAIN DESCRIPTION - LOCATION
LOCATION: ABDOMEN

## 2024-06-01 ASSESSMENT — PAIN DESCRIPTION - ORIENTATION
ORIENTATION: MID;UPPER
ORIENTATION: MID
ORIENTATION: MID;UPPER

## 2024-06-01 NOTE — PROGRESS NOTES
GENERAL SURGERY INPATIENT PROGRESS NOTE  [facility]    PATIENT: Conchita Aguillon, 63 y.o., female, MRN: [id number]    Hospital Day:  LOS: 0 days , Post-Op Day: 1 Day Post-Op robotic assisted nissen fundoplication and hiatal hernia repair                Subjective:    Patient feeling tired and sore.  Mild nausea, has not tried clears yet.    Objective:    Vitals: BP (!) 109/57   Pulse 70   Temp 98.4 °F (36.9 °C) (Oral)   Resp 16   Ht 1.626 m (5' 4\")   Wt 100.2 kg (221 lb)   SpO2 97%   BMI 37.93 kg/m²     I/O: 05/30 1901 - 06/01 0700  In: 1620 [P.O.:120; I.V.:1500]  Out: 520 [Urine:500]    Physical Exam:  General Appearance:   Alert, cooperative, no distress    Head:   Normocephalic, atraumatic    Lungs:    Equal chest rise, respirations unlabored   Heart:   Regular rate and rhythm    Abdomen:    Soft, tenderness as expected.  Incisions c/d and intact with surgical glue   Extremities:  No cyanosis or edema    Neurologic:  Nonfocal, grossly intact        Labs/Imaging Results: No results found for this or any previous visit (from the past 24 hour(s)).      Assessment:  Conchita Aguillon is a 63 y.o. female who is post-op day #1 Day Post-Op robotic assisted nissen fundoplication and hiatal hernia repair.      Stable postop  Diet: Clears, currently has some nausea.  Advised to go slow, if feeling better this afternoon we can advance to fulls.  DO NOT advance past fulls.  She will go home for 2 weeks on fulls.  Wound: C/D/I; OK to shower  Labs/imaging: pending  Ambulation: Encouraged OOB to chair, mobilize as able  Respiratory:  IS at bedside, encourage hourly IS and deep breathing    LIAN Aguilera-CNP  6/1/2024  8:21 AM

## 2024-06-01 NOTE — PROGRESS NOTES
Comprehensive Nutrition Assessment    Type and Reason for Visit:  Initial, Positive Nutrition Screen (reported weight loss 2-13#, reduced intake/appetite)    Nutrition Recommendations/Plan:   Continue advancing diet to full liquids as tolerates  Encourage meal intake, small more frequent meals/snacks as needed  Continue to offer standard oral nutrition supplement with meals as diet advances to full liquids  Will continue to follow up during stay      Malnutrition Assessment:  Malnutrition Status:  At risk for malnutrition (Comment) (liquid diet, reduced appetite post-op) (06/01/24 3295)    Context:  Acute Illness       Nutrition Assessment:    Admit for planned surgery, lap nissen fundoplication with hernia repair. Hx GERD, dysphagia. Clear liquid diet started but has not yet tried anything besides water today. Reports usual loss of appetite for 2-3 days after surgeries. Encouraged to try taking some liquids. Provided copy of full liquid diet from nutrition care manual, will be on full liquid diet for 2 weeks. Willing to drink some oral nutrition supplements once advanced to full liquids. Will follow at high nutrition risk at this time.    Nutrition Related Findings:    up in chair with clear liquid lunch-not consuming, meds noted: lasix, remeron, pepcid, allopurinol, hx breast cancer, HTN, CKD Wound Type: Surgical Incision       Current Nutrition Intake & Therapies:    Average Meal Intake: Unable to assess  Average Supplements Intake: 0%  ADULT DIET; Clear Liquid  ADULT ORAL NUTRITION SUPPLEMENT; Breakfast, Lunch, Dinner; Standard High Calorie/High Protein Oral Supplement    Anthropometric Measures:  Height: 162.6 cm (5' 4\")  Ideal Body Weight (IBW): 120 lbs (55 kg)       Current Body Weight: 100.2 kg (220 lb 14.4 oz), 184.1 % IBW. Weight Source: Stated  Current BMI (kg/m2): 37.9  Usual Body Weight: 99.3 kg (219 lb) (6/2/2023)  % Weight Change (Calculated): 0.9  Weight Adjustment For: No Adjustment                  BMI Categories: Obese Class 2 (BMI 35.0 -39.9)    Estimated Daily Nutrient Needs:  Energy Requirements Based On: Formula  Weight Used for Energy Requirements: Current  Energy (kcal/day): 8592-4410 (miffl[address] st jeor)  Weight Used for Protein Requirements: Ideal  Protein (g/day): 66-77 (1.2-1.4 g/kg)  Method Used for Fluid Requirements: 1 ml/kcal  Fluid (ml/day): 1900    Nutrition Diagnosis:   Predicted inadequate energy intake related to altered GI structure, other (comment) (reduced appetite) as evidenced by NPO or clear liquid status due to medical condition    Nutrition Interventions:   Food and/or Nutrient Delivery: Continue Current Diet, Continue Oral Nutrition Supplement  Nutrition Education/Counseling: Education initiated  Coordination of Nutrition Care: Continue to monitor while inpatient  Plan of Care discussed with: patient    Goals:     Goals: PO intake 50% or greater, other (specify)  Specify Other Goals: will tolerate full liquid diet within 2 days    Nutrition Monitoring and Evaluation:   Behavioral-Environmental Outcomes: None Identified  Food/Nutrient Intake Outcomes: Diet Advancement/Tolerance, Food and Nutrient Intake, Supplement Intake  Physical Signs/Symptoms Outcomes: Biochemical Data, Skin, Weight, Nausea or Vomiting, Meal Time Behavior    Discharge Planning:    Continue Oral Nutrition Supplement     Annie Deshpande RD, LD  Contact: [phone]

## 2024-06-02 VITALS
TEMPERATURE: 98.3 F | RESPIRATION RATE: 16 BRPM | BODY MASS INDEX: 37.73 KG/M2 | HEIGHT: 64 IN | DIASTOLIC BLOOD PRESSURE: 76 MMHG | HEART RATE: 73 BPM | SYSTOLIC BLOOD PRESSURE: 138 MMHG | WEIGHT: 221 LBS | OXYGEN SATURATION: 92 %

## 2024-06-02 PROCEDURE — 6370000000 HC RX 637 (ALT 250 FOR IP): Performed by: SURGERY

## 2024-06-02 PROCEDURE — G0378 HOSPITAL OBSERVATION PER HR: HCPCS

## 2024-06-02 PROCEDURE — 6360000002 HC RX W HCPCS: Performed by: SURGERY

## 2024-06-02 PROCEDURE — 2580000003 HC RX 258: Performed by: SURGERY

## 2024-06-02 PROCEDURE — 99024 POSTOP FOLLOW-UP VISIT: CPT | Performed by: NURSE PRACTITIONER

## 2024-06-02 PROCEDURE — APPNB15 APP NON BILLABLE TIME 0-15 MINS: Performed by: NURSE PRACTITIONER

## 2024-06-02 PROCEDURE — 96372 THER/PROPH/DIAG INJ SC/IM: CPT

## 2024-06-02 RX ORDER — HYDROCODONE BITARTRATE AND ACETAMINOPHEN 5; 325 MG/1; MG/1
1 TABLET ORAL EVERY 4 HOURS PRN
Qty: 15 TABLET | Refills: 0 | Status: SHIPPED | OUTPATIENT
Start: 2024-06-02 | End: 2024-06-07

## 2024-06-02 RX ORDER — DOCUSATE SODIUM 100 MG/1
100 CAPSULE, LIQUID FILLED ORAL 2 TIMES DAILY
Qty: 28 CAPSULE | Refills: 0 | Status: SHIPPED | OUTPATIENT
Start: 2024-06-02 | End: 2024-06-16

## 2024-06-02 RX ADMIN — ALLOPURINOL 300 MG: 100 TABLET ORAL at 10:20

## 2024-06-02 RX ADMIN — ACETAMINOPHEN 650 MG: 325 TABLET ORAL at 05:57

## 2024-06-02 RX ADMIN — ASPIRIN 81 MG: 81 TABLET, CHEWABLE ORAL at 10:20

## 2024-06-02 RX ADMIN — ACETAMINOPHEN 650 MG: 325 TABLET ORAL at 10:19

## 2024-06-02 RX ADMIN — FUROSEMIDE 20 MG: 20 TABLET ORAL at 10:20

## 2024-06-02 RX ADMIN — ACETAMINOPHEN 650 MG: 325 TABLET ORAL at 00:40

## 2024-06-02 RX ADMIN — SODIUM CHLORIDE, POTASSIUM CHLORIDE, SODIUM LACTATE AND CALCIUM CHLORIDE: 600; 310; 30; 20 INJECTION, SOLUTION INTRAVENOUS at 13:40

## 2024-06-02 RX ADMIN — LOSARTAN POTASSIUM 50 MG: 25 TABLET, FILM COATED ORAL at 10:20

## 2024-06-02 RX ADMIN — LETROZOLE 2.5 MG: 2.5 TABLET ORAL at 10:22

## 2024-06-02 RX ADMIN — SPIRONOLACTONE 100 MG: 50 TABLET ORAL at 10:19

## 2024-06-02 RX ADMIN — METOPROLOL TARTRATE 50 MG: 50 TABLET, FILM COATED ORAL at 10:19

## 2024-06-02 RX ADMIN — ENOXAPARIN SODIUM 40 MG: 100 INJECTION SUBCUTANEOUS at 10:19

## 2024-06-02 RX ADMIN — FLUTICASONE PROPIONATE 1 SPRAY: 50 SPRAY, METERED NASAL at 10:22

## 2024-06-02 RX ADMIN — CETIRIZINE HYDROCHLORIDE 10 MG: 10 TABLET, FILM COATED ORAL at 10:20

## 2024-06-02 RX ADMIN — OXYCODONE HYDROCHLORIDE 5 MG: 5 TABLET ORAL at 10:20

## 2024-06-02 RX ADMIN — METHOCARBAMOL TABLETS 500 MG: 500 TABLET, COATED ORAL at 10:19

## 2024-06-02 RX ADMIN — FAMOTIDINE 40 MG: 20 TABLET ORAL at 10:20

## 2024-06-02 RX ADMIN — EZETIMIBE 10 MG: 10 TABLET ORAL at 10:32

## 2024-06-02 RX ADMIN — SODIUM CHLORIDE, PRESERVATIVE FREE 10 ML: 5 INJECTION INTRAVENOUS at 10:24

## 2024-06-02 ASSESSMENT — PAIN DESCRIPTION - PAIN TYPE
TYPE: SURGICAL PAIN

## 2024-06-02 ASSESSMENT — PAIN SCALES - GENERAL
PAINLEVEL_OUTOF10: 6
PAINLEVEL_OUTOF10: 4
PAINLEVEL_OUTOF10: 6
PAINLEVEL_OUTOF10: 6
PAINLEVEL_OUTOF10: 3

## 2024-06-02 ASSESSMENT — PAIN DESCRIPTION - ONSET
ONSET: ON-GOING
ONSET: ON-GOING

## 2024-06-02 ASSESSMENT — PAIN DESCRIPTION - DESCRIPTORS
DESCRIPTORS: PRESSURE

## 2024-06-02 ASSESSMENT — PAIN DESCRIPTION - ORIENTATION
ORIENTATION: UPPER;MID
ORIENTATION: MID;UPPER
ORIENTATION: MID;UPPER

## 2024-06-02 ASSESSMENT — PAIN - FUNCTIONAL ASSESSMENT
PAIN_FUNCTIONAL_ASSESSMENT: ACTIVITIES ARE NOT PREVENTED

## 2024-06-02 ASSESSMENT — PAIN DESCRIPTION - FREQUENCY
FREQUENCY: CONTINUOUS
FREQUENCY: CONTINUOUS

## 2024-06-02 ASSESSMENT — PAIN DESCRIPTION - LOCATION
LOCATION: ABDOMEN

## 2024-06-02 NOTE — PROGRESS NOTES
GENERAL SURGERY INPATIENT PROGRESS NOTE  [facility]    PATIENT: Conchita Aguillon, 63 y.o., female, MRN: [id number]    Hospital Day:  LOS: 0 days , Post-Op Day: 2 Days Post-Op robotic assisted nissen fundoplication and hiatal hernia repair                Subjective:    Patient feeling tired and sore but better than yesterday.  States she has ambulated in the room.  Tolerating diet.    Objective:    Vitals: /63   Pulse 56   Temp 97.7 °F (36.5 °C) (Oral)   Resp 16   Ht 1.626 m (5' 4\")   Wt 100.2 kg (221 lb)   SpO2 91%   BMI 37.93 kg/m²     I/O: 05/31 1901 - 06/02 0700  In: 1040 [P.O.:240; I.V.:800]  Out: 400 [Urine:400]    Physical Exam:  General Appearance:   Alert, cooperative, no distress    Head:   Normocephalic, atraumatic    Lungs:    Equal chest rise, respirations unlabored   Heart:   Regular rate and rhythm    Abdomen:    Soft, tenderness as expected.  Incisions c/d and intact with surgical glue   Extremities:  No cyanosis or edema    Neurologic:  Nonfocal, grossly intact        Labs/Imaging Results:   Recent Results (from the past 24 hour(s))   Basic Metabolic Panel    Collection Time: 06/01/24  9:25 AM   Result Value Ref Range    Sodium 141 135 - 145 MMOL/L    Potassium 4.2 3.5 - 5.1 MMOL/L    Chloride 105 99 - 110 mMol/L    CO2 23 21 - 32 MMOL/L    Anion Gap 13 7 - 16    Glucose 96 70 - 99 MG/DL    BUN 19 6 - 23 MG/DL    Creatinine 1.2 (H) 0.6 - 1.1 MG/DL    Est, Glom Filt Rate 52 (L) >60 mL/min/1.73m2    Calcium 8.3 8.3 - 10.6 MG/DL   CBC    Collection Time: 06/01/24  9:25 AM   Result Value Ref Range    WBC 11.1 (H) 4.0 - 10.5 K/CU MM    RBC 4.24 4.2 - 5.4 M/CU MM    Hemoglobin 12.1 (L) 12.5 - 16.0 GM/DL    Hematocrit 37.9 37 - 47 %    MCV 89.4 78 - 100 FL    MCH 28.5 27 - 31 PG    MCHC 31.9 (L) 32.0 - 36.0 %    RDW 13.3 11.7 - 14.9 %    Platelets 176 140 - 440 K/CU MM    MPV 9.6 7.5 - 11.1 FL         Assessment:  Conchita CASEY Aguillon is a 63 y.o. female who is post-op day #2 Days

## 2024-06-02 NOTE — DISCHARGE INSTRUCTIONS
Nissen Fundoplication Post-Op Diet  [phone]    This diet is designed to help control diarrhea, excess gas and swallowing problems, which may occur after this type of surgery    Avoid Eating Large Meals  Eat small, frequent meals (five to six per day).  Drinking large amounts of fluids with meals can stretch your stomach. You may drink fluids between meals as often as you like, but limit fluids to 1/2 cup (4 fluid ounces) with meals and one cup (8 fluid ounces) with snacks.   Do not lie down after eating. Sit upright for an hour after meals.  Eat very slowly. Take your time when eating.   Take small bites and chew your food well to help aid in swallowing and digestion.   Avoid crusty breads, sticky/gummy foods, such as bananas, fresh doughy breads, rolls and doughnuts, as well as animal products such as meats (2 weeks) and raw crunchy vegetables. These types of foods become sticky and difficult to swallow.   Moist breads like stuffing tend to be better tolerated.   Lastly, if you eat sweets, consume them at the end of your meal to avoid a group of symptoms referred to as “dumping syndrome”. This describes the rapid emptying of foods from the stomach to the small intestine. Sweetened beverages, candy and desserts move more rapidly and dump quickly into the intestines. This can cause symptoms of nausea, weakness, cold sweats, cramps, diarrhea and dizzy spells.  Avoiding Gas  Avoid drinking through a straw. Do not chew tobacco or gum. These actions cause you to swallow air, which produces excess gas in your stomach. Chew with your mouth closed.   Avoid any foods that cause stomach gas and distention. These foods include corn, dried beans, peas, lentils, onions, broccoli, cauliflower and any food from the cabbage family.   Avoid carbonated drinks, alcohol, citrus and tomato products (3-4 weeks) after that time, pour into glass to allow carbonation to escape.      Clear Liquid Diet   The first diet after surgery is

## 2024-06-02 NOTE — PLAN OF CARE
Problem: Chronic Conditions and Co-morbidities  Goal: Patient's chronic conditions and co-morbidity symptoms are monitored and maintained or improved  6/2/2024 1406 by Anum Peterson RN  Outcome: Adequate for Discharge  6/2/2024 1034 by Anum Peterson RN  Outcome: Progressing     Problem: Discharge Planning  Goal: Discharge to home or other facility with appropriate resources  6/2/2024 1406 by Anum Peterson RN  Outcome: Adequate for Discharge  6/2/2024 1034 by Anum Peterson RN  Outcome: Progressing     Problem: Pain  Goal: Verbalizes/displays adequate comfort level or baseline comfort level  6/2/2024 1406 by Anum Peterson RN  Outcome: Adequate for Discharge  6/2/2024 1034 by Anum Peterson RN  Outcome: Progressing     Problem: ABCDS Injury Assessment  Goal: Absence of physical injury  6/2/2024 1406 by Anum Peterson RN  Outcome: Adequate for Discharge  6/2/2024 1034 by Anum Peterson RN  Outcome: Progressing     Problem: Safety - Adult  Goal: Free from fall injury  6/2/2024 1406 by Anum Peterson RN  Outcome: Adequate for Discharge  6/2/2024 1034 by Anum Peterson RN  Outcome: Progressing     Problem: Nutrition Deficit:  Goal: Optimize nutritional status  6/2/2024 1406 by Anum Peterson RN  Outcome: Adequate for Discharge  6/2/2024 1034 by Anum Peterson RN  Outcome: Progressing

## 2024-06-02 NOTE — DISCHARGE SUMMARY
DISCHARGE SUMMARY  [facility] General Surgery    Patient ID:  Name:Conchita Aguillon  Date: 2024 9:37 AM   MRN#: [id number] :1960   Admission Date:2024 Age/Sex:63 y.o. female       Discharge date and time: No discharge date for patient encounter., (expected 2024)    Admitting Physician: Clari Kothari MD     Discharge Physician: same    Admission Diagnoses: Gastroesophageal reflux disease [K21.9]  Hiatal hernia [K44.9]    Discharge Diagnoses: Principal Problem:    Hiatal hernia  Active Problems:    Gastroesophageal reflux disease  Resolved Problems:    * No resolved hospital problems. *       Admission Condition: stable     Discharged Condition: stable    Indication for Admission: Principal Problem:    Hiatal hernia  Active Problems:    Gastroesophageal reflux disease  Resolved Problems:    * No resolved hospital problems. *       Hospital Course:   Conchita Aguillon is a 63 y.o. female presented for scheduled surgery due to hiatal hernia. She underwent nissen fundoplication and hiatal hernia repair. Postoperatively she did well; then was progressed to tolerating a diet, had adequate pain control, and felt ready for discharge home.    Consults: none    Significant Diagnostic Studies: see chart    Treatments: surgery/procedure: Robotic assisted nissen fundoplication and hiatal hernia repair    Disposition: Home or Self Care    Patient Instructions:      Medication List        START taking these medications      docusate sodium 100 MG capsule  Commonly known as: Colace  Take 1 capsule by mouth 2 times daily for 14 days     HYDROcodone-acetaminophen 5-325 MG per tablet  Commonly known as: Norco  Take 1 tablet by mouth every 4 hours as needed for Pain for up to 5 days. Intended supply: 7 days. Take lowest dose possible to manage pain Max Daily Amount: 6 tablets            CONTINUE taking these medications      allopurinol 300 MG tablet  Commonly known as: ZYLOPRIM     aspirin 81

## 2024-06-10 ENCOUNTER — OFFICE VISIT (OUTPATIENT)
Dept: SURGERY | Age: 64
End: 2024-06-10

## 2024-06-10 VITALS
HEART RATE: 64 BPM | OXYGEN SATURATION: 98 % | BODY MASS INDEX: 36.55 KG/M2 | HEIGHT: 64 IN | SYSTOLIC BLOOD PRESSURE: 104 MMHG | DIASTOLIC BLOOD PRESSURE: 78 MMHG | WEIGHT: 214.1 LBS

## 2024-06-10 DIAGNOSIS — K21.9 GASTROESOPHAGEAL REFLUX DISEASE WITHOUT ESOPHAGITIS: ICD-10-CM

## 2024-06-10 DIAGNOSIS — K44.9 HIATAL HERNIA: Primary | ICD-10-CM

## 2024-06-10 DIAGNOSIS — Z09 POSTOP CHECK: ICD-10-CM

## 2024-06-10 NOTE — PROGRESS NOTES
GENERAL SURGERY OUTPATIENT POSTOPERATIVE NOTE  [facility]    PATIENT: Conchita Aguillon, 1960, 63 y.o., female    Date of surgery: 5/31/24    CHIEF COMPLAINT:     Chief Complaint   Patient presents with    Post-Op Check     1st P/O Rob Nissen @ [facility] 5/31/24       History Obtained From:  patient, electronic medical record    HISTORY OF PRESENT ILLNESS:      Conchita Aguillon is a 63 y.o. female presenting postoperatively after laparoscopic nissen fundoplication and hiatal hernia repair. Since the procedure, she has been doing well.  She is following a full liquid diet, she does have some fullness and bloating toward the end of the day.    Wounds/Incisions: are healing well. No drainage or erythema.     I have reviewed the patient's information pertinent to this visit, including medical history, family history, social history and review of systems.    PHYSICAL EXAM:    Vitals:    06/10/24 0953   BP: 104/78   Site: Right Upper Arm   Position: Sitting   Cuff Size: Large Adult   Pulse: 64   SpO2: 98%   Weight: 97.1 kg (214 lb 1.6 oz)   Height: 1.626 m (5' 4\")       Object:  Vital signs and Nurse's note reviewed  Gen:  A&Ox3, NAD  CV: RRR, no m/h/t  Resp: LCTAB, no wheeze or rhonchi  Abd:  Soft, nttp, nd  Wounds: clean, dry and intact; no erythema induration or exudate  Ext:  Warm, no cyanosis or edema    Pertinent laboratory and imaging studies were personally reviewed if available.      IMPRESSION:    Conchita Aguillon is a 63 y.o. female following-up postoperatively from laparoscopic nissen fundoplication and hiatal hernia repair.    Visit Diagnoses:  1. Hiatal hernia    2. Gastroesophageal reflux disease without esophagitis    3. Postop check        Patient Active Problem List    Diagnosis Date Noted    Vasovagal syncope     Other dysphagia 02/09/2023    Chronic low back pain without sciatica 02/03/2023    Weight loss, abnormal 12/02/2022    History of cancer of left breast 12/02/2022    Dizziness

## 2024-06-16 NOTE — PROGRESS NOTES
Patient Name:  Conchita Aguillon  Patient :  1960  Patient MRN:  [id number]     Primary Oncologist: Kristan Murphy MD  Referring Provider: Lazaro Darden MD     Date of Service: 2024      Chief Complaint:    Chief Complaint   Patient presents with    Follow-up      She came in for follow-up visit.    Patient Active Problem List:     Essential hypertension     Hyperlipidemia     Allergic rhinitis due to pollen     Morbid obesity due to excess calories     Hearing loss     Hot flashes due to surgical menopause     Gastroesophageal reflux disease     Chronic back pain     Anxiety and depression     Osteoarthritis     Meniere's disease     Insomnia     Precordial pain      Calculus of gallbladder without cholecystitis without obstruction     S/P laparoscopic cholecystectomy     Vasovagal syncope     Class 2 obesity due to excess calories without serious comorbidity in adult     Family history of early     Closed fracture of left ankle     Acute respiratory failure with hypoxia      Status post left hip replacement     Hyperglycemia        Infiltrating ductal carcinoma of left breast      S/P mastectomy, bilateral     Hx of lymph node excision    HPI:   Conchita Aguillon is a pleasant 63 year old female patient who was referred for evaluation of [id number] invasive ductal carcinoma of the left breast, HR positive HER-2 negative, status post bilateral mastectomy in May 2021.  She went for the routine mammogram in 2021 and denied any palpable lump to her breast.  Mammogram at that time showed at least 2 indeterminate complex masses at the 2:00 and 12 o'clock position in the left breast in the retroareolar region.  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  ER was more than 95%,

## 2024-06-20 ENCOUNTER — OFFICE VISIT (OUTPATIENT)
Dept: GASTROENTEROLOGY | Age: 64
End: 2024-06-20

## 2024-06-20 VITALS
SYSTOLIC BLOOD PRESSURE: 134 MMHG | HEART RATE: 80 BPM | HEIGHT: 64 IN | BODY MASS INDEX: 36.57 KG/M2 | TEMPERATURE: 97.2 F | WEIGHT: 214.2 LBS | OXYGEN SATURATION: 98 % | DIASTOLIC BLOOD PRESSURE: 70 MMHG

## 2024-06-20 DIAGNOSIS — Z87.898 HISTORY OF DYSPHAGIA: ICD-10-CM

## 2024-06-20 DIAGNOSIS — K21.9 GASTROESOPHAGEAL REFLUX DISEASE WITHOUT ESOPHAGITIS: Primary | ICD-10-CM

## 2024-06-20 DIAGNOSIS — R11.0 NAUSEA: ICD-10-CM

## 2024-06-20 DIAGNOSIS — K58.2 IRRITABLE BOWEL SYNDROME WITH BOTH CONSTIPATION AND DIARRHEA: ICD-10-CM

## 2024-06-20 NOTE — PROGRESS NOTES
Conchita Aguillon 63 y.o. female was seen by PREETI Oneal on 6/20/2024     Wt Readings from Last 3 Encounters:   06/20/24 97.2 kg (214 lb 3.2 oz)   06/10/24 97.1 kg (214 lb 1.6 oz)   05/31/24 100.2 kg (221 lb)       HPI  Conchita Aguillon is a pleasant 63 y.o.  female who presents today for follow-up on acid reflux and irritable bowel syndrome with constipation.  Her upper GI series done on 4- showed minimal sliding axial hiatal hernia, no free gastroesophageal reflux, no evidence of achalasia, and no evidence of esophageal stricture.   She had a laparoscopic Nissen fundoplication done by Dr. Kothari on 5- for acid reflux refractory to PPI.  Her appetite is poor and weight is stable.  She is on soft foods at this time.  She has nausea with fullness after eating.  No vomiting.  Intermittent heartburn.  She is taking Pepcid twice a day.  No nocturnal awakenings with acid reflux.  No dysphagia or pain with swallowing.  Mild abdominal discomfort at puncture sites from recent surgery.  No worsening bloating.  She is drinking lactate free milk. No excess belching or flatulence.  No current constipation.  She mentioned in last three days having some diarrhea.  ng up sensation.  The day prior to that she had formed stools.  Her bowels are moving three to four times daily with soft mushy to liquid stools.  Imodium no help.  No constipation.  No blood in her stools or melena.  Her last colonoscopy was done by Dr. Galvez on August 2022 showing two colon polyps that were removed.  Her maternal grandmother and mother had colon cancer.  Her mother had stomach cancer.      ROS  Constitutional:  Positive for fatigue. Negative for appetite change, chills, diaphoresis, fever and unexpected weight change.   HENT:  Positive for hearing loss and tinnitus. Negative for ear pain.    Eyes:  Positive for visual disturbance. Negative for photophobia and pain.   Respiratory:  Positive for cough

## 2024-06-25 ENCOUNTER — OFFICE VISIT (OUTPATIENT)
Dept: SURGERY | Age: 64
End: 2024-06-25

## 2024-06-25 VITALS
WEIGHT: 212.6 LBS | HEART RATE: 109 BPM | SYSTOLIC BLOOD PRESSURE: 126 MMHG | HEIGHT: 64 IN | OXYGEN SATURATION: 98 % | DIASTOLIC BLOOD PRESSURE: 74 MMHG | BODY MASS INDEX: 36.29 KG/M2

## 2024-06-25 DIAGNOSIS — R13.10 DYSPHAGIA, UNSPECIFIED TYPE: Primary | ICD-10-CM

## 2024-06-25 DIAGNOSIS — Z09 POSTOP CHECK: ICD-10-CM

## 2024-06-25 PROCEDURE — 99024 POSTOP FOLLOW-UP VISIT: CPT | Performed by: SURGERY

## 2024-06-25 ASSESSMENT — PATIENT HEALTH QUESTIONNAIRE - PHQ9
SUM OF ALL RESPONSES TO PHQ QUESTIONS 1-9: 0
2. FEELING DOWN, DEPRESSED OR HOPELESS: NOT AT ALL
SUM OF ALL RESPONSES TO PHQ9 QUESTIONS 1 & 2: 0
SUM OF ALL RESPONSES TO PHQ QUESTIONS 1-9: 0
SUM OF ALL RESPONSES TO PHQ QUESTIONS 1-9: 0
1. LITTLE INTEREST OR PLEASURE IN DOING THINGS: NOT AT ALL
SUM OF ALL RESPONSES TO PHQ QUESTIONS 1-9: 0

## 2024-06-25 NOTE — PROGRESS NOTES
GENERAL SURGERY NOTE - Outpatient Post-Op  [facility]    PATIENT: Conchita Aguillon 1960, 63 y.o., female   Date: 6/25/2024  MRN: [id number]   Requesting Provider:  No ref. provider found History Obtained From:  patient, electronic medical record     Reason for Evaluation & Chief Complaint:    Chief Complaint   Patient presents with    Post-Op Check     2nd PO ROB NISSEN @ [facility] 5/31/24       HISTORY OF PRESENT ILLNESS:      Conchita Aguillon is a 63 y.o. female presenting postoperatively after robotic Nissen fundoplication. Since the procedure, she has been doing ok overall, but has been having similar symptoms to before surgery and dysphagia.     She feels like everything is \"getting stuck\". Sometimes it makes her get tunnel vision. She feels like it's the same as before surgery.   Intermittently feels reflux, but not all the time. Sometimes has nausea.   She spaces her pills out throughout the day, denies issues with swallowing pills with this.   Her incisions feel sore.     Bowel movement are  watery .    Wounds/Incisions: are healing well. No drainage or erythema.     Past Medical History:    Past Medical History:   Diagnosis Date    Allergic rhinitis due to pollen 11/19/2015    Allergy     Anxiety 10/19/2023    Arthritis     left hip & knee    Bleeding 10/19/2023    BRUISES EASILY AND BLEEDS A LOT AFTER HAVING BLOOD DRAWN    Blood transfusion without reported diagnosis 1989    POST HYSTERECTOMY    Breast cancer (HCC) 2021    MASECTOMY DOUBLE THEN CHEMO - ON FEMARA DAILY CURRENTLY - SEES ONCOLOGIST EVERY 6 MONTHS (DR CORDOVA IN [place])    Chest pain at rest     OCCASIONALLY - CARDIOLOGY: DR WISEMAN    Chest pain on exertion     OCCASIONALLY - CARDIOLOGY: DR WISEMAN    Chicken pox     CHILD    Chronic back pain     Chronic constipation     MIXED WITH DIARRHEA    Chronic diarrhea     MIXED WITH CONSTIPATION    Chronic pain     COVID 2020    HOSPITALIZED FOR 3 DAYS POST HIP REPLACEMENT WITH COVID

## 2024-06-28 ASSESSMENT — ENCOUNTER SYMPTOMS
COLOR CHANGE: 0
ABDOMINAL DISTENTION: 0
SHORTNESS OF BREATH: 0
VOMITING: 0
NAUSEA: 1
EYE REDNESS: 0
CHEST TIGHTNESS: 0
EYE DISCHARGE: 0
SORE THROAT: 0
ABDOMINAL PAIN: 1
BACK PAIN: 1
CONSTIPATION: 1
DIARRHEA: 1

## 2024-07-09 ENCOUNTER — HOSPITAL ENCOUNTER (OUTPATIENT)
Dept: INFUSION THERAPY | Age: 64
Discharge: HOME | End: 2024-07-09
Payer: MEDICAID

## 2024-07-09 DIAGNOSIS — D64.9 ANEMIA, UNSPECIFIED TYPE: ICD-10-CM

## 2024-07-09 DIAGNOSIS — C50.912 INFILTRATING DUCTAL CARCINOMA OF LEFT BREAST (HCC): ICD-10-CM

## 2024-07-09 LAB
ALBUMIN SERPL-MCNC: 4.2 GM/DL (ref 3.4–5)
ALP BLD-CCNC: 65 IU/L (ref 40–129)
ALT SERPL-CCNC: 20 U/L (ref 10–40)
ANION GAP SERPL CALCULATED.3IONS-SCNC: 13 MMOL/L (ref 7–16)
AST SERPL-CCNC: 18 IU/L (ref 15–37)
BASOPHILS ABSOLUTE: 0 K/CU MM
BASOPHILS RELATIVE PERCENT: 0.3 % (ref 0–1)
BILIRUB SERPL-MCNC: 0.6 MG/DL (ref 0–1)
BUN SERPL-MCNC: 16 MG/DL (ref 6–23)
CALCIUM SERPL-MCNC: 9.5 MG/DL (ref 8.3–10.6)
CHLORIDE BLD-SCNC: 105 MMOL/L (ref 99–110)
CO2: 25 MMOL/L (ref 21–32)
CREAT SERPL-MCNC: 1.3 MG/DL (ref 0.6–1.1)
DIFFERENTIAL TYPE: ABNORMAL
EOSINOPHILS ABSOLUTE: 0.2 K/CU MM
EOSINOPHILS RELATIVE PERCENT: 2.7 % (ref 0–3)
FERRITIN: 325 NG/ML (ref 15–150)
FOLATE SERPL-MCNC: 7 NG/ML (ref 3.1–17.5)
GFR, ESTIMATED: 47 ML/MIN/1.73M2
GLUCOSE SERPL-MCNC: 101 MG/DL (ref 70–99)
HCT VFR BLD CALC: 38.9 % (ref 37–47)
HEMOGLOBIN: 12.5 GM/DL (ref 12.5–16)
IRON: 71 UG/DL (ref 37–145)
LYMPHOCYTES ABSOLUTE: 2.5 K/CU MM
LYMPHOCYTES RELATIVE PERCENT: 35.4 % (ref 24–44)
MCH RBC QN AUTO: 29 PG (ref 27–31)
MCHC RBC AUTO-ENTMCNC: 32.1 % (ref 32–36)
MCV RBC AUTO: 90.3 FL (ref 78–100)
MONOCYTES ABSOLUTE: 0.5 K/CU MM
MONOCYTES RELATIVE PERCENT: 7.4 % (ref 0–4)
NEUTROPHILS ABSOLUTE: 3.8 K/CU MM
NEUTROPHILS RELATIVE PERCENT: 54.2 % (ref 36–66)
PCT TRANSFERRIN: 25 % (ref 10–44)
PDW BLD-RTO: 14.8 % (ref 11.7–14.9)
PLATELET # BLD: 171 K/CU MM (ref 140–440)
PMV BLD AUTO: 8.7 FL (ref 7.5–11.1)
POTASSIUM SERPL-SCNC: 4.2 MMOL/L (ref 3.5–5.1)
RBC # BLD: 4.31 M/CU MM (ref 4.2–5.4)
SODIUM BLD-SCNC: 143 MMOL/L (ref 135–145)
TOTAL IRON BINDING CAPACITY: 287 UG/DL (ref 250–450)
TOTAL PROTEIN: 6.4 GM/DL (ref 6.4–8.2)
UNSATURATED IRON BINDING CAPACITY: 216 UG/DL (ref 110–370)
VITAMIN B-12: 593.8 PG/ML (ref 211–911)
WBC # BLD: 7 K/CU MM (ref 4–10.5)

## 2024-07-09 PROCEDURE — 85025 COMPLETE CBC W/AUTO DIFF WBC: CPT

## 2024-07-09 PROCEDURE — 80053 COMPREHEN METABOLIC PANEL: CPT

## 2024-07-09 PROCEDURE — 82746 ASSAY OF FOLIC ACID SERUM: CPT

## 2024-07-09 PROCEDURE — 36415 COLL VENOUS BLD VENIPUNCTURE: CPT

## 2024-07-09 PROCEDURE — 83550 IRON BINDING TEST: CPT

## 2024-07-09 PROCEDURE — 83540 ASSAY OF IRON: CPT

## 2024-07-09 PROCEDURE — 82607 VITAMIN B-12: CPT

## 2024-07-09 PROCEDURE — 82728 ASSAY OF FERRITIN: CPT

## 2024-07-16 ENCOUNTER — OFFICE VISIT (OUTPATIENT)
Dept: ONCOLOGY | Age: 64
End: 2024-07-16
Payer: MEDICAID

## 2024-07-16 ENCOUNTER — HOSPITAL ENCOUNTER (OUTPATIENT)
Dept: INFUSION THERAPY | Age: 64
Discharge: HOME | End: 2024-07-16
Payer: MEDICAID

## 2024-07-16 VITALS
WEIGHT: 210.8 LBS | OXYGEN SATURATION: 100 % | SYSTOLIC BLOOD PRESSURE: 117 MMHG | TEMPERATURE: 97.7 F | BODY MASS INDEX: 35.99 KG/M2 | HEART RATE: 85 BPM | DIASTOLIC BLOOD PRESSURE: 58 MMHG | HEIGHT: 64 IN

## 2024-07-16 DIAGNOSIS — Z78.0 MENOPAUSE: ICD-10-CM

## 2024-07-16 DIAGNOSIS — D64.9 ANEMIA, UNSPECIFIED TYPE: Primary | ICD-10-CM

## 2024-07-16 PROCEDURE — 99213 OFFICE O/P EST LOW 20 MIN: CPT | Performed by: INTERNAL MEDICINE

## 2024-07-16 PROCEDURE — 99211 OFF/OP EST MAY X REQ PHY/QHP: CPT

## 2024-07-16 PROCEDURE — 3078F DIAST BP <80 MM HG: CPT | Performed by: INTERNAL MEDICINE

## 2024-07-16 PROCEDURE — 3074F SYST BP LT 130 MM HG: CPT | Performed by: INTERNAL MEDICINE

## 2024-07-16 RX ORDER — ALBUTEROL SULFATE 90 UG/1
2 AEROSOL, METERED RESPIRATORY (INHALATION) EVERY 6 HOURS PRN
COMMUNITY
Start: 2024-06-27

## 2024-07-16 RX ORDER — RANOLAZINE 500 MG/1
TABLET, EXTENDED RELEASE ORAL
COMMUNITY
Start: 2024-07-10

## 2024-07-16 RX ORDER — LISINOPRIL 40 MG/1
TABLET ORAL
COMMUNITY
Start: 2024-06-27

## 2024-07-16 NOTE — PROGRESS NOTES
MA Rooming Questions  Patient: Conchita Aguillon  MRN: [id number]    Date: 7/16/2024        1. Do you have any new issues?   yes - Patient states hasn't been feeling like usual self. Patient states feels tired.     2. Do you need any refills on medications?    no    3. Have you had any imaging done since your last visit?   yes - MRI for  @ [facility] 7/15    4. Have you been hospitalized or seen in the emergency room since your last visit here?   no    5. Did the patient have a depression screening completed today? No    No data recorded     PHQ-9 Given to (if applicable):               PHQ-9 Score (if applicable):                     [] Positive     []  Negative              Does question #9 need addressed (if applicable)                     [] Yes    []  No               Annabel De La Torre MA

## 2024-07-19 ENCOUNTER — HOSPITAL ENCOUNTER (OUTPATIENT)
Age: 64
Discharge: HOME | End: 2024-07-19
Payer: MEDICAID

## 2024-07-19 LAB
ALBUMIN SERPL-MCNC: 4.2 GM/DL (ref 3.4–5)
ALP BLD-CCNC: 59 IU/L (ref 40–128)
ALT SERPL-CCNC: 13 U/L (ref 10–40)
ANION GAP SERPL CALCULATED.3IONS-SCNC: 10 MMOL/L (ref 7–16)
AST SERPL-CCNC: 14 IU/L (ref 15–37)
BILIRUB SERPL-MCNC: 0.4 MG/DL (ref 0–1)
BUN SERPL-MCNC: 17 MG/DL (ref 6–23)
CALCIUM SERPL-MCNC: 9.2 MG/DL (ref 8.3–10.6)
CHLORIDE BLD-SCNC: 108 MMOL/L (ref 99–110)
CO2: 27 MMOL/L (ref 21–32)
CREAT SERPL-MCNC: 1.3 MG/DL (ref 0.6–1.1)
GFR, ESTIMATED: 47 ML/MIN/1.73M2
GLUCOSE SERPL-MCNC: 103 MG/DL (ref 70–99)
POTASSIUM SERPL-SCNC: 4.2 MMOL/L (ref 3.5–5.1)
SODIUM BLD-SCNC: 145 MMOL/L (ref 135–145)
TOTAL PROTEIN: 6.9 GM/DL (ref 6.4–8.2)

## 2024-07-19 PROCEDURE — 36415 COLL VENOUS BLD VENIPUNCTURE: CPT

## 2024-07-19 PROCEDURE — 80053 COMPREHEN METABOLIC PANEL: CPT

## 2024-07-24 PROBLEM — I15.0 RENOVASCULAR HYPERTENSION: Status: ACTIVE | Noted: 2024-07-24

## 2024-07-24 PROBLEM — I70.1 RENAL ARTERY STENOSIS, NON-FLOW-LIMITING (HCC): Status: ACTIVE | Noted: 2024-07-24

## 2024-08-23 ENCOUNTER — HOSPITAL ENCOUNTER (OUTPATIENT)
Age: 64
Discharge: HOME | End: 2024-08-23
Attending: INTERNAL MEDICINE
Payer: MEDICAID

## 2024-08-23 ENCOUNTER — HOSPITAL ENCOUNTER (OUTPATIENT)
Dept: WOMENS IMAGING | Age: 64
Discharge: HOME | End: 2024-08-23
Attending: INTERNAL MEDICINE
Payer: MEDICAID

## 2024-08-23 DIAGNOSIS — D64.9 ANEMIA, UNSPECIFIED TYPE: ICD-10-CM

## 2024-08-23 DIAGNOSIS — Z78.0 MENOPAUSE: ICD-10-CM

## 2024-08-23 LAB
ALBUMIN SERPL-MCNC: 4.1 GM/DL (ref 3.4–5)
ALP BLD-CCNC: 65 IU/L (ref 40–129)
ALT SERPL-CCNC: 9 U/L (ref 10–40)
ANION GAP SERPL CALCULATED.3IONS-SCNC: 9 MMOL/L (ref 7–16)
AST SERPL-CCNC: 11 IU/L (ref 15–37)
BASOPHILS ABSOLUTE: 0.1 K/CU MM
BASOPHILS RELATIVE PERCENT: 0.6 % (ref 0–1)
BILIRUB SERPL-MCNC: 0.4 MG/DL (ref 0–1)
BUN SERPL-MCNC: 20 MG/DL (ref 6–23)
CALCIUM SERPL-MCNC: 9.1 MG/DL (ref 8.3–10.6)
CHLORIDE BLD-SCNC: 104 MMOL/L (ref 99–110)
CO2: 29 MMOL/L (ref 21–32)
CREAT SERPL-MCNC: 1.3 MG/DL (ref 0.6–1.1)
DIFFERENTIAL TYPE: ABNORMAL
EOSINOPHILS ABSOLUTE: 0.1 K/CU MM
EOSINOPHILS RELATIVE PERCENT: 1.6 % (ref 0–3)
GFR, ESTIMATED: 46 ML/MIN/1.73M2
GLUCOSE SERPL-MCNC: 123 MG/DL (ref 70–99)
HCT VFR BLD CALC: 39.3 % (ref 37–47)
HEMOGLOBIN: 12.4 GM/DL (ref 12.5–16)
IMMATURE NEUTROPHIL %: 0.4 % (ref 0–0.43)
LYMPHOCYTES ABSOLUTE: 2.2 K/CU MM
LYMPHOCYTES RELATIVE PERCENT: 28.4 % (ref 24–44)
MCH RBC QN AUTO: 29.2 PG (ref 27–31)
MCHC RBC AUTO-ENTMCNC: 31.6 % (ref 32–36)
MCV RBC AUTO: 92.5 FL (ref 78–100)
MONOCYTES ABSOLUTE: 0.7 K/CU MM
MONOCYTES RELATIVE PERCENT: 8.9 % (ref 0–4)
NEUTROPHILS ABSOLUTE: 4.8 K/CU MM
NEUTROPHILS RELATIVE PERCENT: 60.1 % (ref 36–66)
NUCLEATED RBC %: 0 %
PDW BLD-RTO: 14.5 % (ref 11.7–14.9)
PLATELET # BLD: 234 K/CU MM (ref 140–440)
PMV BLD AUTO: 9.3 FL (ref 7.5–11.1)
POTASSIUM SERPL-SCNC: 3.9 MMOL/L (ref 3.5–5.1)
RBC # BLD: 4.25 M/CU MM (ref 4.2–5.4)
SODIUM BLD-SCNC: 142 MMOL/L (ref 135–145)
TOTAL IMMATURE NEUTOROPHIL: 0.03 K/CU MM
TOTAL NUCLEATED RBC: 0 K/CU MM
TOTAL PROTEIN: 6.4 GM/DL (ref 6.4–8.2)
WBC # BLD: 7.9 K/CU MM (ref 4–10.5)

## 2024-08-23 PROCEDURE — 36415 COLL VENOUS BLD VENIPUNCTURE: CPT

## 2024-08-23 PROCEDURE — 80053 COMPREHEN METABOLIC PANEL: CPT

## 2024-08-23 PROCEDURE — 77080 DXA BONE DENSITY AXIAL: CPT

## 2024-08-23 PROCEDURE — 85025 COMPLETE CBC W/AUTO DIFF WBC: CPT

## 2024-09-04 ENCOUNTER — HOSPITAL ENCOUNTER (OUTPATIENT)
Dept: GENERAL RADIOLOGY | Age: 64
Discharge: HOME | End: 2024-09-04
Attending: SURGERY
Payer: MEDICAID

## 2024-09-04 DIAGNOSIS — R13.10 DYSPHAGIA, UNSPECIFIED TYPE: ICD-10-CM

## 2024-09-04 PROCEDURE — 74248 X-RAY SM INT F-THRU STD: CPT

## 2024-09-17 ENCOUNTER — OFFICE VISIT (OUTPATIENT)
Dept: SURGERY | Age: 64
End: 2024-09-17
Payer: MEDICAID

## 2024-09-17 VITALS
DIASTOLIC BLOOD PRESSURE: 72 MMHG | OXYGEN SATURATION: 97 % | SYSTOLIC BLOOD PRESSURE: 100 MMHG | HEART RATE: 61 BPM | WEIGHT: 209.6 LBS | HEIGHT: 64 IN | BODY MASS INDEX: 35.78 KG/M2

## 2024-09-17 DIAGNOSIS — Z98.890 HISTORY OF NISSEN FUNDOPLICATION: ICD-10-CM

## 2024-09-17 DIAGNOSIS — R13.10 DYSPHAGIA, UNSPECIFIED TYPE: Primary | ICD-10-CM

## 2024-09-17 DIAGNOSIS — K21.9 CHRONIC GASTROESOPHAGEAL REFLUX DISEASE: ICD-10-CM

## 2024-09-17 PROCEDURE — 99213 OFFICE O/P EST LOW 20 MIN: CPT | Performed by: SURGERY

## 2024-09-17 PROCEDURE — 3078F DIAST BP <80 MM HG: CPT | Performed by: SURGERY

## 2024-09-17 PROCEDURE — 3074F SYST BP LT 130 MM HG: CPT | Performed by: SURGERY

## 2024-09-17 RX ORDER — DEXLANSOPRAZOLE 60 MG/1
60 CAPSULE, DELAYED RELEASE ORAL DAILY
Qty: 30 CAPSULE | Refills: 3 | Status: SHIPPED | OUTPATIENT
Start: 2024-09-17

## 2024-09-17 ASSESSMENT — ENCOUNTER SYMPTOMS
ABDOMINAL PAIN: 1
DIARRHEA: 1
BACK PAIN: 1
NAUSEA: 1
ABDOMINAL DISTENTION: 0
COLOR CHANGE: 0
CHEST TIGHTNESS: 0
VOMITING: 0
EYE DISCHARGE: 0
SHORTNESS OF BREATH: 0
EYE REDNESS: 0
CONSTIPATION: 1
SORE THROAT: 0

## 2024-09-20 ENCOUNTER — OFFICE VISIT (OUTPATIENT)
Dept: GASTROENTEROLOGY | Age: 64
End: 2024-09-20
Payer: MEDICAID

## 2024-09-20 VITALS
WEIGHT: 210 LBS | HEIGHT: 64 IN | DIASTOLIC BLOOD PRESSURE: 70 MMHG | BODY MASS INDEX: 35.85 KG/M2 | SYSTOLIC BLOOD PRESSURE: 106 MMHG

## 2024-09-20 DIAGNOSIS — Z98.890 HISTORY OF NISSEN FUNDOPLICATION: ICD-10-CM

## 2024-09-20 DIAGNOSIS — R13.19 ESOPHAGEAL DYSPHAGIA: Primary | ICD-10-CM

## 2024-09-20 DIAGNOSIS — R11.0 CHRONIC NAUSEA: ICD-10-CM

## 2024-09-20 DIAGNOSIS — K21.9 GASTROESOPHAGEAL REFLUX DISEASE WITHOUT ESOPHAGITIS: ICD-10-CM

## 2024-09-20 DIAGNOSIS — K58.1 IRRITABLE BOWEL SYNDROME WITH CONSTIPATION: ICD-10-CM

## 2024-09-20 PROCEDURE — 99213 OFFICE O/P EST LOW 20 MIN: CPT | Performed by: NURSE PRACTITIONER

## 2024-09-20 PROCEDURE — 3074F SYST BP LT 130 MM HG: CPT | Performed by: NURSE PRACTITIONER

## 2024-09-20 PROCEDURE — 3078F DIAST BP <80 MM HG: CPT | Performed by: NURSE PRACTITIONER

## 2024-09-23 ENCOUNTER — TELEPHONE (OUTPATIENT)
Dept: GASTROENTEROLOGY | Age: 64
End: 2024-09-23

## 2024-09-23 NOTE — TELEPHONE ENCOUNTER
Please call [facility] to obtain her esophageal manometry results she had done in the last year or two.  Thank you.

## 2024-09-23 NOTE — TELEPHONE ENCOUNTER
Called [facility], no answer. LM for them to call back and ask for me specifically.

## 2024-10-09 ENCOUNTER — HOSPITAL ENCOUNTER (OUTPATIENT)
Dept: INFUSION THERAPY | Age: 64
Discharge: HOME | End: 2024-10-09
Payer: MEDICAID

## 2024-10-09 DIAGNOSIS — Z78.0 MENOPAUSE: ICD-10-CM

## 2024-10-09 DIAGNOSIS — D64.9 ANEMIA, UNSPECIFIED TYPE: ICD-10-CM

## 2024-10-09 LAB
ALBUMIN SERPL-MCNC: 4.1 G/DL (ref 3.4–5)
ALBUMIN/GLOB SERPL: 1.9 {RATIO} (ref 1.1–2.2)
ALP SERPL-CCNC: 48 U/L (ref 40–129)
ALT SERPL-CCNC: 13 U/L (ref 10–40)
ANION GAP SERPL CALCULATED.3IONS-SCNC: 11 MMOL/L (ref 9–17)
AST SERPL-CCNC: 13 U/L (ref 15–37)
BASOPHILS # BLD: 0.01 K/UL
BASOPHILS NFR BLD: 0 % (ref 0–1)
BILIRUB SERPL-MCNC: 0.5 MG/DL (ref 0–1)
BUN SERPL-MCNC: 22 MG/DL (ref 7–20)
CALCIUM SERPL-MCNC: 9.3 MG/DL (ref 8.3–10.6)
CHLORIDE SERPL-SCNC: 106 MMOL/L (ref 99–110)
CO2 SERPL-SCNC: 24 MMOL/L (ref 21–32)
CREAT SERPL-MCNC: 1.4 MG/DL (ref 0.6–1.2)
EOSINOPHIL # BLD: 0.22 K/UL
EOSINOPHILS RELATIVE PERCENT: 3 % (ref 0–3)
ERYTHROCYTE [DISTWIDTH] IN BLOOD BY AUTOMATED COUNT: 15.6 % (ref 11.7–14.9)
GFR, ESTIMATED: 40 ML/MIN/1.73M2
GLUCOSE SERPL-MCNC: 109 MG/DL (ref 74–99)
HCT VFR BLD AUTO: 39.4 % (ref 37–47)
HGB BLD-MCNC: 12.8 G/DL (ref 12.5–16)
LYMPHOCYTES NFR BLD: 1.96 K/UL
LYMPHOCYTES RELATIVE PERCENT: 28 % (ref 24–44)
MCH RBC QN AUTO: 29.7 PG (ref 27–31)
MCHC RBC AUTO-ENTMCNC: 32.5 G/DL (ref 32–36)
MCV RBC AUTO: 91.4 FL (ref 78–100)
MONOCYTES NFR BLD: 0.51 K/UL
MONOCYTES NFR BLD: 7 % (ref 0–4)
NEUTROPHILS NFR BLD: 61 % (ref 36–66)
NEUTS SEG NFR BLD: 4.26 K/UL
PLATELET # BLD AUTO: 187 K/UL (ref 140–440)
PMV BLD AUTO: 8.7 FL (ref 7.5–11.1)
POTASSIUM SERPL-SCNC: 4.2 MMOL/L (ref 3.5–5.1)
PROT SERPL-MCNC: 6.2 G/DL (ref 6.4–8.2)
RBC # BLD AUTO: 4.31 M/UL (ref 4.2–5.4)
SODIUM SERPL-SCNC: 141 MMOL/L (ref 136–145)
WBC OTHER # BLD: 7 K/UL (ref 4–10.5)

## 2024-10-09 PROCEDURE — 36415 COLL VENOUS BLD VENIPUNCTURE: CPT

## 2024-10-09 PROCEDURE — 80053 COMPREHEN METABOLIC PANEL: CPT

## 2024-10-09 PROCEDURE — 85025 COMPLETE CBC W/AUTO DIFF WBC: CPT

## 2024-10-16 ENCOUNTER — OFFICE VISIT (OUTPATIENT)
Dept: ONCOLOGY | Age: 64
End: 2024-10-16
Payer: MEDICAID

## 2024-10-16 ENCOUNTER — HOSPITAL ENCOUNTER (OUTPATIENT)
Dept: INFUSION THERAPY | Age: 64
Discharge: HOME | End: 2024-10-16
Payer: MEDICAID

## 2024-10-16 VITALS
TEMPERATURE: 97.8 F | WEIGHT: 206.8 LBS | SYSTOLIC BLOOD PRESSURE: 138 MMHG | BODY MASS INDEX: 35.3 KG/M2 | DIASTOLIC BLOOD PRESSURE: 79 MMHG | RESPIRATION RATE: 18 BRPM | OXYGEN SATURATION: 98 % | HEIGHT: 64 IN | HEART RATE: 88 BPM

## 2024-10-16 DIAGNOSIS — C50.912 INFILTRATING DUCTAL CARCINOMA OF LEFT BREAST (HCC): Primary | ICD-10-CM

## 2024-10-16 PROCEDURE — 99211 OFF/OP EST MAY X REQ PHY/QHP: CPT

## 2024-10-16 PROCEDURE — 3078F DIAST BP <80 MM HG: CPT | Performed by: INTERNAL MEDICINE

## 2024-10-16 PROCEDURE — 99213 OFFICE O/P EST LOW 20 MIN: CPT | Performed by: INTERNAL MEDICINE

## 2024-10-16 PROCEDURE — 3075F SYST BP GE 130 - 139MM HG: CPT | Performed by: INTERNAL MEDICINE

## 2024-10-16 ASSESSMENT — PATIENT HEALTH QUESTIONNAIRE - PHQ9
SUM OF ALL RESPONSES TO PHQ QUESTIONS 1-9: 2
2. FEELING DOWN, DEPRESSED OR HOPELESS: SEVERAL DAYS
1. LITTLE INTEREST OR PLEASURE IN DOING THINGS: SEVERAL DAYS
SUM OF ALL RESPONSES TO PHQ QUESTIONS 1-9: 2
SUM OF ALL RESPONSES TO PHQ9 QUESTIONS 1 & 2: 2
SUM OF ALL RESPONSES TO PHQ QUESTIONS 1-9: 2
SUM OF ALL RESPONSES TO PHQ QUESTIONS 1-9: 2

## 2024-10-16 NOTE — PROGRESS NOTES
MA Rooming Questions  Patient: Conchita Aguillon  MRN: [id number]    Date: 10/16/2024        1. Do you have any new issues?   Acid reflux- on going    2. Do you need any refills on medications?    no    3. Have you had any imaging done since your last visit?   yes - DEXA    4. Have you been hospitalized or seen in the emergency room since your last visit here?   no    5. Did the patient have a depression screening completed today? Yes    No data recorded     PHQ-9 Given to (if applicable):               PHQ-9 Score (if applicable):                     [] Positive     []  Negative              Does question #9 need addressed (if applicable)                     [] Yes    []  No               Meghna Nielsen CMA      
Lenny week of 8/8/22.   GI recommended Linzess. Not surgical candidate for hiatal hernia for now. She has IBS. 9/4/2024 upper GI SBFT: Mild esophageal reflux noted with the patient supine..   9/4/2024 upper GI SBFT: Mild esophageal reflux noted with the patient supine..   10/9/2024 creat 1.4. LFTS and CBC grossly stable for her.  GI has referred to Dr Burnette at [facility] for w/u of esophageal dysmotility .  Reports her appointment will be in 1/2026. Recommend FU with GI again.     5. 5/2024 she has hiatal hernia surgery by Dr Kothari.  Intermittent diarrhea. Barium test ordered by Dr Kothari.  Recommend to eat small meals each time.  She was prescribed Dexilant by Dr Kothari.    6. She was referred to pulmonologist in [place] by PCP.    7. She has FU with neurologist at [facility] for pituitary gland mass. MRI brain scheduled in 8/2024 at [facility].    8. Refill of gabapentin for restless leg QHS and zofran for intermittent nausea.     9. Mental health: Reports suicidal ideation, 1 past attempt. Reports depression has worsened. Re-referred to counselor. Denied suicide plan, or means.    10.  She has anemia.  Anemic workup 12/8/21 reviewed. CBC in August 2022 grossly unremarkable.   11/2023 Ferritin 315, Iron 38, TIBC 306. Sat 12.  Not on oral iron.  1/8/2024 creat 1.5, LFT's and CBC grossly stable for her.  4/9/2024 creat 1.5, LFTs wnl. WBC 7.6, Hg 12, plat 197.  7/9/2024 creat 1.3. B-12 593.8, LFTs wnl. Ferritin 325, iron 71, TIBC 287, folate 7. On ASA. CBC wnl.   10/9/2024 creat 1.4. LFTS and CBC grossly stable for her.    Repeat labs prior to next OV.    11. She has chronic back pain. 11/22023 back surgery in [place].    We discussed about diet and weight loss.    Return to clinic in 12 weeks or sooner.  All of her question has been answered for today.

## 2024-10-29 RX ORDER — GABAPENTIN 300 MG/1
300 CAPSULE ORAL NIGHTLY
Qty: 90 CAPSULE | Refills: 1 | Status: SHIPPED | OUTPATIENT
Start: 2024-10-29 | End: 2025-04-27

## 2024-10-29 RX ORDER — LETROZOLE 2.5 MG/1
2.5 TABLET, FILM COATED ORAL DAILY
Qty: 30 TABLET | Refills: 3 | Status: SHIPPED | OUTPATIENT
Start: 2024-10-29

## 2024-11-06 NOTE — H&P
History and Physical      Name:  Lady Herbert /Age/Sex: 1960  (64 y.o. female)   MRN & CSN:  [id number] & [id number] Admission Date/Time: 2020  3:46 PM   Location:  [facility] PCP: Zeinab Goldberg MD       Hospital Day: 1        Admitting Physician: Dr. David Prater and Plan:   Lady Herbert is a 64 y.o. female who presents with Shortness of Breath; Extremity Weakness; and Concern For COVID-19     Acute respiratory failure with hypoxia 2/2 Covid viral pneumonia              Admit to Covid unit              Screening labs              Pending cultures              Bronchodilators              Pulmonary hygiene              Type and screen-agreeable to convalescent plasma if needed . R/B reviewed at bedside patient verbalized understanding              Empiric antibiotics levaquin     OA s/p left total hip replacement 10/19/2020   Will benefit from PT/OT evaluation when improved   PRN pain control      Essential hypertension- continue home antihypertensive regimen- Monitor BP trends.  Obesity- Body mass index is 38.74 kg/m². Lifestyle modifications needed         Patient case discussed with ED provider    Diet No diet orders on file   DVT Prophylaxis [] Lovenox, []  Heparin, [] SCDs, [] Ambulation  [x] Long term AC-Xarelto   GI Prophylaxis [] PPI,  [] H2 Blocker,  [] Carafate,  [] Diet/Tube Feeds   Code Status Full     Disposition Admit to inpatient. Patient plans to return home upon discharge   MDM [] Low, [] Moderate,[x]  High     -Patient assessment and plan discussed and reviewed with admitting physician: Gwendolyn Kirk DO. History of Present Illness:     Chief Complaint: Shortness of Breath; Extremity Weakness; and Concern For COVID-19    Lady Herbert is a 64 y.o. female who presents with Generalized weakness, cough, nausea. Concern for Covid.   States family member was caring for her in her home 2/2 recent admission/surgery tested positive for Covid Was given wrong patients after care summary . NFMARVIN    approximate 1 week ago. Patient reports onset of symptoms approximately 2 days ago and seemly increasing in severity. Reports myalgias, fever symptoms exertional dyspnea. While the emergency room the patient did test positive for COVID-19. She was placed on oxygen initially due to maintain oxygen saturations in the low 90s sustained and noted persistent tachycardia. Ten point ROS: reviewed negative, unless as noted in above HPI. Objective:   No intake or output data in the 24 hours ending 11/13/20 1952     Vitals:   Vitals:    11/13/20 1730 11/13/20 1800 11/13/20 1830 11/13/20 1900   BP: (!) 133/59 (!) 126/54 129/64 (!) 120/58   Pulse: 99 99 99 104   Resp:  20 20 20   Temp:       TempSrc:       SpO2: 94% 94% 96% 94%   Weight:       Height:           Physical Exam: 11/13/20     GEN -Awake acutely ill appearing female, sitting upright in bed , NAD. Obese body habitus. Appears given age. EYES -PERRLA. No scleral erythema, discharge, or conjunctivitis. HENT -MM are moist. Oral pharynx without exudates, no evidence of thrush. NECK -Supple, no apparent thyromegaly or masses. RESP -diminished occasional rhonchi. Symmetric chest movement while on supplemental oxygen. C/V -S1/S2 auscultated. RRR without appreciable M/R/G. No JVD or carotid bruits. Peripheral pulses equal bilaterally and palpable. Cap refill <3 sec. no peripheral edema. GI -Abdomen is soft non distended and without significant TTP. + BS. No masses or guarding. Rectal exam deferred. No HSM   -No CVA/ flank tenderness. Damon catheter is not present. LYMPH-No palpable cervical lymphadenopathy and no hepatosplenomegaly. No petechiae or ecchymoses. MS -No gross joint deformities. SKIN -Normal coloration, warm, dry. NEURO-Cranial nerves appear grossly intact, normal speech, no lateralizing weakness. PSYC-Awake, alert, oriented x 4- person, place, time, situation,  Appropriate affect.     Past Medical History:      Past Medical History:   Diagnosis Date    Allergic rhinitis due to pollen 11/19/2015    Arthritis     left hip & knee    Chronic back pain     Depression     Gastroesophageal reflux disease 11/19/2015    Hearing loss 11/19/2015    History of blood transfusion     No reaction per pt    History of cardiac monitoring 04/18/2017    14 day event monitor. Sinus Rhythm    History of nuclear stress test 09/28/2016    cardiolite-normal,EF70%    Hot flashes due to surgical menopause 11/19/2015    Hx of echocardiogram 10/05/2016    EF: >55 %   Mild mitral and tricuspid regurg.  Hyperlipidemia     Hypertension     Follows with PCP    Lexiscan Stress Test 10/14/2020    EF 60%, Normal study, no ischemia    Meniere disease     Morbid obesity due to excess calories ([facility] [address] 11/19/2015    Sleep apnea 11/19/2015    sleep study negative    Tilt table evaluation 05/09/2017     positive for neurocardiogenic syncope       Past Surgical  History:    has a past surgical history that includes Cholecystectomy; Appendectomy (1967); Tonsillectomy (1970); Colonoscopy (2014); Hysterectomy (1989); and Total hip arthroplasty (Left, 10/19/2020). Social History:    FAM HX: Reviewed  family history includes Cancer in her maternal grandmother; Cancer (age of onset: 46) in her sister; Cancer (age of onset: 47) in her maternal cousin; Cancer (age of onset: 80) in her mother; Diabetes in her father, maternal grandfather, maternal grandmother, mother, paternal grandfather, and paternal grandmother; Heart Disease in her father and mother; High Blood Pressure in her father and mother; High Cholesterol in her father and mother; Stroke in her mother.     Soc HX:   Social History     Socioeconomic History    Marital status:      Spouse name: None    Number of children: None    Years of education: None    Highest education level: None   Occupational History    None   Social Needs    Financial resource strain: None    Food insecurity Worry: None     Inability: None    Transportation needs     Medical: None     Non-medical: None   Tobacco Use    Smoking status: Never Smoker    Smokeless tobacco: Never Used    Tobacco comment: Reviewed    Substance and Sexual Activity    Alcohol use: No    Drug use: No    Sexual activity: Not Currently     Partners: Male   Lifestyle    Physical activity     Days per week: None     Minutes per session: None    Stress: None   Relationships    Social connections     Talks on phone: None     Gets together: None     Attends Buddhist service: None     Active member of club or organization: None     Attends meetings of clubs or organizations: None     Relationship status: None    Intimate partner violence     Fear of current or ex partner: None     Emotionally abused: None     Physically abused: None     Forced sexual activity: None   Other Topics Concern    None   Social History Narrative    None     TOBACCO:   reports that she has never smoked. She has never used smokeless tobacco.  ETOH:   reports no history of alcohol use. Drugs:  reports no history of drug use. Allergies: Allergies   Allergen Reactions    Celexa [Citalopram] Other (See Comments)     Stomach pain        Atorvastatin      Leg cramps      Mestinon [Pyridostigmine] Itching and Swelling    Penicillins     Sulfa Antibiotics     Tape Loyce Irena Tape]     Tricor [Fenofibrate]      angioedema    Gemfibrozil Rash    Meloxicam Other (See Comments)     moodswings       Home Medications:     Prior to Admission medications    Medication Sig Start Date End Date Taking?  Authorizing Provider   potassium chloride (KLOR-CON M) 20 MEQ extended release tablet Take 1 tablet by mouth 2 times daily 11/2/20   Domenick Severe, MD   furosemide (LASIX) 40 MG tablet Take 1 tablet by mouth daily 11/2/20   Domenick Severe, MD   rivaroxaban Shirin Jim) 10 MG TABS tablet Take 1 tablet by mouth daily 10/21/20   Lior Hardin MD   docusate sodium ([place]) 100 MG capsule Take 1 capsule by mouth daily as needed for Constipation 10/21/20   Emma Gordon MD   raNITIdine HCl (RANITIDINE ACID REDUCER PO) Take by mouth    Historical Provider, MD   ezetimibe (ZETIA) 10 MG tablet Take 1 tablet by mouth daily 7/6/20   Keyonna Can MD   metoprolol tartrate (LOPRESSOR) 50 MG tablet Take 1 tablet by mouth 2 times daily 7/6/20   Keyonna Can MD   hydroCHLOROthiazide (HYDRODIURIL) 25 MG tablet Take 0.5 tablets by mouth daily 7/6/20   Keyonna Can MD   loratadine (CLARITIN) 10 MG tablet Take 1 tablet by mouth daily 5/9/19   Keyonna Can MD         Medications:   Medications:    Infusions:    sodium chloride 150 mL/hr at 11/13/20 1816     PRN Meds:      Data:     Laboratory this visit:  Reviewed  Recent Labs     11/13/20  1657   WBC 4.3   HGB 11.2*   HCT 34.8*   *      Recent Labs     11/13/20  1657      K 3.5   CL 94*   CO2 26   BUN 12   CREATININE 1.0     Recent Labs     11/13/20  1657   AST 63*   ALT 61*   BILITOT 0.9   ALKPHOS 84     No results for input(s): INR in the last 72 hours. Radiology this visit:  Reviewed. Ct Abdomen Pelvis Wo Contrast Additional Contrast? None    Result Date: 10/27/2020  EXAMINATION: CT OF THE CHEST WITHOUT CONTRAST; CT OF THE ABDOMEN AND PELVIS WITHOUT CONTRAST 10/27/2020 2:31 pm TECHNIQUE: CT of the chest was performed without the administration of intravenous contrast. Multiplanar reformatted images are provided for review. Dose modulation, iterative reconstruction, and/or weight based adjustment of the mA/kV was utilized to reduce the radiation dose to as low as reasonably achievable.; CT of the abdomen and pelvis was performed without the administration of intravenous contrast. Multiplanar reformatted images are provided for review. Dose modulation, iterative reconstruction, and/or weight based adjustment of the mA/kV was utilized to reduce the radiation dose to as low as reasonably achievable.  COMPARISON: None HISTORY: ORDERING SYSTEM PROVIDED HISTORY: cough TECHNOLOGIST PROVIDED HISTORY: Reason for exam:->cough Reason for Exam: cough Acuity: Acute Type of Exam: Initial Relevant Medical/Surgical History: hx hyster, appy, carolynn; ORDERING SYSTEM PROVIDED HISTORY: abdominal pain TECHNOLOGIST PROVIDED HISTORY: Reason for exam:->abdominal pain Additional Contrast?->None Reason for Exam: abd pain Acuity: Acute Type of Exam: Initial Relevant Medical/Surgical History: hx carolynn, hyster, appy FINDINGS: Chest: No infiltrate or consolidation is identified. There are subsegmental atelectatic changes in both lung bases. There is a calcified granuloma in the right lower lobe. There is a calcified granuloma in the left lower lobe. Calcified lymph nodes are present within the mediastinum and both karmen. The heart size is top normal.  A mild hiatal hernia is noted. Mild degenerative changes of the spine are noted. Abdomen/Pelvis: Organs: Calcified granulomas are present within the liver and spleen. The gallbladder has been resected. The pancreas is homogeneous in density. No adrenal masses are identified and there is no hydronephrosis. No renal stones are detected. GI/Bowel: There is no bowel obstruction or free air or free fluid. The appendix is not identified. The stomach and duodenum are grossly within normal limits. Pelvis: Status post hysterectomy. The urinary bladder is collapsed around a Damon catheter. Peritoneum/Retroperitoneum: There are mild vascular calcifications. No pathologically enlarged lymph nodes are identified. Bones/Soft Tissues: See there are degenerative changes of the spine and the patient is status post left hip arthroplasty. No acute abnormality visualized. Evidence of chronic granulomatous disease. Xr Pelvis (1-2 Views)    Result Date: 10/19/2020  EXAMINATION: ONE XRAY VIEW OF THE PELVIS 10/19/2020 1:22 pm COMPARISON: Left hip radiographs 08/27/2020.  HISTORY: ORDERING SYSTEM PROVIDED HISTORY: post op TECHNOLOGIST PROVIDED HISTORY: Single view AP pelvis to include the prosthesis Reason for exam:->post op Reason for Exam: post op FINDINGS: Status post interval left total hip arthroplasty. The hardware is appropriately positioned and intact. The bilateral sacroiliac joints, pubic symphysis, and right hip are intact. No fracture or dislocation. The soft tissues are unremarkable. Status post interval left total hip arthroplasty without complication. Xr Elbow Left (min 3 Views)    Result Date: 10/23/2020  EXAMINATION: THREE XRAY VIEWS OF THE LEFT ELBOW 10/23/2020 12:27 am COMPARISON: None. HISTORY: ORDERING SYSTEM PROVIDED HISTORY: Pt complaining of pain 9/10. Recent fall with broken left hip and left ankle. TECHNOLOGIST PROVIDED HISTORY: Reason for exam:->Pt complaining of pain 9/10. Recent fall with broken left hip and left ankle. Reason for Exam: Pt complaining of pain 9/10. Recent fall with broken left hip and left ankle. Acuity: Acute Type of Exam: Subsequent/Follow-up FINDINGS: Bones are intact and in anatomic alignment. Joint spaces are maintained. Questionable posterior fat pad. No fracture identified. A suspected elbow joint effusion, however, could indicate occult injury. RECOMMENDATION: Consider CT of the elbow. Xr Ankle Left (min 3 Views)    Result Date: 10/22/2020  EXAMINATION: THREE XRAY VIEWS OF THE LEFT FOOT; THREE XRAY VIEWS OF THE LEFT ANKLE 10/22/2020 12:40 pm COMPARISON: None. HISTORY: ORDERING SYSTEM PROVIDED HISTORY: pain TECHNOLOGIST PROVIDED HISTORY: Reason for exam:->pain Reason for Exam: pain Acuity: Acute Type of Exam: Initial Mechanism of Injury: Pt to ED with complaints of left leg and foot pain. Pt reports recent hip replacement; ORDERING SYSTEM PROVIDED HISTORY: pain TECHNOLOGIST PROVIDED HISTORY: Reason for exam:->pain Reason for Exam: PAIN Acuity: Acute Type of Exam: Initial Mechanism of Injury: Pt to ED with complaints of left leg and foot pain.  Pt reports recent hip replacement FINDINGS: Left foot: Mild degenerative change seen at the 1st metatarsal-phalangeal joint. Mild interphalangeal joint degenerative osteoarthritic change. The Lisfranc joint appears intact. No fracture or dislocation is identified. No osseous erosive changes are identified. Calcaneal spurring seen at the plantar aponeurosis. Left ankle: Soft tissue swelling is seen at the ankle. The ankle mortise appears intact. There is a calcaneal spur noted at the plantar aponeurosis. There is a focal spur noted along the anteroinferior aspect of the distal tibia seen on the lateral view, with a oblique lucency extending through this which could be related to a nondisplaced osteophyte fracture. Suspected fracture through a small osteophyte off the anterior inferior aspect of the distal tibia at seen best on the lateral view at the level of the articular surface. Soft tissue swelling seen at the ankle. No other acute osseous fracture seen in the ankle or foot. Xr Foot Left (min 3 Views)    Result Date: 10/22/2020  EXAMINATION: THREE XRAY VIEWS OF THE LEFT FOOT; THREE XRAY VIEWS OF THE LEFT ANKLE 10/22/2020 12:40 pm COMPARISON: None. HISTORY: ORDERING SYSTEM PROVIDED HISTORY: pain TECHNOLOGIST PROVIDED HISTORY: Reason for exam:->pain Reason for Exam: pain Acuity: Acute Type of Exam: Initial Mechanism of Injury: Pt to ED with complaints of left leg and foot pain. Pt reports recent hip replacement; ORDERING SYSTEM PROVIDED HISTORY: pain TECHNOLOGIST PROVIDED HISTORY: Reason for exam:->pain Reason for Exam: PAIN Acuity: Acute Type of Exam: Initial Mechanism of Injury: Pt to ED with complaints of left leg and foot pain. Pt reports recent hip replacement FINDINGS: Left foot: Mild degenerative change seen at the 1st metatarsal-phalangeal joint. Mild interphalangeal joint degenerative osteoarthritic change. The Lisfranc joint appears intact. No fracture or dislocation is identified.   No Type of Exam: Initial Relevant Medical/Surgical History: hx carolynn, hyster, appy FINDINGS: Chest: No infiltrate or consolidation is identified. There are subsegmental atelectatic changes in both lung bases. There is a calcified granuloma in the right lower lobe. There is a calcified granuloma in the left lower lobe. Calcified lymph nodes are present within the mediastinum and both karmen. The heart size is top normal.  A mild hiatal hernia is noted. Mild degenerative changes of the spine are noted. Abdomen/Pelvis: Organs: Calcified granulomas are present within the liver and spleen. The gallbladder has been resected. The pancreas is homogeneous in density. No adrenal masses are identified and there is no hydronephrosis. No renal stones are detected. GI/Bowel: There is no bowel obstruction or free air or free fluid. The appendix is not identified. The stomach and duodenum are grossly within normal limits. Pelvis: Status post hysterectomy. The urinary bladder is collapsed around a Damon catheter. Peritoneum/Retroperitoneum: There are mild vascular calcifications. No pathologically enlarged lymph nodes are identified. Bones/Soft Tissues: See there are degenerative changes of the spine and the patient is status post left hip arthroplasty. No acute abnormality visualized. Evidence of chronic granulomatous disease. Mri Lumbar Spine Wo Contrast    Result Date: 10/28/2020  EXAMINATION: MRI OF THE LUMBAR SPINE WITHOUT CONTRAST, 10/28/2020 7:31 pm TECHNIQUE: Multiplanar multisequence MRI of the lumbar spine was performed without the administration of intravenous contrast. COMPARISON: None. HISTORY: ORDERING SYSTEM PROVIDED HISTORY: BACK PAIN TECHNOLOGIST PROVIDED HISTORY: Reason for exam:->BACK PAIN Reason for Exam: NKI, pain, No Surg FINDINGS: BONES/ALIGNMENT: There is a normal lumbar lordosis.   Assuming that the last well-formed disc represents L5-S1, the conus medullaris ends at L1 and is within normal limits. There is no acute fracture or traumatic subluxation. Grade 1 anterolisthesis of L4 on L5 is likely due to facet arthropathy. 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 Mild to moderate degenerative disc disease is noted. SPINAL CORD: The conus terminates normally. SOFT TISSUES: No paraspinal mass identified. L1-L2: Mild congenital narrowing of the thecal sac. L2-L3: Disc bulge with a superimposed central disc protrusion measuring up to 5.4 mm AP diameter. Facet hypertrophy. Findings results in severe narrowing of the thecal sac with resultant bunching of the cauda equina nerve roots, best appreciated on image number 6 of series 4. L3-L4: Disc bulge and facet hypertrophy contributing to mild-to-moderate narrowing of the thecal sac. L4-L5: Uncovering of the disc due to anterolisthesis. Facet hypertrophy. There is moderate narrowing of the thecal sac and mild-to-moderate narrowing of the right neural foramen. L5-S1: Facet hypertrophy without significant stenosis. Acquired on congenital spinal canal stenosis and degenerative neural foraminal narrowing. Canal stenosis is worst at the L2-3 disc level and results in bunching of the cauda equina nerve roots. Xr Chest Portable    Result Date: 11/13/2020  EXAMINATION: ONE XRAY VIEW OF THE CHEST 11/13/2020 4:04 pm COMPARISON: 10/30/2020 HISTORY: ORDERING SYSTEM PROVIDED HISTORY: SOB, covid exposure TECHNOLOGIST PROVIDED HISTORY: Reason for exam:->SOB, covid exposure Reason for Exam: SOB, covid exposure Acuity: Acute Type of Exam: Initial FINDINGS: Heart size and configuration are within normal limits. Bibasilar atelectasis noted on 10/30/2020 has nearly completely resolved. No acute infiltrate, pneumothorax or pleural effusion. No acute bone finding. Oval-shaped ossific density inferior to the left humeral head. Improving bibasilar atelectasis. Lungs are otherwise clear.      Xr Chest Portable    Result Date: 10/30/2020  EXAMINATION: ONE XRAY VIEW OF THE CHEST 10/30/2020 6:54 pm COMPARISON: October 24, 2020 HISTORY: ORDERING SYSTEM PROVIDED HISTORY: check for pna TECHNOLOGIST PROVIDED HISTORY: Reason for exam:->check for pna Reason for Exam: check for pna Acuity: Unknown Type of Exam: Ongoing Additional signs and symptoms: none Relevant Medical/Surgical History: none FINDINGS: No lines or tubes. Stable cardiomediastinal silhouette. Emphysematous changes are present. The lungs are clear without focal consolidation or pleural effusion. No suspicious pulmonary nodules. No pulmonary edema. No pneumothorax. No acute osseous abnormality. No acute cardiopulmonary disease. Xr Chest 1 View    Result Date: 10/24/2020  EXAMINATION: ONE XRAY VIEW OF THE CHEST 10/24/2020 11:48 am COMPARISON: 10/22/2020 HISTORY: ORDERING SYSTEM PROVIDED HISTORY: leukocytosis/ f/u pna TECHNOLOGIST PROVIDED HISTORY: Reason for exam:->leukocytosis/ f/u pna Follow-up exam FINDINGS: Linear opacity left lung base stable. No new focal consolidation, pleural effusion or pneumothorax. The cardiomediastinal silhouette is stable. No overt pulmonary edema. The osseous structures are stable. Stable left basilar linear opacities likely atelectasis. No new acute cardiopulmonary findings. Xr Chest 1 View    Result Date: 10/24/2020  EXAMINATION: ONE XRAY VIEW OF THE CHEST 10/22/2020 7:52 pm COMPARISON: None. HISTORY: ORDERING SYSTEM PROVIDED HISTORY: fever TECHNOLOGIST PROVIDED HISTORY: Reason for exam:->fever Reason for Exam: fever Acuity: Unknown Type of Exam: Unknown Additional signs and symptoms: none Relevant Medical/Surgical History: none FINDINGS: Mild cardiomegaly with pulmonary vascular congestion is noted. Minimal left basilar atelectasis/infiltrate is seen. There is no pleural effusion or pneumothorax. Mild cardiomegaly with pulmonary vascular congestion. Left basilar atelectasis/infiltrate.      Mri Elbow Left Wo Contrast    Result Date: 10/29/2020  EXAMINATION: MRI OF THE LEFT ELBOW WITHOUT CONTRAST, 10/29/2020 4:46 pm TECHNIQUE: Multiplanar multisequence MRI of the left elbow was performed without the administration of intravenous contrast. COMPARISON: CT left elbow October 24, 2020; left elbow radiograph October 23, 2020 HISTORY: ORDERING SYSTEM PROVIDED HISTORY: left elbow pain/ fever TECHNOLOGIST PROVIDED HISTORY: Reason for exam:->left elbow pain/ fever Reason for Exam: left elbow pain/ fever, fall--due to patient conditio, unable to raise arm, arm in fixed position with restraints as aids,best imaging obtained due to limited motion due to pain after fall the other day Acuity: Acute Type of Exam: Initial Mechanism of Injury: fall Relevant Medical/Surgical History: none FINDINGS: Evaluation of the elbow is suboptimal due to patient positioning and partial flexion as well as inability to utilize the dedicated elbow coil MEDIAL EPICONDYLE: Thickening of the common flexor origin from the medial epicondyle with intrasubstance fluid signal consistent with tendinosis and partial tearing. LATERAL EPICONDYLE: Thickening of the common extensor origin from the lateral epicondyle with high-grade partial tearing at the insertion (images 11 through 13 series 9). Underlying tendinosis of the common extensor origin. MEDIAL COLLATERAL LIGAMENT:  The ulnar collateral ligament, including the anterior and posterior bands, is grossly intact LATERAL COLLATERAL LIGAMENT:  The lateral collateral ligaments including the lateral ulnar collateral ligament are grossly intact MUSCLES / TENDONS: Mild diffuse fatty atrophy of the imaged musculature. The distal biceps tendon, brachialis tendon, and triceps tendons remain intact. ULNAR NERVE: The ulnar nerve is intact and grossly unremarkable in appearance. JOINT SPACES: Mild degenerative changes of the elbow with large complex elbow effusion which is of indeterminate etiology.   Differential considerations include hemarthrosis as well as severe synovitis. BONE MARROW: No osseous contusion or fracture. No suspicious marrow occupying lesions are identified. SOFT TISSUES: There is pronounced dorsal soft tissue edema with confluent fluid at the olecranon bursa. Findings may reflect posttraumatic bursitis given history of fall. Correlate clinically for acuity. 1. Large volume of confluent fluid at the olecranon bursa. Findings may reflect posttraumatic olecranon bursitis given history of fall. Correlate clinically for acuity. 2. Large complex elbow effusion which is of indeterminate etiology. Differential considerations include hemarthrosis as well as severe synovitis. Infection is felt less likely given absence of bone marrow edema and osseous changes. Arthrocentesis of the joint fluid could be obtained for further evaluation as clinically indicated. 3. Tendinosis and mild partial tearing of the common flexor origin from the medial epicondyle. 4. Tendinosis and high-grade partial tearing of the common extensor origin from the lateral epicondyle. 5. No acute fracture identified. Mri Foot Left Wo Contrast    Result Date: 10/29/2020  EXAMINATION: MRI OF THE LEFT FOOT WITHOUT CONTRAST, 10/28/2020 7:00 pm TECHNIQUE: Multiplanar multisequence MRI of the left foot was performed without the administration of intravenous contrast. COMPARISON: Foot and ankle radiograph dated 10/22/2020 HISTORY: ORDERING SYSTEM PROVIDED HISTORY: left foot pain TECHNOLOGIST PROVIDED HISTORY: Reason for exam:->left foot pain Reason for Exam: pain, Swelling, NKI, No Surg FINDINGS: LISFRANC JOINT:  Lisfranc ligament appears intact. The alignment of the tarsal-metatarsal joint appears anatomic. BONE MARROW: There is mild bone marrow edema type signal intensity seen about the 1st metatarsophalangeal joint as well as the expected location of the medial hallux sesamoid.   On the T1 weighted images, there is focus of low to intermediate signal intensity seen within the 1st metatarsal phalangeal joint with corresponding increased T2 hyper signal intensity with suggestion of cortical erosive changes seen affecting the plantar aspect of the 1st metatarsal head (sagittal image 7). No evidence of adjacent soft tissue or skin ulceration is seen. There appear to be degenerative cystic changes seen within the medial cuneiform. LESSER MTP JOINTS: The lesser metatarsophalangeal joints appear unremarkable. SOFT TISSUES: There is a 9 x 5 x 9 mm ovoid focus of near fluid signal intensity seen along the dorsal aspect of the proximal 3rd metatarsal, which may reflect a ganglion. There is mild edema seen within the intrinsic musculature of the foot, which may reflect nonspecific myositis. There appears to be dorsal subcutaneous lipoma seen at the level of the ankle, which is partially visualized on this study. TENDONS: There is a small amount of fluid seen along the flexor hallucis longus tendon, likely reflecting mild tenosynovitis. The visualized portions of flexor and extensor tendons appear grossly intact. The epicenter of the inflammatory changes appear to be at the 1st metatarsophalangeal joint with bone marrow edema type signal intensity seen within the distal aspect of the 1st metatarsal head and base of the 1st proximal phalanx. Correlation with point of tenderness is recommended. There is focus of low to intermediate signal intensity seen within the 1st metatarsophalangeal joint with suggestion of cortical erosive changes seen affecting the plantar aspect of the 1st metatarsal head. No evidence of adjacent soft tissue or skin ulceration is seen and these findings are likely inflammatory in origin rather than infectious. Based on this location and imaging appearance, the possibility of underlying gouty arthritis is raised. Mild edema seen within the intrinsic musculature of the foot, which may reflect nonspecific myositis.  Mild tenosynovitis affecting the flexor hallucis longus tendon. 9 mm focus of near fluid signal intensity seen along the dorsal aspect of the proximal 3rd metatarsal, which may reflect a ganglion     Ct Elbow Left Wo Contrast    Result Date: 10/26/2020  EXAMINATION: CT OF THE LEFT ELBOW WITHOUT CONTRAST 10/24/2020 12:57 pm TECHNIQUE: CT of the left elbow was performed without the administration of intravenous contrast.  Multiplanar reformatted images are provided for review. Dose modulation, iterative reconstruction, and/or weight based adjustment of the mA/kV was utilized to reduce the radiation dose to as low as reasonably achievable. COMPARISON: Radiographs 10/23/2020 HISTORY ORDERING SYSTEM PROVIDED HISTORY: left elbow pain. TECHNOLOGIST PROVIDED HISTORY: Reason for exam:->left elbow pain. Reason for Exam:  ELBOW PAIN, POSSIBLE FALL FINDINGS: No acute fracture or evidence of dislocation. No obvious bony erosions are seen. No significant degenerative changes are noted. There is elevation of the anterior and posterior fat pads with a soft tissue density. There is subcutaneous edema posterior to the olecranon. No acute fracture or evidence of dislocation. There is elevation of the anterior and posterior fat pads with soft tissue density. This could represent a complex joint effusion or synovial thickening/synovitis. In the setting of a fall, hemorrhagic effusion could also be considered. Further assessment can be made with MRI. Nonspecific subcutaneous edema at the posterior aspect of the olecranon. Vl Dup Lower Extremity Venous Right    Result Date: 10/26/2020  EXAMINATION: DUPLEX VENOUS ULTRASOUND OF THE RIGHT LOWER EXTREMITY, 10/26/2020 1:29 pm TECHNIQUE: Duplex ultrasound and Doppler images were obtained of the right lower extremity. COMPARISON: None.  HISTORY: ORDERING SYSTEM PROVIDED HISTORY: edema TECHNOLOGIST PROVIDED HISTORY: Reason for exam:->edema Reason for Exam: edema Type of Exam: Initial FINDINGS: The visualized veins of the right lower extremity are patent and free of echogenic thrombus. The veins are normally compressible and have normal phasic flow. Suboptimal visualization due to body habitus. No evidence of DVT in the right lower extremity. Vl Dup Lower Extremity Venous Left    Result Date: 10/22/2020  EXAMINATION: DUPLEX VENOUS ULTRASOUND OF THE LEFT LOWER EXTREMITY 10/22/2020 11:36 am TECHNIQUE: Duplex ultrasound and Doppler images were obtained of the left lower extremity. COMPARISON: None. HISTORY: ORDERING SYSTEM PROVIDED HISTORY: leg pain, total hip arthroplasty recently TECHNOLOGIST PROVIDED HISTORY: Reason for exam:->leg pain, total hip arthroplasty recently Reason for Exam: left leg pain Acuity: Acute Type of Exam: Initial Mechanism of Injury: left hip arthroplasty Relevant Medical/Surgical History: nk FINDINGS: The visualized veins of the left lower extremity are patent and free of echogenic thrombus. The veins are normally compressible and have normal phasic flow. No evidence of DVT in the left lower extremity. Mri Sacrum Coccyx Wo Contrast    Result Date: 10/28/2020  EXAMINATION: MRI OF THE SACRUM/SI JOINT WITHOUT CONTRAST, 10/28/2020 7:58 pm TECHNIQUE: Multiplanar multisequence MRI of the sacrum/si joint was performed without the administration of intravenous contrast. COMPARISON: MRI lumbar spine same day; CT abdomen pelvis October 27, 2020 left hip radiograph August 27 2020; pelvis radiograph August 19, 2020 HISTORY: ORDERING SYSTEM PROVIDED HISTORY: back pain TECHNOLOGIST PROVIDED HISTORY: Reason for exam:->back pain Reason for Exam: back pain, No Surg FINDINGS: SOFT TISSUES: Visualized intrapelvic structures demonstrate no acute process. Subcutaneous tissues demonstrate mild edema along the left paraspinous musculature which is nonspecific and may reflect muscular strain. There is symmetric fatty atrophy of the paraspinous musculature.   Mild dependent subcutaneous edema of the back. Intramuscular edema involving the musculature at the left hemipelvis. Patient has undergone a recent left hip arthroplasty and findings favored to be postoperative. The bladder is partially collapsed with Damon catheter in place. BONE MARROW: No osseous contusion or fracture. No suspicious marrow occupying lesions are identified. Metallic susceptibility artifact at the left hip related to hip arthroplasty hardware. JOINTS: Anatomic alignment of the acromioclavicular joints. Mild degenerative changes of bilateral acromioclavicular joints. No joint effusion. 1. No acute osseous abnormality of the sacrum or coccyx identified. 2. Mild intramuscular edema of the left paraspinous musculature which may reflect mild muscular strain. 3. Postoperative changes of left hip arthroplasty. Adjacent intramuscular edema at the left hemipelvis. Patient has undergone recent hip arthroplasty at this site and findings favored to be postoperative.          EKG this visit:  Reviewed           Electronically signed by Keypr Public, APRN - CNP on 11/13/2020 at 7:52 PM

## 2025-01-15 ENCOUNTER — HOSPITAL ENCOUNTER (OUTPATIENT)
Age: 65
Discharge: HOME | End: 2025-01-15
Payer: MEDICAID

## 2025-01-15 LAB — PHOSPHATE SERPL-MCNC: 2.5 MG/DL (ref 2.5–4.9)

## 2025-01-15 PROCEDURE — 84100 ASSAY OF PHOSPHORUS: CPT

## 2025-02-13 ENCOUNTER — HOSPITAL ENCOUNTER (OUTPATIENT)
Age: 65
Discharge: HOME | End: 2025-02-13
Payer: MEDICAID

## 2025-02-13 DIAGNOSIS — I15.0 RENOVASCULAR HYPERTENSION: ICD-10-CM

## 2025-02-13 DIAGNOSIS — N17.9 AKI (ACUTE KIDNEY INJURY) (HCC): ICD-10-CM

## 2025-02-13 DIAGNOSIS — I70.1 RENAL ARTERY STENOSIS, NON-FLOW-LIMITING (HCC): ICD-10-CM

## 2025-02-13 DIAGNOSIS — N18.32 STAGE 3B CHRONIC KIDNEY DISEASE (HCC): ICD-10-CM

## 2025-02-13 LAB
ANION GAP SERPL CALCULATED.3IONS-SCNC: 11 MMOL/L (ref 9–17)
BUN SERPL-MCNC: 14 MG/DL (ref 7–20)
CALCIUM SERPL-MCNC: 9.8 MG/DL (ref 8.3–10.6)
CHLORIDE SERPL-SCNC: 105 MMOL/L (ref 99–110)
CO2 SERPL-SCNC: 26 MMOL/L (ref 21–32)
CREAT SERPL-MCNC: 1.2 MG/DL (ref 0.6–1.2)
GFR, ESTIMATED: 46 ML/MIN/1.73M2
GLUCOSE SERPL-MCNC: 98 MG/DL (ref 74–99)
POTASSIUM SERPL-SCNC: 3.8 MMOL/L (ref 3.5–5.1)
SODIUM SERPL-SCNC: 141 MMOL/L (ref 136–145)

## 2025-02-13 PROCEDURE — 80048 BASIC METABOLIC PNL TOTAL CA: CPT

## 2025-03-13 ENCOUNTER — HOSPITAL ENCOUNTER (OUTPATIENT)
Age: 65
Discharge: HOME | End: 2025-03-13
Payer: MEDICAID

## 2025-03-13 DIAGNOSIS — I15.0 RENOVASCULAR HYPERTENSION: ICD-10-CM

## 2025-03-13 DIAGNOSIS — N17.9 AKI (ACUTE KIDNEY INJURY): ICD-10-CM

## 2025-03-13 DIAGNOSIS — N18.32 STAGE 3B CHRONIC KIDNEY DISEASE (HCC): ICD-10-CM

## 2025-03-13 LAB
ALBUMIN: 4.3 G/DL (ref 3.4–5)
ANION GAP SERPL CALCULATED.3IONS-SCNC: 12 MMOL/L (ref 9–17)
BUN SERPL-MCNC: 16 MG/DL (ref 7–20)
CALCIUM SERPL-MCNC: 9.5 MG/DL (ref 8.3–10.6)
CHLORIDE SERPL-SCNC: 103 MMOL/L (ref 99–110)
CO2 SERPL-SCNC: 25 MMOL/L (ref 21–32)
CREAT SERPL-MCNC: 1.3 MG/DL (ref 0.6–1.2)
GFR, ESTIMATED: 43 ML/MIN/1.73M2
GLUCOSE SERPL-MCNC: 108 MG/DL (ref 74–99)
PHOSPHATE SERPL-MCNC: 2.8 MG/DL (ref 2.5–4.9)
POTASSIUM SERPL-SCNC: 3.9 MMOL/L (ref 3.5–5.1)
SODIUM SERPL-SCNC: 141 MMOL/L (ref 136–145)

## 2025-03-13 PROCEDURE — 80069 RENAL FUNCTION PANEL: CPT

## 2025-03-23 NOTE — PROGRESS NOTES
Patient Name:  Conchita Aguillon  Patient :  1960  Patient MRN:  [id number]     Primary Oncologist: Kristan Murphy MD  Referring Provider: Lazaro Darden MD     Date of Service: 2025      Chief Complaint:    Chief Complaint   Patient presents with    Follow-up    Results      She came in for follow-up visit.    Patient Active Problem List:     Essential hypertension     Hyperlipidemia     Allergic rhinitis due to pollen     Morbid obesity due to excess calories     Hearing loss     Hot flashes due to surgical menopause     Gastroesophageal reflux disease     Chronic back pain     Anxiety and depression     Osteoarthritis     Meniere's disease     Insomnia     Precordial pain      Calculus of gallbladder without cholecystitis without obstruction     S/P laparoscopic cholecystectomy     Vasovagal syncope     Class 2 obesity due to excess calories without serious comorbidity in adult     Family history of early     Closed fracture of left ankle     Acute respiratory failure with hypoxia      Status post left hip replacement     Hyperglycemia        Infiltrating ductal carcinoma of left breast      S/P mastectomy, bilateral     Hx of lymph node excision    HPI:   Conchita Aguillon is a pleasant 64 year old female patient who was referred for evaluation of [id number] invasive ductal carcinoma of the left breast, HR positive HER-2 negative, status post bilateral mastectomy in May 2021.  She went for the routine mammogram in 2021 and denied any palpable lump to her breast.  Mammogram at that time showed at least 2 indeterminate complex masses at the 2:00 and 12 o'clock position in the left breast in the retroareolar region.  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  ER was more

## 2025-04-09 ENCOUNTER — HOSPITAL ENCOUNTER (OUTPATIENT)
Dept: INFUSION THERAPY | Age: 65
Discharge: HOME | End: 2025-04-09
Payer: MEDICAID

## 2025-04-09 DIAGNOSIS — C50.912 INFILTRATING DUCTAL CARCINOMA OF LEFT BREAST: ICD-10-CM

## 2025-04-09 LAB
ALBUMIN SERPL-MCNC: 4.1 G/DL (ref 3.4–5)
ALBUMIN/GLOB SERPL: 1.7 {RATIO} (ref 1.1–2.2)
ALP SERPL-CCNC: 68 U/L (ref 40–129)
ALT SERPL-CCNC: 11 U/L (ref 10–40)
ANION GAP SERPL CALCULATED.3IONS-SCNC: 11 MMOL/L (ref 9–17)
AST SERPL-CCNC: 17 U/L (ref 15–37)
BASOPHILS # BLD: 0.03 K/UL
BASOPHILS NFR BLD: 0 % (ref 0–1)
BILIRUB SERPL-MCNC: 0.7 MG/DL (ref 0–1)
BUN SERPL-MCNC: 13 MG/DL (ref 7–20)
CALCIUM SERPL-MCNC: 9.6 MG/DL (ref 8.3–10.6)
CHLORIDE SERPL-SCNC: 104 MMOL/L (ref 99–110)
CO2 SERPL-SCNC: 26 MMOL/L (ref 21–32)
CREAT SERPL-MCNC: 1.2 MG/DL (ref 0.6–1.2)
EOSINOPHIL # BLD: 0.07 K/UL
EOSINOPHILS RELATIVE PERCENT: 1 % (ref 0–3)
ERYTHROCYTE [DISTWIDTH] IN BLOOD BY AUTOMATED COUNT: 15.1 % (ref 11.7–14.9)
GFR, ESTIMATED: 44 ML/MIN/1.73M2
GLUCOSE SERPL-MCNC: 112 MG/DL (ref 74–99)
HCT VFR BLD AUTO: 41.6 % (ref 37–47)
HGB BLD-MCNC: 13.4 G/DL (ref 12.5–16)
LYMPHOCYTES NFR BLD: 2.32 K/UL
LYMPHOCYTES RELATIVE PERCENT: 33 % (ref 24–44)
MCH RBC QN AUTO: 28.3 PG (ref 27–31)
MCHC RBC AUTO-ENTMCNC: 32.2 G/DL (ref 32–36)
MCV RBC AUTO: 87.9 FL (ref 78–100)
MONOCYTES NFR BLD: 0.49 K/UL
MONOCYTES NFR BLD: 7 % (ref 0–4)
NEUTROPHILS NFR BLD: 58 % (ref 36–66)
NEUTS SEG NFR BLD: 4.03 K/UL
PLATELET # BLD AUTO: 190 K/UL (ref 140–440)
PMV BLD AUTO: 9.3 FL (ref 7.5–11.1)
POTASSIUM SERPL-SCNC: 4 MMOL/L (ref 3.5–5.1)
PROT SERPL-MCNC: 6.6 G/DL (ref 6.4–8.2)
RBC # BLD AUTO: 4.73 M/UL (ref 4.2–5.4)
SODIUM SERPL-SCNC: 140 MMOL/L (ref 136–145)
WBC OTHER # BLD: 6.9 K/UL (ref 4–10.5)

## 2025-04-09 PROCEDURE — 36415 COLL VENOUS BLD VENIPUNCTURE: CPT

## 2025-04-09 PROCEDURE — 85025 COMPLETE CBC W/AUTO DIFF WBC: CPT

## 2025-04-09 PROCEDURE — 80053 COMPREHEN METABOLIC PANEL: CPT

## 2025-04-16 ENCOUNTER — OFFICE VISIT (OUTPATIENT)
Dept: ONCOLOGY | Age: 65
End: 2025-04-16
Payer: MEDICAID

## 2025-04-16 ENCOUNTER — HOSPITAL ENCOUNTER (OUTPATIENT)
Dept: INFUSION THERAPY | Age: 65
Discharge: HOME | End: 2025-04-16
Payer: MEDICAID

## 2025-04-16 VITALS
TEMPERATURE: 98 F | WEIGHT: 202.6 LBS | BODY MASS INDEX: 34.59 KG/M2 | HEIGHT: 64 IN | OXYGEN SATURATION: 100 % | HEART RATE: 74 BPM | DIASTOLIC BLOOD PRESSURE: 84 MMHG | SYSTOLIC BLOOD PRESSURE: 170 MMHG

## 2025-04-16 DIAGNOSIS — C50.912 INFILTRATING DUCTAL CARCINOMA OF LEFT BREAST: Primary | ICD-10-CM

## 2025-04-16 PROCEDURE — 99213 OFFICE O/P EST LOW 20 MIN: CPT | Performed by: INTERNAL MEDICINE

## 2025-04-16 PROCEDURE — 3079F DIAST BP 80-89 MM HG: CPT | Performed by: INTERNAL MEDICINE

## 2025-04-16 PROCEDURE — 3077F SYST BP >= 140 MM HG: CPT | Performed by: INTERNAL MEDICINE

## 2025-04-16 PROCEDURE — 99212 OFFICE O/P EST SF 10 MIN: CPT

## 2025-04-16 ASSESSMENT — PATIENT HEALTH QUESTIONNAIRE - PHQ9
7. TROUBLE CONCENTRATING ON THINGS, SUCH AS READING THE NEWSPAPER OR WATCHING TELEVISION: NEARLY EVERY DAY
4. FEELING TIRED OR HAVING LITTLE ENERGY: NEARLY EVERY DAY
SUM OF ALL RESPONSES TO PHQ QUESTIONS 1-9: 20
SUM OF ALL RESPONSES TO PHQ QUESTIONS 1-9: 20
3. TROUBLE FALLING OR STAYING ASLEEP: NEARLY EVERY DAY
8. MOVING OR SPEAKING SO SLOWLY THAT OTHER PEOPLE COULD HAVE NOTICED. OR THE OPPOSITE, BEING SO FIGETY OR RESTLESS THAT YOU HAVE BEEN MOVING AROUND A LOT MORE THAN USUAL: NEARLY EVERY DAY
SUM OF ALL RESPONSES TO PHQ QUESTIONS 1-9: 20
SUM OF ALL RESPONSES TO PHQ QUESTIONS 1-9: 20
9. THOUGHTS THAT YOU WOULD BE BETTER OFF DEAD, OR OF HURTING YOURSELF: NOT AT ALL
6. FEELING BAD ABOUT YOURSELF - OR THAT YOU ARE A FAILURE OR HAVE LET YOURSELF OR YOUR FAMILY DOWN: MORE THAN HALF THE DAYS
1. LITTLE INTEREST OR PLEASURE IN DOING THINGS: NOT AT ALL
5. POOR APPETITE OR OVEREATING: NEARLY EVERY DAY
2. FEELING DOWN, DEPRESSED OR HOPELESS: NEARLY EVERY DAY
10. IF YOU CHECKED OFF ANY PROBLEMS, HOW DIFFICULT HAVE THESE PROBLEMS MADE IT FOR YOU TO DO YOUR WORK, TAKE CARE OF THINGS AT HOME, OR GET ALONG WITH OTHER PEOPLE: VERY DIFFICULT

## 2025-04-16 ASSESSMENT — COLUMBIA-SUICIDE SEVERITY RATING SCALE - C-SSRS
5. HAVE YOU STARTED TO WORK OUT OR WORKED OUT THE DETAILS OF HOW TO KILL YOURSELF? DO YOU INTEND TO CARRY OUT THIS PLAN?: NO
4. HAVE YOU HAD THESE THOUGHTS AND HAD SOME INTENTION OF ACTING ON THEM?: NO
3. HAVE YOU BEEN THINKING ABOUT HOW YOU MIGHT KILL YOURSELF?: NO
BASED ON RESPONSES TO C-SSRS QS 1-6, WHAT IS THE PATIENT'S OVERALL RISK RATING FOR SUICIDE: LOW RISK
7. DID THIS OCCUR IN THE LAST THREE MONTHS: NO
2. HAVE YOU ACTUALLY HAD ANY THOUGHTS OF KILLING YOURSELF?: NO
6. HAVE YOU EVER DONE ANYTHING, STARTED TO DO ANYTHING, OR PREPARED TO DO ANYTHING TO END YOUR LIFE?: NO
1. WITHIN THE PAST MONTH, HAVE YOU WISHED YOU WERE DEAD OR WISHED YOU COULD GO TO SLEEP AND NOT WAKE UP?: YES

## 2025-04-16 NOTE — PROGRESS NOTES
MA Rooming Questions  Patient: Conchita Aguillon  MRN: [id number]    Date: 4/16/2025        1. Do you have any new issues?   yes - Patient states since before parul she started having pain in bilateral shoulder that radiates into her underarms that seems to come and go.          2. Do you need any refills on medications?    no    3. Have you had any imaging done since your last visit?   yes - labs 4/9    4. Have you been hospitalized or seen in the emergency room since your last visit here?   no    5. Did the patient have a depression screening completed today? Yes    PHQ-9 Total Score: 20 (4/16/2025 11:03 AM)  Thoughts that you would be better off dead, or of hurting yourself in some way: 0 (4/16/2025 11:03 AM)       PHQ-9 Given to (if applicable):               PHQ-9 Score (if applicable):                     [] Positive     []  Negative              Does question #9 need addressed (if applicable)                     [] Yes    []  No               Stephania Crandall, [facility]

## 2025-04-24 ENCOUNTER — HOSPITAL ENCOUNTER (OUTPATIENT)
Age: 65
Discharge: HOME | End: 2025-04-24
Payer: MEDICAID

## 2025-04-24 LAB
ALBUMIN SERPL-MCNC: 4.3 G/DL (ref 3.4–5)
ALBUMIN/GLOB SERPL: 1.6 {RATIO} (ref 1.1–2.2)
ALP SERPL-CCNC: 77 U/L (ref 40–129)
ALT SERPL-CCNC: 10 U/L (ref 10–40)
ANION GAP SERPL CALCULATED.3IONS-SCNC: 13 MMOL/L (ref 9–17)
AST SERPL-CCNC: 15 U/L (ref 15–37)
BASOPHILS # BLD: 0.04 K/UL
BASOPHILS NFR BLD: 1 % (ref 0–1)
BILIRUB SERPL-MCNC: 0.6 MG/DL (ref 0–1)
BUN SERPL-MCNC: 18 MG/DL (ref 7–20)
CALCIUM SERPL-MCNC: 9.5 MG/DL (ref 8.3–10.6)
CHLORIDE SERPL-SCNC: 105 MMOL/L (ref 99–110)
CO2 SERPL-SCNC: 24 MMOL/L (ref 21–32)
CREAT SERPL-MCNC: 1.2 MG/DL (ref 0.6–1.2)
EOSINOPHIL # BLD: 0.06 K/UL
EOSINOPHILS RELATIVE PERCENT: 1 % (ref 0–3)
ERYTHROCYTE [DISTWIDTH] IN BLOOD BY AUTOMATED COUNT: 14.2 % (ref 11.7–14.9)
GFR, ESTIMATED: 45 ML/MIN/1.73M2
GLUCOSE SERPL-MCNC: 100 MG/DL (ref 74–99)
HCT VFR BLD AUTO: 42.3 % (ref 37–47)
HGB BLD-MCNC: 13.4 G/DL (ref 12.5–16)
IMM GRANULOCYTES # BLD AUTO: 0.01 K/UL
IMM GRANULOCYTES NFR BLD: 0 %
LYMPHOCYTES NFR BLD: 2.13 K/UL
LYMPHOCYTES RELATIVE PERCENT: 32 % (ref 24–44)
MCH RBC QN AUTO: 27.7 PG (ref 27–31)
MCHC RBC AUTO-ENTMCNC: 31.7 G/DL (ref 32–36)
MCV RBC AUTO: 87.4 FL (ref 78–100)
MONOCYTES NFR BLD: 0.41 K/UL
MONOCYTES NFR BLD: 6 % (ref 0–5)
NEUTROPHILS NFR BLD: 61 % (ref 36–66)
NEUTS SEG NFR BLD: 4.08 K/UL
PLATELET # BLD AUTO: 198 K/UL (ref 140–440)
PMV BLD AUTO: 9 FL (ref 7.5–11.1)
POTASSIUM SERPL-SCNC: 4.2 MMOL/L (ref 3.5–5.1)
PROT SERPL-MCNC: 7 G/DL (ref 6.4–8.2)
RBC # BLD AUTO: 4.84 M/UL (ref 4.2–5.4)
SODIUM SERPL-SCNC: 143 MMOL/L (ref 136–145)
WBC OTHER # BLD: 6.7 K/UL (ref 4–10.5)

## 2025-04-24 PROCEDURE — 85025 COMPLETE CBC W/AUTO DIFF WBC: CPT

## 2025-04-24 PROCEDURE — 80053 COMPREHEN METABOLIC PANEL: CPT

## 2025-05-13 ENCOUNTER — HOSPITAL ENCOUNTER (OUTPATIENT)
Age: 65
Discharge: HOME | End: 2025-05-13
Payer: MEDICAID

## 2025-05-13 LAB
ALBUMIN: 4.5 G/DL (ref 3.4–5)
ANION GAP SERPL CALCULATED.3IONS-SCNC: 14 MMOL/L (ref 9–17)
BUN SERPL-MCNC: 21 MG/DL (ref 7–20)
CALCIUM SERPL-MCNC: 9.5 MG/DL (ref 8.3–10.6)
CHLORIDE SERPL-SCNC: 101 MMOL/L (ref 99–110)
CO2 SERPL-SCNC: 27 MMOL/L (ref 21–32)
CREAT SERPL-MCNC: 1.4 MG/DL (ref 0.6–1.2)
GFR, ESTIMATED: 38 ML/MIN/1.73M2
GLUCOSE SERPL-MCNC: 104 MG/DL (ref 74–99)
PHOSPHATE SERPL-MCNC: 3.4 MG/DL (ref 2.5–4.9)
POTASSIUM SERPL-SCNC: 3.8 MMOL/L (ref 3.5–5.1)
SODIUM SERPL-SCNC: 141 MMOL/L (ref 136–145)

## 2025-05-13 PROCEDURE — 80069 RENAL FUNCTION PANEL: CPT

## 2025-06-15 NOTE — PROGRESS NOTES
Patient Name:  Conchita Aguillon  Patient :  1960  Patient MRN:  [id number]     Primary Oncologist: Kristan Murphy MD  Referring Provider: Lazaro Darden MD     Date of Service: 7/15/2025      Chief Complaint:    Chief Complaint   Patient presents with    Follow-up    Results      She came in for follow-up visit.    Patient Active Problem List:     Essential hypertension     Hyperlipidemia     Allergic rhinitis due to pollen     Morbid obesity due to excess calories     Hearing loss     Hot flashes due to surgical menopause     Gastroesophageal reflux disease     Chronic back pain     Anxiety and depression     Osteoarthritis     Meniere's disease     Insomnia     Precordial pain      Calculus of gallbladder without cholecystitis without obstruction     S/P laparoscopic cholecystectomy     Vasovagal syncope     Class 2 obesity due to excess calories without serious comorbidity in adult     Family history of early     Closed fracture of left ankle     Acute respiratory failure with hypoxia      Status post left hip replacement     Hyperglycemia        Infiltrating ductal carcinoma of left breast      S/P mastectomy, bilateral     Hx of lymph node excision    HPI:   Conchita Aguillon is a pleasant 64 year old female patient who was referred for evaluation of [id number] invasive ductal carcinoma of the left breast, HR positive HER-2 negative, status post bilateral mastectomy in May 2021.  She went for the routine mammogram in 2021 and denied any palpable lump to her breast.  Mammogram at that time showed at least 2 indeterminate complex masses at the 2:00 and 12 o'clock position in the left breast in the retroareolar region.  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  ER was more

## 2025-07-08 ENCOUNTER — HOSPITAL ENCOUNTER (OUTPATIENT)
Dept: INFUSION THERAPY | Age: 65
Discharge: HOME | End: 2025-07-08
Payer: MEDICAID

## 2025-07-08 DIAGNOSIS — C50.912 INFILTRATING DUCTAL CARCINOMA OF LEFT BREAST (HCC): ICD-10-CM

## 2025-07-08 LAB
ALBUMIN SERPL-MCNC: 4.1 G/DL (ref 3.4–5)
ALBUMIN/GLOB SERPL: 1.6 {RATIO} (ref 1.1–2.2)
ALP SERPL-CCNC: 70 U/L (ref 40–129)
ALT SERPL-CCNC: 8 U/L (ref 10–40)
ANION GAP SERPL CALCULATED.3IONS-SCNC: 13 MMOL/L (ref 9–17)
AST SERPL-CCNC: 14 U/L (ref 15–37)
BASOPHILS # BLD: 0.03 K/UL
BASOPHILS NFR BLD: 1 % (ref 0–1)
BILIRUB SERPL-MCNC: 0.6 MG/DL (ref 0–1)
BUN SERPL-MCNC: 19 MG/DL (ref 7–20)
CALCIUM SERPL-MCNC: 9.7 MG/DL (ref 8.3–10.6)
CHLORIDE SERPL-SCNC: 105 MMOL/L (ref 99–110)
CO2 SERPL-SCNC: 23 MMOL/L (ref 21–32)
CREAT SERPL-MCNC: 1.3 MG/DL (ref 0.6–1.2)
EOSINOPHIL # BLD: 0.07 K/UL
EOSINOPHILS RELATIVE PERCENT: 1 % (ref 0–3)
ERYTHROCYTE [DISTWIDTH] IN BLOOD BY AUTOMATED COUNT: 14.5 % (ref 11.7–14.9)
GFR, ESTIMATED: 42 ML/MIN/1.73M2
GLUCOSE SERPL-MCNC: 99 MG/DL (ref 74–99)
HCT VFR BLD AUTO: 43.4 % (ref 37–47)
HGB BLD-MCNC: 14.1 G/DL (ref 12.5–16)
LYMPHOCYTES NFR BLD: 2.2 K/UL
LYMPHOCYTES RELATIVE PERCENT: 34 % (ref 24–44)
MCH RBC QN AUTO: 28.6 PG (ref 27–31)
MCHC RBC AUTO-ENTMCNC: 32.5 G/DL (ref 32–36)
MCV RBC AUTO: 88 FL (ref 78–100)
MONOCYTES NFR BLD: 0.44 K/UL
MONOCYTES NFR BLD: 7 % (ref 0–5)
NEUTROPHILS NFR BLD: 58 % (ref 36–66)
NEUTS SEG NFR BLD: 3.82 K/UL
PLATELET # BLD AUTO: 176 K/UL (ref 140–440)
PMV BLD AUTO: 8.9 FL (ref 7.5–11.1)
POTASSIUM SERPL-SCNC: 4.2 MMOL/L (ref 3.5–5.1)
PROT SERPL-MCNC: 6.7 G/DL (ref 6.4–8.2)
RBC # BLD AUTO: 4.93 M/UL (ref 4.2–5.4)
SODIUM SERPL-SCNC: 141 MMOL/L (ref 136–145)
WBC OTHER # BLD: 6.6 K/UL (ref 4–10.5)

## 2025-07-08 PROCEDURE — 80053 COMPREHEN METABOLIC PANEL: CPT

## 2025-07-08 PROCEDURE — 85025 COMPLETE CBC W/AUTO DIFF WBC: CPT

## 2025-07-08 PROCEDURE — 36415 COLL VENOUS BLD VENIPUNCTURE: CPT

## 2025-07-15 ENCOUNTER — OFFICE VISIT (OUTPATIENT)
Dept: ONCOLOGY | Age: 65
End: 2025-07-15
Payer: MEDICAID

## 2025-07-15 ENCOUNTER — HOSPITAL ENCOUNTER (OUTPATIENT)
Dept: INFUSION THERAPY | Age: 65
Discharge: HOME | End: 2025-07-15
Payer: MEDICAID

## 2025-07-15 VITALS
DIASTOLIC BLOOD PRESSURE: 93 MMHG | HEART RATE: 70 BPM | HEIGHT: 64 IN | OXYGEN SATURATION: 100 % | TEMPERATURE: 98 F | SYSTOLIC BLOOD PRESSURE: 156 MMHG | WEIGHT: 203 LBS | BODY MASS INDEX: 34.66 KG/M2

## 2025-07-15 DIAGNOSIS — R10.10 PAIN OF UPPER ABDOMEN: Primary | ICD-10-CM

## 2025-07-15 DIAGNOSIS — C50.912 INFILTRATING DUCTAL CARCINOMA OF LEFT BREAST (HCC): ICD-10-CM

## 2025-07-15 PROCEDURE — 99212 OFFICE O/P EST SF 10 MIN: CPT

## 2025-07-15 PROCEDURE — 99214 OFFICE O/P EST MOD 30 MIN: CPT | Performed by: INTERNAL MEDICINE

## 2025-07-15 PROCEDURE — 3080F DIAST BP >= 90 MM HG: CPT | Performed by: INTERNAL MEDICINE

## 2025-07-15 PROCEDURE — 3077F SYST BP >= 140 MM HG: CPT | Performed by: INTERNAL MEDICINE

## 2025-07-15 RX ORDER — ONDANSETRON 8 MG/1
8 TABLET, FILM COATED ORAL EVERY 8 HOURS PRN
Qty: 30 TABLET | Refills: 1 | Status: SHIPPED | OUTPATIENT
Start: 2025-07-15

## 2025-07-15 NOTE — PROGRESS NOTES
MA/LPN Rooming Questions  Patient: Conchita Aguillon  MRN: [id number]    Date: 7/15/2025        1. Do you have any new issues?   yes - rib pain for the past 1.5 months          2. Do you need any refills on medications?    yes - zofran     3. Have you had any imaging done since your last visit?   yes - labs 7/8    4. Have you been hospitalized or seen in the emergency room since your last visit here?   no    5. Did the patient have a depression screening completed today? No    No data recorded     PHQ-9 Given to (if applicable):               PHQ-9 Score (if applicable):                     [] Positive     []  Negative              Does question #9 need addressed (if applicable)                     [] Yes    []  No               Stephania Crandall CMA

## 2025-07-16 ENCOUNTER — TELEPHONE (OUTPATIENT)
Dept: ONCOLOGY | Age: 65
End: 2025-07-16

## 2025-07-16 NOTE — TELEPHONE ENCOUNTER
7/16/25 - spoke to pt and scheduled the 7/31/25 CT AP at [facility] arrive at 9;45 am and NPO 4. Mg had sooner appts but pt would like to stay in [place]. R/S Filix to 8/6/25 at 1:15 pm

## 2025-07-31 ENCOUNTER — HOSPITAL ENCOUNTER (OUTPATIENT)
Dept: CT IMAGING | Age: 65
Discharge: HOME | End: 2025-07-31
Attending: INTERNAL MEDICINE
Payer: MEDICAID

## 2025-07-31 DIAGNOSIS — R10.10 PAIN OF UPPER ABDOMEN: ICD-10-CM

## 2025-07-31 DIAGNOSIS — C50.912 INFILTRATING DUCTAL CARCINOMA OF LEFT BREAST (HCC): ICD-10-CM

## 2025-07-31 PROCEDURE — 6360000004 HC RX CONTRAST MEDICATION: Performed by: INTERNAL MEDICINE

## 2025-07-31 PROCEDURE — 74176 CT ABD & PELVIS W/O CONTRAST: CPT

## 2025-07-31 RX ORDER — IOPAMIDOL 755 MG/ML
18 INJECTION, SOLUTION INTRAVASCULAR
Status: COMPLETED | OUTPATIENT
Start: 2025-07-31 | End: 2025-07-31

## 2025-07-31 RX ADMIN — IOPAMIDOL 18 ML: 755 INJECTION, SOLUTION INTRAVENOUS at 10:00

## 2025-08-04 ENCOUNTER — CLINICAL DOCUMENTATION (OUTPATIENT)
Dept: ONCOLOGY | Age: 65
End: 2025-08-04

## 2025-08-06 ENCOUNTER — HOSPITAL ENCOUNTER (OUTPATIENT)
Dept: INFUSION THERAPY | Age: 65
Discharge: HOME | End: 2025-08-06
Payer: MEDICAID

## 2025-08-06 ENCOUNTER — OFFICE VISIT (OUTPATIENT)
Dept: ONCOLOGY | Age: 65
End: 2025-08-06
Payer: MEDICAID

## 2025-08-06 VITALS
HEIGHT: 64 IN | BODY MASS INDEX: 34.76 KG/M2 | WEIGHT: 203.6 LBS | TEMPERATURE: 98.7 F | OXYGEN SATURATION: 99 % | DIASTOLIC BLOOD PRESSURE: 92 MMHG | HEART RATE: 77 BPM | SYSTOLIC BLOOD PRESSURE: 162 MMHG

## 2025-08-06 DIAGNOSIS — C50.912 INFILTRATING DUCTAL CARCINOMA OF LEFT BREAST (HCC): Primary | ICD-10-CM

## 2025-08-06 PROCEDURE — 3077F SYST BP >= 140 MM HG: CPT | Performed by: INTERNAL MEDICINE

## 2025-08-06 PROCEDURE — 3080F DIAST BP >= 90 MM HG: CPT | Performed by: INTERNAL MEDICINE

## 2025-08-06 PROCEDURE — 99213 OFFICE O/P EST LOW 20 MIN: CPT | Performed by: INTERNAL MEDICINE

## 2025-08-06 PROCEDURE — 99212 OFFICE O/P EST SF 10 MIN: CPT

## 2025-08-06 RX ORDER — GABAPENTIN 300 MG/1
300 CAPSULE ORAL NIGHTLY
Qty: 90 CAPSULE | Refills: 1 | Status: SHIPPED | OUTPATIENT
Start: 2025-08-06 | End: 2026-02-02

## 2025-08-06 RX ORDER — LETROZOLE 2.5 MG/1
2.5 TABLET, FILM COATED ORAL DAILY
Qty: 30 TABLET | Refills: 3 | Status: SHIPPED | OUTPATIENT
Start: 2025-08-06

## 2025-08-28 ENCOUNTER — HOSPITAL ENCOUNTER (OUTPATIENT)
Dept: LAB | Age: 65
Discharge: HOME | End: 2025-08-28
Payer: MEDICAID

## 2025-08-28 DIAGNOSIS — I15.0 RENOVASCULAR HYPERTENSION: ICD-10-CM

## 2025-08-28 DIAGNOSIS — N18.32 STAGE 3B CHRONIC KIDNEY DISEASE (HCC): ICD-10-CM

## 2025-08-28 DIAGNOSIS — N17.9 AKI (ACUTE KIDNEY INJURY): ICD-10-CM

## 2025-08-28 DIAGNOSIS — I70.1: ICD-10-CM

## 2025-08-28 LAB
ANION GAP SERPL CALCULATED.3IONS-SCNC: 13 MMOL/L (ref 9–17)
BUN SERPL-MCNC: 17 MG/DL (ref 7–20)
CALCIUM SERPL-MCNC: 9.1 MG/DL (ref 8.3–10.6)
CHLORIDE SERPL-SCNC: 103 MMOL/L (ref 99–110)
CO2 SERPL-SCNC: 24 MMOL/L (ref 21–32)
CREAT SERPL-MCNC: 1.3 MG/DL (ref 0.6–1.2)
GFR, ESTIMATED: 42 ML/MIN/1.73M2
GLUCOSE SERPL-MCNC: 100 MG/DL (ref 74–99)
POTASSIUM SERPL-SCNC: 3.9 MMOL/L (ref 3.5–5.1)
SODIUM SERPL-SCNC: 139 MMOL/L (ref 136–145)

## 2025-08-28 PROCEDURE — 80048 BASIC METABOLIC PNL TOTAL CA: CPT

## (undated) DEVICE — COLUMN DRAPE

## (undated) DEVICE — DRAPE SHEET ULTRAGARD: Brand: MEDLINE

## (undated) DEVICE — TOTAL TRAY, DB, 100% SILI FOLEY, 16FR 10: Brand: MEDLINE

## (undated) DEVICE — TOWEL,OR,DSP,ST,WHITE,DLX,XR,4/PK,20PK/C: Brand: MEDLINE

## (undated) DEVICE — PACK SURG LAP CHOLE

## (undated) DEVICE — ENDOSCOPY KIT: Brand: MEDLINE INDUSTRIES, INC.

## (undated) DEVICE — SOLUTION IV IRRIG POUR BRL 0.9% SODIUM CHL 2F7124

## (undated) DEVICE — COVER,TABLE,44X90,STERILE: Brand: MEDLINE

## (undated) DEVICE — TOWEL,OR,DSP,ST,BLUE,STD,6/PK,12PK/CS: Brand: MEDLINE

## (undated) DEVICE — SPONGE GZ W4XL8IN COT WVN 12 PLY

## (undated) DEVICE — VESSEL SEALER EXTEND: Brand: ENDOWRIST

## (undated) DEVICE — SOLUTION IV IRRIG WATER 1000ML POUR BRL 2F7114

## (undated) DEVICE — SOLUTION IV 1000ML 0.9% SOD CHL FOR IRRIG PLAS CONT

## (undated) DEVICE — Device

## (undated) DEVICE — HEWSON SUTURE RETRIEVER: Brand: HEWSON SUTURE RETRIEVER

## (undated) DEVICE — SOLUTION ANTIFOG VIS SYS CLEARIFY LAPSCP

## (undated) DEVICE — ELECTRODE ES AD CRDLSS PT RET REM POLYHESIVE

## (undated) DEVICE — 3M™ IOBAN™ 2 ANTIMICROBIAL INCISE DRAPE 6650EZ: Brand: IOBAN™ 2

## (undated) DEVICE — SUTURE VICRYL SZ 4-0 L18IN ABSRB UD L19MM PS-2 3/8 CIR PRIM J496H

## (undated) DEVICE — TUBING, SUCTION, 9/32" X 10', STRAIGHT: Brand: MEDLINE

## (undated) DEVICE — EXCEL 10FT (3.05 M) INSUFFLATION TUBING SET WITH 0.1 MICRON FILTER: Brand: EXCEL

## (undated) DEVICE — BIT DRL L110MM DIA2MM QUIK CONN W/O STP N RADLUC REUSE

## (undated) DEVICE — TRAY EPI 25GA L3.5IN CONTAIN BPA DEHP PVC PENCAN

## (undated) DEVICE — SUTURE ABSORBABLE 3-0 PS-1 18 IN UD MONOCRYL + STRATAFIX SXMP1B102

## (undated) DEVICE — YANKAUER,FLEXIBLE HANDLE,REGLR CAPACITY: Brand: MEDLINE INDUSTRIES, INC.

## (undated) DEVICE — GLOVE SURG SZ 8 CRM LTX FREE POLYISOPRENE POLYMER BEAD ANTI

## (undated) DEVICE — SPONGE LAP W18XL18IN WHT COT 4 PLY FLD STRUNG RADPQ DISP ST

## (undated) DEVICE — GOWN,SIRUS,FABRNF,RAGLAN,L,ST,30/CS: Brand: MEDLINE

## (undated) DEVICE — PACK PROCEDURE SURG TOT HIP LF

## (undated) DEVICE — HOOD, PEEL-AWAY: Brand: FLYTE

## (undated) DEVICE — SUTURE STRATAFIX SPRL SZ 1 L14IN ABSRB VLT L48CM CTX 1/2 SXPD2B405

## (undated) DEVICE — PLEDGET VASC W3/16XL3/8IN THK1/16IN PTFE SFT

## (undated) DEVICE — GLOVE SURG SZ 6 CRM LTX FREE POLYISOPRENE POLYMER BEAD ANTI

## (undated) DEVICE — GLOVE SURG SZ 65 L12IN FNGR THK79MIL GRN LTX FREE

## (undated) DEVICE — DISPOSABLE GRASPER: Brand: EPIX LAPAROSCOPIC GRASPER

## (undated) DEVICE — GOWN,SIRUS,POLYRNF,BRTHSLV,XLN/XL,20/CS: Brand: MEDLINE

## (undated) DEVICE — GLOVE ORANGE PI 7   MSG9070

## (undated) DEVICE — SYSTEM SKIN CLSR 22CM DERMBND PRINEO

## (undated) DEVICE — MASTISOL ADHESIVE LIQ 2/3ML

## (undated) DEVICE — GLOVE SURG SZ 65 CRM LTX FREE POLYISOPRENE POLYMER BEAD ANTI

## (undated) DEVICE — APPLICATOR MEDICATED 26 CC SOLUTION HI LT ORNG CHLORAPREP

## (undated) DEVICE — GLOVE SURG SZ 7 L12IN FNGR THK79MIL GRN LTX FREE

## (undated) DEVICE — BLADE SAGITAL 18X90X1.27MM

## (undated) DEVICE — STERILE LATEX POWDER-FREE SURGICAL GLOVESWITH NITRILE COATING: Brand: PROTEXIS

## (undated) DEVICE — Z DISCONTINUED (USE MFG CAT MVABO)  TUBING GAS SAMPLING STD 6.5 FT FEMALE CONN SMRT CAPNOLINE

## (undated) DEVICE — FORCEPS BX L240CM JAW DIA2.8MM L CAP W/ NDL MIC MESH TOOTH

## (undated) DEVICE — LIQUIBAND RAPID ADHESIVE 36/CS 0.8ML: Brand: MEDLINE

## (undated) DEVICE — SYRINGE MED 20ML STD CLR PLAS LUERLOCK TIP N CTRL DISP

## (undated) DEVICE — SEAL

## (undated) DEVICE — SUTURE ETHIBOND EXCEL SZ 2-0 L36IN NONABSORBABLE GRN L26MM SH X523H

## (undated) DEVICE — HARMONIC ACE

## (undated) DEVICE — PROTECTOR EYE PT SELF ADH NS OPT GRD LF

## (undated) DEVICE — SUTURE ETHIBOND EXCEL SZ 0 L36IN NONABSORBABLE GRN SH L26MM X524H

## (undated) DEVICE — LINER,SEMI-RIGID,3000CC,50EA/CS: Brand: MEDLINE

## (undated) DEVICE — POSITIONER HD AD W4.5XH8XL9IN HIGHLY RESILIENT FOAM CMFRT

## (undated) DEVICE — SET TBNG DISP TIP FOR AHTO

## (undated) DEVICE — FAN SPRAY KIT: Brand: PULSAVAC®

## (undated) DEVICE — SUTURE VCRL SZ 2-0 L18IN ABSRB UD CT-1 L36MM 1/2 CIR J839D

## (undated) DEVICE — FIRST ENTRY KIOS THRD 5X100MM ST

## (undated) DEVICE — NEEDLE INSUF L120MM DIA2MM DISP FOR PNEUMOPERI ENDOPATH

## (undated) DEVICE — TAPE MED W1/8XL30IN WHT POLY

## (undated) DEVICE — DRESSING TRNSPAR W2XL2.75IN FLM SHT SEMIPERMEABLE WIND

## (undated) DEVICE — GLOVE SURG SZ 7 CRM LTX FREE POLYISOPRENE POLYMER BEAD ANTI

## (undated) DEVICE — GLOVE SURG SZ 75 CRM LTX FREE POLYISOPRENE POLYMER BEAD ANTI

## (undated) DEVICE — Z DISC USE 2764362 SEAL ENDOSCP INSTR DIA5-8MM UNIV FOR CANN DA VINCI XI

## (undated) DEVICE — ARM DRAPE

## (undated) DEVICE — SUTURE VCRL SZ 0 L18IN ABSRB UD L36MM CT-1 1/2 CIR J840D

## (undated) DEVICE — Z DUP USE 2641840 CLIP INT L POLYMER LOK LIG HEM O LOK

## (undated) DEVICE — NEEDLE HYPO 20GA L1.5IN YEL POLYPR HUB S STL REG BVL STR

## (undated) DEVICE — DUAL LUMEN STOMACH TUBE: Brand: SALEM SUMP

## (undated) DEVICE — GOWN,ECLIPSE,POLYRNF,BRTHSLV,XL,30/CS: Brand: MEDLINE